# Patient Record
Sex: FEMALE | Race: BLACK OR AFRICAN AMERICAN | NOT HISPANIC OR LATINO | Employment: FULL TIME | URBAN - METROPOLITAN AREA
[De-identification: names, ages, dates, MRNs, and addresses within clinical notes are randomized per-mention and may not be internally consistent; named-entity substitution may affect disease eponyms.]

---

## 2017-05-02 ENCOUNTER — OFFICE VISIT (OUTPATIENT)
Dept: FAMILY MEDICINE | Facility: CLINIC | Age: 27
End: 2017-05-02
Payer: MEDICAID

## 2017-05-02 VITALS
RESPIRATION RATE: 18 BRPM | BODY MASS INDEX: 41.22 KG/M2 | WEIGHT: 247.38 LBS | HEART RATE: 80 BPM | SYSTOLIC BLOOD PRESSURE: 122 MMHG | HEIGHT: 65 IN | DIASTOLIC BLOOD PRESSURE: 70 MMHG | TEMPERATURE: 97 F

## 2017-05-02 DIAGNOSIS — N62 BREAST HYPERTROPHY IN FEMALE: ICD-10-CM

## 2017-05-02 DIAGNOSIS — Z00.00 ANNUAL PHYSICAL EXAM: Primary | ICD-10-CM

## 2017-05-02 DIAGNOSIS — E66.01 MORBID OBESITY WITH BMI OF 40.0-44.9, ADULT: ICD-10-CM

## 2017-05-02 PROCEDURE — 99999 PR PBB SHADOW E&M-EST. PATIENT-LVL III: CPT | Mod: PBBFAC,,, | Performed by: FAMILY MEDICINE

## 2017-05-02 PROCEDURE — 99213 OFFICE O/P EST LOW 20 MIN: CPT | Mod: PBBFAC,PO | Performed by: FAMILY MEDICINE

## 2017-05-02 PROCEDURE — 99395 PREV VISIT EST AGE 18-39: CPT | Mod: S$PBB,,, | Performed by: FAMILY MEDICINE

## 2017-05-02 RX ORDER — IBUPROFEN 800 MG/1
800 TABLET ORAL
COMMUNITY
End: 2019-08-29

## 2017-05-02 RX ORDER — ACETAMINOPHEN 500 MG
500 TABLET ORAL 2 TIMES DAILY
Status: ON HOLD | COMMUNITY
End: 2020-04-16

## 2017-05-02 RX ORDER — PHENTERMINE HYDROCHLORIDE 37.5 MG/1
37.5 TABLET ORAL
Qty: 30 TABLET | Refills: 0 | Status: SHIPPED | OUTPATIENT
Start: 2017-05-02 | End: 2017-06-01

## 2017-05-02 RX ORDER — PANTOPRAZOLE SODIUM 40 MG/1
40 TABLET, DELAYED RELEASE ORAL 2 TIMES DAILY
COMMUNITY
End: 2018-07-03 | Stop reason: SDUPTHER

## 2017-05-02 NOTE — MR AVS SNAPSHOT
[facility]  [address] [facility] [address]  Phone: [phone]                  Anthony Barrientos   2017 2:15 PM   Office Visit    Description:  Female : 1990   Provider:  Tyesha Leggett MD   Department:  [facility]           Diagnoses this Visit        Comments    Annual physical exam    -  Primary     Morbid obesity with BMI of 40.0-44.9, adult                To Do List           Future Appointments        Provider Department Dept Phone    5/10/2017 8:00 AM LABORATORY, [address] [facility] [phone]    2017 2:45 PM Tyesha Leggett MD [facility] [phone]      Goals (5 Years of Data)     None      Follow-Up and Disposition     Return in about 4 weeks (around 2017) for weight recheck.       These Medications        Disp Refills Start End    phentermine (ADIPEX-P) 37.5 mg tablet 30 tablet 0 2017    Take 1 tablet (37.5 mg total) by mouth before breakfast. - Oral    Pharmacy: [facility] [address] [place] [address] [address] AT [facility] Ph #: [phone]         [facility]     [facility] -  Assistance  Unless otherwise directed by your provider, please contact [facility], our nurse care line that is available for  assistance.     Registered nurses in the [facility] provide: appointment scheduling, clinical advisement, health education, and other advisory services.  Call: [phone] (toll free)               Medications           Message regarding Medications     Verify the changes and/or additions to your medication regime listed below are the same as discussed with your clinician today.  If any of these changes or additions are incorrect, please notify your healthcare provider.        START taking these NEW medications        Refills    phentermine (ADIPEX-P) 37.5 mg tablet 0  "   Sig: Take 1 tablet (37.5 mg total) by mouth before breakfast.    Class: Normal    Route: Oral      STOP taking these medications     doxylamine-pyridoxine (DICLEGIS) 10-10 mg TbEC Take 20 mg by mouth every evening.    promethazine (PHENERGAN) 25 MG suppository Place 1 suppository (25 mg total) rectally every 6 (six) hours as needed for Nausea.           Verify that the below list of medications is an accurate representation of the medications you are currently taking.  If none reported, the list may be blank. If incorrect, please contact your healthcare provider. Carry this list with you in case of emergency.           Current Medications     acetaminophen (TYLENOL) 500 MG tablet Take 500 mg by mouth 2 (two) times daily.    ibuprofen (ADVIL,MOTRIN) 800 MG tablet Take 800 mg by mouth once daily.    pantoprazole (PROTONIX) 40 MG tablet Take 40 mg by mouth 2 (two) times daily.    phentermine (ADIPEX-P) 37.5 mg tablet Take 1 tablet (37.5 mg total) by mouth before breakfast.           Clinical Reference Information           Your Vitals Were     BP Pulse Temp Resp    122/70 (BP Location: Left arm, Patient Position: Sitting, BP Method: Manual) 80 97.3 °F (36.3 °C) (Tympanic) 18    Height Weight Last Period BMI    5' 5" (1.651 m) 112.2 kg (247 lb 5.7 oz) 04/25/2017 (Approximate) 41.16 kg/m2      Blood Pressure          Most Recent Value    BP  122/70      Allergies as of 5/2/2017     No Known Allergies      Immunizations Administered on Date of Encounter - 5/2/2017     None      Orders Placed During Today's Visit     Future Labs/Procedures Expected by Expires    CBC auto differential  5/2/2017 7/1/2018    Comprehensive metabolic panel  5/2/2017 7/1/2018    Hemoglobin A1c  5/2/2017 7/1/2018    Insulin, random  5/2/2017 7/1/2018    Lipid panel  5/2/2017 7/1/2018    TSH  5/2/2017 7/1/2018    Vitamin D  5/2/2017 7/1/2018      Language Assistance Services     ATTENTION: Language assistance services are available, free of " charge. Please call [phone].      ATENCIÓN: Si habla español, tiene a neff disposición servicios gratuitos de asistencia lingüística. Llame al [phone].     CHÚ Ý: N?u b?n nói Ti?ng Vi?t, có các d?ch v? h? tr? ngôn ng? mi?n phí dành cho b?n. G?i s? [phone].         [facility] complies with applicable Federal civil rights laws and does not discriminate on the basis of race, color, national origin, age, disability, or sex.

## 2017-05-02 NOTE — PROGRESS NOTES
HISTORY OF PRESENT ILLNESS: Ms. Barrientos comes in today not fasting and without taking medication for annual wellness examination.    END OF LIFE DECISION: He does not have a living will and is not sure about life support.    SCREENINGS:       Cholesterol: February 8, 2016.     FFS/Colonoscopy: Never.     Mammogram:  Never.     GYN Exam: April 2017 with GYN Dr. Surekha julian per patient.     Dexa Scan:  Never.     Eye Exam: August 2016 per patient. She wears glasses.       PPD: Negative in the past.     Immunizations:  Td/Tdap - 2016 per patient.                              Gardasil - Never.                              Zostavax - N./A.                              Pneumovax - In the past.                              Seasonal Flu - Fall 2016 per patient                           ROS:  GENERAL: Denies fever, chills, fatigue or unusual weight change. Appetite normal. Exercise 2 times per week - walks with mother.  Does not monitor diet. Weight 115.3 kg (254 lb 3.1 oz) at April 14, 2016 visit. Interested in weight loss medication.  SKIN: Denies changes in mole, color or texture of skin or easy bruising. Reports itchy rash underneath breast - chronic without help with Lotrimin, Nystatin creams.  HEAD:  Denies headaches or recent head trauma.  EYES: Denies change in vision, pain, diplopia, redness or discharge. Wears seatbelt.  EARS: Denies ear pain, discharge, vertigo or decreased hearing.  NOSE: Denies loss of smell, epistaxis or rhinitis.  MOUTH & THROAT: Denies hoarseness or change in voice. Denies excessive gum bleeding or mouth sores.  Denies sore throat.  NODES: Denies swollen glands.  CHEST: Denies TELLEZ, wheezing, cough, or sputum production. Except shortness of breath with lying down flat on back (due to breast enlargement).  CARDIOVASCULAR: Denies chest pain, PND, orthopnea or reduced exercise tolerance.  Denies palpitations. Denies history of heart disease or valvular heart disease or  "hypertension.  ABDOMEN: Denies diarrhea, constipation, nausea, vomiting, abdominal pain, or blood in stool.  URINARY: Denies flank pain, dysuria or hematuria.  GENITOURINARY: Denies flank pain, dysuria, frequency or hematuria. No change in menses. Performs monthly breast self examinations.  Reports breast pain daily due to breast size; consumes chocolate once per week. Bra size 40H.  ENDOCRINE: Denies thyroid, diabetes or cholesterol problems.  HEME/LYMPH: Denies bleeding problems.  PERIPHERAL VASCULAR:Denies claudication or cyanosis.  MUSCULOSKELETAL: Denies joint stiffness, pain or swelling. Denies edema.  NEUROLOGIC: Denies history of seizures, tremors, paralysis, alteration of gait or coordination.  PSYCHIATRIC: Denies mood swings, depression, anxiety, homicidal or suicidal thoughts. Denies sleep problems.    PE:   VS: /70 (BP Location: Left arm, Patient Position: Sitting, BP Method: Manual)  Pulse 80  Temp 97.3 °F (36.3 °C) (Tympanic)   Resp 18  Ht 5' 5" (1.651 m)  Wt 112.2 kg (247 lb 5.7 oz)  LMP 04/25/2017 (Approximate) Comment: Monthly  Breastfeeding? No  BMI 41.16 kg/m2  APPEARANCE:  Well nourished, well developed female, obese and pleasant, alert and oriented in no acute distress.    HEAD: Nontender. Full range of motion.  EYES: PERRL, conjunctiva pink, lids no edema. She wears glasses.  EARS: External canal patent, no swelling or redness. TM's shiny and clear.  NOSE: Mucosa and turbinates pink, not swollen. No discharge. Nontender sinuses.  THROAT: No pharyngeal erythema or exudate. No stridor.   NECK: Supple, no mass, thyroid not enlarged.  NODES: No cervical, axillary lymph node enlargement.  CHEST: Normal respiratory effort. Lungs clear to auscultation.  CARDIOVASCULAR: Normal S1, S2. No rubs, murmurs or gallops. PMI not displaced. No carotid bruit. Pedal pulses palpable bilaterally. No edema.  ABDOMEN: Bowel sounds present. Not distended. Soft. No tenderness, masses or " organomegaly.  BREAST EXAM: Symmetrical but enlarged breasts. No external lesions, no discharge, no masses palpated.  PELVIC EXAM: Not examined.  RECTAL EXAM: Not examined.  MUSCULOSKELETAL: No joint deformities or stiffness. She is ambulatory without problems.  SKIN: No rashes or suspicious lesions, normal color and turgor.  NEUROLOGIC:   Cranial Nerves: II-XII grossly intact.   DTR's: Knees, Ankles 2+ and equal bilaterally. Gait & Posture: Normal gait and fine motion.  PSYCHIATRIC: Patient alert, oriented x 3. Mood/Affect normal without acute anxiety or depression noted. Judgment/insight good as she makes appropriate decisions on today's examination.     ASSESSMENT:    ICD-10-CM ICD-9-CM    1. Annual physical exam Z00.00 V70.0 Vitamin D      CBC auto differential      TSH      Comprehensive metabolic panel      Lipid panel      Insulin, random      Hemoglobin A1c   2. Breast hypertrophy in female N62 611.1    3. Morbid obesity with BMI of 40.0-44.9, adult E66.01 278.01     Z68.41 V85.41      PLAN:  1.  Age-appropriate counseling-appropriate low-sodium, low-cholesterol, low carbohydrate diet and exercise daily, monthly breast self exam, annual wellness examination.   2.  Patient advised to correspond via My Chart for results.  3.  Continue current medications.  4.  Encouraged patient to check with her insurance regarding necessary process for insurance coverage of breast reduction; then let me know.  5.  Discussed weight-loss medications, potential side effects, surveillance protocol.  She desired to try Phentermine 37.5 mg every morning, #30, 0 refill; medication precautions discussed with patient.  6.  See me in 1 month weight check/follow up.  7.  Advised patient to consider trial application of Cornstarch on skin to help keep intertriginous skin areas dry.

## 2017-05-03 ENCOUNTER — PATIENT MESSAGE (OUTPATIENT)
Dept: FAMILY MEDICINE | Facility: CLINIC | Age: 27
End: 2017-05-03

## 2017-05-09 ENCOUNTER — OFFICE VISIT (OUTPATIENT)
Dept: FAMILY MEDICINE | Facility: CLINIC | Age: 27
End: 2017-05-09
Payer: MEDICAID

## 2017-05-09 ENCOUNTER — LAB VISIT (OUTPATIENT)
Dept: LAB | Facility: HOSPITAL | Age: 27
End: 2017-05-09
Attending: FAMILY MEDICINE
Payer: MEDICAID

## 2017-05-09 VITALS
WEIGHT: 246.5 LBS | DIASTOLIC BLOOD PRESSURE: 72 MMHG | SYSTOLIC BLOOD PRESSURE: 126 MMHG | TEMPERATURE: 97 F | RESPIRATION RATE: 18 BRPM | BODY MASS INDEX: 41.02 KG/M2

## 2017-05-09 DIAGNOSIS — K52.9 AGE (ACUTE GASTROENTERITIS): ICD-10-CM

## 2017-05-09 DIAGNOSIS — N39.0 URINARY TRACT INFECTION WITHOUT HEMATURIA, SITE UNSPECIFIED: ICD-10-CM

## 2017-05-09 DIAGNOSIS — R35.0 URINE FREQUENCY: ICD-10-CM

## 2017-05-09 DIAGNOSIS — Z00.00 ANNUAL PHYSICAL EXAM: ICD-10-CM

## 2017-05-09 DIAGNOSIS — R11.2 NAUSEA AND VOMITING, INTRACTABILITY OF VOMITING NOT SPECIFIED, UNSPECIFIED VOMITING TYPE: ICD-10-CM

## 2017-05-09 PROBLEM — E66.01 MORBID OBESITY WITH BMI OF 40.0-44.9, ADULT: Status: ACTIVE | Noted: 2017-05-09

## 2017-05-09 PROBLEM — N62 BREAST HYPERTROPHY IN FEMALE: Status: ACTIVE | Noted: 2017-05-09

## 2017-05-09 LAB
25(OH)D3+25(OH)D2 SERPL-MCNC: 17 NG/ML
ALBUMIN SERPL BCP-MCNC: 3.5 G/DL
ALP SERPL-CCNC: 113 U/L
ALT SERPL W/O P-5'-P-CCNC: 19 U/L
ANION GAP SERPL CALC-SCNC: 8 MMOL/L
AST SERPL-CCNC: 19 U/L
B-HCG UR QL: NEGATIVE
BASOPHILS # BLD AUTO: 0.04 K/UL
BASOPHILS NFR BLD: 0.6 %
BILIRUB SERPL-MCNC: 0.3 MG/DL
BILIRUB SERPL-MCNC: NORMAL MG/DL
BLOOD URINE, POC: NORMAL
BUN SERPL-MCNC: 9 MG/DL
CALCIUM SERPL-MCNC: 9.1 MG/DL
CHLORIDE SERPL-SCNC: 105 MMOL/L
CHOLEST/HDLC SERPL: 4 {RATIO}
CO2 SERPL-SCNC: 25 MMOL/L
COLOR, POC UA: YELLOW
CREAT SERPL-MCNC: 0.8 MG/DL
CTP QC/QA: YES
DIFFERENTIAL METHOD: ABNORMAL
EOSINOPHIL # BLD AUTO: 0.1 K/UL
EOSINOPHIL NFR BLD: 1 %
ERYTHROCYTE [DISTWIDTH] IN BLOOD BY AUTOMATED COUNT: 14.1 %
EST. GFR  (AFRICAN AMERICAN): >60 ML/MIN/1.73 M^2
EST. GFR  (NON AFRICAN AMERICAN): >60 ML/MIN/1.73 M^2
GLUCOSE SERPL-MCNC: 71 MG/DL
GLUCOSE UR QL STRIP: NORMAL
HCT VFR BLD AUTO: 39 %
HDL/CHOLESTEROL RATIO: 25 %
HDLC SERPL-MCNC: 148 MG/DL
HDLC SERPL-MCNC: 37 MG/DL
HGB BLD-MCNC: 12 G/DL
INSULIN COLLECTION INTERVAL: NORMAL
INSULIN SERPL-ACNC: 12.5 UU/ML
KETONES UR QL STRIP: NORMAL
LDLC SERPL CALC-MCNC: 95.6 MG/DL
LEUKOCYTE ESTERASE URINE, POC: NORMAL
LYMPHOCYTES # BLD AUTO: 1.9 K/UL
LYMPHOCYTES NFR BLD: 31.1 %
MCH RBC QN AUTO: 23.7 PG
MCHC RBC AUTO-ENTMCNC: 30.8 %
MCV RBC AUTO: 77 FL
MONOCYTES # BLD AUTO: 0.3 K/UL
MONOCYTES NFR BLD: 4.2 %
NEUTROPHILS # BLD AUTO: 3.9 K/UL
NEUTROPHILS NFR BLD: 62.9 %
NITRITE, POC UA: NORMAL
NONHDLC SERPL-MCNC: 111 MG/DL
PH, POC UA: 7
PLATELET # BLD AUTO: 233 K/UL
PMV BLD AUTO: 11.6 FL
POTASSIUM SERPL-SCNC: 4 MMOL/L
PROT SERPL-MCNC: 7.6 G/DL
PROTEIN, POC: NORMAL
RBC # BLD AUTO: 5.06 M/UL
SODIUM SERPL-SCNC: 138 MMOL/L
SPECIFIC GRAVITY, POC UA: 1.01
TRIGL SERPL-MCNC: 77 MG/DL
TSH SERPL DL<=0.005 MIU/L-ACNC: 0.84 UIU/ML
UROBILINOGEN, POC UA: NORMAL
WBC # BLD AUTO: 6.17 K/UL

## 2017-05-09 PROCEDURE — 82306 VITAMIN D 25 HYDROXY: CPT

## 2017-05-09 PROCEDURE — 83036 HEMOGLOBIN GLYCOSYLATED A1C: CPT

## 2017-05-09 PROCEDURE — 36415 COLL VENOUS BLD VENIPUNCTURE: CPT | Mod: PO

## 2017-05-09 PROCEDURE — 80053 COMPREHEN METABOLIC PANEL: CPT

## 2017-05-09 PROCEDURE — 99999 PR PBB SHADOW E&M-EST. PATIENT-LVL III: CPT | Mod: PBBFAC,,, | Performed by: FAMILY MEDICINE

## 2017-05-09 PROCEDURE — 80061 LIPID PANEL: CPT

## 2017-05-09 PROCEDURE — 99213 OFFICE O/P EST LOW 20 MIN: CPT | Mod: 25,S$PBB,, | Performed by: FAMILY MEDICINE

## 2017-05-09 PROCEDURE — 85025 COMPLETE CBC W/AUTO DIFF WBC: CPT

## 2017-05-09 PROCEDURE — 84443 ASSAY THYROID STIM HORMONE: CPT

## 2017-05-09 PROCEDURE — 83525 ASSAY OF INSULIN: CPT

## 2017-05-09 RX ORDER — PHENAZOPYRIDINE HYDROCHLORIDE 200 MG/1
200 TABLET, FILM COATED ORAL 3 TIMES DAILY
Qty: 6 TABLET | Refills: 0 | Status: SHIPPED | OUTPATIENT
Start: 2017-05-09 | End: 2017-05-11

## 2017-05-09 RX ORDER — SULFAMETHOXAZOLE AND TRIMETHOPRIM 800; 160 MG/1; MG/1
1 TABLET ORAL 2 TIMES DAILY
Qty: 10 TABLET | Refills: 0 | Status: SHIPPED | OUTPATIENT
Start: 2017-05-09 | End: 2017-05-14

## 2017-05-09 RX ORDER — ONDANSETRON 4 MG/1
4 TABLET, ORALLY DISINTEGRATING ORAL EVERY 8 HOURS PRN
Qty: 21 TABLET | Refills: 0 | Status: SHIPPED | OUTPATIENT
Start: 2017-05-09 | End: 2017-06-20

## 2017-05-09 NOTE — MR AVS SNAPSHOT
[facility]  [address] [facility] [facility] [address]  Phone: [phone]                  Anthony Barrientos   2017 1:30 PM   Office Visit    Description:  Female : 1990   Provider:  Tyesha Leggett MD   Department:  [facility]           Reason for Visit     Nausea     Emesis     Diarrhea     Urinary Tract Infection           Diagnoses this Visit        Comments    Nausea and vomiting, intractability of vomiting not specified, unspecified vomiting type    -  Primary     Urine frequency                To Do List           Future Appointments        Provider Department Dept Phone    2017 2:10 PM LABORATORY, [address] [facility] [phone]    2017 2:45 PM Tyesha Leggett MD [facility] [phone]      Goals (5 Years of Data)     None       These Medications        Disp Refills Start End    sulfamethoxazole-trimethoprim 800-160mg (BACTRIM DS) 800-160 mg Tab 10 tablet 0 2017    Take 1 tablet by mouth 2 (two) times daily. - Oral    Pharmacy: [facility] [address] [place] [address] [place] [address] [facility] Ph #: [phone]       phenazopyridine (PYRIDIUM) 200 MG tablet 6 tablet 0 2017    Take 1 tablet (200 mg total) by mouth 3 (three) times daily. - Oral    [facility] [address] [place], [place] [address] [address] AT [facility] Ph #: [phone]       ondansetron (ZOFRAN-ODT) 4 MG TbDL 21 tablet 0 2017     Take 1 tablet (4 mg total) by mouth every 8 (eight) hours as needed (nausea, vomiting). - Oral    [facility] [address] [place], [place] [address]  [address] [facility] Ph #: [phone]         [facility]     [facility] Care [facility] -  Assistance  Unless otherwise directed by your provider, please contact [facility], our nurse care line  that is available for 24/7 assistance.     Registered nurses in the [facility] provide: appointment scheduling, clinical advisement, health education, and other advisory services.  Call: [phone] (toll free)               Medications           Message regarding Medications     Verify the changes and/or additions to your medication regime listed below are the same as discussed with your clinician today.  If any of these changes or additions are incorrect, please notify your healthcare provider.        START taking these NEW medications        Refills    sulfamethoxazole-trimethoprim 800-160mg (BACTRIM DS) 800-160 mg Tab 0    Sig: Take 1 tablet by mouth 2 (two) times daily.    Class: Normal    Route: Oral    phenazopyridine (PYRIDIUM) 200 MG tablet 0    Sig: Take 1 tablet (200 mg total) by mouth 3 (three) times daily.    Class: Normal    Route: Oral    ondansetron (ZOFRAN-ODT) 4 MG TbDL 0    Sig: Take 1 tablet (4 mg total) by mouth every 8 (eight) hours as needed (nausea, vomiting).    Class: Normal    Route: Oral           Verify that the below list of medications is an accurate representation of the medications you are currently taking.  If none reported, the list may be blank. If incorrect, please contact your healthcare provider. Carry this list with you in case of emergency.           Current Medications     acetaminophen (TYLENOL) 500 MG tablet Take 500 mg by mouth 2 (two) times daily.    ibuprofen (ADVIL,MOTRIN) 800 MG tablet Take 800 mg by mouth once daily.    pantoprazole (PROTONIX) 40 MG tablet Take 40 mg by mouth 2 (two) times daily.    ondansetron (ZOFRAN-ODT) 4 MG TbDL Take 1 tablet (4 mg total) by mouth every 8 (eight) hours as needed (nausea, vomiting).    phenazopyridine (PYRIDIUM) 200 MG tablet Take 1 tablet (200 mg total) by mouth 3 (three) times daily.    phentermine (ADIPEX-P) 37.5 mg tablet Take 1 tablet (37.5 mg total) by mouth before breakfast.    sulfamethoxazole-trimethoprim  800-160mg (BACTRIM DS) 800-160 mg Tab Take 1 tablet by mouth 2 (two) times daily.           Clinical Reference Information           Your Vitals Were     BP Temp Resp Weight Last Period BMI    126/72 97 °F (36.1 °C) (Tympanic) 18 111.8 kg (246 lb 7.6 oz) 04/25/2017 (Approximate) 41.02 kg/m2      Blood Pressure          Most Recent Value    BP  126/72      Allergies as of 5/9/2017     No Known Allergies      Immunizations Administered on Date of Encounter - 5/9/2017     None      Orders Placed During Today's Visit      Normal Orders This Visit    POCT URINE DIPSTICK WITHOUT MICROSCOPE     POCT Urine Pregnancy       Language Assistance Services     ATTENTION: Language assistance services are available, free of charge. Please call [phone].      ATENCIÓN: Si remi nicolas, tiene a neff disposición servicios gratuitos de asistencia lingüística. Llame al [phone].     TEENA Ý: N?u b?n nói Ti?ng Vi?t, có các d?ch v? h? tr? ngôn ng? mi?n phí dành cho b?n. G?i s? [phone].         [facility] complies with applicable Federal civil rights laws and does not discriminate on the basis of race, color, national origin, age, disability, or sex.

## 2017-05-09 NOTE — PROGRESS NOTES
Subjective:       Patient ID: Anthony Barrientos is a 26 y.o. female.    Chief Complaint: Nausea; Emesis; Diarrhea; and Urinary Tract Infection    HPI Comments:   Ms. Barrientos comes in today with complaint of having nausea, vomiting, and non bloody diarrhea since Friday.  She states her 6-month old infant has similar symptoms.  She states her symptoms eased up on Saturday only to return on Sunday but with improved diarrhea.  She states Friday experiencing fever and chills.  She states urine frequency with dysuria starting yesterday.  She has history of chronic back pain.  She reports having slight headache without abdominal pain, change of appetite, shortness of breath, cough, wheezing, chest pain, palpitations, hematuria.  She states she has an IUD but reports has irregular menses; she requests pregnancy test.      Urine dipstick ordered by me today - yellow color, specific gravity 1.015, pH 7, 1+ leukocytes, trace protein, otherwise unremarkable.    Urine pregnancy test ordered by me today - (-).    Nausea   Associated symptoms include headaches, nausea and vomiting. Pertinent negatives include no abdominal pain, arthralgias, chest pain, chills or fever.   Emesis    Associated symptoms include diarrhea and headaches. Pertinent negatives include no abdominal pain, arthralgias, chest pain, chills or fever.   Diarrhea    Associated symptoms include headaches and vomiting. Pertinent negatives include no abdominal pain, arthralgias, chills or fever.   Urinary Tract Infection    Associated symptoms include frequency, nausea and vomiting. Pertinent negatives include no chills, hematuria or constipation.     Review of Systems   Constitutional: Negative for appetite change, chills and fever.   Eyes: Negative for discharge and visual disturbance.   Respiratory: Negative for chest tightness and wheezing.    Cardiovascular: Negative for chest pain and palpitations.   Gastrointestinal: Positive for diarrhea, nausea and vomiting.  Negative for abdominal pain, blood in stool and constipation.   Genitourinary: Positive for dysuria and frequency. Negative for difficulty urinating, hematuria and menstrual problem.   Musculoskeletal: Positive for back pain. Negative for arthralgias.        Chronic.   Neurological: Positive for headaches.       Objective:      Physical Exam   Constitutional: She is oriented to person, place, and time. She appears well-developed and well-nourished. No distress.   Pleasant.   Neck: Normal range of motion. Neck supple. No thyromegaly present.   Cardiovascular: Normal rate, regular rhythm, normal heart sounds and intact distal pulses.    No murmur heard.  Pulmonary/Chest: Effort normal and breath sounds normal. No respiratory distress. She has no wheezes.   Abdominal: Soft. Bowel sounds are normal. She exhibits no distension and no mass. There is tenderness. There is no rebound and no guarding.   Slightly tender at upper and mid to left lower abdomen without acute abdomen noted.  No CVA tenderness noted.   Musculoskeletal: Normal range of motion. She exhibits no edema or tenderness.   She is ambulatory without problems.   Lymphadenopathy:     She has no cervical adenopathy.   Neurological: She is alert and oriented to person, place, and time.   Skin: She is not diaphoretic.   Psychiatric: She has a normal mood and affect. Her behavior is normal. Judgment and thought content normal.   Vitals reviewed.      Assessment:       1. Urinary tract infection without hematuria, site unspecified    2. AGE (acute gastroenteritis)    3. Urine frequency    4. Nausea and vomiting, intractability of vomiting not specified, unspecified vomiting type        Plan:       1.  Bactrim DS twice daily x 5 days.  2.  Pyridium 200 mg three times daily x 2 days.  3.  Zofran ODT 4 mg every 8 hours prn nausea, vomiting, #21, 0 refills; medication precautions discussed with patient.  4.  Diet: Liquid to bland to regular as tolerated.  5.  Continue  current medications, follow low sodium, low cholesterol, low carb diet, daily walks.  6.  Follow up sooner if no improvement or worsening symptoms noted.  7.  Work excuse for 5/8/2017 - 5/9/2017 with return 5/10/2017.

## 2017-05-10 LAB
ESTIMATED AVG GLUCOSE: 117 MG/DL
HBA1C MFR BLD HPLC: 5.7 %

## 2017-06-20 ENCOUNTER — OFFICE VISIT (OUTPATIENT)
Dept: FAMILY MEDICINE | Facility: CLINIC | Age: 27
End: 2017-06-20
Payer: MEDICAID

## 2017-06-20 VITALS
HEIGHT: 65 IN | TEMPERATURE: 97 F | RESPIRATION RATE: 18 BRPM | HEART RATE: 108 BPM | BODY MASS INDEX: 40.91 KG/M2 | WEIGHT: 245.56 LBS | OXYGEN SATURATION: 98 % | SYSTOLIC BLOOD PRESSURE: 116 MMHG | DIASTOLIC BLOOD PRESSURE: 82 MMHG

## 2017-06-20 DIAGNOSIS — E55.9 VITAMIN D DEFICIENCY: ICD-10-CM

## 2017-06-20 DIAGNOSIS — N62 BREAST HYPERTROPHY IN FEMALE: ICD-10-CM

## 2017-06-20 DIAGNOSIS — M54.9 BACK PAIN, UNSPECIFIED BACK LOCATION, UNSPECIFIED BACK PAIN LATERALITY, UNSPECIFIED CHRONICITY: ICD-10-CM

## 2017-06-20 DIAGNOSIS — E66.01 MORBID OBESITY WITH BMI OF 40.0-44.9, ADULT: ICD-10-CM

## 2017-06-20 DIAGNOSIS — M54.2 NECK PAIN: ICD-10-CM

## 2017-06-20 PROCEDURE — 99214 OFFICE O/P EST MOD 30 MIN: CPT | Mod: S$PBB,,, | Performed by: FAMILY MEDICINE

## 2017-06-20 PROCEDURE — 99999 PR PBB SHADOW E&M-EST. PATIENT-LVL III: CPT | Mod: PBBFAC,,, | Performed by: FAMILY MEDICINE

## 2017-06-20 PROCEDURE — 99213 OFFICE O/P EST LOW 20 MIN: CPT | Mod: PBBFAC,PO | Performed by: FAMILY MEDICINE

## 2017-06-20 RX ORDER — ERGOCALCIFEROL 1.25 MG/1
50000 CAPSULE ORAL
Qty: 4 CAPSULE | Refills: 5 | Status: SHIPPED | OUTPATIENT
Start: 2017-06-20 | End: 2017-09-21

## 2017-06-20 NOTE — PROGRESS NOTES
Subjective:       Patient ID: Anthony Barrientos is a 26 y.o. female.    Chief Complaint: weight recheck    Ms. Barrientos comes in today for 1-month weight recheck.  She was started on phentermine 37.5 mg daily on May 2, 2017 to help her with weight loss assistance but states 2 weeks after taken medication she stopped taking it due to heart rate levels of 140's.    She states she has been working out 3 times per week with a  since last week.  She states she monitors her diet.  She denies having chest pain, palpitations, leg swelling; shortness of breath, cough, wheezing; abdominal pain, nausea, vomiting, diarrhea, constipation; unusual urinary symptoms; headaches; fatigue.    At this time, she states she desires to see what her diet and exercise alone will do for her in terms of losing weight.    She states she tried massage therapy at [email or web address] for 2 months treatment of for chronic low back and neck pain but reports developed intense soreness with occasional right arm pain; she states massage therapy was not helpful.  She states she desires to end her membership at [email or web address] as therapy has not been helpful but states she needs letter of  medical necessity to end membership.      Labs:                   WBC                      6.17                05/09/2017                 HGB                      12.0                05/09/2017                 HCT                      39.0                05/09/2017                 PLT                      233                 05/09/2017                 LDLCALC                  95.6                05/09/2017                     CHOL                     148                 05/09/2017                 TRIG                     77                  05/09/2017                 HDL                      37 (L)              05/09/2017                 ALT                      19                  05/09/2017                 AST                      19                  05/09/2017                  NA                       138                 05/09/2017                 K                        4.0                 05/09/2017                 CL                       105                 05/09/2017                 CREATININE               0.8                 05/09/2017                 BUN                      9                   05/09/2017                 CO2                      25                  05/09/2017                 TSH                      0.843               05/09/2017                 HGBA1C                   5.7                 05/09/2017                 Vit D, 25-Hydroxy                  17            05/09/2017      Review of Systems   Constitutional: Positive for activity change. Negative for appetite change and fatigue.        Weight 111.8 kg (246 lb 7.6 oz) at May 9, 2017 visit. See history of present illness.   Respiratory: Negative for cough, shortness of breath and wheezing.    Cardiovascular: Negative for chest pain, palpitations and leg swelling.   Gastrointestinal: Negative for abdominal pain, constipation, diarrhea, nausea and vomiting.   Endocrine:        See history of present illness.     Genitourinary: Negative for difficulty urinating.   Musculoskeletal: Positive for back pain and neck pain.   Neurological: Negative for headaches.       Objective:      Physical Exam   Constitutional: She is oriented to person, place, and time. She appears well-developed and well-nourished. No distress.   Pleasant.   Neck: Normal range of motion. Neck supple. No thyromegaly present.   Cardiovascular: Normal rate, regular rhythm, normal heart sounds and intact distal pulses.    No murmur heard.  Pulmonary/Chest: Effort normal and breath sounds normal. No respiratory distress. She has no wheezes.   Abdominal: Soft. Bowel sounds are normal. She exhibits no distension and no mass. There is no tenderness. There is no rebound and no guarding.   Musculoskeletal: Normal range of motion. She exhibits no  edema or tenderness.   She is ambulatory without problems. Non tender back.   Lymphadenopathy:     She has no cervical adenopathy.   Neurological: She is alert and oriented to person, place, and time.   Skin: She is not diaphoretic.   Psychiatric: She has a normal mood and affect. Her behavior is normal. Judgment and thought content normal.   Vitals reviewed.      Assessment:       1. Back pain, unspecified back location, unspecified back pain laterality, unspecified chronicity    2. Neck pain    3. Vitamin D deficiency    4. Breast hypertrophy in female    5. Morbid obesity with BMI of 40.0-44.9, adult        Plan:       1.  Continue current medications (except stop Phentermine), follow low sodium, low cholesterol, low carb diet, daily walks.  2.  Letter requesting termination from membership at [facility] due to ineffectiveness of treatment for back and neck pain.  3.  Restart Vitamin D 50,000 units weekly, #4, 5 refills.  4.  Work on prior authorization for breast reduction consultation/referral.  5.  Follow up sooner if no improvement or worsening symptoms noted.

## 2017-06-27 PROBLEM — M54.2 NECK PAIN: Status: ACTIVE | Noted: 2017-06-27

## 2017-06-27 PROBLEM — M54.9 BACK PAIN: Status: ACTIVE | Noted: 2017-06-27

## 2017-06-27 PROBLEM — E55.9 VITAMIN D DEFICIENCY: Status: ACTIVE | Noted: 2017-06-27

## 2017-09-21 ENCOUNTER — TELEPHONE (OUTPATIENT)
Dept: FAMILY MEDICINE | Facility: CLINIC | Age: 27
End: 2017-09-21

## 2017-09-21 ENCOUNTER — OFFICE VISIT (OUTPATIENT)
Dept: FAMILY MEDICINE | Facility: CLINIC | Age: 27
End: 2017-09-21
Payer: MEDICAID

## 2017-09-21 VITALS
HEART RATE: 88 BPM | BODY MASS INDEX: 39.92 KG/M2 | OXYGEN SATURATION: 99 % | HEIGHT: 65 IN | SYSTOLIC BLOOD PRESSURE: 132 MMHG | TEMPERATURE: 97 F | RESPIRATION RATE: 18 BRPM | DIASTOLIC BLOOD PRESSURE: 84 MMHG | WEIGHT: 239.63 LBS

## 2017-09-21 DIAGNOSIS — J30.2 SEASONAL ALLERGIC RHINITIS, UNSPECIFIED CHRONICITY, UNSPECIFIED TRIGGER: ICD-10-CM

## 2017-09-21 DIAGNOSIS — J02.9 PHARYNGITIS, UNSPECIFIED ETIOLOGY: Primary | ICD-10-CM

## 2017-09-21 PROCEDURE — 99999 PR PBB SHADOW E&M-EST. PATIENT-LVL III: CPT | Mod: PBBFAC,,, | Performed by: FAMILY MEDICINE

## 2017-09-21 PROCEDURE — 96372 THER/PROPH/DIAG INJ SC/IM: CPT | Mod: PBBFAC,PO

## 2017-09-21 PROCEDURE — 99213 OFFICE O/P EST LOW 20 MIN: CPT | Mod: PBBFAC,PO,25 | Performed by: FAMILY MEDICINE

## 2017-09-21 PROCEDURE — 3008F BODY MASS INDEX DOCD: CPT | Mod: ,,, | Performed by: FAMILY MEDICINE

## 2017-09-21 PROCEDURE — 99213 OFFICE O/P EST LOW 20 MIN: CPT | Mod: 25,S$PBB,, | Performed by: FAMILY MEDICINE

## 2017-09-21 RX ORDER — FLUTICASONE PROPIONATE 50 MCG
2 SPRAY, SUSPENSION (ML) NASAL DAILY PRN
Qty: 16 G | Refills: 5 | Status: SHIPPED | OUTPATIENT
Start: 2017-09-21 | End: 2020-01-16 | Stop reason: SDUPTHER

## 2017-09-21 RX ORDER — BETAMETHASONE SODIUM PHOSPHATE AND BETAMETHASONE ACETATE 3; 3 MG/ML; MG/ML
12 INJECTION, SUSPENSION INTRA-ARTICULAR; INTRALESIONAL; INTRAMUSCULAR; SOFT TISSUE
Status: COMPLETED | OUTPATIENT
Start: 2017-09-21 | End: 2017-09-21

## 2017-09-21 RX ORDER — AMOXICILLIN 875 MG/1
875 TABLET, FILM COATED ORAL 2 TIMES DAILY
Qty: 20 TABLET | Refills: 0 | Status: SHIPPED | OUTPATIENT
Start: 2017-09-21 | End: 2017-10-01

## 2017-09-21 RX ADMIN — BETAMETHASONE ACETATE AND BETAMETHASONE SODIUM PHOSPHATE 12 MG: 3; 3 INJECTION, SUSPENSION INTRA-ARTICULAR; INTRALESIONAL; INTRAMUSCULAR; SOFT TISSUE at 02:09

## 2017-09-21 NOTE — TELEPHONE ENCOUNTER
----- Message from Sandra Jane sent at 9/21/2017  7:16 AM CDT -----  Contact: pt  Pt has possible strep,, pt is requesting to be seen today,,,no other providers avavible til 10/02,,, please call pt back at [phone]

## 2017-09-21 NOTE — PROGRESS NOTES
Subjective:       Patient ID: Anthony Barrientos is a 27 y.o. female.    Chief Complaint: Sore Throat    Ms. Barrientos comes in today with complaint of having sore throat, pain and difficulty with swallowing for 3 days.  She states she feels intermittent hot to cold intolerance.  She reports having chronic back pain.  She denies having headache, sinus or ocular symptoms, fatigue, change of appetite, chest pain, palpitations, leg swelling, shortness of breath, cough, wheezing, abdominal pain, nausea, vomiting, diarrhea.  She states her  was diagnosed with strep throat 2 weeks ago.  She does not smoke.    Current Outpatient Prescriptions:  acetaminophen (TYLENOL) 500 MG tablet, Take 500 mg by mouth 2 (two) times daily.  ibuprofen (ADVIL,MOTRIN) 800 MG tablet, Take 800 mg by mouth once daily.  pantoprazole (PROTONIX) 40 MG tablet, Take 40 mg by mouth 2 (two) times daily.            Sore Throat    Pertinent negatives include no abdominal pain, congestion, coughing, diarrhea, headaches, shortness of breath or vomiting.     Review of Systems   Constitutional: Negative for appetite change, chills, fatigue and fever.   HENT: Positive for sore throat. Negative for congestion, postnasal drip, rhinorrhea, sinus pain, sinus pressure and sneezing.    Eyes: Negative for pain, discharge, redness and itching.   Respiratory: Negative for cough, shortness of breath and wheezing.    Cardiovascular: Negative for chest pain, palpitations and leg swelling.   Gastrointestinal: Negative for abdominal pain, diarrhea, nausea and vomiting.   Endocrine: Positive for cold intolerance and heat intolerance.   Musculoskeletal: Positive for back pain. Negative for myalgias.        Chronic.   Skin: Negative for rash.   Neurological: Negative for headaches.       Objective:      Physical Exam   Constitutional: She is oriented to person, place, and time. She appears well-developed and well-nourished. No distress.   Pleasant.   HENT:   Head:  Normocephalic and atraumatic.   Right Ear: External ear normal.   Left Ear: External ear normal.   Nose: Nose normal.   Mouth/Throat: Oropharyngeal exudate present.   Non tender sinuses.  TM's shiny and clear. Nasal mucosa pink, slightly congested without drainage noted.  Oropharynx red with exudate without enlarged tonsils noted.   Eyes: Conjunctivae and EOM are normal. Pupils are equal, round, and reactive to light. Right eye exhibits no discharge. Left eye exhibits no discharge.   Neck: Normal range of motion. Neck supple. No thyromegaly present.   Cardiovascular: Normal rate, regular rhythm, normal heart sounds and intact distal pulses.    No murmur heard.  Pulmonary/Chest: Effort normal and breath sounds normal. No respiratory distress. She has no wheezes.   Abdominal: Soft. Bowel sounds are normal. She exhibits no distension and no mass. There is no tenderness. There is no rebound and no guarding.   Musculoskeletal: Normal range of motion. She exhibits no edema or tenderness.   She is ambulatory without problems.    Lymphadenopathy:     She has no cervical adenopathy.   Neurological: She is alert and oriented to person, place, and time.   Skin: No rash noted. She is not diaphoretic.   Psychiatric: She has a normal mood and affect. Her behavior is normal. Judgment and thought content normal.   Vitals reviewed.      Assessment:       1. Pharyngitis, unspecified etiology    2. Seasonal allergic rhinitis, unspecified chronicity, unspecified trigger        Plan:       1.  Amoxicillin 875 mg twice daily for 10 days.  2.  Flonase 2 sprays each nostril daily prn, #1, 5 refills.  3.  Celestone 12 mg IM X 1 today.  4.  Continue current medications, follow low sodium, low cholesterol, low carb diet, daily walks.  5.  Follow up sooner if no improvement or worsening symptoms noted.  6.  Work excuse for September 22, 2017 with return September 24, 2017.

## 2018-07-02 ENCOUNTER — TELEPHONE (OUTPATIENT)
Dept: FAMILY MEDICINE | Facility: CLINIC | Age: 28
End: 2018-07-02

## 2018-07-02 NOTE — TELEPHONE ENCOUNTER
----- Message from Niya Encarnacion sent at 7/2/2018  1:10 PM CDT -----  Contact: pt  Please give pt a call at ..[phone] (home) regarding an appt being scheduled for abdominal soreness and tenderness

## 2018-07-03 ENCOUNTER — OFFICE VISIT (OUTPATIENT)
Dept: FAMILY MEDICINE | Facility: CLINIC | Age: 28
End: 2018-07-03
Payer: MEDICAID

## 2018-07-03 VITALS
BODY MASS INDEX: 38.76 KG/M2 | DIASTOLIC BLOOD PRESSURE: 78 MMHG | TEMPERATURE: 99 F | RESPIRATION RATE: 16 BRPM | OXYGEN SATURATION: 99 % | SYSTOLIC BLOOD PRESSURE: 118 MMHG | HEIGHT: 66 IN | WEIGHT: 241.19 LBS | HEART RATE: 85 BPM

## 2018-07-03 DIAGNOSIS — Z87.11 HISTORY OF PEPTIC ULCER: ICD-10-CM

## 2018-07-03 DIAGNOSIS — K21.9 GASTROESOPHAGEAL REFLUX DISEASE, ESOPHAGITIS PRESENCE NOT SPECIFIED: Primary | ICD-10-CM

## 2018-07-03 PROCEDURE — 99214 OFFICE O/P EST MOD 30 MIN: CPT | Mod: S$PBB,,, | Performed by: FAMILY MEDICINE

## 2018-07-03 PROCEDURE — 99213 OFFICE O/P EST LOW 20 MIN: CPT | Mod: PBBFAC,PO | Performed by: FAMILY MEDICINE

## 2018-07-03 PROCEDURE — 99999 PR PBB SHADOW E&M-EST. PATIENT-LVL III: CPT | Mod: PBBFAC,,, | Performed by: FAMILY MEDICINE

## 2018-07-03 RX ORDER — PANTOPRAZOLE SODIUM 40 MG/1
40 TABLET, DELAYED RELEASE ORAL DAILY PRN
Qty: 30 TABLET | Refills: 2 | Status: SHIPPED | OUTPATIENT
Start: 2018-07-03 | End: 2018-11-15 | Stop reason: SDUPTHER

## 2018-07-03 NOTE — PROGRESS NOTES
Anthony Barrientos    Chief Complaint   Patient presents with    Abdominal Pain       History of Present Illness:   Ms. Barrientos comes in today with complaint of having abdominal tenderness, soreness mainly at her right upper quadrant and epigastric areas for 3 days.  She describes pain as constant with varying severity.  She denies associated nausea, vomiting, diarrhea, constipation but admits having more frequent stools recently.  She also denies associated fever, chills, change of appetite, chest pain, cough, wheezing, unusual urinary symptoms, reflux symptoms.  She reports slight fatigue.  She also reports having palpitations with shortness of breath yesterday while walking.  She states pain is worse with moving around.    She states she was evaluated at [facility] last night at which time she states workup included unremarkable gallbladder ultrasound, CT chest scan, and urinalysis with abnormal D-dimer test.  She states she was given tramadol and Robaxin prescriptions to take for pain but states she has not yet taken medications.    She states she has not taken Protonix for peptic ulcer disease for at least one month after taking it daily for years.  She states approximately 1 week ago she stopped eating fried foods and caffeine but increased her consumption of vegetables.  She denies tobacco, alcohol, or illicit drug use.  She states she had colposcopy performed by GYN Dr. Surekha Hernandez on June 28, 2018.    She also reports having slight squeezing sensation at her right arm with occasional tingling in all 5 of her right fingers.  She is right handed.  She also reports having chronic but intermittent back pain due to having enlarged breasts.          Current Outpatient Prescriptions   Medication Sig    acetaminophen (TYLENOL) 500 MG tablet Take 500 mg by mouth 2 (two) times daily.    fluticasone (FLONASE) 50 mcg/actuation nasal spray 2 sprays by Each Nare route daily as needed.    ibuprofen  (ADVIL,MOTRIN) 800 MG tablet Take 800 mg by mouth once daily.    pantoprazole (PROTONIX) 40 MG tablet Take 40 mg by mouth 2 (two) times daily.         Review of Systems   Constitutional: Positive for activity change and fatigue. Negative for appetite change, chills and fever.   Respiratory: Negative for cough, shortness of breath and wheezing.    Cardiovascular: Negative for chest pain, palpitations and leg swelling.   Gastrointestinal: Positive for abdominal pain. Negative for constipation, diarrhea, nausea and vomiting.        See history of present illness.   Genitourinary: Negative for difficulty urinating.   Musculoskeletal: Positive for back pain.        Chronic.   Neurological: Negative for headaches.        Occasional tingling at right arm.       Objective:  Physical Exam   Constitutional: She is oriented to person, place, and time. She appears well-developed and well-nourished. No distress.   Pleasant.   HENT:   Mouth/Throat: Oropharynx is clear and moist. No oropharyngeal exudate.   Neck: Normal range of motion. Neck supple. No thyromegaly present.   Cardiovascular: Normal rate, regular rhythm, normal heart sounds and intact distal pulses.    No murmur heard.  Pulmonary/Chest: Effort normal and breath sounds normal. No respiratory distress. She has no wheezes.   Abdominal: Soft. Bowel sounds are normal. She exhibits no distension and no mass. There is no tenderness. There is no rebound and no guarding.   No CVA tenderness noted. Tender at epigastric > right upper quadrants without acute abdomen noted.   Musculoskeletal: Normal range of motion. She exhibits no edema or tenderness.   She is ambulatory without problems. Non tender back.   Lymphadenopathy:     She has no cervical adenopathy.   Neurological: She is alert and oriented to person, place, and time.   Skin: She is not diaphoretic.   Psychiatric: She has a normal mood and affect. Her behavior is normal. Judgment and thought content normal.   Vitals  reviewed.      ASSESSMENT:  1. Gastroesophageal reflux disease, esophagitis presence not specified    2. History of peptic ulcer        PLAN:  Anthony was seen today for abdominal pain.    Diagnoses and all orders for this visit:    Gastroesophageal reflux disease, esophagitis presence not specified  -     pantoprazole (PROTONIX) 40 MG tablet; Take 1 tablet (40 mg total) by mouth daily as needed.    History of peptic ulcer    Restart Protonix 40 mg daily prn.   Avoid NSAID's due history of PUD. Okay to proceed with Robaxin for help with arm and back inflammation.  Continue current medications, follow low sodium, low cholesterol, low carb diet, daily walks.  Follow-up if symptoms worsen or fail to improve.

## 2018-09-24 ENCOUNTER — TELEPHONE (OUTPATIENT)
Dept: FAMILY MEDICINE | Facility: CLINIC | Age: 28
End: 2018-09-24

## 2018-09-24 NOTE — TELEPHONE ENCOUNTER
----- Message from Maren Myriam sent at 9/24/2018  9:35 AM CDT -----  Pt at [phone]//states she went to the [facility] er this morning for dizziness and headache//was told to followup with her PCP//would like to know if possible to have an appt today//pt has Medicaid//please call//adam/carmen

## 2018-09-25 ENCOUNTER — OFFICE VISIT (OUTPATIENT)
Dept: FAMILY MEDICINE | Facility: CLINIC | Age: 28
End: 2018-09-25
Payer: MEDICAID

## 2018-09-25 VITALS
DIASTOLIC BLOOD PRESSURE: 76 MMHG | BODY MASS INDEX: 39.41 KG/M2 | HEART RATE: 96 BPM | WEIGHT: 245.25 LBS | TEMPERATURE: 99 F | HEIGHT: 66 IN | OXYGEN SATURATION: 98 % | SYSTOLIC BLOOD PRESSURE: 114 MMHG

## 2018-09-25 DIAGNOSIS — J30.2 SEASONAL ALLERGIC RHINITIS, UNSPECIFIED TRIGGER: ICD-10-CM

## 2018-09-25 DIAGNOSIS — R42 VERTIGO: Primary | ICD-10-CM

## 2018-09-25 PROCEDURE — 99999 PR PBB SHADOW E&M-EST. PATIENT-LVL III: CPT | Mod: PBBFAC,,, | Performed by: FAMILY MEDICINE

## 2018-09-25 PROCEDURE — 99213 OFFICE O/P EST LOW 20 MIN: CPT | Mod: PBBFAC,PO | Performed by: FAMILY MEDICINE

## 2018-09-25 PROCEDURE — 99213 OFFICE O/P EST LOW 20 MIN: CPT | Mod: S$PBB,,, | Performed by: FAMILY MEDICINE

## 2018-09-25 RX ORDER — KETOROLAC TROMETHAMINE 10 MG/1
10 TABLET, FILM COATED ORAL EVERY 6 HOURS
COMMUNITY
End: 2019-02-12

## 2018-09-25 NOTE — LETTER
September 25, 2018      [facility]  [address] [place] [address]  Phone: [phone]       Patient: Anthony Barrientos   YOB: 1990  Date of Visit: 09/25/2018    To Whom It May Concern:    Jorge Barrientos  was at [facility] on 09/25/2018. Please excuse her from 09/24/2018-09/26/2018. She may return to work on 09/26/2018 with no restrictions. If you have any questions or concerns, or if I can be of further assistance, please do not hesitate to contact me.    Sincerely,    Tyesha Leggett MD

## 2018-09-25 NOTE — PROGRESS NOTES
Anthony Barrientos    Chief Complaint   Patient presents with    ER Follow up       History of Present Illness:   Ms. Barrientos comes in today for ER follow-up.  She states she was evaluated at [facility] ER on September 24, 2018 DOS for bad headache.  She states her blood pressure was slightly elevated.  She states the she had normal urine pregnancy test.  She states she was given Norco and Toradol shot while in the ER and was given prescription for home use for meclizine for dizziness (which she states helps) we and was told to follow up with me.  She states she continues to take Toradol as well for headache with help.    She reports having bad headaches, located at the top of her head and frontally, for 2 weeks.  She reports also having occasional dizziness (swimming sensation) with mild nausea without vomiting, diarrhea, constipation, chest pain, leg swelling, shortness of breath, cough, wheezing, sinus symptoms, photophobia.  She reports having chronic palpitation and also reports having rare phonophobia, occasional blurred vision.  She denies having head trauma, anxiety, depression, homicidal or suicidal thoughts.  She states symptoms are new for her.    She states she rarely consumes caffeine products.  She denies tobacco, alcohol, or illicit drug use.  She reports no known family history of brain tumors, migraine headaches.  She states her last eye exam was approximately 6 months ago; she wears contacts.    She reports headache at 6/10 on pain scale today.            Current Outpatient Medications   Medication Sig    acetaminophen (TYLENOL) 500 MG tablet Take 500 mg by mouth 2 (two) times daily.    fluticasone (FLONASE) 50 mcg/actuation nasal spray 2 sprays by Each Nare route daily as needed.    ibuprofen (ADVIL,MOTRIN) 800 MG tablet Take 800 mg by mouth once daily.    ketorolac (TORADOL) 10 mg tablet Take 10 mg by mouth every 6 (six) hours.    meclizine HCl (MEDI-MECLIZINE ORAL) Take by mouth once  daily.    pantoprazole (PROTONIX) 40 MG tablet Take 1 tablet (40 mg total) by mouth daily as needed.         Review of Systems   Constitutional: Negative for activity change, appetite change, chills, fatigue and fever.   HENT: Negative for congestion, postnasal drip, rhinorrhea, sinus pressure, sinus pain, sneezing and sore throat.         Occasional phonophobia.   Eyes: Positive for visual disturbance. Negative for photophobia, pain, discharge, redness and itching.   Respiratory: Negative for cough, shortness of breath and wheezing.    Cardiovascular: Negative for chest pain, palpitations and leg swelling.   Gastrointestinal: Positive for nausea. Negative for abdominal pain, constipation, diarrhea and vomiting.   Genitourinary: Negative for difficulty urinating.   Musculoskeletal: Negative for back pain.   Neurological: Positive for dizziness and headaches. Negative for syncope.   Psychiatric/Behavioral: Negative for dysphoric mood and suicidal ideas. The patient is not nervous/anxious.         Negative for homicidal ideas.       Objective:  Physical Exam   Constitutional: She is oriented to person, place, and time. She appears well-developed and well-nourished. No distress.   Pleasant.   HENT:   Head: Normocephalic and atraumatic.   Right Ear: External ear normal.   Left Ear: External ear normal.   Nose: Nose normal.   Mouth/Throat: Oropharynx is clear and moist. No oropharyngeal exudate.   Non tender sinuses.  TM's shiny and clear. Nasal mucosa pink, slightly congested without drainage noted.     Eyes: Conjunctivae and EOM are normal. Pupils are equal, round, and reactive to light. Right eye exhibits no discharge. Left eye exhibits no discharge.   Neck: Normal range of motion. Neck supple. No thyromegaly present.   Cardiovascular: Normal rate, regular rhythm, normal heart sounds and intact distal pulses.   No murmur heard.  Pulmonary/Chest: Effort normal and breath sounds normal. No respiratory distress. She has  no wheezes.   Abdominal: Soft. Bowel sounds are normal. She exhibits no distension and no mass. There is no tenderness. There is no rebound and no guarding.   Musculoskeletal: Normal range of motion. She exhibits no edema or tenderness.   She is ambulatory without problems.    Lymphadenopathy:     She has no cervical adenopathy.   Neurological: She is alert and oriented to person, place, and time.   Slight dizziness noted with rapid head range of motion noted.   Skin: She is not diaphoretic.   Psychiatric: She has a normal mood and affect. Her behavior is normal. Judgment and thought content normal.   Vitals reviewed.      ASSESSMENT:  1. Vertigo    2. Seasonal allergic rhinitis, unspecified trigger        PLAN:  Anthony was seen today for er follow up.    Diagnoses and all orders for this visit:    Vertigo    Seasonal allergic rhinitis, unspecified trigger       Advised patient to use OTC Flonase (which she has at home) for symptoms.  Maintain good hydration status.  Continue current medications, follow low sodium, low cholesterol, low carb diet, daily walks.  Work excuse for 9/24/2018 - 9/25/2018 with return 9/26/2018.  Follow-up if symptoms worsen or fail to improve.

## 2018-11-15 DIAGNOSIS — K21.9 GASTROESOPHAGEAL REFLUX DISEASE, ESOPHAGITIS PRESENCE NOT SPECIFIED: ICD-10-CM

## 2018-11-15 RX ORDER — PANTOPRAZOLE SODIUM 40 MG/1
TABLET, DELAYED RELEASE ORAL
Qty: 30 TABLET | Refills: 0 | Status: SHIPPED | OUTPATIENT
Start: 2018-11-15 | End: 2019-01-19 | Stop reason: SDUPTHER

## 2019-01-19 DIAGNOSIS — K21.9 GASTROESOPHAGEAL REFLUX DISEASE, ESOPHAGITIS PRESENCE NOT SPECIFIED: ICD-10-CM

## 2019-01-22 RX ORDER — PANTOPRAZOLE SODIUM 40 MG/1
TABLET, DELAYED RELEASE ORAL
Qty: 30 TABLET | Refills: 0 | Status: SHIPPED | OUTPATIENT
Start: 2019-01-22 | End: 2019-05-06 | Stop reason: SDUPTHER

## 2019-02-06 ENCOUNTER — TELEPHONE (OUTPATIENT)
Dept: RHEUMATOLOGY | Facility: CLINIC | Age: 29
End: 2019-02-06

## 2019-02-06 NOTE — TELEPHONE ENCOUNTER
----- Message from Colette Gama sent at 2/6/2019  8:19 AM CST -----  Contact: pt   Pt is requesting a call back from the nurse in regards to her getting in to see the    [phone] (home)

## 2019-02-06 NOTE — TELEPHONE ENCOUNTER
Spoke with patient . Message was sent to wrong doctor. She is trying to schedule an eye exam with Ophthalmology. Patient transferred to

## 2019-02-07 ENCOUNTER — OFFICE VISIT (OUTPATIENT)
Dept: OPHTHALMOLOGY | Facility: CLINIC | Age: 29
End: 2019-02-07
Payer: COMMERCIAL

## 2019-02-07 DIAGNOSIS — H52.203 MYOPIA OF BOTH EYES WITH ASTIGMATISM: Primary | ICD-10-CM

## 2019-02-07 DIAGNOSIS — H52.13 MYOPIA OF BOTH EYES WITH ASTIGMATISM: Primary | ICD-10-CM

## 2019-02-07 PROCEDURE — 99999 PR PBB SHADOW E&M-EST. PATIENT-LVL I: CPT | Mod: PBBFAC,,, | Performed by: OPTOMETRIST

## 2019-02-07 PROCEDURE — 92310 PR CONTACT LENS FITTING (NO CHANGE): ICD-10-PCS | Mod: S$GLB,,, | Performed by: OPTOMETRIST

## 2019-02-07 PROCEDURE — 92310 CONTACT LENS FITTING OU: CPT | Mod: S$GLB,,, | Performed by: OPTOMETRIST

## 2019-02-07 PROCEDURE — 99999 PR PBB SHADOW E&M-EST. PATIENT-LVL I: ICD-10-PCS | Mod: PBBFAC,,, | Performed by: OPTOMETRIST

## 2019-02-07 PROCEDURE — 92015 PR REFRACTION: ICD-10-PCS | Mod: S$GLB,,, | Performed by: OPTOMETRIST

## 2019-02-07 PROCEDURE — 92004 PR EYE EXAM, NEW PATIENT,COMPREHESV: ICD-10-PCS | Mod: S$GLB,,, | Performed by: OPTOMETRIST

## 2019-02-07 PROCEDURE — 92004 COMPRE OPH EXAM NEW PT 1/>: CPT | Mod: S$GLB,,, | Performed by: OPTOMETRIST

## 2019-02-07 PROCEDURE — 92015 DETERMINE REFRACTIVE STATE: CPT | Mod: S$GLB,,, | Performed by: OPTOMETRIST

## 2019-02-07 NOTE — PROGRESS NOTES
HPI     Pts last exam was 1 year ago. PT c/o blurred distance va and wears cls   full time.   HPI    Any vision changes since last exam: decreased distance va OS>OD  Eye pain: no  Other ocular symptoms: dryness and redness OU, no gtts    Do you wear currently wear glasses or contacts? both    Interested in contacts today? Yes, unsure of brand    Do you plan on getting new glasses today? If needed    Last edited by María Wise MA on 2/7/2019  8:14 AM. (History)            Assessment /Plan     For exam results, see Encounter Report.    Myopia of both eyes with astigmatism      Eyeglass Final Rx     Eyeglass Final Rx       Sphere Cylinder Axis Dist VA    Right -5.50 +0.50 085 20/20    Left -6.25 +1.00 115 20/20    Expiration Date:  2/8/2020              Contact Lens Prescription (2/7/2019)        Brand Base Curve Diameter Sphere Cylinder Axis    Right [employer] 8.60 14.0 -5.00      Left [employer] 8.7 14.5 -5.00 -0.75 030    Expiration Date:  2/8/2020    Replacement:  Monthly    Wearing Schedule:  Daily wear        Dispensed trial contact lenses today. Patient is to wear lenses for 1 week. Ok to order supply if no problems. RTC PRN if any problems arise.    RTC 1 yr for dilated eye exam or PRN if any problems.   Discussed above and answered questions.

## 2019-02-11 ENCOUNTER — PATIENT MESSAGE (OUTPATIENT)
Dept: FAMILY MEDICINE | Facility: CLINIC | Age: 29
End: 2019-02-11

## 2019-02-12 ENCOUNTER — LAB VISIT (OUTPATIENT)
Dept: LAB | Facility: HOSPITAL | Age: 29
End: 2019-02-12
Attending: FAMILY MEDICINE
Payer: COMMERCIAL

## 2019-02-12 ENCOUNTER — OFFICE VISIT (OUTPATIENT)
Dept: FAMILY MEDICINE | Facility: CLINIC | Age: 29
End: 2019-02-12
Payer: COMMERCIAL

## 2019-02-12 VITALS
SYSTOLIC BLOOD PRESSURE: 137 MMHG | WEIGHT: 255.5 LBS | OXYGEN SATURATION: 98 % | HEART RATE: 120 BPM | DIASTOLIC BLOOD PRESSURE: 83 MMHG | HEIGHT: 65 IN | TEMPERATURE: 99 F | BODY MASS INDEX: 42.57 KG/M2

## 2019-02-12 DIAGNOSIS — G89.29 CHRONIC RIGHT UPPER QUADRANT PAIN: ICD-10-CM

## 2019-02-12 DIAGNOSIS — R53.83 FATIGUE, UNSPECIFIED TYPE: Primary | ICD-10-CM

## 2019-02-12 DIAGNOSIS — R53.83 FATIGUE, UNSPECIFIED TYPE: ICD-10-CM

## 2019-02-12 DIAGNOSIS — R10.11 CHRONIC RIGHT UPPER QUADRANT PAIN: ICD-10-CM

## 2019-02-12 LAB
25(OH)D3+25(OH)D2 SERPL-MCNC: 11 NG/ML
ALBUMIN SERPL BCP-MCNC: 3.5 G/DL
ALP SERPL-CCNC: 128 U/L
ALT SERPL W/O P-5'-P-CCNC: 14 U/L
ANION GAP SERPL CALC-SCNC: 10 MMOL/L
AST SERPL-CCNC: 16 U/L
B-HCG UR QL: NEGATIVE
BASOPHILS # BLD AUTO: 0.05 K/UL
BASOPHILS NFR BLD: 0.5 %
BILIRUB SERPL-MCNC: 0.2 MG/DL
BUN SERPL-MCNC: 11 MG/DL
CALCIUM SERPL-MCNC: 9.6 MG/DL
CHLORIDE SERPL-SCNC: 104 MMOL/L
CO2 SERPL-SCNC: 26 MMOL/L
CREAT SERPL-MCNC: 0.7 MG/DL
CTP QC/QA: YES
DIFFERENTIAL METHOD: ABNORMAL
EOSINOPHIL # BLD AUTO: 0.1 K/UL
EOSINOPHIL NFR BLD: 0.6 %
ERYTHROCYTE [DISTWIDTH] IN BLOOD BY AUTOMATED COUNT: 14 %
EST. GFR  (AFRICAN AMERICAN): >60 ML/MIN/1.73 M^2
EST. GFR  (NON AFRICAN AMERICAN): >60 ML/MIN/1.73 M^2
FERRITIN SERPL-MCNC: 116 NG/ML
GLUCOSE SERPL-MCNC: 80 MG/DL
HCT VFR BLD AUTO: 40 %
HGB BLD-MCNC: 11.6 G/DL
IMM GRANULOCYTES # BLD AUTO: 0.03 K/UL
IMM GRANULOCYTES NFR BLD AUTO: 0.3 %
LYMPHOCYTES # BLD AUTO: 2.9 K/UL
LYMPHOCYTES NFR BLD: 28.6 %
MCH RBC QN AUTO: 23.9 PG
MCHC RBC AUTO-ENTMCNC: 29 G/DL
MCV RBC AUTO: 82 FL
MONOCYTES # BLD AUTO: 0.6 K/UL
MONOCYTES NFR BLD: 5.6 %
NEUTROPHILS # BLD AUTO: 6.6 K/UL
NEUTROPHILS NFR BLD: 64.4 %
NRBC BLD-RTO: 0 /100 WBC
PLATELET # BLD AUTO: 339 K/UL
PMV BLD AUTO: 11.7 FL
POTASSIUM SERPL-SCNC: 3.9 MMOL/L
PROT SERPL-MCNC: 7.9 G/DL
RBC # BLD AUTO: 4.86 M/UL
SODIUM SERPL-SCNC: 140 MMOL/L
TSH SERPL DL<=0.005 MIU/L-ACNC: 1.33 UIU/ML
WBC # BLD AUTO: 10.25 K/UL

## 2019-02-12 PROCEDURE — 82306 VITAMIN D 25 HYDROXY: CPT

## 2019-02-12 PROCEDURE — 3008F BODY MASS INDEX DOCD: CPT | Mod: CPTII,S$GLB,, | Performed by: FAMILY MEDICINE

## 2019-02-12 PROCEDURE — 85025 COMPLETE CBC W/AUTO DIFF WBC: CPT

## 2019-02-12 PROCEDURE — 81025 URINE PREGNANCY TEST: CPT | Mod: S$GLB,,, | Performed by: FAMILY MEDICINE

## 2019-02-12 PROCEDURE — 82728 ASSAY OF FERRITIN: CPT

## 2019-02-12 PROCEDURE — 99214 OFFICE O/P EST MOD 30 MIN: CPT | Mod: 25,S$GLB,, | Performed by: FAMILY MEDICINE

## 2019-02-12 PROCEDURE — 3008F PR BODY MASS INDEX (BMI) DOCUMENTED: ICD-10-PCS | Mod: CPTII,S$GLB,, | Performed by: FAMILY MEDICINE

## 2019-02-12 PROCEDURE — 36415 COLL VENOUS BLD VENIPUNCTURE: CPT | Mod: PO

## 2019-02-12 PROCEDURE — 99214 PR OFFICE/OUTPT VISIT, EST, LEVL IV, 30-39 MIN: ICD-10-PCS | Mod: 25,S$GLB,, | Performed by: FAMILY MEDICINE

## 2019-02-12 PROCEDURE — 99999 PR PBB SHADOW E&M-EST. PATIENT-LVL III: CPT | Mod: PBBFAC,,, | Performed by: FAMILY MEDICINE

## 2019-02-12 PROCEDURE — 81025 POCT URINE PREGNANCY: ICD-10-PCS | Mod: S$GLB,,, | Performed by: FAMILY MEDICINE

## 2019-02-12 PROCEDURE — 99999 PR PBB SHADOW E&M-EST. PATIENT-LVL III: ICD-10-PCS | Mod: PBBFAC,,, | Performed by: FAMILY MEDICINE

## 2019-02-12 PROCEDURE — 84443 ASSAY THYROID STIM HORMONE: CPT

## 2019-02-12 PROCEDURE — 80053 COMPREHEN METABOLIC PANEL: CPT

## 2019-02-12 NOTE — PROGRESS NOTES
"Anthony Sexton    Chief Complaint   Patient presents with    Increasing fatigue       History of Present Illness:   Ms. Sexton comes in today with complaint of having more than usual fatigue for 2 months.  She states fatigue is now affecting her work as she feels "foggy" at the computer.  She states she has a new job for the last 2 months which is less stressful.  She states she sleeps well at night and denies having anxiety, depression, homicidal suicidal.  She states for the last month she has been monitoring her diet but has not been exercising.  She states she has occasional cold intolerance without heat intolerance.  She states she had menstrual flow in December 2018 but Ms. January 2019 with last menstrual period of February 4, 2019.    She also complains of having occasional, intermittent right upper quadrant pain for 2-3 months.  She denies having associated indigestion, heartburn, nausea, vomiting, diarrhea, constipation, unusual urinary symptoms, fever, chills, appetite change, chest pain, palpitations, leg swelling, shortness of breath, cough, back pain, headaches.  She states she takes Protonix daily.    She reports having occasional right upper arm tightness of intermittent nature without associated trauma.  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  She states she often lifts the her 2-year-old, 25-lb daughter.    Urine pregnancy test ordered by me - (-).        Current Outpatient Medications   Medication Sig    acetaminophen (TYLENOL) 500 MG tablet Take 500 mg by mouth 2 (two) times daily.    fluticasone (FLONASE) 50 mcg/actuation nasal spray 2 sprays by Each Nare route daily as needed.    ibuprofen (ADVIL,MOTRIN) 800 MG tablet Take 800 mg by mouth once daily.    pantoprazole (PROTONIX) 40 MG tablet TAKE 1 TABLET(40 MG) BY MOUTH DAILY AS NEEDED       Review of Systems   Constitutional: Positive for fatigue. Negative for activity change, appetite change, chills, fever and unexpected " weight change.   Respiratory: Negative for cough, shortness of breath and wheezing.    Cardiovascular: Negative for chest pain, palpitations and leg swelling.   Gastrointestinal: Negative for blood in stool, constipation, diarrhea and vomiting.   Endocrine: Positive for cold intolerance. Negative for heat intolerance.   Genitourinary: Positive for menstrual problem. Negative for difficulty urinating.   Musculoskeletal: Negative for arthralgias.   Neurological: Negative for headaches.   Psychiatric/Behavioral: Negative for dysphoric mood and suicidal ideas. The patient is not nervous/anxious.         Negative for homicidal ideas.       Objective:  Physical Exam   Constitutional: She is oriented to person, place, and time. She appears well-developed and well-nourished. No distress.   Pleasant.   HENT:   Mouth/Throat: Oropharynx is clear and moist. No oropharyngeal exudate.   Eyes: Conjunctivae and EOM are normal. Pupils are equal, round, and reactive to light.   Neck: Normal range of motion. Neck supple. No thyromegaly present.   Cardiovascular: Normal rate, regular rhythm, normal heart sounds and intact distal pulses.   No murmur heard.  Pulmonary/Chest: Effort normal and breath sounds normal. No respiratory distress. She has no wheezes.   Abdominal: Soft. Bowel sounds are normal. She exhibits no distension and no mass. There is tenderness. There is no rebound and no guarding.   No CVA tenderness noted. Slightly tender right upper quadrant without acute abdomen noted.   Musculoskeletal: Normal range of motion. She exhibits no edema or tenderness.   She is ambulatory without problems. Non tender arms with full range of motion noted.   Lymphadenopathy:     She has no cervical adenopathy.   Neurological: She is alert and oriented to person, place, and time.   Skin: She is not diaphoretic.   Psychiatric: She has a normal mood and affect. Her behavior is normal. Judgment and thought content normal.   Vitals  reviewed.      ASSESSMENT:  1. Fatigue, unspecified type    2. Chronic right upper quadrant pain        PLAN:  Anthony was seen today for increasing fatigue.    Diagnoses and all orders for this visit:    Fatigue, unspecified type  -     POCT Urine Pregnancy  -     CBC auto differential; Future  -     Comprehensive metabolic panel; Future  -     TSH; Future  -     Ferritin; Future  -     Vitamin D; Future    Chronic right upper quadrant pain  -     US Abdomen Complete; Future    Patient advised to call for results.  Continue current medications, follow low sodium, low cholesterol, low carb diet, daily walks.  Follow-up if symptoms worsen or fail to improve.

## 2019-02-14 ENCOUNTER — PATIENT MESSAGE (OUTPATIENT)
Dept: FAMILY MEDICINE | Facility: CLINIC | Age: 29
End: 2019-02-14

## 2019-02-19 DIAGNOSIS — E66.01 MORBID OBESITY: Primary | ICD-10-CM

## 2019-02-19 DIAGNOSIS — E55.9 VITAMIN D DEFICIENCY: ICD-10-CM

## 2019-02-19 RX ORDER — ERGOCALCIFEROL 1.25 MG/1
50000 CAPSULE ORAL
Qty: 4 CAPSULE | Refills: 5 | Status: SHIPPED | OUTPATIENT
Start: 2019-02-19 | End: 2019-08-13 | Stop reason: SDUPTHER

## 2019-03-08 ENCOUNTER — HOSPITAL ENCOUNTER (OUTPATIENT)
Dept: RADIOLOGY | Facility: HOSPITAL | Age: 29
Discharge: HOME | End: 2019-03-08
Attending: FAMILY MEDICINE
Payer: COMMERCIAL

## 2019-03-08 DIAGNOSIS — R10.11 CHRONIC RIGHT UPPER QUADRANT PAIN: ICD-10-CM

## 2019-03-08 DIAGNOSIS — G89.29 CHRONIC RIGHT UPPER QUADRANT PAIN: ICD-10-CM

## 2019-03-08 PROCEDURE — 76700 US EXAM ABDOM COMPLETE: CPT | Mod: 26,,, | Performed by: RADIOLOGY

## 2019-03-08 PROCEDURE — 76700 US EXAM ABDOM COMPLETE: CPT | Mod: TC

## 2019-03-08 PROCEDURE — 76700 US ABDOMEN COMPLETE: ICD-10-PCS | Mod: 26,,, | Performed by: RADIOLOGY

## 2019-04-12 ENCOUNTER — OFFICE VISIT (OUTPATIENT)
Dept: FAMILY MEDICINE | Facility: CLINIC | Age: 29
End: 2019-04-12
Payer: COMMERCIAL

## 2019-04-12 VITALS
TEMPERATURE: 98 F | RESPIRATION RATE: 16 BRPM | HEART RATE: 86 BPM | BODY MASS INDEX: 42.32 KG/M2 | WEIGHT: 254 LBS | DIASTOLIC BLOOD PRESSURE: 64 MMHG | SYSTOLIC BLOOD PRESSURE: 126 MMHG | OXYGEN SATURATION: 97 % | HEIGHT: 65 IN

## 2019-04-12 DIAGNOSIS — E66.01 MORBID OBESITY WITH BMI OF 40.0-44.9, ADULT: ICD-10-CM

## 2019-04-12 DIAGNOSIS — E55.9 VITAMIN D DEFICIENCY: ICD-10-CM

## 2019-04-12 DIAGNOSIS — R06.83 SNORING: ICD-10-CM

## 2019-04-12 DIAGNOSIS — R41.840 ATTENTION DEFICIT: ICD-10-CM

## 2019-04-12 DIAGNOSIS — Z00.00 ANNUAL PHYSICAL EXAM: Primary | ICD-10-CM

## 2019-04-12 PROCEDURE — 99999 PR PBB SHADOW E&M-EST. PATIENT-LVL IV: ICD-10-PCS | Mod: PBBFAC,,, | Performed by: FAMILY MEDICINE

## 2019-04-12 PROCEDURE — 99395 PREV VISIT EST AGE 18-39: CPT | Mod: S$GLB,,, | Performed by: FAMILY MEDICINE

## 2019-04-12 PROCEDURE — 99395 PR PREVENTIVE VISIT,EST,18-39: ICD-10-PCS | Mod: S$GLB,,, | Performed by: FAMILY MEDICINE

## 2019-04-12 PROCEDURE — 99999 PR PBB SHADOW E&M-EST. PATIENT-LVL IV: CPT | Mod: PBBFAC,,, | Performed by: FAMILY MEDICINE

## 2019-04-12 RX ORDER — DEXTROAMPHETAMINE SACCHARATE, AMPHETAMINE ASPARTATE MONOHYDRATE, DEXTROAMPHETAMINE SULFATE AND AMPHETAMINE SULFATE 5; 5; 5; 5 MG/1; MG/1; MG/1; MG/1
20 CAPSULE, EXTENDED RELEASE ORAL EVERY MORNING
Qty: 30 CAPSULE | Refills: 0 | Status: SHIPPED | OUTPATIENT
Start: 2019-04-12 | End: 2019-04-15 | Stop reason: SDUPTHER

## 2019-04-12 NOTE — PROGRESS NOTES
HISTORY OF PRESENT ILLNESS: Ms. Barrientos comes in today not fasting and with taking medication for annual wellness examination.     END OF LIFE DECISION: He does not have a living will and is not sure about life support.    Current Outpatient Medications   Medication Sig    acetaminophen (TYLENOL) 500 MG tablet Take 500 mg by mouth 2 (two) times daily.    ergocalciferol (ERGOCALCIFEROL) 50,000 unit Cap Take 1 capsule (50,000 Units total) by mouth every 7 days.    fluticasone (FLONASE) 50 mcg/actuation nasal spray 2 sprays by Each Nare route daily as needed.    ibuprofen (ADVIL,MOTRIN) 800 MG tablet Take 800 mg by mouth once daily.    pantoprazole (PROTONIX) 40 MG tablet TAKE 1 TABLET(40 MG) BY MOUTH DAILY AS NEEDED     SCREENINGS:       Cholesterol: May 9, 2017.     FFS/Colonoscopy: Never.     Mammogram:  Never.     GYN Exam: April 2018 with GYN Dr. Surekha julian per patient. Scheduled for April 15, 2019.     Dexa Scan:  Never.     Eye Exam: February 7, 2019 with Dr. Delgado. She wears contacts/glasses.       PPD: Negative in the past.     Immunizations:  Td/Tdap - 2016 per patient.                              Gardasil - Never.                              Zostavax - N./A.                              Pneumovax - In the past.                              Seasonal Flu - Fall 2018 per patient                           ROS:        GENERAL: Denies fever, chills, fatigue or unusual weight change. Appetite normal. Does not exercises.  Does not monitor diet. Weight 115.9 kg (255 lb 8.2 oz)             at February 12, 2019 visit.   SKIN: Denies itching, rash, changes in mole, color or texture of skin or easy bruising.   HEAD:  Denies headaches or recent head trauma.  EYES: Denies change in vision, pain, diplopia, redness or discharge. Wears seatbelt.  EARS: Denies ear pain, discharge, vertigo or decreased hearing.  NOSE: Denies loss of smell, epistaxis or rhinitis.  MOUTH & THROAT: Denies hoarseness or change in  "voice. Denies excessive gum bleeding or mouth sores.  Denies sore throat.  NODES: Denies swollen glands.  CHEST: Denies TELLEZ, wheezing, cough, or sputum production. Reports slight snoring.  CARDIOVASCULAR: Denies chest pain, PND, orthopnea or reduced exercise tolerance.  Denies palpitations.   ABDOMEN: Denies diarrhea, constipation, nausea, vomiting, abdominal pain, or blood in stool.  URINARY: Denies flank pain, dysuria or hematuria.  GENITOURINARY: Denies flank pain, dysuria, frequency or hematuria. No change in menses. Performs monthly breast self examinations.    ENDOCRINE: Denies thyroid, diabetes or cholesterol problems.  HEME/LYMPH: Denies bleeding problems.  PERIPHERAL VASCULAR:Denies claudication or cyanosis.  MUSCULOSKELETAL: Denies joint stiffness, pain or swelling. Denies edema.  NEUROLOGIC: Denies history of seizures, tremors, paralysis, alteration of gait or coordination.  PSYCHIATRIC: Denies mood swings, controlled depression, anxiety, homicidal or suicidal thoughts. Reports somnolence x years. Reports decreased focused/attention x at least 2 years; reports has psychiatry 1st-evaluation appointment scheduled for May 15, 2019.    PE:   VS: /64   Pulse 86   Temp 98.4 °F (36.9 °C) (Oral)   Resp 16   Ht 5' 5" (1.651 m)   Wt 115.2 kg (253 lb 15.5 oz)   LMP  (LMP Unknown)   SpO2 97%   BMI 42.26 kg/m²   APPEARANCE:  Well nourished, well developed female, pleasant, alert and oriented in no acute distress.    HEAD: Nontender. Full range of motion.  EYES: PERRL, conjunctiva pink, lids no edema.   EARS: External canal patent, no swelling or redness. TM's shiny and clear.  NOSE: Mucosa and turbinates pink, not swollen. No discharge. Nontender sinuses.  THROAT: No pharyngeal erythema or exudate. No stridor.   NECK: Supple, no mass, thyroid not enlarged.  NODES: No cervical, axillary or inguinal lymph node enlargement.  CHEST: Normal respiratory effort. Lungs clear to auscultation.  CARDIOVASCULAR: " Normal S1, S2. No rubs, murmurs or gallops. PMI not displaced. No carotid bruit. Pedal pulses palpable bilaterally. No edema.  ABDOMEN: Bowel sounds present. Not distended. Soft. No tenderness, masses or organomegaly.  BREAST EXAM: Symmetrical, no external lesions, no discharge, no masses palpated.  PELVIC EXAM: No external lesions noted, no discharge, cervix appears normal, bimanual exam showed no uterine abnormalities and no adnexal masses. Urethra and bladder intact.  RECTAL EXAM: No external hemorrhoids or anal fissures. Heme-negative stool in the rectal vault.  MUSCULOSKELETAL: No joint deformities or stiffness. She is ambulatory without problems.  SKIN: No rashes or suspicious lesions, normal color and turgor.  NEUROLOGIC:   Cranial Nerves: II-XII grossly intact.   DTR's: Knees, Ankles 2+ and equal bilaterally. Gait & Posture: Normal gait and fine motion.  PSYCHIATRIC:Patient alert, oriented x 3. Mood/Affect normal without acute anxiety or depression noted. Judgment/insight good as she makes appropriate decisions during today's exam.     [place] Sleepiness Score - 17.      Lab Results   Component Value Date    WBC 10.25 02/12/2019    HGB 11.6 (L) 02/12/2019    HCT 40.0 02/12/2019    MCV 82 02/12/2019     02/12/2019     CMP  Sodium   Date Value Ref Range Status   02/12/2019 140 136 - 145 mmol/L Final     Potassium   Date Value Ref Range Status   02/12/2019 3.9 3.5 - 5.1 mmol/L Final     Chloride   Date Value Ref Range Status   02/12/2019 104 95 - 110 mmol/L Final     CO2   Date Value Ref Range Status   02/12/2019 26 23 - 29 mmol/L Final     Glucose   Date Value Ref Range Status   02/12/2019 80 70 - 110 mg/dL Final     BUN, Bld   Date Value Ref Range Status   02/12/2019 11 6 - 20 mg/dL Final     Creatinine   Date Value Ref Range Status   02/12/2019 0.7 0.5 - 1.4 mg/dL Final     Calcium   Date Value Ref Range Status   02/12/2019 9.6 8.7 - 10.5 mg/dL Final     Total Protein   Date Value Ref Range Status    02/12/2019 7.9 6.0 - 8.4 g/dL Final     Albumin   Date Value Ref Range Status   02/12/2019 3.5 3.5 - 5.2 g/dL Final     Total Bilirubin   Date Value Ref Range Status   02/12/2019 0.2 0.1 - 1.0 mg/dL Final     Comment:     For infants and newborns, interpretation of results should be based  on gestational age, weight and in agreement with clinical  observations.  Premature Infant recommended reference ranges:  Up to 24 hours.............<8.0 mg/dL  Up to 48 hours............<12.0 mg/dL  3-5 days..................<15.0 mg/dL  6-29 days.................<15.0 mg/dL       Alkaline Phosphatase   Date Value Ref Range Status   02/12/2019 128 55 - 135 U/L Final     AST   Date Value Ref Range Status   02/12/2019 16 10 - 40 U/L Final     ALT   Date Value Ref Range Status   02/12/2019 14 10 - 44 U/L Final     Anion Gap   Date Value Ref Range Status   02/12/2019 10 8 - 16 mmol/L Final     eGFR if    Date Value Ref Range Status   02/12/2019 >60.0 >60 mL/min/1.73 m^2 Final     Lab Results   Component Value Date    TSH 1.332 02/12/2019     Vit D, 25-Hydroxy 30 - 96 ng/mL 11   02/12/2019     Lab Results   Component Value Date    FERRITIN 116 02/12/2019     ASSESSMENT:    ICD-10-CM ICD-9-CM    1. Annual physical exam Z00.00 V70.0 Urinalysis      Lipid panel      Hemoglobin A1c   2. Vitamin D deficiency E55.9 268.9    3. Snoring R06.83 786.09 Polysomnogram (CPAP will be added if patient meets diagnostic criteria.)   4. Attention deficit R41.840 799.51  dextroamphetamine-amphetamine (ADDERALL XR) 20 MG 24 hr capsule   5. Morbid obesity with BMI of 40.0-44.9, adult E66.01 278.01     Z68.41 V85.41      PLAN:  1. Age-appropriate counseling-appropriate low-sodium, low-cholesterol, low carbohydrate diet and exercise daily, monthly breast self exam, annual wellness examination.   2. Patient advised to call for results.  3. Continue current medications.  4. Add Adderall XR 20 mg daily; #30, 0 refill; medication precautions  discussed with patient.  5. Keep follow up with specialists.  6. Follow up in about 1 year (around 4/17/2020) for physical.       Answers for HPI/ROS submitted by the patient on 4/11/2019   activity change: No  unexpected weight change: No  neck pain: Yes  hearing loss: No  rhinorrhea: No  trouble swallowing: No  eye discharge: No  visual disturbance: No  chest tightness: No  wheezing: No  chest pain: No  palpitations: No  blood in stool: No  constipation: No  vomiting: No  diarrhea: No  polydipsia: No  polyuria: No  difficulty urinating: No  hematuria: No  menstrual problem: No  dysuria: No  joint swelling: No  arthralgias: No  headaches: No  weakness: No  confusion: No  dysphoric mood: No

## 2019-04-12 NOTE — Clinical Note
Please call lab to see if they can add A1c to lab Lipid drawn today. If not, advise pt order in and can get done at her convenience. Thanks.

## 2019-04-15 ENCOUNTER — TELEPHONE (OUTPATIENT)
Dept: FAMILY MEDICINE | Facility: CLINIC | Age: 29
End: 2019-04-15

## 2019-04-15 ENCOUNTER — CLINICAL SUPPORT (OUTPATIENT)
Dept: INTERNAL MEDICINE | Facility: CLINIC | Age: 29
End: 2019-04-15
Payer: COMMERCIAL

## 2019-04-15 DIAGNOSIS — Z00.00 ANNUAL PHYSICAL EXAM: ICD-10-CM

## 2019-04-15 DIAGNOSIS — R41.840 ATTENTION DEFICIT: ICD-10-CM

## 2019-04-15 PROCEDURE — 83036 HEMOGLOBIN GLYCOSYLATED A1C: CPT

## 2019-04-15 RX ORDER — DEXTROAMPHETAMINE SACCHARATE, AMPHETAMINE ASPARTATE MONOHYDRATE, DEXTROAMPHETAMINE SULFATE AND AMPHETAMINE SULFATE 5; 5; 5; 5 MG/1; MG/1; MG/1; MG/1
20 CAPSULE, EXTENDED RELEASE ORAL EVERY MORNING
Qty: 30 CAPSULE | Refills: 0 | Status: SHIPPED | OUTPATIENT
Start: 2019-04-15 | End: 2019-04-24 | Stop reason: SDUPTHER

## 2019-04-15 NOTE — TELEPHONE ENCOUNTER
----- Message from Tyesha Leggett MD sent at 4/15/2019  1:09 PM CDT -----  Please call lab to see if they can add A1c to lab Lipid drawn today. If not, advise pt order in and can get done at her convenience. Thanks.

## 2019-04-16 LAB
ESTIMATED AVG GLUCOSE: 105 MG/DL (ref 68–131)
HBA1C MFR BLD HPLC: 5.3 % (ref 4–5.6)

## 2019-04-23 ENCOUNTER — CLINICAL SUPPORT (OUTPATIENT)
Dept: INTERNAL MEDICINE | Facility: CLINIC | Age: 29
End: 2019-04-23
Payer: COMMERCIAL

## 2019-04-23 ENCOUNTER — TELEPHONE (OUTPATIENT)
Dept: FAMILY MEDICINE | Facility: CLINIC | Age: 29
End: 2019-04-23

## 2019-04-23 DIAGNOSIS — Z00.00 ANNUAL PHYSICAL EXAM: Primary | ICD-10-CM

## 2019-04-23 LAB
BILIRUB UR QL STRIP: NEGATIVE
CLARITY UR: CLEAR
COLOR UR: YELLOW
GLUCOSE UR QL STRIP: NEGATIVE
HGB UR QL STRIP: NEGATIVE
KETONES UR QL STRIP: ABNORMAL
LEUKOCYTE ESTERASE UR QL STRIP: NEGATIVE
NITRITE UR QL STRIP: NEGATIVE
PH UR STRIP: 6 [PH] (ref 5–8)
PROT UR QL STRIP: NEGATIVE
SP GR UR STRIP: <=1.005 (ref 1–1.03)
URN SPEC COLLECT METH UR: ABNORMAL

## 2019-04-24 ENCOUNTER — PATIENT MESSAGE (OUTPATIENT)
Dept: FAMILY MEDICINE | Facility: CLINIC | Age: 29
End: 2019-04-24

## 2019-04-24 ENCOUNTER — HOSPITAL ENCOUNTER (EMERGENCY)
Facility: HOSPITAL | Age: 29
Discharge: HOME | End: 2019-04-24
Attending: EMERGENCY MEDICINE
Payer: COMMERCIAL

## 2019-04-24 ENCOUNTER — OFFICE VISIT (OUTPATIENT)
Dept: URGENT CARE | Facility: CLINIC | Age: 29
End: 2019-04-24
Payer: COMMERCIAL

## 2019-04-24 VITALS
HEART RATE: 114 BPM | SYSTOLIC BLOOD PRESSURE: 126 MMHG | HEIGHT: 66 IN | WEIGHT: 246.94 LBS | RESPIRATION RATE: 18 BRPM | DIASTOLIC BLOOD PRESSURE: 74 MMHG | BODY MASS INDEX: 39.69 KG/M2 | OXYGEN SATURATION: 99 % | TEMPERATURE: 98 F

## 2019-04-24 VITALS
SYSTOLIC BLOOD PRESSURE: 187 MMHG | HEART RATE: 118 BPM | DIASTOLIC BLOOD PRESSURE: 98 MMHG | BODY MASS INDEX: 40.07 KG/M2 | OXYGEN SATURATION: 100 % | WEIGHT: 249.31 LBS | HEIGHT: 66 IN | TEMPERATURE: 99 F | RESPIRATION RATE: 20 BRPM

## 2019-04-24 DIAGNOSIS — R00.0 TACHYCARDIA: Primary | ICD-10-CM

## 2019-04-24 DIAGNOSIS — R94.31 ABNORMAL EKG: ICD-10-CM

## 2019-04-24 DIAGNOSIS — T50.905A ADVERSE EFFECT OF DRUG, INITIAL ENCOUNTER: Primary | ICD-10-CM

## 2019-04-24 DIAGNOSIS — R00.2 PALPITATIONS: ICD-10-CM

## 2019-04-24 DIAGNOSIS — R41.840 ATTENTION DEFICIT: ICD-10-CM

## 2019-04-24 DIAGNOSIS — R07.89 CHEST PRESSURE: ICD-10-CM

## 2019-04-24 DIAGNOSIS — T43.605A: ICD-10-CM

## 2019-04-24 PROCEDURE — 96372 THER/PROPH/DIAG INJ SC/IM: CPT

## 2019-04-24 PROCEDURE — 99214 OFFICE O/P EST MOD 30 MIN: CPT | Mod: S$GLB,,, | Performed by: NURSE PRACTITIONER

## 2019-04-24 PROCEDURE — 93005 ELECTROCARDIOGRAM TRACING: CPT

## 2019-04-24 PROCEDURE — 3008F BODY MASS INDEX DOCD: CPT | Mod: CPTII,S$GLB,, | Performed by: NURSE PRACTITIONER

## 2019-04-24 PROCEDURE — 99284 EMERGENCY DEPT VISIT MOD MDM: CPT | Mod: 25

## 2019-04-24 PROCEDURE — 99214 PR OFFICE/OUTPT VISIT, EST, LEVL IV, 30-39 MIN: ICD-10-PCS | Mod: S$GLB,,, | Performed by: NURSE PRACTITIONER

## 2019-04-24 PROCEDURE — 93010 ELECTROCARDIOGRAM REPORT: CPT | Mod: S$GLB,,, | Performed by: INTERNAL MEDICINE

## 2019-04-24 PROCEDURE — 63600175 PHARM REV CODE 636 W HCPCS: Performed by: PHYSICIAN ASSISTANT

## 2019-04-24 PROCEDURE — 99999 PR PBB SHADOW E&M-EST. PATIENT-LVL V: CPT | Mod: PBBFAC,,, | Performed by: NURSE PRACTITIONER

## 2019-04-24 PROCEDURE — 99999 PR PBB SHADOW E&M-EST. PATIENT-LVL V: ICD-10-PCS | Mod: PBBFAC,,, | Performed by: NURSE PRACTITIONER

## 2019-04-24 PROCEDURE — 93010 EKG 12-LEAD: ICD-10-PCS | Mod: S$GLB,,, | Performed by: INTERNAL MEDICINE

## 2019-04-24 PROCEDURE — 3008F PR BODY MASS INDEX (BMI) DOCUMENTED: ICD-10-PCS | Mod: CPTII,S$GLB,, | Performed by: NURSE PRACTITIONER

## 2019-04-24 RX ORDER — DEXTROAMPHETAMINE SACCHARATE, AMPHETAMINE ASPARTATE MONOHYDRATE, DEXTROAMPHETAMINE SULFATE AND AMPHETAMINE SULFATE 2.5; 2.5; 2.5; 2.5 MG/1; MG/1; MG/1; MG/1
CAPSULE, EXTENDED RELEASE ORAL
Qty: 30 CAPSULE | Refills: 0 | Status: SHIPPED | OUTPATIENT
Start: 2019-04-24 | End: 2019-05-01

## 2019-04-24 RX ADMIN — LORAZEPAM 2 MG: 2 INJECTION INTRAMUSCULAR; INTRAVENOUS at 09:04

## 2019-04-24 NOTE — LETTER
April 24, 2019      [facility]  [address]  [facility] [address]  Phone: [phone]  Fax: [phone]       Patient: Anthony Sexton   YOB: 1990  Date of Visit: 04/24/2019    To Whom It May Concern:    Jorge Sexton  was at [facility] on 04/24/2019. She may return to work/school on 04/26/19 with no restrictions. If you have any questions or concerns, or if I can be of further assistance, please do not hesitate to contact me.    Sincerely,    Gala Card LPN

## 2019-04-24 NOTE — PATIENT INSTRUCTIONS
Drug Reaction (Other Type)  You are having a reaction to a drug you have taken. This may not be the same as an allergic reaction. It is an undesired, unfavorable reaction, or a side effect of a medicine. This can cause a variety of symptoms, including:  · Dizziness or headache  · Rash  · Flushing or hot sensation  · Nausea, vomiting, or stomach pain  · Diarrhea or constipation  · Trouble breathing  · High or low blood pressure  A reaction can be an upset stomach from something like aspirin or ibuprofen, feeling faint after taking a blood pressure medicine, feeling anxious, and many other things. Symptoms of a drug reaction can range from very mild to very severe.  In most cases, the reaction goes away within 1 to 12 hours. However, it will probably occur again if you take this same medicine. Your healthcare provider will advise you whether to change how much, when, or how often you take this medicine. He or she may also advise you to discontinue this medicine or switch to another one.   Home care  · Another medicine may be recommended to reduce your symptoms until the drugs effect wears off. Follow your healthcare providers advice.  · When the drugs effect has worn off, there should be no further problem as long as you don't take the same drug again.   · Ask your healthcare provider if you should also avoid similar drugs. Write down the information so you will remember it.  · Make certain the drug reaction is documented in your medical record.  Follow-up care  Follow up with your healthcare provider, or as advised if your symptoms are not better within 24 hours.  When to seek medical advice  Call your healthcare provider right away if any of these occur.  · New symptoms that concern you  · Worsening of your current symptoms, including rash or facial swelling  · Symptoms that are not relieved by the treatment advised  · A fever of 100.4°F (38°C),or as advised by your healthcare provider  Call 911  Call emergency  services right away if any of these occur:  · Trouble breathing or swallowing, or wheezing  · Hoarse voice or trouble speaking  · Confusion  · Extreme drowsiness or trouble awakening  · Fainting or loss of consciousness  · Rapid heart rate or slow heart rate  · Very low or very high blood pressure  · Vomiting blood, or large amounts of blood in stool  · Seizure  Date Last Reviewed: 4/1/2017  © 4230-8051 [employer]. [address] [place] [address], [place] [address]. All rights reserved. This information is not intended as a substitute for professional medical care. Always follow your healthcare professional's instructions.

## 2019-04-24 NOTE — PROGRESS NOTES
Subjective:      Patient ID: Anthony Sexton is a 28 y.o. female.    Chief Complaint: Pressure in chest      29 y/o BF presents to the  with reports of chest pressure, started Adderall on Monday, B/P elevated for 2 days since starting medication and HRT rate 120's-130's.  Reports notified PCP and dosage for Adderall was reduced to 10 mg today.  Also reports did not take Adderall today.  Reports PMH of Sinus Tachycardia    Review of Systems   Constitutional: Negative for chills, diaphoresis and fatigue.   Respiratory: Positive for chest tightness. Negative for shortness of breath and wheezing.    Cardiovascular: Negative for chest pain.        Tachycardia   Endocrine: Negative for cold intolerance.   Neurological: Negative for dizziness, weakness, light-headedness, numbness and headaches.   Psychiatric/Behavioral: Negative for agitation.        Objective:     Vitals:    04/24/19 1707   BP: 126/74   Pulse: (!) 114   Resp: 18   Temp: 98 °F (36.7 °C)     Physical Exam   Constitutional: She is oriented to person, place, and time. She appears well-developed and well-nourished. She is cooperative. No distress.   HENT:   Head: Normocephalic.   Right Ear: Hearing, tympanic membrane, external ear and ear canal normal.   Left Ear: Hearing, tympanic membrane, external ear and ear canal normal.   Nose: Nose normal.   Mouth/Throat: Uvula is midline, oropharynx is clear and moist and mucous membranes are normal. No oral lesions. No uvula swelling. No oropharyngeal exudate, posterior oropharyngeal edema, posterior oropharyngeal erythema or tonsillar abscesses. Tonsils are 2+ on the right. Tonsils are 2+ on the left. No tonsillar exudate.   Eyes: Pupils are equal, round, and reactive to light. Conjunctivae, EOM and lids are normal. Right eye exhibits no discharge. Left eye exhibits no discharge.   Neck: Normal range of motion and full passive range of motion without pain. Neck supple. No tracheal tenderness present.    Cardiovascular: Regular rhythm and normal heart sounds. Tachycardia present.   Abnormal EKG:  Sinus Tach, Possible left atrial enlargement and Septal Infarc   Pulmonary/Chest: Effort normal and breath sounds normal. No accessory muscle usage. No tachypnea and no bradypnea. No respiratory distress.   Musculoskeletal: Normal range of motion.   Neurological: She is alert and oriented to person, place, and time.   Skin: Skin is warm and dry. Capillary refill takes less than 2 seconds. No rash noted.   Nursing note and vitals reviewed.      Assessment:     1. Adverse effect of drug, initial encounter    2. Abnormal EKG    3. Chest pressure        Plan:     Anthony was seen today for pressure in chest.    Diagnoses and all orders for this visit:    Adverse effect of drug, initial encounter    Abnormal EKG  -     Ambulatory referral to Cardiology    Chest pressure  -     IN OFFICE EKG 12-LEAD (to Muse)    Follow up with Dr. Leggett within the next 24-48 hours  Go to ER if condition persist or worsens  Discontinue Adderall until follow up with Dr. Leggett  Work excuse given for tomorrow  Maintain hydration  Adequate rest    SAURABH Vasquez, FNP-C

## 2019-04-25 NOTE — ED NOTES
Patient identifiers verified and correct for Anthony Sexton.    LOC: The patient is awake, alert and aware of environment with an appropriate affect, the patient is oriented x 3 and speaking appropriately.  APPEARANCE: Patient resting comfortably and in no acute distress, patient is clean and well groomed, patient's clothing is properly fastened.  SKIN: The skin is warm and dry, color consistent with ethnicity, patient has normal skin turgor and moist mucus membranes, skin intact, no breakdown or bruising noted.  MUSCULOSKELETAL: Patient moving all extremities spontaneously, no obvious swelling or deformities noted.  RESPIRATORY: Airway is open and patent, respirations are spontaneous, patient has a normal effort and rate, no accessory muscle use noted, bilateral breath sounds clear.  CARDIAC: Patient has a normal rate and regular rhythm, no periphreal edema noted, capillary refill < 3 seconds.  ABDOMEN: Soft and non tender to palpation, no distention noted, normoactive bowel sounds present in all four quadrants.  NEUROLOGIC: PERRL, **mm bilaterally, eyes open spontaneously, behavior appropriate to situation, follows commands, facial expression symmetrical, bilateral hand grasp equal and even, purposeful motor response noted, normal sensation in all extremities when touched with a finger.    Pt reports palpitations, sob, throat closing, nervousness, and insomnia after adderall dose

## 2019-04-25 NOTE — ED PROVIDER NOTES
SCRIBE #1 NOTE: I, Susy Keys, am scribing for, and in the presence of, GABRIELLA Keen. I have scribed the entire note.      History      Chief Complaint   Patient presents with    Palpitations     Pt states started on Adderall 2 days ago for ADHD and having palpitations since started medicine.         Review of patient's allergies indicates:  No Known Allergies     HPI   HPI    4/24/2019, 8:45 PM   History obtained from the patient      History of Present Illness: Anthony Sexton is a 28 y.o. female patient who presents to the Emergency Department for palpitations which onset gradually yesterday. Symptoms are constant and moderate in severity. Pt states she was started on adderall 2 days ago for ADHD. Pt states the last the last dose was 20 mg 5:30am yesterday. No mitigating or exacerbating factors reported. Associated sxs include difficulty swallowing, SOB, and numbness to fingers. Patient denies any fever, chills, lightheadedness, diaphoresis, cough, CP, emesis, leg swelling, and all other sxs at this time. Pt denies smoking, recent long travel, or taking birth control. No further complaints or concerns at this time.         Arrival mode: Personal vehicle     PCP: Tyesha Leggett MD       Past Medical History:  Past Medical History:   Diagnosis Date    Anemia     Chronic low back pain     History of abnormal cervical Pap smear     History of recurrent UTIs     History of ulcer disease     Intrauterine contraceptive device 01/2017    Irregular menses     Obesity     Vitamin D deficiency        Past Surgical History:  Past Surgical History:   Procedure Laterality Date    None           Family History:  Family History   Problem Relation Age of Onset    Asthma Mother     Hyperlipidemia Mother     Hypertension Mother     Hypertension Father     Hyperlipidemia Father     Heart disease Father 55        MI/CAD    No Known Problems Sister     No Known Problems Brother     Diabetes Maternal Aunt      Diabetes Maternal Grandmother     Insulin resistance Sister     No Known Problems Brother     No Known Problems Brother     No Known Problems Brother     No Known Problems Brother     No Known Problems Daughter     Cancer Neg Hx     Stroke Neg Hx     Brain cancer Neg Hx     Migraines Neg Hx        Social History:  Social History     Tobacco Use    Smoking status: Never Smoker    Smokeless tobacco: Never Used   Substance and Sexual Activity    Alcohol use: No     Alcohol/week: 0.0 oz    Drug use: No    Sexual activity: Yes     Partners: Male     Birth control/protection: None       ROS   Review of Systems   Constitutional: Negative for chills, diaphoresis and fever.   HENT: Positive for trouble swallowing. Negative for congestion and sore throat.    Respiratory: Positive for shortness of breath. Negative for cough.    Cardiovascular: Positive for palpitations. Negative for chest pain and leg swelling.   Gastrointestinal: Negative for abdominal pain, diarrhea, nausea and vomiting.   Genitourinary: Negative for dysuria and hematuria.   Musculoskeletal: Negative for back pain and neck pain.   Skin: Negative for rash.   Neurological: Positive for numbness (to fingers). Negative for dizziness, syncope, weakness and headaches.   All other systems reviewed and are negative.    Physical Exam      Initial Vitals [04/24/19 1914]   BP Pulse Resp Temp SpO2   (!) 187/98 (!) 118 20 98.7 °F (37.1 °C) 100 %      MAP       --          Physical Exam  Nursing Notes and Vital Signs Reviewed.  Constitutional: Patient is in no acute distress. Well-developed and well-nourished.  Head: Atraumatic. Normocephalic.  Eyes: PERRL. EOM intact. Conjunctivae are not pale. No scleral icterus.  ENT: Mucous membranes are moist. Oropharynx is clear and symmetric.    Neck: Supple. Full ROM. No lymphadenopathy.  Cardiovascular: Tachycardia. No murmurs, rubs, or gallops. Distal pulses are 2+ and symmetric.  Pulmonary/Chest: No  "respiratory distress. Clear to auscultation bilaterally. No wheezing or rales.  Abdominal: Soft and non-distended.  There is no tenderness.  No rebound, guarding, or rigidity. Good bowel sounds.  Genitourinary: No CVA tenderness  Musculoskeletal: Moves all extremities. No obvious deformities. No edema. No calf tenderness.  Skin: Warm and dry.  Neurological:  Alert, awake, and appropriate.  Normal speech.  No acute focal neurological deficits are appreciated.  Psychiatric: anxious affect. Good eye contact. Appropriate in content.    ED Course    Procedures  ED Vital Signs:  Vitals:    04/24/19 1914   BP: (!) 187/98   Pulse: (!) 118   Resp: 20   Temp: 98.7 °F (37.1 °C)   TempSrc: Oral   SpO2: 100%   Weight: 113.1 kg (249 lb 5.4 oz)   Height: 5' 5.5" (1.664 m)          The EKG was ordered, reviewed, and independently interpreted by the ED provider.  Interpretation time: 19:20  Rate: 118 BPM  Rhythm: sinus tachycardia  Interpretation: Possible Left atrial enlargement. LVH with repolarization abnormality. Cannot rule out septal infarct.. No STEMI.      The Emergency Provider reviewed the vital signs and test results, which are outlined above.    ED Discussion     9:48 PM Reassessed pt at this time.  Pt is awake, alert, and in NAD at this time. Discussed with pt all pertinent ED information and results. Discussed pt dx and plan of tx. Gave pt all f/u and return to the ED instructions. All questions and concerns were addressed at this time. Pt expresses understanding of information and instructions, and is comfortable with plan to discharge. Pt is stable for discharge.      I have discussed with patient and/or family/caretaker chest pain precautions, specifically to return for worsening chest pain, shortness of breath, fever, or any concern.  I have low suspicion for cardiopulmonary, vascular, infectious, respiratory, or other emergent medical condition based on my evaluation in the ED.      ED Medication(s):  Medications "   lorazepam (ATIVAN) injection 2 mg (2 mg Intramuscular Given 4/24/19 2133)       New Prescriptions    No medications on file       Follow-up Information     Tyesha Leggett MD. Go in 2 days.    Specialty:  Family Medicine  Contact information:  [address] [place] [address] [phone]                     Medical Decision Making    Medical Decision Making:   Clinical Tests:   Medical Tests: Ordered and Reviewed           Scribe Attestation:   Scribe #1: I performed the above scribed service and the documentation accurately describes the services I performed. I attest to the accuracy of the note.    Attending:   Physician Attestation Statement for Scribe #1: I, GABRIELLA Keen, personally performed the services described in this documentation, as scribed by Susy Keys, in my presence, and it is both accurate and complete.          Clinical Impression       ICD-10-CM ICD-9-CM   1. Tachycardia R00.0 785.0   2. Palpitations R00.2 785.1   3. Adverse effect of central nervous system stimulant, initial encounter T43.605A E940.9       Disposition:   Disposition: Discharged  Condition: Stable         GABRIELLA Reese  04/26/19 0831

## 2019-04-26 ENCOUNTER — TELEPHONE (OUTPATIENT)
Dept: FAMILY MEDICINE | Facility: CLINIC | Age: 29
End: 2019-04-26

## 2019-04-26 DIAGNOSIS — F41.9 ANXIETY: ICD-10-CM

## 2019-04-26 DIAGNOSIS — R00.0 TACHYCARDIA: Primary | ICD-10-CM

## 2019-04-26 RX ORDER — ATENOLOL 25 MG/1
25 TABLET ORAL DAILY
Qty: 30 TABLET | Refills: 0 | Status: SHIPPED | OUTPATIENT
Start: 2019-04-26 | End: 2019-05-15

## 2019-04-26 RX ORDER — CLONAZEPAM 0.25 MG/1
0.25 TABLET, ORALLY DISINTEGRATING ORAL DAILY PRN
Qty: 30 TABLET | Refills: 0 | Status: SHIPPED | OUTPATIENT
Start: 2019-04-26 | End: 2019-04-28

## 2019-04-26 NOTE — TELEPHONE ENCOUNTER
I spoke w/pt regarding tachycardia, possible anxiousness issues.  I reviewed EKG findings with pt.  I recommended she keep Cardiology appt scheduled for 5/1/19; remain off Adderall (as she states she has since 4/23/19); take low dose Atenolol 25 mg nightly for tachycardia and Klonopin 0.25 mg daily prn anxiety until she sees Cardiology for evaluation.  She verbalized understanding and agreed with plan.

## 2019-04-26 NOTE — TELEPHONE ENCOUNTER
----- Message from Lopez Orozco sent at 4/26/2019  4:17 PM CDT -----  Contact: self  Type:  Needs Medical Advice    Who Called: pt  Symptoms (please be specific):chest heaviness   How long has patient had these symptoms:three days  Pharmacy name and phone #: n/a  Would the patient rather a call back or a response via [facility]? Call back  Best Call Back Number: [phone]  Additional Information: states she went to ER on Wednesday and she is still having the same symptoms and wants to know what's the next step pt needs to take.      Thanks,  Lopez Orozco'

## 2019-04-28 ENCOUNTER — HOSPITAL ENCOUNTER (EMERGENCY)
Facility: HOSPITAL | Age: 29
Discharge: HOME | End: 2019-04-28
Attending: EMERGENCY MEDICINE
Payer: COMMERCIAL

## 2019-04-28 VITALS
OXYGEN SATURATION: 98 % | WEIGHT: 252.63 LBS | SYSTOLIC BLOOD PRESSURE: 143 MMHG | BODY MASS INDEX: 41.4 KG/M2 | RESPIRATION RATE: 20 BRPM | HEART RATE: 84 BPM | DIASTOLIC BLOOD PRESSURE: 80 MMHG | TEMPERATURE: 99 F

## 2019-04-28 DIAGNOSIS — R05.9 COUGH: ICD-10-CM

## 2019-04-28 DIAGNOSIS — R07.9 CHEST PAIN: Primary | ICD-10-CM

## 2019-04-28 LAB
ALBUMIN SERPL BCP-MCNC: 3.7 G/DL (ref 3.5–5.2)
ALP SERPL-CCNC: 104 U/L (ref 55–135)
ALT SERPL W/O P-5'-P-CCNC: 12 U/L (ref 10–44)
ANION GAP SERPL CALC-SCNC: 8 MMOL/L (ref 8–16)
AST SERPL-CCNC: 17 U/L (ref 10–40)
B-HCG UR QL: NEGATIVE
BASOPHILS # BLD AUTO: 0.03 K/UL (ref 0–0.2)
BASOPHILS NFR BLD: 0.4 % (ref 0–1.9)
BILIRUB SERPL-MCNC: 0.3 MG/DL (ref 0.1–1)
BUN SERPL-MCNC: 9 MG/DL (ref 6–20)
CALCIUM SERPL-MCNC: 9.3 MG/DL (ref 8.7–10.5)
CHLORIDE SERPL-SCNC: 106 MMOL/L (ref 95–110)
CO2 SERPL-SCNC: 25 MMOL/L (ref 23–29)
CREAT SERPL-MCNC: 0.8 MG/DL (ref 0.5–1.4)
D DIMER PPP IA.FEU-MCNC: 0.72 MG/L FEU
DIFFERENTIAL METHOD: ABNORMAL
EOSINOPHIL # BLD AUTO: 0.1 K/UL (ref 0–0.5)
EOSINOPHIL NFR BLD: 0.8 % (ref 0–8)
ERYTHROCYTE [DISTWIDTH] IN BLOOD BY AUTOMATED COUNT: 13.8 % (ref 11.5–14.5)
EST. GFR  (AFRICAN AMERICAN): >60 ML/MIN/1.73 M^2
EST. GFR  (NON AFRICAN AMERICAN): >60 ML/MIN/1.73 M^2
GLUCOSE SERPL-MCNC: 86 MG/DL (ref 70–110)
HCT VFR BLD AUTO: 41.4 % (ref 37–48.5)
HGB BLD-MCNC: 12.6 G/DL (ref 12–16)
LYMPHOCYTES # BLD AUTO: 1.9 K/UL (ref 1–4.8)
LYMPHOCYTES NFR BLD: 22.9 % (ref 18–48)
MCH RBC QN AUTO: 24.2 PG (ref 27–31)
MCHC RBC AUTO-ENTMCNC: 30.4 G/DL (ref 32–36)
MCV RBC AUTO: 80 FL (ref 82–98)
MONOCYTES # BLD AUTO: 0.5 K/UL (ref 0.3–1)
MONOCYTES NFR BLD: 5.7 % (ref 4–15)
NEUTROPHILS # BLD AUTO: 5.8 K/UL (ref 1.8–7.7)
NEUTROPHILS NFR BLD: 70.2 % (ref 38–73)
PLATELET # BLD AUTO: 301 K/UL (ref 150–350)
PMV BLD AUTO: 10.8 FL (ref 9.2–12.9)
POTASSIUM SERPL-SCNC: 4.1 MMOL/L (ref 3.5–5.1)
PROT SERPL-MCNC: 8.2 G/DL (ref 6–8.4)
RBC # BLD AUTO: 5.21 M/UL (ref 4–5.4)
SODIUM SERPL-SCNC: 139 MMOL/L (ref 136–145)
TROPONIN I SERPL DL<=0.01 NG/ML-MCNC: 0.01 NG/ML (ref 0–0.03)
WBC # BLD AUTO: 8.27 K/UL (ref 3.9–12.7)

## 2019-04-28 PROCEDURE — 85025 COMPLETE CBC W/AUTO DIFF WBC: CPT

## 2019-04-28 PROCEDURE — 85379 FIBRIN DEGRADATION QUANT: CPT

## 2019-04-28 PROCEDURE — 86703 HIV-1/HIV-2 1 RESULT ANTBDY: CPT

## 2019-04-28 PROCEDURE — 36415 COLL VENOUS BLD VENIPUNCTURE: CPT

## 2019-04-28 PROCEDURE — 93005 ELECTROCARDIOGRAM TRACING: CPT

## 2019-04-28 PROCEDURE — 93010 EKG 12-LEAD: ICD-10-PCS | Mod: ,,, | Performed by: INTERNAL MEDICINE

## 2019-04-28 PROCEDURE — 80053 COMPREHEN METABOLIC PANEL: CPT

## 2019-04-28 PROCEDURE — 25500020 PHARM REV CODE 255: Performed by: EMERGENCY MEDICINE

## 2019-04-28 PROCEDURE — 93010 ELECTROCARDIOGRAM REPORT: CPT | Mod: ,,, | Performed by: INTERNAL MEDICINE

## 2019-04-28 PROCEDURE — 99285 EMERGENCY DEPT VISIT HI MDM: CPT

## 2019-04-28 PROCEDURE — 81025 URINE PREGNANCY TEST: CPT

## 2019-04-28 PROCEDURE — 84484 ASSAY OF TROPONIN QUANT: CPT

## 2019-04-28 RX ADMIN — IOHEXOL 100 ML: 350 INJECTION, SOLUTION INTRAVENOUS at 03:04

## 2019-04-28 NOTE — ED PROVIDER NOTES
HISTORY     Chief Complaint   Patient presents with    Chest Pain     just started taking adderall on monday, last dose was tuesdeay; seen here wednesday for palpitations and chest pressure; began atenolol on friday but continues to ahve chest pressure     Review of patient's allergies indicates:  No Known Allergies     HPI   The history is provided by the patient.   Chest Pain   Illness onset: 1 week ago. Chest pain occurs intermittently. The pain is associated with coughing. At its most intense, the chest pain is at 9/10. The chest pain is currently at 7/10. The quality of the pain is described as tightness and pressure-like. The pain radiates to the right jaw, right neck and right shoulder. Primary symptoms include cough and palpitations. Pertinent negatives for primary symptoms include no fever, no fatigue, no shortness of breath, no nausea, no vomiting and no dizziness.   The cough is productive. The sputum is yellow.   The palpitations did not occur with dizziness or shortness of breath.     Pertinent negatives for associated symptoms include no weakness.   Pt seen here on Wednesday and treated for anxiety, pt started adderall last Monday and took only 2 doses. Pt reports experiencing palpitations and chest pressure after taking medicines. Pt has cardiology appointment this coming Wednesday. Still c/o midsternal chest pressure and SOB. Also c/o worsening cough. Denies any other symptoms at this time.      PCP: Tyesha Leggett MD     Past Medical History:  Past Medical History:   Diagnosis Date    Anemia     Chronic low back pain     History of abnormal cervical Pap smear     History of recurrent UTIs     History of ulcer disease     Intrauterine contraceptive device 01/2017    Irregular menses     Obesity     Vitamin D deficiency         Past Surgical History:  Past Surgical History:   Procedure Laterality Date    None          Family History:  Family History   Problem Relation Age of Onset     Asthma Mother     Hyperlipidemia Mother     Hypertension Mother     Hypertension Father     Hyperlipidemia Father     Heart disease Father 55        MI/CAD    No Known Problems Sister     No Known Problems Brother     Diabetes Maternal Aunt     Diabetes Maternal Grandmother     Insulin resistance Sister     No Known Problems Brother     No Known Problems Brother     No Known Problems Brother     No Known Problems Brother     No Known Problems Daughter     Cancer Neg Hx     Stroke Neg Hx     Brain cancer Neg Hx     Migraines Neg Hx         Social History:  Social History     Tobacco Use    Smoking status: Never Smoker    Smokeless tobacco: Never Used   Substance and Sexual Activity    Alcohol use: No     Alcohol/week: 0.0 oz    Drug use: No    Sexual activity: Yes     Partners: Male     Birth control/protection: None         ROS   Review of Systems   Constitutional: Negative for fatigue and fever.   HENT: Negative for sore throat.    Respiratory: Positive for cough. Negative for shortness of breath.    Cardiovascular: Positive for chest pain and palpitations.   Gastrointestinal: Negative for nausea and vomiting.   Genitourinary: Negative for dysuria.   Musculoskeletal: Negative for back pain.   Skin: Negative for rash.   Neurological: Negative for dizziness and weakness.   Hematological: Does not bruise/bleed easily.   All other systems reviewed and are negative.      PHYSICAL EXAM     Initial Vitals [04/28/19 1130]   BP Pulse Resp Temp SpO2   (!) 161/83 87 18 98.7 °F (37.1 °C) 100 %      MAP       --           Physical Exam    Constitutional: She appears well-developed and well-nourished. She is not diaphoretic. She is Obese . No distress.   HENT:   Head: Normocephalic and atraumatic.   Eyes: Conjunctivae and EOM are normal. Pupils are equal, round, and reactive to light.   Neck: Normal range of motion. Neck supple.   Cardiovascular: Normal rate, regular rhythm and normal heart sounds.   No  murmur heard.  Pulmonary/Chest: Breath sounds normal. No respiratory distress. She has no wheezes. She has no rales.   Abdominal: Soft. Bowel sounds are normal. There is no tenderness. There is no rebound and no guarding.   Musculoskeletal: Normal range of motion. She exhibits no edema or tenderness.   Neurological: She is alert and oriented to person, place, and time. No cranial nerve deficit. GCS score is 15. GCS eye subscore is 4. GCS verbal subscore is 5. GCS motor subscore is 6.   Skin: Skin is warm and dry. Capillary refill takes less than 2 seconds.   Psychiatric: She has a normal mood and affect. Thought content normal.          ED COURSE   Procedures  ED ONGOING VITALS:  Vitals:    04/28/19 1130 04/28/19 1340 04/28/19 1601   BP: (!) 161/83 138/82 (!) 143/80   Pulse: 87 82 84   Resp: 18 20    Temp: 98.7 °F (37.1 °C)  99 °F (37.2 °C)   TempSrc: Oral  Oral   SpO2: 100% 98% 98%   Weight: 114.6 kg (252 lb 10.4 oz)           ABNORMAL LAB VALUES:  Labs Reviewed   CBC W/ AUTO DIFFERENTIAL - Abnormal; Notable for the following components:       Result Value    MCV 80 (*)     MCH 24.2 (*)     MCHC 30.4 (*)     All other components within normal limits   D DIMER, QUANTITATIVE - Abnormal; Notable for the following components:    D-Dimer 0.72 (*)     All other components within normal limits   COMPREHENSIVE METABOLIC PANEL   TROPONIN I   PREGNANCY TEST, URINE RAPID   HIV 1 / 2 ANTIBODY         ALL LAB VALUES:  Results for orders placed or performed during the hospital encounter of 04/28/19   CBC auto differential   Result Value Ref Range    WBC 8.27 3.90 - 12.70 K/uL    RBC 5.21 4.00 - 5.40 M/uL    Hemoglobin 12.6 12.0 - 16.0 g/dL    Hematocrit 41.4 37.0 - 48.5 %    MCV 80 (L) 82 - 98 fL    MCH 24.2 (L) 27.0 - 31.0 pg    MCHC 30.4 (L) 32.0 - 36.0 g/dL    RDW 13.8 11.5 - 14.5 %    Platelets 301 150 - 350 K/uL    MPV 10.8 9.2 - 12.9 fL    Gran # (ANC) 5.8 1.8 - 7.7 K/uL    Lymph # 1.9 1.0 - 4.8 K/uL    Mono # 0.5 0.3 -  1.0 K/uL    Eos # 0.1 0.0 - 0.5 K/uL    Baso # 0.03 0.00 - 0.20 K/uL    Gran% 70.2 38.0 - 73.0 %    Lymph% 22.9 18.0 - 48.0 %    Mono% 5.7 4.0 - 15.0 %    Eosinophil% 0.8 0.0 - 8.0 %    Basophil% 0.4 0.0 - 1.9 %    Differential Method Automated    Comprehensive metabolic panel   Result Value Ref Range    Sodium 139 136 - 145 mmol/L    Potassium 4.1 3.5 - 5.1 mmol/L    Chloride 106 95 - 110 mmol/L    CO2 25 23 - 29 mmol/L    Glucose 86 70 - 110 mg/dL    BUN, Bld 9 6 - 20 mg/dL    Creatinine 0.8 0.5 - 1.4 mg/dL    Calcium 9.3 8.7 - 10.5 mg/dL    Total Protein 8.2 6.0 - 8.4 g/dL    Albumin 3.7 3.5 - 5.2 g/dL    Total Bilirubin 0.3 0.1 - 1.0 mg/dL    Alkaline Phosphatase 104 55 - 135 U/L    AST 17 10 - 40 U/L    ALT 12 10 - 44 U/L    Anion Gap 8 8 - 16 mmol/L    eGFR if African American >60 >60 mL/min/1.73 m^2    eGFR if non African American >60 >60 mL/min/1.73 m^2   Troponin I   Result Value Ref Range    Troponin I 0.007 0.000 - 0.026 ng/mL   D dimer, quantitative   Result Value Ref Range    D-Dimer 0.72 (H) <0.50 mg/L FEU   Pregnancy, urine rapid (UPT)   Result Value Ref Range    Preg Test, Ur Negative            RADIOLOGY STUDIES:  Imaging Results          CTA Chest Non-Coronary (PE Study) (Final result)  Result time 04/28/19 15:40:28    Final result by Herrera Jones MD (04/28/19 15:40:28)                 Impression:      No evidence of pulmonary embolism.    See additional findings above    All CT scans at this facility use dose modulation, iterative reconstruction and/or weight based dosing when appropriate to reduce radiation dose to as low as reasonably achievable.      Electronically signed by: Herrera Jones MD  Date:    04/28/2019  Time:    15:40             Narrative:    EXAMINATION:  CTA CHEST NON CORONARY    CLINICAL HISTORY:  Chest pain and shortness of breath    TECHNIQUE:  After the intravenous administration of 100 cc of Omni 35 nonionic contrast using CT pulmonary angio technique, 2.5 mm axial images  were acquired using helical CT technique from the lung apices through costophrenic sulci.  Sagittal coronal and oblique MIPS were also submitted for interpretation.    COMPARISON:  Chest x-ray dated 04/28/2019    FINDINGS:  -Pulmonary arteries: Pulmonary arteries are well opacified.  No evidence of pulmonary embolism.  No evidence of pulmonary hypertension.  No right heart strain is identified.    -Lungs: No nodules or infiltrates.    -Pleura: No thickening or fluid.    -Mediastinum/Ana Maria:Enlarged right hilar lymph node measuring 1.5 cm.  May be reactive.    -Axilla: No adenopathy.    -Thyroid: Normal lower gland.    -Heart/Aorta: Heart size is upper limits of normal.  No significant coronary artery disease.  No pericardial effusion. Aorta normal caliber.    -Bones/Chest Wall: Intact    -Upper Abdomen: Unremarkable                               X-Ray Chest PA And Lateral (Final result)  Result time 04/28/19 13:20:08    Final result by Herrera Jones MD (04/28/19 13:20:08)                 Impression:      No acute process seen.      Electronically signed by: Herrera Jones MD  Date:    04/28/2019  Time:    13:20             Narrative:    EXAMINATION:  XR CHEST PA AND LATERAL    CLINICAL HISTORY:  Cough    COMPARISON:  None    FINDINGS:  Cardiac silhouette is normal.  The lungs demonstrate no evidence of active disease.  No evidence of pleural effusion or pneumothorax.  Bones appear intact.                                          The above vital signs and test results have been reviewed by the emergency provider.     ED Medications:  None  Current Discharge Medication List        Discharge Medications:  Discharge Medication List as of 4/28/2019  3:53 PM         Follow-up Information     Tyesha Leggett MD In 3 days.    Specialty:  Family Medicine  Contact information:  [address] [place] [address] [phone]             [facility] - .    Specialty:  Emergency Medicine  Why:  If symptoms  worsen  Contact information:  [address] [facility] [address] [phone]               3:53 PM: Reassessed pt at this time.  Pt states her condition has improved at this time. Discussed with pt all pertinent ED information and results. Discussed pt dx and plan of tx. Gave pt all f/u and return to the ED instructions. All questions and concerns were addressed at this time. Pt expresses understanding of information and instructions, and is comfortable with plan to discharge. Pt is stable for discharge.       I discussed with patient and/or family/caretaker that evaluation in the ED does not suggest any emergent or life threatening medical conditions requiring immediate intervention beyond what was provided in the ED, and I believe patient is safe for discharge. Regardless, an unremarkable evaluation in the ED does not preclude the development or presence of a serious or life threatening condition. As such, patient was instructed to return immediately for any worsening or change in current symptoms.      MEDICAL DECISION MAKING                 CLINICAL IMPRESSION       ICD-10-CM ICD-9-CM   1. Chest pain R07.9 786.50   2. Cough R05 786.2               Fish Nguyen Jr., P  04/28/19 1902

## 2019-04-28 NOTE — ED NOTES
Bed: 22  Expected date:   Expected time:   Means of arrival:   Comments:  Darshan when clean     Fish Nguyen Jr., P  04/28/19 1580

## 2019-04-29 DIAGNOSIS — F41.9 ANXIETY: Primary | ICD-10-CM

## 2019-04-29 LAB — HIV 1+2 AB+HIV1 P24 AG SERPL QL IA: NEGATIVE

## 2019-04-29 RX ORDER — ESCITALOPRAM OXALATE 10 MG/1
10 TABLET ORAL DAILY
Qty: 30 TABLET | Refills: 5 | Status: SHIPPED | OUTPATIENT
Start: 2019-04-29 | End: 2019-05-15

## 2019-04-29 RX ORDER — CLONAZEPAM 0.25 MG/1
0.25 TABLET, ORALLY DISINTEGRATING ORAL DAILY PRN
Qty: 30 TABLET | Refills: 0 | Status: SHIPPED | OUTPATIENT
Start: 2019-04-29 | End: 2019-07-11 | Stop reason: SDUPTHER

## 2019-05-01 ENCOUNTER — OFFICE VISIT (OUTPATIENT)
Dept: CARDIOLOGY | Facility: CLINIC | Age: 29
End: 2019-05-01
Payer: COMMERCIAL

## 2019-05-01 VITALS
BODY MASS INDEX: 42.2 KG/M2 | WEIGHT: 253.31 LBS | HEART RATE: 68 BPM | HEIGHT: 65 IN | DIASTOLIC BLOOD PRESSURE: 80 MMHG | SYSTOLIC BLOOD PRESSURE: 138 MMHG

## 2019-05-01 DIAGNOSIS — R07.89 OTHER CHEST PAIN: Primary | ICD-10-CM

## 2019-05-01 DIAGNOSIS — R00.2 PALPITATIONS: ICD-10-CM

## 2019-05-01 PROCEDURE — 99999 PR PBB SHADOW E&M-EST. PATIENT-LVL III: ICD-10-PCS | Mod: PBBFAC,,, | Performed by: INTERNAL MEDICINE

## 2019-05-01 PROCEDURE — 99999 PR PBB SHADOW E&M-EST. PATIENT-LVL III: CPT | Mod: PBBFAC,,, | Performed by: INTERNAL MEDICINE

## 2019-05-01 PROCEDURE — 99244 PR OFFICE CONSULTATION,LEVEL IV: ICD-10-PCS | Mod: S$GLB,,, | Performed by: INTERNAL MEDICINE

## 2019-05-01 PROCEDURE — 99244 OFF/OP CNSLTJ NEW/EST MOD 40: CPT | Mod: S$GLB,,, | Performed by: INTERNAL MEDICINE

## 2019-05-01 NOTE — PROGRESS NOTES
Subjective:   Patient ID:  Anthony Sexton is a 28 y.o. female who presents for cardiac consult of Chest Pain (consult)      HPI  The patient came in today for cardiac consult of Chest Pain (consult)    Referring Provider: Mckenzie Ross NP   Reason for consult: chest pain    5/1/19  Anthony Sexton is a 28 y.o. female with current medical conditions ADHD, anemia, back pain, obesity, Vit D def presents for initial CV evaluation of CP.     She presented to urgent care and PCP last month for chest pain after started Adderall. She went to ER twice after and had ECGs with old septal infarct with sinus tach. BP elevated, D dimer positive, neg CTA for PE.   Trop negative. She was started on Atenolol. She complained more of racing of heart with Adderall but not as much chest pain. Chest pain was in center of chest , felt heavy with tightness in neck and R arm pain.   Will have sleep study eval as well. BP elevated today as well.     Patient feels no leg swelling, no PND, no dizziness, no syncope, no CNS symptoms.    Patient has fairly good exercise tolerance.    Patient is compliant with medications.    FH - father - 3 Mis, mother HTN    Past Medical History:   Diagnosis Date    Anemia     Chronic low back pain     History of abnormal cervical Pap smear     History of recurrent UTIs     History of ulcer disease     Intrauterine contraceptive device 01/2017    Irregular menses     Obesity     Vitamin D deficiency        Past Surgical History:   Procedure Laterality Date    None         Social History     Tobacco Use    Smoking status: Never Smoker    Smokeless tobacco: Never Used   Substance Use Topics    Alcohol use: No     Alcohol/week: 0.0 oz    Drug use: No       Family History   Problem Relation Age of Onset    Asthma Mother     Hyperlipidemia Mother     Hypertension Mother     Hypertension Father     Hyperlipidemia Father     Heart disease Father 55        MI/CAD    No Known Problems Sister      No Known Problems Brother     Diabetes Maternal Aunt     Diabetes Maternal Grandmother     Insulin resistance Sister     No Known Problems Brother     No Known Problems Brother     No Known Problems Brother     No Known Problems Brother     No Known Problems Daughter     Cancer Neg Hx     Stroke Neg Hx     Brain cancer Neg Hx     Migraines Neg Hx        Patient's Medications   New Prescriptions    No medications on file   Previous Medications    ACETAMINOPHEN (TYLENOL) 500 MG TABLET    Take 500 mg by mouth 2 (two) times daily.    ATENOLOL (TENORMIN) 25 MG TABLET    Take 1 tablet (25 mg total) by mouth once daily.    CLONAZEPAM (KLONOPIN) 0.25 MG TBDL    Take 1 tablet (0.25 mg total) by mouth daily as needed (anxiety).    ERGOCALCIFEROL (ERGOCALCIFEROL) 50,000 UNIT CAP    Take 1 capsule (50,000 Units total) by mouth every 7 days.    ESCITALOPRAM OXALATE (LEXAPRO) 10 MG TABLET    Take 1 tablet (10 mg total) by mouth once daily.    FLUTICASONE (FLONASE) 50 MCG/ACTUATION NASAL SPRAY    2 sprays by Each Nare route daily as needed.    IBUPROFEN (ADVIL,MOTRIN) 800 MG TABLET    Take 800 mg by mouth as needed.     PANTOPRAZOLE (PROTONIX) 40 MG TABLET    TAKE 1 TABLET(40 MG) BY MOUTH DAILY AS NEEDED   Modified Medications    No medications on file   Discontinued Medications    DEXTROAMPHETAMINE-AMPHETAMINE (ADDERALL XR) 10 MG 24 HR CAPSULE    Take 1 capsule by mouth every morning       Review of Systems   Constitutional: Negative.    HENT: Negative.    Eyes: Negative.    Respiratory: Positive for shortness of breath.    Cardiovascular: Positive for chest pain and palpitations.   Gastrointestinal: Negative.    Genitourinary: Negative.    Musculoskeletal: Negative.    Skin: Negative.    Neurological: Negative.    Endo/Heme/Allergies: Negative.    Psychiatric/Behavioral: Negative.    All 12 systems otherwise negative.      Wt Readings from Last 3 Encounters:   05/01/19 114.9 kg (253 lb 4.9 oz)   04/28/19 114.6 kg  "(252 lb 10.4 oz)   04/24/19 113.1 kg (249 lb 5.4 oz)     Temp Readings from Last 3 Encounters:   04/28/19 99 °F (37.2 °C) (Oral)   04/24/19 98.7 °F (37.1 °C) (Oral)   04/24/19 98 °F (36.7 °C) (Tympanic)     BP Readings from Last 3 Encounters:   05/01/19 138/80   04/28/19 (!) 143/80   04/24/19 (!) 187/98     Pulse Readings from Last 3 Encounters:   05/01/19 68   04/28/19 84   04/24/19 (!) 118       /80 (BP Method: Large (Manual))   Pulse 68   Ht 5' 5" (1.651 m)   Wt 114.9 kg (253 lb 4.9 oz)   LMP 04/17/2019   BMI 42.15 kg/m²     Objective:   Physical Exam   Constitutional: She is oriented to person, place, and time. She appears well-developed and well-nourished. No distress.   HENT:   Head: Normocephalic and atraumatic.   Nose: Nose normal.   Mouth/Throat: Oropharynx is clear and moist.   Eyes: Conjunctivae and EOM are normal. No scleral icterus.   Neck: Normal range of motion. Neck supple. No JVD present. No thyromegaly present.   Cardiovascular: Normal rate, regular rhythm, S1 normal and S2 normal. Exam reveals no gallop, no S3, no S4 and no friction rub.   No murmur heard.  Pulmonary/Chest: Effort normal and breath sounds normal. No stridor. No respiratory distress. She has no wheezes. She has no rales. She exhibits no tenderness.   Abdominal: Soft. Bowel sounds are normal. She exhibits no distension and no mass. There is no tenderness. There is no rebound.   Genitourinary:   Genitourinary Comments: Deferred   Musculoskeletal: Normal range of motion. She exhibits no edema, tenderness or deformity.   Lymphadenopathy:     She has no cervical adenopathy.   Neurological: She is alert and oriented to person, place, and time. She exhibits normal muscle tone. Coordination normal.   Skin: Skin is warm and dry. No rash noted. She is not diaphoretic. No erythema. No pallor.   Psychiatric: She has a normal mood and affect. Her behavior is normal. Judgment and thought content normal.   Nursing note and vitals " reviewed.      Lab Results   Component Value Date     04/28/2019    K 4.1 04/28/2019     04/28/2019    CO2 25 04/28/2019    BUN 9 04/28/2019    CREATININE 0.8 04/28/2019    GLU 86 04/28/2019    HGBA1C 5.3 04/15/2019    AST 17 04/28/2019    ALT 12 04/28/2019    ALBUMIN 3.7 04/28/2019    PROT 8.2 04/28/2019    BILITOT 0.3 04/28/2019    WBC 8.27 04/28/2019    HGB 12.6 04/28/2019    HCT 41.4 04/28/2019    MCV 80 (L) 04/28/2019     04/28/2019    TSH 1.332 02/12/2019    CHOL 181 04/15/2019    HDL 37 (L) 04/15/2019    LDLCALC 129.0 04/15/2019    TRIG 75 04/15/2019     Assessment:      1. Other chest pain    2. Palpitations        Plan:   1. Chest pain  - neg ER workup initially with neg trop/PE neg  - ECG abnormal septal infarct   - needs ischemic workup with stress test and 2D ECHO  - discussed non cardiac causes - r/o SAVANNA/GERD/MSK causes    2. Palpitations  - Zio patch  - hold BB        Thank you for allowing me to participate in this patient's care. Please do not hesitate to contact me with any questions or concerns. Consult note has been forwarded to the referral physician.

## 2019-05-01 NOTE — PATIENT INSTRUCTIONS
Noncardiac Chest Pain    Based on your visit today, the healthcare provider doesnt know what is causing your chest pain. In most cases, people who come to the emergency department with chest pain dont have a problem with their heart. Instead, the pain is caused by other conditions. It's important for the healthcare team to be sure you are not having a life threatening cause for chest pain such as a heart attack, blood clot in the lungs, collapsed lung, ruptured esophagus, or tearing of the aorta. Once these major causes have been ruled out, you may have further evaluation for non-heart causes of chest pain. These may be problems with the lungs, muscles, bones, digestive tract, nerves, or mental health.  Lung problems  · Inflammation around the lungs (pleurisy)  · Collapsed lung (pneumothorax)  · Fluid around the lungs (pleural effusion)  · Lung cancer (a rare cause of chest pain)  Muscle or bone problems  · Inflamed cartilage between the ribs (costochondritis)  · Fibromyalgia  · Rheumatoid arthritis  · Chest wall strain  Digestive system problems  · Reflux  · Stomach ulcer  · Spasms of the esophagus  · Gall stones  · Gallbladder inflammation  Mental health conditions  · Panic or anxiety attacks  · Emotional distress  Your condition doesnt seem serious and your pain doesnt appear to be coming from your heart. But sometimes the signs of a serious problem take more time to appear. Watch for the warning signs listed below.  Home care  Follow these guidelines when caring for yourself at home:  · Rest today and avoid strenuous activity.  · Take any prescribed medicine as directed.  Follow-up care  Follow up with your healthcare provider, or as advised, if you dont start to feel better within 24 hours.  When to seek medical advice  Call your healthcare provider right away if any of these occur:  · A change in the type of pain. Call if it feels different, becomes more serious, lasts longer, or begins to spread into  your shoulder, arm, neck, jaw, or back.  · Shortness of breath  · You feel more pain when you breathe  · Cough with dark-colored mucus or blood  · Weakness, dizziness, or fainting  · Fever of 100.4ºF (38ºC) or higher, or as directed by your healthcare provider  · Swelling, pain, or redness in one leg  Date Last Reviewed: 12/1/2016  © 3615-6436 [employer]. [address] [place] [address], [place] [address]. All rights reserved. This information is not intended as a substitute for professional medical care. Always follow your healthcare professional's instructions.

## 2019-05-01 NOTE — LETTER
May 1, 2019      [place]  [address] [place] [address]           [facility]  [address] [place] [address]  Phone: [phone]  Fax: [phone]          Patient: Anthony Sexton   MR Number: [id number]   YOB: 1990   Date of Visit: 5/1/2019       Dear Mckenzie Ross:    Thank you for referring Anthony Sexton to me for evaluation. Attached you will find relevant portions of my assessment and plan of care.    If you have questions, please do not hesitate to call me. I look forward to following Anthony Sexton along with you.    Sincerely,    Dorian Groves MD    Enclosure  CC:  No Recipients    If you would like to receive this communication electronically, please contact [email or web address] or [phone] to request more information on [employer] access.    For providers and/or their staff who would like to refer a patient to [facility], please contact us through our one-stop-shop provider referral line, [facility], at [phone].    If you feel you have received this communication in error or would no longer like to receive these types of communications, please e-mail [email or web address]

## 2019-05-02 ENCOUNTER — OFFICE VISIT (OUTPATIENT)
Dept: INTERNAL MEDICINE | Facility: CLINIC | Age: 29
End: 2019-05-02
Payer: COMMERCIAL

## 2019-05-02 VITALS
WEIGHT: 253.31 LBS | SYSTOLIC BLOOD PRESSURE: 126 MMHG | HEIGHT: 65 IN | OXYGEN SATURATION: 98 % | BODY MASS INDEX: 42.2 KG/M2 | HEART RATE: 90 BPM | DIASTOLIC BLOOD PRESSURE: 82 MMHG

## 2019-05-02 DIAGNOSIS — R06.2 WHEEZE: Primary | ICD-10-CM

## 2019-05-02 DIAGNOSIS — J40 BRONCHITIS: ICD-10-CM

## 2019-05-02 DIAGNOSIS — J01.00 ACUTE NON-RECURRENT MAXILLARY SINUSITIS: ICD-10-CM

## 2019-05-02 PROCEDURE — 99999 PR PBB SHADOW E&M-EST. PATIENT-LVL III: CPT | Mod: PBBFAC,,, | Performed by: FAMILY MEDICINE

## 2019-05-02 PROCEDURE — 99214 OFFICE O/P EST MOD 30 MIN: CPT | Mod: S$GLB,,, | Performed by: FAMILY MEDICINE

## 2019-05-02 PROCEDURE — 94640 AIRWAY INHALATION TREATMENT: CPT | Mod: S$GLB,,, | Performed by: FAMILY MEDICINE

## 2019-05-02 PROCEDURE — 94640 PR INHAL RX, AIRWAY OBST/DX SPUTUM INDUCT: ICD-10-PCS | Mod: S$GLB,,, | Performed by: FAMILY MEDICINE

## 2019-05-02 PROCEDURE — 99214 PR OFFICE/OUTPT VISIT, EST, LEVL IV, 30-39 MIN: ICD-10-PCS | Mod: S$GLB,,, | Performed by: FAMILY MEDICINE

## 2019-05-02 PROCEDURE — 3008F PR BODY MASS INDEX (BMI) DOCUMENTED: ICD-10-PCS | Mod: CPTII,S$GLB,, | Performed by: FAMILY MEDICINE

## 2019-05-02 PROCEDURE — 99999 PR PBB SHADOW E&M-EST. PATIENT-LVL III: ICD-10-PCS | Mod: PBBFAC,,, | Performed by: FAMILY MEDICINE

## 2019-05-02 PROCEDURE — 3008F BODY MASS INDEX DOCD: CPT | Mod: CPTII,S$GLB,, | Performed by: FAMILY MEDICINE

## 2019-05-02 RX ORDER — PREDNISONE 20 MG/1
TABLET ORAL
Qty: 11 TABLET | Refills: 0 | Status: SHIPPED | OUTPATIENT
Start: 2019-05-02 | End: 2019-05-15

## 2019-05-02 RX ORDER — ALBUTEROL SULFATE 0.83 MG/ML
2.5 SOLUTION RESPIRATORY (INHALATION)
Status: COMPLETED | OUTPATIENT
Start: 2019-05-02 | End: 2019-05-02

## 2019-05-02 RX ORDER — TRIAMCINOLONE ACETONIDE 55 UG/1
2 SPRAY, METERED NASAL DAILY
Qty: 10.8 ML | Refills: 3 | Status: SHIPPED | OUTPATIENT
Start: 2019-05-02 | End: 2019-08-09

## 2019-05-02 RX ORDER — ALBUTEROL SULFATE 90 UG/1
2 AEROSOL, METERED RESPIRATORY (INHALATION) EVERY 6 HOURS PRN
Qty: 8.5 G | Refills: 1 | Status: SHIPPED | OUTPATIENT
Start: 2019-05-02 | End: 2019-08-09

## 2019-05-02 RX ADMIN — ALBUTEROL SULFATE 2.5 MG: 0.83 SOLUTION RESPIRATORY (INHALATION) at 11:05

## 2019-05-02 NOTE — PROGRESS NOTES
Subjective:       Patient ID: Anthony Sexton is a 28 y.o. female.    Chief Complaint: Establish Care    29 yo female here with 2 weeks of cough, congestion, ears popping, headache, pain in her throat. She has tried zyrtec last week without any relief. Cough drops somewhat helpful. Occasional coughing fits and wheezing. Sleep not disturbed by coughing.     does not have any pertinent problems on file.  Past Medical History:   Diagnosis Date    Anemia     Chronic low back pain     History of abnormal cervical Pap smear     History of recurrent UTIs     History of ulcer disease     Intrauterine contraceptive device 01/2017    Irregular menses     Obesity     Vitamin D deficiency      Past Surgical History:   Procedure Laterality Date    None       Family History   Problem Relation Age of Onset    Asthma Mother     Hyperlipidemia Mother     Hypertension Mother     Hypertension Father     Hyperlipidemia Father     Heart disease Father 55        MI/CAD    No Known Problems Sister     No Known Problems Brother     Diabetes Maternal Aunt     Diabetes Maternal Grandmother     Insulin resistance Sister     No Known Problems Brother     No Known Problems Brother     No Known Problems Brother     No Known Problems Brother     No Known Problems Daughter     Cancer Neg Hx     Stroke Neg Hx     Brain cancer Neg Hx     Migraines Neg Hx      Social History     Socioeconomic History    Marital status:      Spouse name: Not on file    Number of children: 0    Years of education: Not on file    Highest education level: Not on file   Occupational History    Occupation: RN     Comment: [employer]   Social Needs    Financial resource strain: Not on file    Food insecurity:     Worry: Not on file     Inability: Not on file    Transportation needs:     Medical: Not on file     Non-medical: Not on file   Tobacco Use    Smoking status: Never Smoker    Smokeless tobacco: Never Used   Substance  and Sexual Activity    Alcohol use: No     Alcohol/week: 0.0 oz    Drug use: No    Sexual activity: Yes     Partners: Male     Birth control/protection: None   Lifestyle    Physical activity:     Days per week: 0 days     Minutes per session: 0 min    Stress: Not at all   Relationships    Social connections:     Talks on phone: More than three times a week     Gets together: Never     Attends Religion service: 1 to 4 times per year     Active member of club or organization: Yes     Attends meetings of clubs or organizations: Never     Relationship status:    Other Topics Concern    Not on file   Social History Narrative    She wears seatbelt.     Review of Systems   Constitutional: Positive for fatigue. Negative for appetite change, chills and fever.   HENT: Positive for congestion, rhinorrhea and sore throat. Negative for sinus pressure and sinus pain.    Respiratory: Positive for cough and wheezing. Negative for chest tightness and shortness of breath.    Cardiovascular: Negative for chest pain and palpitations.   Gastrointestinal: Negative for abdominal pain, diarrhea, nausea and vomiting.   Endocrine: Positive for cold intolerance. Negative for heat intolerance.   Genitourinary: Negative for dysuria and urgency.   Musculoskeletal: Positive for myalgias. Negative for arthralgias.   Skin: Negative for color change and rash.   Neurological: Negative for dizziness and light-headedness.   Psychiatric/Behavioral: Positive for sleep disturbance. Negative for dysphoric mood. The patient is not nervous/anxious.    All other systems reviewed and are negative.      Objective:     Vitals:    05/02/19 1056   BP: 126/82   Pulse: 90        Physical Exam   Constitutional: She is oriented to person, place, and time. She appears well-developed and well-nourished. No distress.   HENT:   Head: Normocephalic and atraumatic.   Right Ear: External ear normal. Tympanic membrane is bulging. Tympanic membrane is not  injected. A middle ear effusion is present.   Left Ear: External ear normal. Tympanic membrane is not injected. A middle ear effusion is present.   Nose: Mucosal edema and rhinorrhea present. Right sinus exhibits no maxillary sinus tenderness and no frontal sinus tenderness. Left sinus exhibits no maxillary sinus tenderness and no frontal sinus tenderness.   Mouth/Throat: Posterior oropharyngeal erythema present. No oropharyngeal exudate, posterior oropharyngeal edema or tonsillar abscesses. No tonsillar exudate.   Eyes: Pupils are equal, round, and reactive to light. Conjunctivae and EOM are normal. No scleral icterus.   Neck: Normal range of motion. Neck supple.   Cardiovascular: Normal rate, regular rhythm, normal heart sounds and intact distal pulses.   No murmur heard.  Pulmonary/Chest: Effort normal. No stridor. No respiratory distress. She has wheezes. She has no rales.   Musculoskeletal: Normal range of motion. She exhibits no edema or tenderness.   Lymphadenopathy:     She has cervical adenopathy.   Neurological: She is alert and oriented to person, place, and time.   Normal gait. No speech abnormality   Skin: Skin is warm and dry. No pallor.   Psychiatric: She has a normal mood and affect. Her behavior is normal. Judgment and thought content normal.   Nursing note and vitals reviewed.      Assessment:       1. Wheeze    2. Bronchitis    3. Acute non-recurrent maxillary sinusitis        Plan:           Problem List Items Addressed This Visit     None      Visit Diagnoses     Wheeze    -  Primary    Relevant Medications    albuterol nebulizer solution 2.5 mg (Start on 5/2/2019 11:30 AM)    Bronchitis        Relevant Medications    predniSONE (DELTASONE) 20 MG tablet    inhalation spacing device    albuterol (PROVENTIL/VENTOLIN HFA) 90 mcg/actuation inhaler    Acute non-recurrent maxillary sinusitis        Relevant Medications    triamcinolone (NASACORT) 55 mcg nasal inhaler

## 2019-05-06 DIAGNOSIS — K21.9 GASTROESOPHAGEAL REFLUX DISEASE, ESOPHAGITIS PRESENCE NOT SPECIFIED: ICD-10-CM

## 2019-05-06 RX ORDER — PANTOPRAZOLE SODIUM 40 MG/1
TABLET, DELAYED RELEASE ORAL
Qty: 30 TABLET | Refills: 5 | Status: SHIPPED | OUTPATIENT
Start: 2019-05-06 | End: 2019-09-09

## 2019-05-08 ENCOUNTER — OFFICE VISIT (OUTPATIENT)
Dept: CARDIOLOGY | Facility: CLINIC | Age: 29
End: 2019-05-08
Attending: INTERNAL MEDICINE
Payer: COMMERCIAL

## 2019-05-08 DIAGNOSIS — R07.89 OTHER CHEST PAIN: ICD-10-CM

## 2019-05-08 DIAGNOSIS — R00.2 PALPITATIONS: ICD-10-CM

## 2019-05-08 LAB
DIASTOLIC DYSFUNCTION: NO
ESTIMATED PA SYSTOLIC PRESSURE: 28.4
RETIRED EF AND QEF - SEE NOTES: 60 (ref 55–65)

## 2019-05-08 PROCEDURE — 0298T HOLTER MONITOR - 3-14 DAY ADULT: ICD-10-PCS | Mod: S$GLB,,, | Performed by: NUCLEAR MEDICINE

## 2019-05-08 PROCEDURE — 0298T HOLTER MONITOR - 3-14 DAY ADULT: CPT | Mod: S$GLB,,, | Performed by: NUCLEAR MEDICINE

## 2019-05-08 PROCEDURE — 0296T HOLTER MONITOR - 3-14 DAY ADULT: CPT | Mod: S$GLB,,, | Performed by: NUCLEAR MEDICINE

## 2019-05-08 PROCEDURE — 93306 2D ECHO WITH COLOR FLOW DOPPLER: ICD-10-PCS | Mod: S$GLB,,, | Performed by: INTERNAL MEDICINE

## 2019-05-08 PROCEDURE — 93306 TTE W/DOPPLER COMPLETE: CPT | Mod: S$GLB,,, | Performed by: INTERNAL MEDICINE

## 2019-05-08 PROCEDURE — 0296T HOLTER MONITOR - 3-14 DAY ADULT: ICD-10-PCS | Mod: S$GLB,,, | Performed by: NUCLEAR MEDICINE

## 2019-05-15 ENCOUNTER — OFFICE VISIT (OUTPATIENT)
Dept: PSYCHIATRY | Facility: CLINIC | Age: 29
End: 2019-05-15
Payer: COMMERCIAL

## 2019-05-15 VITALS
HEART RATE: 85 BPM | DIASTOLIC BLOOD PRESSURE: 93 MMHG | SYSTOLIC BLOOD PRESSURE: 125 MMHG | BODY MASS INDEX: 42.3 KG/M2 | WEIGHT: 254.19 LBS

## 2019-05-15 DIAGNOSIS — F90.0 ATTENTION DEFICIT HYPERACTIVITY DISORDER (ADHD), PREDOMINANTLY INATTENTIVE TYPE: ICD-10-CM

## 2019-05-15 DIAGNOSIS — F41.1 GENERALIZED ANXIETY DISORDER: ICD-10-CM

## 2019-05-15 DIAGNOSIS — F33.1 MDD (MAJOR DEPRESSIVE DISORDER), RECURRENT EPISODE, MODERATE: Primary | ICD-10-CM

## 2019-05-15 PROCEDURE — 99999 PR PBB SHADOW E&M-EST. PATIENT-LVL II: CPT | Mod: PBBFAC,,, | Performed by: PSYCHIATRY & NEUROLOGY

## 2019-05-15 PROCEDURE — 99999 PR PBB SHADOW E&M-EST. PATIENT-LVL II: ICD-10-PCS | Mod: PBBFAC,,, | Performed by: PSYCHIATRY & NEUROLOGY

## 2019-05-15 PROCEDURE — 90792 PR PSYCHIATRIC DIAGNOSTIC EVALUATION W/MEDICAL SERVICES: ICD-10-PCS | Mod: S$GLB,,, | Performed by: PSYCHIATRY & NEUROLOGY

## 2019-05-15 PROCEDURE — 90792 PSYCH DIAG EVAL W/MED SRVCS: CPT | Mod: S$GLB,,, | Performed by: PSYCHIATRY & NEUROLOGY

## 2019-05-15 RX ORDER — FLUOXETINE HYDROCHLORIDE 20 MG/1
20 CAPSULE ORAL DAILY
Qty: 30 CAPSULE | Refills: 1 | Status: SHIPPED | OUTPATIENT
Start: 2019-06-14 | End: 2019-07-11 | Stop reason: SDUPTHER

## 2019-05-15 RX ORDER — FLUOXETINE 10 MG/1
CAPSULE ORAL
Qty: 53 CAPSULE | Refills: 0 | Status: SHIPPED | OUTPATIENT
Start: 2019-05-15 | End: 2019-06-16
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
## 2019-05-24 ENCOUNTER — OFFICE VISIT (OUTPATIENT)
Dept: OBSTETRICS AND GYNECOLOGY | Facility: CLINIC | Age: 29
End: 2019-05-24
Payer: COMMERCIAL

## 2019-05-24 VITALS
WEIGHT: 256.38 LBS | DIASTOLIC BLOOD PRESSURE: 72 MMHG | BODY MASS INDEX: 42.71 KG/M2 | HEIGHT: 65 IN | SYSTOLIC BLOOD PRESSURE: 142 MMHG

## 2019-05-24 DIAGNOSIS — Z12.4 CERVICAL CANCER SCREENING: ICD-10-CM

## 2019-05-24 DIAGNOSIS — B96.89 BACTERIAL VAGINOSIS: ICD-10-CM

## 2019-05-24 DIAGNOSIS — Z01.419 WELL WOMAN EXAM WITH ROUTINE GYNECOLOGICAL EXAM: Primary | ICD-10-CM

## 2019-05-24 DIAGNOSIS — N89.8 VAGINAL DISCHARGE: ICD-10-CM

## 2019-05-24 DIAGNOSIS — N76.0 BACTERIAL VAGINOSIS: ICD-10-CM

## 2019-05-24 PROCEDURE — 99999 PR PBB SHADOW E&M-EST. PATIENT-LVL III: CPT | Mod: PBBFAC,,, | Performed by: OBSTETRICS & GYNECOLOGY

## 2019-05-24 PROCEDURE — 87491 CHLMYD TRACH DNA AMP PROBE: CPT

## 2019-05-24 PROCEDURE — 87210 PR  SMEAR,STAIN,WET MNT,INTERP: ICD-10-PCS | Mod: QW,S$GLB,, | Performed by: OBSTETRICS & GYNECOLOGY

## 2019-05-24 PROCEDURE — 99385 PR PREVENTIVE VISIT,NEW,18-39: ICD-10-PCS | Mod: 25,S$GLB,, | Performed by: OBSTETRICS & GYNECOLOGY

## 2019-05-24 PROCEDURE — 87624 HPV HI-RISK TYP POOLED RSLT: CPT

## 2019-05-24 PROCEDURE — 99385 PREV VISIT NEW AGE 18-39: CPT | Mod: 25,S$GLB,, | Performed by: OBSTETRICS & GYNECOLOGY

## 2019-05-24 PROCEDURE — 99999 PR PBB SHADOW E&M-EST. PATIENT-LVL III: ICD-10-PCS | Mod: PBBFAC,,, | Performed by: OBSTETRICS & GYNECOLOGY

## 2019-05-24 PROCEDURE — 88175 CYTOPATH C/V AUTO FLUID REDO: CPT

## 2019-05-24 PROCEDURE — 87210 SMEAR WET MOUNT SALINE/INK: CPT | Mod: QW,S$GLB,, | Performed by: OBSTETRICS & GYNECOLOGY

## 2019-05-24 RX ORDER — METRONIDAZOLE 7.5 MG/G
1 GEL VAGINAL NIGHTLY
Qty: 70 G | Refills: 0 | Status: SHIPPED | OUTPATIENT
Start: 2019-05-24 | End: 2019-05-29

## 2019-05-24 NOTE — PROGRESS NOTES
Subjective:       Patient ID: Anthony Sexton is a 28 y.o. female.    Chief Complaint:  Well Woman      History of Present Illness  HPI  Presents for well-woman exam.  She complains of a vaginal discharge with an odor.  No itching or burning.  Mutually monogamous with her .  They are not using contraception, but she wants to discuss it.  Had a Mirena, but required early removal for persistent daily bleeding.  Is now having regular, monthly periods.     Last pap in 2017: abnormal; had a colpo and was told she needed a LEEP, but did not have it done.  GYN & OB History  Patient's last menstrual period was 2019 (exact date).   Date of Last Pap: No result found    OB History    Para Term  AB Living   1 1 1 0 0 1   SAB TAB Ectopic Multiple Live Births   0 0 0 0 1      # Outcome Date GA Lbr Trever/2nd Weight Sex Delivery Anes PTL Lv   1 Term 16   3.289 kg (7 lb 4 oz) F Vag-Spont   DANIELLE      Complications: Severe hyperemesis gravidarum       Review of Systems  Review of Systems   Constitutional: Negative for activity change, fatigue, fever and unexpected weight change.   Gastrointestinal: Negative for abdominal pain, bloating, constipation, diarrhea, nausea and vomiting.   Endocrine: Negative for hair loss and hot flashes.   Genitourinary: Positive for vaginal discharge and vaginal odor. Negative for dysmenorrhea, dyspareunia, dysuria, frequency, genital sores, hematuria, menorrhagia, menstrual problem, pelvic pain, urgency, vaginal bleeding, vaginal pain and postcoital bleeding.   Integumentary:  Negative for rash, hair changes, breast mass, nipple discharge and breast skin changes.   Hematological: Negative for adenopathy.   Breast: Negative for mass, mastodynia, nipple discharge and skin changes          Objective:    Physical Exam:   Constitutional: She is oriented to person, place, and time. She appears well-developed and well-nourished. No distress.      Neck: Neck supple. No thyromegaly  present.     Pulmonary/Chest: Right breast exhibits no inverted nipple, no mass, no nipple discharge, no skin change, no tenderness, no bleeding and no swelling. Left breast exhibits no inverted nipple, no mass, no nipple discharge, no skin change, no tenderness, no bleeding and no swelling. Breasts are symmetrical.        Abdominal: Soft. She exhibits no distension and no mass. There is no tenderness. There is no rebound and no guarding.     Genitourinary: Pelvic exam was performed with patient supine. There is no rash, tenderness, lesion or injury on the right labia. There is no rash, tenderness, lesion or injury on the left labia. Uterus is not deviated, not enlarged, not fixed, not tender, not hosting fibroids and not experiencing uterine prolapse. Cervix is normal. Right adnexum displays no mass, no tenderness and no fullness. Left adnexum displays no mass, no tenderness and no fullness. No erythema, tenderness, bleeding, rectocele or cystocele in the vagina. No foreign body in the vagina. No signs of injury around the vagina. Vaginal discharge (white with fishy odor) found. Cervix exhibits no motion tenderness, no discharge and no friability.        Uterus Size: 6 cm      Lymphadenopathy:        Right: No inguinal adenopathy present.        Left: No inguinal adenopathy present.    Neurological: She is alert and oriented to person, place, and time.     Psychiatric: She has a normal mood and affect.        UPT: negative  Wet prep: many clue cells  Assessment:        1. Well woman exam with routine gynecological exam    2. Cervical cancer screening    3. Vaginal discharge       4.   Bacterial vaginosis         Plan:      Anthony was seen today for well woman.    Diagnoses and all orders for this visit:    Well woman exam with routine gynecological exam    Cervical cancer screening  -     Liquid-based pap smear, screening  -     HPV High Risk Genotypes, PCR    Vaginal discharge  -     C. trachomatis/N. gonorrhoeae  by AMP DNA    Bacterial vaginosis  -     metroNIDAZOLE (METROGEL) 0.75 % vaginal gel; Place 1 applicator vaginally every evening. for 5 days    Patient was counseled today on vaginitis prevention including :  a. avoiding feminine products such as deoderant soaps, body wash, bubble bath, douches, scented toilet paper, deoderant tampons or pads, feminine wipes, chronic pad use, etc.  b. avoiding other vulvovaginal irritants such as long hot baths, humidity, tight, synthetic clothing, chlorine and sitting around in wet bathing suits  c. wearing cotton underwear, avoiding thong underwear and no underwear to bed  d. taking showers instead of baths and use a hair dryer on cool setting afterwards to dry  e. wearing cotton to exercise and shower immediately after exercise and change clothes    Discussed contraception options with patient including pills, patch, ring, Depo Provera, Implanon, Mirena, and Paraguard.  I recommend no estrogen containing therapy at this time.  Pt's most recent EKG with possible septal infarct, and she has elevated bp.  Patient states she will use condoms at this time until the cardiac work-up is complete.    RTC 1 year or sooner pending results.

## 2019-05-27 LAB
C TRACH DNA SPEC QL NAA+PROBE: NOT DETECTED
N GONORRHOEA DNA SPEC QL NAA+PROBE: NOT DETECTED

## 2019-05-29 ENCOUNTER — CLINICAL SUPPORT (OUTPATIENT)
Dept: CARDIOLOGY | Facility: CLINIC | Age: 29
End: 2019-05-29
Attending: INTERNAL MEDICINE
Payer: COMMERCIAL

## 2019-05-29 DIAGNOSIS — R00.2 PALPITATIONS: ICD-10-CM

## 2019-05-29 DIAGNOSIS — R07.89 OTHER CHEST PAIN: ICD-10-CM

## 2019-05-29 LAB — DIASTOLIC DYSFUNCTION: NO

## 2019-05-29 PROCEDURE — 93015 CARDIAC TREADMILL STRESS TEST: ICD-10-PCS | Mod: S$GLB,,, | Performed by: INTERNAL MEDICINE

## 2019-05-29 PROCEDURE — 93015 CV STRESS TEST SUPVJ I&R: CPT | Mod: S$GLB,,, | Performed by: INTERNAL MEDICINE

## 2019-05-29 PROCEDURE — 99999 PR PBB SHADOW E&M-EST. PATIENT-LVL I: CPT | Mod: PBBFAC,,,

## 2019-05-29 PROCEDURE — 99999 PR PBB SHADOW E&M-EST. PATIENT-LVL I: ICD-10-PCS | Mod: PBBFAC,,,

## 2019-05-30 LAB
HPV HR 12 DNA CVX QL NAA+PROBE: POSITIVE
HPV16 AG SPEC QL: NEGATIVE
HPV18 DNA SPEC QL NAA+PROBE: NEGATIVE

## 2019-06-14 ENCOUNTER — OFFICE VISIT (OUTPATIENT)
Dept: OPHTHALMOLOGY | Facility: CLINIC | Age: 29
End: 2019-06-14
Payer: COMMERCIAL

## 2019-06-14 DIAGNOSIS — H53.10 SUBJECTIVE VISUAL DISTURBANCE OF RIGHT EYE: Primary | ICD-10-CM

## 2019-06-14 PROCEDURE — 99999 PR PBB SHADOW E&M-EST. PATIENT-LVL II: ICD-10-PCS | Mod: PBBFAC,,, | Performed by: OPTOMETRIST

## 2019-06-14 PROCEDURE — 92012 PR EYE EXAM, EST PATIENT,INTERMED: ICD-10-PCS | Mod: S$GLB,,, | Performed by: OPTOMETRIST

## 2019-06-14 PROCEDURE — 92012 INTRM OPH EXAM EST PATIENT: CPT | Mod: S$GLB,,, | Performed by: OPTOMETRIST

## 2019-06-14 PROCEDURE — 99999 PR PBB SHADOW E&M-EST. PATIENT-LVL II: CPT | Mod: PBBFAC,,, | Performed by: OPTOMETRIST

## 2019-06-14 NOTE — PROGRESS NOTES
HPI     PT was last seen on 2/7/19 with DNL for full exam.   PT c/o blurred vision, fb sensation and irritation in her right eye for   about 3 weeks.   Pain Scale:  1  Onset:   3 weeks  OD, OS, OU:   OD*  Discharge:   no  A.M. Matting:  no  Itch:   no  Redness:   no  Photophobia:   no  Foreign body sensation:   yes  Deep pain:   no  Previous occurrence:   no  Drops:   no      Last edited by María Wise MA on 6/14/2019 11:14 AM. (History)            Assessment /Plan     For exam results, see Encounter Report.    Subjective visual disturbance of right eye    not fb sensation but seems as if there is something in field of vision especially when looking up, thought it was hair in the way   No pain, no redness    Optos done today: retina intact with no tears, holes or RD  mOCT done to r/o any macular issues: normal    SLE: no fb seen with lid eversion, but noted one long lash and 2 shorter lashes pointed downward along UL margin  Removed lashes without incident with cilia forceps  Pt reports after lash removal that symptoms have dissipated     RTC PRN if symptoms return

## 2019-07-11 ENCOUNTER — OFFICE VISIT (OUTPATIENT)
Dept: PSYCHIATRY | Facility: CLINIC | Age: 29
End: 2019-07-11
Payer: COMMERCIAL

## 2019-07-11 DIAGNOSIS — F41.1 GENERALIZED ANXIETY DISORDER: ICD-10-CM

## 2019-07-11 DIAGNOSIS — F33.0 MILD EPISODE OF RECURRENT MAJOR DEPRESSIVE DISORDER: Primary | ICD-10-CM

## 2019-07-11 PROCEDURE — 99213 PR OFFICE/OUTPT VISIT, EST, LEVL III, 20-29 MIN: ICD-10-PCS | Mod: S$GLB,,, | Performed by: PSYCHIATRY & NEUROLOGY

## 2019-07-11 PROCEDURE — 99213 OFFICE O/P EST LOW 20 MIN: CPT | Mod: S$GLB,,, | Performed by: PSYCHIATRY & NEUROLOGY

## 2019-07-11 RX ORDER — FLUOXETINE HYDROCHLORIDE 20 MG/1
20 CAPSULE ORAL DAILY
Qty: 30 CAPSULE | Refills: 3 | Status: SHIPPED | OUTPATIENT
Start: 2019-07-11 | End: 2019-08-29

## 2019-07-11 RX ORDER — CLONAZEPAM 0.25 MG/1
0.25 TABLET, ORALLY DISINTEGRATING ORAL DAILY PRN
Qty: 30 TABLET | Refills: 0 | Status: SHIPPED | OUTPATIENT
Start: 2019-07-11 | End: 2019-08-29

## 2019-07-11 NOTE — PROGRESS NOTES
ESTABLISHED OUTPATIENT VISIT   E/M LEVEL 3: [id number]    ENCOUNTER DATE: 8/1/2019  SITE: [facility] [place], [place].       HISTORY    CHIEF COMPLAINT   Antohny Sexton is a 28 y.o. female who presents for followup of Bipolar Disorder    HPI   Anthony finds that the Prozac is working. However, she feels a lot of stress from work. Currently she is struggling with work and also struggling with problems at home. She is getting out more and has a little more motivation to get things done, but not 100%. No suicidal ideation, no homicidal ideation. Clonazepam has helped with anxiety. Denies SI, Denies HI. Feels that the anxiety is not 100 percent improved. Still present.         ROS   pertient symptoms in HPI    [facility]  Past Medical History reviewed: Yes  Family History reviewed: Yes  Social History reviewed: Yes    Allergies:   Review of patient's allergies indicates:  No Known Allergies     PSYCHOTROPIC MEDICATIONS   Current Outpatient Medications on File Prior to Visit   Medication Sig Dispense Refill    acetaminophen (TYLENOL) 500 MG tablet Take 500 mg by mouth 2 (two) times daily.      albuterol (PROVENTIL/VENTOLIN HFA) 90 mcg/actuation inhaler Inhale 2 puffs into the lungs every 6 (six) hours as needed for Wheezing. Rescue 8.5 g 1    ergocalciferol (ERGOCALCIFEROL) 50,000 unit Cap Take 1 capsule (50,000 Units total) by mouth every 7 days. 4 capsule 5    fluticasone (FLONASE) 50 mcg/actuation nasal spray 2 sprays by Each Nare route daily as needed. 16 g 5    ibuprofen (ADVIL,MOTRIN) 800 MG tablet Take 800 mg by mouth as needed.       pantoprazole (PROTONIX) 40 MG tablet TAKE 1 TABLET BY MOUTH ONCE DAILY AS NEEDED 30 tablet 5    triamcinolone (NASACORT) 55 mcg nasal inhaler 2 sprays by Nasal route once daily. 10.8 mL 3     No current facility-administered medications on file prior to visit.          EXAMINATION    [unfilled]  There were no vitals filed for this visit.     Wt Readings from Last 2 Encounters:   05/24/19  116.3 kg (256 lb 6.3 oz)   05/15/19 115.3 kg (254 lb 3.1 oz)         CONSTITUTIONAL  General Appearance: well groomed    MUSCULOSKELETAL  No involuntary muscle movements  Normal gait    PSYCHIATRIC   Mental Status Exam:  Appearance: unremarkable, age appropriate  Behavior/Cooperation: appopriate normal, cooperative  Speech:  normal tone, normal rate, normal pitch, normal volume  Language: grossly intact with spontaneous speech  Mood: improving depression. Some anxiety   Affect:  congruent with mood, broad  Thought Process:linear, logical and gaol directed.   Thought Content: normal, no suicidality, no homicidality, no delusions, nor paranoia  Sensorium:  Awake  Alert and Oriented: x3 person, place, situation, no time/date  Memory:    Recent:  Intact; able to report recent events  Attention/concentration: appropriate for age/education.   Insight:Intact  Judgment:  Intact      MEDICAL DECISION MAKING    DIAGNOSES  No diagnosis found.      PROBLEM LIST AND MANAGEMENT PLANS       - increase prozac to 20 mg after first week, to help with depression and anxiety.   -Continue Clonazepam .25 mg   -Referral for therapy         PRESCRIPTION DRUG MANAGEMENT  Compliance: YES  Side Effects: YES  Regimen Adjustments: YES           SSRI  R/B/SE of SSRI reviewed with patient including gi distress, sexual dysfunction, and serotonin syndrome.  Black box warning of increased risk of worsening depression and SI reviewed.  Patient verbalized understanding.    DIAGNOSTIC TESTING  No diagnosis found.        -Patient was educated on possible side-effects of medications  -Discussed 911 for suicidal or homicidal ideation, worsening symptoms, or adverse reactions to medications  -Patient will contact clinic with any questions or concerns    Lenora Walters

## 2019-08-09 ENCOUNTER — OFFICE VISIT (OUTPATIENT)
Dept: FAMILY MEDICINE | Facility: CLINIC | Age: 29
End: 2019-08-09
Payer: COMMERCIAL

## 2019-08-09 VITALS
HEART RATE: 97 BPM | DIASTOLIC BLOOD PRESSURE: 80 MMHG | OXYGEN SATURATION: 97 % | SYSTOLIC BLOOD PRESSURE: 128 MMHG | WEIGHT: 257.19 LBS | BODY MASS INDEX: 42.85 KG/M2 | TEMPERATURE: 98 F | HEIGHT: 65 IN

## 2019-08-09 DIAGNOSIS — J30.2 SEASONAL ALLERGIC RHINITIS, UNSPECIFIED TRIGGER: ICD-10-CM

## 2019-08-09 DIAGNOSIS — R51.9 PRESSURE IN HEAD: ICD-10-CM

## 2019-08-09 PROCEDURE — 99214 PR OFFICE/OUTPT VISIT, EST, LEVL IV, 30-39 MIN: ICD-10-PCS | Mod: S$GLB,,, | Performed by: FAMILY MEDICINE

## 2019-08-09 PROCEDURE — 99999 PR PBB SHADOW E&M-EST. PATIENT-LVL III: CPT | Mod: PBBFAC,,, | Performed by: FAMILY MEDICINE

## 2019-08-09 PROCEDURE — 3008F PR BODY MASS INDEX (BMI) DOCUMENTED: ICD-10-PCS | Mod: CPTII,S$GLB,, | Performed by: FAMILY MEDICINE

## 2019-08-09 PROCEDURE — 3008F BODY MASS INDEX DOCD: CPT | Mod: CPTII,S$GLB,, | Performed by: FAMILY MEDICINE

## 2019-08-09 PROCEDURE — 99999 PR PBB SHADOW E&M-EST. PATIENT-LVL III: ICD-10-PCS | Mod: PBBFAC,,, | Performed by: FAMILY MEDICINE

## 2019-08-09 PROCEDURE — 99214 OFFICE O/P EST MOD 30 MIN: CPT | Mod: S$GLB,,, | Performed by: FAMILY MEDICINE

## 2019-08-09 RX ORDER — METRONIDAZOLE 7.5 MG/G
37.5 GEL VAGINAL DAILY
COMMUNITY
Start: 2019-08-06 | End: 2019-08-11

## 2019-08-09 NOTE — PROGRESS NOTES
Anthony Sexton    Chief Complaint   Patient presents with    head pressure       History of Present Illness:   Ms. Sexton comes in today with complaint of having intermittent head pressure for 1 month but more frequently this week.  She states her ears are popping more.  She states she awakens with left nasal congestion.  She states she has been experiencing lightheaded, photophobia, dizziness, nausea and has had occasional blurred vision without phonophobia or other sinus or ocular symptoms.  She denies having head trauma.  She states head pressure occurs at the top of her head; she does not describe pressure as pain but describes pressure as electrical shock.  She states this is the worst pressure of her life.    She states she wears a week.  She takes ibuprofen and without help for pressure she states she uses Flonase daily for allergies.    She states she saw Dr. Delgado, optometrist, on June 14, 2019 for evaluation of floaters at her right eye.    Otherwise, she reports no other associated symptoms.        Current Outpatient Medications   Medication Sig    acetaminophen (TYLENOL) 500 MG tablet Take 500 mg by mouth 2 (two) times daily.    clonazePAM (KLONOPIN) 0.25 MG TbDL Take 1 tablet (0.25 mg total) by mouth daily as needed (anxiety).    FLUoxetine 20 MG capsule Take 1 capsule (20 mg total) by mouth once daily.    fluticasone (FLONASE) 50 mcg/actuation nasal spray 2 sprays by Each Nare route daily as needed.    ibuprofen (ADVIL,MOTRIN) 800 MG tablet Take 800 mg by mouth as needed.     metroNIDAZOLE (METROGEL) 0.75 % vaginal gel Place 37.5 mg vaginally once daily.    pantoprazole (PROTONIX) 40 MG tablet TAKE 1 TABLET BY MOUTH ONCE DAILY AS NEEDED    ergocalciferol (ERGOCALCIFEROL) 50,000 unit Cap Take 1 capsule (50,000 Units total) by mouth every 7 days.       Review of Systems   Constitutional: Negative for appetite change, chills, fatigue and fever.   HENT: Positive for congestion. Negative for ear  pain, rhinorrhea, sinus pressure, sinus pain, sneezing and sore throat.         See history of present illness.   Eyes: Positive for photophobia and visual disturbance. Negative for pain, discharge, redness and itching.   Respiratory: Negative for cough, shortness of breath and wheezing.    Cardiovascular: Negative for chest pain, palpitations and leg swelling.   Gastrointestinal: Positive for nausea. Negative for abdominal pain, constipation, diarrhea and vomiting.   Genitourinary: Negative for difficulty urinating.   Musculoskeletal: Negative for back pain.   Neurological: Positive for dizziness, light-headedness and headaches. Negative for syncope.   Psychiatric/Behavioral: Negative for dysphoric mood and suicidal ideas. The patient is not nervous/anxious.         Negative for homicidal ideas.       Objective:  Physical Exam   Constitutional: She is oriented to person, place, and time. She appears well-developed and well-nourished. No distress.   Pleasant.   HENT:   Head: Normocephalic and atraumatic.   Right Ear: External ear normal.   Left Ear: External ear normal.   Nose: Nose normal.   Mouth/Throat: Oropharynx is clear and moist. No oropharyngeal exudate.   Non tender sinuses.  TM's shiny and clear. Nasal mucosa pink, congested without drainage noted.   Eyes: Pupils are equal, round, and reactive to light. Conjunctivae and EOM are normal. Right eye exhibits no discharge. Left eye exhibits no discharge.   Neck: Normal range of motion. Neck supple. No thyromegaly present.   Cardiovascular: Normal rate, regular rhythm, normal heart sounds and intact distal pulses.   No murmur heard.  Pulmonary/Chest: Effort normal and breath sounds normal. No respiratory distress. She has no wheezes.   Abdominal: Soft. Bowel sounds are normal. She exhibits no distension and no mass. There is no tenderness. There is no rebound and no guarding.   Musculoskeletal: Normal range of motion. She exhibits tenderness. She exhibits no  edema.   She is ambulatory without problems. Slightly tender at top of head.   Lymphadenopathy:     She has no cervical adenopathy.   Neurological: She is alert and oriented to person, place, and time.   Skin: She is not diaphoretic.   Psychiatric: She has a normal mood and affect. Her behavior is normal. Judgment and thought content normal.   Vitals reviewed.      ASSESSMENT:  1. Pressure in head    2. Seasonal allergic rhinitis, unspecified trigger        PLAN:  Anthony was seen today for head pressure.    Diagnoses and all orders for this visit:    Pressure in head  -     CT Head Without Contrast; Future    Seasonal allergic rhinitis, unspecified trigger       Patient advised to call for results.  Continue current medications, follow low sodium, low cholesterol, low carb diet, daily walks.  Patient will try OTC Excedrin as directed.  Follow up if symptoms worsen or fail to improve.

## 2019-08-13 ENCOUNTER — TELEPHONE (OUTPATIENT)
Dept: PSYCHIATRY | Facility: CLINIC | Age: 29
End: 2019-08-13

## 2019-08-13 DIAGNOSIS — E55.9 VITAMIN D DEFICIENCY: ICD-10-CM

## 2019-08-13 RX ORDER — ERGOCALCIFEROL 1.25 MG/1
50000 CAPSULE ORAL
Qty: 4 CAPSULE | Refills: 5 | Status: SHIPPED | OUTPATIENT
Start: 2019-08-13 | End: 2019-09-10 | Stop reason: SDUPTHER

## 2019-08-21 ENCOUNTER — PATIENT MESSAGE (OUTPATIENT)
Dept: OBSTETRICS AND GYNECOLOGY | Facility: CLINIC | Age: 29
End: 2019-08-21

## 2019-08-21 DIAGNOSIS — Z30.011 ENCOUNTER FOR INITIAL PRESCRIPTION OF CONTRACEPTIVE PILLS: Primary | ICD-10-CM

## 2019-08-22 RX ORDER — NORGESTIMATE AND ETHINYL ESTRADIOL 0.25-0.035
1 KIT ORAL DAILY
Qty: 28 TABLET | Refills: 11 | Status: SHIPPED | OUTPATIENT
Start: 2019-08-22 | End: 2019-08-29

## 2019-08-27 ENCOUNTER — PATIENT MESSAGE (OUTPATIENT)
Dept: OBSTETRICS AND GYNECOLOGY | Facility: CLINIC | Age: 29
End: 2019-08-27

## 2019-08-27 DIAGNOSIS — R11.0 NAUSEA: Primary | ICD-10-CM

## 2019-08-27 RX ORDER — PROMETHAZINE HYDROCHLORIDE 25 MG/1
25 TABLET ORAL EVERY 6 HOURS PRN
Qty: 30 TABLET | Refills: 3 | Status: SHIPPED | OUTPATIENT
Start: 2019-08-27 | End: 2019-10-21 | Stop reason: SDUPTHER

## 2019-08-29 ENCOUNTER — OFFICE VISIT (OUTPATIENT)
Dept: OBSTETRICS AND GYNECOLOGY | Facility: CLINIC | Age: 29
End: 2019-08-29
Payer: COMMERCIAL

## 2019-08-29 VITALS
DIASTOLIC BLOOD PRESSURE: 82 MMHG | WEIGHT: 263.69 LBS | HEIGHT: 65 IN | SYSTOLIC BLOOD PRESSURE: 122 MMHG | BODY MASS INDEX: 43.93 KG/M2

## 2019-08-29 DIAGNOSIS — E55.9 VITAMIN D DEFICIENCY: ICD-10-CM

## 2019-08-29 DIAGNOSIS — Z32.01 PREGNANCY TEST POSITIVE: Primary | ICD-10-CM

## 2019-08-29 DIAGNOSIS — E66.01 MORBID OBESITY WITH BMI OF 40.0-44.9, ADULT: ICD-10-CM

## 2019-08-29 LAB
BACTERIA #/AREA URNS AUTO: ABNORMAL /HPF
BILIRUB UR QL STRIP: NEGATIVE
CLARITY UR REFRACT.AUTO: ABNORMAL
COLOR UR AUTO: YELLOW
GLUCOSE UR QL STRIP: NEGATIVE
HGB UR QL STRIP: NEGATIVE
KETONES UR QL STRIP: NEGATIVE
LEUKOCYTE ESTERASE UR QL STRIP: ABNORMAL
MICROSCOPIC COMMENT: ABNORMAL
NITRITE UR QL STRIP: NEGATIVE
PH UR STRIP: 5 [PH] (ref 5–8)
PROT UR QL STRIP: NEGATIVE
RBC #/AREA URNS AUTO: 1 /HPF (ref 0–4)
SP GR UR STRIP: 1.02 (ref 1–1.03)
SQUAMOUS #/AREA URNS AUTO: 18 /HPF
URN SPEC COLLECT METH UR: ABNORMAL
WBC #/AREA URNS AUTO: 25 /HPF (ref 0–5)

## 2019-08-29 PROCEDURE — 99213 PR OFFICE/OUTPT VISIT, EST, LEVL III, 20-29 MIN: ICD-10-PCS | Mod: S$GLB,,, | Performed by: OBSTETRICS & GYNECOLOGY

## 2019-08-29 PROCEDURE — 3008F BODY MASS INDEX DOCD: CPT | Mod: CPTII,S$GLB,, | Performed by: OBSTETRICS & GYNECOLOGY

## 2019-08-29 PROCEDURE — 3008F PR BODY MASS INDEX (BMI) DOCUMENTED: ICD-10-PCS | Mod: CPTII,S$GLB,, | Performed by: OBSTETRICS & GYNECOLOGY

## 2019-08-29 PROCEDURE — 99213 OFFICE O/P EST LOW 20 MIN: CPT | Mod: S$GLB,,, | Performed by: OBSTETRICS & GYNECOLOGY

## 2019-08-29 PROCEDURE — 81001 URINALYSIS AUTO W/SCOPE: CPT

## 2019-08-29 PROCEDURE — 99999 PR PBB SHADOW E&M-EST. PATIENT-LVL III: ICD-10-PCS | Mod: PBBFAC,,, | Performed by: OBSTETRICS & GYNECOLOGY

## 2019-08-29 PROCEDURE — 87086 URINE CULTURE/COLONY COUNT: CPT

## 2019-08-29 PROCEDURE — 99999 PR PBB SHADOW E&M-EST. PATIENT-LVL III: CPT | Mod: PBBFAC,,, | Performed by: OBSTETRICS & GYNECOLOGY

## 2019-08-29 NOTE — PROGRESS NOTES
"  Subjective:       Patient ID: Anthony Sexton is a 29 y.o. female.    Chief Complaint:  Possible Pregnancy      History of Present Illness  HPI  Presents for pregnancy risk assessment.  Pregnancy was unplanned, but she and her s/o are very excited.  This is her second pregnancy.  First pregnancy was in  and complicated by severe hyperemesis gravidarum.  LMP was 19.  So far, her nausea is very manageable with phenergan.  Is taking 1/2 of the 25mg tabs.  Tolerating prenatal gummy vitamins well.  She had a normal well-woman exam with me in May.  Had negative pap with positive "other" high risk HPV.  Negative GC/CT in May.    GYN & OB History  Patient's last menstrual period was 2019.   Date of Last Pap: 2019    OB History    Para Term  AB Living   2 1 1 0 0 1   SAB TAB Ectopic Multiple Live Births   0 0 0 0 1      # Outcome Date GA Lbr Trever/2nd Weight Sex Delivery Anes PTL Lv   2 Current            1 Term 16   3.289 kg (7 lb 4 oz) F Vag-Spont   DANIELLE      Complications: Severe hyperemesis gravidarum       Review of Systems  Review of Systems   Constitutional: Negative for activity change, fatigue, fever and unexpected weight change.   Gastrointestinal: Positive for nausea (managed with phenergan). Negative for abdominal pain, bloating, constipation, diarrhea and vomiting.   Endocrine: Negative for hair loss and hot flashes.   Genitourinary: Positive for menstrual problem (amenorrhea). Negative for dysmenorrhea, dyspareunia, dysuria, frequency, genital sores, hematuria, menorrhagia, pelvic pain, urgency, vaginal bleeding, vaginal discharge, vaginal pain, postcoital bleeding and vaginal odor.   Integumentary:  Negative for rash, hair changes, breast mass, nipple discharge and breast skin changes.   Hematological: Negative for adenopathy.   Breast: Negative for mass, mastodynia, nipple discharge and skin changes          Objective:    Physical Exam:   Constitutional: She is oriented to " person, place, and time. She appears well-developed and well-nourished. No distress.                           Neurological: She is alert and oriented to person, place, and time.    Skin: No rash noted. No erythema. No pallor.    Psychiatric: She has a normal mood and affect. Her behavior is normal. Judgment and thought content normal.        UPT: positive  Assessment:        1. Pregnancy test positive    2. Morbid obesity with BMI of 40.0-44.9, adult    3. Vitamin D deficiency                Plan:      Anthony was seen today for possible pregnancy.    Diagnoses and all orders for this visit:    Pregnancy test positive  -     CBC auto differential; Future  -     Hepatitis B surface antigen; Future  -     HIV 1/2 Ag/Ab (4th Gen); Future  -     RPR; Future  -     Rubella antibody, IgG; Future  -     Type & Screen - Ob Profile; Future  -     Urinalysis  -     Urine culture  -     US OB/GYN Procedure ([facility]); Future  -     Hemoglobin Electrophoresis,Hgb A2 Kp.; Future    Morbid obesity with BMI of 40.0-44.9, adult  -     OB Glucose Screen; Future  -     HEMOGLOBIN A1C; Future    Vitamin D deficiency  -     Vitamin D; Future    The patient was counseled on common complaints of pregnancy.  She was given bleeding and pain precautions.  I oriented the patient to the collaborative CNM/physician practice, and all questions were answered.    Patient works at [employer].  Will schedule initial OB appt with CNM at [employer].  Okay to come to [facility] to see me for visits if she requests or if needed.

## 2019-08-29 NOTE — PATIENT INSTRUCTIONS
Pregnancy    Your exam today shows that you are pregnant.  Pregnancy symptoms  During pregnancy your bodys hormones change. This causes physical and emotional changes. This is normal. Knowing what to expect is important for your piece of mind and so you know when to seek help for a problem. Here are some of the most common symptoms:  · Morning sickness or nausea. This can happen any time of the day or night.  · Tender, swollen breasts  · Need to urinate frequently  · Tiredness or fatigue  · Dizziness  · Indigestion or heartburn  · Food cravings or turn-offs  · Constipation  · Emotional changes. This can range from anxiety to excitement to depression.  General care for a healthy pregnancy  Here are things you can do to help make sure your baby is born healthy:  · Rest when you feel tired. This is especially true in the later months of pregnancy.  · Drink more fluids. Your body needs more fluids than you may be used to. Drink 8 to10 glasses of juice, milk, or water every day.  · Eat well-balanced meals. Eat at regular times to give your body enough protein. You can expect to gain about 30 pounds during the pregnancy. Dont try to diet or lose weight while you are pregnant.  · Take a prenatal vitamin every day. This helps you meet the extra nutritional needs of pregnancy.  · Dont take any other medicine during your pregnancy unless your healthcare provider tells you to. This includes prescription medicines and those you buy over the counter. Many medicines can harm the growing baby.  · If you have nausea or vomiting, dont eat greasy or fried foods. Eat several smaller meals throughout the day rather than 3 large meals.  · If you smoke, you must stop. The nicotine you breathe in goes right to the baby.  · Stay away from alcohol, even in moderate amounts. Daily drinking will harm your baby and can cause permanent brain damage.  · Dont use recreational drugs, especially cocaine, crack, and heroin. These will harm  your baby. Also avoid marijuana.  · If you were using recreational drugs or prescribed medicine when you found out that you were pregnant, talk with your healthcare provider about possible effects on your growing baby.  · If you have medical problems that you need to take medicine for, talk with your healthcare provider.  Follow-up care  Call your healthcare provider to arrange for prenatal care. Prenatal care is important. You can see your family provider, a pregnancy specialist (obstetrician), or a primary care clinic.  When to seek medical advice  Call your healthcare provider right away if any of these occur:  · Vaginal bleeding  · Pain in your belly (abdomen) or back that is moderate or severe  · Lots of vomiting, or you cant keep any fluids down for 6 hours  · Burning feeling when you urinate  · Headache, dizziness, or rapid weight gain  · Fever  · Vision changes or blurred vision  Date Last Reviewed: 10/1/2016  © 6713-8228 [employer]. [address] [place] [address], [place] [address]. All rights reserved. This information is not intended as a substitute for professional medical care. Always follow your healthcare professional's instructions.

## 2019-08-30 LAB
BACTERIA UR CULT: NORMAL
BACTERIA UR CULT: NORMAL

## 2019-09-03 ENCOUNTER — PATIENT MESSAGE (OUTPATIENT)
Dept: OBSTETRICS AND GYNECOLOGY | Facility: CLINIC | Age: 29
End: 2019-09-03

## 2019-09-09 ENCOUNTER — INITIAL PRENATAL (OUTPATIENT)
Dept: OBSTETRICS AND GYNECOLOGY | Facility: CLINIC | Age: 29
End: 2019-09-09
Payer: COMMERCIAL

## 2019-09-09 ENCOUNTER — PROCEDURE VISIT (OUTPATIENT)
Dept: OBSTETRICS AND GYNECOLOGY | Facility: CLINIC | Age: 29
End: 2019-09-09
Payer: COMMERCIAL

## 2019-09-09 ENCOUNTER — LAB VISIT (OUTPATIENT)
Dept: LAB | Facility: HOSPITAL | Age: 29
End: 2019-09-09
Attending: OBSTETRICS & GYNECOLOGY
Payer: COMMERCIAL

## 2019-09-09 VITALS — BODY MASS INDEX: 42.85 KG/M2 | SYSTOLIC BLOOD PRESSURE: 120 MMHG | WEIGHT: 257.5 LBS | DIASTOLIC BLOOD PRESSURE: 74 MMHG

## 2019-09-09 DIAGNOSIS — N89.8 VAGINAL IRRITATION: Primary | ICD-10-CM

## 2019-09-09 DIAGNOSIS — E66.01 MORBID OBESITY WITH BMI OF 40.0-44.9, ADULT: ICD-10-CM

## 2019-09-09 DIAGNOSIS — Z32.01 PREGNANCY TEST POSITIVE: ICD-10-CM

## 2019-09-09 DIAGNOSIS — Z34.91 NORMAL PREGNANCY IN FIRST TRIMESTER: ICD-10-CM

## 2019-09-09 DIAGNOSIS — E55.9 VITAMIN D DEFICIENCY: ICD-10-CM

## 2019-09-09 DIAGNOSIS — K21.9 GASTROESOPHAGEAL REFLUX DISEASE, ESOPHAGITIS PRESENCE NOT SPECIFIED: ICD-10-CM

## 2019-09-09 LAB
25(OH)D3+25(OH)D2 SERPL-MCNC: 15 NG/ML (ref 30–96)
ABO + RH BLD: NORMAL
BASOPHILS # BLD AUTO: 0.04 K/UL (ref 0–0.2)
BASOPHILS NFR BLD: 0.3 % (ref 0–1.9)
BLD GP AB SCN CELLS X3 SERPL QL: NORMAL
DIFFERENTIAL METHOD: ABNORMAL
EOSINOPHIL # BLD AUTO: 0 K/UL (ref 0–0.5)
EOSINOPHIL NFR BLD: 0.2 % (ref 0–8)
ERYTHROCYTE [DISTWIDTH] IN BLOOD BY AUTOMATED COUNT: 14.2 % (ref 11.5–14.5)
ESTIMATED AVG GLUCOSE: 103 MG/DL (ref 68–131)
HBA1C MFR BLD HPLC: 5.2 % (ref 4–5.6)
HCT VFR BLD AUTO: 41.4 % (ref 37–48.5)
HGB BLD-MCNC: 12 G/DL (ref 12–16)
IMM GRANULOCYTES # BLD AUTO: 0.04 K/UL (ref 0–0.04)
IMM GRANULOCYTES NFR BLD AUTO: 0.3 % (ref 0–0.5)
LYMPHOCYTES # BLD AUTO: 2.3 K/UL (ref 1–4.8)
LYMPHOCYTES NFR BLD: 18.8 % (ref 18–48)
MCH RBC QN AUTO: 24.1 PG (ref 27–31)
MCHC RBC AUTO-ENTMCNC: 29 G/DL (ref 32–36)
MCV RBC AUTO: 83 FL (ref 82–98)
MONOCYTES # BLD AUTO: 0.5 K/UL (ref 0.3–1)
MONOCYTES NFR BLD: 4.1 % (ref 4–15)
NEUTROPHILS # BLD AUTO: 9.5 K/UL (ref 1.8–7.7)
NEUTROPHILS NFR BLD: 76.3 % (ref 38–73)
NRBC BLD-RTO: 0 /100 WBC
PLATELET # BLD AUTO: 291 K/UL (ref 150–350)
PMV BLD AUTO: 11.3 FL (ref 9.2–12.9)
RBC # BLD AUTO: 4.97 M/UL (ref 4–5.4)
WBC # BLD AUTO: 12.4 K/UL (ref 3.9–12.7)

## 2019-09-09 PROCEDURE — 0500F INITIAL PRENATAL CARE VISIT: CPT | Mod: S$GLB,,, | Performed by: ADVANCED PRACTICE MIDWIFE

## 2019-09-09 PROCEDURE — 76801 OB US < 14 WKS SINGLE FETUS: CPT | Mod: S$GLB,,, | Performed by: OBSTETRICS & GYNECOLOGY

## 2019-09-09 PROCEDURE — 87491 CHLMYD TRACH DNA AMP PROBE: CPT

## 2019-09-09 PROCEDURE — 83020 HEMOGLOBIN ELECTROPHORESIS: CPT

## 2019-09-09 PROCEDURE — 0500F PR INITIAL PRENATAL CARE VISIT: ICD-10-PCS | Mod: S$GLB,,, | Performed by: ADVANCED PRACTICE MIDWIFE

## 2019-09-09 PROCEDURE — 86762 RUBELLA ANTIBODY: CPT

## 2019-09-09 PROCEDURE — 87340 HEPATITIS B SURFACE AG IA: CPT

## 2019-09-09 PROCEDURE — 86703 HIV-1/HIV-2 1 RESULT ANTBDY: CPT

## 2019-09-09 PROCEDURE — 87661 TRICHOMONAS VAGINALIS AMPLIF: CPT

## 2019-09-09 PROCEDURE — 86901 BLOOD TYPING SEROLOGIC RH(D): CPT

## 2019-09-09 PROCEDURE — 99999 PR PBB SHADOW E&M-EST. PATIENT-LVL II: ICD-10-PCS | Mod: PBBFAC,,, | Performed by: ADVANCED PRACTICE MIDWIFE

## 2019-09-09 PROCEDURE — 76801 PR US, OB <14WKS, TRANSABD, SINGLE GESTATION: ICD-10-PCS | Mod: S$GLB,,, | Performed by: OBSTETRICS & GYNECOLOGY

## 2019-09-09 PROCEDURE — 86592 SYPHILIS TEST NON-TREP QUAL: CPT

## 2019-09-09 PROCEDURE — 85025 COMPLETE CBC W/AUTO DIFF WBC: CPT

## 2019-09-09 PROCEDURE — 87481 CANDIDA DNA AMP PROBE: CPT | Mod: 59

## 2019-09-09 PROCEDURE — 83036 HEMOGLOBIN GLYCOSYLATED A1C: CPT

## 2019-09-09 PROCEDURE — 87801 DETECT AGNT MULT DNA AMPLI: CPT

## 2019-09-09 PROCEDURE — 99999 PR PBB SHADOW E&M-EST. PATIENT-LVL II: CPT | Mod: PBBFAC,,, | Performed by: ADVANCED PRACTICE MIDWIFE

## 2019-09-09 PROCEDURE — 82306 VITAMIN D 25 HYDROXY: CPT

## 2019-09-09 PROCEDURE — 36415 COLL VENOUS BLD VENIPUNCTURE: CPT

## 2019-09-09 RX ORDER — METRONIDAZOLE 10 MG/G
GEL TOPICAL DAILY
Qty: 60 G | Refills: 1 | Status: SHIPPED | OUTPATIENT
Start: 2019-09-09 | End: 2020-01-28

## 2019-09-09 RX ORDER — PANTOPRAZOLE SODIUM 40 MG/1
40 TABLET, DELAYED RELEASE ORAL 2 TIMES DAILY
Qty: 60 TABLET | Refills: 5 | Status: ON HOLD | OUTPATIENT
Start: 2019-09-09 | End: 2020-04-16

## 2019-09-09 RX ORDER — ONDANSETRON 4 MG/1
4 TABLET, FILM COATED ORAL EVERY 6 HOURS PRN
Qty: 30 TABLET | Refills: 1 | Status: ON HOLD | OUTPATIENT
Start: 2019-09-09 | End: 2020-04-16

## 2019-09-09 NOTE — PROGRESS NOTES
NOB S<D   Labs today sono reviewed  genprobe and vaginosis probs done  25 pound weight gain advised  Early glucose screen ordered Patient unable to tolerate glucose today Will try next visit  Refill given for metrogel for BV   Protonix increase to 2xd and zofran ordered

## 2019-09-10 ENCOUNTER — PATIENT MESSAGE (OUTPATIENT)
Dept: OBSTETRICS AND GYNECOLOGY | Facility: CLINIC | Age: 29
End: 2019-09-10

## 2019-09-10 DIAGNOSIS — E55.9 VITAMIN D DEFICIENCY: Primary | ICD-10-CM

## 2019-09-10 LAB
BACTERIAL VAGINOSIS DNA: NEGATIVE
C TRACH DNA SPEC QL NAA+PROBE: NOT DETECTED
CANDIDA GLABRATA DNA: NEGATIVE
CANDIDA KRUSEI DNA: NEGATIVE
CANDIDA RRNA VAG QL PROBE: NEGATIVE
HBV SURFACE AG SERPL QL IA: NEGATIVE
HIV 1+2 AB+HIV1 P24 AG SERPL QL IA: NEGATIVE
N GONORRHOEA DNA SPEC QL NAA+PROBE: NOT DETECTED
RPR SER QL: NORMAL
RUBV IGG SER-ACNC: 42.6 IU/ML
RUBV IGG SER-IMP: REACTIVE
T VAGINALIS RRNA GENITAL QL PROBE: NEGATIVE

## 2019-09-10 RX ORDER — ERGOCALCIFEROL 1.25 MG/1
100000 CAPSULE ORAL
Qty: 8 CAPSULE | Refills: 5 | Status: SHIPPED | OUTPATIENT
Start: 2019-09-10 | End: 2020-08-04 | Stop reason: SDUPTHER

## 2019-09-11 LAB
HGB A2 MFR BLD HPLC: 2.2 % (ref 2.2–3.2)
HGB FRACT BLD ELPH-IMP: NORMAL
HGB FRACT BLD ELPH-IMP: NORMAL

## 2019-09-18 ENCOUNTER — PATIENT MESSAGE (OUTPATIENT)
Dept: OBSTETRICS AND GYNECOLOGY | Facility: CLINIC | Age: 29
End: 2019-09-18

## 2019-09-30 ENCOUNTER — HOSPITAL ENCOUNTER (EMERGENCY)
Facility: HOSPITAL | Age: 29
Discharge: HOME | End: 2019-09-30
Attending: EMERGENCY MEDICINE
Payer: COMMERCIAL

## 2019-09-30 VITALS
DIASTOLIC BLOOD PRESSURE: 82 MMHG | OXYGEN SATURATION: 100 % | BODY MASS INDEX: 40.9 KG/M2 | WEIGHT: 254.5 LBS | TEMPERATURE: 98 F | HEIGHT: 66 IN | RESPIRATION RATE: 18 BRPM | HEART RATE: 98 BPM | SYSTOLIC BLOOD PRESSURE: 139 MMHG

## 2019-09-30 DIAGNOSIS — O21.0 HYPEREMESIS GRAVIDARUM: Primary | ICD-10-CM

## 2019-09-30 LAB
ALBUMIN SERPL BCP-MCNC: 3.3 G/DL (ref 3.5–5.2)
ALP SERPL-CCNC: 75 U/L (ref 55–135)
ALT SERPL W/O P-5'-P-CCNC: 13 U/L (ref 10–44)
ANION GAP SERPL CALC-SCNC: 8 MMOL/L (ref 8–16)
AST SERPL-CCNC: 11 U/L (ref 10–40)
BASOPHILS # BLD AUTO: 0.01 K/UL (ref 0–0.2)
BASOPHILS NFR BLD: 0.1 % (ref 0–1.9)
BILIRUB SERPL-MCNC: 0.3 MG/DL (ref 0.1–1)
BUN SERPL-MCNC: 9 MG/DL (ref 6–20)
CALCIUM SERPL-MCNC: 9.4 MG/DL (ref 8.7–10.5)
CHLORIDE SERPL-SCNC: 105 MMOL/L (ref 95–110)
CO2 SERPL-SCNC: 22 MMOL/L (ref 23–29)
CREAT SERPL-MCNC: 0.8 MG/DL (ref 0.5–1.4)
DIFFERENTIAL METHOD: ABNORMAL
EOSINOPHIL # BLD AUTO: 0 K/UL (ref 0–0.5)
EOSINOPHIL NFR BLD: 0.3 % (ref 0–8)
ERYTHROCYTE [DISTWIDTH] IN BLOOD BY AUTOMATED COUNT: 14.4 % (ref 11.5–14.5)
EST. GFR  (AFRICAN AMERICAN): >60 ML/MIN/1.73 M^2
EST. GFR  (NON AFRICAN AMERICAN): >60 ML/MIN/1.73 M^2
GLUCOSE SERPL-MCNC: 80 MG/DL (ref 70–110)
HCT VFR BLD AUTO: 39 % (ref 37–48.5)
HGB BLD-MCNC: 12.5 G/DL (ref 12–16)
LYMPHOCYTES # BLD AUTO: 2 K/UL (ref 1–4.8)
LYMPHOCYTES NFR BLD: 17.7 % (ref 18–48)
MCH RBC QN AUTO: 25.1 PG (ref 27–31)
MCHC RBC AUTO-ENTMCNC: 32.1 G/DL (ref 32–36)
MCV RBC AUTO: 78 FL (ref 82–98)
MONOCYTES # BLD AUTO: 0.5 K/UL (ref 0.3–1)
MONOCYTES NFR BLD: 4.6 % (ref 4–15)
NEUTROPHILS # BLD AUTO: 8.8 K/UL (ref 1.8–7.7)
NEUTROPHILS NFR BLD: 77.5 % (ref 38–73)
PLATELET # BLD AUTO: 308 K/UL (ref 150–350)
PMV BLD AUTO: 10.8 FL (ref 9.2–12.9)
POTASSIUM SERPL-SCNC: 3.8 MMOL/L (ref 3.5–5.1)
PROT SERPL-MCNC: 7.6 G/DL (ref 6–8.4)
RBC # BLD AUTO: 4.98 M/UL (ref 4–5.4)
SODIUM SERPL-SCNC: 135 MMOL/L (ref 136–145)
WBC # BLD AUTO: 11.4 K/UL (ref 3.9–12.7)

## 2019-09-30 PROCEDURE — 85025 COMPLETE CBC W/AUTO DIFF WBC: CPT

## 2019-09-30 PROCEDURE — 80053 COMPREHEN METABOLIC PANEL: CPT

## 2019-09-30 PROCEDURE — 63600175 PHARM REV CODE 636 W HCPCS: Performed by: NURSE PRACTITIONER

## 2019-09-30 PROCEDURE — 96361 HYDRATE IV INFUSION ADD-ON: CPT

## 2019-09-30 PROCEDURE — 99284 EMERGENCY DEPT VISIT MOD MDM: CPT | Mod: 25

## 2019-09-30 PROCEDURE — 96374 THER/PROPH/DIAG INJ IV PUSH: CPT

## 2019-09-30 RX ORDER — METOCLOPRAMIDE HYDROCHLORIDE 5 MG/ML
10 INJECTION INTRAMUSCULAR; INTRAVENOUS
Status: COMPLETED | OUTPATIENT
Start: 2019-09-30 | End: 2019-09-30

## 2019-09-30 RX ORDER — SODIUM CHLORIDE 9 MG/ML
1000 INJECTION, SOLUTION INTRAVENOUS
Status: COMPLETED | OUTPATIENT
Start: 2019-09-30 | End: 2019-09-30

## 2019-09-30 RX ORDER — METOCLOPRAMIDE 10 MG/1
10 TABLET ORAL EVERY 6 HOURS PRN
Qty: 30 TABLET | Refills: 0 | Status: ON HOLD | OUTPATIENT
Start: 2019-09-30 | End: 2020-04-16

## 2019-09-30 RX ADMIN — SODIUM CHLORIDE 1000 ML: 0.9 INJECTION, SOLUTION INTRAVENOUS at 09:09

## 2019-09-30 RX ADMIN — METOCLOPRAMIDE 10 MG: 5 INJECTION, SOLUTION INTRAMUSCULAR; INTRAVENOUS at 09:09

## 2019-09-30 NOTE — ED NOTES
Patient identifiers verified and correct for Anthony Sexton.    Patient presented to the ED with c/o N/V on and off for the past 6 weeks and some upper abdominal pain. Patient reports she is 10 weeks pregnant and has a HX of hyperemesis. Patient denies taking anything for N/V, and denies any other issues at this time     LOC: The patient is awake, alert and aware of environment with an appropriate affect, the patient is oriented x 3 and speaking appropriately.  APPEARANCE: Patient resting comfortably and in no acute distress, patient is clean and well groomed, patient's clothing is properly fastened.  HEENT: WNL   SKIN: The skin is warm and dry, color consistent with ethnicity, patient has normal skin turgor and moist mucus membranes, skin intact, no breakdown or bruising noted.  MUSCULOSKELETAL: Patient moving all extremities spontaneously.   RESPIRATORY: Airway is open and patent, respirations are spontaneous, and unlabored. Breath sounds are clear.   CARDIAC: Patient has a normal rate, no periphreal edema noted, capillary refill < 3 seconds.   ABDOMEN: Soft and non tender to palpation. No distention noted.   : Normal urinary patterns. Urine is yellow without foul odor.    GI: + N/V, epigastric pain reported

## 2019-09-30 NOTE — ED PROVIDER NOTES
Encounter Date: 9/30/2019       History     Chief Complaint   Patient presents with    Emesis During Pregnancy     pt states she is 10 weeks pregnant, pt c/o n/v, decreased urine output     29 year old female with complaint of nausea and vomiting X 6 weeks.  Reports that she is 10 weeks pregnant.  Pt reports not holding any food or fluids down over the past few days. No diarrhea.  No fever or chills.  No vaginal bleeding. NO pelvic pain.         Review of patient's allergies indicates:  No Known Allergies  Past Medical History:   Diagnosis Date    Anemia     Chronic low back pain     History of abnormal cervical Pap smear     History of recurrent UTIs     History of ulcer disease     Irregular menses     Obesity     Vitamin D deficiency      Past Surgical History:   Procedure Laterality Date    None       Family History   Problem Relation Age of Onset    Asthma Mother     Hyperlipidemia Mother     Hypertension Mother     Hypertension Father     Hyperlipidemia Father     Heart disease Father 55        MI/CAD    No Known Problems Sister     No Known Problems Brother     Diabetes Maternal Aunt     Diabetes Maternal Grandmother     Insulin resistance Sister     No Known Problems Brother     No Known Problems Brother     No Known Problems Brother     No Known Problems Brother     No Known Problems Daughter     Breast cancer Maternal Aunt     Cancer Neg Hx     Stroke Neg Hx     Brain cancer Neg Hx     Migraines Neg Hx     Colon cancer Neg Hx     Ovarian cancer Neg Hx      Social History     Tobacco Use    Smoking status: Never Smoker    Smokeless tobacco: Never Used   Substance Use Topics    Alcohol use: Not Currently     Alcohol/week: 0.0 standard drinks    Drug use: No     Review of Systems   Constitutional: Negative for fever.   HENT: Negative for sore throat.    Respiratory: Negative for shortness of breath.    Cardiovascular: Negative for chest pain.   Gastrointestinal: Positive  for nausea and vomiting.   Genitourinary: Negative for dysuria.   Musculoskeletal: Negative for back pain.   Skin: Negative for rash.   Neurological: Negative for weakness.   Hematological: Does not bruise/bleed easily.       Physical Exam     Initial Vitals [09/30/19 0905]   BP Pulse Resp Temp SpO2   (!) 140/85 100 20 98.8 °F (37.1 °C) 100 %      MAP       --         Physical Exam    Nursing note and vitals reviewed.  Constitutional: She appears well-developed and well-nourished.   HENT:   Head: Normocephalic and atraumatic.   Eyes: Conjunctivae and EOM are normal. Pupils are equal, round, and reactive to light.   Neck: Normal range of motion. Neck supple.   Cardiovascular: Normal rate, regular rhythm, normal heart sounds and intact distal pulses.   Pulmonary/Chest: Breath sounds normal.   Abdominal: Soft. There is no tenderness. There is no rebound and no guarding.   Musculoskeletal: Normal range of motion.   Neurological: She is alert and oriented to person, place, and time. She has normal strength and normal reflexes.   Skin: Skin is warm and dry.   Psychiatric: She has a normal mood and affect. Her behavior is normal. Thought content normal.         ED Course   Procedures  Labs Reviewed   CBC W/ AUTO DIFFERENTIAL - Abnormal; Notable for the following components:       Result Value    Mean Corpuscular Volume 78 (*)     Mean Corpuscular Hemoglobin 25.1 (*)     Gran # (ANC) 8.8 (*)     Gran% 77.5 (*)     Lymph% 17.7 (*)     All other components within normal limits   COMPREHENSIVE METABOLIC PANEL - Abnormal; Notable for the following components:    Sodium 135 (*)     CO2 22 (*)     Albumin 3.3 (*)     All other components within normal limits   URINALYSIS          Imaging Results    None           10:35 AM  Pt reports feeling better, pt reports that she is ready for discharge                    Clinical Impression:       ICD-10-CM ICD-9-CM   1. Hyperemesis gravidarum O21.0 643.00                                 Tad Lu, HILARY  09/30/19 1030

## 2019-10-18 ENCOUNTER — ROUTINE PRENATAL (OUTPATIENT)
Dept: OBSTETRICS AND GYNECOLOGY | Facility: CLINIC | Age: 29
End: 2019-10-18
Payer: COMMERCIAL

## 2019-10-18 VITALS — WEIGHT: 257.5 LBS | SYSTOLIC BLOOD PRESSURE: 118 MMHG | DIASTOLIC BLOOD PRESSURE: 80 MMHG | BODY MASS INDEX: 42.2 KG/M2

## 2019-10-18 DIAGNOSIS — Z34.81 SUPERVISION OF NORMAL INTRAUTERINE PREGNANCY IN MULTIGRAVIDA IN FIRST TRIMESTER: Primary | ICD-10-CM

## 2019-10-18 DIAGNOSIS — Z3A.12 12 WEEKS GESTATION OF PREGNANCY: ICD-10-CM

## 2019-10-18 PROCEDURE — 0502F SUBSEQUENT PRENATAL CARE: CPT | Mod: CPTII,S$GLB,, | Performed by: MIDWIFE

## 2019-10-18 PROCEDURE — 90471 IMMUNIZATION ADMIN: CPT | Mod: S$GLB,,, | Performed by: OBSTETRICS & GYNECOLOGY

## 2019-10-18 PROCEDURE — 90686 FLU VACCINE (QUAD) GREATER THAN OR EQUAL TO 3YO PRESERVATIVE FREE IM: ICD-10-PCS | Mod: S$GLB,,, | Performed by: OBSTETRICS & GYNECOLOGY

## 2019-10-18 PROCEDURE — 0502F PR SUBSEQUENT PRENATAL CARE: ICD-10-PCS | Mod: CPTII,S$GLB,, | Performed by: MIDWIFE

## 2019-10-18 PROCEDURE — 99999 PR PBB SHADOW E&M-EST. PATIENT-LVL III: CPT | Mod: PBBFAC,,, | Performed by: MIDWIFE

## 2019-10-18 PROCEDURE — 99999 PR PBB SHADOW E&M-EST. PATIENT-LVL III: ICD-10-PCS | Mod: PBBFAC,,, | Performed by: MIDWIFE

## 2019-10-18 PROCEDURE — 90471 FLU VACCINE (QUAD) GREATER THAN OR EQUAL TO 3YO PRESERVATIVE FREE IM: ICD-10-PCS | Mod: S$GLB,,, | Performed by: OBSTETRICS & GYNECOLOGY

## 2019-10-18 PROCEDURE — 90686 IIV4 VACC NO PRSV 0.5 ML IM: CPT | Mod: S$GLB,,, | Performed by: OBSTETRICS & GYNECOLOGY

## 2019-10-18 NOTE — PROGRESS NOTES
29 y.o. female  at 12w3d  Doing well without concerns  Quad screen next visit  Reports nausea better today  TW lbs  Flu vaccine today   Reviewed warning signs and how/when to call.  RTC x 4wks, call or present sooner prn.

## 2019-10-21 DIAGNOSIS — R11.0 NAUSEA: ICD-10-CM

## 2019-10-22 RX ORDER — PROMETHAZINE HYDROCHLORIDE 25 MG/1
25 TABLET ORAL EVERY 6 HOURS PRN
Qty: 30 TABLET | Refills: 3 | Status: SHIPPED | OUTPATIENT
Start: 2019-10-22 | End: 2019-12-12 | Stop reason: SDUPTHER

## 2019-11-06 ENCOUNTER — PATIENT MESSAGE (OUTPATIENT)
Dept: OBSTETRICS AND GYNECOLOGY | Facility: CLINIC | Age: 29
End: 2019-11-06

## 2019-11-13 ENCOUNTER — CLINICAL SUPPORT (OUTPATIENT)
Dept: INTERNAL MEDICINE | Facility: CLINIC | Age: 29
End: 2019-11-13
Payer: COMMERCIAL

## 2019-11-13 DIAGNOSIS — Z34.81 SUPERVISION OF NORMAL INTRAUTERINE PREGNANCY IN MULTIGRAVIDA IN FIRST TRIMESTER: ICD-10-CM

## 2019-11-13 PROCEDURE — 81511 FTL CGEN ABNOR FOUR ANAL: CPT

## 2019-11-15 ENCOUNTER — ROUTINE PRENATAL (OUTPATIENT)
Dept: OBSTETRICS AND GYNECOLOGY | Facility: CLINIC | Age: 29
End: 2019-11-15
Payer: COMMERCIAL

## 2019-11-15 VITALS — WEIGHT: 259.94 LBS | SYSTOLIC BLOOD PRESSURE: 130 MMHG | DIASTOLIC BLOOD PRESSURE: 80 MMHG | BODY MASS INDEX: 42.6 KG/M2

## 2019-11-15 DIAGNOSIS — Z34.92 NORMAL PREGNANCY IN SECOND TRIMESTER: Primary | ICD-10-CM

## 2019-11-15 LAB
# FETUSES US: NORMAL
2ND TRIMESTER 4 SCREEN PNL SERPL: NEGATIVE
2ND TRIMESTER 4 SCREEN SERPL-IMP: NORMAL
AFP MOM SERPL: 1.18
AFP SERPL-MCNC: 31.1 NG/ML
AGE AT DELIVERY: 31
B-HCG MOM SERPL: 2
B-HCG SERPL-ACNC: 59.6 IU/ML
FET TS 21 RISK FROM MAT AGE: NORMAL
GA (DAYS): 1 D
GA (WEEKS): 16 WK
GA METHOD: NORMAL
IDDM PATIENT QL: NORMAL
INHIBIN A MOM SERPL: 1.54
INHIBIN A SERPL-MCNC: 181.2 PG/ML
SMOKING STATUS FTND: NO
TS 18 RISK FETUS: NORMAL
TS 21 RISK FETUS: NORMAL
U ESTRIOL MOM SERPL: 1.62
U ESTRIOL SERPL-MCNC: 1.27 NG/ML

## 2019-11-15 PROCEDURE — 0502F PR SUBSEQUENT PRENATAL CARE: ICD-10-PCS | Mod: CPTII,S$GLB,, | Performed by: ADVANCED PRACTICE MIDWIFE

## 2019-11-15 PROCEDURE — 99999 PR PBB SHADOW E&M-EST. PATIENT-LVL II: ICD-10-PCS | Mod: PBBFAC,,, | Performed by: ADVANCED PRACTICE MIDWIFE

## 2019-11-15 PROCEDURE — 0502F SUBSEQUENT PRENATAL CARE: CPT | Mod: CPTII,S$GLB,, | Performed by: ADVANCED PRACTICE MIDWIFE

## 2019-11-15 PROCEDURE — 99999 PR PBB SHADOW E&M-EST. PATIENT-LVL II: CPT | Mod: PBBFAC,,, | Performed by: ADVANCED PRACTICE MIDWIFE

## 2019-11-15 RX ORDER — BUTALBITAL, ACETAMINOPHEN AND CAFFEINE 50; 325; 40 MG/1; MG/1; MG/1
1 TABLET ORAL EVERY 4 HOURS PRN
Qty: 30 TABLET | Refills: 0 | Status: SHIPPED | OUTPATIENT
Start: 2019-11-15 | End: 2019-12-15

## 2019-11-15 NOTE — PROGRESS NOTES
"Quad screen done results pending  Able to tolerate prenatals  Anatomy scan next visit  fioricet for headaches  Patient viewed [employer] video, Fall in Love and was provided with [facility] handouts: Benefits of Breastfeeding, "Exclusive Breastfeeding," and Skin to Skin with Your Baby. Discussed benefits of skin to skin, the magic first hour, delaying routine procedures, benefits of breastfeeding, and the importance of the first early feeding, and the importance of exclusive breastfeeding. Encouraged patient to attend [facility]s Prenatal Breastfeeding Class and to download the [employer] mobile elian if she has not already done so.  Patient verbalizes understanding.  "

## 2019-12-12 ENCOUNTER — PROCEDURE VISIT (OUTPATIENT)
Dept: OBSTETRICS AND GYNECOLOGY | Facility: CLINIC | Age: 29
End: 2019-12-12
Payer: COMMERCIAL

## 2019-12-12 ENCOUNTER — ROUTINE PRENATAL (OUTPATIENT)
Dept: OBSTETRICS AND GYNECOLOGY | Facility: CLINIC | Age: 29
End: 2019-12-12
Payer: COMMERCIAL

## 2019-12-12 VITALS
BODY MASS INDEX: 42.81 KG/M2 | DIASTOLIC BLOOD PRESSURE: 78 MMHG | WEIGHT: 261.25 LBS | SYSTOLIC BLOOD PRESSURE: 124 MMHG

## 2019-12-12 DIAGNOSIS — Z34.92 NORMAL PREGNANCY IN SECOND TRIMESTER: ICD-10-CM

## 2019-12-12 DIAGNOSIS — Z34.92 NORMAL PREGNANCY IN SECOND TRIMESTER: Primary | ICD-10-CM

## 2019-12-12 DIAGNOSIS — R11.0 NAUSEA: ICD-10-CM

## 2019-12-12 DIAGNOSIS — O99.212 OBESITY AFFECTING PREGNANCY IN SECOND TRIMESTER: ICD-10-CM

## 2019-12-12 DIAGNOSIS — Z36.3 ANTENATAL SCREENING FOR MALFORMATION USING ULTRASONICS: ICD-10-CM

## 2019-12-12 PROCEDURE — 0502F SUBSEQUENT PRENATAL CARE: CPT | Mod: CPTII,S$GLB,, | Performed by: ADVANCED PRACTICE MIDWIFE

## 2019-12-12 PROCEDURE — 76805 PR US, OB 14+WKS, TRANSABD, SINGLE GESTATION: ICD-10-PCS | Mod: S$GLB,,, | Performed by: OBSTETRICS & GYNECOLOGY

## 2019-12-12 PROCEDURE — 99999 PR PBB SHADOW E&M-EST. PATIENT-LVL I: ICD-10-PCS | Mod: PBBFAC,,, | Performed by: ADVANCED PRACTICE MIDWIFE

## 2019-12-12 PROCEDURE — 76805 OB US >/= 14 WKS SNGL FETUS: CPT | Mod: S$GLB,,, | Performed by: OBSTETRICS & GYNECOLOGY

## 2019-12-12 PROCEDURE — 0502F PR SUBSEQUENT PRENATAL CARE: ICD-10-PCS | Mod: CPTII,S$GLB,, | Performed by: ADVANCED PRACTICE MIDWIFE

## 2019-12-12 PROCEDURE — 99999 PR PBB SHADOW E&M-EST. PATIENT-LVL I: CPT | Mod: PBBFAC,,, | Performed by: ADVANCED PRACTICE MIDWIFE

## 2019-12-12 RX ORDER — PROMETHAZINE HYDROCHLORIDE 25 MG/1
25 TABLET ORAL EVERY 6 HOURS PRN
Qty: 30 TABLET | Refills: 3 | Status: ON HOLD | OUTPATIENT
Start: 2019-12-12 | End: 2020-04-16

## 2019-12-12 NOTE — PROGRESS NOTES
sono done anterior placenta normal anatomy  Headaches much improved  Patient viewed [employer] video, Learn Your Baby and was provided with [facility] handouts: Baby Led Feeding and Rooming In. Discussed benefits of rooming-in 24 hours a day, benefits of cue-based feeding, the impact of feeding frequency on milk supply, and basic breastfeeding management. Encouraged patient to attend [facility]s Prenatal Breastfeeding Class and to download the [employer] mobile elian if she has not already done so. Patient verbalizes understanding.

## 2020-01-09 ENCOUNTER — OFFICE VISIT (OUTPATIENT)
Dept: OPHTHALMOLOGY | Facility: CLINIC | Age: 30
End: 2020-01-09
Payer: COMMERCIAL

## 2020-01-09 DIAGNOSIS — H52.13 MYOPIA OF BOTH EYES WITH ASTIGMATISM: Primary | ICD-10-CM

## 2020-01-09 DIAGNOSIS — H52.203 MYOPIA OF BOTH EYES WITH ASTIGMATISM: Primary | ICD-10-CM

## 2020-01-09 PROCEDURE — 99999 PR PBB SHADOW E&M-EST. PATIENT-LVL II: CPT | Mod: PBBFAC,,, | Performed by: OPTOMETRIST

## 2020-01-09 PROCEDURE — 92310 CONTACT LENS FITTING OU: CPT | Mod: CSM,,, | Performed by: OPTOMETRIST

## 2020-01-09 PROCEDURE — 92015 DETERMINE REFRACTIVE STATE: CPT | Mod: S$GLB,,, | Performed by: OPTOMETRIST

## 2020-01-09 PROCEDURE — 99999 PR PBB SHADOW E&M-EST. PATIENT-LVL II: ICD-10-PCS | Mod: PBBFAC,,, | Performed by: OPTOMETRIST

## 2020-01-09 PROCEDURE — 92014 PR EYE EXAM, EST PATIENT,COMPREHESV: ICD-10-PCS | Mod: S$GLB,,, | Performed by: OPTOMETRIST

## 2020-01-09 PROCEDURE — 92014 COMPRE OPH EXAM EST PT 1/>: CPT | Mod: S$GLB,,, | Performed by: OPTOMETRIST

## 2020-01-09 PROCEDURE — 92015 PR REFRACTION: ICD-10-PCS | Mod: S$GLB,,, | Performed by: OPTOMETRIST

## 2020-01-09 PROCEDURE — 92310 PR CONTACT LENS FITTING (NO CHANGE): ICD-10-PCS | Mod: CSM,,, | Performed by: OPTOMETRIST

## 2020-01-09 NOTE — PROGRESS NOTES
HPI     Blurred Vision     Comments: Yearly              Comments     Last visit with DNL on 06/14/2019.  HPI    Any vision changes since last exam: Yes  Eye pain: No  Other ocular symptoms: No    Do you wear currently wear glasses or contacts? Both    Interested in contacts today? Yes    Do you plan on getting new glasses today? Yes              Last edited by Nancy Moore on 1/9/2020  2:43 PM. (History)            Assessment /Plan     For exam results, see Encounter Report.    Myopia of both eyes with astigmatism    Eyeglass Final Rx     Eyeglass Final Rx       Sphere Cylinder Axis    Right -5.50 +0.75 080    Left -6.50 +1.25 105    Expiration Date:  1/9/2021              Dispensed AO trials today, pt completely out of lenses    Order trials  See at dispense

## 2020-01-10 ENCOUNTER — ROUTINE PRENATAL (OUTPATIENT)
Dept: OBSTETRICS AND GYNECOLOGY | Facility: CLINIC | Age: 30
End: 2020-01-10
Payer: COMMERCIAL

## 2020-01-10 VITALS
BODY MASS INDEX: 42.49 KG/M2 | SYSTOLIC BLOOD PRESSURE: 122 MMHG | WEIGHT: 259.25 LBS | DIASTOLIC BLOOD PRESSURE: 70 MMHG

## 2020-01-10 DIAGNOSIS — Z34.92 NORMAL PREGNANCY IN SECOND TRIMESTER: Primary | ICD-10-CM

## 2020-01-10 PROCEDURE — 99999 PR PBB SHADOW E&M-EST. PATIENT-LVL III: CPT | Mod: PBBFAC,,, | Performed by: ADVANCED PRACTICE MIDWIFE

## 2020-01-10 PROCEDURE — 99999 PR PBB SHADOW E&M-EST. PATIENT-LVL III: ICD-10-PCS | Mod: PBBFAC,,, | Performed by: ADVANCED PRACTICE MIDWIFE

## 2020-01-10 PROCEDURE — 0502F SUBSEQUENT PRENATAL CARE: CPT | Mod: CPTII,S$GLB,, | Performed by: ADVANCED PRACTICE MIDWIFE

## 2020-01-10 PROCEDURE — 0502F PR SUBSEQUENT PRENATAL CARE: ICD-10-PCS | Mod: CPTII,S$GLB,, | Performed by: ADVANCED PRACTICE MIDWIFE

## 2020-01-10 NOTE — PROGRESS NOTES
28 week labs next visit  Patient viewed [employer] video, Protect Breastfeeding and was provided with [facility] handout: Risks of Formula Feeding. Discussed importance of exclusive breastfeeding for the first 6 months and continuing to breastfeed after the introduction of complementary foods, risks of supplementation, benefits of feeding on demand, the impact of feeding frequency on milk supply, and basic management of breastfeeding. Encouraged patient to attend [facility] Prenatal Breastfeeding Class and to download the [employer] mobile elian if she has not already done so. Patient verbalizes understanding.

## 2020-01-17 ENCOUNTER — PATIENT MESSAGE (OUTPATIENT)
Dept: FAMILY MEDICINE | Facility: CLINIC | Age: 30
End: 2020-01-17

## 2020-01-17 RX ORDER — FLUTICASONE PROPIONATE 50 MCG
2 SPRAY, SUSPENSION (ML) NASAL DAILY PRN
Qty: 16 G | Refills: 5 | Status: SHIPPED | OUTPATIENT
Start: 2020-01-17 | End: 2020-04-23

## 2020-01-24 ENCOUNTER — LAB VISIT (OUTPATIENT)
Dept: LAB | Facility: HOSPITAL | Age: 30
End: 2020-01-24
Attending: ADVANCED PRACTICE MIDWIFE
Payer: COMMERCIAL

## 2020-01-24 DIAGNOSIS — Z34.92 NORMAL PREGNANCY IN SECOND TRIMESTER: ICD-10-CM

## 2020-01-24 LAB
BASOPHILS # BLD AUTO: 0.02 K/UL (ref 0–0.2)
BASOPHILS NFR BLD: 0.2 % (ref 0–1.9)
DIFFERENTIAL METHOD: ABNORMAL
EOSINOPHIL # BLD AUTO: 0 K/UL (ref 0–0.5)
EOSINOPHIL NFR BLD: 0.2 % (ref 0–8)
ERYTHROCYTE [DISTWIDTH] IN BLOOD BY AUTOMATED COUNT: 13.7 % (ref 11.5–14.5)
GLUCOSE SERPL-MCNC: 90 MG/DL (ref 70–140)
HCT VFR BLD AUTO: 33.4 % (ref 37–48.5)
HGB BLD-MCNC: 9.8 G/DL (ref 12–16)
IMM GRANULOCYTES # BLD AUTO: 0.04 K/UL (ref 0–0.04)
IMM GRANULOCYTES NFR BLD AUTO: 0.4 % (ref 0–0.5)
LYMPHOCYTES # BLD AUTO: 1.5 K/UL (ref 1–4.8)
LYMPHOCYTES NFR BLD: 16.4 % (ref 18–48)
MCH RBC QN AUTO: 23.9 PG (ref 27–31)
MCHC RBC AUTO-ENTMCNC: 29.3 G/DL (ref 32–36)
MCV RBC AUTO: 82 FL (ref 82–98)
MONOCYTES # BLD AUTO: 0.4 K/UL (ref 0.3–1)
MONOCYTES NFR BLD: 4.7 % (ref 4–15)
NEUTROPHILS # BLD AUTO: 7.1 K/UL (ref 1.8–7.7)
NEUTROPHILS NFR BLD: 78.1 % (ref 38–73)
NRBC BLD-RTO: 0 /100 WBC
PLATELET # BLD AUTO: 284 K/UL (ref 150–350)
PMV BLD AUTO: 11.4 FL (ref 9.2–12.9)
RBC # BLD AUTO: 4.1 M/UL (ref 4–5.4)
WBC # BLD AUTO: 9.13 K/UL (ref 3.9–12.7)

## 2020-01-24 PROCEDURE — 85025 COMPLETE CBC W/AUTO DIFF WBC: CPT

## 2020-01-24 PROCEDURE — 86703 HIV-1/HIV-2 1 RESULT ANTBDY: CPT

## 2020-01-24 PROCEDURE — 86592 SYPHILIS TEST NON-TREP QUAL: CPT

## 2020-01-24 PROCEDURE — 36415 COLL VENOUS BLD VENIPUNCTURE: CPT

## 2020-01-24 PROCEDURE — 82950 GLUCOSE TEST: CPT

## 2020-01-25 LAB — RPR SER QL: NORMAL

## 2020-01-27 ENCOUNTER — PATIENT MESSAGE (OUTPATIENT)
Dept: OBSTETRICS AND GYNECOLOGY | Facility: CLINIC | Age: 30
End: 2020-01-27

## 2020-01-27 PROBLEM — D50.8 IRON DEFICIENCY ANEMIA SECONDARY TO INADEQUATE DIETARY IRON INTAKE: Status: ACTIVE | Noted: 2020-01-27

## 2020-01-27 LAB — HIV 1+2 AB+HIV1 P24 AG SERPL QL IA: NEGATIVE

## 2020-01-27 RX ORDER — FERROUS SULFATE 325(65) MG
325 TABLET, DELAYED RELEASE (ENTERIC COATED) ORAL 2 TIMES DAILY
Qty: 60 TABLET | Refills: 3 | Status: SHIPPED | OUTPATIENT
Start: 2020-01-27 | End: 2020-05-27

## 2020-01-28 ENCOUNTER — ROUTINE PRENATAL (OUTPATIENT)
Dept: OBSTETRICS AND GYNECOLOGY | Facility: CLINIC | Age: 30
End: 2020-01-28
Payer: COMMERCIAL

## 2020-01-28 VITALS
WEIGHT: 257.06 LBS | DIASTOLIC BLOOD PRESSURE: 66 MMHG | SYSTOLIC BLOOD PRESSURE: 116 MMHG | BODY MASS INDEX: 42.13 KG/M2

## 2020-01-28 DIAGNOSIS — Z34.80 SUPERVISION OF OTHER NORMAL PREGNANCY: ICD-10-CM

## 2020-01-28 DIAGNOSIS — D50.8 IRON DEFICIENCY ANEMIA SECONDARY TO INADEQUATE DIETARY IRON INTAKE: Primary | ICD-10-CM

## 2020-01-28 PROCEDURE — 99999 PR PBB SHADOW E&M-EST. PATIENT-LVL III: ICD-10-PCS | Mod: PBBFAC,,, | Performed by: ADVANCED PRACTICE MIDWIFE

## 2020-01-28 PROCEDURE — 99999 PR PBB SHADOW E&M-EST. PATIENT-LVL III: CPT | Mod: PBBFAC,,, | Performed by: ADVANCED PRACTICE MIDWIFE

## 2020-01-28 PROCEDURE — 0502F SUBSEQUENT PRENATAL CARE: CPT | Mod: CPTII,S$GLB,, | Performed by: ADVANCED PRACTICE MIDWIFE

## 2020-01-28 PROCEDURE — 0502F PR SUBSEQUENT PRENATAL CARE: ICD-10-PCS | Mod: CPTII,S$GLB,, | Performed by: ADVANCED PRACTICE MIDWIFE

## 2020-01-28 NOTE — PROGRESS NOTES
Here c/o tachycardia and SOB H/H 9.8/33.4 taking iron. Hematology consult, handicapped parking and hydration urged  Baby active 28 week labs reviewed  Patient viewed [employer]s video, Nourish and was provided with [facility] handouts: A Good Latch and Tips for a More Comfortable Labor. Discussed nonpharmacological pain relief methods for labor, techniques and benefits of effective breastfeeding position and latch, and basic breastfeeding management. Encouraged patient to attend [facility]s Prenatal Breastfeeding Class and to download the [employer] mobile elian if she has not already done so. Patient verbalizes understanding.

## 2020-02-06 NOTE — PROGRESS NOTES
Subjective:       Patient ID: Anthony Sexton is a 29 y.o. female.    Chief Complaint: Iron deficiency anemia secondary to inadequate dietary iron intake [D50.8]  HPI: We have an opportunity to see Ms. Anthony Sexton in Hematology Oncology clinic at [facility] on 02/06/2020.  Ms. Anthony Sexton is a 29 y.o. woman 28w1d gestation who presents for evaluation of anemia.  Reports has TELLEZ, and intermittent SOB.  Denies any SOB at night when resting in bed. Taking prenatals with iron.        No history exists.     Past Medical History:   Diagnosis Date    Anemia     Chronic low back pain     History of abnormal cervical Pap smear     History of recurrent UTIs     History of ulcer disease     Irregular menses     Obesity     Vitamin D deficiency      Family History   Problem Relation Age of Onset    Asthma Mother     Hyperlipidemia Mother     Hypertension Mother     Hypertension Father     Hyperlipidemia Father     Heart disease Father 55        MI/CAD    No Known Problems Sister     No Known Problems Brother     Diabetes Maternal Aunt     Diabetes Maternal Grandmother     Insulin resistance Sister     No Known Problems Brother     No Known Problems Brother     No Known Problems Brother     No Known Problems Brother     No Known Problems Daughter     Breast cancer Maternal Aunt     Cancer Neg Hx     Stroke Neg Hx     Brain cancer Neg Hx     Migraines Neg Hx     Colon cancer Neg Hx     Ovarian cancer Neg Hx      Social History     Socioeconomic History    Marital status:      Spouse name: Not on file    Number of children: 0    Years of education: Not on file    Highest education level: Not on file   Occupational History    Occupation: RN     Comment: [employer]   Social Needs    Financial resource strain: Not on file    Food insecurity:     Worry: Not on file     Inability: Not on file    Transportation needs:     Medical: Not on file     Non-medical: Not on file    Tobacco Use    Smoking status: Never Smoker    Smokeless tobacco: Never Used   Substance and Sexual Activity    Alcohol use: Not Currently     Alcohol/week: 0.0 standard drinks    Drug use: No    Sexual activity: Yes     Partners: Male     Birth control/protection: None   Lifestyle    Physical activity:     Days per week: 0 days     Minutes per session: 0 min    Stress: Not at all   Relationships    Social connections:     Talks on phone: More than three times a week     Gets together: Never     Attends Taoist service: 1 to 4 times per year     Active member of club or organization: Yes     Attends meetings of clubs or organizations: Never     Relationship status:    Other Topics Concern    Not on file   Social History Narrative    She wears seatbelt.     Past Surgical History:   Procedure Laterality Date    None       Current Outpatient Medications   Medication Sig Dispense Refill    acetaminophen (TYLENOL) 500 MG tablet Take 500 mg by mouth 2 (two) times daily.      ergocalciferol (ERGOCALCIFEROL) 50,000 unit Cap Take 2 capsules (100,000 Units total) by mouth every 7 days. 8 capsule 5    ferrous sulfate 325 (65 FE) MG EC tablet Take 1 tablet (325 mg total) by mouth 2 (two) times daily. 60 tablet 3    fluticasone propionate (FLONASE) 50 mcg/actuation nasal spray 2 sprays (100 mcg total) by Each Nostril route daily as needed. 16 g 5    metoclopramide HCl (REGLAN) 10 MG tablet Take 1 tablet (10 mg total) by mouth every 6 (six) hours as needed (vomiting). (Patient not taking: Reported on 1/28/2020) 30 tablet 0    ondansetron (ZOFRAN) 4 MG tablet Take 1 tablet (4 mg total) by mouth every 6 (six) hours as needed for Nausea. 30 tablet 1    pantoprazole (PROTONIX) 40 MG tablet Take 1 tablet (40 mg total) by mouth 2 (two) times daily. 60 tablet 5    prenatal vits62/FA/om3/dha/epa (PRENATAL GUMMY ORAL) Take 1 Dose by mouth.      promethazine (PHENERGAN) 25 MG tablet Take 1 tablet (25 mg total)  by mouth every 6 (six) hours as needed for Nausea. 30 tablet 3     No current facility-administered medications for this visit.        Labs:  Lab Results   Component Value Date    WBC 9.13 01/24/2020    HGB 9.8 (L) 01/24/2020    HCT 33.4 (L) 01/24/2020    MCV 82 01/24/2020     01/24/2020     BMP  Lab Results   Component Value Date     (L) 09/30/2019    K 3.8 09/30/2019     09/30/2019    CO2 22 (L) 09/30/2019    BUN 9 09/30/2019    CREATININE 0.8 09/30/2019    CALCIUM 9.4 09/30/2019    ANIONGAP 8 09/30/2019    ESTGFRAFRICA >60 09/30/2019    EGFRNONAA >60 09/30/2019     Lab Results   Component Value Date    ALT 13 09/30/2019    AST 11 09/30/2019    ALKPHOS 75 09/30/2019    BILITOT 0.3 09/30/2019       Lab Results   Component Value Date    IRON 42 04/29/2010    TIBC 355 04/29/2010    FERRITIN 116 02/12/2019     Lab Results   Component Value Date    ZVJOKUYX90 347 04/29/2010     Lab Results   Component Value Date    FOLATE 14.3 04/29/2010     Lab Results   Component Value Date    TSH 1.332 02/12/2019       I have reviewed the radiology reports and examined the scan/xray images.    Review of Systems   Constitutional: Negative.    HENT: Negative.    Eyes: Negative.    Respiratory: Positive for shortness of breath.    Cardiovascular: Negative.    Gastrointestinal: Negative.    Endocrine: Negative.    Genitourinary: Negative.    Musculoskeletal: Negative.    Skin: Negative.    Allergic/Immunologic: Negative.    Neurological: Negative.    Hematological: Negative.    Psychiatric/Behavioral: Negative.      ECOG SCORE    0 - Fully active-able to carry on all pre-disease performance without restriction            Objective:     Vitals:    02/07/20 1551   BP: 113/73   Pulse: 107   Resp: 18   Temp: 97.4 °F (36.3 °C)   Body mass index is 41.99 kg/m².  Physical Exam   Constitutional: She is oriented to person, place, and time. She appears well-developed and well-nourished.   HENT:   Head: Normocephalic and  atraumatic.   Eyes: Conjunctivae and EOM are normal.   Neck: Normal range of motion. Neck supple.   Cardiovascular: Normal rate and regular rhythm.   Pulmonary/Chest: Effort normal and breath sounds normal.   Abdominal: Soft. Bowel sounds are normal.   Musculoskeletal: Normal range of motion.   Neurological: She is alert and oriented to person, place, and time.   Skin: Skin is warm and dry.   Psychiatric: She has a normal mood and affect. Her behavior is normal. Judgment and thought content normal.   Nursing note and vitals reviewed.        Assessment:      1. Iron deficiency anemia secondary to inadequate dietary iron intake    2. SOB (shortness of breath)           Plan:     Iron deficiency anemia secondary to inadequate dietary iron intake    Will check iron levels, if has iron deficiency, will set up for iv injectafer x 2.  Workup for other causes of anemia.  -     CBC auto differential; Future; Expected date: 02/07/2020  -     Comprehensive metabolic panel; Future; Expected date: 02/07/2020  -     Ferritin; Future; Expected date: 02/07/2020  -     Iron and TIBC; Future; Expected date: 02/07/2020  -     Lactate dehydrogenase; Future; Expected date: 02/07/2020  -     Haptoglobin; Future; Expected date: 02/07/2020  -     Vitamin B12; Future; Expected date: 02/07/2020  -     TSH; Future; Expected date: 02/07/2020  -     Folate; Future; Expected date: 02/07/2020      SOB  Likely due to anemia.  Will check ddimer, if elevated, will set up for CTA chest.  -     D dimer, quantitative; Future; Expected date: 02/07/2020

## 2020-02-07 ENCOUNTER — ROUTINE PRENATAL (OUTPATIENT)
Dept: OBSTETRICS AND GYNECOLOGY | Facility: CLINIC | Age: 30
End: 2020-02-07
Payer: COMMERCIAL

## 2020-02-07 ENCOUNTER — OFFICE VISIT (OUTPATIENT)
Dept: HEMATOLOGY/ONCOLOGY | Facility: CLINIC | Age: 30
End: 2020-02-07
Payer: COMMERCIAL

## 2020-02-07 ENCOUNTER — LAB VISIT (OUTPATIENT)
Dept: LAB | Facility: HOSPITAL | Age: 30
End: 2020-02-07
Attending: INTERNAL MEDICINE
Payer: COMMERCIAL

## 2020-02-07 VITALS
OXYGEN SATURATION: 100 % | RESPIRATION RATE: 18 BRPM | HEIGHT: 66 IN | TEMPERATURE: 97 F | WEIGHT: 260.13 LBS | SYSTOLIC BLOOD PRESSURE: 113 MMHG | BODY MASS INDEX: 42.63 KG/M2 | BODY MASS INDEX: 41.8 KG/M2 | WEIGHT: 260.13 LBS | DIASTOLIC BLOOD PRESSURE: 64 MMHG | HEART RATE: 107 BPM | SYSTOLIC BLOOD PRESSURE: 122 MMHG | DIASTOLIC BLOOD PRESSURE: 73 MMHG

## 2020-02-07 DIAGNOSIS — R06.02 SOB (SHORTNESS OF BREATH): ICD-10-CM

## 2020-02-07 DIAGNOSIS — D50.8 IRON DEFICIENCY ANEMIA SECONDARY TO INADEQUATE DIETARY IRON INTAKE: Primary | ICD-10-CM

## 2020-02-07 DIAGNOSIS — O99.213 OBESITY AFFECTING PREGNANCY IN THIRD TRIMESTER: Primary | ICD-10-CM

## 2020-02-07 DIAGNOSIS — Z34.92 NORMAL PREGNANCY IN SECOND TRIMESTER: ICD-10-CM

## 2020-02-07 DIAGNOSIS — D50.8 IRON DEFICIENCY ANEMIA SECONDARY TO INADEQUATE DIETARY IRON INTAKE: ICD-10-CM

## 2020-02-07 LAB
ALBUMIN SERPL BCP-MCNC: 2.8 G/DL (ref 3.5–5.2)
ALP SERPL-CCNC: 112 U/L (ref 55–135)
ALT SERPL W/O P-5'-P-CCNC: 7 U/L (ref 10–44)
ANION GAP SERPL CALC-SCNC: 8 MMOL/L (ref 8–16)
AST SERPL-CCNC: 12 U/L (ref 10–40)
BASOPHILS # BLD AUTO: 0.03 K/UL (ref 0–0.2)
BASOPHILS NFR BLD: 0.3 % (ref 0–1.9)
BILIRUB SERPL-MCNC: 0.1 MG/DL (ref 0.1–1)
BUN SERPL-MCNC: 8 MG/DL (ref 6–20)
CALCIUM SERPL-MCNC: 8.8 MG/DL (ref 8.7–10.5)
CHLORIDE SERPL-SCNC: 107 MMOL/L (ref 95–110)
CO2 SERPL-SCNC: 23 MMOL/L (ref 23–29)
CREAT SERPL-MCNC: 0.6 MG/DL (ref 0.5–1.4)
D DIMER PPP IA.FEU-MCNC: 2.17 MG/L FEU
DIFFERENTIAL METHOD: ABNORMAL
EOSINOPHIL # BLD AUTO: 0 K/UL (ref 0–0.5)
EOSINOPHIL NFR BLD: 0.2 % (ref 0–8)
ERYTHROCYTE [DISTWIDTH] IN BLOOD BY AUTOMATED COUNT: 14.2 % (ref 11.5–14.5)
EST. GFR  (AFRICAN AMERICAN): >60 ML/MIN/1.73 M^2
EST. GFR  (NON AFRICAN AMERICAN): >60 ML/MIN/1.73 M^2
GLUCOSE SERPL-MCNC: 83 MG/DL (ref 70–110)
HCT VFR BLD AUTO: 31.2 % (ref 37–48.5)
HGB BLD-MCNC: 9.4 G/DL (ref 12–16)
IMM GRANULOCYTES # BLD AUTO: 0.03 K/UL (ref 0–0.04)
IMM GRANULOCYTES NFR BLD AUTO: 0.3 % (ref 0–0.5)
LDH SERPL L TO P-CCNC: 146 U/L (ref 110–260)
LYMPHOCYTES # BLD AUTO: 1.8 K/UL (ref 1–4.8)
LYMPHOCYTES NFR BLD: 16.9 % (ref 18–48)
MCH RBC QN AUTO: 24 PG (ref 27–31)
MCHC RBC AUTO-ENTMCNC: 30.1 G/DL (ref 32–36)
MCV RBC AUTO: 80 FL (ref 82–98)
MONOCYTES # BLD AUTO: 0.7 K/UL (ref 0.3–1)
MONOCYTES NFR BLD: 6.3 % (ref 4–15)
NEUTROPHILS # BLD AUTO: 8.2 K/UL (ref 1.8–7.7)
NEUTROPHILS NFR BLD: 76.3 % (ref 38–73)
NRBC BLD-RTO: 0 /100 WBC
PLATELET # BLD AUTO: 282 K/UL (ref 150–350)
PMV BLD AUTO: 10.8 FL (ref 9.2–12.9)
POTASSIUM SERPL-SCNC: 3.7 MMOL/L (ref 3.5–5.1)
PROT SERPL-MCNC: 6.7 G/DL (ref 6–8.4)
RBC # BLD AUTO: 3.92 M/UL (ref 4–5.4)
SODIUM SERPL-SCNC: 138 MMOL/L (ref 136–145)
WBC # BLD AUTO: 10.77 K/UL (ref 3.9–12.7)

## 2020-02-07 PROCEDURE — 85379 FIBRIN DEGRADATION QUANT: CPT

## 2020-02-07 PROCEDURE — 99999 PR PBB SHADOW E&M-EST. PATIENT-LVL III: ICD-10-PCS | Mod: PBBFAC,,, | Performed by: ADVANCED PRACTICE MIDWIFE

## 2020-02-07 PROCEDURE — 36415 COLL VENOUS BLD VENIPUNCTURE: CPT

## 2020-02-07 PROCEDURE — 0502F SUBSEQUENT PRENATAL CARE: CPT | Mod: CPTII,S$GLB,, | Performed by: ADVANCED PRACTICE MIDWIFE

## 2020-02-07 PROCEDURE — 83010 ASSAY OF HAPTOGLOBIN QUANT: CPT

## 2020-02-07 PROCEDURE — 99999 PR PBB SHADOW E&M-EST. PATIENT-LVL III: CPT | Mod: PBBFAC,,, | Performed by: ADVANCED PRACTICE MIDWIFE

## 2020-02-07 PROCEDURE — 82728 ASSAY OF FERRITIN: CPT

## 2020-02-07 PROCEDURE — 0502F PR SUBSEQUENT PRENATAL CARE: ICD-10-PCS | Mod: CPTII,S$GLB,, | Performed by: ADVANCED PRACTICE MIDWIFE

## 2020-02-07 PROCEDURE — 99244 PR OFFICE CONSULTATION,LEVEL IV: ICD-10-PCS | Mod: S$GLB,,, | Performed by: INTERNAL MEDICINE

## 2020-02-07 PROCEDURE — 82607 VITAMIN B-12: CPT

## 2020-02-07 PROCEDURE — 82746 ASSAY OF FOLIC ACID SERUM: CPT

## 2020-02-07 PROCEDURE — 85025 COMPLETE CBC W/AUTO DIFF WBC: CPT

## 2020-02-07 PROCEDURE — 99999 PR PBB SHADOW E&M-EST. PATIENT-LVL III: ICD-10-PCS | Mod: PBBFAC,,, | Performed by: INTERNAL MEDICINE

## 2020-02-07 PROCEDURE — 80053 COMPREHEN METABOLIC PANEL: CPT

## 2020-02-07 PROCEDURE — 99244 OFF/OP CNSLTJ NEW/EST MOD 40: CPT | Mod: S$GLB,,, | Performed by: INTERNAL MEDICINE

## 2020-02-07 PROCEDURE — 83540 ASSAY OF IRON: CPT

## 2020-02-07 PROCEDURE — 83615 LACTATE (LD) (LDH) ENZYME: CPT

## 2020-02-07 PROCEDURE — 99999 PR PBB SHADOW E&M-EST. PATIENT-LVL III: CPT | Mod: PBBFAC,,, | Performed by: INTERNAL MEDICINE

## 2020-02-07 PROCEDURE — 84443 ASSAY THYROID STIM HORMONE: CPT

## 2020-02-07 NOTE — LETTER
February 7, 2020      Elizabeth Rosales CNM  [address]  [place] [address]           [facility]  [address]  [place] [address]  Phone: [phone]  Fax: [phone]          Patient: Anthony Sexton   MR Number: [id number]   YOB: 1990   Date of Visit: 2/7/2020       Dear Elizabeth Rosales:    Thank you for referring Anthony Sexton to me for evaluation. Attached you will find relevant portions of my assessment and plan of care.    If you have questions, please do not hesitate to call me. I look forward to following Anthony Sexton along with you.    Sincerely,    Teddy Leone MD    Enclosure  CC:  No Recipients    If you would like to receive this communication electronically, please contact [email or web address] or [phone] to request more information on [facility] Link access.    For providers and/or their staff who would like to refer a patient to [facility], please contact us through our one-stop-shop provider referral line, Angel Sahu, at [phone].    If you feel you have received this communication in error or would no longer like to receive these types of communications, please e-mail [email or web address]

## 2020-02-08 LAB
FERRITIN SERPL-MCNC: 20 NG/ML (ref 20–300)
FOLATE SERPL-MCNC: 6.9 NG/ML (ref 4–24)
HAPTOGLOB SERPL-MCNC: 221 MG/DL (ref 30–250)
IRON SERPL-MCNC: 202 UG/DL (ref 30–160)
SATURATED IRON: 41 % (ref 20–50)
TOTAL IRON BINDING CAPACITY: 496 UG/DL (ref 250–450)
TRANSFERRIN SERPL-MCNC: 335 MG/DL (ref 200–375)
TSH SERPL DL<=0.005 MIU/L-ACNC: 0.77 UIU/ML (ref 0.4–4)
VIT B12 SERPL-MCNC: 259 PG/ML (ref 210–950)

## 2020-02-21 ENCOUNTER — ROUTINE PRENATAL (OUTPATIENT)
Dept: OBSTETRICS AND GYNECOLOGY | Facility: CLINIC | Age: 30
End: 2020-02-21
Payer: COMMERCIAL

## 2020-02-21 VITALS
BODY MASS INDEX: 41.49 KG/M2 | SYSTOLIC BLOOD PRESSURE: 136 MMHG | WEIGHT: 257.06 LBS | DIASTOLIC BLOOD PRESSURE: 70 MMHG

## 2020-02-21 DIAGNOSIS — Z34.80 SUPERVISION OF OTHER NORMAL PREGNANCY: Primary | ICD-10-CM

## 2020-02-21 PROCEDURE — 90471 IMMUNIZATION ADMIN: CPT | Mod: S$GLB,,, | Performed by: OBSTETRICS & GYNECOLOGY

## 2020-02-21 PROCEDURE — 90715 TDAP VACCINE GREATER THAN OR EQUAL TO 7YO IM: ICD-10-PCS | Mod: S$GLB,,, | Performed by: OBSTETRICS & GYNECOLOGY

## 2020-02-21 PROCEDURE — 99999 PR PBB SHADOW E&M-EST. PATIENT-LVL III: CPT | Mod: PBBFAC,,, | Performed by: ADVANCED PRACTICE MIDWIFE

## 2020-02-21 PROCEDURE — 99999 PR PBB SHADOW E&M-EST. PATIENT-LVL III: ICD-10-PCS | Mod: PBBFAC,,, | Performed by: ADVANCED PRACTICE MIDWIFE

## 2020-02-21 PROCEDURE — 90471 TDAP VACCINE GREATER THAN OR EQUAL TO 7YO IM: ICD-10-PCS | Mod: S$GLB,,, | Performed by: OBSTETRICS & GYNECOLOGY

## 2020-02-21 PROCEDURE — 90715 TDAP VACCINE 7 YRS/> IM: CPT | Mod: S$GLB,,, | Performed by: OBSTETRICS & GYNECOLOGY

## 2020-02-21 PROCEDURE — 0502F SUBSEQUENT PRENATAL CARE: CPT | Mod: CPTII,S$GLB,, | Performed by: ADVANCED PRACTICE MIDWIFE

## 2020-02-21 PROCEDURE — 0502F PR SUBSEQUENT PRENATAL CARE: ICD-10-PCS | Mod: CPTII,S$GLB,, | Performed by: ADVANCED PRACTICE MIDWIFE

## 2020-02-21 NOTE — PROGRESS NOTES
BBP next visit  Reports frequent BH contractions  PTL precautions  Saw hematology will continue iron  Kick counts baby active

## 2020-02-21 NOTE — PROGRESS NOTES
Verified pt by two identifiers: name and date of birth.Allergies and medications reviewed.     Tdap Given IM to right deltoid using aseptic technique. No discomfort noted, pt tolerated well. Advised to wait 15 minutes in clinic to monitor. Patient verbalized understanding.

## 2020-03-06 ENCOUNTER — PROCEDURE VISIT (OUTPATIENT)
Dept: OBSTETRICS AND GYNECOLOGY | Facility: CLINIC | Age: 30
End: 2020-03-06
Payer: COMMERCIAL

## 2020-03-06 ENCOUNTER — ROUTINE PRENATAL (OUTPATIENT)
Dept: OBSTETRICS AND GYNECOLOGY | Facility: CLINIC | Age: 30
End: 2020-03-06
Payer: COMMERCIAL

## 2020-03-06 VITALS
BODY MASS INDEX: 41.13 KG/M2 | WEIGHT: 254.88 LBS | SYSTOLIC BLOOD PRESSURE: 114 MMHG | DIASTOLIC BLOOD PRESSURE: 68 MMHG

## 2020-03-06 DIAGNOSIS — Z34.80 SUPERVISION OF OTHER NORMAL PREGNANCY: ICD-10-CM

## 2020-03-06 DIAGNOSIS — Z36.89 ENCOUNTER FOR ULTRASOUND TO ASSESS FETAL GROWTH: ICD-10-CM

## 2020-03-06 DIAGNOSIS — O99.213 OBESITY COMPLICATING PREGNANCY IN THIRD TRIMESTER: ICD-10-CM

## 2020-03-06 DIAGNOSIS — O99.213 OBESITY AFFECTING PREGNANCY IN THIRD TRIMESTER: Primary | ICD-10-CM

## 2020-03-06 DIAGNOSIS — Z34.93 NORMAL PREGNANCY IN THIRD TRIMESTER: ICD-10-CM

## 2020-03-06 PROCEDURE — 76816 PR  US,PREGNANT UTERUS,F/U,TRANSABD APP: ICD-10-PCS | Mod: S$GLB,,, | Performed by: OBSTETRICS & GYNECOLOGY

## 2020-03-06 PROCEDURE — 99999 PR PBB SHADOW E&M-EST. PATIENT-LVL III: ICD-10-PCS | Mod: PBBFAC,,, | Performed by: ADVANCED PRACTICE MIDWIFE

## 2020-03-06 PROCEDURE — 76816 OB US FOLLOW-UP PER FETUS: CPT | Mod: S$GLB,,, | Performed by: OBSTETRICS & GYNECOLOGY

## 2020-03-06 PROCEDURE — 0502F SUBSEQUENT PRENATAL CARE: CPT | Mod: CPTII,S$GLB,, | Performed by: ADVANCED PRACTICE MIDWIFE

## 2020-03-06 PROCEDURE — 0502F PR SUBSEQUENT PRENATAL CARE: ICD-10-PCS | Mod: CPTII,S$GLB,, | Performed by: ADVANCED PRACTICE MIDWIFE

## 2020-03-06 PROCEDURE — 99999 PR PBB SHADOW E&M-EST. PATIENT-LVL III: CPT | Mod: PBBFAC,,, | Performed by: ADVANCED PRACTICE MIDWIFE

## 2020-03-19 ENCOUNTER — PATIENT MESSAGE (OUTPATIENT)
Dept: OBSTETRICS AND GYNECOLOGY | Facility: CLINIC | Age: 30
End: 2020-03-19

## 2020-03-20 ENCOUNTER — ROUTINE PRENATAL (OUTPATIENT)
Dept: OBSTETRICS AND GYNECOLOGY | Facility: CLINIC | Age: 30
End: 2020-03-20
Payer: COMMERCIAL

## 2020-03-20 VITALS
WEIGHT: 252.19 LBS | BODY MASS INDEX: 40.71 KG/M2 | SYSTOLIC BLOOD PRESSURE: 114 MMHG | DIASTOLIC BLOOD PRESSURE: 78 MMHG

## 2020-03-20 DIAGNOSIS — O09.93 SUPERVISION OF HIGH RISK PREGNANCY IN THIRD TRIMESTER: ICD-10-CM

## 2020-03-20 DIAGNOSIS — O99.213 OBESITY AFFECTING PREGNANCY IN THIRD TRIMESTER: Primary | ICD-10-CM

## 2020-03-20 PROCEDURE — 0502F SUBSEQUENT PRENATAL CARE: CPT | Mod: CPTII,S$GLB,, | Performed by: ADVANCED PRACTICE MIDWIFE

## 2020-03-20 PROCEDURE — 0502F PR SUBSEQUENT PRENATAL CARE: ICD-10-PCS | Mod: CPTII,S$GLB,, | Performed by: ADVANCED PRACTICE MIDWIFE

## 2020-03-20 PROCEDURE — 99999 PR PBB SHADOW E&M-EST. PATIENT-LVL III: CPT | Mod: PBBFAC,,, | Performed by: ADVANCED PRACTICE MIDWIFE

## 2020-03-20 PROCEDURE — 99999 PR PBB SHADOW E&M-EST. PATIENT-LVL III: ICD-10-PCS | Mod: PBBFAC,,, | Performed by: ADVANCED PRACTICE MIDWIFE

## 2020-03-24 ENCOUNTER — PATIENT MESSAGE (OUTPATIENT)
Dept: OBSTETRICS AND GYNECOLOGY | Facility: CLINIC | Age: 30
End: 2020-03-24

## 2020-03-25 ENCOUNTER — PATIENT MESSAGE (OUTPATIENT)
Dept: PSYCHIATRY | Facility: CLINIC | Age: 30
End: 2020-03-25

## 2020-03-26 ENCOUNTER — OFFICE VISIT (OUTPATIENT)
Dept: PSYCHIATRY | Facility: CLINIC | Age: 30
End: 2020-03-26
Payer: COMMERCIAL

## 2020-03-26 DIAGNOSIS — F41.1 GENERALIZED ANXIETY DISORDER: Primary | ICD-10-CM

## 2020-03-26 DIAGNOSIS — F33.1 MODERATE EPISODE OF RECURRENT MAJOR DEPRESSIVE DISORDER: ICD-10-CM

## 2020-03-26 PROCEDURE — 99214 OFFICE O/P EST MOD 30 MIN: CPT | Mod: 95,,, | Performed by: PSYCHIATRY & NEUROLOGY

## 2020-03-26 PROCEDURE — 99214 PR OFFICE/OUTPT VISIT, EST, LEVL IV, 30-39 MIN: ICD-10-PCS | Mod: 95,,, | Performed by: PSYCHIATRY & NEUROLOGY

## 2020-03-26 RX ORDER — HYDROXYZINE HYDROCHLORIDE 25 MG/1
25 TABLET, FILM COATED ORAL 3 TIMES DAILY PRN
Qty: 90 TABLET | Refills: 3 | Status: SHIPPED | OUTPATIENT
Start: 2020-03-26 | End: 2020-04-23

## 2020-03-26 RX ORDER — SERTRALINE HYDROCHLORIDE 100 MG/1
100 TABLET, FILM COATED ORAL DAILY
Qty: 30 TABLET | Refills: 11 | Status: SHIPPED | OUTPATIENT
Start: 2020-03-26 | End: 2020-03-27

## 2020-03-26 NOTE — PROGRESS NOTES
ESTABLISHED OUTPATIENT VISIT   E/M LEVEL 4: [id number]    ENCOUNTER DATE: 4/7/2020  SITE: [facility] [place].       HISTORY      CHIEF COMPLAINT   Anthony Sexton is a 29 y.o. female who presents for followup of anxiety disorder    HPI         The patient location is:  Patient Home   The chief complaint leading to consultation is: ANXIETY AND DEPRESSION   Visit type: Virtual visit with synchronous audio and video  Total time spent with patient: 20  Each patient to whom he or she provides medical services by telemedicine is:  (1) informed of the relationship between the physician and patient and the respective role of any other health care provider with respect to management of the patient; and (2) notified that he or she may decline to receive medical services by telemedicine and may withdraw from such care at any time.     Interval History and Content of Current Session:  Interim Events/Subjective Report/Content of Current Session:   Increased anxiety secondary to current cases of Corona, and worrying about exposure due to work.  36 weeks pregnant  History of postpartum depression  Currently on no medication  She was previously on SSRI-Prozac and clonazepam      Depressive Disorder: DENIES passive suicidal ideation, active suicidal ideation.  REPORTS depressed mood, sleep change, tired/fatigued, worthlessness.    Anxiety Disorder: panic attacks, sleep problems, excessive worry, avoidance symptoms.   Manic Disorder: DENIES racing thoughts, mixed with depression symptoms, reckless behavior., REPORTS racing thoughts, mixed with depression symptoms, reckless behavior.   Psychotic Disorder: DENIES delusions, hallucinations, overvalued ideas.   Substance Use:  DENIES cigarettes, alcohol, blackouts.           ROS   pertient symptoms in HPI    PFSH  Past Medical History reviewed: Yes  Family History reviewed: Yes  Social History reviewed: Yes    Allergies:   Review of patient's allergies indicates:  No Known  Allergies     PSYCHOTROPIC MEDICATIONS   Current Outpatient Medications on File Prior to Visit   Medication Sig Dispense Refill    acetaminophen (TYLENOL) 500 MG tablet Take 500 mg by mouth 2 (two) times daily.      ergocalciferol (ERGOCALCIFEROL) 50,000 unit Cap Take 2 capsules (100,000 Units total) by mouth every 7 days. 8 capsule 5    ferrous sulfate 325 (65 FE) MG EC tablet Take 1 tablet (325 mg total) by mouth 2 (two) times daily. 60 tablet 3    fluticasone propionate (FLONASE) 50 mcg/actuation nasal spray 2 sprays (100 mcg total) by Each Nostril route daily as needed. 16 g 5    levonorgestreL (MIRENA) 20 mcg/24 hours (5 yrs) 52 mg IUD 1 Intra Uterine Device by Intrauterine route once. for 1 dose 1 each 0    metoclopramide HCl (REGLAN) 10 MG tablet Take 1 tablet (10 mg total) by mouth every 6 (six) hours as needed (vomiting). 30 tablet 0    ondansetron (ZOFRAN) 4 MG tablet Take 1 tablet (4 mg total) by mouth every 6 (six) hours as needed for Nausea. 30 tablet 1    pantoprazole (PROTONIX) 40 MG tablet Take 1 tablet (40 mg total) by mouth 2 (two) times daily. 60 tablet 5    prenatal vits62/FA/om3/dha/epa (PRENATAL GUMMY ORAL) Take 1 Dose by mouth.      promethazine (PHENERGAN) 25 MG tablet Take 1 tablet (25 mg total) by mouth every 6 (six) hours as needed for Nausea. 30 tablet 3     No current facility-administered medications on file prior to visit.          EXAMINATION    [unfilled]  There were no vitals filed for this visit.     Wt Readings from Last 2 Encounters:   04/03/20 114.5 kg (252 lb 6.8 oz)   03/20/20 114.4 kg (252 lb 3.3 oz)         CONSTITUTIONAL  General Appearance: well groomed    MUSCULOSKELETAL  No involuntary muscle movements  Normal gait    PSYCHIATRIC   Mental Status Exam:  Appearance: unremarkable, age appropriate  Behavior/Cooperation: appopriate normal, cooperative  Speech:  normal tone, normal rate, normal pitch, normal volume  Language: grossly intact with spontaneous speech  Mood:  Depressed mood, lack of motivation,   Affect:  congruent with mood, restricted  Thought Process:linear, logical and gaol directed.   Thought Content: no suicidality, no homicidality, no delusions, nor paranoia  Sensorium:  Awake  Alert and Oriented: x3 person, place, situation, no time/date  Memory:    Recent:  Intact; able to report recent events  Attention/concentration: appropriate for age/education.   Insight:Intact  Judgment:  Intact      MEDICAL DECISION MAKING    DIAGNOSES  Encounter Diagnoses   Name Primary?    Generalized anxiety disorder Yes    Moderate episode of recurrent major depressive disorder          PROBLEM LIST AND MANAGEMENT PLANS         Orders Placed This Encounter    hydroxyzine HCL (ATARAX) 25 MG tablet    sertraline (ZOLOFT) 50 MG tablet           PRESCRIPTION DRUG MANAGEMENT  Compliance: YES  Side Effects: YES  Regimen Adjustments: YES           SSRI  R/B/SE of SSRI reviewed with patient including gi distress, sexual dysfunction, and serotonin syndrome.  Black box warning of increased risk of worsening depression and SI reviewed.  Patient verbalized understanding.    DIAGNOSTIC TESTING  Encounter Diagnoses   Name Primary?    Generalized anxiety disorder Yes    Moderate episode of recurrent major depressive disorder            -Patient was educated on possible side-effects of medications  -Discussed 911 for suicidal or homicidal ideation, worsening symptoms, or adverse reactions to medications  -Patient will contact clinic with any questions or concerns    Lenora Walters

## 2020-03-27 ENCOUNTER — PATIENT MESSAGE (OUTPATIENT)
Dept: PSYCHIATRY | Facility: CLINIC | Age: 30
End: 2020-03-27

## 2020-03-27 RX ORDER — SERTRALINE HYDROCHLORIDE 50 MG/1
50 TABLET, FILM COATED ORAL DAILY
Qty: 30 TABLET | Refills: 11 | Status: SHIPPED | OUTPATIENT
Start: 2020-03-27 | End: 2020-04-20

## 2020-04-03 ENCOUNTER — PROCEDURE VISIT (OUTPATIENT)
Dept: OBSTETRICS AND GYNECOLOGY | Facility: CLINIC | Age: 30
End: 2020-04-03
Payer: COMMERCIAL

## 2020-04-03 ENCOUNTER — ROUTINE PRENATAL (OUTPATIENT)
Dept: OBSTETRICS AND GYNECOLOGY | Facility: CLINIC | Age: 30
End: 2020-04-03
Payer: COMMERCIAL

## 2020-04-03 VITALS
WEIGHT: 252.44 LBS | SYSTOLIC BLOOD PRESSURE: 118 MMHG | DIASTOLIC BLOOD PRESSURE: 74 MMHG | BODY MASS INDEX: 40.74 KG/M2

## 2020-04-03 DIAGNOSIS — O99.213 OBESITY AFFECTING PREGNANCY IN THIRD TRIMESTER: ICD-10-CM

## 2020-04-03 DIAGNOSIS — Z34.93 NORMAL PREGNANCY IN THIRD TRIMESTER: ICD-10-CM

## 2020-04-03 DIAGNOSIS — O99.213 OBESITY AFFECTING PREGNANCY IN THIRD TRIMESTER: Primary | ICD-10-CM

## 2020-04-03 PROCEDURE — 0502F SUBSEQUENT PRENATAL CARE: CPT | Mod: CPTII,S$GLB,, | Performed by: ADVANCED PRACTICE MIDWIFE

## 2020-04-03 PROCEDURE — 76816 OB US FOLLOW-UP PER FETUS: CPT | Mod: S$GLB,,, | Performed by: OBSTETRICS & GYNECOLOGY

## 2020-04-03 PROCEDURE — 87081 CULTURE SCREEN ONLY: CPT

## 2020-04-03 PROCEDURE — 0502F PR SUBSEQUENT PRENATAL CARE: ICD-10-PCS | Mod: CPTII,S$GLB,, | Performed by: ADVANCED PRACTICE MIDWIFE

## 2020-04-03 PROCEDURE — 99999 PR PBB SHADOW E&M-EST. PATIENT-LVL III: ICD-10-PCS | Mod: PBBFAC,,, | Performed by: ADVANCED PRACTICE MIDWIFE

## 2020-04-03 PROCEDURE — 76819 PR US, OB, FETAL BIOPHYSICAL, W/O NST: ICD-10-PCS | Mod: S$GLB,,, | Performed by: OBSTETRICS & GYNECOLOGY

## 2020-04-03 PROCEDURE — 76819 FETAL BIOPHYS PROFIL W/O NST: CPT | Mod: S$GLB,,, | Performed by: OBSTETRICS & GYNECOLOGY

## 2020-04-03 PROCEDURE — 99999 PR PBB SHADOW E&M-EST. PATIENT-LVL III: CPT | Mod: PBBFAC,,, | Performed by: ADVANCED PRACTICE MIDWIFE

## 2020-04-03 PROCEDURE — 76816 PR  US,PREGNANT UTERUS,F/U,TRANSABD APP: ICD-10-PCS | Mod: S$GLB,,, | Performed by: OBSTETRICS & GYNECOLOGY

## 2020-04-06 LAB — BACTERIA SPEC AEROBE CULT: NORMAL

## 2020-04-14 ENCOUNTER — PATIENT MESSAGE (OUTPATIENT)
Dept: OBSTETRICS AND GYNECOLOGY | Facility: CLINIC | Age: 30
End: 2020-04-14

## 2020-04-15 ENCOUNTER — PATIENT MESSAGE (OUTPATIENT)
Dept: OBSTETRICS AND GYNECOLOGY | Facility: CLINIC | Age: 30
End: 2020-04-15

## 2020-04-15 ENCOUNTER — TELEPHONE (OUTPATIENT)
Dept: OBSTETRICS AND GYNECOLOGY | Facility: CLINIC | Age: 30
End: 2020-04-15

## 2020-04-15 NOTE — TELEPHONE ENCOUNTER
----- Message from Nolan Paz sent at 4/15/2020  3:38 PM CDT -----  Contact: Pt   Type:  Patient Returning Call    Who Called:Anthony Sexton  Who Left Message for Patient:  Does the patient know what this is regarding?:  Would the patient rather a call back or a response via [facility]? Call Back  Best Call Back Number:[phone] (home)   Additional Information:

## 2020-04-16 ENCOUNTER — ANESTHESIA (OUTPATIENT)
Dept: OBSTETRICS AND GYNECOLOGY | Facility: HOSPITAL | Age: 30
End: 2020-04-16
Payer: COMMERCIAL

## 2020-04-16 ENCOUNTER — ANESTHESIA EVENT (OUTPATIENT)
Dept: OBSTETRICS AND GYNECOLOGY | Facility: HOSPITAL | Age: 30
End: 2020-04-16
Payer: COMMERCIAL

## 2020-04-16 ENCOUNTER — HOSPITAL ENCOUNTER (INPATIENT)
Facility: HOSPITAL | Age: 30
LOS: 2 days | Discharge: HOME | End: 2020-04-18
Attending: OBSTETRICS & GYNECOLOGY | Admitting: OBSTETRICS & GYNECOLOGY
Payer: COMMERCIAL

## 2020-04-16 ENCOUNTER — ROUTINE PRENATAL (OUTPATIENT)
Dept: OBSTETRICS AND GYNECOLOGY | Facility: CLINIC | Age: 30
End: 2020-04-16
Payer: COMMERCIAL

## 2020-04-16 VITALS
BODY MASS INDEX: 40.28 KG/M2 | DIASTOLIC BLOOD PRESSURE: 72 MMHG | SYSTOLIC BLOOD PRESSURE: 116 MMHG | WEIGHT: 249.56 LBS

## 2020-04-16 DIAGNOSIS — Z34.93 NORMAL PREGNANCY IN THIRD TRIMESTER: Primary | ICD-10-CM

## 2020-04-16 DIAGNOSIS — Z37.9 NORMAL LABOR: ICD-10-CM

## 2020-04-16 DIAGNOSIS — O99.213 OBESITY AFFECTING PREGNANCY IN THIRD TRIMESTER: ICD-10-CM

## 2020-04-16 PROBLEM — A53.0 POSITIVE RPR TEST: Status: ACTIVE | Noted: 2020-04-16

## 2020-04-16 LAB
ABO + RH BLD: NORMAL
BASOPHILS # BLD AUTO: 0.02 K/UL (ref 0–0.2)
BASOPHILS NFR BLD: 0.2 % (ref 0–1.9)
BLD GP AB SCN CELLS X3 SERPL QL: NORMAL
DIFFERENTIAL METHOD: ABNORMAL
EOSINOPHIL # BLD AUTO: 0 K/UL (ref 0–0.5)
EOSINOPHIL NFR BLD: 0.1 % (ref 0–8)
ERYTHROCYTE [DISTWIDTH] IN BLOOD BY AUTOMATED COUNT: 15.9 % (ref 11.5–14.5)
HCT VFR BLD AUTO: 33.4 % (ref 37–48.5)
HGB BLD-MCNC: 10.1 G/DL (ref 12–16)
HIV1+2 IGG SERPL QL IA.RAPID: NORMAL
IMM GRANULOCYTES # BLD AUTO: 0.03 K/UL (ref 0–0.04)
IMM GRANULOCYTES NFR BLD AUTO: 0.3 % (ref 0–0.5)
LYMPHOCYTES # BLD AUTO: 1.5 K/UL (ref 1–4.8)
LYMPHOCYTES NFR BLD: 14.1 % (ref 18–48)
MCH RBC QN AUTO: 23.7 PG (ref 27–31)
MCHC RBC AUTO-ENTMCNC: 30.2 G/DL (ref 32–36)
MCV RBC AUTO: 78 FL (ref 82–98)
MONOCYTES # BLD AUTO: 0.5 K/UL (ref 0.3–1)
MONOCYTES NFR BLD: 4.4 % (ref 4–15)
NEUTROPHILS # BLD AUTO: 8.9 K/UL (ref 1.8–7.7)
NEUTROPHILS NFR BLD: 80.9 % (ref 38–73)
NRBC BLD-RTO: 0 /100 WBC
PLATELET # BLD AUTO: 276 K/UL (ref 150–350)
PMV BLD AUTO: 11.4 FL (ref 9.2–12.9)
RBC # BLD AUTO: 4.26 M/UL (ref 4–5.4)
RPR SER QL: REACTIVE
RPR SER-TITR: ABNORMAL {TITER}
SARS-COV-2 RDRP RESP QL NAA+PROBE: NEGATIVE
WBC # BLD AUTO: 10.94 K/UL (ref 3.9–12.7)

## 2020-04-16 PROCEDURE — 86901 BLOOD TYPING SEROLOGIC RH(D): CPT

## 2020-04-16 PROCEDURE — 59400 PR FULL ROUT OBSTE CARE,VAGINAL DELIV: ICD-10-PCS | Mod: AT,,, | Performed by: MIDWIFE

## 2020-04-16 PROCEDURE — 27200710 HC EPIDURAL INFUSION PUMP SET: Performed by: ANESTHESIOLOGY

## 2020-04-16 PROCEDURE — 0502F SUBSEQUENT PRENATAL CARE: CPT | Mod: CPTII,S$GLB,, | Performed by: ADVANCED PRACTICE MIDWIFE

## 2020-04-16 PROCEDURE — 63600175 PHARM REV CODE 636 W HCPCS: Performed by: MIDWIFE

## 2020-04-16 PROCEDURE — 99211 OFF/OP EST MAY X REQ PHY/QHP: CPT | Mod: 25

## 2020-04-16 PROCEDURE — 72100003 HC LABOR CARE, EA. ADDL. 8 HRS

## 2020-04-16 PROCEDURE — 86703 HIV-1/HIV-2 1 RESULT ANTBDY: CPT

## 2020-04-16 PROCEDURE — 27800516 HC TRAY, EPIDURAL COMBO: Performed by: ANESTHESIOLOGY

## 2020-04-16 PROCEDURE — 86593 SYPHILIS TEST NON-TREP QUANT: CPT

## 2020-04-16 PROCEDURE — 11000001 HC ACUTE MED/SURG PRIVATE ROOM

## 2020-04-16 PROCEDURE — 63600175 PHARM REV CODE 636 W HCPCS: Mod: JG | Performed by: OBSTETRICS & GYNECOLOGY

## 2020-04-16 PROCEDURE — 72100002 HC LABOR CARE, 1ST 8 HOURS

## 2020-04-16 PROCEDURE — 59025 FETAL NON-STRESS TEST: CPT

## 2020-04-16 PROCEDURE — 36415 COLL VENOUS BLD VENIPUNCTURE: CPT

## 2020-04-16 PROCEDURE — 62326 NJX INTERLAMINAR LMBR/SAC: CPT | Performed by: NURSE ANESTHETIST, CERTIFIED REGISTERED

## 2020-04-16 PROCEDURE — 25000003 PHARM REV CODE 250: Performed by: NURSE ANESTHETIST, CERTIFIED REGISTERED

## 2020-04-16 PROCEDURE — 51701 INSERT BLADDER CATHETER: CPT

## 2020-04-16 PROCEDURE — 59400 OBSTETRICAL CARE: CPT | Mod: AT,,, | Performed by: MIDWIFE

## 2020-04-16 PROCEDURE — 0502F PR SUBSEQUENT PRENATAL CARE: ICD-10-PCS | Mod: CPTII,S$GLB,, | Performed by: ADVANCED PRACTICE MIDWIFE

## 2020-04-16 PROCEDURE — 99999 PR PBB SHADOW E&M-EST. PATIENT-LVL III: CPT | Mod: PBBFAC,,, | Performed by: ADVANCED PRACTICE MIDWIFE

## 2020-04-16 PROCEDURE — 99999 PR PBB SHADOW E&M-EST. PATIENT-LVL III: ICD-10-PCS | Mod: PBBFAC,,, | Performed by: ADVANCED PRACTICE MIDWIFE

## 2020-04-16 PROCEDURE — U0002 COVID-19 LAB TEST NON-CDC: HCPCS

## 2020-04-16 PROCEDURE — 86592 SYPHILIS TEST NON-TREP QUAL: CPT

## 2020-04-16 PROCEDURE — 85025 COMPLETE CBC W/AUTO DIFF WBC: CPT

## 2020-04-16 PROCEDURE — 86780 TREPONEMA PALLIDUM: CPT

## 2020-04-16 PROCEDURE — 72200004 HC VAGINAL DELIVERY LEVEL I

## 2020-04-16 PROCEDURE — 63600175 PHARM REV CODE 636 W HCPCS: Performed by: NURSE ANESTHETIST, CERTIFIED REGISTERED

## 2020-04-16 RX ORDER — ROPIVACAINE HYDROCHLORIDE 2 MG/ML
INJECTION, SOLUTION EPIDURAL; INFILTRATION; PERINEURAL
Status: DISCONTINUED | OUTPATIENT
Start: 2020-04-16 | End: 2020-04-17

## 2020-04-16 RX ORDER — OXYTOCIN/RINGER'S LACTATE 30/500 ML
41.7 PLASTIC BAG, INJECTION (ML) INTRAVENOUS CONTINUOUS
Status: ACTIVE | OUTPATIENT
Start: 2020-04-16 | End: 2020-04-16

## 2020-04-16 RX ORDER — LIDOCAINE HYDROCHLORIDE AND EPINEPHRINE 15; 5 MG/ML; UG/ML
INJECTION, SOLUTION EPIDURAL
Status: DISCONTINUED | OUTPATIENT
Start: 2020-04-16 | End: 2020-04-17

## 2020-04-16 RX ORDER — ROPIVACAINE HYDROCHLORIDE 2 MG/ML
INJECTION, SOLUTION EPIDURAL; INFILTRATION; PERINEURAL CONTINUOUS PRN
Status: DISCONTINUED | OUTPATIENT
Start: 2020-04-16 | End: 2020-04-17

## 2020-04-16 RX ORDER — SODIUM CHLORIDE, SODIUM LACTATE, POTASSIUM CHLORIDE, CALCIUM CHLORIDE 600; 310; 30; 20 MG/100ML; MG/100ML; MG/100ML; MG/100ML
INJECTION, SOLUTION INTRAVENOUS CONTINUOUS
Status: CANCELLED | OUTPATIENT
Start: 2020-04-16

## 2020-04-16 RX ORDER — OXYTOCIN/RINGER'S LACTATE 30/500 ML
41.7 PLASTIC BAG, INJECTION (ML) INTRAVENOUS CONTINUOUS
Status: CANCELLED | OUTPATIENT
Start: 2020-04-16 | End: 2020-04-16

## 2020-04-16 RX ORDER — CALCIUM CARBONATE 200(500)MG
500 TABLET,CHEWABLE ORAL 3 TIMES DAILY PRN
Status: DISCONTINUED | OUTPATIENT
Start: 2020-04-16 | End: 2020-04-17

## 2020-04-16 RX ORDER — ONDANSETRON 8 MG/1
8 TABLET, ORALLY DISINTEGRATING ORAL EVERY 8 HOURS PRN
Status: DISCONTINUED | OUTPATIENT
Start: 2020-04-16 | End: 2020-04-17

## 2020-04-16 RX ORDER — OXYTOCIN/RINGER'S LACTATE 30/500 ML
333 PLASTIC BAG, INJECTION (ML) INTRAVENOUS CONTINUOUS
Status: CANCELLED | OUTPATIENT
Start: 2020-04-16 | End: 2020-04-16

## 2020-04-16 RX ORDER — SIMETHICONE 80 MG
1 TABLET,CHEWABLE ORAL 4 TIMES DAILY PRN
Status: DISCONTINUED | OUTPATIENT
Start: 2020-04-16 | End: 2020-04-17

## 2020-04-16 RX ORDER — CALCIUM CARBONATE 200(500)MG
500 TABLET,CHEWABLE ORAL 3 TIMES DAILY PRN
Status: CANCELLED | OUTPATIENT
Start: 2020-04-16

## 2020-04-16 RX ORDER — ONDANSETRON 8 MG/1
8 TABLET, ORALLY DISINTEGRATING ORAL EVERY 8 HOURS PRN
Status: CANCELLED | OUTPATIENT
Start: 2020-04-16

## 2020-04-16 RX ORDER — SIMETHICONE 80 MG
1 TABLET,CHEWABLE ORAL 4 TIMES DAILY PRN
Status: CANCELLED | OUTPATIENT
Start: 2020-04-16

## 2020-04-16 RX ORDER — SODIUM CHLORIDE 9 MG/ML
INJECTION, SOLUTION INTRAVENOUS
Status: CANCELLED | OUTPATIENT
Start: 2020-04-16

## 2020-04-16 RX ORDER — SODIUM CHLORIDE 9 MG/ML
INJECTION, SOLUTION INTRAVENOUS
Status: DISCONTINUED | OUTPATIENT
Start: 2020-04-16 | End: 2020-04-17

## 2020-04-16 RX ORDER — SODIUM CHLORIDE, SODIUM LACTATE, POTASSIUM CHLORIDE, CALCIUM CHLORIDE 600; 310; 30; 20 MG/100ML; MG/100ML; MG/100ML; MG/100ML
INJECTION, SOLUTION INTRAVENOUS CONTINUOUS
Status: DISCONTINUED | OUTPATIENT
Start: 2020-04-16 | End: 2020-04-17

## 2020-04-16 RX ORDER — MISOPROSTOL 200 UG/1
600 TABLET ORAL
Status: DISCONTINUED | OUTPATIENT
Start: 2020-04-16 | End: 2020-04-17

## 2020-04-16 RX ORDER — OXYTOCIN/RINGER'S LACTATE 30/500 ML
333 PLASTIC BAG, INJECTION (ML) INTRAVENOUS CONTINUOUS
Status: ACTIVE | OUTPATIENT
Start: 2020-04-16 | End: 2020-04-16

## 2020-04-16 RX ORDER — MISOPROSTOL 200 UG/1
600 TABLET ORAL
Status: CANCELLED | OUTPATIENT
Start: 2020-04-16

## 2020-04-16 RX ADMIN — PENICILLIN G BENZATHINE 2.4 MILLION UNITS: 2400000 INJECTION, SUSPENSION INTRAMUSCULAR at 06:04

## 2020-04-16 RX ADMIN — SODIUM CHLORIDE, POTASSIUM CHLORIDE, SODIUM LACTATE AND CALCIUM CHLORIDE 500 ML: 600; 310; 30; 20 INJECTION, SOLUTION INTRAVENOUS at 10:04

## 2020-04-16 RX ADMIN — ROPIVACAINE HYDROCHLORIDE 5 ML: 2 INJECTION, SOLUTION EPIDURAL; INFILTRATION at 07:04

## 2020-04-16 RX ADMIN — ROPIVACAINE HYDROCHLORIDE 14 ML/HR: 2 INJECTION, SOLUTION EPIDURAL; INFILTRATION at 07:04

## 2020-04-16 RX ADMIN — LIDOCAINE HYDROCHLORIDE,EPINEPHRINE BITARTRATE 3 ML: 15; .005 INJECTION, SOLUTION EPIDURAL; INFILTRATION; INTRACAUDAL; PERINEURAL at 07:04

## 2020-04-16 NOTE — PROGRESS NOTES
+ RPR with 1:2 ratio. Discussed with Dr. SILVER Siu, as patient is low risk for exposure. Recommendation is to give dose of IM Bicillin. Only 1 dose, as RPR was negative 2 months ago. Discussed findings and plan of care with patient. Agrees to receiving dose of Bicillin while waiting for FTA antibodies results. Nurse instructed to notify peds.

## 2020-04-16 NOTE — ANESTHESIA PREPROCEDURE EVALUATION
04/16/2020  Anthony Sexton is a 29 y.o., female.    Anesthesia Evaluation    I have reviewed the Patient Summary Reports.    I have reviewed the Nursing Notes.   I have reviewed the Medications.     Review of Systems  Anesthesia Hx:  No previous Anesthesia    Social:  Non-Smoker    Hematology/Oncology:  Hematology Normal   Oncology Normal     EENT/Dental:EENT/Dental Normal   Cardiovascular:   Exercise tolerance: good NYHA Classification I    Pulmonary:   Shortness of breath    Renal/:  Renal/ Normal     Hepatic/GI:  Hepatic/GI Normal    Musculoskeletal:  Musculoskeletal Normal    Neurological:  Neurology Normal    Endocrine:  Endocrine Normal    Dermatological:  Skin Normal    Psych:  Psychiatric Normal           Physical Exam  General:  Well nourished, Obesity    Airway/Jaw/Neck:  Airway Findings: Mouth Opening: Normal Tongue: Normal  General Airway Assessment: Adult  Mallampati: II  Improves to I with phonation.  TM Distance: Normal, at least 6 cm        Eyes/Ears/Nose:  EYES/EARS/NOSE FINDINGS: Normal   Dental:  DENTAL FINDINGS: Normal   Chest/Lungs:  Chest/Lungs Findings: Clear to auscultation, Normal Respiratory Rate     Heart/Vascular:  Heart Findings: Rate: Normal  Rhythm: Regular Rhythm     Abdomen:  Abdomen Findings: Normal    Musculoskeletal:  Musculoskeletal Findings: Normal   Skin:  Skin Findings: Normal    Mental Status:  Mental Status Findings:  Cooperative, Alert and Oriented         Anesthesia Plan  Type of Anesthesia, risks & benefits discussed:  Anesthesia Type:  epidural  Patient's Preference:   Intra-op Monitoring Plan:   Intra-op Monitoring Plan Comments:   Post Op Pain Control Plan: multimodal analgesia  Post Op Pain Control Plan Comments:   Induction:    Beta Blocker:  Patient is not currently on a Beta-Blocker (No further documentation required).       Informed Consent: Patient  understands risks and agrees with Anesthesia plan.  Questions answered. Anesthesia consent signed with patient.  ASA Score: 2     Day of Surgery Review of History & Physical:    H&P update referred to the surgeon.         Ready For Surgery From Anesthesia Perspective.

## 2020-04-16 NOTE — SUBJECTIVE & OBJECTIVE
Obstetric HPI:  Patient reports painful contractions, active fetal movement, Yes vaginal bleeding (bloody show) , No loss of fluid     This pregnancy has been complicated by morbid obesity, & iron deficiency anemia (on iron supplement).    OB History    Para Term  AB Living   2 1 1 0 0 1   SAB TAB Ectopic Multiple Live Births   0 0 0 0 1      # Outcome Date GA Lbr Tre[place]/2nd Weight Sex Delivery Anes PTL Lv   2 Current            1 Term 16 37w0d  3.289 kg (7 lb 4 oz) F Vag-Spont   DANIELLE      Complications: Severe hyperemesis gravidarum     Past Medical History:   Diagnosis Date    Anemia     Chronic low back pain     History of abnormal cervical Pap smear     History of recurrent UTIs     History of ulcer disease     Irregular menses     Obesity     Vitamin D deficiency      Past Surgical History:   Procedure Laterality Date    None         PTA Medications   Medication Sig    acetaminophen (TYLENOL) 500 MG tablet Take 500 mg by mouth 2 (two) times daily.    ergocalciferol (ERGOCALCIFEROL) 50,000 unit Cap Take 2 capsules (100,000 Units total) by mouth every 7 days.    ferrous sulfate 325 (65 FE) MG EC tablet Take 1 tablet (325 mg total) by mouth 2 (two) times daily. (Patient not taking: Reported on 2020)    fluticasone propionate (FLONASE) 50 mcg/actuation nasal spray 2 sprays (100 mcg total) by Each Nostril route daily as needed.    hydroxyzine HCL (ATARAX) 25 MG tablet Take 1 tablet (25 mg total) by mouth 3 (three) times daily as needed for Itching. (Patient not taking: Reported on 2020)    levonorgestreL (MIRENA) 20 mcg/24 hours (5 yrs) 52 mg IUD 1 Intra Uterine Device by Intrauterine route once. for 1 dose    metoclopramide HCl (REGLAN) 10 MG tablet Take 1 tablet (10 mg total) by mouth every 6 (six) hours as needed (vomiting). (Patient not taking: Reported on 2020)    ondansetron (ZOFRAN) 4 MG tablet Take 1 tablet (4 mg total) by mouth every 6 (six) hours as needed  for Nausea.    pantoprazole (PROTONIX) 40 MG tablet Take 1 tablet (40 mg total) by mouth 2 (two) times daily.    prenatal vits62/FA/om3/dha/epa (PRENATAL GUMMY ORAL) Take 1 Dose by mouth.    promethazine (PHENERGAN) 25 MG tablet Take 1 tablet (25 mg total) by mouth every 6 (six) hours as needed for Nausea.    sertraline (ZOLOFT) 50 MG tablet Take 1 tablet (50 mg total) by mouth once daily.       Review of patient's allergies indicates:  No Known Allergies     Family History     Problem Relation (Age of Onset)    Asthma Mother    Breast cancer Maternal Aunt    Diabetes Maternal Aunt, Maternal Grandmother    Heart disease Father (55)    Hyperlipidemia Mother, Father    Hypertension Mother, Father    Insulin resistance Sister    No Known Problems Sister, Brother, Brother, Brother, Brother, Brother, Daughter        Tobacco Use    Smoking status: Never Smoker    Smokeless tobacco: Never Used   Substance and Sexual Activity    Alcohol use: Not Currently     Alcohol/week: 0.0 standard drinks    Drug use: No    Sexual activity: Yes     Partners: Male     Birth control/protection: None     Review of Systems   Constitutional: Negative.    Eyes: Negative.    Respiratory: Negative.    Cardiovascular: Positive for leg swelling.   Gastrointestinal: Positive for abdominal pain. Negative for vomiting.        Abdominal pain consistent with contractions   Genitourinary: Positive for vaginal bleeding. Negative for vaginal discharge.        Reports small amount of bloody show   Neurological: Negative.  Negative for headaches.   All other systems reviewed and are negative.     Objective:     Vital Signs (Most Recent):  Temp: 98.4 °F (36.9 °C) (04/16/20 1452)  Pulse: 107 (04/16/20 1601)  BP: 105/75 (04/16/20 1601)  SpO2: 99 % (04/16/20 1559) Vital Signs (24h Range):  Temp:  [98.4 °F (36.9 °C)] 98.4 °F (36.9 °C)  Pulse:  [] 107  SpO2:  [99 %-100 %] 99 %  BP: (105-134)/(72-76) 105/75        There is no height or weight on  file to calculate BMI.    FHT: Cat 1 (reassuring) accels present, no decels  TOCO:  Q 3-5 minutes    Physical Exam:   Constitutional: She is oriented to person, place, and time. She appears well-developed and well-nourished.    HENT:   Head: Normocephalic and atraumatic.    Eyes: Conjunctivae and EOM are normal.    Neck: Normal range of motion.    Cardiovascular: Normal rate, regular rhythm and normal heart sounds.  Exam reveals edema.    +1 pitting edema to LE    Pulmonary/Chest: Effort normal and breath sounds normal. No respiratory distress.        Abdominal: Soft. Bowel sounds are normal. She exhibits no distension. There is no tenderness.     Genitourinary: Vagina normal and uterus normal.           Musculoskeletal: Normal range of motion and moves all extremeties.       Neurological: She is alert and oriented to person, place, and time. She has normal reflexes. She displays normal reflexes.    Skin: Skin is warm and dry.    Psychiatric: She has a normal mood and affect. Her behavior is normal. Judgment and thought content normal.       Cervix:  Dilation:  5-6  Effacement:  75%  Station: -2  Presentation: Vertex per RN exam     Significant Labs:  Lab Results   Component Value Date    GROUPTRH B POS 09/09/2019    HEPBSAG Negative 09/09/2019    STREPBCULT No Group B Streptococcus isolated 04/03/2020       I have personallly reviewed all pertinent lab results from the last 24 hours.

## 2020-04-16 NOTE — PROGRESS NOTES
Lots of back pain Having frequent contractions Will begin leave today  cx favorable for labor Home to await labor  Will bounce on birth ball and walk  Kick counts baby active

## 2020-04-16 NOTE — LACTATION NOTE
Discussed practices that support optimal maternity care and  feeding such as immediate skin to skin, the magic first hour, the importance of the first feeding, and delaying routine procedures. Also discussed continued skin to skin contact, rooming-in, and feeding on cue. Discussed feeding choice with mother. Reviewed benefits of breastfeeding and risks of formula feeding. Mother states her intention is to breastfeed

## 2020-04-16 NOTE — HOSPITAL COURSE
Admit to L&D for management of labor  Patient desires epidural, OK on request  Treated for +RPR, FTA antibodies pending  AROM clear fluid  4/17/20- PPD#1 Routine pp care. VSSAF, protonix added as h/o GERD  4/18/2020--PPD#2, Discharge instructions reviewed including PPD, PIH S&S, bleeding precautions and pain management

## 2020-04-16 NOTE — PLAN OF CARE
Pt ambulating the halls to illicit a regular contraction pattern.  supporting pt with ambulation. IV saline lock L wrist. No needs at this time.

## 2020-04-16 NOTE — H&P
[facility] -   Obstetrics  History & Physical    Patient Name: Anthony Sexton  MRN: [id number]  Admission Date: 2020  Primary Care Provider: Tyesha Leggett MD    Subjective:     Principal Problem:Normal labor    History of Present Illness:   38.2 to L&D with c/o contractions. Was 4 cm in office today, now 5-6 cm.     Obstetric HPI:  Patient reports painful contractions, active fetal movement, Yes vaginal bleeding (bloody show) , No loss of fluid     This pregnancy has been complicated by morbid obesity, & iron deficiency anemia (on iron supplement).    OB History    Para Term  AB Living   2 1 1 0 0 1   SAB TAB Ectopic Multiple Live Births   0 0 0 0 1      # Outcome Date GA Lbr Trever/2nd Weight Sex Delivery Anes PTL Lv   2 Current            1 Term 16 37w0d  3.289 kg (7 lb 4 oz) F Vag-Spont   DANIELLE      Complications: Severe hyperemesis gravidarum     Past Medical History:   Diagnosis Date    Anemia     Chronic low back pain     History of abnormal cervical Pap smear     History of recurrent UTIs     History of ulcer disease     Irregular menses     Obesity     Vitamin D deficiency      Past Surgical History:   Procedure Laterality Date    None         PTA Medications   Medication Sig    acetaminophen (TYLENOL) 500 MG tablet Take 500 mg by mouth 2 (two) times daily.    ergocalciferol (ERGOCALCIFEROL) 50,000 unit Cap Take 2 capsules (100,000 Units total) by mouth every 7 days.    ferrous sulfate 325 (65 FE) MG EC tablet Take 1 tablet (325 mg total) by mouth 2 (two) times daily. (Patient not taking: Reported on 2020)    fluticasone propionate (FLONASE) 50 mcg/actuation nasal spray 2 sprays (100 mcg total) by Each Nostril route daily as needed.    hydroxyzine HCL (ATARAX) 25 MG tablet Take 1 tablet (25 mg total) by mouth 3 (three) times daily as needed for Itching. (Patient not taking: Reported on 2020)    levonorgestreL (MIRENA) 20 mcg/24 hours (5 yrs) 52 mg  IUD 1 Intra Uterine Device by Intrauterine route once. for 1 dose    metoclopramide HCl (REGLAN) 10 MG tablet Take 1 tablet (10 mg total) by mouth every 6 (six) hours as needed (vomiting). (Patient not taking: Reported on 4/16/2020)    ondansetron (ZOFRAN) 4 MG tablet Take 1 tablet (4 mg total) by mouth every 6 (six) hours as needed for Nausea.    pantoprazole (PROTONIX) 40 MG tablet Take 1 tablet (40 mg total) by mouth 2 (two) times daily.    prenatal vits62/FA/om3/dha/epa (PRENATAL GUMMY ORAL) Take 1 Dose by mouth.    promethazine (PHENERGAN) 25 MG tablet Take 1 tablet (25 mg total) by mouth every 6 (six) hours as needed for Nausea.    sertraline (ZOLOFT) 50 MG tablet Take 1 tablet (50 mg total) by mouth once daily.       Review of patient's allergies indicates:  No Known Allergies     Family History     Problem Relation (Age of Onset)    Asthma Mother    Breast cancer Maternal Aunt    Diabetes Maternal Aunt, Maternal Grandmother    Heart disease Father (55)    Hyperlipidemia Mother, Father    Hypertension Mother, Father    Insulin resistance Sister    No Known Problems Sister, Brother, Brother, Brother, Brother, Brother, Daughter        Tobacco Use    Smoking status: Never Smoker    Smokeless tobacco: Never Used   Substance and Sexual Activity    Alcohol use: Not Currently     Alcohol/week: 0.0 standard drinks    Drug use: No    Sexual activity: Yes     Partners: Male     Birth control/protection: None     Review of Systems   Constitutional: Negative.    Eyes: Negative.    Respiratory: Negative.    Cardiovascular: Positive for leg swelling.   Gastrointestinal: Positive for abdominal pain. Negative for vomiting.        Abdominal pain consistent with contractions   Genitourinary: Positive for vaginal bleeding. Negative for vaginal discharge.        Reports small amount of bloody show   Neurological: Negative.  Negative for headaches.   All other systems reviewed and are negative.     Objective:     Vital  Signs (Most Recent):  Temp: 98.4 °F (36.9 °C) (20 1452)  Pulse: 107 (20 1601)  BP: 105/75 (20 1601)  SpO2: 99 % (20 1559) Vital Signs (24h Range):  Temp:  [98.4 °F (36.9 °C)] 98.4 °F (36.9 °C)  Pulse:  [] 107  SpO2:  [99 %-100 %] 99 %  BP: (105-134)/(72-76) 105/75        There is no height or weight on file to calculate BMI.    FHT: Cat 1 (reassuring) accels present, no decels  TOCO:  Q 3-5 minutes    Physical Exam:   Constitutional: She is oriented to person, place, and time. She appears well-developed and well-nourished.    HENT:   Head: Normocephalic and atraumatic.    Eyes: Conjunctivae and EOM are normal.    Neck: Normal range of motion.    Cardiovascular: Normal rate, regular rhythm and normal heart sounds.  Exam reveals edema.    +1 pitting edema to LE    Pulmonary/Chest: Effort normal and breath sounds normal. No respiratory distress.        Abdominal: Soft. Bowel sounds are normal. She exhibits no distension. There is no tenderness.     Genitourinary: Vagina normal and uterus normal.           Musculoskeletal: Normal range of motion and moves all extremeties.       Neurological: She is alert and oriented to person, place, and time. She has normal reflexes. She displays normal reflexes.    Skin: Skin is warm and dry.    Psychiatric: She has a normal mood and affect. Her behavior is normal. Judgment and thought content normal.       Cervix:  Dilation:  5-6  Effacement:  75%  Station: -2  Presentation: Vertex per RN exam     Significant Labs:  Lab Results   Component Value Date    GROUPTRH B POS 2019    HEPBSAG Negative 2019    STREPBCULT No Group B Streptococcus isolated 2020       I have personallly reviewed all pertinent lab results from the last 24 hours.    Assessment/Plan:     29 y.o. female  at 38w2d for:    * Normal labor  Normal labor  Continue to monitor maternal/fetal status closely  Anticipate         Donna Goins,  CNM  Obstetrics  [facility]

## 2020-04-17 PROCEDURE — 25000003 PHARM REV CODE 250: Performed by: OBSTETRICS & GYNECOLOGY

## 2020-04-17 PROCEDURE — 25000003 PHARM REV CODE 250: Performed by: MIDWIFE

## 2020-04-17 PROCEDURE — 11000001 HC ACUTE MED/SURG PRIVATE ROOM

## 2020-04-17 PROCEDURE — 63600175 PHARM REV CODE 636 W HCPCS: Performed by: OBSTETRICS & GYNECOLOGY

## 2020-04-17 RX ORDER — ACETAMINOPHEN 325 MG/1
650 TABLET ORAL EVERY 6 HOURS PRN
Status: DISCONTINUED | OUTPATIENT
Start: 2020-04-17 | End: 2020-04-18 | Stop reason: HOSPADM

## 2020-04-17 RX ORDER — IBUPROFEN 600 MG/1
600 TABLET ORAL EVERY 6 HOURS
Status: DISCONTINUED | OUTPATIENT
Start: 2020-04-17 | End: 2020-04-18 | Stop reason: HOSPADM

## 2020-04-17 RX ORDER — SERTRALINE HYDROCHLORIDE 50 MG/1
50 TABLET, FILM COATED ORAL DAILY
Status: DISCONTINUED | OUTPATIENT
Start: 2020-04-17 | End: 2020-04-18 | Stop reason: HOSPADM

## 2020-04-17 RX ORDER — OXYTOCIN/RINGER'S LACTATE 30/500 ML
41.65 PLASTIC BAG, INJECTION (ML) INTRAVENOUS CONTINUOUS
Status: DISPENSED | OUTPATIENT
Start: 2020-04-17 | End: 2020-04-17

## 2020-04-17 RX ORDER — ONDANSETRON 8 MG/1
8 TABLET, ORALLY DISINTEGRATING ORAL EVERY 8 HOURS PRN
Status: DISCONTINUED | OUTPATIENT
Start: 2020-04-17 | End: 2020-04-18 | Stop reason: HOSPADM

## 2020-04-17 RX ORDER — HYDROCORTISONE 25 MG/G
CREAM TOPICAL 3 TIMES DAILY PRN
Status: DISCONTINUED | OUTPATIENT
Start: 2020-04-17 | End: 2020-04-18 | Stop reason: HOSPADM

## 2020-04-17 RX ORDER — DOCUSATE SODIUM 100 MG/1
200 CAPSULE, LIQUID FILLED ORAL 2 TIMES DAILY PRN
Status: DISCONTINUED | OUTPATIENT
Start: 2020-04-17 | End: 2020-04-18 | Stop reason: HOSPADM

## 2020-04-17 RX ORDER — DIPHENHYDRAMINE HCL 25 MG
25 CAPSULE ORAL EVERY 4 HOURS PRN
Status: DISCONTINUED | OUTPATIENT
Start: 2020-04-17 | End: 2020-04-18 | Stop reason: HOSPADM

## 2020-04-17 RX ORDER — ENOXAPARIN SODIUM 100 MG/ML
40 INJECTION SUBCUTANEOUS EVERY 24 HOURS
Status: DISCONTINUED | OUTPATIENT
Start: 2020-04-17 | End: 2020-04-18 | Stop reason: HOSPADM

## 2020-04-17 RX ORDER — DIPHENHYDRAMINE HYDROCHLORIDE 50 MG/ML
25 INJECTION INTRAMUSCULAR; INTRAVENOUS EVERY 4 HOURS PRN
Status: DISCONTINUED | OUTPATIENT
Start: 2020-04-17 | End: 2020-04-18 | Stop reason: HOSPADM

## 2020-04-17 RX ORDER — HYDROCODONE BITARTRATE AND ACETAMINOPHEN 5; 325 MG/1; MG/1
1 TABLET ORAL EVERY 4 HOURS PRN
Status: DISCONTINUED | OUTPATIENT
Start: 2020-04-17 | End: 2020-04-18 | Stop reason: HOSPADM

## 2020-04-17 RX ORDER — PANTOPRAZOLE SODIUM 40 MG/1
40 TABLET, DELAYED RELEASE ORAL DAILY
Status: DISCONTINUED | OUTPATIENT
Start: 2020-04-17 | End: 2020-04-18 | Stop reason: HOSPADM

## 2020-04-17 RX ADMIN — SERTRALINE HYDROCHLORIDE 50 MG: 50 TABLET ORAL at 08:04

## 2020-04-17 RX ADMIN — ONDANSETRON 8 MG: 8 TABLET, ORALLY DISINTEGRATING ORAL at 04:04

## 2020-04-17 RX ADMIN — ENOXAPARIN SODIUM 40 MG: 100 INJECTION SUBCUTANEOUS at 11:04

## 2020-04-17 RX ADMIN — IBUPROFEN 600 MG: 600 TABLET, FILM COATED ORAL at 02:04

## 2020-04-17 RX ADMIN — HYDROCODONE BITARTRATE AND ACETAMINOPHEN 1 TABLET: 5; 325 TABLET ORAL at 04:04

## 2020-04-17 RX ADMIN — PANTOPRAZOLE SODIUM 40 MG: 40 TABLET, DELAYED RELEASE ORAL at 02:04

## 2020-04-17 RX ADMIN — IBUPROFEN 600 MG: 600 TABLET, FILM COATED ORAL at 05:04

## 2020-04-17 RX ADMIN — IBUPROFEN 600 MG: 600 TABLET, FILM COATED ORAL at 11:04

## 2020-04-17 NOTE — ASSESSMENT & PLAN NOTE
Normal labor  AROM  Bolusing for epidural  Continue to monitor maternal/fetal status closely  Anticipate

## 2020-04-17 NOTE — PROGRESS NOTES
[facility]  Obstetrics  Labor Progress Note    Patient Name: Anthony Sexton  MRN: [id number]  Admission Date: 2020  Hospital Length of Stay: 0 days  Attending Physician: Kirsten Siu MD  Primary Care Provider: Tyesha Leggett MD    Subjective:     Principal Problem:Normal labor    Hospital Course:  Admit to L&D for management of labor  Patient desires epidural, OK on request  Treated for +RPR, FTA antibodies pending  AROM clear fluid      Interval History:  Anthony is a 29 y.o.  at 38w2d. She is doing well. Bouncing on birthing ball.     Objective:     Vital Signs (Most Recent):  Temp: 98.4 °F (36.9 °C) (20 1452)  Pulse: 88 (20 1820)  BP: 121/78 (20 1820)  SpO2: 100 % (20 1820) Vital Signs (24h Range):  Temp:  [98.4 °F (36.9 °C)] 98.4 °F (36.9 °C)  Pulse:  [] 88  SpO2:  [98 %-100 %] 100 %  BP: (105-134)/(72-78) 121/78        There is no height or weight on file to calculate BMI.    FHT: Cat 1 (reassuring) intermittent monitring  TOCO:  irregular    Cervical Exam:  Dilation:  7  Effacement:  90%  Station: 0  Presentation: Vertex     AROM small amount of clear, odorless fluid     Significant Labs:  Lab Results   Component Value Date    GROUPTRH B POS 2020    HEPBSAG Negative 2019    STREPBCULT No Group B Streptococcus isolated 2020       I have personallly reviewed all pertinent lab results from the last 24 hours.    Physical Exam    Assessment/Plan:     29 y.o. female  at 38w2d for:    * Normal labor  Normal labor  AROM  Bolusing for epidural  Continue to monitor maternal/fetal status closely  Anticipate     Positive RPR test  Consulted with MD  1x dose of Bicillin given  FTA antibodies pending  Will notify katya Goins CNM  Obstetrics  [facility]

## 2020-04-17 NOTE — LACTATION NOTE
"Lactation Rounds.      Mother reports that breastfeeding is going "ok" so far.  She reports that the latch is initially painful and then it resolves during the feeding.      Discussed early feeding cues and encouraged mother to feed baby in response to those cues. Encouraged unrestricted feedings rather than timed/amount limits, procedural schedules, or visitation schedules. Reviewed normal feeding expectations of 8 or more feedings per 24 hour period, cues that babies use to signal hunger and satiety, and the importance of physical contact during feeding.     Asked mother to call for the next feeding for latching assistance and evaluation.  "

## 2020-04-17 NOTE — ANESTHESIA POSTPROCEDURE EVALUATION
Anesthesia Post Evaluation    Patient: Anthony Sexton    Procedure(s) Performed: * No procedures listed *    Final Anesthesia Type: epidural    Patient location during evaluation: labor & delivery  Patient participation: Yes- Able to Participate  Level of consciousness: awake and alert  Post-procedure vital signs: reviewed and stable  Pain management: adequate  Airway patency: patent    PONV status at discharge: No PONV  Anesthetic complications: no      Cardiovascular status: blood pressure returned to baseline  Respiratory status: unassisted and spontaneous ventilation  Hydration status: euvolemic  Follow-up not needed.          Vitals Value Taken Time   /77 4/17/2020 12:31 AM   Temp 36.6 °C (97.8 °F) 4/17/2020 12:36 AM   Pulse 93 4/17/2020 12:36 AM   Resp 18 4/17/2020 12:36 AM   SpO2 98 % 4/17/2020 12:36 AM         No case tracking events are documented in the log.      Pain/Faizan Score: Pain Rating Prior to Med Admin: 6 (4/17/2020  4:42 AM)  Pain Rating Post Med Admin: 4 (4/17/2020  3:00 AM)  Faizan Score: 9 (4/17/2020 12:45 AM)

## 2020-04-17 NOTE — SUBJECTIVE & OBJECTIVE
Hospital course: Admit to L&D for management of labor  Patient desires epidural, OK on request  Treated for +RPR, FTA antibodies pending  AROM clear fluid  4/17/20- PPD#1 Routine pp care. VSSAF, protonix added as h/o GERD      Interval History:       She is doing well this morning. She is tolerating a regular diet without nausea or vomiting. She is voiding spontaneously. She is ambulating. She has passed flatus, and has a BM. Vaginal bleeding is mild. She denies fever or chills. Abdominal pain is mild and controlled with oral medications. She is breastfeeding. She desires circumcision for her male baby: not applicable.    Objective:     Vital Signs (Most Recent):  Temp: 97.8 °F (36.6 °C) (04/17/20 0036)  Pulse: 93 (04/17/20 0036)  Resp: 18 (04/17/20 0036)  BP: 127/77 (04/17/20 0031)  SpO2: 98 % (04/17/20 0036) Vital Signs (24h Range):  Temp:  [97.5 °F (36.4 °C)-98.4 °F (36.9 °C)] 97.8 °F (36.6 °C)  Pulse:  [] 93  Resp:  [18] 18  SpO2:  [86 %-100 %] 98 %  BP: (105-138)/(73-87) 127/77        There is no height or weight on file to calculate BMI.      Intake/Output Summary (Last 24 hours) at 4/17/2020 1152  Last data filed at 4/17/2020 0400  Gross per 24 hour   Intake --   Output 1075 ml   Net -1075 ml       Significant Labs:  Lab Results   Component Value Date    GROUPTRH B POS 04/16/2020    HEPBSAG Negative 09/09/2019    STREPBCULT No Group B Streptococcus isolated 04/03/2020     Recent Labs   Lab 04/16/20  1518   HGB 10.1*   HCT 33.4*       I have personallly reviewed all pertinent lab results from the last 24 hours.    Physical Exam:   Constitutional: She is oriented to person, place, and time. She appears well-developed and well-nourished.    HENT:   Head: Normocephalic.    Eyes: EOM are normal.    Neck: Neck supple.    Cardiovascular: Normal rate and regular rhythm.     Pulmonary/Chest: Effort normal.        Abdominal: Soft.     Genitourinary: Uterus normal. Vaginal discharge (normal lochia) found.            Musculoskeletal: Moves all extremeties. She exhibits no edema.       Neurological: She is alert and oriented to person, place, and time. She has normal reflexes.    Skin: Skin is warm and dry.    Psychiatric: She has a normal mood and affect.

## 2020-04-17 NOTE — L&D DELIVERY NOTE
[facility]  Vaginal Delivery   Operative Note    SUMMARY     Normal spontaneous vaginal delivery of live infant, was placed on mothers abdomen for skin to skin and bulb suctioning performed.  Infant delivered position OA over intact perineum.  Nuchal cord: Yes, cord reduced at perineum.    Spontaneous delivery of placenta and IV pitocin given noting good uterine tone.  Small right periurethral tear repaired in normal fashion with 3.0 Chromic SH suture. Red rubber catheter in place during repair. Hemostasis and approximation noted.  Patient tolerated delivery well. Sponge needle and lap counted correctly x2. 200 ml EBL. Mom and baby stable.     Indications:  (spontaneous vaginal delivery)  Pregnancy complicated by:   Patient Active Problem List   Diagnosis    Acanthosis nigricans    Morbid obesity with BMI of 40.0-44.9, adult    Breast hypertrophy in female    Vitamin D deficiency    Back pain    Neck pain    Iron deficiency anemia secondary to inadequate dietary iron intake    SOB (shortness of breath)    Positive RPR test     (spontaneous vaginal delivery)    Single live birth    Tear of periurethral tissue with delivery     Admitting GA: 38w2d    Delivery Information for Maynor Sexton    Birth information:  YOB: 2022   Time of birth: 10:21 PM   Sex: female   Head Delivery Date/Time: 2020 10:20 PM   Delivery type: Vaginal, Spontaneous   Gestational Age: 38w2d    Delivery Providers    Delivering clinician:  Donna Goins CNM   Provider Role    Alexandr Knight RN Registered Nurse    Tierra Vargas, SOPHY Registered Nurse    Mayte Coelho, ST Surgical Tech            Measurements    Weight:    Length:           Apgars    Living status:  Living  Apgars:   1 min.:   5 min.:   10 min.:   15 min.:   20 min.:     Skin color:   1  1       Heart rate:   2  2       Reflex irritability:   2  2       Muscle tone:   2  2       Respiratory effort:   2  2       Total:   9   9       Apgars assigned by:  ANGY MANN RN         Operative Delivery    Forceps attempted?:  No  Vacuum extractor attempted?:  No         Shoulder Dystocia    Shoulder dystocia present?:  No           Presentation    Presentation:  Vertex  Position:  Middle Occiput Anterior           Interventions/Resuscitation    Method:  Bulb Suctioning, Tactile Stimulation       Cord    Vessels:  3 vessels  Complications:  Nuchal  Nuchal Intervention:  reduced  Nuchal Cord Description:  loose nuchal cord  Number of Loops:  1  Delayed Cord Clamping?:  Yes  Cord Clamped Date/Time:  2020 10:26 PM  Cord Blood Disposition:  Lab  Gases Sent?:  No  Stem Cell Collection (by MD):  No       Placenta    Placenta delivery date/time:  2020  Placenta removal:  Spontaneous  Placenta appearance:  Intact  Placenta disposition:  discarded           Labor Events:       labor: No     Labor Onset Date/Time:         Dilation Complete Date/Time:         Start Pushing Date/Time:         Start Pushing Date/Time:       Rupture Date/Time:              Rupture type:           Fluid Amount:        Fluid Color:        Fluid Odor:        Membrane Status:                 steroids: None     Antibiotics given for GBS: No     Induction: none     Indications for induction:        Augmentation: amniotomy     Indications for augmentation: Ineffective Contraction Pattern     Labor complications: None     Additional complications:          Cervical ripening:                     Delivery:      Episiotomy: None     Indication for Episiotomy:       Perineal Lacerations: None Repaired:      Periurethral Laceration: right Repaired: Yes   Labial Laceration:   Repaired:     Sulcus Laceration:   Repaired:     Vaginal Laceration:   Repaired:     Cervical Laceration:   Repaired:     Repair suture:       Repair # of packets: 1     Last Value - EBL - Nursing (mL): 200     Sum - EBL - Nursing (mL): 200     Last Value - EBL - Anesthesia (mL):       Calculated QBL (mL):        Vaginal Sweep Performed: No     Surgicount Correct: Yes       Other providers:       Anesthesia    Method:  Epidural          Details (if applicable):  Trial of Labor      Categorization:      Priority:     Indications for :     Incision Type:       Additional  information:  Forceps:    Vacuum:    Breech:    Observed anomalies    Other (Comments):

## 2020-04-17 NOTE — PROGRESS NOTES
[facility] -   Obstetrics  Postpartum Progress Note    Patient Name: Anthony Sexton  MRN: [id number]  Admission Date: 2020  Hospital Length of Stay: 1 days  Attending Physician: Kirsten Siu MD  Primary Care Provider: Tyesha Leggett MD    Subjective:     Principal Problem: (spontaneous vaginal delivery)    Hospital course: Admit to L&D for management of labor  Patient desires epidural, OK on request  Treated for +RPR, FTA antibodies pending  AROM clear fluid  20- PPD#1 Routine pp care. VSSAF, protonix added as h/o GERD      Interval History:       She is doing well this morning. She is tolerating a regular diet without nausea or vomiting. She is voiding spontaneously. She is ambulating. She has passed flatus, and has a BM. Vaginal bleeding is mild. She denies fever or chills. Abdominal pain is mild and controlled with oral medications. She is breastfeeding. She desires circumcision for her male baby: not applicable.    Objective:     Vital Signs (Most Recent):  Temp: 97.8 °F (36.6 °C) (20 0036)  Pulse: 93 (20 0036)  Resp: 18 (20 0036)  BP: 127/77 (20 0031)  SpO2: 98 % (20 003) Vital Signs (24h Range):  Temp:  [97.5 °F (36.4 °C)-98.4 °F (36.9 °C)] 97.8 °F (36.6 °C)  Pulse:  [] 93  Resp:  [18] 18  SpO2:  [86 %-100 %] 98 %  BP: (105-138)/(73-87) 127/77        There is no height or weight on file to calculate BMI.      Intake/Output Summary (Last 24 hours) at 2020 1152  Last data filed at 2020 0400  Gross per 24 hour   Intake --   Output 1075 ml   Net -1075 ml       Significant Labs:  Lab Results   Component Value Date    GROUPTRH B POS 2020    HEPBSAG Negative 2019    STREPBCULT No Group B Streptococcus isolated 2020     Recent Labs   Lab 20  1518   HGB 10.1*   HCT 33.4*       I have personallly reviewed all pertinent lab results from the last 24 hours.    Physical Exam:   Constitutional: She is oriented to person, place,  and time. She appears well-developed and well-nourished.    HENT:   Head: Normocephalic.    Eyes: EOM are normal.    Neck: Neck supple.    Cardiovascular: Normal rate and regular rhythm.     Pulmonary/Chest: Effort normal.        Abdominal: Soft.     Genitourinary: Uterus normal. Vaginal discharge (normal lochia) found.           Musculoskeletal: Moves all extremeties. She exhibits no edema.       Neurological: She is alert and oriented to person, place, and time. She has normal reflexes.    Skin: Skin is warm and dry.    Psychiatric: She has a normal mood and affect.       Assessment/Plan:     29 y.o. female  for:    *  (spontaneous vaginal delivery)  Routine pp care    Tear of periurethral tissue with delivery  Analgesics as ordered. Mild discomfort only.     Single live birth  Under care of peds services.     Positive RPR test  Consulted with MD  1x dose of Bicillin given  FTA antibodies pending  Will notify peds  Babys RPR is negative.         Disposition: As patient meets milestones, will plan to discharge tomorrow. .    Leslie Ashraf CNM  Obstetrics  [facility]

## 2020-04-17 NOTE — SUBJECTIVE & OBJECTIVE
Interval History:  Anthony is a 29 y.o.  at 38w2d. She is doing well. Bouncing on birthing ball.     Objective:     Vital Signs (Most Recent):  Temp: 98.4 °F (36.9 °C) (20 1452)  Pulse: 88 (20 1820)  BP: 121/78 (20 1820)  SpO2: 100 % (20 1820) Vital Signs (24h Range):  Temp:  [98.4 °F (36.9 °C)] 98.4 °F (36.9 °C)  Pulse:  [] 88  SpO2:  [98 %-100 %] 100 %  BP: (105-134)/(72-78) 121/78        There is no height or weight on file to calculate BMI.    FHT: Cat 1 (reassuring) intermittent monitring  TOCO:  irregular    Cervical Exam:  Dilation:  7  Effacement:  90%  Station: 0  Presentation: Vertex     AROM small amount of clear, odorless fluid     Significant Labs:  Lab Results   Component Value Date    GROUPTRH B POS 2020    HEPBSAG Negative 2019    STREPBCULT No Group B Streptococcus isolated 2020       I have personallly reviewed all pertinent lab results from the last 24 hours.    Physical Exam

## 2020-04-17 NOTE — ASSESSMENT & PLAN NOTE
Consulted with MD  1x dose of Bicillin given  FTA antibodies pending  Will notify peds  Babys RPR is negative.

## 2020-04-17 NOTE — ANESTHESIA PROCEDURE NOTES
Epidural    Patient location during procedure: OB   Reason for block: primary anesthetic   Diagnosis: iup,labor    Start time: 4/16/2020 7:21 PM  Timeout: 4/16/2020 7:20 PM  End time: 4/16/2020 7:45 PM    Staffing  Performing Provider: Reza Selby Jr., CRNA  Authorizing Provider: Margy Sharma MD        Preanesthetic Checklist  Completed: patient identified, site marked, surgical consent, pre-op evaluation, timeout performed, IV checked, risks and benefits discussed, monitors and equipment checked, anesthesia consent given, hand hygiene performed and patient being monitored  Preparation  Patient position: sitting  Prep: Betadine  Patient monitoring: Pulse Ox and Blood Pressure  Epidural  Skin Anesthetic: lidocaine 1%  Skin Wheal: 3 mL  Administration type: continuous  Approach: midline  Interspace: L3-4    Injection technique: YONG air  Needle and Epidural Catheter  Needle type: Tuohy   Needle gauge: 17  Needle length: 3.5 inches  Needle insertion depth: 9 cm  Catheter type: springwound and multi-orifice  Catheter size: 18 G  Catheter at skin depth: 15 cm  Test dose: 3 mL of lidocaine 1.5% with Epi 1-to-200,000  Additional Documentation: incremental injection, negative aspiration for heme and CSF, no paresthesia on injection, no signs/symptoms of IV or SA injection, no significant pain on injection and no significant complaints from patient  Needle localization: anatomical landmarks  Medications:  Volume per aspiration: 0 mL  Time between aspirations: 2 minutes  Assessment  Upper dermatomal levels - Left: T10  Right: T9   Dermatomal levels determined by alcohol wipe  Ease of block: easy  Patient's tolerance of the procedure: comfortable throughout block and no complaintsNo inadvertent dural puncture with Tuohy.  Dural puncture not performed with spinal needle

## 2020-04-18 VITALS
RESPIRATION RATE: 18 BRPM | OXYGEN SATURATION: 97 % | SYSTOLIC BLOOD PRESSURE: 126 MMHG | TEMPERATURE: 98 F | DIASTOLIC BLOOD PRESSURE: 87 MMHG | HEART RATE: 85 BPM

## 2020-04-18 PROBLEM — O99.019 ANEMIA DURING PREGNANCY: Status: ACTIVE | Noted: 2020-04-18

## 2020-04-18 PROBLEM — Z87.59 HISTORY OF POSTPARTUM DEPRESSION: Status: ACTIVE | Noted: 2020-04-18

## 2020-04-18 PROBLEM — Z86.59 HISTORY OF POSTPARTUM DEPRESSION: Status: ACTIVE | Noted: 2020-04-18

## 2020-04-18 PROCEDURE — 99024 POSTOP FOLLOW-UP VISIT: CPT | Mod: ,,, | Performed by: ADVANCED PRACTICE MIDWIFE

## 2020-04-18 PROCEDURE — 99024 PR POST-OP FOLLOW-UP VISIT: ICD-10-PCS | Mod: ,,, | Performed by: ADVANCED PRACTICE MIDWIFE

## 2020-04-18 PROCEDURE — 25000003 PHARM REV CODE 250: Performed by: OBSTETRICS & GYNECOLOGY

## 2020-04-18 PROCEDURE — 25000003 PHARM REV CODE 250: Performed by: MIDWIFE

## 2020-04-18 RX ORDER — IBUPROFEN 600 MG/1
600 TABLET ORAL EVERY 6 HOURS
Qty: 60 TABLET | Refills: 0 | Status: SHIPPED | OUTPATIENT
Start: 2020-04-18 | End: 2020-11-03

## 2020-04-18 RX ADMIN — IBUPROFEN 600 MG: 600 TABLET, FILM COATED ORAL at 12:04

## 2020-04-18 RX ADMIN — IBUPROFEN 600 MG: 600 TABLET, FILM COATED ORAL at 05:04

## 2020-04-18 RX ADMIN — PANTOPRAZOLE SODIUM 40 MG: 40 TABLET, DELAYED RELEASE ORAL at 08:04

## 2020-04-18 RX ADMIN — DOCUSATE SODIUM 200 MG: 100 CAPSULE, LIQUID FILLED ORAL at 12:04

## 2020-04-18 RX ADMIN — HYDROCODONE BITARTRATE AND ACETAMINOPHEN 1 TABLET: 5; 325 TABLET ORAL at 02:04

## 2020-04-18 RX ADMIN — IBUPROFEN 600 MG: 600 TABLET, FILM COATED ORAL at 11:04

## 2020-04-18 RX ADMIN — SERTRALINE HYDROCHLORIDE 50 MG: 50 TABLET ORAL at 08:04

## 2020-04-18 NOTE — SUBJECTIVE & OBJECTIVE
Hospital course: Admit to L&D for management of labor  Patient desires epidural, OK on request  Treated for +RPR, FTA antibodies pending  AROM clear fluid  4/17/20- PPD#1 Routine pp care. VSSAF, protonix added as h/o GERD  4/18/2020--PPD#2, Discharge instructions reviewed including PPD, PIH S&S, bleeding precautions and pain management    Interval History:     She is doing well this morning. She is tolerating a regular diet without nausea or vomiting. She is voiding spontaneously. She is ambulating. Vaginal bleeding is mild. She denies fever or chills. Abdominal pain is mild and controlled with oral medications. She is breastfeeding. She desires circumcision for her male baby: not applicable.    Objective:     Vital Signs (Most Recent):  Temp: 98.5 °F (36.9 °C) (04/18/20 0825)  Pulse: 77 (04/18/20 0825)  Resp: 18 (04/18/20 0825)  BP: 118/70 (04/18/20 0825)  SpO2: 97 % (04/17/20 1936) Vital Signs (24h Range):  Temp:  [98 °F (36.7 °C)-98.5 °F (36.9 °C)] 98.5 °F (36.9 °C)  Pulse:  [72-84] 77  Resp:  [18-19] 18  SpO2:  [97 %] 97 %  BP: (111-132)/(62-77) 118/70        There is no height or weight on file to calculate BMI.      Intake/Output Summary (Last 24 hours) at 4/18/2020 0932  Last data filed at 4/17/2020 1200  Gross per 24 hour   Intake --   Output 300 ml   Net -300 ml       Significant Labs:  Lab Results   Component Value Date    GROUPTRH B POS 04/16/2020    HEPBSAG Negative 09/09/2019    STREPBCULT No Group B Streptococcus isolated 04/03/2020     Recent Labs   Lab 04/16/20  1518   HGB 10.1*   HCT 33.4*       I have personallly reviewed all pertinent lab results from the last 24 hours.    Physical Exam:   Constitutional: She is oriented to person, place, and time. She appears well-developed and well-nourished.    HENT:   Head: Normocephalic.     Neck: Normal range of motion.     Pulmonary/Chest: Effort normal.        Abdominal: Soft.   Non-Tender             Musculoskeletal: Normal range of motion.        Neurological: She is alert and oriented to person, place, and time.    Skin: Skin is warm and dry.    Psychiatric: She has a normal mood and affect. Her behavior is normal. Judgment and thought content normal.

## 2020-04-18 NOTE — LACTATION NOTE
Lactation Rounds: infant output and weight loss WNL. Mother states that she wishes to pump and bottle feed formula until copious milk production and then resume direct breastfeeding at an undetermined future time. Counseling mother on early introduction of bottle nipples and pumping to establish adequate milk supply, mother wishes to continue with plan, support and encouragement provided. Mother has a Spectra S2 pump for home use. Instructed mother to pump each time infant feeds and discussed mechanism of milk production and maintenance. Encouraged hand expression after pumping session to aid in milk production and volume of expressed breast milk.    Mother states she does not know how to hand express. Mother was taught hand expression of breastmilk/colostrum. She was instructed to:   Sit upright and lean forward, if possible.   When feasible, apply warm, wet compress over breasts for a few minutes.    Perform gentle breast massage.   Form a C with her hand and place it about 1 inch back from the areola with the nipple centered between her index finger and her thumb.   Press, compress, relax:  Using her finger and thumb, apply pressure in an inward direction toward the breast without stretching the tissue, compress the breast tissue between her finger and thumb, then relax her finger and thumb. Repeat process for a few minutes.   Rotate placement of finger and thumb on the breasts to facilitate emptying.   Collect expressed breastmilk/colostrum with a spoon or cup and feed immediately to the baby, if able. Use bottle per maternal preference.   If unable to feed immediately, place breastmilk/colostrum directly into a sterile storage container for later use. Place the babys breast milk label (with the date and time of collection and the names of mother's medications) on the container. Reviewed proper handling and storage of expressed breastmilk.   Patient effectively return demonstrated and verbalized  understanding.      Mother denies any further lactation needs or concerns at this time. Mother anticipates discharge home today. Lactation discharge information reviewed.  Mother is aware of warm line and telehealth outpatient consultations. Encouraged mother to contact lactation with any questions, concerns, or problems. Contact numbers provided, and mother verbalizes understanding.

## 2020-04-18 NOTE — DISCHARGE SUMMARY
[facility] -   Obstetrics  Discharge Summary      Patient Name: Anthony Sexton  MRN: [id number]  Admission Date: 2020  Hospital Length of Stay: 2 days  Discharge Date and Time:  2020 9:44 AM  Attending Physician: Kirsten Siu MD   Discharging Provider: Ozzie Ma CNM   Primary Care Provider: Tyesha Leggett MD    HPI:  38.2 to L&D with c/o contractions. Was 4 cm in office today, now 5-6 cm.         * No surgery found *     Hospital Course:   Admit to L&D for management of labor  Patient desires epidural, OK on request  Treated for +RPR, FTA antibodies pending  AROM clear fluid  20- PPD#1 Routine pp care. VSSAF, protonix added as h/o GERD  2020--PPD#2, Discharge instructions reviewed including PPD, PIH S&S, bleeding precautions and pain management         Final Active Diagnoses:    Diagnosis Date Noted POA    PRINCIPAL PROBLEM:   (spontaneous vaginal delivery) [O80] 2020 Not Applicable    Anemia during pregnancy [O99.019] 2020 Yes    History of postpartum depression [Z87.59, Z86.59] 2020 Not Applicable    Positive RPR test [A53.0] 2020 Yes    Single live birth [Z37.0] 2020 Not Applicable    Tear of periurethral tissue with delivery [O71.89] 2020 No      Problems Resolved During this Admission:    Diagnosis Date Noted Date Resolved POA    Normal labor [O80, Z37.9] 2020 Not Applicable        Labs: All labs within the past 24 hours have been reviewed    Feeding Method: breast    Immunizations     None          Delivery:    Episiotomy: None   Lacerations: None   Repair suture:     Repair # of packets: 1   Blood loss (ml): 200     Birth information:  YOB: 2022   Time of birth: 10:21 PM   Sex: female   Delivery type: Vaginal, Spontaneous   Gestational Age: 38w2d    Delivery Clinician:      Other providers:       Additional  information:  Forceps:    Vacuum:    Breech:    Observed anomalies      Living?:            APGARS  One minute Five minutes Ten minutes   Skin color:         Heart rate:         Grimace:         Muscle tone:         Breathing:         Totals: 9  9        Placenta: Delivered:       appearance    Pending Diagnostic Studies:     None          Discharged Condition: good    Disposition: Home or Self Care    Follow Up:  Follow-up Information     Elizabeth Rosales CNM. Call in 4 weeks.    Specialty:  Obstetrics  Why:  Routine Postpartum Visit  Contact information:  [id number] [facility]  [place] [address] [phone]                 Patient Instructions:   No discharge procedures on file.  Medications:  Current Discharge Medication List      START taking these medications    Details   ibuprofen (ADVIL,MOTRIN) 600 MG tablet Take 1 tablet (600 mg total) by mouth every 6 (six) hours.  Qty: 60 tablet, Refills: 0         CONTINUE these medications which have NOT CHANGED    Details   ergocalciferol (ERGOCALCIFEROL) 50,000 unit Cap Take 2 capsules (100,000 Units total) by mouth every 7 days.  Qty: 8 capsule, Refills: 5    Associated Diagnoses: Vitamin D deficiency      ferrous sulfate 325 (65 FE) MG EC tablet Take 1 tablet (325 mg total) by mouth 2 (two) times daily.  Qty: 60 tablet, Refills: 3      fluticasone propionate (FLONASE) 50 mcg/actuation nasal spray 2 sprays (100 mcg total) by Each Nostril route daily as needed.  Qty: 16 g, Refills: 5      hydroxyzine HCL (ATARAX) 25 MG tablet Take 1 tablet (25 mg total) by mouth 3 (three) times daily as needed for Itching.  Qty: 90 tablet, Refills: 3      levonorgestreL (MIRENA) 20 mcg/24 hours (5 yrs) 52 mg IUD 1 Intra Uterine Device by Intrauterine route once. for 1 dose  Qty: 1 each, Refills: 0      prenatal vits62/FA/om3/dha/epa (PRENATAL GUMMY ORAL) Take 1 Dose by mouth.      sertraline (ZOLOFT) 50 MG tablet Take 1 tablet (50 mg total) by mouth once daily.  Qty: 30 tablet, Refills: 11         STOP taking these medications       acetaminophen (TYLENOL) 500 MG  tablet Comments:   Reason for Stopping:         metoclopramide HCl (REGLAN) 10 MG tablet Comments:   Reason for Stopping:         ondansetron (ZOFRAN) 4 MG tablet Comments:   Reason for Stopping:         pantoprazole (PROTONIX) 40 MG tablet Comments:   Reason for Stopping:         promethazine (PHENERGAN) 25 MG tablet Comments:   Reason for Stopping:               Ozzie Ma CNM  Obstetrics [facility] -

## 2020-04-18 NOTE — PROGRESS NOTES
Discharge instructions given and questions answered. Patient brought to vehicle via wheelchair in stable condition.

## 2020-04-18 NOTE — PLAN OF CARE
Mother requesting supplementation due to baby cluster feeding and nipples are hurting. . Reviewed risks of supplementation. Discussed adequacy of colostrum. Instructed mother on normal  feeding and sleeping patterns. Encouraged mother to breastfeed infant a minimum of 8 times in 24 hours prior to supplementation to promote appropriate breast stimulation for adequate milk supply. Discussed with mother preferred alternative feeding methods, such as supplement infant at breast via SNS, syringe, spoon, or cup feeding. Discussed risks and encouraged mother to avoid artificial nipples and bottles. Mother chooses to supplement infant via nipple.  Mother taught how to safely feed infant via this method. Demonstrated by nurse and mother return demonstrates proper and safe usage.   Because baby is being supplemented away from the breast, mother was:   - informed that breastfeeding support and assistance is available as needed  - encouraged to express milk from both breasts each time a supplement is given  - encouraged to use her own collected milk as a first choice for supplementation  Mother was encouraged to request assistance as needed and voices understanding.

## 2020-04-18 NOTE — PROGRESS NOTES
[facility] -   Obstetrics  Postpartum Progress Note    Patient Name: Anthony Sexton  MRN: [id number]  Admission Date: 2020  Hospital Length of Stay: 2 days  Attending Physician: Kirsten Siu MD  Primary Care Provider: Tyesha Leggett MD    Subjective:     Principal Problem: (spontaneous vaginal delivery)    Hospital course: Admit to L&D for management of labor  Patient desires epidural, OK on request  Treated for +RPR, FTA antibodies pending  AROM clear fluid  20- PPD#1 Routine pp care. VSSAF, protonix added as h/o GERD  2020--PPD#2, Discharge instructions reviewed including PPD, PIH S&S, bleeding precautions and pain management    Interval History:     She is doing well this morning. She is tolerating a regular diet without nausea or vomiting. She is voiding spontaneously. She is ambulating. Vaginal bleeding is mild. She denies fever or chills. Abdominal pain is mild and controlled with oral medications. She is breastfeeding. She desires circumcision for her male baby: not applicable.    Objective:     Vital Signs (Most Recent):  Temp: 98.5 °F (36.9 °C) (20 0825)  Pulse: 77 (20 0825)  Resp: 18 (20 0825)  BP: 118/70 (20 0825)  SpO2: 97 % (20 1936) Vital Signs (24h Range):  Temp:  [98 °F (36.7 °C)-98.5 °F (36.9 °C)] 98.5 °F (36.9 °C)  Pulse:  [72-84] 77  Resp:  [18-19] 18  SpO2:  [97 %] 97 %  BP: (111-132)/(62-77) 118/70        There is no height or weight on file to calculate BMI.      Intake/Output Summary (Last 24 hours) at 2020 0932  Last data filed at 2020 1200  Gross per 24 hour   Intake --   Output 300 ml   Net -300 ml       Significant Labs:  Lab Results   Component Value Date    GROUPTRH B POS 2020    HEPBSAG Negative 2019    STREPBCULT No Group B Streptococcus isolated 2020     Recent Labs   Lab 20  1518   HGB 10.1*   HCT 33.4*       I have personallly reviewed all pertinent lab results from the last 24  hours.    Physical Exam:   Constitutional: She is oriented to person, place, and time. She appears well-developed and well-nourished.    HENT:   Head: Normocephalic.     Neck: Normal range of motion.     Pulmonary/Chest: Effort normal.        Abdominal: Soft.   Non-Tender             Musculoskeletal: Normal range of motion.       Neurological: She is alert and oriented to person, place, and time.    Skin: Skin is warm and dry.    Psychiatric: She has a normal mood and affect. Her behavior is normal. Judgment and thought content normal.       Assessment/Plan:     29 y.o. female  for:    *  (spontaneous vaginal delivery)  Routine pp care    Tear of periurethral tissue with delivery  Analgesics as ordered. Mild discomfort only.     Single live birth  Under care of peds services.     Positive RPR test  Consulted with MD  1x dose of Bicillin given  FTA antibodies pending  Will notify peds  Babys RPR is negative.         Disposition: As patient meets milestones, will plan to discharge today.    Ozzie Ma CNM  Obstetrics  [facility]

## 2020-04-18 NOTE — DISCHARGE INSTRUCTIONS

## 2020-04-20 ENCOUNTER — HOSPITAL ENCOUNTER (EMERGENCY)
Facility: HOSPITAL | Age: 30
Discharge: HOME | End: 2020-04-21
Attending: FAMILY MEDICINE
Payer: COMMERCIAL

## 2020-04-20 ENCOUNTER — OFFICE VISIT (OUTPATIENT)
Dept: PSYCHIATRY | Facility: CLINIC | Age: 30
End: 2020-04-20
Payer: COMMERCIAL

## 2020-04-20 ENCOUNTER — PATIENT MESSAGE (OUTPATIENT)
Dept: OBSTETRICS AND GYNECOLOGY | Facility: CLINIC | Age: 30
End: 2020-04-20

## 2020-04-20 ENCOUNTER — PATIENT MESSAGE (OUTPATIENT)
Dept: PSYCHIATRY | Facility: CLINIC | Age: 30
End: 2020-04-20

## 2020-04-20 ENCOUNTER — OFFICE VISIT (OUTPATIENT)
Dept: OBSTETRICS AND GYNECOLOGY | Facility: CLINIC | Age: 30
End: 2020-04-20
Payer: COMMERCIAL

## 2020-04-20 DIAGNOSIS — Z20.822 SUSPECTED COVID-19 VIRUS INFECTION: ICD-10-CM

## 2020-04-20 DIAGNOSIS — M54.50 ACUTE BILATERAL LOW BACK PAIN WITHOUT SCIATICA: ICD-10-CM

## 2020-04-20 DIAGNOSIS — F41.1 GAD (GENERALIZED ANXIETY DISORDER): ICD-10-CM

## 2020-04-20 DIAGNOSIS — R06.02 SOB (SHORTNESS OF BREATH): Primary | ICD-10-CM

## 2020-04-20 DIAGNOSIS — R22.42 LOCALIZED SWELLING OF LEFT LOWER EXTREMITY: ICD-10-CM

## 2020-04-20 LAB
ALBUMIN SERPL BCP-MCNC: 2.9 G/DL (ref 3.5–5.2)
ALP SERPL-CCNC: 137 U/L (ref 55–135)
ALT SERPL W/O P-5'-P-CCNC: 20 U/L (ref 10–44)
ANION GAP SERPL CALC-SCNC: 10 MMOL/L (ref 8–16)
AST SERPL-CCNC: 26 U/L (ref 10–40)
BASOPHILS # BLD AUTO: 0.03 K/UL (ref 0–0.2)
BASOPHILS NFR BLD: 0.4 % (ref 0–1.9)
BILIRUB SERPL-MCNC: 0.2 MG/DL (ref 0.1–1)
BNP SERPL-MCNC: 192 PG/ML (ref 0–99)
BUN SERPL-MCNC: 10 MG/DL (ref 6–20)
CALCIUM SERPL-MCNC: 8.6 MG/DL (ref 8.7–10.5)
CHLORIDE SERPL-SCNC: 109 MMOL/L (ref 95–110)
CK SERPL-CCNC: 111 U/L (ref 20–180)
CO2 SERPL-SCNC: 21 MMOL/L (ref 23–29)
CREAT SERPL-MCNC: 0.7 MG/DL (ref 0.5–1.4)
CRP SERPL-MCNC: 34.4 MG/L (ref 0–8.2)
D DIMER PPP IA.FEU-MCNC: 3.11 MG/L FEU
DIFFERENTIAL METHOD: ABNORMAL
EOSINOPHIL # BLD AUTO: 0.1 K/UL (ref 0–0.5)
EOSINOPHIL NFR BLD: 1.2 % (ref 0–8)
ERYTHROCYTE [DISTWIDTH] IN BLOOD BY AUTOMATED COUNT: 15.5 % (ref 11.5–14.5)
EST. GFR  (AFRICAN AMERICAN): >60 ML/MIN/1.73 M^2
EST. GFR  (NON AFRICAN AMERICAN): >60 ML/MIN/1.73 M^2
GLUCOSE SERPL-MCNC: 83 MG/DL (ref 70–110)
HCT VFR BLD AUTO: 30.6 % (ref 37–48.5)
HGB BLD-MCNC: 9.3 G/DL (ref 12–16)
IMM GRANULOCYTES # BLD AUTO: 0.03 K/UL (ref 0–0.04)
IMM GRANULOCYTES NFR BLD AUTO: 0.4 % (ref 0–0.5)
LACTATE SERPL-SCNC: 0.8 MMOL/L (ref 0.5–2.2)
LDH SERPL L TO P-CCNC: 204 U/L (ref 110–260)
LYMPHOCYTES # BLD AUTO: 2.1 K/UL (ref 1–4.8)
LYMPHOCYTES NFR BLD: 24.7 % (ref 18–48)
MCH RBC QN AUTO: 23.6 PG (ref 27–31)
MCHC RBC AUTO-ENTMCNC: 30.4 G/DL (ref 32–36)
MCV RBC AUTO: 78 FL (ref 82–98)
MONOCYTES # BLD AUTO: 0.6 K/UL (ref 0.3–1)
MONOCYTES NFR BLD: 6.7 % (ref 4–15)
NEUTROPHILS # BLD AUTO: 5.7 K/UL (ref 1.8–7.7)
NEUTROPHILS NFR BLD: 66.6 % (ref 38–73)
NRBC BLD-RTO: 0 /100 WBC
PLATELET # BLD AUTO: 274 K/UL (ref 150–350)
PMV BLD AUTO: 11.5 FL (ref 9.2–12.9)
POTASSIUM SERPL-SCNC: 3.5 MMOL/L (ref 3.5–5.1)
PROCALCITONIN SERPL IA-MCNC: 0.02 NG/ML
PROT SERPL-MCNC: 6.6 G/DL (ref 6–8.4)
RBC # BLD AUTO: 3.94 M/UL (ref 4–5.4)
SARS-COV-2 RDRP RESP QL NAA+PROBE: NEGATIVE
SODIUM SERPL-SCNC: 140 MMOL/L (ref 136–145)
TROPONIN I SERPL DL<=0.01 NG/ML-MCNC: 0.01 NG/ML (ref 0–0.03)
WBC # BLD AUTO: 8.53 K/UL (ref 3.9–12.7)

## 2020-04-20 PROCEDURE — 0503F PR POSTPARTUM CARE VISIT: ICD-10-PCS | Mod: ,,, | Performed by: ADVANCED PRACTICE MIDWIFE

## 2020-04-20 PROCEDURE — 80053 COMPREHEN METABOLIC PANEL: CPT

## 2020-04-20 PROCEDURE — 84145 PROCALCITONIN (PCT): CPT

## 2020-04-20 PROCEDURE — U0002 COVID-19 LAB TEST NON-CDC: HCPCS

## 2020-04-20 PROCEDURE — 93005 ELECTROCARDIOGRAM TRACING: CPT

## 2020-04-20 PROCEDURE — 93010 ELECTROCARDIOGRAM REPORT: CPT | Mod: ,,, | Performed by: INTERNAL MEDICINE

## 2020-04-20 PROCEDURE — 83880 ASSAY OF NATRIURETIC PEPTIDE: CPT

## 2020-04-20 PROCEDURE — 99214 OFFICE O/P EST MOD 30 MIN: CPT | Mod: 95,,, | Performed by: PSYCHIATRY & NEUROLOGY

## 2020-04-20 PROCEDURE — 85379 FIBRIN DEGRADATION QUANT: CPT

## 2020-04-20 PROCEDURE — 82728 ASSAY OF FERRITIN: CPT

## 2020-04-20 PROCEDURE — 84484 ASSAY OF TROPONIN QUANT: CPT

## 2020-04-20 PROCEDURE — 36000 PLACE NEEDLE IN VEIN: CPT

## 2020-04-20 PROCEDURE — 93010 EKG 12-LEAD: ICD-10-PCS | Mod: ,,, | Performed by: INTERNAL MEDICINE

## 2020-04-20 PROCEDURE — 85025 COMPLETE CBC W/AUTO DIFF WBC: CPT

## 2020-04-20 PROCEDURE — 83615 LACTATE (LD) (LDH) ENZYME: CPT

## 2020-04-20 PROCEDURE — 0503F POSTPARTUM CARE VISIT: CPT | Mod: ,,, | Performed by: ADVANCED PRACTICE MIDWIFE

## 2020-04-20 PROCEDURE — 99214 PR OFFICE/OUTPT VISIT, EST, LEVL IV, 30-39 MIN: ICD-10-PCS | Mod: 95,,, | Performed by: PSYCHIATRY & NEUROLOGY

## 2020-04-20 PROCEDURE — 99285 EMERGENCY DEPT VISIT HI MDM: CPT | Mod: 25

## 2020-04-20 PROCEDURE — 82550 ASSAY OF CK (CPK): CPT

## 2020-04-20 PROCEDURE — 86140 C-REACTIVE PROTEIN: CPT

## 2020-04-20 PROCEDURE — 83605 ASSAY OF LACTIC ACID: CPT

## 2020-04-20 RX ORDER — CYCLOBENZAPRINE HCL 10 MG
10 TABLET ORAL 3 TIMES DAILY PRN
Qty: 30 TABLET | Refills: 1 | Status: SHIPPED | OUTPATIENT
Start: 2020-04-20 | End: 2020-04-30

## 2020-04-20 RX ORDER — HYDROXYZINE PAMOATE 25 MG/1
25 CAPSULE ORAL 3 TIMES DAILY
Qty: 30 CAPSULE | Refills: 3 | Status: SHIPPED | OUTPATIENT
Start: 2020-04-20 | End: 2020-05-19 | Stop reason: SDUPTHER

## 2020-04-20 RX ORDER — SERTRALINE HYDROCHLORIDE 100 MG/1
100 TABLET, FILM COATED ORAL DAILY
Qty: 30 TABLET | Refills: 11 | Status: SHIPPED | OUTPATIENT
Start: 2020-04-20 | End: 2020-05-05

## 2020-04-20 NOTE — PROGRESS NOTES
ESTABLISHED OUTPATIENT VISIT   E/M LEVEL 4: [id number]    ENCOUNTER DATE: 4/30/2020  SITE: [facility] [place].         The patient location is:  Patient home  The chief complaint leading to consultation is: anxiety, depression and ADHD  Visit type: Virtual visit with synchronous audio and video  Total time spent with patient: 20  Each patient to whom he or she provides medical services by telemedicine is:  (1) informed of the relationship between the physician and patient and the respective role of any other health care provider with respect to management of the patient; and (2) notified that he or she may decline to receive medical services by telemedicine and may withdraw from such care at any time.       HISTORY      CHIEF COMPLAINT   Anthony Sexton is a 29 y.o. female who presents for followup of anxiety disorder AND adhd    HPI     Interval History and Content of Current Session:  Interim Events/Subjective Report/Content of Current Session:   Had her baby  She has had problems with sleep  Her baby has been cluster feeding and has not been able to fall sleep  She has been having problems with anxiety  Says that she has been having a lot of problems with anxiety   Has had postpartum depression in the past  Right now dealing with post partum anxiety  Currently does not feel depressed, has motivation, no feelings of worthlessness and hopelessness  The biggest problem is that she is struggling with increased rumination, somatic symptoms of anxiety,         Depressive Disorder: DENIES depressed mood, worthlessness, passive suicidal ideation, active suicidal ideation.    Anxiety Disorder: panic attacks, sleep problems, excessive worry, avoidance symptoms.   Manic Disorder: DENIES racing thoughts, mixed with depression symptoms, reckless behavior.   Psychotic Disorder: DENIES delusions, hallucinations, overvalued ideas.   Substance Use:  DENIES cigarettes, alcohol, blackouts.           ROS   pertient symptoms  in [facility]  Past Medical History reviewed: Yes  Family History reviewed: Yes  Social History reviewed: Yes    Allergies:   Review of patient's allergies indicates:  No Known Allergies     PSYCHOTROPIC MEDICATIONS   Current Outpatient Medications on File Prior to Visit   Medication Sig Dispense Refill    ergocalciferol (ERGOCALCIFEROL) 50,000 unit Cap Take 2 capsules (100,000 Units total) by mouth every 7 days. 8 capsule 5    ferrous sulfate 325 (65 FE) MG EC tablet Take 1 tablet (325 mg total) by mouth 2 (two) times daily. 60 tablet 3    ibuprofen (ADVIL,MOTRIN) 600 MG tablet Take 1 tablet (600 mg total) by mouth every 6 (six) hours. 60 tablet 0    levonorgestreL (MIRENA) 20 mcg/24 hours (5 yrs) 52 mg IUD 1 Intra Uterine Device by Intrauterine route once. for 1 dose 1 each 0    prenatal vits62/FA/om3/dha/epa (PRENATAL GUMMY ORAL) Take 1 Dose by mouth.       No current facility-administered medications on file prior to visit.          EXAMINATION    [unfilled]  There were no vitals filed for this visit.     Wt Readings from Last 2 Encounters:   04/23/20 106.1 kg (233 lb 14.5 oz)   04/20/20 113.4 kg (250 lb)         CONSTITUTIONAL  General Appearance: well groomed    MUSCULOSKELETAL  No involuntary muscle movements  Normal gait    PSYCHIATRIC   Mental Status Exam:  Appearance: unremarkable, age appropriate  Behavior/Cooperation: appopriate normal, cooperative  Speech:  normal tone, normal rate, normal pitch, normal volume  Language: grossly intact with spontaneous speech  Mood: Anxiety   Affect:  congruent with mood, broad  Thought Process:linear, logical and gaol directed.   Thought Content: no suicidality, no homicidality, no delusions, nor paranoia  Sensorium:  Awake  Alert and Oriented: x3 person, place, situation, no time/date  Memory:    Recent:  Intact; able to report recent events  Attention/concentration: appropriate for age/education.   Insight:Intact  Judgment:  Intact      MEDICAL DECISION  MAKING    DIAGNOSES  Encounter Diagnoses   Name Primary?    Post partum depression Yes    SENTHIL (generalized anxiety disorder)          PROBLEM LIST AND MANAGEMENT PLANS    Encounter Diagnoses   Name Primary?    Post partum depression Yes    SENTHIL (generalized anxiety disorder)           Orders Placed This Encounter    sertraline (ZOLOFT) 100 MG tablet    hydrOXYzine pamoate (VISTARIL) 25 MG Cap           PRESCRIPTION DRUG MANAGEMENT  Compliance: YES  Side Effects: YES  Regimen Adjustments: YES           SSRI  R/B/SE of SSRI reviewed with patient including gi distress, sexual dysfunction, and serotonin syndrome.  Black box warning of increased risk of worsening depression and SI reviewed.  Patient verbalized understanding.    DIAGNOSTIC TESTING  Encounter Diagnoses   Name Primary?    Post partum depression Yes    SENTHIL (generalized anxiety disorder)            -Patient was educated on possible side-effects of medications  -Discussed 911 for suicidal or homicidal ideation, worsening symptoms, or adverse reactions to medications  -Patient will contact clinic with any questions or concerns    Lenora Walters

## 2020-04-20 NOTE — PROGRESS NOTES
The patient location is: home  The chief complaint leading to consultation is: post partum  Visit type: audiovisual  Total time spent with patient: 12 min  Each patient to whom he or she provides medical services by telemedicine is:  (1) informed of the relationship between the physician and patient and the respective role of any other health care provider with respect to management of the patient; and (2) notified that he or she may decline to receive medical services by telemedicine and may withdraw from such care at any time.    Notes: Having back pain Did not  motrin rx Will alternate motrin and tylenol. Rx flexeril  Sent to pharmacy  Encouraged to increase water elevate feet and rest when baby sleeps  Keep pp visit  Feels SOB when laying flat /83, mild edema in feet Will monitor and report if sx worsen

## 2020-04-21 VITALS
SYSTOLIC BLOOD PRESSURE: 142 MMHG | HEIGHT: 65 IN | TEMPERATURE: 99 F | HEART RATE: 61 BPM | RESPIRATION RATE: 18 BRPM | DIASTOLIC BLOOD PRESSURE: 69 MMHG | BODY MASS INDEX: 41.65 KG/M2 | OXYGEN SATURATION: 100 % | WEIGHT: 250 LBS

## 2020-04-21 DIAGNOSIS — Z01.84 ANTIBODY RESPONSE EXAMINATION: ICD-10-CM

## 2020-04-21 LAB — FERRITIN SERPL-MCNC: 20 NG/ML (ref 20–300)

## 2020-04-21 PROCEDURE — 25000003 PHARM REV CODE 250: Performed by: FAMILY MEDICINE

## 2020-04-21 PROCEDURE — 25500020 PHARM REV CODE 255: Performed by: FAMILY MEDICINE

## 2020-04-21 RX ORDER — ACETAMINOPHEN 325 MG/1
650 TABLET ORAL
Status: COMPLETED | OUTPATIENT
Start: 2020-04-21 | End: 2020-04-21

## 2020-04-21 RX ADMIN — IOHEXOL 100 ML: 350 INJECTION, SOLUTION INTRAVENOUS at 01:04

## 2020-04-21 RX ADMIN — ACETAMINOPHEN 650 MG: 325 TABLET ORAL at 02:04

## 2020-04-21 NOTE — ED NOTES
Patient PP x6 days, reports that ever since she went home on Saturday 4/18 she noticed that she became SOB when laying flat and when her feet are elevated. Swelling also noted to bilateral legs and feet, RLE extremity more swollen than LLE. SOB improves when pumping or nursing.

## 2020-04-21 NOTE — ED PROVIDER NOTES
SCRIBE #1 NOTE: I, Babatunde Orourke, am scribing for, and in the presence of, Cindy Zamarripa MD. I have scribed the entire note.      History      Chief Complaint   Patient presents with    Shortness of Breath     PP x 4 days, sent down from L&D for eval for PE. Pt c/o shortness of breath, worsening when lying flat or with feet elevated. Pt states that she feels some relief after pumping.        Review of patient's allergies indicates:  No Known Allergies     HPI   HPI    4/20/2020, 10:29 PM   History obtained from the patient      History of Present Illness: Anthony Sexton is a 29 y.o. female patient who presents to the Emergency Department for SOB, onset just PTA. Pt is 4 days postpartum (vaginal delivery). Symptoms are constant and moderate in severity. Pt states that her sxs are worse when lying flat, when her feet are elevated, and at night. Pt states that her SOB improves when pumping or nursing. Associated sxs chest pain when walking. Patient denies any fever, chills, n/v/d, weakness, numbness, dizziness, headache, and all other sxs at this time. No prior Tx reported. No further complaints or concerns at this time.     Arrival mode: Personal vehicle    PCP: Tyesha Leggett MD       Past Medical History:  Past Medical History:   Diagnosis Date    Anemia     Chronic low back pain     History of abnormal cervical Pap smear     History of recurrent UTIs     History of ulcer disease     Irregular menses     Obesity     Vitamin D deficiency        Past Surgical History:  Past Surgical History:   Procedure Laterality Date    None           Family History:  Family History   Problem Relation Age of Onset    Asthma Mother     Hyperlipidemia Mother     Hypertension Mother     Hypertension Father     Hyperlipidemia Father     Heart disease Father 55        MI/CAD    No Known Problems Sister     No Known Problems Brother     Diabetes Maternal Aunt     Diabetes Maternal Grandmother     Insulin resistance  Sister     No Known Problems Brother     No Known Problems Brother     No Known Problems Brother     No Known Problems Brother     No Known Problems Daughter     Breast cancer Maternal Aunt     Cancer Neg Hx     Stroke Neg Hx     Brain cancer Neg Hx     Migraines Neg Hx     Colon cancer Neg Hx     Ovarian cancer Neg Hx        Social History:  Social History     Tobacco Use    Smoking status: Never Smoker    Smokeless tobacco: Never Used   Substance and Sexual Activity    Alcohol use: Not Currently     Alcohol/week: 0.0 standard drinks    Drug use: No    Sexual activity: Yes     Partners: Male     Birth control/protection: None       ROS   Review of Systems   Constitutional: Negative for chills, diaphoresis, fatigue and fever.   HENT: Negative for sore throat.    Respiratory: Positive for shortness of breath.    Cardiovascular: Positive for chest pain (when walking).   Gastrointestinal: Negative for diarrhea, nausea and vomiting.   Genitourinary: Negative for dysuria.   Musculoskeletal: Negative for back pain.   Skin: Negative for rash.   Neurological: Negative for dizziness, seizures, weakness, light-headedness, numbness and headaches.   Hematological: Does not bruise/bleed easily.   All other systems reviewed and are negative.    Physical Exam      Initial Vitals [04/20/20 2148]   BP Pulse Resp Temp SpO2   (!) 168/84 63 18 98.8 °F (37.1 °C) 99 %      MAP       --          Physical Exam  Nursing Notes and Vital Signs Reviewed.  Constitutional: Patient is in no acute distress. Well-developed and well-nourished.  Head: Atraumatic. Normocephalic.  Eyes: PERRL. EOM intact. Conjunctivae are not pale. No scleral icterus.  ENT: Mucous membranes are moist. Oropharynx is clear and symmetric.    Neck: Supple. Full ROM. No lymphadenopathy.  Cardiovascular: Regular rate. Regular rhythm. No murmurs, rubs, or gallops. Distal pulses are 2+ and symmetric.  Pulmonary/Chest: No respiratory distress. Clear to  "auscultation bilaterally. No wheezing or rales.  Abdominal: Soft and non-distended.  There is no tenderness.  No rebound, guarding, or rigidity.   Musculoskeletal: Moves all extremities. No obvious deformities. Trace BLE edema, RLE slightly larger than LLE.  Skin: Warm and dry.  Neurological:  Alert, awake, and appropriate.  Normal speech.  No acute focal neurological deficits are appreciated.  Psychiatric: Normal affect. Good eye contact. Appropriate in content.    ED Course    Procedures  ED Vital Signs:  Vitals:    04/20/20 2148 04/20/20 2206 04/20/20 2240 04/20/20 2355   BP: (!) 168/84  (!) 165/82 (!) 159/73   Pulse: 63 65 60 64   Resp: 18  19 19   Temp: 98.8 °F (37.1 °C)      TempSrc:       SpO2: 99%  100% 100%   Weight: 113.4 kg (250 lb)      Height: 5' 5" (1.651 m)       04/21/20 0135 04/21/20 0320   BP: 136/65 (!) 142/69   Pulse: 62 61   Resp: 20 18   Temp:  98.6 °F (37 °C)   TempSrc:  Oral   SpO2: 97% 100%   Weight:     Height:         Abnormal Lab Results:  Labs Reviewed   CBC W/ AUTO DIFFERENTIAL - Abnormal; Notable for the following components:       Result Value    RBC 3.94 (*)     Hemoglobin 9.3 (*)     Hematocrit 30.6 (*)     Mean Corpuscular Volume 78 (*)     Mean Corpuscular Hemoglobin 23.6 (*)     Mean Corpuscular Hemoglobin Conc 30.4 (*)     RDW 15.5 (*)     All other components within normal limits   COMPREHENSIVE METABOLIC PANEL - Abnormal; Notable for the following components:    CO2 21 (*)     Calcium 8.6 (*)     Albumin 2.9 (*)     Alkaline Phosphatase 137 (*)     All other components within normal limits   C-REACTIVE PROTEIN - Abnormal; Notable for the following components:    CRP 34.4 (*)     All other components within normal limits   B-TYPE NATRIURETIC PEPTIDE - Abnormal; Notable for the following components:     (*)     All other components within normal limits   D DIMER, QUANTITATIVE - Abnormal; Notable for the following components:    D-Dimer 3.11 (*)     All other components " within normal limits   LACTATE DEHYDROGENASE   CK   LACTIC ACID, PLASMA   TROPONIN I   SARS-COV-2 RNA AMPLIFICATION, QUAL    Narrative:     What symptom criteria does the patient meet?->Difficulty  breathing   PROCALCITONIN   FERRITIN        All Lab Results:  Results for orders placed or performed during the hospital encounter of 04/20/20   CBC auto differential   Result Value Ref Range    WBC 8.53 3.90 - 12.70 K/uL    RBC 3.94 (L) 4.00 - 5.40 M/uL    Hemoglobin 9.3 (L) 12.0 - 16.0 g/dL    Hematocrit 30.6 (L) 37.0 - 48.5 %    Mean Corpuscular Volume 78 (L) 82 - 98 fL    Mean Corpuscular Hemoglobin 23.6 (L) 27.0 - 31.0 pg    Mean Corpuscular Hemoglobin Conc 30.4 (L) 32.0 - 36.0 g/dL    RDW 15.5 (H) 11.5 - 14.5 %    Platelets 274 150 - 350 K/uL    MPV 11.5 9.2 - 12.9 fL    Immature Granulocytes 0.4 0.0 - 0.5 %    Gran # (ANC) 5.7 1.8 - 7.7 K/uL    Immature Grans (Abs) 0.03 0.00 - 0.04 K/uL    Lymph # 2.1 1.0 - 4.8 K/uL    Mono # 0.6 0.3 - 1.0 K/uL    Eos # 0.1 0.0 - 0.5 K/uL    Baso # 0.03 0.00 - 0.20 K/uL    nRBC 0 0 /100 WBC    Gran% 66.6 38.0 - 73.0 %    Lymph% 24.7 18.0 - 48.0 %    Mono% 6.7 4.0 - 15.0 %    Eosinophil% 1.2 0.0 - 8.0 %    Basophil% 0.4 0.0 - 1.9 %    Differential Method Automated    Comprehensive metabolic panel   Result Value Ref Range    Sodium 140 136 - 145 mmol/L    Potassium 3.5 3.5 - 5.1 mmol/L    Chloride 109 95 - 110 mmol/L    CO2 21 (L) 23 - 29 mmol/L    Glucose 83 70 - 110 mg/dL    BUN, Bld 10 6 - 20 mg/dL    Creatinine 0.7 0.5 - 1.4 mg/dL    Calcium 8.6 (L) 8.7 - 10.5 mg/dL    Total Protein 6.6 6.0 - 8.4 g/dL    Albumin 2.9 (L) 3.5 - 5.2 g/dL    Total Bilirubin 0.2 0.1 - 1.0 mg/dL    Alkaline Phosphatase 137 (H) 55 - 135 U/L    AST 26 10 - 40 U/L    ALT 20 10 - 44 U/L    Anion Gap 10 8 - 16 mmol/L    eGFR if African American >60 >60 mL/min/1.73 m^2    eGFR if non African American >60 >60 mL/min/1.73 m^2   C-Reactive Protein   Result Value Ref Range    CRP 34.4 (H) 0.0 - 8.2 mg/L    Lactate Dehydrogenase   Result Value Ref Range     110 - 260 U/L   CK   Result Value Ref Range     20 - 180 U/L   Lactic Acid, Plasma   Result Value Ref Range    Lactate (Lactic Acid) 0.8 0.5 - 2.2 mmol/L   Troponin I   Result Value Ref Range    Troponin I 0.009 0.000 - 0.026 ng/mL   COVID-19 Rapid Screening   Result Value Ref Range    SARS-CoV-2 RNA, Amplification, Qual Negative Negative   Procalcitonin   Result Value Ref Range    Procalcitonin 0.02 <0.25 ng/mL   Brain Natriuretic Peptide   Result Value Ref Range     (H) 0 - 99 pg/mL   D dimer, quantitative   Result Value Ref Range    D-Dimer 3.11 (H) <0.50 mg/L FEU     Imaging Results:  Imaging Results          CTA Chest Non-Coronary (PE Study) (Final result)  Result time 04/21/20 07:52:28    Final result by Herrera Jones MD (04/21/20 07:52:28)                 Impression:      No evidence of pulmonary embolism.    See additional findings above    All CT scans at this facility use dose modulation, iterative reconstruction and/or weight based dosing when appropriate to reduce radiation dose to as low as reasonably achievable.      Electronically signed by: Herrera Jones MD  Date:    04/21/2020  Time:    07:52             Narrative:    EXAMINATION:  CTA CHEST NON CORONARY    CLINICAL HISTORY:  Chest pain and shortness of breath}    TECHNIQUE:  After the intravenous administration of 100 cc of Omni 35 nonionic contrast using CT pulmonary angio technique, 1.25  Mm axial images were acquired using helical CT technique from the lung apices through costophrenic sulci.  Sagittal coronal and oblique MIPS were also submitted for interpretation.    COMPARISON:  Chest x-ray dated 04/20/2020    FINDINGS:  -Pulmonary arteries: Pulmonary arteries are well opacified.  No evidence of pulmonary embolism.  No evidence of pulmonary hypertension.  No right heart strain is identified with RV/LV ratio > 0.9.    -Lungs: No nodules or infiltrates.    -Pleura: No  thickening or fluid.    -Mediastinum/Ana Maria:No significant adenopathy.  Residual thymic tissue is noted.    -Axilla: No adenopathy.    -Thyroid: Normal lower gland.    -Heart/Aorta: Heart size is borderline enlarged..    No significant coronary artery disease.  No pericardial effusion. Aorta normal caliber.    -Bones/Chest Wall: Intact    -Upper Abdomen: Unremarkable                               US Lower Extremity Veins Left (Final result)  Result time 04/20/20 23:55:42    Final result by Pio Castro MD (04/20/20 23:55:42)                 Impression:      No sonographic evidence of deep venous thrombosis is identified.      Electronically signed by: Pio Castro  Date:    04/20/2020  Time:    23:55             Narrative:    EXAMINATION:  US LOWER EXTREMITY VEINS LEFT    CLINICAL HISTORY:  Localized swelling, mass and lump, left lower limb    COMPARISON:  None    FINDINGS:  Sonographic evaluation of the common femoral, superficial femoral, popliteal, and posterior tibial veins of left lower extremity demonstrates no sonographic evidence of deep venous thrombosis.                               X-Ray Chest AP Portable (Final result)  Result time 04/20/20 23:13:45    Final result by Pio Castro MD (04/20/20 23:13:45)                 Impression:      No acute findings.      Electronically signed by: Pio Castro  Date:    04/20/2020  Time:    23:13             Narrative:    EXAMINATION:  XR CHEST AP PORTABLE    CLINICAL HISTORY:  Suspected Covid-19 Virus Infection; cough    COMPARISON:  04/28/2019    FINDINGS:  Lung fields are clear.    No pleural fluid or pneumothorax.    No adenopathy is identified.    No significant osseous abnormality.                               3:03 AM: Per STAT radiology, pt's CT results: No evidence of pulmonary embolism. Lungs are clear.    The EKG was ordered, reviewed, and independently interpreted by the ED provider.  Interpretation time: 22:26  Rate: 61 BPM  Rhythm: Normal sinus rhythm with  sinus arrhythmia.  Interpretation: No acute ST changes. No STEMI.           The Emergency Provider reviewed the vital signs and test results, which are outlined above.    ED Discussion     2:56 AM: Reassessed pt at this time. Discussed with pt all pertinent ED information and results. Discussed pt dx and plan of tx. Gave pt all f/u and return to the ED instructions. All questions and concerns were addressed at this time. Pt expresses understanding of information and instructions, and is comfortable with plan to discharge. Pt is stable for discharge.    I discussed with patient and/or family/caretaker that evaluation in the ED does not suggest any emergent or life threatening medical conditions requiring immediate intervention beyond what was provided in the ED, and I believe patient is safe for discharge.  Regardless, an unremarkable evaluation in the ED does not preclude the development or presence of a serious of life threatening condition. As such, patient was instructed to return immediately for any worsening or change in current symptoms.         ED Medication(s):  Medications   iohexoL (OMNIPAQUE 350) injection 100 mL (100 mLs Intravenous Given 4/21/20 0128)   acetaminophen tablet 650 mg (650 mg Oral Given 4/21/20 0250)       Follow-up Information     Tyesha Leggett MD. Schedule an appointment as soon as possible for a visit in 3 days.    Specialty:  Family Medicine  Contact information:  [address]  [place] [address] [phone]                  Discharge Medication List as of 4/21/2020  2:56 AM           Medical Decision Making    Medical Decision Making:   Clinical Tests:   Lab Tests: Ordered and Reviewed  Radiological Study: Ordered and Reviewed  Medical Tests: Ordered and Reviewed           Scribe Attestation:   Scribe #1: I performed the above scribed service and the documentation accurately describes the services I performed. I attest to the accuracy of the note.    Attending:   Physician  "Attestation Statement for Scribe #1: I, Cindy Zamarripa MD, personally performed the services described in this documentation, as scribed by Babatunde Orourke, in my presence, and it is both accurate and complete.          Clinical Impression       ICD-10-CM ICD-9-CM   1. SOB (shortness of breath) R06.02 786.05   2. Suspected Covid-19 Virus Infection R68.89    3. Localized swelling of left lower extremity R22.42 782.2       Disposition:   Disposition: Discharged  Condition: Stable  Portions of this note may have been created with voice recognition software. Occasional "wrong-word" or "sound-a-like" substitutions may have occurred due to the inherent limitations of voice recognition software. Please, read the note carefully and recognize, using context, where substitutions have occurred.            Cindy Zamarripa MD  04/21/20 0758    "

## 2020-04-22 ENCOUNTER — PATIENT MESSAGE (OUTPATIENT)
Dept: OBSTETRICS AND GYNECOLOGY | Facility: CLINIC | Age: 30
End: 2020-04-22

## 2020-04-22 ENCOUNTER — TELEPHONE (OUTPATIENT)
Dept: LACTATION | Facility: CLINIC | Age: 30
End: 2020-04-22

## 2020-04-22 LAB — T PALLIDUM AB SER QL IF: NORMAL

## 2020-04-22 NOTE — TELEPHONE ENCOUNTER
Pt checking to see if maternity leave start date can be changed from 4/24 to 4/13. I wanted to follow up that the request has been sent over to [facility]. Please advise. Thank you!

## 2020-04-23 ENCOUNTER — HOSPITAL ENCOUNTER (INPATIENT)
Facility: HOSPITAL | Age: 30
LOS: 2 days | Discharge: HOME | DRG: 776 | End: 2020-04-25
Attending: EMERGENCY MEDICINE | Admitting: OBSTETRICS & GYNECOLOGY
Payer: COMMERCIAL

## 2020-04-23 DIAGNOSIS — R06.02 SOB (SHORTNESS OF BREATH): ICD-10-CM

## 2020-04-23 DIAGNOSIS — E66.01 MORBID OBESITY WITH BMI OF 40.0-44.9, ADULT: ICD-10-CM

## 2020-04-23 DIAGNOSIS — Z87.59 HISTORY OF POSTPARTUM DEPRESSION: ICD-10-CM

## 2020-04-23 DIAGNOSIS — Z86.59 HISTORY OF POSTPARTUM DEPRESSION: ICD-10-CM

## 2020-04-23 PROBLEM — K21.9 GASTROESOPHAGEAL REFLUX DISEASE WITHOUT ESOPHAGITIS: Status: ACTIVE | Noted: 2020-04-23

## 2020-04-23 LAB
ALBUMIN SERPL BCP-MCNC: 3.1 G/DL (ref 3.5–5.2)
ALP SERPL-CCNC: 131 U/L (ref 55–135)
ALT SERPL W/O P-5'-P-CCNC: 21 U/L (ref 10–44)
ANION GAP SERPL CALC-SCNC: 12 MMOL/L (ref 8–16)
AST SERPL-CCNC: 20 U/L (ref 10–40)
BASOPHILS # BLD AUTO: 0.04 K/UL (ref 0–0.2)
BASOPHILS NFR BLD: 0.5 % (ref 0–1.9)
BILIRUB SERPL-MCNC: 0.3 MG/DL (ref 0.1–1)
BILIRUB UR QL STRIP: NEGATIVE
BNP SERPL-MCNC: 125 PG/ML (ref 0–99)
BUN SERPL-MCNC: 13 MG/DL (ref 6–20)
CALCIUM SERPL-MCNC: 8.4 MG/DL (ref 8.7–10.5)
CHLORIDE SERPL-SCNC: 109 MMOL/L (ref 95–110)
CLARITY UR: CLEAR
CO2 SERPL-SCNC: 19 MMOL/L (ref 23–29)
COLOR UR: YELLOW
CREAT SERPL-MCNC: 0.7 MG/DL (ref 0.5–1.4)
CREAT UR-MCNC: 24.4 MG/DL (ref 15–325)
DIFFERENTIAL METHOD: ABNORMAL
EOSINOPHIL # BLD AUTO: 0.1 K/UL (ref 0–0.5)
EOSINOPHIL NFR BLD: 1.4 % (ref 0–8)
ERYTHROCYTE [DISTWIDTH] IN BLOOD BY AUTOMATED COUNT: 15.7 % (ref 11.5–14.5)
EST. GFR  (AFRICAN AMERICAN): >60 ML/MIN/1.73 M^2
EST. GFR  (NON AFRICAN AMERICAN): >60 ML/MIN/1.73 M^2
GLUCOSE SERPL-MCNC: 76 MG/DL (ref 70–110)
GLUCOSE UR QL STRIP: NEGATIVE
HCT VFR BLD AUTO: 35.3 % (ref 37–48.5)
HGB BLD-MCNC: 10.7 G/DL (ref 12–16)
HGB UR QL STRIP: ABNORMAL
IMM GRANULOCYTES # BLD AUTO: 0.03 K/UL (ref 0–0.04)
IMM GRANULOCYTES NFR BLD AUTO: 0.4 % (ref 0–0.5)
KETONES UR QL STRIP: ABNORMAL
LEUKOCYTE ESTERASE UR QL STRIP: NEGATIVE
LYMPHOCYTES # BLD AUTO: 2.1 K/UL (ref 1–4.8)
LYMPHOCYTES NFR BLD: 26.4 % (ref 18–48)
MCH RBC QN AUTO: 23.5 PG (ref 27–31)
MCHC RBC AUTO-ENTMCNC: 30.3 G/DL (ref 32–36)
MCV RBC AUTO: 78 FL (ref 82–98)
MONOCYTES # BLD AUTO: 0.5 K/UL (ref 0.3–1)
MONOCYTES NFR BLD: 6 % (ref 4–15)
NEUTROPHILS # BLD AUTO: 5.2 K/UL (ref 1.8–7.7)
NEUTROPHILS NFR BLD: 65.3 % (ref 38–73)
NITRITE UR QL STRIP: NEGATIVE
NRBC BLD-RTO: 0 /100 WBC
PH UR STRIP: 7 [PH] (ref 5–8)
PLATELET # BLD AUTO: 349 K/UL (ref 150–350)
PMV BLD AUTO: 12.4 FL (ref 9.2–12.9)
POTASSIUM SERPL-SCNC: 3.7 MMOL/L (ref 3.5–5.1)
PROT SERPL-MCNC: 7.1 G/DL (ref 6–8.4)
PROT UR QL STRIP: NEGATIVE
PROT UR-MCNC: 7 MG/DL (ref 0–15)
PROT/CREAT UR: 0.29 MG/G{CREAT} (ref 0–0.2)
RBC # BLD AUTO: 4.55 M/UL (ref 4–5.4)
SARS-COV-2 RDRP RESP QL NAA+PROBE: NEGATIVE
SODIUM SERPL-SCNC: 140 MMOL/L (ref 136–145)
SP GR UR STRIP: 1.01 (ref 1–1.03)
URN SPEC COLLECT METH UR: ABNORMAL
UROBILINOGEN UR STRIP-ACNC: NEGATIVE EU/DL
WBC # BLD AUTO: 7.98 K/UL (ref 3.9–12.7)

## 2020-04-23 PROCEDURE — 11000001 HC ACUTE MED/SURG PRIVATE ROOM

## 2020-04-23 PROCEDURE — 93005 ELECTROCARDIOGRAM TRACING: CPT

## 2020-04-23 PROCEDURE — 81003 URINALYSIS AUTO W/O SCOPE: CPT

## 2020-04-23 PROCEDURE — 99285 EMERGENCY DEPT VISIT HI MDM: CPT | Mod: 25

## 2020-04-23 PROCEDURE — 93010 EKG 12-LEAD: ICD-10-PCS | Mod: ,,, | Performed by: INTERNAL MEDICINE

## 2020-04-23 PROCEDURE — 99222 1ST HOSP IP/OBS MODERATE 55: CPT | Mod: 24,,, | Performed by: OBSTETRICS & GYNECOLOGY

## 2020-04-23 PROCEDURE — 85025 COMPLETE CBC W/AUTO DIFF WBC: CPT

## 2020-04-23 PROCEDURE — 99222 PR INITIAL HOSPITAL CARE,LEVL II: ICD-10-PCS | Mod: 24,,, | Performed by: OBSTETRICS & GYNECOLOGY

## 2020-04-23 PROCEDURE — U0002 COVID-19 LAB TEST NON-CDC: HCPCS

## 2020-04-23 PROCEDURE — 82570 ASSAY OF URINE CREATININE: CPT

## 2020-04-23 PROCEDURE — 25000003 PHARM REV CODE 250: Performed by: OBSTETRICS & GYNECOLOGY

## 2020-04-23 PROCEDURE — 80053 COMPREHEN METABOLIC PANEL: CPT

## 2020-04-23 PROCEDURE — 83880 ASSAY OF NATRIURETIC PEPTIDE: CPT

## 2020-04-23 PROCEDURE — 63600175 PHARM REV CODE 636 W HCPCS: Performed by: OBSTETRICS & GYNECOLOGY

## 2020-04-23 PROCEDURE — 93010 ELECTROCARDIOGRAM REPORT: CPT | Mod: ,,, | Performed by: INTERNAL MEDICINE

## 2020-04-23 RX ORDER — IBUPROFEN 600 MG/1
600 TABLET ORAL EVERY 8 HOURS PRN
Status: DISCONTINUED | OUTPATIENT
Start: 2020-04-23 | End: 2020-04-25 | Stop reason: HOSPADM

## 2020-04-23 RX ORDER — LABETALOL 200 MG/1
200 TABLET, FILM COATED ORAL EVERY 12 HOURS
Status: DISCONTINUED | OUTPATIENT
Start: 2020-04-23 | End: 2020-04-24

## 2020-04-23 RX ORDER — BUTALBITAL, ACETAMINOPHEN AND CAFFEINE 50; 325; 40 MG/1; MG/1; MG/1
1 TABLET ORAL EVERY 6 HOURS PRN
Status: DISCONTINUED | OUTPATIENT
Start: 2020-04-23 | End: 2020-04-24

## 2020-04-23 RX ORDER — MAGNESIUM SULFATE HEPTAHYDRATE 40 MG/ML
2 INJECTION, SOLUTION INTRAVENOUS CONTINUOUS
Status: DISCONTINUED | OUTPATIENT
Start: 2020-04-23 | End: 2020-04-24

## 2020-04-23 RX ORDER — SERTRALINE HYDROCHLORIDE 50 MG/1
50 TABLET, FILM COATED ORAL DAILY
Status: DISCONTINUED | OUTPATIENT
Start: 2020-04-23 | End: 2020-04-25 | Stop reason: HOSPADM

## 2020-04-23 RX ORDER — MAGNESIUM SULFATE HEPTAHYDRATE 40 MG/ML
4 INJECTION, SOLUTION INTRAVENOUS ONCE
Status: COMPLETED | OUTPATIENT
Start: 2020-04-23 | End: 2020-04-23

## 2020-04-23 RX ORDER — SODIUM CHLORIDE, SODIUM LACTATE, POTASSIUM CHLORIDE, CALCIUM CHLORIDE 600; 310; 30; 20 MG/100ML; MG/100ML; MG/100ML; MG/100ML
INJECTION, SOLUTION INTRAVENOUS CONTINUOUS
Status: DISCONTINUED | OUTPATIENT
Start: 2020-04-23 | End: 2020-04-24

## 2020-04-23 RX ORDER — PANTOPRAZOLE SODIUM 40 MG/1
40 TABLET, DELAYED RELEASE ORAL DAILY
COMMUNITY
End: 2020-05-08 | Stop reason: SDUPTHER

## 2020-04-23 RX ORDER — PANTOPRAZOLE SODIUM 40 MG/1
40 TABLET, DELAYED RELEASE ORAL DAILY
Status: DISCONTINUED | OUTPATIENT
Start: 2020-04-23 | End: 2020-04-25 | Stop reason: HOSPADM

## 2020-04-23 RX ORDER — CALCIUM GLUCONATE 98 MG/ML
1 INJECTION, SOLUTION INTRAVENOUS
Status: DISCONTINUED | OUTPATIENT
Start: 2020-04-23 | End: 2020-04-25 | Stop reason: HOSPADM

## 2020-04-23 RX ORDER — ACETAMINOPHEN 325 MG/1
650 TABLET ORAL EVERY 6 HOURS PRN
Status: DISCONTINUED | OUTPATIENT
Start: 2020-04-23 | End: 2020-04-25 | Stop reason: HOSPADM

## 2020-04-23 RX ADMIN — SODIUM CHLORIDE, SODIUM LACTATE, POTASSIUM CHLORIDE, AND CALCIUM CHLORIDE: .6; .31; .03; .02 INJECTION, SOLUTION INTRAVENOUS at 06:04

## 2020-04-23 RX ADMIN — SERTRALINE HYDROCHLORIDE 50 MG: 50 TABLET ORAL at 09:04

## 2020-04-23 RX ADMIN — ACETAMINOPHEN 650 MG: 325 TABLET ORAL at 01:04

## 2020-04-23 RX ADMIN — ACETAMINOPHEN 650 MG: 325 TABLET ORAL at 06:04

## 2020-04-23 RX ADMIN — BUTALBITAL, ACETAMINOPHEN AND CAFFEINE 1 TABLET: 50; 325; 40 TABLET ORAL at 08:04

## 2020-04-23 RX ADMIN — LABETALOL HYDROCHLORIDE 200 MG: 200 TABLET, FILM COATED ORAL at 08:04

## 2020-04-23 RX ADMIN — PANTOPRAZOLE SODIUM 40 MG: 40 TABLET, DELAYED RELEASE ORAL at 10:04

## 2020-04-23 RX ADMIN — LABETALOL HYDROCHLORIDE 200 MG: 200 TABLET, FILM COATED ORAL at 07:04

## 2020-04-23 RX ADMIN — MAGNESIUM SULFATE HEPTAHYDRATE 40 G: 40 INJECTION, SOLUTION INTRAVENOUS at 06:04

## 2020-04-23 NOTE — SUBJECTIVE & OBJECTIVE
OB History    Para Term  AB Living   2 2 2 0 0 2   SAB TAB Ectopic Multiple Live Births   0 0 0 0 2      # Outcome Date GA Lbr Trever/2nd Weight Sex Delivery Anes PTL Lv   2 Term 20 38w2d 07:44 / 00:06 3.14 kg (6 lb 14.8 oz) F Vag-Spont EPI N DANIELLE      Name: KERVIN GUEVARA      Apgar1: 9  Apgar5: 9   1 Term 16 37w0d  3.289 kg (7 lb 4 oz) F Vag-Spont   DANIELLE      Complications: Severe hyperemesis gravidarum     Past Medical History:   Diagnosis Date    Anemia     Chronic low back pain     History of abnormal cervical Pap smear     History of recurrent UTIs     History of ulcer disease     Irregular menses     Obesity     Pre-eclampsia in postpartum period 2020    Vitamin D deficiency      Past Surgical History:   Procedure Laterality Date    None         PTA Medications   Medication Sig    ergocalciferol (ERGOCALCIFEROL) 50,000 unit Cap Take 2 capsules (100,000 Units total) by mouth every 7 days.    ferrous sulfate 325 (65 FE) MG EC tablet Take 1 tablet (325 mg total) by mouth 2 (two) times daily.    hydrOXYzine pamoate (VISTARIL) 25 MG Cap Take 1 capsule (25 mg total) by mouth 3 (three) times daily.    ibuprofen (ADVIL,MOTRIN) 600 MG tablet Take 1 tablet (600 mg total) by mouth every 6 (six) hours.    pantoprazole (PROTONIX) 40 MG tablet Take 40 mg by mouth once daily.    prenatal vits62/FA/om3/dha/epa (PRENATAL GUMMY ORAL) Take 1 Dose by mouth.    sertraline (ZOLOFT) 100 MG tablet Take 1 tablet (100 mg total) by mouth once daily. (Patient taking differently: Take 50 mg by mouth once daily. )    cyclobenzaprine (FLEXERIL) 10 MG tablet Take 1 tablet (10 mg total) by mouth 3 (three) times daily as needed for Muscle spasms.    levonorgestreL (MIRENA) 20 mcg/24 hours (5 yrs) 52 mg IUD 1 Intra Uterine Device by Intrauterine route once. for 1 dose       Review of patient's allergies indicates:  No Known Allergies     Family History     Problem Relation (Age of Onset)     Asthma Mother    Breast cancer Maternal Aunt    Diabetes Maternal Aunt, Maternal Grandmother    Heart disease Father (55)    Hyperlipidemia Mother, Father    Hypertension Mother, Father    Insulin resistance Sister    No Known Problems Sister, Brother, Brother, Brother, Brother, Brother, Daughter        Tobacco Use    Smoking status: Never Smoker    Smokeless tobacco: Never Used   Substance and Sexual Activity    Alcohol use: Not Currently     Alcohol/week: 0.0 standard drinks    Drug use: No    Sexual activity: Yes     Partners: Male     Birth control/protection: None     Review of Systems   Constitutional: Negative for activity change, chills, fatigue and fever.   Eyes: Positive for visual disturbance.   Respiratory: Positive for shortness of breath. Negative for cough and wheezing.    Cardiovascular: Positive for leg swelling. Negative for chest pain.   Gastrointestinal: Negative for abdominal pain, constipation, nausea and vomiting.   Genitourinary: Negative for dysuria, frequency, hematuria, pelvic pain, urgency, vaginal bleeding (light lochia) and vaginal discharge.   Integumentary:  Negative for rash.   Neurological: Positive for headaches.      Objective:     Vital Signs (Most Recent):  Temp: 98.2 °F (36.8 °C) (04/23/20 0800)  Pulse: 90 (04/23/20 0805)  Resp: 18 (04/23/20 0800)  BP: (!) 143/69 (04/23/20 0803)  SpO2: 99 % (04/23/20 0805) Vital Signs (24h Range):  Temp:  [98.2 °F (36.8 °C)-98.7 °F (37.1 °C)] 98.2 °F (36.8 °C)  Pulse:  [56-96] 90  Resp:  [18-24] 18  SpO2:  [98 %-100 %] 99 %  BP: (137-193)/() 143/69     Weight: 106.1 kg (233 lb 14.5 oz)  Body mass index is 38.92 kg/m².    Patient's last menstrual period was 07/08/2019.    Physical Exam:   Constitutional: She is oriented to person, place, and time. She appears well-developed and well-nourished. No distress.       Cardiovascular: Normal rate, regular rhythm and normal heart sounds.    No murmur heard.   Pulmonary/Chest: Effort normal  and breath sounds normal. No respiratory distress. She has no wheezes. She has no rales.        Abdominal: Soft. Bowel sounds are normal. She exhibits no distension. There is no tenderness. There is no guarding.     Genitourinary: Vagina normal.           Musculoskeletal: She exhibits edema (1+ BLE).   No calf tenderness       Neurological: She is alert and oriented to person, place, and time. She has normal reflexes.   No ankle clonus    Skin: Skin is warm and dry. No rash noted. She is not diaphoretic.        Laboratory:  CBC:   Recent Labs   Lab 04/23/20  0448   WBC 7.98   RBC 4.55   HGB 10.7*   HCT 35.3*      MCV 78*   MCH 23.5*   MCHC 30.3*     CMP:   Recent Labs   Lab 04/23/20  0557   GLU 76   CALCIUM 8.4*   ALBUMIN 3.1*   PROT 7.1      K 3.7   CO2 19*      BUN 13   CREATININE 0.7   ALKPHOS 131   ALT 21   AST 20   BILITOT 0.3     I have personallly reviewed all pertinent lab results from the last 24 hours.    Diagnostic Results:  Labs: Reviewed

## 2020-04-23 NOTE — PHYSICIAN QUERY
PT Name: Anthony Sexton  MR #: [id number]     Physician Query Form - Documentation Clarification      CDS/: Shamar Lopez,[email or web address]        Contact information:[email or web address]    This form is a permanent document in the medical record.     Query Date: April 23, 2020    By submitting this query, we are merely seeking further clarification of documentation. Please utilize your independent clinical judgment when addressing the question(s) below.    The Medical record reflects the following:    Supporting Clinical Findings Location in Medical Record   Positive RPR test  Consulted with MD  1x dose of Bicillin given  FTA antibodies pending  Will notify peds  Babys RPR is negative    FTA-ABS  Value: Non-reactive OB Progress note 4/18@940am              LAB 4/16                                                                                      Doctor, Please specify diagnosis or diagnoses associated with above clinical findings.    Please clarify positive RPR test.    Provider Use Only      [  ] No current or past syphilis infection    [  ] Current syphilis infection    [  ] Past medical history only of syphilis infection    [  ] Other, please specify:___________________________________                                                                                                               [ x ] Clinically Undetermined

## 2020-04-23 NOTE — LACTATION NOTE
Lactation rounds attempted. Primary nurse reports that patient is asleep and she will notify lactation when patient is awake and ready for consult.

## 2020-04-23 NOTE — ASSESSMENT & PLAN NOTE
Patient with severe range pressures on admission. Urine pCr 0.29 and labs are normal with no evidence for HELLP.  Will plan for magnesium infusion. Currently BP is 162/80 will start labetalol 200 mg BID and monitor blood pressures closely.

## 2020-04-23 NOTE — LACTATION NOTE
Lactation Rounds: Mother states that she has been pumping breast at home every 4-6 hours to meet her breastfeeding goals of providing infant with 50% feeds of EBM via bottle.     Mother states her last pumping session was at 2300 on 04/22/2020 and states her breast are engorged with knots. [employer] pump, tubing and collections containers brought to bedside.  Discussed proper pump setting of maintenance phase.  Instructed on proper usage of pump and to adjust suction according to maximum comfort level.  Verified appropriate flange fit, 27 mm.  Educated on the frequency and duration of pumping in order to promote and maintain a full milk supply.  Hands on pumping technique reviewed.  Encouraged hand expression after pumping if needed for engorgement management.  Instructed on cleaning of breast pump parts.  Written instructions also given.  Pt verbalized understanding and appropriate recall for proper milk handling, collection, labeling and storage. Mother state she prefers to pump one breast at a time. Breast pumped bilaterally until soft and knots resolved. 48 mL collected, labeled and stored appropriately. Breast pump kit cleaned and ready for next pumping session.    Mother verbalizes understanding of all education and counseling. Mother denies any further needs or concerns at this time. Mother to call for assistance as needed.

## 2020-04-23 NOTE — ED PROVIDER NOTES
SCRIBE #1 NOTE: I, Babatunde Orourke, am scribing for, and in the presence of, Thomas Esparza MD. I have scribed the entire note.      History      Chief Complaint   Patient presents with    General Illness      has baby here on 16th; states has been having SOB since and was seen here few days ago. states BLE and LUE is swelling, with HA and vision changes       Review of patient's allergies indicates:  No Known Allergies     HPI   HPI    4/23/2020, 4:47 AM   History obtained from the patient      History of Present Illness: Anthony Sexton is a 29 y.o. female patient who presents to the Emergency Department for general illness, onset 1 week PTA. Pt is 1 week postpartum (vaginal delivery) and reports SOB, R postorbital headache, BLE and LUE swelling, and vision changes. Pt presented to the ED on 4/20/20 for similar sxs, and was discharged after a ruling out a PE. Pt had a negative COVID screen on 4/20/20. Symptoms are constant and moderate in severity. No mitigating or exacerbating factors reported. Patient denies any fever, chills, cough, n/v/d, CP, weakness, numbness, dizziness, and all other sxs at this time. No prior Tx reported. Pt is currently breast feeding. No further complaints or concerns at this time.     Arrival mode: Personal vehicle     PCP: Tyesha Leggett MD       Past Medical History:  Past Medical History:   Diagnosis Date    Anemia     Chronic low back pain     History of abnormal cervical Pap smear     History of recurrent UTIs     History of ulcer disease     Irregular menses     Obesity     Pre-eclampsia in postpartum period 4/23/2020    Vitamin D deficiency        Past Surgical History:  Past Surgical History:   Procedure Laterality Date    None           Family History:  Family History   Problem Relation Age of Onset    Asthma Mother     Hyperlipidemia Mother     Hypertension Mother     Hypertension Father     Hyperlipidemia Father     Heart disease Father 55        MI/CAD    No  Known Problems Sister     No Known Problems Brother     Diabetes Maternal Aunt     Diabetes Maternal Grandmother     Insulin resistance Sister     No Known Problems Brother     No Known Problems Brother     No Known Problems Brother     No Known Problems Brother     No Known Problems Daughter     Breast cancer Maternal Aunt     Cancer Neg Hx     Stroke Neg Hx     Brain cancer Neg Hx     Migraines Neg Hx     Colon cancer Neg Hx     Ovarian cancer Neg Hx        Social History:  Social History     Tobacco Use    Smoking status: Never Smoker    Smokeless tobacco: Never Used   Substance and Sexual Activity    Alcohol use: Not Currently     Alcohol/week: 0.0 standard drinks    Drug use: No    Sexual activity: Yes     Partners: Male     Birth control/protection: None       ROS   Review of Systems   Constitutional: Negative for chills, diaphoresis, fatigue and fever.   HENT: Negative for sore throat.    Eyes: Positive for visual disturbance.   Respiratory: Positive for shortness of breath. Negative for cough.    Cardiovascular: Positive for leg swelling (BLE). Negative for chest pain.   Gastrointestinal: Negative for diarrhea, nausea and vomiting.   Genitourinary: Negative for dysuria.   Musculoskeletal: Negative for back pain.        (+) LUE swelling   Skin: Negative for rash.   Neurological: Positive for headaches (R postorbital). Negative for dizziness, seizures, weakness and numbness.   Hematological: Does not bruise/bleed easily.   All other systems reviewed and are negative.    Physical Exam      Initial Vitals [04/23/20 0425]   BP Pulse Resp Temp SpO2   (!) 193/91 67 18 98.7 °F (37.1 °C) 99 %      MAP       --          Physical Exam  Nursing Notes and Vital Signs Reviewed.  Constitutional: Patient is in no acute distress. Obese.  Head: Atraumatic. Normocephalic.  Eyes: PERRL. EOM intact. Conjunctivae are not pale. No scleral icterus.  ENT: Mucous membranes are moist. Oropharynx is clear and  symmetric.    Neck: Supple. Full ROM. No lymphadenopathy.  Cardiovascular: Regular rate. Regular rhythm. No murmurs, rubs, or gallops. Distal pulses are 2+ and symmetric.  Pulmonary/Chest: No respiratory distress. Clear to auscultation bilaterally. No wheezing or rales.  Abdominal: Soft and non-distended.  There is mild RUQ tenderness.  No rebound, guarding, or rigidity. Good bowel sounds.  Musculoskeletal: Moves all extremities. No obvious deformities. Trace BLE edema. No calf tenderness.  Skin: Warm and dry.  Neurological:  Alert, awake, and appropriate.  Normal speech.  No acute focal neurological deficits are appreciated.  Psychiatric: Normal affect. Good eye contact. Appropriate in content.    ED Course    Procedures  ED Vital Signs:  Vitals:    04/23/20 1603 04/23/20 1606 04/23/20 1611 04/23/20 1616   BP: 139/86      Pulse: 78 71 73 74   Resp: 18      Temp: 98.2 °F (36.8 °C)      TempSrc: Axillary      SpO2:  96% 96% 96%   Weight:       Height:        04/23/20 1900 04/23/20 2000 04/23/20 2020 04/23/20 2100   BP: 139/75 134/80  138/71   Pulse: 83 74  73   Resp:       Temp:   98 °F (36.7 °C)    TempSrc:   Oral    SpO2: 98%      Weight:       Height:        04/23/20 2200 04/23/20 2300 04/24/20 0000 04/24/20 0100   BP: (!) 143/81 (!) 147/76 (!) 147/85 (!) 154/87   Pulse: 72 79 78 88   Resp:       Temp:       TempSrc:       SpO2:  98% 97% 98%   Weight:       Height:        04/24/20 0200 04/24/20 0300 04/24/20 0400   BP: (!) 154/89 (!) 154/86 (!) 154/88   Pulse:   75   Resp:      Temp:      TempSrc:      SpO2:   99%   Weight:      Height:          Abnormal Lab Results:  Labs Reviewed   CBC W/ AUTO DIFFERENTIAL - Abnormal; Notable for the following components:       Result Value    Hemoglobin 10.7 (*)     Hematocrit 35.3 (*)     Mean Corpuscular Volume 78 (*)     Mean Corpuscular Hemoglobin 23.5 (*)     Mean Corpuscular Hemoglobin Conc 30.3 (*)     RDW 15.7 (*)     All other components within normal limits    URINALYSIS, REFLEX TO URINE CULTURE - Abnormal; Notable for the following components:    Ketones, UA 2+ (*)     Occult Blood UA Trace (*)     All other components within normal limits    Narrative:     Preferred Collection Type->Urine, Clean Catch   PROTEIN / CREATININE RATIO, URINE - Abnormal; Notable for the following components:    Prot/Creat Ratio, Ur 0.29 (*)     All other components within normal limits    Narrative:     Preferred Collection Type->Urine, Clean Catch        All Lab Results:  Results for orders placed or performed during the hospital encounter of 04/23/20   CBC auto differential   Result Value Ref Range    WBC 7.98 3.90 - 12.70 K/uL    RBC 4.55 4.00 - 5.40 M/uL    Hemoglobin 10.7 (L) 12.0 - 16.0 g/dL    Hematocrit 35.3 (L) 37.0 - 48.5 %    Mean Corpuscular Volume 78 (L) 82 - 98 fL    Mean Corpuscular Hemoglobin 23.5 (L) 27.0 - 31.0 pg    Mean Corpuscular Hemoglobin Conc 30.3 (L) 32.0 - 36.0 g/dL    RDW 15.7 (H) 11.5 - 14.5 %    Platelets 349 150 - 350 K/uL    MPV 12.4 9.2 - 12.9 fL    Immature Granulocytes 0.4 0.0 - 0.5 %    Gran # (ANC) 5.2 1.8 - 7.7 K/uL    Immature Grans (Abs) 0.03 0.00 - 0.04 K/uL    Lymph # 2.1 1.0 - 4.8 K/uL    Mono # 0.5 0.3 - 1.0 K/uL    Eos # 0.1 0.0 - 0.5 K/uL    Baso # 0.04 0.00 - 0.20 K/uL    nRBC 0 0 /100 WBC    Gran% 65.3 38.0 - 73.0 %    Lymph% 26.4 18.0 - 48.0 %    Mono% 6.0 4.0 - 15.0 %    Eosinophil% 1.4 0.0 - 8.0 %    Basophil% 0.5 0.0 - 1.9 %    Differential Method Automated    Urinalysis, Reflex to Urine Culture Urine, Clean Catch   Result Value Ref Range    Specimen UA Urine, Clean Catch     Color, UA Yellow Yellow, Straw, Talia    Appearance, UA Clear Clear    pH, UA 7.0 5.0 - 8.0    Specific Gravity, UA 1.010 1.005 - 1.030    Protein, UA Negative Negative    Glucose, UA Negative Negative    Ketones, UA 2+ (A) Negative    Bilirubin (UA) Negative Negative    Occult Blood UA Trace (A) Negative    Nitrite, UA Negative Negative    Urobilinogen, UA Negative  <2.0 EU/dL    Leukocytes, UA Negative Negative   Protein / creatinine ratio, urine   Result Value Ref Range    Protein, Urine Random 7 0 - 15 mg/dL    Creatinine, Random Ur 24.4 15.0 - 325.0 mg/dL    Prot/Creat Ratio, Ur 0.29 (H) 0.00 - 0.20   Comprehensive metabolic panel   Result Value Ref Range    Sodium 140 136 - 145 mmol/L    Potassium 3.7 3.5 - 5.1 mmol/L    Chloride 109 95 - 110 mmol/L    CO2 19 (L) 23 - 29 mmol/L    Glucose 76 70 - 110 mg/dL    BUN, Bld 13 6 - 20 mg/dL    Creatinine 0.7 0.5 - 1.4 mg/dL    Calcium 8.4 (L) 8.7 - 10.5 mg/dL    Total Protein 7.1 6.0 - 8.4 g/dL    Albumin 3.1 (L) 3.5 - 5.2 g/dL    Total Bilirubin 0.3 0.1 - 1.0 mg/dL    Alkaline Phosphatase 131 55 - 135 U/L    AST 20 10 - 40 U/L    ALT 21 10 - 44 U/L    Anion Gap 12 8 - 16 mmol/L    eGFR if African American >60 >60 mL/min/1.73 m^2    eGFR if non African American >60 >60 mL/min/1.73 m^2   Brain natriuretic peptide   Result Value Ref Range     (H) 0 - 99 pg/mL   COVID-19 Rapid Screening   Result Value Ref Range    SARS-CoV-2 RNA, Amplification, Qual Negative Negative     Imaging Results:  Imaging Results          X-Ray Chest AP Portable (Final result)  Result time 04/23/20 07:21:14    Final result by Figueroa Quintero MD (04/23/20 07:21:14)                 Impression:      No acute abnormality.      Electronically signed by: Figueroa Quintero  Date:    04/23/2020  Time:    07:21             Narrative:    EXAMINATION:  XR CHEST AP PORTABLE    CLINICAL HISTORY:  . Shortness of breath    TECHNIQUE:  Single frontal portable view of the chest was performed.    COMPARISON:  04/20/2020    FINDINGS:  Support devices: None    The lungs are clear, with normal appearance of pulmonary vasculature and no pleural effusion or pneumothorax.    The cardiac silhouette is normal in size. The hilar and mediastinal contours are unremarkable.    Bones are intact.                               5:00 AM: Per ED physician, pt's CXR results: No acute  findings.    The EKG was ordered, reviewed, and independently interpreted by the ED provider.  Interpretation time: 04:55  Rate: 58 BPM  Rhythm: sinus bradycardia  Interpretation: No acute ST changes. No STEMI.           The Emergency Provider reviewed the vital signs and test results, which are outlined above.    ED Discussion     5:51 AM: Discussed pt's case with Dr. Cueto (OB/GYN) who recommends admission to L&D. Dr. Cueto agrees with current care and management of pt and accepts admission.   Admitting Service: OB/GYN  Admitting Physician: Dr. Cueto  Admit to: L&D    5:52 AM: Re-evaluated pt. I have discussed test results, shared treatment plan, and the need for admission with patient at bedside. Pt expresses understanding at this time and agree with all information. All questions answered. Pt has no further questions or concerns at this time. Pt is ready for admit.         ED Medication(s):  Medications   magnesium sulfate in water 40 gram/1,000 mL (4 %) infusion (2 g/hr Intravenous New Bag 4/24/20 0052)   calcium gluconate 100 mg/mL (10%) injection 1 g (has no administration in time range)   lactated ringers infusion ( Intravenous Verify Only 4/24/20 0400)   labetaloL tablet 200 mg (200 mg Oral Given 4/23/20 2049)   sertraline tablet 50 mg (50 mg Oral Given 4/23/20 0909)   pantoprazole EC tablet 40 mg (40 mg Oral Given 4/23/20 1002)   acetaminophen tablet 650 mg (650 mg Oral Given 4/23/20 1842)   butalbital-acetaminophen-caffeine -40 mg per tablet 1 tablet (1 tablet Oral Given 4/24/20 0320)   ibuprofen tablet 600 mg (has no administration in time range)   magnesium sulfate in water 40 gram/1,000 mL (4 %) bolus from bag 4 g (40 g Intravenous Bolus from Bag 4/23/20 0633)          Current Discharge Medication List            Medical Decision Making    Medical Decision Making:   Clinical Tests:   Lab Tests: Ordered and Reviewed  Radiological Study: Ordered and Reviewed  Medical Tests: Ordered and  Reviewed           Scribe Attestation:   Scribe #1: I performed the above scribed service and the documentation accurately describes the services I performed. I attest to the accuracy of the note.    Attending:   Physician Attestation Statement for Scribe #1: I, Thomas Esparza MD, personally performed the services described in this documentation, as scribed by Babatunde Orourke, in my presence, and it is both accurate and complete.          Clinical Impression       ICD-10-CM ICD-9-CM   1. Pre-eclampsia in postpartum period O14.95 642.44   2. SOB (shortness of breath) R06.02 786.05       Disposition:   Disposition: Admitted (to L&D)  Condition: Fair         Thomas Esparza MD  04/24/20 0409

## 2020-04-23 NOTE — HPI
Anthony Sexton is a 29 y.o. female  who is 1 week s/p SVDs presented to the ER for complaints of worsening shortness of breath, headache, vision changes and concerns for swelling. She reports onset of symptoms post partum day 3 with shortness of breath and presented to the ER with evaluation for PE which was negative and she was discharged home. She notes in general all of her symptoms have been quite transient. She continued to have transient SOB and thought she was doing too much activity, improved with rest. Noted bilaterally swelling to legs and then also left arm. Reports brief periods of blurriness and spots to her vision with temporary loss of vision to left eye. Due to worsening HA and SOB she came to the ER again and found to have elevated blood pressures 193/91 with concern for preE. Currently denies HA, vision changes, or shortness of breath, of note magnesium is currently infusing. No cough or fever. She is currently breastfeeding.

## 2020-04-23 NOTE — ASSESSMENT & PLAN NOTE
Likely secondary to preE, currently asymptomatic and previous work up for PE negative. Covid negative.

## 2020-04-23 NOTE — HOSPITAL COURSE
Patient admitted for treatment for postpartum preE. Urine pCr 0.29 and abs are normal with no evidence for HELLP.  Will plan for magnesium infusion and will start labetalol 200 mg BID and monitor blood pressures closely.  HD #2 - magnesium sulfate discontinued after 24 hours.  Nifdipine XL 30 mg BID started.  4/25/20-stable for discharge on HD #3, bp stable on procardia 60mg bid

## 2020-04-23 NOTE — H&P
[facility] -   Obstetrics & Gynecology  History & Physical    Patient Name: Anthony Sexton  MRN: [id number]  Admission Date: 2020  Primary Care Provider: Tyesha Leggett MD    Subjective:     Chief Complaint/Reason for Admission: SOB    History of Present Illness:  Anthony Sexton is a 29 y.o.  female   who is 1 week s/p SVDs presented to the ER for complaints of worsening shortness of breath, headache, vision changes and concerns for swelling. She reports onset of symptoms post partum day 3 with shortness of breath and presented to the ER with evaluation for PE which was negative and she was discharged home. She notes in general all of her symptoms have been quite transient. She continued to have transient SOB and thought she was doing too much activity, improved with rest. Noted bilaterally swelling to legs and then also left arm. Reports brief periods of blurriness and spots to her vision with temporary loss of vision to left eye. Due to worsening HA and SOB she came to the ER again and found to have elevated blood pressures 193/91 with concern for preE. Currently denies HA, vision changes, or shortness of breath, of note magnesium is currently infusing. No cough or fever. She is currently breastfeeding.            OB History    Para Term  AB Living   2 2 2 0 0 2   SAB TAB Ectopic Multiple Live Births   0 0 0 0 2      # Outcome Date GA Lbr Trever/2nd Weight Sex Delivery Anes PTL Lv   2 Term 20 38w2d 07:44 / 00:06 3.14 kg (6 lb 14.8 oz) F Vag-Spont EPI N DANIELLE      Name: KERVIN SEXTON      Apgar1: 9  Apgar5: 9   1 Term 16 37w0d  3.289 kg (7 lb 4 oz) F Vag-Spont   DANIELLE      Complications: Severe hyperemesis gravidarum     Past Medical History:   Diagnosis Date    Anemia     Chronic low back pain     History of abnormal cervical Pap smear     History of recurrent UTIs     History of ulcer disease     Irregular menses     Obesity     Pre-eclampsia in postpartum period  4/23/2020    Vitamin D deficiency      Past Surgical History:   Procedure Laterality Date    None         PTA Medications   Medication Sig    ergocalciferol (ERGOCALCIFEROL) 50,000 unit Cap Take 2 capsules (100,000 Units total) by mouth every 7 days.    ferrous sulfate 325 (65 FE) MG EC tablet Take 1 tablet (325 mg total) by mouth 2 (two) times daily.    hydrOXYzine pamoate (VISTARIL) 25 MG Cap Take 1 capsule (25 mg total) by mouth 3 (three) times daily.    ibuprofen (ADVIL,MOTRIN) 600 MG tablet Take 1 tablet (600 mg total) by mouth every 6 (six) hours.    pantoprazole (PROTONIX) 40 MG tablet Take 40 mg by mouth once daily.    prenatal vits62/FA/om3/dha/epa (PRENATAL GUMMY ORAL) Take 1 Dose by mouth.    sertraline (ZOLOFT) 100 MG tablet Take 1 tablet (100 mg total) by mouth once daily. (Patient taking differently: Take 50 mg by mouth once daily. )    cyclobenzaprine (FLEXERIL) 10 MG tablet Take 1 tablet (10 mg total) by mouth 3 (three) times daily as needed for Muscle spasms.    levonorgestreL (MIRENA) 20 mcg/24 hours (5 yrs) 52 mg IUD 1 Intra Uterine Device by Intrauterine route once. for 1 dose       Review of patient's allergies indicates:  No Known Allergies     Family History     Problem Relation (Age of Onset)    Asthma Mother    Breast cancer Maternal Aunt    Diabetes Maternal Aunt, Maternal Grandmother    Heart disease Father (55)    Hyperlipidemia Mother, Father    Hypertension Mother, Father    Insulin resistance Sister    No Known Problems Sister, Brother, Brother, Brother, Brother, Brother, Daughter        Tobacco Use    Smoking status: Never Smoker    Smokeless tobacco: Never Used   Substance and Sexual Activity    Alcohol use: Not Currently     Alcohol/week: 0.0 standard drinks    Drug use: No    Sexual activity: Yes     Partners: Male     Birth control/protection: None     Review of Systems   Constitutional: Negative for activity change, chills, fatigue and fever.   Eyes: Positive for  visual disturbance.   Respiratory: Positive for shortness of breath. Negative for cough and wheezing.    Cardiovascular: Positive for leg swelling. Negative for chest pain.   Gastrointestinal: Negative for abdominal pain, constipation, nausea and vomiting.   Genitourinary: Negative for dysuria, frequency, hematuria, pelvic pain, urgency, vaginal bleeding (light lochia) and vaginal discharge.   Integumentary:  Negative for rash.   Neurological: Positive for headaches.      Objective:     Vital Signs (Most Recent):  Temp: 98.2 °F (36.8 °C) (04/23/20 0800)  Pulse: 90 (04/23/20 0805)  Resp: 18 (04/23/20 0800)  BP: (!) 143/69 (04/23/20 0803)  SpO2: 99 % (04/23/20 0805) Vital Signs (24h Range):  Temp:  [98.2 °F (36.8 °C)-98.7 °F (37.1 °C)] 98.2 °F (36.8 °C)  Pulse:  [56-96] 90  Resp:  [18-24] 18  SpO2:  [98 %-100 %] 99 %  BP: (137-193)/() 143/69     Weight: 106.1 kg (233 lb 14.5 oz)  Body mass index is 38.92 kg/m².    Patient's last menstrual period was 07/08/2019.    Physical Exam:   Constitutional: She is oriented to person, place, and time. She appears well-developed and well-nourished. No distress.       Cardiovascular: Normal rate, regular rhythm and normal heart sounds.    No murmur heard.   Pulmonary/Chest: Effort normal and breath sounds normal. No respiratory distress. She has no wheezes. She has no rales.        Abdominal: Soft. Bowel sounds are normal. She exhibits no distension. There is no tenderness. There is no guarding.     Genitourinary: Vagina normal.           Musculoskeletal: She exhibits edema (1+ BLE).   No calf tenderness       Neurological: She is alert and oriented to person, place, and time. She has normal reflexes.   No ankle clonus    Skin: Skin is warm and dry. No rash noted. She is not diaphoretic.        Laboratory:  CBC:   Recent Labs   Lab 04/23/20  0448   WBC 7.98   RBC 4.55   HGB 10.7*   HCT 35.3*      MCV 78*   MCH 23.5*   MCHC 30.3*     CMP:   Recent Labs   Lab  04/23/20  0557   GLU 76   CALCIUM 8.4*   ALBUMIN 3.1*   PROT 7.1      K 3.7   CO2 19*      BUN 13   CREATININE 0.7   ALKPHOS 131   ALT 21   AST 20   BILITOT 0.3     I have personallly reviewed all pertinent lab results from the last 24 hours.    Diagnostic Results:  Labs: Reviewed    Assessment/Plan:     * Pre-eclampsia in postpartum period  Patient with severe range pressures on admission. Urine pCr 0.29 and labs are normal with no evidence for HELLP.  Will plan for magnesium infusion. Currently BP is 162/80 will start labetalol 200 mg BID and monitor blood pressures closely.      Gastroesophageal reflux disease without esophagitis  Continue Protonix    History of postpartum depression  Continue home Zoloft    SOB (shortness of breath)  Likely secondary to preE, currently asymptomatic and previous work up for PE negative. Covid negative.    Iron deficiency anemia secondary to inadequate dietary iron intake  Resume iron at discharge as patient reports potential GI upset with it    Morbid obesity with BMI of 40.0-44.9, adult           Deb Bowie PA-C  Obstetrics & Gynecology  [facility] [place]

## 2020-04-24 PROBLEM — R06.02 SOB (SHORTNESS OF BREATH): Status: RESOLVED | Noted: 2020-02-07 | Resolved: 2020-04-24

## 2020-04-24 PROCEDURE — 99232 PR SUBSEQUENT HOSPITAL CARE,LEVL II: ICD-10-PCS | Mod: 24,,, | Performed by: OBSTETRICS & GYNECOLOGY

## 2020-04-24 PROCEDURE — 25000003 PHARM REV CODE 250: Performed by: OBSTETRICS & GYNECOLOGY

## 2020-04-24 PROCEDURE — 51702 INSERT TEMP BLADDER CATH: CPT

## 2020-04-24 PROCEDURE — 11000001 HC ACUTE MED/SURG PRIVATE ROOM

## 2020-04-24 PROCEDURE — 99232 SBSQ HOSP IP/OBS MODERATE 35: CPT | Mod: 24,,, | Performed by: OBSTETRICS & GYNECOLOGY

## 2020-04-24 PROCEDURE — 63600175 PHARM REV CODE 636 W HCPCS: Performed by: OBSTETRICS & GYNECOLOGY

## 2020-04-24 RX ORDER — NIFEDIPINE 30 MG/1
30 TABLET, EXTENDED RELEASE ORAL 2 TIMES DAILY
Status: DISCONTINUED | OUTPATIENT
Start: 2020-04-24 | End: 2020-04-24

## 2020-04-24 RX ORDER — NIFEDIPINE 30 MG/1
60 TABLET, EXTENDED RELEASE ORAL 2 TIMES DAILY
Status: DISCONTINUED | OUTPATIENT
Start: 2020-04-24 | End: 2020-04-25 | Stop reason: HOSPADM

## 2020-04-24 RX ORDER — HYDROXYZINE PAMOATE 25 MG/1
50 CAPSULE ORAL EVERY 6 HOURS PRN
Status: DISCONTINUED | OUTPATIENT
Start: 2020-04-24 | End: 2020-04-25 | Stop reason: HOSPADM

## 2020-04-24 RX ORDER — BUTALBITAL, ACETAMINOPHEN AND CAFFEINE 50; 325; 40 MG/1; MG/1; MG/1
1 TABLET ORAL EVERY 6 HOURS PRN
Status: DISCONTINUED | OUTPATIENT
Start: 2020-04-24 | End: 2020-04-25 | Stop reason: HOSPADM

## 2020-04-24 RX ORDER — NIFEDIPINE 60 MG/1
60 TABLET, EXTENDED RELEASE ORAL 2 TIMES DAILY
Qty: 60 TABLET | Refills: 1 | Status: SHIPPED | OUTPATIENT
Start: 2020-04-24 | End: 2020-05-21

## 2020-04-24 RX ADMIN — SERTRALINE HYDROCHLORIDE 50 MG: 50 TABLET ORAL at 08:04

## 2020-04-24 RX ADMIN — HYDROXYZINE PAMOATE 50 MG: 25 CAPSULE ORAL at 05:04

## 2020-04-24 RX ADMIN — NIFEDIPINE 60 MG: 30 TABLET, FILM COATED, EXTENDED RELEASE ORAL at 08:04

## 2020-04-24 RX ADMIN — IBUPROFEN 600 MG: 600 TABLET, FILM COATED ORAL at 05:04

## 2020-04-24 RX ADMIN — PANTOPRAZOLE SODIUM 40 MG: 40 TABLET, DELAYED RELEASE ORAL at 07:04

## 2020-04-24 RX ADMIN — IBUPROFEN 600 MG: 600 TABLET, FILM COATED ORAL at 02:04

## 2020-04-24 RX ADMIN — MAGNESIUM SULFATE IN WATER 2 G/HR: 40 INJECTION, SOLUTION INTRAVENOUS at 12:04

## 2020-04-24 RX ADMIN — BUTALBITAL, ACETAMINOPHEN AND CAFFEINE 1 TABLET: 50; 325; 40 TABLET ORAL at 03:04

## 2020-04-24 NOTE — LACTATION NOTE
Lactation Note:    Called into patients room. She reports pumping is going well. She is pumping every 4-6 hours. She is getting approx 2-3 oz per pump session. Patient inquired about how to increase her milk supply. Instructed mother to pump at least 8-10 times in 24 hours using hands on pumping, and to hand express after. Mother verbalized understanding.    She reports her infant directly  after birth, but she does not have plans to put infant back to the breast at this time. She plans to exclusively pump. Support and encouragement offered.     Mother given information about lactation consults if she changes her mind to put infant back to the breast. Mother does have a pump at home.    She denies any further questions, concerns, or lactation needs at this time. Instructed to call lactation as needed for assistance. Verbalized understanding.

## 2020-04-24 NOTE — SUBJECTIVE & OBJECTIVE
Interval History: No complaints.  Minimal pain.  Mild headache today.    Scheduled Meds:   NIFEdipine  30 mg Oral BID    pantoprazole  40 mg Oral Daily    sertraline  50 mg Oral Daily     Continuous Infusions:  PRN Meds:acetaminophen, butalbital-acetaminophen-caffeine -40 mg, calcium gluconate, ibuprofen    Review of patient's allergies indicates:  No Known Allergies    Objective:     Vital Signs (Most Recent):  Temp: 98.1 °F (36.7 °C) (04/24/20 0737)  Pulse: 67 (04/24/20 0737)  Resp: 18 (04/24/20 0737)  BP: (!) 150/100 (04/24/20 0737)  SpO2: 99 % (04/24/20 0735) Vital Signs (24h Range):  Temp:  [98 °F (36.7 °C)-98.2 °F (36.8 °C)] 98.1 °F (36.7 °C)  Pulse:  [59-88] 67  Resp:  [18-20] 18  SpO2:  [93 %-100 %] 99 %  BP: (128-154)/() 150/100     Weight: 106.1 kg (233 lb 14.5 oz)  Body mass index is 38.92 kg/m².  Patient's last menstrual period was 07/08/2019.    I&O (Last 24H):    Intake/Output Summary (Last 24 hours) at 4/24/2020 0807  Last data filed at 4/24/2020 0730  Gross per 24 hour   Intake 2068.75 ml   Output 4020 ml   Net -1951.25 ml       Physical Exam:   Constitutional: She is oriented to person, place, and time. She appears well-developed and well-nourished. No distress.      Neck: Neck supple.    Cardiovascular: Normal rate.     Pulmonary/Chest: Effort normal.        Abdominal: Soft. She exhibits no distension. There is no tenderness.             Musculoskeletal: She exhibits no edema or tenderness.       Neurological: She is alert and oriented to person, place, and time.     Psychiatric: She has a normal mood and affect.       Laboratory:  I have personallly reviewed all pertinent lab results from the last 24 hours.

## 2020-04-24 NOTE — NURSING
Pt said her heaviness in chest had gone down a little, but she thinks it's more anxiety. Stated she has taken vistaril in the past and it has helped. Dr Arellano notified and she ordered vistaril. I just administered it and will let Dr Arellano know if does not subside. Her sister is at bedside and they are smiling and laughing.

## 2020-04-24 NOTE — PROGRESS NOTES
[facility] -   Obstetrics & Gynecology  Progress Note    Patient Name: Anthony Sexton  MRN: [id number]  Admission Date: 2020  Primary Care Provider: Tyesha Leggett MD  Principal Problem: Pre-eclampsia in postpartum period    Subjective:     HPI:  Anthony Sexton is a 29 y.o.  female   who is 1 week s/p SVDs presented to the ER for complaints of worsening shortness of breath, headache, vision changes and concerns for swelling. She reports onset of symptoms post partum day 3 with shortness of breath and presented to the ER with evaluation for PE which was negative and she was discharged home. She notes in general all of her symptoms have been quite transient. She continued to have transient SOB and thought she was doing too much activity, improved with rest. Noted bilaterally swelling to legs and then also left arm. Reports brief periods of blurriness and spots to her vision with temporary loss of vision to left eye. Due to worsening HA and SOB she came to the ER again and found to have elevated blood pressures 193/91 with concern for preE. Currently denies HA, vision changes, or shortness of breath, of note magnesium is currently infusing. No cough or fever. She is currently breastfeeding.        Interval History: No complaints.  Minimal pain.  Mild headache today.    Scheduled Meds:   NIFEdipine  30 mg Oral BID    pantoprazole  40 mg Oral Daily    sertraline  50 mg Oral Daily     Continuous Infusions:  PRN Meds:acetaminophen, butalbital-acetaminophen-caffeine -40 mg, calcium gluconate, ibuprofen    Review of patient's allergies indicates:  No Known Allergies    Objective:     Vital Signs (Most Recent):  Temp: 98.1 °F (36.7 °C) (20 0737)  Pulse: 67 (20 0737)  Resp: 18 (20 0737)  BP: (!) 150/100 (20 0737)  SpO2: 99 % (20 0735) Vital Signs (24h Range):  Temp:  [98 °F (36.7 °C)-98.2 °F (36.8 °C)] 98.1 °F (36.7 °C)  Pulse:  [59-88] 67  Resp:  [18-20] 18  SpO2:  [93  %-100 %] 99 %  BP: (128-154)/() 150/100     Weight: 106.1 kg (233 lb 14.5 oz)  Body mass index is 38.92 kg/m².  Patient's last menstrual period was 07/08/2019.    I&O (Last 24H):    Intake/Output Summary (Last 24 hours) at 4/24/2020 0807  Last data filed at 4/24/2020 0730  Gross per 24 hour   Intake 2068.75 ml   Output 4020 ml   Net -1951.25 ml       Physical Exam:   Constitutional: She is oriented to person, place, and time. She appears well-developed and well-nourished. No distress.      Neck: Neck supple.    Cardiovascular: Normal rate.     Pulmonary/Chest: Effort normal.        Abdominal: Soft. She exhibits no distension. There is no tenderness.             Musculoskeletal: She exhibits no edema or tenderness.       Neurological: She is alert and oriented to person, place, and time.     Psychiatric: She has a normal mood and affect.       Laboratory:  I have personallly reviewed all pertinent lab results from the last 24 hours.        Assessment/Plan:     * Pre-eclampsia in postpartum period  Patient s/p 24 hours of magnesium sulfate.  Begin nifedipine XL 30 mg BID today.  Anticipate discharge tomorrow.      Gastroesophageal reflux disease without esophagitis  Continue Protonix    History of postpartum depression  Continue home Zoloft    Iron deficiency anemia secondary to inadequate dietary iron intake  Resume iron at discharge as patient reports potential GI upset with it    Morbid obesity with BMI of 40.0-44.9, adult           Sugar Arellano MD  Obstetrics & Gynecology  [facility] -

## 2020-04-24 NOTE — ASSESSMENT & PLAN NOTE
Patient s/p 24 hours of magnesium sulfate.  Begin nifedipine XL 30 mg BID today.  Anticipate discharge tomorrow.

## 2020-04-24 NOTE — PLAN OF CARE
"Patient's headache is gone for now after taking motrin. Diuresing and measuring urine. IV site WNLs. BP has remained in the 140s/80s today. She is pumping her breastmilk and we are labeling it and bringing it to 5th floor fridge. No visitors at bedside. She started c/o "heaviness" in her chest that started 15 minutes ago. She said it is not the chest pain she had that brought her to hospital and thinks it could be anxiety. I coached her to close her eyes and take some deep breaths. I reassured her that everything is going well etc... I will check on her in 10 minutes to see if it helped.  "

## 2020-04-25 VITALS
SYSTOLIC BLOOD PRESSURE: 137 MMHG | DIASTOLIC BLOOD PRESSURE: 89 MMHG | BODY MASS INDEX: 38.98 KG/M2 | OXYGEN SATURATION: 99 % | WEIGHT: 233.94 LBS | TEMPERATURE: 99 F | HEIGHT: 65 IN | RESPIRATION RATE: 20 BRPM | HEART RATE: 94 BPM

## 2020-04-25 PROCEDURE — 99024 POSTOP FOLLOW-UP VISIT: CPT | Mod: ,,, | Performed by: OBSTETRICS & GYNECOLOGY

## 2020-04-25 PROCEDURE — 99024 PR POST-OP FOLLOW-UP VISIT: ICD-10-PCS | Mod: ,,, | Performed by: OBSTETRICS & GYNECOLOGY

## 2020-04-25 PROCEDURE — 99238 HOSP IP/OBS DSCHRG MGMT 30/<: CPT | Mod: 24,,, | Performed by: OBSTETRICS & GYNECOLOGY

## 2020-04-25 PROCEDURE — 25000003 PHARM REV CODE 250: Performed by: OBSTETRICS & GYNECOLOGY

## 2020-04-25 PROCEDURE — 99238 PR HOSPITAL DISCHARGE DAY,<30 MIN: ICD-10-PCS | Mod: 24,,, | Performed by: OBSTETRICS & GYNECOLOGY

## 2020-04-25 RX ADMIN — NIFEDIPINE 60 MG: 30 TABLET, FILM COATED, EXTENDED RELEASE ORAL at 09:04

## 2020-04-25 RX ADMIN — BUTALBITAL, ACETAMINOPHEN AND CAFFEINE 1 TABLET: 50; 325; 40 TABLET ORAL at 09:04

## 2020-04-25 RX ADMIN — PANTOPRAZOLE SODIUM 40 MG: 40 TABLET, DELAYED RELEASE ORAL at 09:04

## 2020-04-25 RX ADMIN — SERTRALINE HYDROCHLORIDE 50 MG: 50 TABLET ORAL at 09:04

## 2020-04-25 NOTE — PROGRESS NOTES
Discharge instructions reviewed and given to patient. Pt verbalized understanding of follow up appts and instructions. Procardia medication at bedside. Pt verbalized understanding of how to take medication. Gave her her 0900 dose. Awaiting her ride home.

## 2020-04-25 NOTE — PROGRESS NOTES
[facility] -   Obstetrics & Gynecology  Progress Note    Patient Name: Anthony Sexton  MRN: [id number]  Admission Date: 2020  Primary Care Provider: Tyesha Leggett MD  Principal Problem: Pre-eclampsia in postpartum period    Subjective:     HPI:  Anthony Sexton is a 29 y.o.  female   who is 1 week s/p SVDs presented to the ER for complaints of worsening shortness of breath, headache, vision changes and concerns for swelling. She reports onset of symptoms post partum day 3 with shortness of breath and presented to the ER with evaluation for PE which was negative and she was discharged home. She notes in general all of her symptoms have been quite transient. She continued to have transient SOB and thought she was doing too much activity, improved with rest. Noted bilaterally swelling to legs and then also left arm. Reports brief periods of blurriness and spots to her vision with temporary loss of vision to left eye. Due to worsening HA and SOB she came to the ER again and found to have elevated blood pressures 193/91 with concern for preE. Currently denies HA, vision changes, or shortness of breath, of note magnesium is currently infusing. No cough or fever. She is currently breastfeeding.        Interval History:   Patient reports no problems overnight, minimal lochia, no prob void; tolerating regular diet, +ambulation, +breast pumping; had very mild headache last night that resolved without medication, denies headache currently, emotionally well    Scheduled Meds:   NIFEdipine  60 mg Oral BID    pantoprazole  40 mg Oral Daily    sertraline  50 mg Oral Daily     Continuous Infusions:  PRN Meds:acetaminophen, butalbital-acetaminophen-caffeine -40 mg, calcium gluconate, hydrOXYzine pamoate, ibuprofen    Review of patient's allergies indicates:  No Known Allergies    Objective:     Vital Signs (Most Recent):  Temp: 98.7 °F (37.1 °C) (20 0751)  Pulse: 94 (20 0751)  Resp: 20  (04/25/20 0751)  BP: 137/89 (04/25/20 0751)  SpO2: 99 % (04/24/20 0735) Vital Signs (24h Range):  Temp:  [97.6 °F (36.4 °C)-98.7 °F (37.1 °C)] 98.7 °F (37.1 °C)  Pulse:  [72-94] 94  Resp:  [18-20] 20  BP: (109-146)/(70-91) 137/89     Weight: 106.1 kg (233 lb 14.5 oz)  Body mass index is 38.92 kg/m².  Patient's last menstrual period was 07/08/2019.    I&O (Last 24H):    Intake/Output Summary (Last 24 hours) at 4/25/2020 0812  Last data filed at 4/25/2020 0200  Gross per 24 hour   Intake --   Output 4400 ml   Net -4400 ml       Physical Exam:   Constitutional: She is oriented to person, place, and time. She appears well-developed.    HENT:   Head: Normocephalic.    Eyes: Pupils are equal, round, and reactive to light.    Neck: Normal range of motion.    Cardiovascular: Normal rate and regular rhythm.     Pulmonary/Chest: Effort normal and breath sounds normal.        Abdominal: Soft. Bowel sounds are normal. She exhibits no abdominal incision.     Genitourinary:   Genitourinary Comments: Ut fundus -difficult to assess, but non tender           Musculoskeletal: Normal range of motion and moves all extremeties. She exhibits no edema.       Neurological: She is alert and oriented to person, place, and time. She has normal reflexes.    Skin: Skin is warm and dry.    Psychiatric: She has a normal mood and affect. Her behavior is normal. Judgment and thought content normal.       Laboratory:  Recent Lab Results     None        I have personallly reviewed all pertinent lab results from the last 24 hours.    Diagnostic Results:  none    Assessment/Plan:     * Pre-eclampsia in postpartum period  Patient s/p 24 hours of magnesium sulfate.  bp stable on procardia 60mg bid;   Denies preE symptoms  Stable for discharge      Gastroesophageal reflux disease without esophagitis  Continue Protonix    History of postpartum depression  Continue home Zoloft    Iron deficiency anemia secondary to inadequate dietary iron intake  Resume  iron at discharge as patient reports potential GI upset with it    Morbid obesity with BMI of 40.0-44.9, adult           Ally Lott MD  Obstetrics & Gynecology  [facility] [place]

## 2020-04-25 NOTE — PLAN OF CARE
Vitals stable. Blood has remained WNL. Patient denies visual disturbances, but does have a dull headache that comes and goes. Patient is ambulating without difficulty. Voiding spontaneously. Pumping for infant. Frequent checks made to ensure safety. Will continue to monitor.

## 2020-04-25 NOTE — ASSESSMENT & PLAN NOTE
Patient s/p 24 hours of magnesium sulfate.  bp stable on procardia 60mg bid;   Denies preE symptoms  Stable for discharge

## 2020-04-25 NOTE — LACTATION NOTE
Lactation Note:    Mother is taking procardia 60 mg once per day and Vistaril 25 mg PRN QID. Both medications are L2, handouts given. Mother instructed to clear medications with pediatrician also. Mother verbalized understanding.      Mother denies any further lactation needs or concerns at this time. Mother anticipates discharge home today. Lactation discharge information reviewed.  Mother is aware of warm line and outpatient consultations. Encouraged mother to contact lactation with any questions, concerns, or problems. Contact numbers provided, and mother verbalizes understanding.

## 2020-04-25 NOTE — SUBJECTIVE & OBJECTIVE
Interval History:   Patient reports no problems overnight, minimal lochia, no prob void; tolerating regular diet, +ambulation, +breast pumping; had very mild headache last night that resolved without medication, denies headache currently, emotionally well    Scheduled Meds:   NIFEdipine  60 mg Oral BID    pantoprazole  40 mg Oral Daily    sertraline  50 mg Oral Daily     Continuous Infusions:  PRN Meds:acetaminophen, butalbital-acetaminophen-caffeine -40 mg, calcium gluconate, hydrOXYzine pamoate, ibuprofen    Review of patient's allergies indicates:  No Known Allergies    Objective:     Vital Signs (Most Recent):  Temp: 98.7 °F (37.1 °C) (04/25/20 0751)  Pulse: 94 (04/25/20 0751)  Resp: 20 (04/25/20 0751)  BP: 137/89 (04/25/20 0751)  SpO2: 99 % (04/24/20 0735) Vital Signs (24h Range):  Temp:  [97.6 °F (36.4 °C)-98.7 °F (37.1 °C)] 98.7 °F (37.1 °C)  Pulse:  [72-94] 94  Resp:  [18-20] 20  BP: (109-146)/(70-91) 137/89     Weight: 106.1 kg (233 lb 14.5 oz)  Body mass index is 38.92 kg/m².  Patient's last menstrual period was 07/08/2019.    I&O (Last 24H):    Intake/Output Summary (Last 24 hours) at 4/25/2020 0812  Last data filed at 4/25/2020 0200  Gross per 24 hour   Intake --   Output 4400 ml   Net -4400 ml       Physical Exam:   Constitutional: She is oriented to person, place, and time. She appears well-developed.    HENT:   Head: Normocephalic.    Eyes: Pupils are equal, round, and reactive to light.    Neck: Normal range of motion.    Cardiovascular: Normal rate and regular rhythm.     Pulmonary/Chest: Effort normal and breath sounds normal.        Abdominal: Soft. Bowel sounds are normal. She exhibits no abdominal incision.     Genitourinary:   Genitourinary Comments: Ut fundus -difficult to assess, but non tender           Musculoskeletal: Normal range of motion and moves all extremeties. She exhibits no edema.       Neurological: She is alert and oriented to person, place, and time. She has normal  reflexes.    Skin: Skin is warm and dry.    Psychiatric: She has a normal mood and affect. Her behavior is normal. Judgment and thought content normal.       Laboratory:  Recent Lab Results     None        I have personallly reviewed all pertinent lab results from the last 24 hours.    Diagnostic Results:  none

## 2020-04-25 NOTE — DISCHARGE SUMMARY
[facility] -   Obstetrics & Gynecology  Discharge Summary    Patient Name: Anthony Sexton  MRN: [id number]  Admission Date: 2020  Hospital Length of Stay: 2 days  Discharge Date and Time:  2020 8:22 AM  Attending Physician: Meng Cueto MD   Discharging Provider: Ally Lott MD  Primary Care Provider: Tyesha Leggett MD    HPI:  Anthony Sexton is a 29 y.o.  female   who is 1 week s/p SVDs presented to the ER for complaints of worsening shortness of breath, headache, vision changes and concerns for swelling. She reports onset of symptoms post partum day 3 with shortness of breath and presented to the ER with evaluation for PE which was negative and she was discharged home. She notes in general all of her symptoms have been quite transient. She continued to have transient SOB and thought she was doing too much activity, improved with rest. Noted bilaterally swelling to legs and then also left arm. Reports brief periods of blurriness and spots to her vision with temporary loss of vision to left eye. Due to worsening HA and SOB she came to the ER again and found to have elevated blood pressures 193/91 with concern for preE. Currently denies HA, vision changes, or shortness of breath, of note magnesium is currently infusing. No cough or fever. She is currently breastfeeding.        Hospital Course:  Patient admitted for treatment for postpartum preE. Urine pCr 0.29 and abs are normal with no evidence for HELLP.  Will plan for magnesium infusion and will start labetalol 200 mg BID and monitor blood pressures closely.  HD #2 - magnesium sulfate discontinued after 24 hours.  Nifdipine XL 30 mg BID started.  20-stable for discharge on HD #3, bp stable on procardia 60mg bid    * No surgery found *     Consults (From admission, onward)        Status Ordering Provider     Inpatient consult to Lactation  Once     Provider:  (Not yet assigned)    Acknowledged MENG CUETO     Inpatient  consult to Lactation  Once     Provider:  (Not yet assigned)    Acknowledged ROMEO VENTURA     Inpatient consult to Obstetrics / Gynecology  Once     Provider:  Meng Cueto MD    Acknowledged NEAL FISCHER          Significant Diagnostic Studies: Labs: All labs within the past 24 hours have been reviewed    Pending Diagnostic Studies:     None        Final Active Diagnoses:    Diagnosis Date Noted POA    PRINCIPAL PROBLEM:  Pre-eclampsia in postpartum period [O14.95] 04/23/2020 Yes    Gastroesophageal reflux disease without esophagitis [K21.9] 04/23/2020 Yes    History of postpartum depression [Z87.59, Z86.59] 04/18/2020 Not Applicable    Iron deficiency anemia secondary to inadequate dietary iron intake [D50.8] 01/27/2020 Yes    Morbid obesity with BMI of 40.0-44.9, adult [E66.01, Z68.41] 05/09/2017 Not Applicable      Problems Resolved During this Admission:    Diagnosis Date Noted Date Resolved POA    SOB (shortness of breath) [R06.02] 02/07/2020 04/24/2020 Yes        Discharged Condition: good    Disposition: Home or Self Care    Follow Up:  Follow-up Information     Elizabeth Rosales CNM In 1 week.    Specialty:  Obstetrics  Why:  needs appt thursday or friday 4/30 or 5/1); bp check  Contact information:  [id number]  [place] [address] [phone]                 Patient Instructions:      Diet Adult Regular     Order Specific Question Answer Comments   Na restriction, if any: 2gNa      Call MD for:  temperature >100.4     Call MD for:  persistent nausea and vomiting or diarrhea     Call MD for:  severe uncontrolled pain     Call MD for:  redness, tenderness, or signs of infection (pain, swelling, redness, odor or green/yellow discharge around incision site)     Call MD for:  difficulty breathing or increased cough     Call MD for:  persistent dizziness, light-headedness, or visual disturbances     Call MD for:   Order Comments: Bleeding more than 1 pad per hour     Pelvic Rest   Order  Comments: For 6 wks (no tampons, intercourse, douching)     Medications:  Reconciled Home Medications:      Medication List      START taking these medications    NIFEdipine 60 MG (OSM) 24 hr tablet  Commonly known as:  PROCARDIA-XL  Take 1 tablet (60 mg total) by mouth 2 (two) times daily.        CHANGE how you take these medications    sertraline 100 MG tablet  Commonly known as:  ZOLOFT  Take 1 tablet (100 mg total) by mouth once daily.  What changed:  how much to take        CONTINUE taking these medications    cyclobenzaprine 10 MG tablet  Commonly known as:  FLEXERIL  Take 1 tablet (10 mg total) by mouth 3 (three) times daily as needed for Muscle spasms.     ferrous sulfate 325 (65 FE) MG EC tablet  Take 1 tablet (325 mg total) by mouth 2 (two) times daily.     hydrOXYzine pamoate 25 MG Cap  Commonly known as:  VISTARIL  Take 1 capsule (25 mg total) by mouth 3 (three) times daily.     ibuprofen 600 MG tablet  Commonly known as:  ADVIL,MOTRIN  Take 1 tablet (600 mg total) by mouth every 6 (six) hours.     levonorgestreL 20 mcg/24 hours (5 yrs) 52 mg IUD  Commonly known as:  MIRENA  1 Intra Uterine Device by Intrauterine route once. for 1 dose     pantoprazole 40 MG tablet  Commonly known as:  PROTONIX  Take 40 mg by mouth once daily.     PRENATAL GUMMY ORAL  Take 1 Dose by mouth.     VITAMIN D2 50,000 unit Cap  Generic drug:  ergocalciferol  Take 2 capsules (100,000 Units total) by mouth every 7 days.            Ally Lott MD  Obstetrics & Gynecology  [facility]

## 2020-04-27 ENCOUNTER — TELEPHONE (OUTPATIENT)
Dept: PHARMACY | Facility: CLINIC | Age: 30
End: 2020-04-27

## 2020-04-28 ENCOUNTER — PATIENT MESSAGE (OUTPATIENT)
Dept: PHARMACY | Facility: CLINIC | Age: 30
End: 2020-04-28

## 2020-04-28 NOTE — TELEPHONE ENCOUNTER
Patient reached, via [employer], in regards to Mirena IUD coverage - $0 copay. Consultation declined. Consent to deliver IUD to [facility] for insertion granted by patient. No further questions or concerns. Appt scheduled: none. Msg sent to provider's staff to confirm delivery address. TTANGY

## 2020-05-04 ENCOUNTER — PATIENT MESSAGE (OUTPATIENT)
Dept: PSYCHIATRY | Facility: CLINIC | Age: 30
End: 2020-05-04

## 2020-05-05 ENCOUNTER — PATIENT MESSAGE (OUTPATIENT)
Dept: PSYCHIATRY | Facility: CLINIC | Age: 30
End: 2020-05-05

## 2020-05-05 ENCOUNTER — POSTPARTUM VISIT (OUTPATIENT)
Dept: OBSTETRICS AND GYNECOLOGY | Facility: CLINIC | Age: 30
End: 2020-05-05
Payer: COMMERCIAL

## 2020-05-05 VITALS — WEIGHT: 226.44 LBS | BODY MASS INDEX: 37.68 KG/M2

## 2020-05-05 PROBLEM — D50.8 IRON DEFICIENCY ANEMIA SECONDARY TO INADEQUATE DIETARY IRON INTAKE: Status: RESOLVED | Noted: 2020-01-27 | Resolved: 2020-05-05

## 2020-05-05 PROBLEM — A53.0 POSITIVE RPR TEST: Status: RESOLVED | Noted: 2020-04-16 | Resolved: 2020-05-05

## 2020-05-05 PROCEDURE — 99999 PR PBB SHADOW E&M-EST. PATIENT-LVL III: ICD-10-PCS | Mod: PBBFAC,,, | Performed by: ADVANCED PRACTICE MIDWIFE

## 2020-05-05 PROCEDURE — 99999 PR PBB SHADOW E&M-EST. PATIENT-LVL III: CPT | Mod: PBBFAC,,, | Performed by: ADVANCED PRACTICE MIDWIFE

## 2020-05-05 RX ORDER — SERTRALINE HYDROCHLORIDE 100 MG/1
150 TABLET, FILM COATED ORAL DAILY
Qty: 45 TABLET | Refills: 11 | Status: SHIPPED | OUTPATIENT
Start: 2020-05-05 | End: 2021-01-21

## 2020-05-05 NOTE — PROGRESS NOTES
Subjective:       Anthony Sexton is a 29 y.o. female who presents for a postpartum visit. She is 3 weeks postpartum following a spontaneous vaginal delivery. I have fully reviewed the prenatal and intrapartum course. The delivery was at 38 gestational weeks. Outcome: spontaneous vaginal delivery. Anesthesia: epidural. Postpartum course has been complicated for readmission for PP PIH On Nifedipine Baby's course has been normal. Baby is feeding by both breast and bottle - Similac Advance. Bleeding no bleeding. Bowel function is normal. Bladder function is normal. Patient is not sexually active. Contraception method is mirena. Postpartum depression screening: negative.    The following portions of the patient's history were reviewed and updated as appropriate: allergies, current medications, past family history, past medical history, past social history, past surgical history and problem list.    Review of Systems  A comprehensive review of systems was negative.     Objective:      Wt 102.7 kg (226 lb 6.6 oz)   LMP 07/08/2019   Breastfeeding? Yes   BMI 37.68 kg/m²    General:  alert, appears stated age and cooperative    Breasts:  inspection negative, no nipple discharge or bleeding, no masses or nodularity palpable   Lungs: clear to auscultation bilaterally   Heart:  regular rate and rhythm, S1, S2 normal, no murmur, click, rub or gallop   Abdomen: soft, non-tender; bowel sounds normal; no masses,  no organomegaly    Vulva:  normal   Vagina: normal vagina   Cervix:  anteverted, no cervical motion tenderness and no lesions   Corpus: normal size, contour, position, consistency, mobility, non-tender   Adnexa:  normal adnexa   Rectal Exam: Not performed.          Assessment:       normal postpartum exam. Pap smear not done at today's visit.     Plan:      1. Contraception: mirena  2. Return 2 weeks for insertion  3. Follow up in: 2 weeks or as needed.

## 2020-05-07 ENCOUNTER — PATIENT MESSAGE (OUTPATIENT)
Dept: OBSTETRICS AND GYNECOLOGY | Facility: CLINIC | Age: 30
End: 2020-05-07

## 2020-05-08 ENCOUNTER — PATIENT MESSAGE (OUTPATIENT)
Dept: FAMILY MEDICINE | Facility: CLINIC | Age: 30
End: 2020-05-08

## 2020-05-11 ENCOUNTER — TELEPHONE (OUTPATIENT)
Dept: OBSTETRICS AND GYNECOLOGY | Facility: CLINIC | Age: 30
End: 2020-05-11

## 2020-05-11 RX ORDER — PANTOPRAZOLE SODIUM 40 MG/1
40 TABLET, DELAYED RELEASE ORAL 2 TIMES DAILY
Qty: 60 TABLET | Refills: 0 | Status: SHIPPED | OUTPATIENT
Start: 2020-05-11 | End: 2020-06-29 | Stop reason: SDUPTHER

## 2020-05-19 ENCOUNTER — OFFICE VISIT (OUTPATIENT)
Dept: PSYCHIATRY | Facility: CLINIC | Age: 30
End: 2020-05-19
Payer: COMMERCIAL

## 2020-05-19 ENCOUNTER — PROCEDURE VISIT (OUTPATIENT)
Dept: OBSTETRICS AND GYNECOLOGY | Facility: CLINIC | Age: 30
End: 2020-05-19
Payer: COMMERCIAL

## 2020-05-19 VITALS
HEIGHT: 66 IN | DIASTOLIC BLOOD PRESSURE: 70 MMHG | WEIGHT: 226.44 LBS | BODY MASS INDEX: 36.39 KG/M2 | SYSTOLIC BLOOD PRESSURE: 118 MMHG

## 2020-05-19 DIAGNOSIS — F41.1 GENERALIZED ANXIETY DISORDER: ICD-10-CM

## 2020-05-19 DIAGNOSIS — Z30.430 ENCOUNTER FOR INSERTION OF INTRAUTERINE CONTRACEPTIVE DEVICE (IUD): Primary | ICD-10-CM

## 2020-05-19 PROCEDURE — 99214 OFFICE O/P EST MOD 30 MIN: CPT | Mod: 95,,, | Performed by: PSYCHIATRY & NEUROLOGY

## 2020-05-19 PROCEDURE — 81025 URINE PREGNANCY TEST: CPT | Mod: S$GLB,,, | Performed by: ADVANCED PRACTICE MIDWIFE

## 2020-05-19 PROCEDURE — 58300 INSERTION OF INTRAUTERINE DEVICE: ICD-10-PCS | Mod: S$GLB,,, | Performed by: ADVANCED PRACTICE MIDWIFE

## 2020-05-19 PROCEDURE — 99214 PR OFFICE/OUTPT VISIT, EST, LEVL IV, 30-39 MIN: ICD-10-PCS | Mod: 95,,, | Performed by: PSYCHIATRY & NEUROLOGY

## 2020-05-19 PROCEDURE — 81025 PR  URINE PREGNANCY TEST: ICD-10-PCS | Mod: S$GLB,,, | Performed by: ADVANCED PRACTICE MIDWIFE

## 2020-05-19 PROCEDURE — 58300 INSERT INTRAUTERINE DEVICE: CPT | Mod: S$GLB,,, | Performed by: ADVANCED PRACTICE MIDWIFE

## 2020-05-19 RX ORDER — HYDROXYZINE PAMOATE 25 MG/1
25 CAPSULE ORAL 3 TIMES DAILY
Qty: 30 CAPSULE | Refills: 3 | Status: SHIPPED | OUTPATIENT
Start: 2020-05-19 | End: 2020-11-03

## 2020-05-19 RX ORDER — METRONIDAZOLE 500 MG/1
500 TABLET ORAL EVERY 12 HOURS
Qty: 14 TABLET | Refills: 0 | Status: SHIPPED | OUTPATIENT
Start: 2020-05-19 | End: 2020-05-27

## 2020-05-19 NOTE — PROCEDURES
INSERTION OF INTRAUTERINE DEVICE  Date/Time: 5/19/2020 2:30 PM  Performed by: Elizabeth Rosales CNM  Authorized by: Elizabeth Rosales CNM     Consent:     Consent obtained:  Written    Consent given by:  Patient    Procedure risks and benefits discussed: yes      Patient questions answered: yes      Patient agrees, verbalizes understanding, and wants to proceed: yes      Educational handouts given: yes      Instructions and paperwork completed: yes    Procedure:     Pelvic exam performed: yes      Negative GC/chlamydia test: yes      Negative urine pregnancy test: yes      Negative serum pregnancy test: no      Cervix cleaned and prepped: yes      Speculum placed in vagina: yes      Tenaculum applied to cervix: yes      Uterus sounded: yes      IUD inserted with no complications: yes      Strings trimmed: yes    Post-procedure:     Patient tolerated procedure well: yes      Patient will follow up after next period: yes      metronidazol 500mg po 2xd for BV  Decrease nifedapine to 30mg 2xd  To PCP if elevated

## 2020-05-19 NOTE — PROGRESS NOTES
ESTABLISHED OUTPATIENT VISIT   E/M LEVEL 4: [id number]    ENCOUNTER DATE: 6/3/2020  SITE: [facility] [place].         The patient location is:  Patient home  The chief complaint leading to consultation is: anxiety, depression and ADHD  Visit type: Virtual visit with synchronous audio and video  Total time spent with patient: 20  Each patient to whom he or she provides medical services by telemedicine is:  (1) informed of the relationship between the physician and patient and the respective role of any other health care provider with respect to management of the patient; and (2) notified that he or she may decline to receive medical services by telemedicine and may withdraw from such care at any time.       HISTORY      CHIEF COMPLAINT   Anthony Sexton is a 29 y.o. female who presents for followup of anxiety disorder/ hx of ADHD    HPI     Interval History and Content of Current Session:  Interim Events/Subjective Report/Content of Current Session:   Overall she is doing better  Does struggle with being tired and also struggling with anxiety, secondary to events such as new baby  Her appetite is improving.  Energy is slowly improving    Denies feeling depressed   No suicidal ideation  No homicidal ideation  Still has some anxiety and panic attacks    Depressive Disorder: DENIES depressed mood, worthlessness, passive suicidal ideation, active suicidal ideation.    Anxiety Disorder: panic attacks, sleep problems, excessive worry, avoidance symptoms. (improving)   Manic Disorder: DENIES racing thoughts, mixed with depression symptoms, reckless behavior.   Psychotic Disorder: DENIES delusions, hallucinations, overvalued ideas.   Substance Use:  DENIES cigarettes, alcohol, blackouts.           ROS   pertient symptoms in HPI    PFSH  Past Medical History reviewed: Yes  Family History reviewed: Yes  Social History reviewed: Yes    Allergies:   Review of patient's allergies indicates:  No Known Allergies      PSYCHOTROPIC MEDICATIONS   Current Outpatient Medications on File Prior to Visit   Medication Sig Dispense Refill    ergocalciferol (ERGOCALCIFEROL) 50,000 unit Cap Take 2 capsules (100,000 Units total) by mouth every 7 days. 8 capsule 5    ibuprofen (ADVIL,MOTRIN) 600 MG tablet Take 1 tablet (600 mg total) by mouth every 6 (six) hours. 60 tablet 0    pantoprazole (PROTONIX) 40 MG tablet Take 1 tablet (40 mg total) by mouth 2 (two) times daily. 60 tablet 0    prenatal vits62/FA/om3/dha/epa (PRENATAL GUMMY ORAL) Take 1 Dose by mouth.      sertraline (ZOLOFT) 100 MG tablet Take 1.5 tablets (150 mg total) by mouth once daily. 45 tablet 11     No current facility-administered medications on file prior to visit.          EXAMINATION    [unfilled]  There were no vitals filed for this visit.     Wt Readings from Last 2 Encounters:   06/01/20 105.2 kg (231 lb 14.8 oz)   05/29/20 103 kg (227 lb)         CONSTITUTIONAL  General Appearance: well groomed    MUSCULOSKELETAL  No involuntary muscle movements  Normal gait    PSYCHIATRIC   Mental Status Exam:  Appearance: unremarkable, age appropriate  Behavior/Cooperation: appopriate normal, cooperative  Speech:  normal tone, normal rate, normal pitch, normal volume  Language: grossly intact with spontaneous speech  Mood: Anxious, but improved  Affect:  congruent with mood, broad  Thought Process:linear, logical and gaol directed.   Thought Content: no suicidality, no homicidality, no delusions, nor paranoia  Sensorium:  Awake  Alert and Oriented: x3 person, place, situation, no time/date  Memory:    Recent:  Intact; able to report recent events  Attention/concentration: appropriate for age/education.   Insight:Intact  Judgment:  Intact      MEDICAL DECISION MAKING    DIAGNOSES  Encounter Diagnoses   Name Primary?    Post partum depression Yes    Generalized anxiety disorder          PROBLEM LIST AND MANAGEMENT PLANS  Continue zoloft at 150 mg daily   Continue Vistaril as  needed for anxiety              PRESCRIPTION DRUG MANAGEMENT  Compliance: YES  Side Effects: YES  Regimen Adjustments: YES           SSRI  R/B/SE of SSRI reviewed with patient including gi distress, sexual dysfunction, and serotonin syndrome.  Black box warning of increased risk of worsening depression and SI reviewed.  Patient verbalized understanding.    DIAGNOSTIC TESTING  Encounter Diagnoses   Name Primary?    Post partum depression Yes    Generalized anxiety disorder            -Patient was educated on possible side-effects of medications  -Discussed 911 for suicidal or homicidal ideation, worsening symptoms, or adverse reactions to medications  -Patient will contact clinic with any questions or concerns    Lenora Walters

## 2020-05-20 ENCOUNTER — PATIENT MESSAGE (OUTPATIENT)
Dept: OBSTETRICS AND GYNECOLOGY | Facility: CLINIC | Age: 30
End: 2020-05-20

## 2020-05-21 DIAGNOSIS — Z01.84 ANTIBODY RESPONSE EXAMINATION: ICD-10-CM

## 2020-05-21 RX ORDER — NIFEDIPINE 30 MG/1
30 TABLET, EXTENDED RELEASE ORAL 2 TIMES DAILY
Qty: 60 TABLET | Refills: 11 | Status: SHIPPED | OUTPATIENT
Start: 2020-05-21 | End: 2020-05-27 | Stop reason: SDUPTHER

## 2020-05-23 ENCOUNTER — HOSPITAL ENCOUNTER (EMERGENCY)
Facility: HOSPITAL | Age: 30
Discharge: HOME | End: 2020-05-23
Attending: EMERGENCY MEDICINE
Payer: COMMERCIAL

## 2020-05-23 VITALS
BODY MASS INDEX: 37.06 KG/M2 | RESPIRATION RATE: 21 BRPM | TEMPERATURE: 98 F | HEIGHT: 66 IN | WEIGHT: 230.63 LBS | OXYGEN SATURATION: 100 % | HEART RATE: 95 BPM | DIASTOLIC BLOOD PRESSURE: 75 MMHG | SYSTOLIC BLOOD PRESSURE: 118 MMHG

## 2020-05-23 DIAGNOSIS — R07.89 ATYPICAL CHEST PAIN: Primary | ICD-10-CM

## 2020-05-23 DIAGNOSIS — R07.9 CHEST PAIN: ICD-10-CM

## 2020-05-23 DIAGNOSIS — N39.0 ACUTE LOWER UTI (URINARY TRACT INFECTION): ICD-10-CM

## 2020-05-23 LAB
ALBUMIN SERPL BCP-MCNC: 3.7 G/DL (ref 3.5–5.2)
ALP SERPL-CCNC: 169 U/L (ref 55–135)
ALT SERPL W/O P-5'-P-CCNC: 14 U/L (ref 10–44)
ANION GAP SERPL CALC-SCNC: 10 MMOL/L (ref 8–16)
AST SERPL-CCNC: 14 U/L (ref 10–40)
BASOPHILS # BLD AUTO: 0.04 K/UL (ref 0–0.2)
BASOPHILS NFR BLD: 0.4 % (ref 0–1.9)
BILIRUB SERPL-MCNC: 0.1 MG/DL (ref 0.1–1)
BILIRUB UR QL STRIP: NEGATIVE
BNP SERPL-MCNC: <10 PG/ML (ref 0–99)
BUN SERPL-MCNC: 11 MG/DL (ref 6–20)
CALCIUM SERPL-MCNC: 9.3 MG/DL (ref 8.7–10.5)
CHLORIDE SERPL-SCNC: 107 MMOL/L (ref 95–110)
CLARITY UR: CLEAR
CO2 SERPL-SCNC: 23 MMOL/L (ref 23–29)
COLOR UR: YELLOW
CREAT SERPL-MCNC: 0.9 MG/DL (ref 0.5–1.4)
D DIMER PPP IA.FEU-MCNC: 1.56 MG/L FEU
DIFFERENTIAL METHOD: ABNORMAL
EOSINOPHIL # BLD AUTO: 0.1 K/UL (ref 0–0.5)
EOSINOPHIL NFR BLD: 0.5 % (ref 0–8)
ERYTHROCYTE [DISTWIDTH] IN BLOOD BY AUTOMATED COUNT: 15.4 % (ref 11.5–14.5)
EST. GFR  (AFRICAN AMERICAN): >60 ML/MIN/1.73 M^2
EST. GFR  (NON AFRICAN AMERICAN): >60 ML/MIN/1.73 M^2
GLUCOSE SERPL-MCNC: 108 MG/DL (ref 70–110)
GLUCOSE UR QL STRIP: NEGATIVE
HCT VFR BLD AUTO: 40.6 % (ref 37–48.5)
HGB BLD-MCNC: 11.6 G/DL (ref 12–16)
HGB UR QL STRIP: NEGATIVE
IMM GRANULOCYTES # BLD AUTO: 0.04 K/UL (ref 0–0.04)
IMM GRANULOCYTES NFR BLD AUTO: 0.4 % (ref 0–0.5)
KETONES UR QL STRIP: NEGATIVE
LEUKOCYTE ESTERASE UR QL STRIP: ABNORMAL
LYMPHOCYTES # BLD AUTO: 2.7 K/UL (ref 1–4.8)
LYMPHOCYTES NFR BLD: 24.5 % (ref 18–48)
MCH RBC QN AUTO: 23.2 PG (ref 27–31)
MCHC RBC AUTO-ENTMCNC: 28.6 G/DL (ref 32–36)
MCV RBC AUTO: 81 FL (ref 82–98)
MICROSCOPIC COMMENT: NORMAL
MONOCYTES # BLD AUTO: 0.5 K/UL (ref 0.3–1)
MONOCYTES NFR BLD: 4.1 % (ref 4–15)
NEUTROPHILS # BLD AUTO: 7.7 K/UL (ref 1.8–7.7)
NEUTROPHILS NFR BLD: 70.1 % (ref 38–73)
NITRITE UR QL STRIP: NEGATIVE
NRBC BLD-RTO: 0 /100 WBC
PH UR STRIP: 6 [PH] (ref 5–8)
PLATELET # BLD AUTO: 267 K/UL (ref 150–350)
PMV BLD AUTO: 10.8 FL (ref 9.2–12.9)
POTASSIUM SERPL-SCNC: 3.5 MMOL/L (ref 3.5–5.1)
PROT SERPL-MCNC: 7.9 G/DL (ref 6–8.4)
PROT UR QL STRIP: NEGATIVE
RBC # BLD AUTO: 5.01 M/UL (ref 4–5.4)
SODIUM SERPL-SCNC: 140 MMOL/L (ref 136–145)
SP GR UR STRIP: 1.01 (ref 1–1.03)
SQUAMOUS #/AREA URNS HPF: 15 /HPF
TROPONIN I SERPL DL<=0.01 NG/ML-MCNC: 0.01 NG/ML (ref 0–0.03)
URN SPEC COLLECT METH UR: ABNORMAL
UROBILINOGEN UR STRIP-ACNC: NEGATIVE EU/DL
WBC # BLD AUTO: 10.94 K/UL (ref 3.9–12.7)
WBC #/AREA URNS HPF: 4 /HPF (ref 0–5)

## 2020-05-23 PROCEDURE — 25500020 PHARM REV CODE 255: Performed by: EMERGENCY MEDICINE

## 2020-05-23 PROCEDURE — 83880 ASSAY OF NATRIURETIC PEPTIDE: CPT

## 2020-05-23 PROCEDURE — 93005 ELECTROCARDIOGRAM TRACING: CPT

## 2020-05-23 PROCEDURE — 99285 EMERGENCY DEPT VISIT HI MDM: CPT | Mod: 25

## 2020-05-23 PROCEDURE — 25000003 PHARM REV CODE 250: Performed by: EMERGENCY MEDICINE

## 2020-05-23 PROCEDURE — 81000 URINALYSIS NONAUTO W/SCOPE: CPT

## 2020-05-23 PROCEDURE — 85025 COMPLETE CBC W/AUTO DIFF WBC: CPT

## 2020-05-23 PROCEDURE — 63600175 PHARM REV CODE 636 W HCPCS: Performed by: EMERGENCY MEDICINE

## 2020-05-23 PROCEDURE — 80053 COMPREHEN METABOLIC PANEL: CPT

## 2020-05-23 PROCEDURE — 84484 ASSAY OF TROPONIN QUANT: CPT

## 2020-05-23 PROCEDURE — 36415 COLL VENOUS BLD VENIPUNCTURE: CPT

## 2020-05-23 PROCEDURE — 85379 FIBRIN DEGRADATION QUANT: CPT

## 2020-05-23 PROCEDURE — 93010 ELECTROCARDIOGRAM REPORT: CPT | Mod: ,,, | Performed by: INTERNAL MEDICINE

## 2020-05-23 PROCEDURE — 93010 EKG 12-LEAD: ICD-10-PCS | Mod: ,,, | Performed by: INTERNAL MEDICINE

## 2020-05-23 RX ORDER — ASPIRIN 325 MG
325 TABLET ORAL
Status: COMPLETED | OUTPATIENT
Start: 2020-05-23 | End: 2020-05-23

## 2020-05-23 RX ORDER — CEPHALEXIN 500 MG/1
500 CAPSULE ORAL EVERY 12 HOURS
Qty: 6 CAPSULE | Refills: 0 | Status: SHIPPED | OUTPATIENT
Start: 2020-05-23 | End: 2020-05-27

## 2020-05-23 RX ORDER — ACETAMINOPHEN 500 MG
1000 TABLET ORAL
Status: COMPLETED | OUTPATIENT
Start: 2020-05-23 | End: 2020-05-23

## 2020-05-23 RX ORDER — HYDROXYZINE PAMOATE 25 MG/1
25 CAPSULE ORAL
Status: COMPLETED | OUTPATIENT
Start: 2020-05-23 | End: 2020-05-23

## 2020-05-23 RX ADMIN — HYDROXYZINE PAMOATE 25 MG: 25 CAPSULE ORAL at 10:05

## 2020-05-23 RX ADMIN — ACETAMINOPHEN 1000 MG: 500 TABLET ORAL at 10:05

## 2020-05-23 RX ADMIN — ASPIRIN 325 MG: 325 TABLET, FILM COATED ORAL at 07:05

## 2020-05-23 RX ADMIN — SODIUM CHLORIDE, SODIUM LACTATE, POTASSIUM CHLORIDE, AND CALCIUM CHLORIDE 1000 ML: .6; .31; .03; .02 INJECTION, SOLUTION INTRAVENOUS at 09:05

## 2020-05-23 RX ADMIN — IOHEXOL 100 ML: 350 INJECTION, SOLUTION INTRAVENOUS at 08:05

## 2020-05-23 NOTE — ED PROVIDER NOTES
"SCRIBE #1 NOTE: I, Dc Irizarry, am scribing for, and in the presence of, Diego Farley Jr., MD. I have scribed the HPI, ROS, and PEx.     SCRIBE #2 NOTE: I, Dc Cabrales, am scribing for, and in the presence of,  Bandar Lemus MD. I have scribed the remaining portions of the note not scribed by Scribe #1.      History     Chief Complaint   Patient presents with    Chest Pain     Pt states, "I had post partum preeclampsia and I started feeling the same way I was back then, chest hurting through to my back and lightheaded."     Review of patient's allergies indicates:  No Known Allergies      History of Present Illness     HPI    5/23/2020, 6:48 PM  History obtained from the patient      History of Present Illness: Anthony Sexton is a 29 y.o. female patient with a PMHx of anemia, anxiety, chronic lower back pain, UTI, ulcers, post-partum depression, post-partum preeclamspia, and vitamin D deficiency who presents to the Emergency Department for evaluation of constant right-sided chest pain which onset gradually 3 days PTA. Pt states that she had a child 1 month ago, and 2 weeks ago developed post-partum preeclampsia. Pt denies cardiac or pulmonary hx and reports being compliant with all Rx'd medication. Symptoms are worsening and moderate in severity, describing her pain as a dull ache. No mitigating or exacerbating factors reported. Associated sxs include right-sided back pain, light-headedness, leg tingling ("funny feeling"), and malodorous urine. Patient denies any SOB, leg swelling, weakness, dysuria, hematuria, and all other sxs at this time. No prior Tx reported. No further complaints or concerns at this time.       Arrival mode: Personal vehicle    PCP: Tyesha Leggett MD        Past Medical History:  Past Medical History:   Diagnosis Date    Anemia     Anxiety     Chronic low back pain     Depression     History of abnormal cervical Pap smear     History of recurrent UTIs     History of ulcer " disease     Irregular menses     Obesity     Postpartum depression     with last pregnancy.     Pre-eclampsia in postpartum period 4/23/2020    Vitamin D deficiency        Past Surgical History:  Past Surgical History:   Procedure Laterality Date    None           Family History:  Family History   Problem Relation Age of Onset    Asthma Mother     Hyperlipidemia Mother     Hypertension Mother     Hypertension Father     Hyperlipidemia Father     Heart disease Father 55        MI/CAD    No Known Problems Sister     No Known Problems Brother     Diabetes Maternal Aunt     Diabetes Maternal Grandmother     Insulin resistance Sister     No Known Problems Brother     No Known Problems Brother     No Known Problems Brother     No Known Problems Brother     No Known Problems Daughter     Breast cancer Maternal Aunt     Cancer Neg Hx     Stroke Neg Hx     Brain cancer Neg Hx     Migraines Neg Hx     Colon cancer Neg Hx     Ovarian cancer Neg Hx        Social History:  Social History     Tobacco Use    Smoking status: Never Smoker    Smokeless tobacco: Never Used   Substance and Sexual Activity    Alcohol use: Not Currently     Alcohol/week: 0.0 standard drinks    Drug use: No    Sexual activity: Not Currently     Partners: Male     Birth control/protection: None        Review of Systems     Review of Systems   Constitutional: Negative for chills and fever.   HENT: Negative for sore throat.    Respiratory: Negative for cough and shortness of breath.    Cardiovascular: Positive for chest pain (Right-sided; dull ache). Negative for leg swelling.   Gastrointestinal: Negative for abdominal pain, diarrhea, nausea and vomiting.   Genitourinary: Negative for dysuria and hematuria.        (+) Malodorous urine   Musculoskeletal: Positive for back pain (Right-sided). Negative for neck pain and neck stiffness.   Skin: Negative for rash and wound.   Neurological: Positive for light-headedness. Negative  "for dizziness, weakness, numbness and headaches.        (+) "Funny feeling" in legs   Hematological: Does not bruise/bleed easily.   All other systems reviewed and are negative.     Physical Exam     Initial Vitals [05/23/20 1843]   BP Pulse Resp Temp SpO2   (!) 145/86 (!) 134 20 98.8 °F (37.1 °C) 99 %      MAP       --          Physical Exam  Nursing Notes and Vital Signs Reviewed.  Constitutional: Patient is in no acute distress. Well-developed and well-nourished.  Head: Atraumatic. Normocephalic.  Eyes: PERRL. EOM intact. Conjunctivae are not pale. No scleral icterus.  ENT: Mucous membranes are moist. Oropharynx is clear and symmetric.    Neck: Supple. Full ROM.  Cardiovascular: Tachycardic. Regular rhythm. No murmurs, rubs, or gallops. Distal pulses are 2+ and symmetric.  Pulmonary/Chest: No respiratory distress. Clear to auscultation bilaterally. No wheezing or rales.  Abdominal: Soft and non-distended. There is no tenderness to palpation. No rebound or guarding.  Genitourinary: No CVA tenderness  Musculoskeletal: Moves all extremities. No obvious deformities. No edema. No calf tenderness.  Skin: Warm and dry.  Neurological:  Alert, awake, and appropriate.  Normal speech.  No acute focal neurological deficits are appreciated.  Psychiatric: Normal affect. Good eye contact. Appropriate in content.     ED Course   Procedures  ED Vital Signs:  Vitals:    05/23/20 1843 05/23/20 1900 05/23/20 1902 05/23/20 1930   BP: (!) 145/86  (!) 140/82 137/74   Pulse: (!) 134 (!) 131 (!) 137 (!) 123   Resp: 20 20 20   Temp: 98.8 °F (37.1 °C)      TempSrc: Oral      SpO2: 99%  100% 100%   Weight: 104.6 kg (230 lb 9.6 oz)      Height: 5' 5.5" (1.664 m)       05/23/20 2001 05/23/20 2031 05/23/20 2101 05/23/20 2130   BP: 137/77 130/79 119/73 133/80   Pulse: (!) 123 (!) 117 (!) 111 (!) 119   Resp: (!) 21 (!) 22 19 (!) 23   Temp:       TempSrc:       SpO2: 99% 100% 100% 99%   Weight:       Height:        05/23/20 2201 05/23/20 2231 " 05/23/20 2301 05/23/20 2306   BP: 133/81 124/74 118/75    Pulse: 109 99 95    Resp: (!) 22 (!) 21 (!) 21    Temp:    98.1 °F (36.7 °C)   TempSrc:    Oral   SpO2: 99% 99% 100%    Weight:       Height:           Abnormal Lab Results:  Labs Reviewed   CBC W/ AUTO DIFFERENTIAL - Abnormal; Notable for the following components:       Result Value    Hemoglobin 11.6 (*)     Mean Corpuscular Volume 81 (*)     Mean Corpuscular Hemoglobin 23.2 (*)     Mean Corpuscular Hemoglobin Conc 28.6 (*)     RDW 15.4 (*)     All other components within normal limits   COMPREHENSIVE METABOLIC PANEL - Abnormal; Notable for the following components:    Alkaline Phosphatase 169 (*)     All other components within normal limits   D DIMER, QUANTITATIVE - Abnormal; Notable for the following components:    D-Dimer 1.56 (*)     All other components within normal limits   URINALYSIS, REFLEX TO URINE CULTURE - Abnormal; Notable for the following components:    Leukocytes, UA 1+ (*)     All other components within normal limits    Narrative:     Preferred Collection Type->Urine, Clean Catch   TROPONIN I   B-TYPE NATRIURETIC PEPTIDE   D DIMER, QUANTITATIVE   URINALYSIS MICROSCOPIC    Narrative:     Preferred Collection Type->Urine, Clean Catch        All Lab Results:  Results for orders placed or performed during the hospital encounter of 05/23/20   CBC auto differential   Result Value Ref Range    WBC 10.94 3.90 - 12.70 K/uL    RBC 5.01 4.00 - 5.40 M/uL    Hemoglobin 11.6 (L) 12.0 - 16.0 g/dL    Hematocrit 40.6 37.0 - 48.5 %    Mean Corpuscular Volume 81 (L) 82 - 98 fL    Mean Corpuscular Hemoglobin 23.2 (L) 27.0 - 31.0 pg    Mean Corpuscular Hemoglobin Conc 28.6 (L) 32.0 - 36.0 g/dL    RDW 15.4 (H) 11.5 - 14.5 %    Platelets 267 150 - 350 K/uL    MPV 10.8 9.2 - 12.9 fL    Immature Granulocytes 0.4 0.0 - 0.5 %    Gran # (ANC) 7.7 1.8 - 7.7 K/uL    Immature Grans (Abs) 0.04 0.00 - 0.04 K/uL    Lymph # 2.7 1.0 - 4.8 K/uL    Mono # 0.5 0.3 - 1.0 K/uL     Eos # 0.1 0.0 - 0.5 K/uL    Baso # 0.04 0.00 - 0.20 K/uL    nRBC 0 0 /100 WBC    Gran% 70.1 38.0 - 73.0 %    Lymph% 24.5 18.0 - 48.0 %    Mono% 4.1 4.0 - 15.0 %    Eosinophil% 0.5 0.0 - 8.0 %    Basophil% 0.4 0.0 - 1.9 %    Differential Method Automated    Comprehensive metabolic panel   Result Value Ref Range    Sodium 140 136 - 145 mmol/L    Potassium 3.5 3.5 - 5.1 mmol/L    Chloride 107 95 - 110 mmol/L    CO2 23 23 - 29 mmol/L    Glucose 108 70 - 110 mg/dL    BUN, Bld 11 6 - 20 mg/dL    Creatinine 0.9 0.5 - 1.4 mg/dL    Calcium 9.3 8.7 - 10.5 mg/dL    Total Protein 7.9 6.0 - 8.4 g/dL    Albumin 3.7 3.5 - 5.2 g/dL    Total Bilirubin 0.1 0.1 - 1.0 mg/dL    Alkaline Phosphatase 169 (H) 55 - 135 U/L    AST 14 10 - 40 U/L    ALT 14 10 - 44 U/L    Anion Gap 10 8 - 16 mmol/L    eGFR if African American >60 >60 mL/min/1.73 m^2    eGFR if non African American >60 >60 mL/min/1.73 m^2   Troponin I #1   Result Value Ref Range    Troponin I 0.008 0.000 - 0.026 ng/mL   B-Type natriuretic peptide (BNP)   Result Value Ref Range    BNP <10 0 - 99 pg/mL   D dimer, quantitative   Result Value Ref Range    D-Dimer 1.56 (H) <0.50 mg/L FEU   Urinalysis, Reflex to Urine Culture Urine, Clean Catch   Result Value Ref Range    Specimen UA Urine, Clean Catch     Color, UA Yellow Yellow, Straw, Talia    Appearance, UA Clear Clear    pH, UA 6.0 5.0 - 8.0    Specific Gravity, UA 1.010 1.005 - 1.030    Protein, UA Negative Negative    Glucose, UA Negative Negative    Ketones, UA Negative Negative    Bilirubin (UA) Negative Negative    Occult Blood UA Negative Negative    Nitrite, UA Negative Negative    Urobilinogen, UA Negative <2.0 EU/dL    Leukocytes, UA 1+ (A) Negative   Urinalysis Microscopic   Result Value Ref Range    WBC, UA 4 0 - 5 /hpf    Squam Epithel, UA 15 /hpf    Microscopic Comment SEE COMMENT          Imaging Results:  Imaging Results          CTA Chest Non-Coronary (PE Study) (Final result)  Result time 05/23/20 20:59:19     Final result by Pio Mena Jr., MD (05/23/20 20:59:19)                 Impression:      No pulmonary embolus.    No abnormality identified within the chest.    All CT scan at this facility use dose modulation, iterative reconstruction, and/or weight base dosing when appropriate to reduce radiation dose to as low as reasonably achievable.      Electronically signed by: Pio Mena Jr., MD  Date:    05/23/2020  Time:    20:59             Narrative:    EXAMINATION:  CTA CHEST NON CORONARY    CLINICAL HISTORY:  Chest pain, acute, PE suspected, intermed prob, positive D-dimer;    TECHNIQUE:  Axial CTA images performed through the chest after the administration of 100 cc intravenous contrast. Maximum intensity projections were performed and interpreted.    FINDINGS:  There is no evidence of pulmonary embolus. Pulmonary artery caliber is normal. The heart, great vessels, and mediastinal structures are within normal limits. No thoracic adenopathy.    The lungs are clear bilaterally. No pleural effusions.    The upper abdominal visualized organs are within normal limits.    The bones are intact.                               X-Ray Chest AP Portable (Final result)  Result time 05/23/20 19:24:24    Final result by Ward Alvarado MD (05/23/20 19:24:24)                 Impression:      No acute findings.      Electronically signed by: Ward Alvarado MD  Date:    05/23/2020  Time:    19:24             Narrative:    EXAMINATION:  XR CHEST AP PORTABLE    CLINICAL HISTORY:  Acute chest pain, Chest Pain;    COMPARISON:  04/23/2020.    FINDINGS:  Heart size is normal. The lung fields are clear. No acute cardiopulmonary infiltrate.                                 The EKG was ordered, reviewed, and independently interpreted by the ED provider.  Interpretation time: 1847  Rate: 134 BPM  Rhythm: sinus tachycardia  Interpretation: Minimal voltage criteria for LVH, may be normal variant. Cannot rule out anterior infarct,  age undetermined. No STEMI.           The Emergency Provider reviewed the vital signs and test results, which are outlined above.     ED Discussion     8:09 PM: Re-evaluated pt. Pt is resting comfortably and is in no acute distress.  Pt declines any pain or nausea medicine at this time.  D/w pt all pertinent results. Notified pt of elevated d-dimer and explained that pt will be getting a CTA of her chest. D/w pt any concerns expressed at this time. Answered all questions. Pt expresses understanding at this time.    9:00 PM: Dr. Farley transfers care of pt to Dr. Lemus pending lab and radiology results.    HR resolved to lower 90s. Patient states her pain has resolved.    10:56 PM: Reassessed pt at this time.  Pt states her condition has improved at this time. Discussed with pt all pertinent ED information and results. Discussed pt dx and plan of tx. Gave pt all f/u and return to the ED instructions. All questions and concerns were addressed at this time. Pt expresses understanding of information and instructions, and is comfortable with plan to discharge. Pt is stable for discharge.    I discussed with patient and/or family/caretaker that evaluation in the ED does not suggest any emergent or life threatening medical conditions requiring immediate intervention beyond what was provided in the ED, and I believe patient is safe for discharge.  Regardless, an unremarkable evaluation in the ED does not preclude the development or presence of a serious of life threatening condition. As such, patient was instructed to return immediately for any worsening or change in current symptoms.    I have discussed with patient and/or family/caretaker chest pain precautions, specifically to return for worsening chest pain, shortness of breath, fever, or any concern.  I have low suspicion for cardiopulmonary, vascular, infectious, respiratory, or other emergent medical condition based on my evaluation in the ED.    I counseled the  patient on the risks of taking antibiotics, including taking all doses as prescribed. I explained the risk of antibiotic resistance in the future and susceptibility to C. diff infection, severe allergic reactions, and yeast infections.            Medical Decision Making:   Clinical Tests:   Lab Tests: Ordered and Reviewed  Radiological Study: Ordered and Reviewed  Medical Tests: Ordered and Reviewed           ED Medication(s):  Medications   aspirin tablet 325 mg (325 mg Oral Given 5/23/20 1906)   iohexoL (OMNIPAQUE 350) injection 100 mL (100 mLs Intravenous Given 5/23/20 2042)   lactated ringers bolus 1,000 mL (1,000 mLs Intravenous New Bag 5/23/20 2154)   hydrOXYzine pamoate capsule 25 mg (25 mg Oral Given 5/23/20 2211)   acetaminophen tablet 1,000 mg (1,000 mg Oral Given 5/23/20 2211)       Discharge Medication List as of 5/23/2020 10:57 PM      START taking these medications    Details   cephALEXin (KEFLEX) 500 MG capsule Take 1 capsule (500 mg total) by mouth every 12 (twelve) hours. for 3 days, Starting Sat 5/23/2020, Until Tue 5/26/2020, Print             Follow-up Information     Tyesha Leggett MD In 1 week.    Specialty:  Family Medicine  Contact information:  [address] [facility] [address] [phone]             [facility]    Specialty:  Emergency Medicine  Why:  As needed, If symptoms worsen  Contact information:  [address] [facility] [address] [phone]                     Scribe Attestation:   Scribe #1: I performed the above scribed service and the documentation accurately describes the services I performed. I attest to the accuracy of the note.     Attending:   Physician Attestation Statement for Scribe #1: I, Diego Farley Jr., MD, personally performed the services described in this documentation, as scribed by Dc Irizarry, in my presence, and it is both accurate and complete.       Scribe Attestation:   Scribe #2: I performed the  above scribed service and the documentation accurately describes the services I performed. I attest to the accuracy of the note.    Attending Attestation:           Physician Attestation for Scribe:    Physician Attestation Statement for Scribe #2: I, Bandar Lemus MD, reviewed documentation, as scribed by Dc Cabrales in my presence, and it is both accurate and complete. I also acknowledge and confirm the content of the note done by Stephibe #1.           Clinical Impression       ICD-10-CM ICD-9-CM   1. Atypical chest pain R07.89 786.59   2. Chest pain R07.9 786.50   3. Acute lower UTI (urinary tract infection) N39.0 599.0       Disposition:   Disposition: Discharged  Condition: Stable       Bandar Lemus MD  05/24/20 0511

## 2020-05-25 ENCOUNTER — PATIENT MESSAGE (OUTPATIENT)
Dept: FAMILY MEDICINE | Facility: CLINIC | Age: 30
End: 2020-05-25

## 2020-05-27 ENCOUNTER — PATIENT MESSAGE (OUTPATIENT)
Dept: CARDIOLOGY | Facility: CLINIC | Age: 30
End: 2020-05-27

## 2020-05-27 ENCOUNTER — HOSPITAL ENCOUNTER (EMERGENCY)
Facility: HOSPITAL | Age: 30
Discharge: HOME | End: 2020-05-27
Attending: EMERGENCY MEDICINE
Payer: COMMERCIAL

## 2020-05-27 ENCOUNTER — LAB VISIT (OUTPATIENT)
Dept: LAB | Facility: HOSPITAL | Age: 30
End: 2020-05-27
Attending: FAMILY MEDICINE
Payer: COMMERCIAL

## 2020-05-27 ENCOUNTER — OFFICE VISIT (OUTPATIENT)
Dept: FAMILY MEDICINE | Facility: CLINIC | Age: 30
End: 2020-05-27
Payer: COMMERCIAL

## 2020-05-27 VITALS
HEIGHT: 65 IN | RESPIRATION RATE: 20 BRPM | SYSTOLIC BLOOD PRESSURE: 123 MMHG | WEIGHT: 228.63 LBS | DIASTOLIC BLOOD PRESSURE: 79 MMHG | HEART RATE: 73 BPM | OXYGEN SATURATION: 100 % | TEMPERATURE: 99 F | BODY MASS INDEX: 38.09 KG/M2

## 2020-05-27 VITALS — HEART RATE: 94 BPM | DIASTOLIC BLOOD PRESSURE: 78 MMHG | SYSTOLIC BLOOD PRESSURE: 121 MMHG

## 2020-05-27 DIAGNOSIS — I10 HYPERTENSION, UNSPECIFIED TYPE: ICD-10-CM

## 2020-05-27 DIAGNOSIS — E66.9 OBESITY (BMI 30-39.9): ICD-10-CM

## 2020-05-27 DIAGNOSIS — R07.9 CHEST PAIN: ICD-10-CM

## 2020-05-27 DIAGNOSIS — R00.2 PALPITATIONS: Primary | ICD-10-CM

## 2020-05-27 DIAGNOSIS — R00.2 PALPITATIONS: ICD-10-CM

## 2020-05-27 DIAGNOSIS — R00.0 SINUS TACHYCARDIA: Primary | ICD-10-CM

## 2020-05-27 PROBLEM — E66.01 MORBID OBESITY WITH BMI OF 40.0-44.9, ADULT: Status: RESOLVED | Noted: 2017-05-09 | Resolved: 2020-05-27

## 2020-05-27 LAB
ALBUMIN SERPL BCP-MCNC: 4 G/DL (ref 3.5–5.2)
ALP SERPL-CCNC: 176 U/L (ref 55–135)
ALT SERPL W/O P-5'-P-CCNC: 15 U/L (ref 10–44)
AMPHET+METHAMPHET UR QL: NEGATIVE
ANION GAP SERPL CALC-SCNC: 10 MMOL/L (ref 8–16)
AST SERPL-CCNC: 21 U/L (ref 10–40)
BACTERIA #/AREA URNS HPF: ABNORMAL /HPF
BARBITURATES UR QL SCN>200 NG/ML: NEGATIVE
BASOPHILS # BLD AUTO: 0.04 K/UL (ref 0–0.2)
BASOPHILS NFR BLD: 0.4 % (ref 0–1.9)
BENZODIAZ UR QL SCN>200 NG/ML: NEGATIVE
BILIRUB SERPL-MCNC: 0.1 MG/DL (ref 0.1–1)
BILIRUB UR QL STRIP: ABNORMAL
BNP SERPL-MCNC: <10 PG/ML (ref 0–99)
BUN SERPL-MCNC: 13 MG/DL (ref 6–20)
BZE UR QL SCN: NEGATIVE
CALCIUM SERPL-MCNC: 9.3 MG/DL (ref 8.7–10.5)
CANNABINOIDS UR QL SCN: NEGATIVE
CHLORIDE SERPL-SCNC: 107 MMOL/L (ref 95–110)
CK SERPL-CCNC: 151 U/L (ref 20–180)
CLARITY UR: ABNORMAL
CO2 SERPL-SCNC: 23 MMOL/L (ref 23–29)
COLOR UR: ABNORMAL
CREAT SERPL-MCNC: 0.9 MG/DL (ref 0.5–1.4)
CREAT UR-MCNC: 177.3 MG/DL (ref 15–325)
DIFFERENTIAL METHOD: ABNORMAL
EOSINOPHIL # BLD AUTO: 0.1 K/UL (ref 0–0.5)
EOSINOPHIL NFR BLD: 0.4 % (ref 0–8)
ERYTHROCYTE [DISTWIDTH] IN BLOOD BY AUTOMATED COUNT: 15.3 % (ref 11.5–14.5)
EST. GFR  (AFRICAN AMERICAN): >60 ML/MIN/1.73 M^2
EST. GFR  (NON AFRICAN AMERICAN): >60 ML/MIN/1.73 M^2
GLUCOSE SERPL-MCNC: 114 MG/DL (ref 70–110)
GLUCOSE UR QL STRIP: ABNORMAL
HCT VFR BLD AUTO: 43.1 % (ref 37–48.5)
HGB BLD-MCNC: 12.6 G/DL (ref 12–16)
HGB UR QL STRIP: ABNORMAL
IMM GRANULOCYTES # BLD AUTO: 0.04 K/UL (ref 0–0.04)
IMM GRANULOCYTES NFR BLD AUTO: 0.4 % (ref 0–0.5)
KETONES UR QL STRIP: ABNORMAL
LDH SERPL L TO P-CCNC: 172 U/L (ref 110–260)
LEUKOCYTE ESTERASE UR QL STRIP: ABNORMAL
LYMPHOCYTES # BLD AUTO: 2.1 K/UL (ref 1–4.8)
LYMPHOCYTES NFR BLD: 18.6 % (ref 18–48)
MAGNESIUM SERPL-MCNC: 2.1 MG/DL (ref 1.6–2.6)
MAGNESIUM SERPL-MCNC: 2.1 MG/DL (ref 1.6–2.6)
MCH RBC QN AUTO: 23.2 PG (ref 27–31)
MCHC RBC AUTO-ENTMCNC: 29.2 G/DL (ref 32–36)
MCV RBC AUTO: 79 FL (ref 82–98)
METHADONE UR QL SCN>300 NG/ML: NEGATIVE
MICROSCOPIC COMMENT: ABNORMAL
MONOCYTES # BLD AUTO: 0.4 K/UL (ref 0.3–1)
MONOCYTES NFR BLD: 3.5 % (ref 4–15)
NEUTROPHILS # BLD AUTO: 8.7 K/UL (ref 1.8–7.7)
NEUTROPHILS NFR BLD: 76.7 % (ref 38–73)
NITRITE UR QL STRIP: ABNORMAL
NRBC BLD-RTO: 0 /100 WBC
OPIATES UR QL SCN: NEGATIVE
PCP UR QL SCN>25 NG/ML: NEGATIVE
PH UR STRIP: ABNORMAL [PH] (ref 5–8)
PLATELET # BLD AUTO: 267 K/UL (ref 150–350)
PMV BLD AUTO: 10.2 FL (ref 9.2–12.9)
POTASSIUM SERPL-SCNC: 4 MMOL/L (ref 3.5–5.1)
PROT SERPL-MCNC: 8.6 G/DL (ref 6–8.4)
PROT UR QL STRIP: ABNORMAL
RBC # BLD AUTO: 5.44 M/UL (ref 4–5.4)
RBC #/AREA URNS HPF: >100 /HPF (ref 0–4)
SARS-COV-2 RDRP RESP QL NAA+PROBE: NEGATIVE
SODIUM SERPL-SCNC: 140 MMOL/L (ref 136–145)
SP GR UR STRIP: ABNORMAL (ref 1–1.03)
SQUAMOUS #/AREA URNS HPF: 0 /HPF
TOXICOLOGY INFORMATION: NORMAL
TROPONIN I SERPL DL<=0.01 NG/ML-MCNC: 0.01 NG/ML (ref 0–0.03)
TROPONIN I SERPL DL<=0.01 NG/ML-MCNC: 0.02 NG/ML (ref 0–0.03)
TSH SERPL DL<=0.005 MIU/L-ACNC: 1.98 UIU/ML (ref 0.4–4)
TSH SERPL DL<=0.005 MIU/L-ACNC: 3 UIU/ML (ref 0.4–4)
URN SPEC COLLECT METH UR: ABNORMAL
UROBILINOGEN UR STRIP-ACNC: ABNORMAL EU/DL
WBC # BLD AUTO: 11.32 K/UL (ref 3.9–12.7)
WBC #/AREA URNS HPF: 7 /HPF (ref 0–5)

## 2020-05-27 PROCEDURE — 25000003 PHARM REV CODE 250: Performed by: EMERGENCY MEDICINE

## 2020-05-27 PROCEDURE — 93005 ELECTROCARDIOGRAM TRACING: CPT

## 2020-05-27 PROCEDURE — 82550 ASSAY OF CK (CPK): CPT

## 2020-05-27 PROCEDURE — U0002 COVID-19 LAB TEST NON-CDC: HCPCS

## 2020-05-27 PROCEDURE — 84484 ASSAY OF TROPONIN QUANT: CPT | Mod: 91

## 2020-05-27 PROCEDURE — 36415 COLL VENOUS BLD VENIPUNCTURE: CPT

## 2020-05-27 PROCEDURE — 81000 URINALYSIS NONAUTO W/SCOPE: CPT | Mod: 59

## 2020-05-27 PROCEDURE — 85025 COMPLETE CBC W/AUTO DIFF WBC: CPT

## 2020-05-27 PROCEDURE — 83615 LACTATE (LD) (LDH) ENZYME: CPT

## 2020-05-27 PROCEDURE — 96376 TX/PRO/DX INJ SAME DRUG ADON: CPT

## 2020-05-27 PROCEDURE — 99214 PR OFFICE/OUTPT VISIT, EST, LEVL IV, 30-39 MIN: ICD-10-PCS | Mod: 95,,, | Performed by: FAMILY MEDICINE

## 2020-05-27 PROCEDURE — 83880 ASSAY OF NATRIURETIC PEPTIDE: CPT

## 2020-05-27 PROCEDURE — 96374 THER/PROPH/DIAG INJ IV PUSH: CPT

## 2020-05-27 PROCEDURE — 83735 ASSAY OF MAGNESIUM: CPT | Mod: 91

## 2020-05-27 PROCEDURE — 84443 ASSAY THYROID STIM HORMONE: CPT

## 2020-05-27 PROCEDURE — 93010 EKG 12-LEAD: ICD-10-PCS | Mod: ,,, | Performed by: INTERNAL MEDICINE

## 2020-05-27 PROCEDURE — 80307 DRUG TEST PRSMV CHEM ANLYZR: CPT

## 2020-05-27 PROCEDURE — 93010 ELECTROCARDIOGRAM REPORT: CPT | Mod: ,,, | Performed by: INTERNAL MEDICINE

## 2020-05-27 PROCEDURE — 80053 COMPREHEN METABOLIC PANEL: CPT

## 2020-05-27 PROCEDURE — 83735 ASSAY OF MAGNESIUM: CPT

## 2020-05-27 PROCEDURE — 99214 OFFICE O/P EST MOD 30 MIN: CPT | Mod: 95,,, | Performed by: FAMILY MEDICINE

## 2020-05-27 PROCEDURE — 99291 CRITICAL CARE FIRST HOUR: CPT | Mod: 25

## 2020-05-27 PROCEDURE — 84443 ASSAY THYROID STIM HORMONE: CPT | Mod: 91

## 2020-05-27 RX ORDER — NIFEDIPINE 60 MG/1
60 TABLET, EXTENDED RELEASE ORAL 2 TIMES DAILY
Qty: 60 TABLET | Refills: 5 | OUTPATIENT
Start: 2020-05-27 | End: 2020-05-27

## 2020-05-27 RX ORDER — METOPROLOL SUCCINATE 25 MG/1
25 TABLET, EXTENDED RELEASE ORAL DAILY
Qty: 30 TABLET | Refills: 1 | Status: SHIPPED | OUTPATIENT
Start: 2020-05-27 | End: 2020-07-29 | Stop reason: SDUPTHER

## 2020-05-27 RX ORDER — METOPROLOL SUCCINATE 25 MG/1
25 TABLET, EXTENDED RELEASE ORAL ONCE
Status: COMPLETED | OUTPATIENT
Start: 2020-05-27 | End: 2020-05-27

## 2020-05-27 RX ORDER — METOPROLOL TARTRATE 1 MG/ML
5 INJECTION, SOLUTION INTRAVENOUS
Status: COMPLETED | OUTPATIENT
Start: 2020-05-27 | End: 2020-05-27

## 2020-05-27 RX ORDER — METOPROLOL SUCCINATE 25 MG/1
25 TABLET, EXTENDED RELEASE ORAL DAILY
Qty: 30 TABLET | Refills: 1 | Status: SHIPPED | OUTPATIENT
Start: 2020-05-27 | End: 2020-05-27 | Stop reason: SDUPTHER

## 2020-05-27 RX ADMIN — METOPROLOL TARTRATE 5 MG: 5 INJECTION INTRAVENOUS at 08:05

## 2020-05-27 RX ADMIN — METOPROLOL TARTRATE 5 MG: 5 INJECTION INTRAVENOUS at 07:05

## 2020-05-27 RX ADMIN — METOPROLOL SUCCINATE 25 MG: 25 TABLET, EXTENDED RELEASE ORAL at 08:05

## 2020-05-27 NOTE — TELEPHONE ENCOUNTER
Ok for Virtual visit (as long as she has b/p monitor w/cuff at home to measure b/p during visit) w/me. Thanks.

## 2020-05-27 NOTE — PROGRESS NOTES
Anthony Sexton    Chief Complaint   Patient presents with    Blood pressure and Heart rate problems       History of Present Illness:   The patient location is: Home.  The chief complaint leading to consultation is: Blood pressure and heart rate problems.    Visit type: audiovisual    Face to Face time with patient: 25 minutes with 25 minutes of total time spent on the encounter, which includes face to face time and non-face to face time preparing to see the patient (eg, review of tests), Obtaining and/or reviewing separately obtained history, Documenting clinical information in the electronic or other health record, Independently interpreting results (not separately reported) and communicating results to the patient/family/caregiver, or Care coordination (not separately reported).     Each patient to whom he or she provides medical services by telemedicine is:  (1) informed of the relationship between the physician and patient and the respective role of any other health care provider with respect to management of the patient; and (2) notified that he or she may decline to receive medical services by telemedicine and may withdraw from such care at any time.    Notes:     Ms. Sexton visits with me today as she states she is concerned about having elevated blood pressure and elevated heart rate (90's at rest but 100's with activity).  She reports her highest blood pressure levels range 130-140's/90's and highest heart rate levels range 108-109.  Her blood pressure and heart rate during visit with me today (and after walking) or 121/78 and 94.    She reports no history of hypertension or heart rate issues prior to pregnancy.  She states she had vaginal birth of her daughter on April 16, 2020.  She states 1 week following delivery she had preeclampsia and required hospitalization for 2 days.  She states she was discharged home on Procardia XL 60 mg twice daily with help until recently when she states she developed  elevated heart rate.  She reports having ER visit at [facility] on May 23, 2020 for elevated heart rate at which time she states she had EKG performed.  She states she was given IV fluids along with Tylenol and Vistaril. She states at her recent postpartum visit on last week she was told to decrease Procardia XL 60 mg twice daily to 30 mg twice daily which she states she has not done as her blood pressure remains elevated.    She states she occasionally has neck pain and tightness.    She reports having occasional dull chest pain and occasional numbness of her feet with elevated heart rate.    She states she occasionally has headache.  She reports having slight visual change at her right eye since delivery.    She states she eats chocolate every day despite stating she monitors her diet.  She states she exercises little but states her symptoms started after she started exercising.    Otherwise, she denies having fever, chills, fatigue, appetite changes; shortness of breath, cough, wheezing; leg swelling; abdominal pain, nausea, vomiting, diarrhea, constipation; unusual urinary symptoms; back pain; anxiety, depression, homicidal or suicidal thoughts.    She states she continue Zoloft from 50 mg daily for treatment of anxiety and depression per Psychiatry.    She states she is currently breastfeeding.  She states she has not been taking iron daily.        Labs:                    WBC                      10.94               05/23/2020                 HGB                      11.6 (L)            05/23/2020                 HCT                      40.6                05/23/2020                 PLT                      267                 05/23/2020                 CHOL                     181                 04/15/2019                 TRIG                     75                  04/15/2019                 HDL                      37 (L)              04/15/2019                 ALT                      14                   05/23/2020                 AST                      14                  05/23/2020                 NA                       140                 05/23/2020                 K                        3.5                 05/23/2020                 CL                       107                 05/23/2020                 CREATININE               0.9                 05/23/2020                 BUN                      11                  05/23/2020                 CO2                      23                  05/23/2020                 TSH                      0.769               02/07/2020                 HGBA1C                   5.2                 09/09/2019                LDLCALC                  129.0               04/15/2019                Current Outpatient Medications   Medication Sig    cephALEXin (KEFLEX) 500 MG capsule Take 1 capsule (500 mg total) by mouth every 12 (twelve) hours. for 3 days    ergocalciferol (ERGOCALCIFEROL) 50,000 unit Cap Take 2 capsules (100,000 Units total) by mouth every 7 days.    hydrOXYzine pamoate (VISTARIL) 25 MG Cap Take 1 capsule (25 mg total) by mouth 3 (three) times daily. (Patient taking differently: Take 25 mg by mouth as needed. )    ibuprofen (ADVIL,MOTRIN) 600 MG tablet Take 1 tablet (600 mg total) by mouth every 6 (six) hours.    levonorgestreL (MIRENA) 20 mcg/24 hours (5 yrs) 52 mg IUD 1 Intra Uterine Device by Intrauterine route once. for 1 dose    NIFEdipine (PROCARDIA-XL) 60 MG (OSM) 24 hr tablet Take 1 tablet (60 mg total) by mouth 2 (two) times a day.    pantoprazole (PROTONIX) 40 MG tablet Take 1 tablet (40 mg total) by mouth 2 (two) times daily.    prenatal vits62/FA/om3/dha/epa (PRENATAL GUMMY ORAL) Take 1 Dose by mouth.    sertraline (ZOLOFT) 100 MG tablet Take 1.5 tablets (150 mg total) by mouth once daily.     Current Facility-Administered Medications   Medication    levonorgestreL 20 mcg/24 hours (5 yrs) 52 mg IUD 1 Intra Uterine Device       Review of  Systems   Constitutional: Negative for activity change, appetite change, chills, fatigue and fever.   Eyes: Positive for visual disturbance.   Respiratory: Negative for cough, shortness of breath and wheezing.    Cardiovascular: Positive for chest pain and palpitations. Negative for leg swelling.   Gastrointestinal: Negative for abdominal pain, constipation, diarrhea, nausea and vomiting.   Genitourinary: Negative for difficulty urinating and menstrual problem.   Musculoskeletal: Positive for neck pain. Negative for arthralgias and joint swelling.   Neurological: Positive for numbness and headaches.   Psychiatric/Behavioral: Negative for dysphoric mood and suicidal ideas. The patient is not nervous/anxious.         Negative for homicidal ideas. See history of present illness.       Objective:  Physical Exam   Constitutional: She is oriented to person, place, and time. She appears well-developed and well-nourished. No distress.   Pleasant.   Pulmonary/Chest: Effort normal. No respiratory distress.   Musculoskeletal: Normal range of motion.   She moves around her home without problems during virtual visit.   Neurological: She is alert and oriented to person, place, and time.   Skin: She is not diaphoretic.   Psychiatric: She has a normal mood and affect. Her behavior is normal. Judgment and thought content normal.   Vitals reviewed.      ASSESSMENT:  1. Palpitations    2. Hypertension, unspecified type    3. Obesity (BMI 30-39.9)        PLAN:  Anthony was seen today for blood pressure and heart rate problems.    Diagnoses and all orders for this visit:    Palpitations  -     TSH; Future  -     Magnesium; Future    Hypertension, unspecified type  -     NIFEdipine (PROCARDIA-XL) 60 MG (OSM) 24 hr tablet; Take 1 tablet (60 mg total) by mouth 2 (two) times a day.    Obesity (BMI 30-39.9)       Patient advised to call for results.  Continue current medications (including continue Procardia XL 60 mg twice daily), follow low  sodium, low cholesterol, low carb diet, daily walks.  Prescription refill as noted above.  Avoid eating caffeine products.  Keep follow up with specialists.  Flu shot this fall.  Follow up if symptoms worsen or fail to improve.

## 2020-05-28 ENCOUNTER — PATIENT MESSAGE (OUTPATIENT)
Dept: FAMILY MEDICINE | Facility: CLINIC | Age: 30
End: 2020-05-28

## 2020-05-28 NOTE — ED PROVIDER NOTES
SCRIBE #1 NOTE: I, Dc Cabrales, am scribing for, and in the presence of, Deya Zhou MD. I have scribed the entire note.       History     Chief Complaint   Patient presents with    Chest Pain     6 weeks postpartum, c/o chest pain that doesnt go away with lightheadedness.      Review of patient's allergies indicates:  No Known Allergies      History of Present Illness     HPI    5/27/2020, 7:12 PM  History obtained from the patient      History of Present Illness: Anthony Sexton is a 29 y.o. female patient with a history of anxiety, pre-eclampsia, obesity who presents to the Emergency Department for evaluation of palpitations which onset 1-2 weeks ago. Symptoms are episodic and moderate in severity. No mitigating or exacerbating factors reported. Associated sxs include substernal chest pain and TELLEZ. Patient denies any diaphoresis, cough, leg swelling, n/v, and all other sxs at this time. Prior Tx includes procardia 60 mg BID. No further complaints or concerns at this time. Patient is 6 weeks s/p vaginal delivery.      Arrival mode: Personal transportation     PCP: Tyesha Leggett MD      Past Medical History:  Past Medical History:   Diagnosis Date    Anemia     Anxiety     Chronic low back pain     Depression     History of abnormal cervical Pap smear     History of recurrent UTIs     History of ulcer disease     Irregular menses     Obesity     Postpartum depression     with last pregnancy.     Pre-eclampsia in postpartum period 4/23/2020    Vitamin D deficiency        Past Surgical History:  Past Surgical History:   Procedure Laterality Date    None           Family History:  Family History   Problem Relation Age of Onset    Asthma Mother     Hyperlipidemia Mother     Hypertension Mother     Hypertension Father     Hyperlipidemia Father     Heart disease Father 55        MI/CAD    No Known Problems Sister     No Known Problems Brother     Diabetes Maternal Aunt     Diabetes  Maternal Grandmother     Insulin resistance Sister     No Known Problems Brother     No Known Problems Brother     No Known Problems Brother     No Known Problems Brother     No Known Problems Daughter     Breast cancer Maternal Aunt     Cancer Neg Hx     Stroke Neg Hx     Brain cancer Neg Hx     Migraines Neg Hx     Colon cancer Neg Hx     Ovarian cancer Neg Hx        Social History:   Social History     Tobacco Use    Smoking status: Never Smoker    Smokeless tobacco: Never Used   Substance and Sexual Activity    Alcohol use: Not Currently     Alcohol/week: 0.0 standard drinks    Drug use: No    Sexual activity: Not Currently     Partners: Male     Birth control/protection: None        Review of Systems     Review of Systems   Constitutional: Negative for diaphoresis and fever.   HENT: Negative for sore throat.    Respiratory: Positive for shortness of breath. Negative for cough.    Cardiovascular: Positive for chest pain and palpitations. Negative for leg swelling.   Gastrointestinal: Negative for nausea and vomiting.   Genitourinary: Negative for dysuria.   Musculoskeletal: Negative for back pain.   Skin: Negative for rash.   Neurological: Negative for weakness.   Hematological: Does not bruise/bleed easily.   All other systems reviewed and are negative.       Physical Exam     Initial Vitals [05/27/20 1853]   BP Pulse Resp Temp SpO2   (!) 141/85 (!) 137 18 99 °F (37.2 °C) 100 %      MAP       --          Physical Exam  Nursing Notes and Vital Signs Reviewed.  Constitutional: Well-developed and morbidly obese. Patient is in NAD.  Head: Atraumatic. Normocephalic.  Eyes: PERRL. EOM intact. Conjunctivae are not pale. No scleral icterus.  ENT: Mucous membranes are moist. Oropharynx is clear and symmetric.    Neck: Supple. Full ROM. No lymphadenopathy.  Cardiovascular: Tachycardic. Regular rhythm. No murmurs, rubs, or gallops. Distal pulses are 2+ and symmetric.  Pulmonary/Chest: No respiratory  distress. Clear to auscultation bilaterally. No wheezing or rales.  Abdominal: Soft and non-distended.  There is no tenderness.  No rebound, guarding, or rigidity. Good bowel sounds.  Genitourinary: No CVA tenderness  Musculoskeletal: Moves all extremities. No obvious deformities. No calf tenderness.  Skin: Warm and dry.  Neurological:  Alert, awake, and appropriate.  Normal speech.  No acute focal neurological deficits are appreciated.  Psychiatric: Anxious affect. Good eye contact. Appropriate in content.     ED Course   Critical Care  Date/Time: 5/27/2020 9:59 PM  Performed by: Deya Zhou MD  Authorized by: Deya Zhou MD   Direct patient critical care time: 20 minutes  Additional history critical care time: 10 minutes  Ordering / reviewing critical care time: 5 minutes  Documentation critical care time: 7 minutes  Total critical care time (exclusive of procedural time) : 42 minutes  Critical care time was exclusive of separately billable procedures and treating other patients and teaching time.  Critical care was necessary to treat or prevent imminent or life-threatening deterioration of the following conditions: sinus tachycardia.  Critical care was time spent personally by me on the following activities: blood draw for specimens, development of treatment plan with patient or surrogate, interpretation of cardiac output measurements, evaluation of patient's response to treatment, examination of patient, obtaining history from patient or surrogate, ordering and performing treatments and interventions, ordering and review of laboratory studies, ordering and review of radiographic studies, pulse oximetry, re-evaluation of patient's condition and review of old charts.        ED Vital Signs:  Vitals:    05/27/20 1853 05/27/20 1905 05/27/20 1927 05/27/20 2000   BP: (!) 141/85 (!) 142/81  134/82   Pulse: (!) 137 (!) 134 100 102   Resp: 18 20  20   Temp: 99 °F (37.2 °C)      TempSrc: Oral      SpO2:  "100% 100%  100%   Weight: 103.7 kg (228 lb 9.9 oz)      Height: 5' 5" (1.651 m)       05/27/20 2019   BP: 131/81   Pulse: 96   Resp: (!) 21   Temp:    TempSrc:    SpO2: 100%   Weight:    Height:        Abnormal Lab Results:  Labs Reviewed   URINALYSIS, REFLEX TO URINE CULTURE - Abnormal; Notable for the following components:       Result Value    Color, UA Red (*)     Appearance, UA Cloudy (*)     All other components within normal limits    Narrative:     Preferred Collection Type->Urine, Clean Catch   CBC W/ AUTO DIFFERENTIAL - Abnormal; Notable for the following components:    RBC 5.44 (*)     Mean Corpuscular Volume 79 (*)     Mean Corpuscular Hemoglobin 23.2 (*)     Mean Corpuscular Hemoglobin Conc 29.2 (*)     RDW 15.3 (*)     Gran # (ANC) 8.7 (*)     Gran% 76.7 (*)     Mono% 3.5 (*)     All other components within normal limits   COMPREHENSIVE METABOLIC PANEL - Abnormal; Notable for the following components:    Glucose 114 (*)     Total Protein 8.6 (*)     Alkaline Phosphatase 176 (*)     All other components within normal limits   URINALYSIS MICROSCOPIC - Abnormal; Notable for the following components:    RBC, UA >100 (*)     WBC, UA 7 (*)     Bacteria Few (*)     All other components within normal limits    Narrative:     Preferred Collection Type->Urine, Clean Catch   TROPONIN I   B-TYPE NATRIURETIC PEPTIDE   TSH   MAGNESIUM   CK   DRUG SCREEN PANEL, URINE EMERGENCY    Narrative:     Preferred Collection Type->Urine, Clean Catch   SARS-COV-2 RNA AMPLIFICATION, QUAL   LACTATE DEHYDROGENASE   TROPONIN I        All Lab Results:  Results for orders placed or performed during the hospital encounter of 05/27/20   Urinalysis, Reflex to Urine Culture Urine, Clean Catch   Result Value Ref Range    Specimen UA Urine, Clean Catch     Color, UA Red (A) Yellow, Straw, Talia    Appearance, UA Cloudy (A) Clear    pH, UA SEE COMMENT 5.0 - 8.0    Specific Gravity, UA SEE COMMENT 1.005 - 1.030    Protein, UA SEE COMMENT " Negative    Glucose, UA SEE COMMENT Negative    Ketones, UA SEE COMMENT Negative    Bilirubin (UA) SEE COMMENT Negative    Occult Blood UA SEE COMMENT Negative    Nitrite, UA SEE COMMENT Negative    Urobilinogen, UA SEE COMMENT <2.0 EU/dL    Leukocytes, UA SEE COMMENT Negative   CBC auto differential   Result Value Ref Range    WBC 11.32 3.90 - 12.70 K/uL    RBC 5.44 (H) 4.00 - 5.40 M/uL    Hemoglobin 12.6 12.0 - 16.0 g/dL    Hematocrit 43.1 37.0 - 48.5 %    Mean Corpuscular Volume 79 (L) 82 - 98 fL    Mean Corpuscular Hemoglobin 23.2 (L) 27.0 - 31.0 pg    Mean Corpuscular Hemoglobin Conc 29.2 (L) 32.0 - 36.0 g/dL    RDW 15.3 (H) 11.5 - 14.5 %    Platelets 267 150 - 350 K/uL    MPV 10.2 9.2 - 12.9 fL    Immature Granulocytes 0.4 0.0 - 0.5 %    Gran # (ANC) 8.7 (H) 1.8 - 7.7 K/uL    Immature Grans (Abs) 0.04 0.00 - 0.04 K/uL    Lymph # 2.1 1.0 - 4.8 K/uL    Mono # 0.4 0.3 - 1.0 K/uL    Eos # 0.1 0.0 - 0.5 K/uL    Baso # 0.04 0.00 - 0.20 K/uL    nRBC 0 0 /100 WBC    Gran% 76.7 (H) 38.0 - 73.0 %    Lymph% 18.6 18.0 - 48.0 %    Mono% 3.5 (L) 4.0 - 15.0 %    Eosinophil% 0.4 0.0 - 8.0 %    Basophil% 0.4 0.0 - 1.9 %    Differential Method Automated    Comprehensive metabolic panel   Result Value Ref Range    Sodium 140 136 - 145 mmol/L    Potassium 4.0 3.5 - 5.1 mmol/L    Chloride 107 95 - 110 mmol/L    CO2 23 23 - 29 mmol/L    Glucose 114 (H) 70 - 110 mg/dL    BUN, Bld 13 6 - 20 mg/dL    Creatinine 0.9 0.5 - 1.4 mg/dL    Calcium 9.3 8.7 - 10.5 mg/dL    Total Protein 8.6 (H) 6.0 - 8.4 g/dL    Albumin 4.0 3.5 - 5.2 g/dL    Total Bilirubin 0.1 0.1 - 1.0 mg/dL    Alkaline Phosphatase 176 (H) 55 - 135 U/L    AST 21 10 - 40 U/L    ALT 15 10 - 44 U/L    Anion Gap 10 8 - 16 mmol/L    eGFR if African American >60 >60 mL/min/1.73 m^2    eGFR if non African American >60 >60 mL/min/1.73 m^2   Troponin I #1   Result Value Ref Range    Troponin I 0.023 0.000 - 0.026 ng/mL   B-Type natriuretic peptide (BNP)   Result Value Ref Range     BNP <10 0 - 99 pg/mL   TSH   Result Value Ref Range    TSH 3.002 0.400 - 4.000 uIU/mL   Magnesium   Result Value Ref Range    Magnesium 2.1 1.6 - 2.6 mg/dL   CPK   Result Value Ref Range     20 - 180 U/L   Drug screen panel, emergency   Result Value Ref Range    Benzodiazepines Negative     Methadone metabolites Negative     Cocaine (Metab.) Negative     Opiate Scrn, Ur Negative     Barbiturate Screen, Ur Negative     Amphetamine Screen, Ur Negative     THC Negative     Phencyclidine Negative     Creatinine, Random Ur 177.3 15.0 - 325.0 mg/dL    Toxicology Information SEE COMMENT    COVID-19 Rapid Screening   Result Value Ref Range    SARS-CoV-2 RNA, Amplification, Qual Negative Negative   Lactate dehydrogenase   Result Value Ref Range     110 - 260 U/L   Urinalysis Microscopic   Result Value Ref Range    RBC, UA >100 (H) 0 - 4 /hpf    WBC, UA 7 (H) 0 - 5 /hpf    Bacteria Few (A) None-Occ /hpf    Squam Epithel, UA 0 /hpf    Microscopic Comment SEE COMMENT    Troponin I   Result Value Ref Range    Troponin I 0.015 0.000 - 0.026 ng/mL         Imaging Results          X-Ray Chest AP Portable (Final result)  Result time 05/27/20 19:49:00    Final result by Pio Mena Jr., MD (05/27/20 19:49:00)                 Impression:      No acute cardiopulmonary disease.      Electronically signed by: Pio Mena Jr., MD  Date:    05/27/2020  Time:    19:49             Narrative:    EXAMINATION:  XR CHEST AP PORTABLE    CLINICAL HISTORY:  Chest Pain;    COMPARISON:  05/23/2020    FINDINGS:  The lungs are clear. The cardiac silhouette and mediastinum are within normal limits. The remaining osseous structures and soft tissues are within normal limits.                                 The EKG was ordered, reviewed, and independently interpreted by the ED provider.  Interpretation time: 1859  Rate: 125 BPM  Rhythm: sinus tach  Interpretation: cannot rule out anterior infarct, age undetermined. No STEMI.      The  Emergency Provider reviewed the vital signs and test results, which are outlined above.     ED Discussion       9:59 PM: Reassessed pt at this time.  Pt states her condition has improved at this time. Discussed with pt all pertinent ED information and results. Discussed pt dx and plan of tx. Gave pt all f/u and return to the ED instructions. All questions and concerns were addressed at this time. Pt expresses understanding of information and instructions, and is comfortable with plan to discharge. Pt is stable for discharge.    I discussed with patient and/or family/caretaker that evaluation in the ED does not suggest any emergent or life threatening medical conditions requiring immediate intervention beyond what was provided in the ED, and I believe patient is safe for discharge.  Regardless, an unremarkable evaluation in the ED does not preclude the development or presence of a serious of life threatening condition. As such, patient was instructed to return immediately for any worsening or change in current symptoms.    I have discussed with patient and/or family/caretaker chest pain precautions, specifically to return for worsening chest pain, shortness of breath, fever, or any concern.  I have low suspicion for cardiopulmonary, vascular, infectious, respiratory, or other emergent medical condition based on my evaluation in the ED.       MDM        Medical Decision Making:   Clinical Tests:   Lab Tests: Ordered and Reviewed  Radiological Study: Ordered and Reviewed  Medical Tests: Ordered and Reviewed           ED Medication(s):  Medications   metoprolol injection 5 mg (5 mg Intravenous Given 5/27/20 1920)   metoprolol injection 5 mg (5 mg Intravenous Given 5/27/20 2015)   metoprolol succinate (TOPROL-XL) 24 hr tablet 25 mg (25 mg Oral Given 5/27/20 2015)       Current Discharge Medication List      START taking these medications    Details   metoprolol succinate (TOPROL-XL) 25 MG 24 hr tablet Take 1 tablet (25  mg total) by mouth once daily.  Qty: 30 tablet, Refills: 1    Comments: .             Follow-up Information     [facility]. Schedule an appointment as soon as possible for a visit in 2 days.    Specialty:  Cardiology  Why:  Return to the Emergency Room, If symptoms worsen  Contact information:  [id number] [facility] [id number] [phone]  Additional information:  3rd Floor - From [address], take the [facility] exit ([address]). Enter the facility from the [address]                     Sushma Attestation:   Scribe #1: I performed the above scribed service and the documentation accurately describes the services I performed. I attest to the accuracy of the note.     Attending:   Physician Attestation Statement for Scribe #1: I, Deya Zhou MD, personally performed the services described in this documentation, as scribed by Dc Cabrales, in my presence, and it is both accurate and complete.           Clinical Impression       ICD-10-CM ICD-9-CM   1. Sinus tachycardia R00.0 427.89   2. Chest pain R07.9 786.50       Disposition:   Disposition: Discharged  Condition: Stable         Deya Zhou MD  05/27/20 2392

## 2020-05-29 ENCOUNTER — HOSPITAL ENCOUNTER (EMERGENCY)
Facility: HOSPITAL | Age: 30
Discharge: HOME | End: 2020-05-29
Attending: EMERGENCY MEDICINE
Payer: COMMERCIAL

## 2020-05-29 VITALS
SYSTOLIC BLOOD PRESSURE: 135 MMHG | BODY MASS INDEX: 37.98 KG/M2 | DIASTOLIC BLOOD PRESSURE: 72 MMHG | OXYGEN SATURATION: 100 % | RESPIRATION RATE: 20 BRPM | HEART RATE: 55 BPM | TEMPERATURE: 98 F | WEIGHT: 227.94 LBS | HEIGHT: 65 IN

## 2020-05-29 VITALS
DIASTOLIC BLOOD PRESSURE: 78 MMHG | HEIGHT: 65 IN | BODY MASS INDEX: 37.82 KG/M2 | WEIGHT: 227 LBS | RESPIRATION RATE: 20 BRPM | TEMPERATURE: 98 F | HEART RATE: 65 BPM | SYSTOLIC BLOOD PRESSURE: 138 MMHG | OXYGEN SATURATION: 95 %

## 2020-05-29 DIAGNOSIS — R07.89 CHEST WALL PAIN: Primary | ICD-10-CM

## 2020-05-29 DIAGNOSIS — T50.905A MEDICATION SIDE EFFECT, INITIAL ENCOUNTER: Primary | ICD-10-CM

## 2020-05-29 DIAGNOSIS — R07.9 CHEST PAIN: ICD-10-CM

## 2020-05-29 LAB
ALBUMIN SERPL BCP-MCNC: 3.8 G/DL (ref 3.5–5.2)
ALP SERPL-CCNC: 163 U/L (ref 55–135)
ALT SERPL W/O P-5'-P-CCNC: 15 U/L (ref 10–44)
ANION GAP SERPL CALC-SCNC: 10 MMOL/L (ref 8–16)
AST SERPL-CCNC: 17 U/L (ref 10–40)
BASOPHILS # BLD AUTO: 0.03 K/UL (ref 0–0.2)
BASOPHILS NFR BLD: 0.3 % (ref 0–1.9)
BILIRUB SERPL-MCNC: 0.2 MG/DL (ref 0.1–1)
BNP SERPL-MCNC: 12 PG/ML (ref 0–99)
BUN SERPL-MCNC: 13 MG/DL (ref 6–20)
CALCIUM SERPL-MCNC: 9 MG/DL (ref 8.7–10.5)
CHLORIDE SERPL-SCNC: 105 MMOL/L (ref 95–110)
CO2 SERPL-SCNC: 23 MMOL/L (ref 23–29)
CREAT SERPL-MCNC: 0.8 MG/DL (ref 0.5–1.4)
DIFFERENTIAL METHOD: ABNORMAL
EOSINOPHIL # BLD AUTO: 0.1 K/UL (ref 0–0.5)
EOSINOPHIL NFR BLD: 0.6 % (ref 0–8)
ERYTHROCYTE [DISTWIDTH] IN BLOOD BY AUTOMATED COUNT: 15.3 % (ref 11.5–14.5)
EST. GFR  (AFRICAN AMERICAN): >60 ML/MIN/1.73 M^2
EST. GFR  (NON AFRICAN AMERICAN): >60 ML/MIN/1.73 M^2
GLUCOSE SERPL-MCNC: 80 MG/DL (ref 70–110)
HCT VFR BLD AUTO: 39 % (ref 37–48.5)
HGB BLD-MCNC: 11.3 G/DL (ref 12–16)
IMM GRANULOCYTES # BLD AUTO: 0.03 K/UL (ref 0–0.04)
IMM GRANULOCYTES NFR BLD AUTO: 0.3 % (ref 0–0.5)
LYMPHOCYTES # BLD AUTO: 2.9 K/UL (ref 1–4.8)
LYMPHOCYTES NFR BLD: 28.9 % (ref 18–48)
MCH RBC QN AUTO: 23.1 PG (ref 27–31)
MCHC RBC AUTO-ENTMCNC: 29 G/DL (ref 32–36)
MCV RBC AUTO: 80 FL (ref 82–98)
MONOCYTES # BLD AUTO: 0.5 K/UL (ref 0.3–1)
MONOCYTES NFR BLD: 5 % (ref 4–15)
NEUTROPHILS # BLD AUTO: 6.5 K/UL (ref 1.8–7.7)
NEUTROPHILS NFR BLD: 64.9 % (ref 38–73)
NRBC BLD-RTO: 0 /100 WBC
PLATELET # BLD AUTO: 247 K/UL (ref 150–350)
PMV BLD AUTO: 10.4 FL (ref 9.2–12.9)
POTASSIUM SERPL-SCNC: 3.7 MMOL/L (ref 3.5–5.1)
PROT SERPL-MCNC: 7.9 G/DL (ref 6–8.4)
RBC # BLD AUTO: 4.89 M/UL (ref 4–5.4)
SODIUM SERPL-SCNC: 138 MMOL/L (ref 136–145)
TROPONIN I SERPL DL<=0.01 NG/ML-MCNC: 0.01 NG/ML (ref 0–0.03)
WBC # BLD AUTO: 10.02 K/UL (ref 3.9–12.7)

## 2020-05-29 PROCEDURE — 84484 ASSAY OF TROPONIN QUANT: CPT

## 2020-05-29 PROCEDURE — 96374 THER/PROPH/DIAG INJ IV PUSH: CPT

## 2020-05-29 PROCEDURE — 85025 COMPLETE CBC W/AUTO DIFF WBC: CPT

## 2020-05-29 PROCEDURE — S0028 INJECTION, FAMOTIDINE, 20 MG: HCPCS | Performed by: EMERGENCY MEDICINE

## 2020-05-29 PROCEDURE — 93005 ELECTROCARDIOGRAM TRACING: CPT

## 2020-05-29 PROCEDURE — 96375 TX/PRO/DX INJ NEW DRUG ADDON: CPT

## 2020-05-29 PROCEDURE — 80053 COMPREHEN METABOLIC PANEL: CPT

## 2020-05-29 PROCEDURE — 63600175 PHARM REV CODE 636 W HCPCS: Performed by: EMERGENCY MEDICINE

## 2020-05-29 PROCEDURE — 93010 EKG 12-LEAD: ICD-10-PCS | Mod: ,,, | Performed by: INTERNAL MEDICINE

## 2020-05-29 PROCEDURE — 25000003 PHARM REV CODE 250: Performed by: EMERGENCY MEDICINE

## 2020-05-29 PROCEDURE — 99284 EMERGENCY DEPT VISIT MOD MDM: CPT | Mod: 25,27

## 2020-05-29 PROCEDURE — 36415 COLL VENOUS BLD VENIPUNCTURE: CPT

## 2020-05-29 PROCEDURE — 83880 ASSAY OF NATRIURETIC PEPTIDE: CPT

## 2020-05-29 PROCEDURE — 99285 EMERGENCY DEPT VISIT HI MDM: CPT | Mod: 25

## 2020-05-29 PROCEDURE — 93010 ELECTROCARDIOGRAM REPORT: CPT | Mod: ,,, | Performed by: INTERNAL MEDICINE

## 2020-05-29 RX ORDER — DIPHENHYDRAMINE HYDROCHLORIDE 50 MG/ML
12.5 INJECTION INTRAMUSCULAR; INTRAVENOUS
Status: COMPLETED | OUTPATIENT
Start: 2020-05-29 | End: 2020-05-29

## 2020-05-29 RX ORDER — MORPHINE SULFATE 4 MG/ML
4 INJECTION, SOLUTION INTRAMUSCULAR; INTRAVENOUS
Status: COMPLETED | OUTPATIENT
Start: 2020-05-29 | End: 2020-05-29

## 2020-05-29 RX ORDER — HYDROCODONE BITARTRATE AND ACETAMINOPHEN 5; 325 MG/1; MG/1
1 TABLET ORAL EVERY 6 HOURS PRN
Qty: 9 TABLET | Refills: 0 | Status: SHIPPED | OUTPATIENT
Start: 2020-05-29 | End: 2020-07-28

## 2020-05-29 RX ORDER — DEXAMETHASONE SODIUM PHOSPHATE 4 MG/ML
8 INJECTION, SOLUTION INTRA-ARTICULAR; INTRALESIONAL; INTRAMUSCULAR; INTRAVENOUS; SOFT TISSUE
Status: COMPLETED | OUTPATIENT
Start: 2020-05-29 | End: 2020-05-29

## 2020-05-29 RX ORDER — ONDANSETRON 2 MG/ML
4 INJECTION INTRAMUSCULAR; INTRAVENOUS
Status: COMPLETED | OUTPATIENT
Start: 2020-05-29 | End: 2020-05-29

## 2020-05-29 RX ORDER — FAMOTIDINE 10 MG/ML
20 INJECTION INTRAVENOUS
Status: COMPLETED | OUTPATIENT
Start: 2020-05-29 | End: 2020-05-29

## 2020-05-29 RX ADMIN — DIPHENHYDRAMINE HYDROCHLORIDE 12.5 MG: 50 INJECTION, SOLUTION INTRAMUSCULAR; INTRAVENOUS at 04:05

## 2020-05-29 RX ADMIN — MORPHINE SULFATE 4 MG: 4 INJECTION INTRAVENOUS at 03:05

## 2020-05-29 RX ADMIN — DEXAMETHASONE SODIUM PHOSPHATE 8 MG: 4 INJECTION, SOLUTION INTRAMUSCULAR; INTRAVENOUS at 04:05

## 2020-05-29 RX ADMIN — FAMOTIDINE 20 MG: 10 INJECTION, SOLUTION INTRAVENOUS at 04:05

## 2020-05-29 RX ADMIN — LORAZEPAM 0.5 MG: 2 INJECTION INTRAMUSCULAR; INTRAVENOUS at 04:05

## 2020-05-29 RX ADMIN — ONDANSETRON 4 MG: 2 INJECTION INTRAMUSCULAR; INTRAVENOUS at 03:05

## 2020-05-29 NOTE — ED PROVIDER NOTES
SCRIBE #1 NOTE: I, Debbie Rodriguez, am scribing for, and in the presence of, Zeferino Weller Jr., MD. I have scribed the entire note.       History     Chief Complaint   Patient presents with    Chest Pain     6 weeks post partum, chest pain, and headache. Seen recently for the same.     Review of patient's allergies indicates:  No Known Allergies      History of Present Illness     HPI    5/29/2020, 2:46 AM  History obtained from the patient      History of Present Illness: Anthony Sexton is a 29 y.o. female patient with a PMHx of anemia, anxiety, depression, post-partum depression who is 6 week postpartum presents to the Emergency Department for evaluation of CP which onset gradually around 7PM last night. Pt was evaluated for similar sxs yesterday and sxs were improved upon discharge.  Symptoms are constant and moderate in severity.  The patient describes the sxs as heaviness. No mitigating or exacerbating factors reported. Associated sxs include HA and lightheadedness. Patient denies any fever, chills, SOB, diaphoresis, palpitations, extremity weakness/numbness, leg swelling, dizziness, cough, N/V, and all other sxs at this time. No further complaints or concerns at this time.  Patient has had a negative CT PE protocol recently.  Patient is 6 weeks postpartum.  She denies any symptoms immediately post delivery.  The symptoms did not start until 2 weeks ago.        Arrival mode: Personal vehicle      PCP: Tyesha Leggett MD        Past Medical History:  Past Medical History:   Diagnosis Date    Anemia     Anxiety     Chronic low back pain     Depression     History of abnormal cervical Pap smear     History of recurrent UTIs     History of ulcer disease     Irregular menses     Obesity     Postpartum depression     with last pregnancy.     Pre-eclampsia in postpartum period 4/23/2020    Vitamin D deficiency        Past Surgical History:  Past Surgical History:   Procedure Laterality Date    None            Family History:  Family History   Problem Relation Age of Onset    Asthma Mother     Hyperlipidemia Mother     Hypertension Mother     Hypertension Father     Hyperlipidemia Father     Heart disease Father 55        MI/CAD    No Known Problems Sister     No Known Problems Brother     Diabetes Maternal Aunt     Diabetes Maternal Grandmother     Insulin resistance Sister     No Known Problems Brother     No Known Problems Brother     No Known Problems Brother     No Known Problems Brother     No Known Problems Daughter     Breast cancer Maternal Aunt     Cancer Neg Hx     Stroke Neg Hx     Brain cancer Neg Hx     Migraines Neg Hx     Colon cancer Neg Hx     Ovarian cancer Neg Hx        Social History:  Social History     Tobacco Use    Smoking status: Never Smoker    Smokeless tobacco: Never Used   Substance and Sexual Activity    Alcohol use: Not Currently     Alcohol/week: 0.0 standard drinks    Drug use: No    Sexual activity: Not Currently     Partners: Male     Birth control/protection: None        Review of Systems     Review of Systems   Constitutional: Negative for chills, diaphoresis and fever.   HENT: Negative for sore throat.    Respiratory: Negative for cough and shortness of breath.    Cardiovascular: Positive for chest pain. Negative for palpitations and leg swelling.   Gastrointestinal: Negative for nausea and vomiting.   Genitourinary: Negative for dysuria.   Musculoskeletal: Negative for back pain.   Skin: Negative for rash.   Neurological: Positive for light-headedness and headaches. Negative for dizziness, weakness and numbness.   Hematological: Does not bruise/bleed easily.   All other systems reviewed and are negative.       Physical Exam     Initial Vitals [05/29/20 0242]   BP Pulse Resp Temp SpO2   (!) 165/87 83 18 98.1 °F (36.7 °C) 100 %      MAP       --          Physical Exam  Nursing Notes and Vital Signs Reviewed.  Constitutional: Patient is in no acute  "distress. Well-developed and well-nourished.  Head: Atraumatic. Normocephalic.  Eyes: . EOM intact.  No scleral icterus.  ENT: Mucous membranes are moist. Oropharynx is clear and symmetric.  TMs clear.  Nares clear  Neck:. Full ROM.  Trachea midline.  No stridor  Cardiovascular: Regular rate. Regular rhythm. No murmurs, rubs, or gallops. Distal pulses are 2+ and symmetric.  Pulmonary/Chest: No respiratory distress. Clear to auscultation bilaterally. No wheezing or rales. Chest wall tenderness, reproduces chief complaint.   Abdominal: Soft and non-distended.  There is no tenderness.  No rebound, guarding, or rigidity. Good bowel sounds.  Genitourinary: No CVA tenderness.  No suprapubic tenderness  Musculoskeletal: Moves all extremities. No obvious deformities. No edema. No calf tenderness.  Negative Homans there is tenderness to the anterior chest wall on both costosternal borders.  This reproduces her symptoms.  There is no crepitus.  There is no step-off.  Skin: Warm and dry.  Neurological:  Alert, awake, and appropriate.  Normal speech.  No acute focal neurological deficits are appreciated.  Psychiatric: Normal affect. Good eye contact. Appropriate in content.     ED Course   Procedures  ED Vital Signs:  Vitals:    05/29/20 0242 05/29/20 0301 05/29/20 0303   BP: (!) 165/87  139/73   Pulse: 83 61 64   Resp: 18  20   Temp: 98.1 °F (36.7 °C)     TempSrc: Oral     SpO2: 100%  100%   Weight: 103.4 kg (227 lb 15.3 oz)     Height: 5' 5" (1.651 m)         Abnormal Lab Results:  Labs Reviewed   CBC W/ AUTO DIFFERENTIAL - Abnormal; Notable for the following components:       Result Value    Hemoglobin 11.3 (*)     Mean Corpuscular Volume 80 (*)     Mean Corpuscular Hemoglobin 23.1 (*)     Mean Corpuscular Hemoglobin Conc 29.0 (*)     RDW 15.3 (*)     All other components within normal limits   COMPREHENSIVE METABOLIC PANEL - Abnormal; Notable for the following components:    Alkaline Phosphatase 163 (*)     All other " components within normal limits   TROPONIN I   B-TYPE NATRIURETIC PEPTIDE        All Lab Results:  Results for orders placed or performed during the hospital encounter of 05/29/20   CBC auto differential   Result Value Ref Range    WBC 10.02 3.90 - 12.70 K/uL    RBC 4.89 4.00 - 5.40 M/uL    Hemoglobin 11.3 (L) 12.0 - 16.0 g/dL    Hematocrit 39.0 37.0 - 48.5 %    Mean Corpuscular Volume 80 (L) 82 - 98 fL    Mean Corpuscular Hemoglobin 23.1 (L) 27.0 - 31.0 pg    Mean Corpuscular Hemoglobin Conc 29.0 (L) 32.0 - 36.0 g/dL    RDW 15.3 (H) 11.5 - 14.5 %    Platelets 247 150 - 350 K/uL    MPV 10.4 9.2 - 12.9 fL    Immature Granulocytes 0.3 0.0 - 0.5 %    Gran # (ANC) 6.5 1.8 - 7.7 K/uL    Immature Grans (Abs) 0.03 0.00 - 0.04 K/uL    Lymph # 2.9 1.0 - 4.8 K/uL    Mono # 0.5 0.3 - 1.0 K/uL    Eos # 0.1 0.0 - 0.5 K/uL    Baso # 0.03 0.00 - 0.20 K/uL    nRBC 0 0 /100 WBC    Gran% 64.9 38.0 - 73.0 %    Lymph% 28.9 18.0 - 48.0 %    Mono% 5.0 4.0 - 15.0 %    Eosinophil% 0.6 0.0 - 8.0 %    Basophil% 0.3 0.0 - 1.9 %    Differential Method Automated    Comprehensive metabolic panel   Result Value Ref Range    Sodium 138 136 - 145 mmol/L    Potassium 3.7 3.5 - 5.1 mmol/L    Chloride 105 95 - 110 mmol/L    CO2 23 23 - 29 mmol/L    Glucose 80 70 - 110 mg/dL    BUN, Bld 13 6 - 20 mg/dL    Creatinine 0.8 0.5 - 1.4 mg/dL    Calcium 9.0 8.7 - 10.5 mg/dL    Total Protein 7.9 6.0 - 8.4 g/dL    Albumin 3.8 3.5 - 5.2 g/dL    Total Bilirubin 0.2 0.1 - 1.0 mg/dL    Alkaline Phosphatase 163 (H) 55 - 135 U/L    AST 17 10 - 40 U/L    ALT 15 10 - 44 U/L    Anion Gap 10 8 - 16 mmol/L    eGFR if African American >60 >60 mL/min/1.73 m^2    eGFR if non African American >60 >60 mL/min/1.73 m^2   Troponin I #1   Result Value Ref Range    Troponin I 0.007 0.000 - 0.026 ng/mL   B-Type natriuretic peptide (BNP)   Result Value Ref Range    BNP 12 0 - 99 pg/mL         Imaging Results:  Imaging Results          X-Ray Chest AP Portable (In process)                 Per ED physician, pt's CXR results NAF      The EKG was ordered, reviewed, and independently interpreted by the ED provider.  Interpretation time: 0257  Rate: 62 BPM  Rhythm: normal sinus rhythm  Interpretation: Minimal voltage criteria for LVH, may be normal variant. Cannot rule out Anterior infarct, age undetermined. No STEMI. When compared to EKG performed 5/27/2020, there are no significant changes.           The Emergency Provider reviewed the vital signs and test results, which are outlined above.     ED Discussion       3:43 AM: Reassessed pt at this time.  Pt states her condition has improved at this time. Discussed with pt all pertinent ED information and results. Discussed pt dx and plan of tx. Gave pt all f/u and return to the ED instructions. All questions and concerns were addressed at this time. Pt expresses understanding of information and instructions, and is comfortable with plan to discharge. Pt is stable for discharge.    I discussed with patient and/or family/caretaker that evaluation in the ED does not suggest any emergent or life threatening medical conditions requiring immediate intervention beyond what was provided in the ED, and I believe patient is safe for discharge.  Regardless, an unremarkable evaluation in the ED does not preclude the development or presence of a serious of life threatening condition. As such, patient was instructed to return immediately for any worsening or change in current symptoms.    I have discussed with patient and/or family/caretaker chest pain precautions, specifically to return for worsening chest pain, shortness of breath, fever, or any concern.  I have low suspicion for cardiopulmonary, vascular, infectious, respiratory, or other emergent medical condition based on my evaluation in the ED.    3:51 AM  Patient is stable nontoxic.  Patient is complaining of chest pain several weeks.  Cardiac workup is negative today a troponin remains normal her last  troponin 2 days ago.  Her symptoms reproducible pressure and palpation.  She has had a negative PE protocol as well for the same symptoms.  This is unchanged.  Symptoms began several weeks after delivery.  This is not consistent with bone gas embolism or Aldair embolism.  Has had negative PE protocol as well in the interim.  Food patient symptomatically with pain medications reports well.  Patient verbalized understanding agreement with all seems reliable.     Medical Decision Making:   Clinical Tests:   Lab Tests: Ordered and Reviewed  Radiological Study: Ordered and Reviewed  Medical Tests: Ordered and Reviewed           ED Medication(s):  Medications   morphine injection 4 mg (4 mg Intravenous Given 5/29/20 0302)   ondansetron injection 4 mg (4 mg Intravenous Given 5/29/20 0302)       New Prescriptions    HYDROCODONE-ACETAMINOPHEN (NORCO) 5-325 MG PER TABLET    Take 1 tablet by mouth every 6 (six) hours as needed.       Follow-up Information     Tyesha Leggett MD In 2 days.    Specialty:  Family Medicine  Contact information:  [address] [place] [address] [phone]                       Scribe Attestation:   Scribe #1: I performed the above scribed service and the documentation accurately describes the services I performed. I attest to the accuracy of the note.     Attending:   Physician Attestation Statement for Scribe #1: I, Zeferino Weller Jr., MD, personally performed the services described in this documentation, as scribed by Debbie Rodriguez, in my presence, and it is both accurate and complete.           Clinical Impression       ICD-10-CM ICD-9-CM   1. Chest wall pain R07.89 786.52   2. Chest pain R07.9 786.50       Disposition:   Disposition: Discharged  Condition: Stable         Zeferino Weller Jr., MD  05/29/20 0355

## 2020-05-29 NOTE — ED PROVIDER NOTES
"SCRIBE #1 NOTE: I, Debbie Rodriguez, am scribing for, and in the presence of, Zeferino Weller Jr., MD. I have scribed the entire note.       History     Chief Complaint   Patient presents with    Medication Reaction     received 4mg morphine, now complains of tightness in jaw and shivering.      Review of patient's allergies indicates:  No Known Allergies      History of Present Illness     HPI    5/29/2020, 4:18 AM  History obtained from the patient      History of Present Illness: Anthony Sexton is a 29 y.o. female patient with a PMHx of anemia, anxiety, depression, postpartum depression who is 6 week postpartum presents to the Emergency Department for evaluation s/p receiving morphine earlier. She reports feeling "tightness in face" half way home. Symptoms are constant and moderate in severity. No mitigating or exacerbating factors reported. No other sxs reported. Patient denies any fever, chills, SOB, CP, diaphoresis, palpitations, extremity weakness/numbness, leg swelling, dizziness, cough, N/V, and all other sxs at this time. No further complaints or concerns at this time.       Arrival mode: Personal vehicle     PCP: Tyesha Leggett MD        Past Medical History:  Past Medical History:   Diagnosis Date    Anemia     Anxiety     Chronic low back pain     Depression     History of abnormal cervical Pap smear     History of recurrent UTIs     History of ulcer disease     Irregular menses     Obesity     Postpartum depression     with last pregnancy.     Pre-eclampsia in postpartum period 4/23/2020    Vitamin D deficiency        Past Surgical History:  Past Surgical History:   Procedure Laterality Date    None           Family History:  Family History   Problem Relation Age of Onset    Asthma Mother     Hyperlipidemia Mother     Hypertension Mother     Hypertension Father     Hyperlipidemia Father     Heart disease Father 55        MI/CAD    No Known Problems Sister     No Known Problems Brother "     Diabetes Maternal Aunt     Diabetes Maternal Grandmother     Insulin resistance Sister     No Known Problems Brother     No Known Problems Brother     No Known Problems Brother     No Known Problems Brother     No Known Problems Daughter     Breast cancer Maternal Aunt     Cancer Neg Hx     Stroke Neg Hx     Brain cancer Neg Hx     Migraines Neg Hx     Colon cancer Neg Hx     Ovarian cancer Neg Hx        Social History:  Social History     Tobacco Use    Smoking status: Never Smoker    Smokeless tobacco: Never Used   Substance and Sexual Activity    Alcohol use: Not Currently     Alcohol/week: 0.0 standard drinks    Drug use: No    Sexual activity: Not Currently     Partners: Male     Birth control/protection: None        Review of Systems     Review of Systems   Constitutional: Negative for chills, diaphoresis and fever.   HENT: Negative for sore throat.         + tightness in face   Respiratory: Negative for cough and shortness of breath.    Cardiovascular: Negative for chest pain, palpitations and leg swelling.   Gastrointestinal: Negative for nausea and vomiting.   Genitourinary: Negative for dysuria.   Musculoskeletal: Negative for back pain.   Skin: Negative for rash.   Neurological: Negative for dizziness, weakness and numbness.   Hematological: Does not bruise/bleed easily.   All other systems reviewed and are negative.       Physical Exam     Initial Vitals [05/29/20 0414]   BP Pulse Resp Temp SpO2   (!) 140/93 105 20 98.7 °F (37.1 °C) 100 %      MAP       --          Physical Exam  Nursing Notes and Vital Signs Reviewed.  Constitutional: Patient is in no acute distress. Well-developed and well-nourished.  Head: Atraumatic. Normocephalic.  Eyes: PERRL. EOM intact. Conjunctivae are not pale. No scleral icterus.  ENT: Mucous membranes are moist. Oropharynx is clear and symmetric.  patient's jaws trembling.  Patient has tremors of her hands and feet as well.  Airway is widely patent.   "No wheezing  Neck: Supple. Full ROM. No lymphadenopathy.  Cardiovascular: Regular rate. Regular rhythm. No murmurs, rubs, or gallops. Distal pulses are 2+ and symmetric.  Pulmonary/Chest: No respiratory distress. Clear to auscultation bilaterally. No wheezing or rales.  Abdominal: Soft and non-distended.  There is no tenderness.  No rebound, guarding, or rigidity. Good bowel sounds.  Genitourinary: No CVA tenderness  Musculoskeletal: Moves all extremities. No obvious deformities. No edema. No calf tenderness.  Skin: Warm and dry.  Neurological:  Alert, awake, and appropriate.  Normal speech.  No acute focal neurological deficits are appreciated.  Two through 12 intact bilaterally.  Psychiatric: Normal affect. Good eye contact. Appropriate in content.  Patient is anxious.     ED Course   Procedures  ED Vital Signs:  Vitals:    05/29/20 0414 05/29/20 0454 05/29/20 0501   BP: (!) 140/93 (!) 149/88 (!) 146/78   Pulse: 105 (!) 53 (!) 58   Resp: 20     Temp: 98.7 °F (37.1 °C)     TempSrc: Oral     SpO2: 100% 98% 95%   Weight: 103 kg (227 lb)     Height: 5' 5" (1.651 m)         Abnormal Lab Results:  Labs Reviewed - No data to display     All Lab Results:  none    Imaging Results:  Imaging Results    None                 The Emergency Provider reviewed the vital signs and test results, which are outlined above.     ED Discussion     4:45 AM: Pt reports sxs are completely resolved. Will continue to observe a little longer.    5:24 AM: Reassessed pt at this time.  Pt states her condition has improved at this time. Pt reports sxs have completely resolved. Discussed with pt all pertinent ED information. Discussed pt dx and plan of tx. Gave pt all f/u and return to the ED instructions. All questions and concerns were addressed at this time. Pt expresses understanding of information and instructions, and is comfortable with plan to discharge. Pt is stable for discharge.    I discussed with patient and/or family/caretaker that " evaluation in the ED does not suggest any emergent or life threatening medical conditions requiring immediate intervention beyond what was provided in the ED, and I believe patient is safe for discharge.  Regardless, an unremarkable evaluation in the ED does not preclude the development or presence of a serious of life threatening condition. As such, patient was instructed to return immediately for any worsening or change in current symptoms.    5:28 AM  Patient is stable nontoxic.  All symptoms have resolved.  Patient is safe for discharge in opinion.  Patient appears to have had to anxiety secondary to reaction morphine.  This has since resolved.               ED Medication(s):  Medications   dexamethasone injection 8 mg (8 mg Intravenous Given 5/29/20 0445)   diphenhydrAMINE injection 12.5 mg (12.5 mg Intravenous Given 5/29/20 0445)   lorazepam (ATIVAN) injection 0.5 mg (0.5 mg Intravenous Given 5/29/20 0445)   famotidine (PF) injection 20 mg (20 mg Intravenous Given 5/29/20 0445)       New Prescriptions    No medications on file       Follow-up Information     Tyesha Leggett MD.    Specialty:  Family Medicine  Contact information:  [address]  [facility] [address] [phone]                       Scribe Attestation:   Scribe #1: I performed the above scribed service and the documentation accurately describes the services I performed. I attest to the accuracy of the note.     Attending:   Physician Attestation Statement for Scribe #1: I, Zeferino Weller Jr., MD, personally performed the services described in this documentation, as scribed by Debbie Rodriguez, in my presence, and it is both accurate and complete.           Clinical Impression       ICD-10-CM ICD-9-CM   1. Medication side effect, initial encounter T50.905A E947.9       Disposition:   Disposition: Discharged  Condition: Stable         Zeferino Weller Jr., MD  05/29/20 0510

## 2020-05-29 NOTE — DISCHARGE INSTRUCTIONS
Avoid morphine in the future.  Continue all home medications.  Return as needed for any worsening symptoms, problems, questions or concerns for

## 2020-06-01 ENCOUNTER — OFFICE VISIT (OUTPATIENT)
Dept: OBSTETRICS AND GYNECOLOGY | Facility: CLINIC | Age: 30
End: 2020-06-01
Payer: COMMERCIAL

## 2020-06-01 VITALS
BODY MASS INDEX: 38.59 KG/M2 | WEIGHT: 231.94 LBS | SYSTOLIC BLOOD PRESSURE: 124 MMHG | DIASTOLIC BLOOD PRESSURE: 80 MMHG

## 2020-06-01 DIAGNOSIS — Z30.432 ENCOUNTER FOR IUD REMOVAL: Primary | ICD-10-CM

## 2020-06-01 PROBLEM — M54.9 BACK PAIN: Status: RESOLVED | Noted: 2017-06-27 | Resolved: 2020-06-01

## 2020-06-01 PROBLEM — O99.019 ANEMIA DURING PREGNANCY: Status: RESOLVED | Noted: 2020-04-18 | Resolved: 2020-06-01

## 2020-06-01 PROBLEM — M54.2 NECK PAIN: Status: RESOLVED | Noted: 2017-06-27 | Resolved: 2020-06-01

## 2020-06-01 PROBLEM — Z87.59 HISTORY OF POSTPARTUM DEPRESSION: Status: RESOLVED | Noted: 2020-04-18 | Resolved: 2020-06-01

## 2020-06-01 PROBLEM — Z86.59 HISTORY OF POSTPARTUM DEPRESSION: Status: RESOLVED | Noted: 2020-04-18 | Resolved: 2020-06-01

## 2020-06-01 PROBLEM — N62 BREAST HYPERTROPHY IN FEMALE: Status: RESOLVED | Noted: 2017-05-09 | Resolved: 2020-06-01

## 2020-06-01 PROCEDURE — 99999 PR PBB SHADOW E&M-EST. PATIENT-LVL III: ICD-10-PCS | Mod: PBBFAC,,, | Performed by: NURSE PRACTITIONER

## 2020-06-01 PROCEDURE — 58301 REMOVE INTRAUTERINE DEVICE: CPT | Mod: S$GLB,,, | Performed by: NURSE PRACTITIONER

## 2020-06-01 PROCEDURE — 3008F BODY MASS INDEX DOCD: CPT | Mod: CPTII,S$GLB,, | Performed by: NURSE PRACTITIONER

## 2020-06-01 PROCEDURE — 58301 PR REMOVE, INTRAUTERINE DEVICE: ICD-10-PCS | Mod: S$GLB,,, | Performed by: NURSE PRACTITIONER

## 2020-06-01 PROCEDURE — 3008F PR BODY MASS INDEX (BMI) DOCUMENTED: ICD-10-PCS | Mod: CPTII,S$GLB,, | Performed by: NURSE PRACTITIONER

## 2020-06-01 PROCEDURE — 99999 PR PBB SHADOW E&M-EST. PATIENT-LVL III: CPT | Mod: PBBFAC,,, | Performed by: NURSE PRACTITIONER

## 2020-06-01 RX ORDER — ACETAMINOPHEN AND CODEINE PHOSPHATE 120; 12 MG/5ML; MG/5ML
1 SOLUTION ORAL DAILY
Qty: 28 TABLET | Refills: 12 | Status: SHIPPED | OUTPATIENT
Start: 2020-06-01 | End: 2020-08-11 | Stop reason: ALTCHOICE

## 2020-06-01 RX ORDER — NORGESTIMATE AND ETHINYL ESTRADIOL 0.25-0.035
1 KIT ORAL DAILY
Qty: 28 TABLET | Refills: 11 | Status: SHIPPED | OUTPATIENT
Start: 2020-06-01 | End: 2020-06-01

## 2020-06-01 NOTE — PROGRESS NOTES
"CC: IUD removal     Anthony Sexton is a 29 y.o. female  presents for IUD removal. Patient wants to start OCP. Patient is breast feeding.Luna reports that she has had an increase in anxiety and " wants IUD/Mirena removed". Has been seen in ED several times, since IUD placement.     Past Medical History:   Diagnosis Date    Anemia     Anxiety     Chronic low back pain     Depression     History of abnormal cervical Pap smear     History of recurrent UTIs     History of ulcer disease     Irregular menses     Obesity     Postpartum depression     with last pregnancy.     Pre-eclampsia in postpartum period 2020    Vitamin D deficiency      Past Surgical History:   Procedure Laterality Date    None       Social History     Socioeconomic History    Marital status:      Spouse name: Not on file    Number of children: 0    Years of education: Not on file    Highest education level: Not on file   Occupational History    Occupation: RN     Comment: [employer]   Social Needs    Financial resource strain: Not on file    Food insecurity:     Worry: Not on file     Inability: Not on file    Transportation needs:     Medical: Not on file     Non-medical: Not on file   Tobacco Use    Smoking status: Never Smoker    Smokeless tobacco: Never Used   Substance and Sexual Activity    Alcohol use: Not Currently     Alcohol/week: 0.0 standard drinks    Drug use: No    Sexual activity: Not Currently     Partners: Male     Birth control/protection: None   Lifestyle    Physical activity:     Days per week: 0 days     Minutes per session: 0 min    Stress: Not at all   Relationships    Social connections:     Talks on phone: More than three times a week     Gets together: Never     Attends Yarsan[employer] service: 1 to 4 times per year     Active member of club or organization: Yes     Attends meetings of clubs or organizations: Never     Relationship status:    Other Topics Concern    Not " on file   Social History Narrative    She wears seatbelt.     Family History   Problem Relation Age of Onset    Asthma Mother     Hyperlipidemia Mother     Hypertension Mother     Hypertension Father     Hyperlipidemia Father     Heart disease Father 55        MI/CAD    No Known Problems Sister     No Known Problems Brother     Diabetes Maternal Aunt     Diabetes Maternal Grandmother     Insulin resistance Sister     No Known Problems Brother     No Known Problems Brother     No Known Problems Brother     No Known Problems Brother     No Known Problems Daughter     Breast cancer Maternal Aunt     Cancer Neg Hx     Stroke Neg Hx     Brain cancer Neg Hx     Migraines Neg Hx     Colon cancer Neg Hx     Ovarian cancer Neg Hx      OB History        2    Para   2    Term   2       0    AB   0    Living   2       SAB   0    TAB   0    Ectopic   0    Multiple   0    Live Births   2                 /80   Wt 105.2 kg (231 lb 14.8 oz)   LMP 2020   BMI 38.59 kg/m²       ROS:  CARDIOVASCULAR: Denies palpitations or left sided chest pain.   ABDOMEN: No abdominal pain, constipation, diarrhea, nausea, vomiting or rectal bleeding.   URINARY: No frequency, dysuria, hematuria, or burning on urination.  REPRODUCTIVE: See HPI.   PSYCHIATRIC: HPI    PHYSICAL EXAM:  APPEARANCE: Obese female in no acute distress.  PELVIC: Cervix, IUD stings seen with exam   PLAN:  IUD removed intact  [facility]ronor OCP

## 2020-06-02 DIAGNOSIS — R00.0 TACHYCARDIA: Primary | ICD-10-CM

## 2020-06-10 ENCOUNTER — HOSPITAL ENCOUNTER (OUTPATIENT)
Dept: CARDIOLOGY | Facility: HOSPITAL | Age: 30
Discharge: HOME | End: 2020-06-10
Attending: FAMILY MEDICINE
Payer: COMMERCIAL

## 2020-06-10 VITALS — HEIGHT: 65 IN | BODY MASS INDEX: 38.59 KG/M2

## 2020-06-10 DIAGNOSIS — R00.0 TACHYCARDIA: ICD-10-CM

## 2020-06-10 LAB
AORTIC ROOT ANNULUS: 3.03 CM
AV INDEX (PROSTH): 0.7
AV MEAN GRADIENT: 5 MMHG
AV PEAK GRADIENT: 8 MMHG
AV VALVE AREA: 2.23 CM2
AV VELOCITY RATIO: 0.77
CV ECHO LV RWT: 0.54 CM
DOP CALC AO PEAK VEL: 1.44 M/S
DOP CALC AO VTI: 32.21 CM
DOP CALC LVOT AREA: 3.2 CM2
DOP CALC LVOT DIAMETER: 2.01 CM
DOP CALC LVOT PEAK VEL: 1.11 M/S
DOP CALC LVOT STROKE VOLUME: 71.74 CM3
DOP CALCLVOT PEAK VEL VTI: 22.62 CM
E WAVE DECELERATION TIME: 217.77 MSEC
E/A RATIO: 1.02
E/E' RATIO: 5.17 M/S
ECHO LV POSTERIOR WALL: 1.17 CM (ref 0.6–1.1)
FRACTIONAL SHORTENING: 35 % (ref 28–44)
INTERVENTRICULAR SEPTUM: 1.05 CM (ref 0.6–1.1)
IVRT: 79.92 MSEC
LA MAJOR: 4.05 CM
LA WIDTH: 3.02 CM
LEFT ATRIUM SIZE: 3.3 CM
LEFT INTERNAL DIMENSION IN SYSTOLE: 2.81 CM (ref 2.1–4)
LEFT VENTRICLE DIASTOLIC VOLUME: 83.13 ML
LEFT VENTRICLE SYSTOLIC VOLUME: 29.88 ML
LEFT VENTRICULAR INTERNAL DIMENSION IN DIASTOLE: 4.3 CM (ref 3.5–6)
LEFT VENTRICULAR MASS: 165.05 G
LV LATERAL E/E' RATIO: 4.77 M/S
LV SEPTAL E/E' RATIO: 5.64 M/S
MV PEAK A VEL: 0.61 M/S
MV PEAK E VEL: 0.62 M/S
PISA TR MAX VEL: 2.62 M/S
PV PEAK VELOCITY: 0.93 CM/S
RA MAJOR: 3.85 CM
RA PRESSURE: 3 MMHG
RA WIDTH: 2.82 CM
RIGHT VENTRICULAR END-DIASTOLIC DIMENSION: 2.53 CM
SINUS: 2.64 CM
STJ: 2.35 CM
TDI LATERAL: 0.13 M/S
TDI SEPTAL: 0.11 M/S
TDI: 0.12 M/S
TR MAX PG: 27 MMHG
TRICUSPID ANNULAR PLANE SYSTOLIC EXCURSION: 1.81 CM
TV REST PULMONARY ARTERY PRESSURE: 30 MMHG

## 2020-06-10 PROCEDURE — 93306 TTE W/DOPPLER COMPLETE: CPT | Mod: 26,,, | Performed by: INTERNAL MEDICINE

## 2020-06-10 PROCEDURE — 93306 ECHO (CUPID ONLY): ICD-10-PCS | Mod: 26,,, | Performed by: INTERNAL MEDICINE

## 2020-06-10 PROCEDURE — 93306 TTE W/DOPPLER COMPLETE: CPT

## 2020-06-15 ENCOUNTER — OFFICE VISIT (OUTPATIENT)
Dept: CARDIOLOGY | Facility: CLINIC | Age: 30
End: 2020-06-15
Payer: COMMERCIAL

## 2020-06-15 DIAGNOSIS — K21.9 GASTROESOPHAGEAL REFLUX DISEASE WITHOUT ESOPHAGITIS: ICD-10-CM

## 2020-06-15 DIAGNOSIS — R00.2 PALPITATIONS: ICD-10-CM

## 2020-06-15 DIAGNOSIS — G47.30 SLEEP APNEA, UNSPECIFIED TYPE: ICD-10-CM

## 2020-06-15 DIAGNOSIS — R07.9 CHEST PAIN, UNSPECIFIED TYPE: Primary | ICD-10-CM

## 2020-06-15 PROCEDURE — 99214 OFFICE O/P EST MOD 30 MIN: CPT | Mod: 95,,, | Performed by: INTERNAL MEDICINE

## 2020-06-15 PROCEDURE — 99214 PR OFFICE/OUTPT VISIT, EST, LEVL IV, 30-39 MIN: ICD-10-PCS | Mod: 95,,, | Performed by: INTERNAL MEDICINE

## 2020-06-15 NOTE — PROGRESS NOTES
Subjective:   Patient ID:  Anthony Sexton is a 29 y.o. female who presents for cardiac consult of Follow-up    The patient location is: home  The chief complaint leading to consultation is: CV follow up    Visit type: audiovisual    Face to Face time with patient: 8 min  15  minutes of total time spent on the encounter, which includes face to face time and non-face to face time preparing to see the patient (eg, review of tests), Obtaining and/or reviewing separately obtained history, Documenting clinical information in the electronic or other health record, Independently interpreting results (not separately reported) and communicating results to the patient/family/caregiver, or Care coordination (not separately reported).       Each patient to whom he or she provides medical services by telemedicine is:  (1) informed of the relationship between the physician and patient and the respective role of any other health care provider with respect to management of the patient; and (2) notified that he or she may decline to receive medical services by telemedicine and may withdraw from such care at any time.    Notes:     Follow-up  Associated symptoms include chest pain.     The patient came in today for cardiac consult of Follow-up    Referring Provider: Mckenzie Ross NP   Reason for initial consult: chest pain      Anthony Sexton is a 29 y.o. female with current medical conditions ADHD, anemia, back pain, obesity, Vit D def presents for follow up CV eval.     5/1/19  She presented to urgent care and PCP last month for chest pain after started Adderall. She went to ER twice after and had ECGs with old septal infarct with sinus tach. BP elevated, D dimer positive, neg CTA for PE. Trop negative. She was started on Atenolol. She complained more of racing of heart with Adderall but not as much chest pain. Chest pain was in center of chest , felt heavy with tightness in neck and R arm pain. Will have sleep study eval as  well. BP elevated today as well.     6/15/20  She went to ER last month - 6 week postpartum presents to the Emergency Department for evaluation of CP which onset gradually around 7PM last night. Pt was evaluated for similar sxs yesterday and sxs were improved upon discharge.  Symptoms are constant and moderate in severity.  The patient describes the sxs as heaviness. No mitigating or exacerbating factors reported.    Labs, trop, BNP neg, She was given Norco and sent home. ECHO last week was normal. Stress test last year neg.   Baby has been 2 months old. She has started BB palpitations have increased. HR goes up to 130 and drops to 80s to 40s.   She was on Nifedpine post partum. She is on PPI as well. She is taking Zoloft thought was due to anxiety.     BP upper 130s/80.     Patient feels no leg swelling, no PND, no dizziness, no syncope, no CNS symptoms.    Patient has fairly good exercise tolerance.    Patient is compliant with medications.    FH - father - 3 Mis, mother HTN    Results for orders placed during the hospital encounter of 06/10/20   Echo Color Flow Doppler? Yes; Bubble Contrast? No    Narrative · Concentric left ventricular hypertrophy.  · Normal left ventricular systolic function. The estimated ejection   fraction is 60%.  · Normal LV diastolic function.  · Normal central venous pressure (3 mmHg).  · The estimated PA systolic pressure is 30 mmHg.        EKG Conclusions:     1. The EKG portion of this study is negative for ischemia at a moderate workload, and peak heart rate of 166 bpm (92% of predicted).   2. Blood pressure response to exercise was normal (Presenting BP: 144/94 Peak BP: 215/89).   3. No significant arrhythmias were present.   4. There were no symptoms of chest discomfort or significant dyspnea throughout the protocol.   5. The Nesbitt treadmill score was 6 suggesting a low probability for future cardiovascular events.       This document has been electronically    SIGNED BY: Francisco J  MD Foster On: 05/29/2019 17:43      Past Medical History:   Diagnosis Date    Anemia     Anxiety     Chronic low back pain     Depression     History of abnormal cervical Pap smear     History of recurrent UTIs     History of ulcer disease     Irregular menses     Obesity     Postpartum depression     with last pregnancy.     Pre-eclampsia in postpartum period 4/23/2020    Vitamin D deficiency        Past Surgical History:   Procedure Laterality Date    None         Social History     Tobacco Use    Smoking status: Never Smoker    Smokeless tobacco: Never Used   Substance Use Topics    Alcohol use: Not Currently     Alcohol/week: 0.0 standard drinks    Drug use: No       Family History   Problem Relation Age of Onset    Asthma Mother     Hyperlipidemia Mother     Hypertension Mother     Hypertension Father     Hyperlipidemia Father     Heart disease Father 55        MI/CAD    No Known Problems Sister     No Known Problems Brother     Diabetes Maternal Aunt     Diabetes Maternal Grandmother     Insulin resistance Sister     No Known Problems Brother     No Known Problems Brother     No Known Problems Brother     No Known Problems Brother     No Known Problems Daughter     Breast cancer Maternal Aunt     Cancer Neg Hx     Stroke Neg Hx     Brain cancer Neg Hx     Migraines Neg Hx     Colon cancer Neg Hx     Ovarian cancer Neg Hx        Patient's Medications   New Prescriptions    No medications on file   Previous Medications    ERGOCALCIFEROL (ERGOCALCIFEROL) 50,000 UNIT CAP    Take 2 capsules (100,000 Units total) by mouth every 7 days.    HYDROCODONE-ACETAMINOPHEN (NORCO) 5-325 MG PER TABLET    Take 1 tablet by mouth every 6 (six) hours as needed.    HYDROXYZINE PAMOATE (VISTARIL) 25 MG CAP    Take 1 capsule (25 mg total) by mouth 3 (three) times daily.    IBUPROFEN (ADVIL,MOTRIN) 600 MG TABLET    Take 1 tablet (600 mg total) by mouth every 6 (six) hours.    METOPROLOL  SUCCINATE (TOPROL-XL) 25 MG 24 HR TABLET    Take 1 tablet (25 mg total) by mouth once daily.    NORETHINDRONE (ORTHO MICRONOR) 0.35 MG TABLET    Take 1 tablet (0.35 mg total) by mouth once daily.    PANTOPRAZOLE (PROTONIX) 40 MG TABLET    Take 1 tablet (40 mg total) by mouth 2 (two) times daily.    PRENATAL VITS62/FA/OM3/DHA/EPA (PRENATAL GUMMY ORAL)    Take 1 Dose by mouth.    SERTRALINE (ZOLOFT) 100 MG TABLET    Take 1.5 tablets (150 mg total) by mouth once daily.   Modified Medications    No medications on file   Discontinued Medications    No medications on file       Review of Systems   Constitutional: Negative.    HENT: Negative.    Eyes: Negative.    Respiratory: Positive for shortness of breath.    Cardiovascular: Positive for chest pain and palpitations.   Gastrointestinal: Negative.    Genitourinary: Negative.    Musculoskeletal: Negative.    Skin: Negative.    Neurological: Negative.    Endo/Heme/Allergies: Negative.    Psychiatric/Behavioral: Negative.    All 12 systems otherwise negative.      Wt Readings from Last 3 Encounters:   06/01/20 105.2 kg (231 lb 14.8 oz)   05/29/20 103 kg (227 lb)   05/29/20 103.4 kg (227 lb 15.3 oz)     Temp Readings from Last 3 Encounters:   05/29/20 98 °F (36.7 °C) (Oral)   05/29/20 98.1 °F (36.7 °C) (Oral)   05/27/20 98.9 °F (37.2 °C) (Oral)     BP Readings from Last 3 Encounters:   06/01/20 124/80   05/29/20 138/78   05/29/20 135/72     Pulse Readings from Last 3 Encounters:   05/29/20 65   05/29/20 (!) 55   05/27/20 73       LMP 05/27/2020     Objective:   Physical Exam   Constitutional: She is oriented to person, place, and time. She appears well-developed and well-nourished. No distress.   HENT:   Head: Normocephalic and atraumatic.   Nose: Nose normal.   Mouth/Throat: Oropharynx is clear and moist.   Eyes: Conjunctivae and EOM are normal. No scleral icterus.   Neck: Normal range of motion. Neck supple. No JVD present. No thyromegaly present.   Cardiovascular: Normal  rate, regular rhythm, S1 normal and S2 normal. Exam reveals no gallop, no S3, no S4 and no friction rub.   No murmur heard.  Pulmonary/Chest: Effort normal and breath sounds normal. No stridor. No respiratory distress. She has no wheezes. She has no rales. She exhibits no tenderness.   Abdominal: Soft. Bowel sounds are normal. She exhibits no distension and no mass. There is no abdominal tenderness. There is no rebound.   Genitourinary:    Genitourinary Comments: Deferred     Musculoskeletal: Normal range of motion.         General: No tenderness, deformity or edema.   Lymphadenopathy:     She has no cervical adenopathy.   Neurological: She is alert and oriented to person, place, and time. She exhibits normal muscle tone. Coordination normal.   Skin: Skin is warm and dry. No rash noted. She is not diaphoretic. No erythema. No pallor.   Psychiatric: She has a normal mood and affect. Her behavior is normal. Judgment and thought content normal.   Nursing note and vitals reviewed.      Lab Results   Component Value Date     05/29/2020    K 3.7 05/29/2020     05/29/2020    CO2 23 05/29/2020    BUN 13 05/29/2020    CREATININE 0.8 05/29/2020    GLU 80 05/29/2020    HGBA1C 5.2 09/09/2019    MG 2.1 05/27/2020    AST 17 05/29/2020    ALT 15 05/29/2020    ALBUMIN 3.8 05/29/2020    PROT 7.9 05/29/2020    BILITOT 0.2 05/29/2020    WBC 10.02 05/29/2020    HGB 11.3 (L) 05/29/2020    HCT 39.0 05/29/2020    MCV 80 (L) 05/29/2020     05/29/2020    TSH 3.002 05/27/2020    CHOL 181 04/15/2019    HDL 37 (L) 04/15/2019    LDLCALC 129.0 04/15/2019    TRIG 75 04/15/2019    BNP 12 05/29/2020     Assessment:      1. Chest pain, unspecified type    2. Sleep apnea, unspecified type    3. Palpitations    4. Gastroesophageal reflux disease without esophagitis        Plan:   1. Chest pain, atypical; recent ER eval  - neg ER workup initially with neg trop/PE neg  - needs ischemic workup with stress test and 2D ECHO - negative  -  discussed non cardiac causes - r/o SAVANNA/GERD/MSK causes    2. Palpitations  - order Holter 48 hr  - cont BB at 12.5 mg daily - full dose caused bradycardia    3. GERD  - cont PI    4. SAVANNA sx  - refer for sleep study    Thank you for allowing me to participate in this patient's care. Please do not hesitate to contact me with any questions or concerns. Consult note has been forwarded to the referral physician.

## 2020-06-16 ENCOUNTER — TELEPHONE (OUTPATIENT)
Dept: SLEEP MEDICINE | Facility: CLINIC | Age: 30
End: 2020-06-16

## 2020-06-20 DIAGNOSIS — Z01.84 ANTIBODY RESPONSE EXAMINATION: ICD-10-CM

## 2020-06-29 RX ORDER — ACETAMINOPHEN AND CODEINE PHOSPHATE 120; 12 MG/5ML; MG/5ML
1 SOLUTION ORAL DAILY
Qty: 30 TABLET | Refills: 12 | Status: CANCELLED | OUTPATIENT
Start: 2020-06-29 | End: 2020-07-29

## 2020-06-29 RX ORDER — PANTOPRAZOLE SODIUM 40 MG/1
40 TABLET, DELAYED RELEASE ORAL 2 TIMES DAILY
Qty: 60 TABLET | Refills: 0 | Status: SHIPPED | OUTPATIENT
Start: 2020-06-29 | End: 2020-08-31 | Stop reason: SDUPTHER

## 2020-06-29 NOTE — TELEPHONE ENCOUNTER
Spoke with patient, patient stated she sent medication refill on accident. Patient was informed she has 12 refills to call pharmacy when she runs out. Patient verbalized understanding.

## 2020-07-07 RX ORDER — ACETAMINOPHEN AND CODEINE PHOSPHATE 120; 12 MG/5ML; MG/5ML
1 SOLUTION ORAL DAILY
Qty: 30 TABLET | Refills: 12 | Status: CANCELLED | OUTPATIENT
Start: 2020-07-07 | End: 2020-08-06

## 2020-07-07 RX ORDER — METOPROLOL SUCCINATE 25 MG/1
25 TABLET, EXTENDED RELEASE ORAL DAILY
Qty: 30 TABLET | Refills: 1 | Status: CANCELLED | OUTPATIENT
Start: 2020-07-07 | End: 2021-07-07

## 2020-07-20 DIAGNOSIS — Z01.84 ANTIBODY RESPONSE EXAMINATION: ICD-10-CM

## 2020-07-27 ENCOUNTER — CLINICAL SUPPORT (OUTPATIENT)
Dept: INTERNAL MEDICINE | Facility: CLINIC | Age: 30
End: 2020-07-27
Payer: COMMERCIAL

## 2020-07-27 DIAGNOSIS — Z00.00 ANNUAL PHYSICAL EXAM: ICD-10-CM

## 2020-07-27 DIAGNOSIS — Z01.84 ANTIBODY RESPONSE EXAMINATION: ICD-10-CM

## 2020-07-27 DIAGNOSIS — Z00.00 ANNUAL PHYSICAL EXAM: Primary | ICD-10-CM

## 2020-07-27 LAB — SARS-COV-2 IGG SERPLBLD QL IA.RAPID: NEGATIVE

## 2020-07-27 PROCEDURE — 83036 HEMOGLOBIN GLYCOSYLATED A1C: CPT

## 2020-07-27 PROCEDURE — 86769 SARS-COV-2 COVID-19 ANTIBODY: CPT

## 2020-07-27 PROCEDURE — 82306 VITAMIN D 25 HYDROXY: CPT

## 2020-07-27 PROCEDURE — 84443 ASSAY THYROID STIM HORMONE: CPT

## 2020-07-27 PROCEDURE — 80053 COMPREHEN METABOLIC PANEL: CPT

## 2020-07-27 PROCEDURE — 80061 LIPID PANEL: CPT

## 2020-07-27 PROCEDURE — 86803 HEPATITIS C AB TEST: CPT

## 2020-07-27 PROCEDURE — 85025 COMPLETE CBC W/AUTO DIFF WBC: CPT

## 2020-07-28 ENCOUNTER — HOSPITAL ENCOUNTER (EMERGENCY)
Facility: HOSPITAL | Age: 30
Discharge: HOME | End: 2020-07-29
Attending: EMERGENCY MEDICINE
Payer: COMMERCIAL

## 2020-07-28 ENCOUNTER — OFFICE VISIT (OUTPATIENT)
Dept: FAMILY MEDICINE | Facility: CLINIC | Age: 30
End: 2020-07-28
Payer: COMMERCIAL

## 2020-07-28 VITALS
SYSTOLIC BLOOD PRESSURE: 149 MMHG | OXYGEN SATURATION: 98 % | WEIGHT: 249.88 LBS | TEMPERATURE: 98 F | RESPIRATION RATE: 16 BRPM | HEART RATE: 93 BPM | HEIGHT: 65 IN | DIASTOLIC BLOOD PRESSURE: 96 MMHG | BODY MASS INDEX: 41.63 KG/M2

## 2020-07-28 VITALS
SYSTOLIC BLOOD PRESSURE: 124 MMHG | HEART RATE: 91 BPM | OXYGEN SATURATION: 100 % | RESPIRATION RATE: 20 BRPM | TEMPERATURE: 99 F | WEIGHT: 242.5 LBS | BODY MASS INDEX: 40.4 KG/M2 | HEIGHT: 65 IN | DIASTOLIC BLOOD PRESSURE: 71 MMHG

## 2020-07-28 DIAGNOSIS — E66.01 MORBID OBESITY WITH BMI OF 40.0-44.9, ADULT: ICD-10-CM

## 2020-07-28 DIAGNOSIS — I10 HYPERTENSION, UNSPECIFIED TYPE: ICD-10-CM

## 2020-07-28 DIAGNOSIS — E55.9 VITAMIN D DEFICIENCY: ICD-10-CM

## 2020-07-28 DIAGNOSIS — N62 BREAST HYPERTROPHY IN FEMALE: ICD-10-CM

## 2020-07-28 DIAGNOSIS — R00.2 PALPITATIONS: ICD-10-CM

## 2020-07-28 DIAGNOSIS — Z00.00 ANNUAL PHYSICAL EXAM: Primary | ICD-10-CM

## 2020-07-28 DIAGNOSIS — R00.0 CHRONIC TACHYCARDIA: Primary | ICD-10-CM

## 2020-07-28 DIAGNOSIS — R00.0 TACHYCARDIA: ICD-10-CM

## 2020-07-28 LAB
25(OH)D3+25(OH)D2 SERPL-MCNC: 23 NG/ML (ref 30–96)
ALBUMIN SERPL BCP-MCNC: 3.8 G/DL (ref 3.5–5.2)
ALBUMIN SERPL BCP-MCNC: 4.1 G/DL (ref 3.5–5.2)
ALP SERPL-CCNC: 169 U/L (ref 55–135)
ALP SERPL-CCNC: 200 U/L (ref 55–135)
ALT SERPL W/O P-5'-P-CCNC: 13 U/L (ref 10–44)
ALT SERPL W/O P-5'-P-CCNC: 17 U/L (ref 10–44)
ANION GAP SERPL CALC-SCNC: 13 MMOL/L (ref 8–16)
ANION GAP SERPL CALC-SCNC: 9 MMOL/L (ref 8–16)
AST SERPL-CCNC: 18 U/L (ref 10–40)
AST SERPL-CCNC: 19 U/L (ref 10–40)
BASOPHILS # BLD AUTO: 0 K/UL (ref 0–0.2)
BASOPHILS # BLD AUTO: 0.05 K/UL (ref 0–0.2)
BASOPHILS NFR BLD: 0 % (ref 0–1.9)
BASOPHILS NFR BLD: 0.5 % (ref 0–1.9)
BILIRUB SERPL-MCNC: 0.2 MG/DL (ref 0.1–1)
BILIRUB SERPL-MCNC: 0.2 MG/DL (ref 0.1–1)
BNP SERPL-MCNC: 15 PG/ML (ref 0–99)
BUN SERPL-MCNC: 12 MG/DL (ref 6–20)
BUN SERPL-MCNC: 14 MG/DL (ref 6–20)
CALCIUM SERPL-MCNC: 8.9 MG/DL (ref 8.7–10.5)
CALCIUM SERPL-MCNC: 9.9 MG/DL (ref 8.7–10.5)
CHLORIDE SERPL-SCNC: 105 MMOL/L (ref 95–110)
CHLORIDE SERPL-SCNC: 106 MMOL/L (ref 95–110)
CHOLEST SERPL-MCNC: 178 MG/DL (ref 120–199)
CHOLEST/HDLC SERPL: 3.5 {RATIO} (ref 2–5)
CO2 SERPL-SCNC: 22 MMOL/L (ref 23–29)
CO2 SERPL-SCNC: 26 MMOL/L (ref 23–29)
CREAT SERPL-MCNC: 0.7 MG/DL (ref 0.5–1.4)
CREAT SERPL-MCNC: 0.9 MG/DL (ref 0.5–1.4)
D DIMER PPP IA.FEU-MCNC: 1 MG/L FEU
DIFFERENTIAL METHOD: ABNORMAL
DIFFERENTIAL METHOD: ABNORMAL
EOSINOPHIL # BLD AUTO: 0.1 K/UL (ref 0–0.5)
EOSINOPHIL # BLD AUTO: 0.1 K/UL (ref 0–0.5)
EOSINOPHIL NFR BLD: 0.4 % (ref 0–8)
EOSINOPHIL NFR BLD: 0.5 % (ref 0–8)
ERYTHROCYTE [DISTWIDTH] IN BLOOD BY AUTOMATED COUNT: 14.6 % (ref 11.5–14.5)
ERYTHROCYTE [DISTWIDTH] IN BLOOD BY AUTOMATED COUNT: 15.2 % (ref 11.5–14.5)
EST. GFR  (AFRICAN AMERICAN): >60 ML/MIN/1.73 M^2
EST. GFR  (AFRICAN AMERICAN): >60 ML/MIN/1.73 M^2
EST. GFR  (NON AFRICAN AMERICAN): >60 ML/MIN/1.73 M^2
EST. GFR  (NON AFRICAN AMERICAN): >60 ML/MIN/1.73 M^2
ESTIMATED AVG GLUCOSE: 105 MG/DL (ref 68–131)
GLUCOSE SERPL-MCNC: 107 MG/DL (ref 70–110)
GLUCOSE SERPL-MCNC: 54 MG/DL (ref 70–110)
HBA1C MFR BLD HPLC: 5.3 % (ref 4–5.6)
HCT VFR BLD AUTO: 38.7 % (ref 37–48.5)
HCT VFR BLD AUTO: 43.7 % (ref 37–48.5)
HDLC SERPL-MCNC: 51 MG/DL (ref 40–75)
HDLC SERPL: 28.7 % (ref 20–50)
HGB BLD-MCNC: 10.6 G/DL (ref 12–16)
HGB BLD-MCNC: 12.5 G/DL (ref 12–16)
HIV 1+2 AB+HIV1 P24 AG SERPL QL IA: NEGATIVE
IMM GRANULOCYTES # BLD AUTO: 0.06 K/UL (ref 0–0.04)
IMM GRANULOCYTES # BLD AUTO: 0.5 K/UL (ref 0–0.04)
IMM GRANULOCYTES NFR BLD AUTO: 0.6 % (ref 0–0.5)
IMM GRANULOCYTES NFR BLD AUTO: 3.7 % (ref 0–0.5)
LDLC SERPL CALC-MCNC: 113.2 MG/DL (ref 63–159)
LYMPHOCYTES # BLD AUTO: 2.7 K/UL (ref 1–4.8)
LYMPHOCYTES # BLD AUTO: 2.7 K/UL (ref 1–4.8)
LYMPHOCYTES NFR BLD: 19.6 % (ref 18–48)
LYMPHOCYTES NFR BLD: 26.6 % (ref 18–48)
MCH RBC QN AUTO: 22.7 PG (ref 27–31)
MCH RBC QN AUTO: 23.1 PG (ref 27–31)
MCHC RBC AUTO-ENTMCNC: 27.4 G/DL (ref 32–36)
MCHC RBC AUTO-ENTMCNC: 28.6 G/DL (ref 32–36)
MCV RBC AUTO: 80 FL (ref 82–98)
MCV RBC AUTO: 85 FL (ref 82–98)
MONOCYTES # BLD AUTO: 0.5 K/UL (ref 0.3–1)
MONOCYTES # BLD AUTO: 0.6 K/UL (ref 0.3–1)
MONOCYTES NFR BLD: 4.1 % (ref 4–15)
MONOCYTES NFR BLD: 4.8 % (ref 4–15)
NEUTROPHILS # BLD AUTO: 6.8 K/UL (ref 1.8–7.7)
NEUTROPHILS # BLD AUTO: 9.8 K/UL (ref 1.8–7.7)
NEUTROPHILS NFR BLD: 67 % (ref 38–73)
NEUTROPHILS NFR BLD: 72.2 % (ref 38–73)
NONHDLC SERPL-MCNC: 127 MG/DL
NRBC BLD-RTO: 0 /100 WBC
NRBC BLD-RTO: 0 /100 WBC
PLATELET # BLD AUTO: 310 K/UL (ref 150–350)
PLATELET # BLD AUTO: 354 K/UL (ref 150–350)
PMV BLD AUTO: 10.9 FL (ref 9.2–12.9)
PMV BLD AUTO: 11.7 FL (ref 9.2–12.9)
POTASSIUM SERPL-SCNC: 3.6 MMOL/L (ref 3.5–5.1)
POTASSIUM SERPL-SCNC: 4.2 MMOL/L (ref 3.5–5.1)
PROT SERPL-MCNC: 7.9 G/DL (ref 6–8.4)
PROT SERPL-MCNC: 9.1 G/DL (ref 6–8.4)
RBC # BLD AUTO: 4.58 M/UL (ref 4–5.4)
RBC # BLD AUTO: 5.5 M/UL (ref 4–5.4)
SODIUM SERPL-SCNC: 140 MMOL/L (ref 136–145)
SODIUM SERPL-SCNC: 141 MMOL/L (ref 136–145)
TRIGL SERPL-MCNC: 69 MG/DL (ref 30–150)
TROPONIN I SERPL DL<=0.01 NG/ML-MCNC: 0.01 NG/ML (ref 0–0.03)
TSH SERPL DL<=0.005 MIU/L-ACNC: 1.19 UIU/ML (ref 0.4–4)
TSH SERPL DL<=0.005 MIU/L-ACNC: 1.57 UIU/ML (ref 0.4–4)
WBC # BLD AUTO: 10.17 K/UL (ref 3.9–12.7)
WBC # BLD AUTO: 13.59 K/UL (ref 3.9–12.7)

## 2020-07-28 PROCEDURE — 83880 ASSAY OF NATRIURETIC PEPTIDE: CPT

## 2020-07-28 PROCEDURE — 93010 ELECTROCARDIOGRAM REPORT: CPT | Mod: ,,, | Performed by: INTERNAL MEDICINE

## 2020-07-28 PROCEDURE — 84443 ASSAY THYROID STIM HORMONE: CPT | Mod: 91

## 2020-07-28 PROCEDURE — 25000003 PHARM REV CODE 250: Performed by: EMERGENCY MEDICINE

## 2020-07-28 PROCEDURE — 80053 COMPREHEN METABOLIC PANEL: CPT | Mod: 91

## 2020-07-28 PROCEDURE — 85025 COMPLETE CBC W/AUTO DIFF WBC: CPT | Mod: 91

## 2020-07-28 PROCEDURE — 93010 EKG 12-LEAD: ICD-10-PCS | Mod: ,,, | Performed by: INTERNAL MEDICINE

## 2020-07-28 PROCEDURE — 99999 PR PBB SHADOW E&M-EST. PATIENT-LVL V: CPT | Mod: PBBFAC,,, | Performed by: FAMILY MEDICINE

## 2020-07-28 PROCEDURE — 3080F DIAST BP >= 90 MM HG: CPT | Mod: CPTII,S$GLB,, | Performed by: FAMILY MEDICINE

## 2020-07-28 PROCEDURE — 84484 ASSAY OF TROPONIN QUANT: CPT

## 2020-07-28 PROCEDURE — 85379 FIBRIN DEGRADATION QUANT: CPT

## 2020-07-28 PROCEDURE — 86703 HIV-1/HIV-2 1 RESULT ANTBDY: CPT

## 2020-07-28 PROCEDURE — 99999 PR PBB SHADOW E&M-EST. PATIENT-LVL V: ICD-10-PCS | Mod: PBBFAC,,, | Performed by: FAMILY MEDICINE

## 2020-07-28 PROCEDURE — 99395 PR PREVENTIVE VISIT,EST,18-39: ICD-10-PCS | Mod: S$GLB,,, | Performed by: FAMILY MEDICINE

## 2020-07-28 PROCEDURE — 3077F SYST BP >= 140 MM HG: CPT | Mod: CPTII,S$GLB,, | Performed by: FAMILY MEDICINE

## 2020-07-28 PROCEDURE — 3077F PR MOST RECENT SYSTOLIC BLOOD PRESSURE >= 140 MM HG: ICD-10-PCS | Mod: CPTII,S$GLB,, | Performed by: FAMILY MEDICINE

## 2020-07-28 PROCEDURE — 96374 THER/PROPH/DIAG INJ IV PUSH: CPT

## 2020-07-28 PROCEDURE — 93005 ELECTROCARDIOGRAM TRACING: CPT

## 2020-07-28 PROCEDURE — 99285 EMERGENCY DEPT VISIT HI MDM: CPT | Mod: 25

## 2020-07-28 PROCEDURE — 3080F PR MOST RECENT DIASTOLIC BLOOD PRESSURE >= 90 MM HG: ICD-10-PCS | Mod: CPTII,S$GLB,, | Performed by: FAMILY MEDICINE

## 2020-07-28 PROCEDURE — 25500020 PHARM REV CODE 255: Performed by: EMERGENCY MEDICINE

## 2020-07-28 PROCEDURE — 99395 PREV VISIT EST AGE 18-39: CPT | Mod: S$GLB,,, | Performed by: FAMILY MEDICINE

## 2020-07-28 RX ORDER — BUTALBITAL, ACETAMINOPHEN AND CAFFEINE 50; 325; 40 MG/1; MG/1; MG/1
1 TABLET ORAL
Status: COMPLETED | OUTPATIENT
Start: 2020-07-28 | End: 2020-07-28

## 2020-07-28 RX ORDER — METOPROLOL SUCCINATE 25 MG/1
25 TABLET, EXTENDED RELEASE ORAL DAILY
Status: DISCONTINUED | OUTPATIENT
Start: 2020-07-28 | End: 2020-07-29 | Stop reason: HOSPADM

## 2020-07-28 RX ORDER — METOPROLOL TARTRATE 1 MG/ML
5 INJECTION, SOLUTION INTRAVENOUS
Status: COMPLETED | OUTPATIENT
Start: 2020-07-28 | End: 2020-07-28

## 2020-07-28 RX ORDER — NIFEDIPINE 60 MG/1
60 TABLET, EXTENDED RELEASE ORAL DAILY
Qty: 30 TABLET | Refills: 5 | Status: SHIPPED | OUTPATIENT
Start: 2020-07-28 | End: 2020-07-29

## 2020-07-28 RX ADMIN — METOPROLOL TARTRATE 5 MG: 5 INJECTION INTRAVENOUS at 09:07

## 2020-07-28 RX ADMIN — IOHEXOL 100 ML: 350 INJECTION, SOLUTION INTRAVENOUS at 11:07

## 2020-07-28 RX ADMIN — BUTALBITAL, ACETAMINOPHEN, AND CAFFEINE 1 TABLET: 50; 325; 40 TABLET ORAL at 11:07

## 2020-07-28 RX ADMIN — METOPROLOL SUCCINATE 25 MG: 25 TABLET, EXTENDED RELEASE ORAL at 11:07

## 2020-07-28 NOTE — PROGRESS NOTES
HISTORY OF PRESENT ILLNESS: Ms. Barrientos comes in today not fasting and with taking medication for annual wellness examination.     END OF LIFE DECISION: He does not have a living will and does not desire life support and states family is aware of her decision.    Current Outpatient Medications   Medication Sig    ergocalciferol (ERGOCALCIFEROL) 50,000 unit Cap Take 2 capsules (100,000 Units total) by mouth every 7 days. (Patient taking differently: Take 50,000 Units by mouth every 7 days. )    hydrOXYzine pamoate (VISTARIL) 25 MG Cap Take 1 capsule (25 mg total) by mouth 3 (three) times daily. (Patient taking differently: Take 25 mg by mouth as needed. )    ibuprofen (ADVIL,MOTRIN) 600 MG tablet Take 1 tablet (600 mg total) by mouth every 6 (six) hours.    metoprolol succinate (TOPROL-XL) 25 MG 24 hr tablet Take 1 tablet (25 mg total) by mouth once daily.    norethindrone (ORTHO MICRONOR) 0.35 mg tablet Take 1 tablet (0.35 mg total) by mouth once daily.    pantoprazole (PROTONIX) 40 MG tablet Take 1 tablet (40 mg total) by mouth 2 (two) times daily.    prenatal vits62/FA/om3/dha/epa (PRENATAL GUMMY ORAL) Take 1 Dose by mouth.    sertraline (ZOLOFT) 100 MG tablet Take 1.5 tablets (150 mg total) by mouth once daily.      SCREENINGS:       Cholesterol: April 15, 2019.     FFS/Colonoscopy: Never.     Mammogram:  Never.     GYN Exam: April 15, 2019 with GYN Dr. Surekha julian per patient. Scheduled for Dr. Siu on August 3, 2020.     Dexa Scan:  Never.     Eye Exam: January 9, 2020 with Dr. Delgado. She wears contacts/glasses.       HCVAb: Never. She desires.     PPD: Negative in the past.     Immunizations:  Td/Tdap - 2016 per patient.                              Gardasil - Never.                              Zostavax - N./A.                              Pneumovax - In the past.                              Seasonal Flu - October 18, 2019.                           ROS:        GENERAL: Denies fever,  chills, fatigue or unusual weight change. Appetite normal. Does not exercises since blood pressure, heart rate issues.  Does not monitor diet. Weight  116.7 kg (257 lb 2.7                    oz) at August 9, 2019 visit.   SKIN: Denies itching, rash, changes in mole, color or texture of skin or easy bruising.   HEAD:  Denies headaches or recent head trauma.  EYES: Denies change in vision, pain, diplopia, redness or discharge. Wears seatbelt.  EARS: Denies ear pain, discharge, vertigo or decreased hearing.  NOSE: Denies loss of smell, epistaxis or rhinitis.  MOUTH & THROAT: Denies hoarseness or change in voice. Denies excessive gum bleeding or mouth sores.  Denies sore throat.  NODES: Denies swollen glands.  CHEST: Denies TELLEZ, wheezing, cough, or sputum production. Reports slight snoring.  CARDIOVASCULAR: Denies chest pain, PND, orthopnea or reduced exercise tolerance.  Denies palpitations.  Saw Dr. Groves, cardiologist, on Sanjuana 15, 2020 for elevated blood pressure and heart rate and recommended sleep study before blood pressure medication adjustment; however, states advised her to take half pill of metoprolol due to drop inheart rate without help with blood pressure control.  Reports now with headache, dizziness, and floaters due to elevated blood pressure.  ABDOMEN: Denies diarrhea, constipation, nausea, vomiting, abdominal pain, or blood in stool.  URINARY: Denies flank pain, dysuria or hematuria.  GENITOURINARY: Denies flank pain, dysuria, frequency or hematuria. No change in menses. Performs monthly breast self examinations.  Reports desires breast reduction as causes chronic back pain.  ENDOCRINE: Denies thyroid, diabetes or cholesterol problems.  HEME/LYMPH: Denies bleeding problems.  PERIPHERAL VASCULAR:Denies claudication or cyanosis.  MUSCULOSKELETAL: Denies joint stiffness, pain or swelling. Denies edema.  NEUROLOGIC: Denies history of seizures, tremors, paralysis, alteration of gait or  "coordination.  PSYCHIATRIC: Denies mood swings, controlled depression, anxiety, homicidal or suicidal thoughts. Denies acute steep problems.    PE:   VS: BP (!) 149/96 Comment: Rechecked by Dr. Leggett.  Pulse 93   Temp 98.1 °F (36.7 °C) (Temporal)   Resp 16   Ht 5' 5" (1.651 m)   Wt 113.3 kg (249 lb 14.3 oz)   LMP 07/26/2020   SpO2 98%   Breastfeeding No Comment: Irregular menses  BMI 41.58 kg/m²   APPEARANCE:  Well nourished, well developed female, pleasant and obese, alert and oriented in no acute distress.    HEAD: Nontender. Full range of motion.  EYES: PERRL, conjunctiva pink, lids no edema.   EARS: External canal patent, no swelling or redness. TM's shiny and clear.  NOSE: Mucosa and turbinates pink, not swollen. No discharge. Nontender sinuses.  THROAT: No pharyngeal erythema or exudate. No stridor.   NECK: Supple, no mass, thyroid not enlarged.  NODES: No cervical lymph node enlargement.  CHEST: Normal respiratory effort. Lungs clear to auscultation.  CARDIOVASCULAR: Normal S1, S2. No rubs, murmurs or gallops. PMI not displaced. No carotid bruit. Pedal pulses palpable bilaterally. No edema.  ABDOMEN: Bowel sounds present. Not distended. Soft. No tenderness, masses or organomegaly.  BREAST EXAM: Not examined.  PELVIC EXAM: Not examined.  RECTAL EXAM: Not examined.  MUSCULOSKELETAL: No joint deformities or stiffness. She is ambulatory without problems.  SKIN: No rashes or suspicious lesions, normal color and turgor.  NEUROLOGIC:   Cranial Nerves: II-XII grossly intact.   DTR's: Knees, Ankles 2+ and equal bilaterally. Gait & Posture: Normal gait and fine motion.  PSYCHIATRIC:Patient alert, oriented x 3. Mood/Affect normal without acute anxiety or depression noted. Judgment/insight good as she makes appropriate decisions during today's exam.     ASSESSMENT:    ICD-10-CM ICD-9-CM    1. Annual physical exam  Z00.00 V70.0 Hepatitis C Antibody   2. Hypertension, unspecified type  I10 401.9 NIFEdipine " (PROCARDIA-XL) 60 MG (OSM) 24 hr tablet   3. Vitamin D deficiency  E55.9 268.9    4. Breast hypertrophy in female  N62 611.1 Ambulatory referral/consult to Plastic Surgery   5. Morbid obesity with BMI of 40.0-44.9, adult  E66.01 278.01     Z68.41 V85.41        PLAN:  1. Age-appropriate counseling-appropriate low-sodium, low-cholesterol, low carbohydrate diet and exercise daily, monthly breast self exam, annual wellness examination.   2. Patient advised to call for results.  3. Continue current medications.  4. Add back Nifedipine XL 60 mg nightly as directed; medication precautions discussed with patient.  5. Continue home blood pressure monitoring.  6. Keep follow up with specialists.         7. Follow up in about 1 year (around 7/28/2021) for Physical.     Answers for HPI/ROS submitted by the patient on 7/27/2020   activity change: No  unexpected weight change: No  neck pain: Yes  hearing loss: No  rhinorrhea: No  trouble swallowing: No  eye discharge: No  visual disturbance: No  chest tightness: No  wheezing: No  chest pain: No  palpitations: Yes  blood in stool: No  constipation: No  vomiting: No  diarrhea: No  polydipsia: No  polyuria: No  difficulty urinating: No  hematuria: No  menstrual problem: Yes  dysuria: No  joint swelling: No  arthralgias: Yes  headaches: Yes  weakness: No  confusion: No  dysphoric mood: No

## 2020-07-29 ENCOUNTER — OFFICE VISIT (OUTPATIENT)
Dept: CARDIOLOGY | Facility: CLINIC | Age: 30
End: 2020-07-29
Payer: COMMERCIAL

## 2020-07-29 VITALS
BODY MASS INDEX: 40.59 KG/M2 | WEIGHT: 243.63 LBS | HEART RATE: 90 BPM | OXYGEN SATURATION: 99 % | HEIGHT: 65 IN | RESPIRATION RATE: 16 BRPM | SYSTOLIC BLOOD PRESSURE: 120 MMHG | DIASTOLIC BLOOD PRESSURE: 72 MMHG

## 2020-07-29 DIAGNOSIS — I10 ESSENTIAL HYPERTENSION: ICD-10-CM

## 2020-07-29 DIAGNOSIS — R07.9 CHEST PAIN, UNSPECIFIED TYPE: Primary | ICD-10-CM

## 2020-07-29 DIAGNOSIS — R00.2 PALPITATIONS: ICD-10-CM

## 2020-07-29 DIAGNOSIS — K21.9 GASTROESOPHAGEAL REFLUX DISEASE WITHOUT ESOPHAGITIS: ICD-10-CM

## 2020-07-29 DIAGNOSIS — E66.01 MORBID OBESITY WITH BMI OF 40.0-44.9, ADULT: ICD-10-CM

## 2020-07-29 LAB — HCV AB SERPL QL IA: NEGATIVE

## 2020-07-29 PROCEDURE — 3074F SYST BP LT 130 MM HG: CPT | Mod: CPTII,S$GLB,, | Performed by: INTERNAL MEDICINE

## 2020-07-29 PROCEDURE — 99999 PR PBB SHADOW E&M-EST. PATIENT-LVL III: ICD-10-PCS | Mod: PBBFAC,,, | Performed by: INTERNAL MEDICINE

## 2020-07-29 PROCEDURE — 3078F PR MOST RECENT DIASTOLIC BLOOD PRESSURE < 80 MM HG: ICD-10-PCS | Mod: CPTII,S$GLB,, | Performed by: INTERNAL MEDICINE

## 2020-07-29 PROCEDURE — 3074F PR MOST RECENT SYSTOLIC BLOOD PRESSURE < 130 MM HG: ICD-10-PCS | Mod: CPTII,S$GLB,, | Performed by: INTERNAL MEDICINE

## 2020-07-29 PROCEDURE — 99214 OFFICE O/P EST MOD 30 MIN: CPT | Mod: S$GLB,,, | Performed by: INTERNAL MEDICINE

## 2020-07-29 PROCEDURE — 3008F PR BODY MASS INDEX (BMI) DOCUMENTED: ICD-10-PCS | Mod: CPTII,S$GLB,, | Performed by: INTERNAL MEDICINE

## 2020-07-29 PROCEDURE — 3008F BODY MASS INDEX DOCD: CPT | Mod: CPTII,S$GLB,, | Performed by: INTERNAL MEDICINE

## 2020-07-29 PROCEDURE — 99999 PR PBB SHADOW E&M-EST. PATIENT-LVL III: CPT | Mod: PBBFAC,,, | Performed by: INTERNAL MEDICINE

## 2020-07-29 PROCEDURE — 99214 PR OFFICE/OUTPT VISIT, EST, LEVL IV, 30-39 MIN: ICD-10-PCS | Mod: S$GLB,,, | Performed by: INTERNAL MEDICINE

## 2020-07-29 PROCEDURE — 3078F DIAST BP <80 MM HG: CPT | Mod: CPTII,S$GLB,, | Performed by: INTERNAL MEDICINE

## 2020-07-29 RX ORDER — METOPROLOL SUCCINATE 25 MG/1
25 TABLET, EXTENDED RELEASE ORAL DAILY
Qty: 30 TABLET | Refills: 3 | Status: SHIPPED | OUTPATIENT
Start: 2020-07-29 | End: 2020-10-28

## 2020-07-29 RX ORDER — AMLODIPINE BESYLATE 5 MG/1
5 TABLET ORAL DAILY
Qty: 30 TABLET | Refills: 3 | Status: SHIPPED | OUTPATIENT
Start: 2020-07-29 | End: 2020-10-28 | Stop reason: SDUPTHER

## 2020-07-29 NOTE — ED PROVIDER NOTES
"SCRIBE #1 NOTE: I, Dionne Felice, am scribing for, and in the presence of, Deya Zhou MD. I have scribed the entire note.       History     Chief Complaint   Patient presents with    Tachycardia     Recent changes to metoprolol. Called EMS for palpitations.     Review of patient's allergies indicates:  No Known Allergies      History of Present Illness     HPI    7/28/2020, 8:35 PM  History obtained from the patient      History of Present Illness: Anthony Sexton is a 29 y.o. female patient with a PMHx of pre-eclampsia in postpartum period, anemia, anxiety, UTI, obesity, postpartum depression and vitamin D deficiency who presents to the Emergency Department for evaluation of palpitations which suddenly this afternoon while she was walking from her kitchen to her living room. She states, "my heart felt like it was beating out of its chest." Pt reports having similar sxs in the past. Symptoms are constant and moderate in severity. No mitigating or exacerbating factors reported. Associated sxs include HA. Pt notes that x1.5 months ago her medication was switched from procardia to 25 mg metoprolol. Pt now takes 1/2 a tablet of 25 mg metoprolol daily. Patient denies any fever, chills, numbness, weakness, n/v/d, abd pain, CP, SOB, leg swelling, and all other sxs at this time. No further complaints or concerns at this time.       Arrival mode: Ambulance services     PCP: Tyesha Leggett MD        Past Medical History:  Past Medical History:   Diagnosis Date    Anemia     Anxiety     Chronic low back pain     Depression     History of abnormal cervical Pap smear     History of recurrent UTIs     History of ulcer disease     Irregular menses     Obesity     Postpartum depression     with last pregnancy.     Pre-eclampsia in postpartum period 4/23/2020    Vitamin D deficiency        Past Surgical History:  Past Surgical History:   Procedure Laterality Date    None           Family History:  Family History "   Problem Relation Age of Onset    Asthma Mother     Hyperlipidemia Mother     Hypertension Mother     Hypertension Father     Hyperlipidemia Father     Heart disease Father 55        MI/CAD    No Known Problems Sister     No Known Problems Brother     Diabetes Maternal Aunt     Diabetes Maternal Grandmother     Insulin resistance Sister     No Known Problems Brother     No Known Problems Brother     No Known Problems Brother     No Known Problems Brother     No Known Problems Daughter     Breast cancer Maternal Aunt     Cancer Neg Hx     Stroke Neg Hx     Brain cancer Neg Hx     Migraines Neg Hx     Colon cancer Neg Hx     Ovarian cancer Neg Hx        Social History:  Social History     Tobacco Use    Smoking status: Never Smoker    Smokeless tobacco: Never Used   Substance and Sexual Activity    Alcohol use: Not Currently     Alcohol/week: 0.0 standard drinks     Frequency: Monthly or less     Drinks per session: 1 or 2     Binge frequency: Never    Drug use: No    Sexual activity: Not Currently     Partners: Male     Birth control/protection: None        Review of Systems     Review of Systems   Constitutional: Negative for chills and fever.   HENT: Negative for sore throat.    Respiratory: Negative for shortness of breath.    Cardiovascular: Positive for palpitations. Negative for chest pain and leg swelling.   Gastrointestinal: Negative for abdominal pain, diarrhea, nausea and vomiting.   Genitourinary: Negative for dysuria.   Musculoskeletal: Negative for back pain.   Skin: Negative for rash.   Neurological: Positive for headaches. Negative for weakness and numbness.   Hematological: Does not bruise/bleed easily.   All other systems reviewed and are negative.     Physical Exam     Initial Vitals   BP Pulse Resp Temp SpO2   07/28/20 2025 07/28/20 2025 07/28/20 2025 07/28/20 2127 07/28/20 2025   123/83 (!) 134 20 98.8 °F (37.1 °C) 99 %      MAP       --                 Physical  "Exam  Nursing Notes and Vital Signs Reviewed.  Constitutional: Patient is in no acute distress. Well-developed and well-nourished.  Head: Atraumatic. Normocephalic.  Eyes: PERRL. EOM intact. Conjunctivae are not pale. No scleral icterus.  ENT: Mucous membranes are moist. Oropharynx is clear and symmetric.    Neck: Supple. Full ROM. No lymphadenopathy.  Cardiovascular: Tachycardic. Regular rhythm. No murmurs, rubs, or gallops. Distal pulses are 2+ and symmetric.  Pulmonary/Chest: No respiratory distress. Clear to auscultation bilaterally. No wheezing or rales.  Abdominal: Soft and non-distended.  There is no tenderness.  No rebound, guarding, or rigidity. Good bowel sounds.  Genitourinary: No CVA tenderness  Musculoskeletal: Moves all extremities. No obvious deformities. No edema. No calf tenderness.  Skin: Warm and dry.  Neurological:  Alert, awake, and appropriate.  Normal speech.  No acute focal neurological deficits are appreciated.  Psychiatric: Normal affect. Good eye contact. Appropriate in content.     ED Course   Procedures  ED Vital Signs:  Vitals:    07/28/20 2025 07/28/20 2050 07/28/20 2127 07/28/20 2133   BP: 123/83      Pulse: (!) 134 (!) 129  (!) 123   Resp: 20   20   Temp:   98.8 °F (37.1 °C)    TempSrc:   Oral    SpO2: 99%   100%   Weight: 110 kg (242 lb 8.1 oz)      Height: 5' 5" (1.651 m)       07/28/20 2150 07/28/20 2257 07/28/20 2311 07/28/20 2340   BP: 138/89  122/72 124/71   Pulse: 105 91 95 91   Resp: 20 20 20   Temp:       TempSrc:       SpO2: 100% 100% 100% 100%   Weight:       Height:           Abnormal Lab Results:  Labs Reviewed   CBC W/ AUTO DIFFERENTIAL - Abnormal; Notable for the following components:       Result Value    WBC 13.59 (*)     RBC 5.50 (*)     Mean Corpuscular Volume 80 (*)     Mean Corpuscular Hemoglobin 22.7 (*)     Mean Corpuscular Hemoglobin Conc 28.6 (*)     RDW 14.6 (*)     Platelets 354 (*)     Immature Granulocytes 3.7 (*)     Gran # (ANC) 9.8 (*)     Immature " Grans (Abs) 0.50 (*)     All other components within normal limits   COMPREHENSIVE METABOLIC PANEL - Abnormal; Notable for the following components:    CO2 22 (*)     Total Protein 9.1 (*)     Alkaline Phosphatase 200 (*)     All other components within normal limits   D DIMER, QUANTITATIVE - Abnormal; Notable for the following components:    D-Dimer 1.00 (*)     All other components within normal limits   HIV 1 / 2 ANTIBODY   TROPONIN I   B-TYPE NATRIURETIC PEPTIDE   TSH        All Lab Results:  Results for orders placed or performed during the hospital encounter of 07/28/20   HIV 1/2 Ag/Ab (4th Gen)   Result Value Ref Range    HIV 1/2 Ag/Ab Negative Negative   CBC auto differential   Result Value Ref Range    WBC 13.59 (H) 3.90 - 12.70 K/uL    RBC 5.50 (H) 4.00 - 5.40 M/uL    Hemoglobin 12.5 12.0 - 16.0 g/dL    Hematocrit 43.7 37.0 - 48.5 %    Mean Corpuscular Volume 80 (L) 82 - 98 fL    Mean Corpuscular Hemoglobin 22.7 (L) 27.0 - 31.0 pg    Mean Corpuscular Hemoglobin Conc 28.6 (L) 32.0 - 36.0 g/dL    RDW 14.6 (H) 11.5 - 14.5 %    Platelets 354 (H) 150 - 350 K/uL    MPV 10.9 9.2 - 12.9 fL    Immature Granulocytes 3.7 (H) 0.0 - 0.5 %    Gran # (ANC) 9.8 (H) 1.8 - 7.7 K/uL    Immature Grans (Abs) 0.50 (H) 0.00 - 0.04 K/uL    Lymph # 2.7 1.0 - 4.8 K/uL    Mono # 0.6 0.3 - 1.0 K/uL    Eos # 0.1 0.0 - 0.5 K/uL    Baso # 0.00 0.00 - 0.20 K/uL    nRBC 0 0 /100 WBC    Gran% 72.2 38.0 - 73.0 %    Lymph% 19.6 18.0 - 48.0 %    Mono% 4.1 4.0 - 15.0 %    Eosinophil% 0.4 0.0 - 8.0 %    Basophil% 0.0 0.0 - 1.9 %    Differential Method Automated    Comprehensive metabolic panel   Result Value Ref Range    Sodium 140 136 - 145 mmol/L    Potassium 3.6 3.5 - 5.1 mmol/L    Chloride 105 95 - 110 mmol/L    CO2 22 (L) 23 - 29 mmol/L    Glucose 107 70 - 110 mg/dL    BUN, Bld 14 6 - 20 mg/dL    Creatinine 0.9 0.5 - 1.4 mg/dL    Calcium 9.9 8.7 - 10.5 mg/dL    Total Protein 9.1 (H) 6.0 - 8.4 g/dL    Albumin 4.1 3.5 - 5.2 g/dL    Total  Bilirubin 0.2 0.1 - 1.0 mg/dL    Alkaline Phosphatase 200 (H) 55 - 135 U/L    AST 18 10 - 40 U/L    ALT 17 10 - 44 U/L    Anion Gap 13 8 - 16 mmol/L    eGFR if African American >60 >60 mL/min/1.73 m^2    eGFR if non African American >60 >60 mL/min/1.73 m^2   Troponin I #1   Result Value Ref Range    Troponin I 0.012 0.000 - 0.026 ng/mL   B-Type natriuretic peptide (BNP)   Result Value Ref Range    BNP 15 0 - 99 pg/mL   D dimer, quantitative   Result Value Ref Range    D-Dimer 1.00 (H) <0.50 mg/L FEU   TSH   Result Value Ref Range    TSH 1.186 0.400 - 4.000 uIU/mL       Imaging Results:  Imaging Results          CTA Chest Non-Coronary (PE Study) (Final result)  Result time 07/28/20 23:41:11    Final result by Domo Contreras MD (07/28/20 23:41:11)                 Impression:      1.  No pulmonary embolus.    2.  No abnormality identified within the chest.    All CT scans at this facility are performed  using dose modulation techniques as appropriate to performed exam including the following:  automated exposure control; adjustment of mA and/or kV according to the patients size (this includes techniques or standardized protocols for targeted exams where dose is matched to indication/reason for exam: i.e. extremities or head);  iterative reconstruction technique.      Electronically signed by: Domo Contreras MD  Date:    07/28/2020  Time:    23:41             Narrative:    EXAMINATION:  CTA CHEST NON CORONARY    CLINICAL HISTORY:  PE suspected, intermediate prob, positive D-dimer;    TECHNIQUE:  Axial CTA images performed through the chest after the administration of 100 cc intravenous contrast. 3D MIP images were performed and interpreted.    COMPARISON:  CT angiogram of the chest, 05/23/2020    FINDINGS:  There are no filling defects in the pulmonary arteries to indicate a pulmonary embolism.  The pulmonary arteries have a normal caliber throughout.  Normal heart size.  No pericardial effusion.  No mediastinal or hilar  lymphadenopathy.  Normal caliber thoracic aorta.  The lungs are clear.  No pleural effusion or pneumothorax or pulmonary edema.  The thoracic osseous structures are unremarkable.  Images of the upper abdomen show no acute abnormality.                               X-Ray Chest AP Portable (Final result)  Result time 07/28/20 22:02:28    Final result by Domo Contreras MD (07/28/20 22:02:28)                 Impression:      No acute cardiopulmonary disease.      Electronically signed by: Domo Contreras MD  Date:    07/28/2020  Time:    22:02             Narrative:    EXAMINATION:  XR CHEST AP PORTABLE    CLINICAL HISTORY:  palpitations;    COMPARISON:  Chest x-ray, 05/29/2020    FINDINGS:  The lungs are clear. The cardiac silhouette is within normal limits. The bones are intact.                                 The EKG was ordered, reviewed, and independently interpreted by the ED provider.  Interpretation time: 2045  Rate: 129 BPM  Rhythm: sinus tachycardia  Interpretation: Possible L atrial enlargement. LVH. Nonspecific T wave abnormality. No STEMI.             The Emergency Provider reviewed the vital signs and test results, which are outlined above.     ED Discussion     11:44 PM: Reassessed pt at this time.  Pt states her condition has improved at this time. Discussed with pt all pertinent ED information and results. Discussed pt dx and plan of tx. Gave pt all f/u and return to the ED instructions. All questions and concerns were addressed at this time. Pt expresses understanding of information and instructions, and is comfortable with plan to discharge. Pt is stable for discharge.    I discussed with patient and/or family/caretaker that evaluation in the ED does not suggest any emergent or life threatening medical conditions requiring immediate intervention beyond what was provided in the ED, and I believe patient is safe for discharge.  Regardless, an unremarkable evaluation in the ED does not preclude the  development or presence of a serious of life threatening condition. As such, patient was instructed to return immediately for any worsening or change in current symptoms.         Medical Decision Making:   Clinical Tests:   Lab Tests: Reviewed and Ordered  Radiological Study: Reviewed and Ordered  Medical Tests: Reviewed and Ordered           ED Medication(s):  Medications   metoprolol succinate (TOPROL-XL) 24 hr tablet 25 mg (25 mg Oral Given 7/28/20 2308)   metoprolol injection 5 mg (5 mg Intravenous Given 7/28/20 2126)   metoprolol injection 5 mg (5 mg Intravenous Given 7/28/20 2137)   butalbital-acetaminophen-caffeine -40 mg per tablet 1 tablet (1 tablet Oral Given 7/28/20 2308)   iohexoL (OMNIPAQUE 350) injection 100 mL (100 mLs Intravenous Given 7/28/20 2334)       Discharge Medication List as of 7/28/2020 11:44 PM          Follow-up Information     Dorian Groves Md, MD. Schedule an appointment as soon as possible for a visit in 2 days.    Specialties: Cardiology, Internal Medicine  Why: Return to the Emergency Room, If symptoms worsen  Contact information:  [id number]  [place] [address]  [phone]                       Scribe Attestation:   Scribe #1: I performed the above scribed service and the documentation accurately describes the services I performed. I attest to the accuracy of the note.     Attending:   Physician Attestation Statement for Scribe #1: I, Deya Zhou MD, personally performed the services described in this documentation, as scribed by Dionne Viveros, in my presence, and it is both accurate and complete.           Clinical Impression       ICD-10-CM ICD-9-CM   1. Chronic tachycardia  R00.0 785.0   2. Tachycardia  R00.0 785.0   3. Palpitations  R00.2 785.1       Disposition:   Disposition: Discharged  Condition: Stable         Deya Zhou MD  07/29/20 0004       Deya Zhou MD  07/29/20 0004       Deay Zhou MD  07/29/20 0005

## 2020-07-29 NOTE — PROGRESS NOTES
Subjective:   Patient ID:  Anthony Sexton is a 29 y.o. female who presents for cardiac consult of Palpitations and Dizziness        Follow-up  Associated symptoms include chest pain.   Palpitations   Associated symptoms include chest pain, dizziness and shortness of breath.   Dizziness:    Associated symptoms: palpitations and chest pain.    The patient came in today for cardiac consult of Palpitations and Dizziness    Referring Provider: Mckenzie Ross NP   Reason for initial consult: chest pain      Anthony Sexton is a 29 y.o. female with current medical conditions ADHD, anemia, back pain, obesity, Vit D def presents for follow up CV eval.     5/1/19  She presented to urgent care and PCP last month for chest pain after started Adderall. She went to ER twice after and had ECGs with old septal infarct with sinus tach. BP elevated, D dimer positive, neg CTA for PE. Trop negative. She was started on Atenolol. She complained more of racing of heart with Adderall but not as much chest pain. Chest pain was in center of chest , felt heavy with tightness in neck and R arm pain. Will have sleep study eval as well. BP elevated today as well.     6/15/20  She went to ER last month - 6 week postpartum presents to the Emergency Department for evaluation of CP which onset gradually around 7PM last night. Pt was evaluated for similar sxs yesterday and sxs were improved upon discharge.  Symptoms are constant and moderate in severity.  The patient describes the sxs as heaviness. No mitigating or exacerbating factors reported.  Labs, trop, BNP neg, She was given Norco and sent home. ECHO last week was normal. Stress test last year neg.   Baby has been 2 months old. She has started BB palpitations have increased. HR goes up to 130 and drops to 80s to 40s.   She was on Nifedpine post partum. She is on PPI as well. She is taking Zoloft thought was due to anxiety.   BP upper 130s/80.     7/29/20  She started taking procardia and  "experienced palpitations. Went to ER for palpitations which suddenly this afternoon while she was walking from her kitchen to her living room. She states, "my heart felt like it was beating out of its chest." Pt reports having similar sxs in the past. Symptoms are constant and moderate in severity. No mitigating or exacerbating factors reported. Associated sxs include HA. Pt notes that x1.5 months ago her medication was switched from procardia to 25 mg metoprolol. Pt now takes 1/2 a tablet of 25 mg metoprolol daily.    In ER given BB symptoms improved.     Patient feels no leg swelling, no PND, no dizziness, no syncope, no CNS symptoms.    Patient has fairly good exercise tolerance.    Patient is compliant with medications.    FH - father - 3 Mis, mother HTN    Results for orders placed during the hospital encounter of 06/10/20   Echo Color Flow Doppler? Yes; Bubble Contrast? No    Narrative · Concentric left ventricular hypertrophy.  · Normal left ventricular systolic function. The estimated ejection   fraction is 60%.  · Normal LV diastolic function.  · Normal central venous pressure (3 mmHg).  · The estimated PA systolic pressure is 30 mmHg.        EKG Conclusions:     1. The EKG portion of this study is negative for ischemia at a moderate workload, and peak heart rate of 166 bpm (92% of predicted).   2. Blood pressure response to exercise was normal (Presenting BP: 144/94 Peak BP: 215/89).   3. No significant arrhythmias were present.   4. There were no symptoms of chest discomfort or significant dyspnea throughout the protocol.   5. The Nesbitt treadmill score was 6 suggesting a low probability for future cardiovascular events.       This document has been electronically    SIGNED BY: Francisco J Hutchison MD On: 05/29/2019 17:43      Past Medical History:   Diagnosis Date    Anemia     Anxiety     Chronic low back pain     Depression     History of abnormal cervical Pap smear     History of recurrent UTIs     " History of ulcer disease     Irregular menses     Obesity     Postpartum depression     with last pregnancy.     Pre-eclampsia in postpartum period 4/23/2020    Vitamin D deficiency        Past Surgical History:   Procedure Laterality Date    None         Social History     Tobacco Use    Smoking status: Never Smoker    Smokeless tobacco: Never Used   Substance Use Topics    Alcohol use: Not Currently     Alcohol/week: 0.0 standard drinks     Frequency: Monthly or less     Drinks per session: 1 or 2     Binge frequency: Never    Drug use: No       Family History   Problem Relation Age of Onset    Asthma Mother     Hyperlipidemia Mother     Hypertension Mother     Hypertension Father     Hyperlipidemia Father     Heart disease Father 55        MI/CAD    No Known Problems Sister     No Known Problems Brother     Diabetes Maternal Aunt     Diabetes Maternal Grandmother     Insulin resistance Sister     No Known Problems Brother     No Known Problems Brother     No Known Problems Brother     No Known Problems Brother     No Known Problems Daughter     Breast cancer Maternal Aunt     Cancer Neg Hx     Stroke Neg Hx     Brain cancer Neg Hx     Migraines Neg Hx     Colon cancer Neg Hx     Ovarian cancer Neg Hx        Patient's Medications   New Prescriptions    AMLODIPINE (NORVASC) 5 MG TABLET    Take 1 tablet (5 mg total) by mouth once daily.   Previous Medications    ERGOCALCIFEROL (ERGOCALCIFEROL) 50,000 UNIT CAP    Take 2 capsules (100,000 Units total) by mouth every 7 days.    HYDROXYZINE PAMOATE (VISTARIL) 25 MG CAP    Take 1 capsule (25 mg total) by mouth 3 (three) times daily.    IBUPROFEN (ADVIL,MOTRIN) 600 MG TABLET    Take 1 tablet (600 mg total) by mouth every 6 (six) hours.    NORETHINDRONE (ORTHO MICRONOR) 0.35 MG TABLET    Take 1 tablet (0.35 mg total) by mouth once daily.    PANTOPRAZOLE (PROTONIX) 40 MG TABLET    Take 1 tablet (40 mg total) by mouth 2 (two) times daily.     "PRENATAL VITS62/FA/OM3/DHA/EPA (PRENATAL GUMMY ORAL)    Take 1 Dose by mouth.    SERTRALINE (ZOLOFT) 100 MG TABLET    Take 1.5 tablets (150 mg total) by mouth once daily.   Modified Medications    Modified Medication Previous Medication    METOPROLOL SUCCINATE (TOPROL-XL) 25 MG 24 HR TABLET metoprolol succinate (TOPROL-XL) 25 MG 24 hr tablet       Take 1 tablet (25 mg total) by mouth once daily.    Take 1 tablet (25 mg total) by mouth once daily.   Discontinued Medications    NIFEDIPINE (PROCARDIA-XL) 60 MG (OSM) 24 HR TABLET    Take 1 tablet (60 mg total) by mouth once daily.       Review of Systems   Constitutional: Negative.    HENT: Negative.    Eyes: Negative.    Respiratory: Positive for shortness of breath.    Cardiovascular: Positive for chest pain and palpitations.   Gastrointestinal: Negative.    Genitourinary: Negative.    Musculoskeletal: Negative.    Skin: Negative.    Neurological: Positive for dizziness.   Endo/Heme/Allergies: Negative.    Psychiatric/Behavioral: Negative.    All 12 systems otherwise negative.      Wt Readings from Last 3 Encounters:   07/29/20 110.5 kg (243 lb 9.7 oz)   07/28/20 110 kg (242 lb 8.1 oz)   07/28/20 113.3 kg (249 lb 14.3 oz)     Temp Readings from Last 3 Encounters:   07/28/20 98.8 °F (37.1 °C) (Oral)   07/28/20 98.1 °F (36.7 °C) (Temporal)   05/29/20 98 °F (36.7 °C) (Oral)     BP Readings from Last 3 Encounters:   07/29/20 120/72   07/28/20 124/71   07/28/20 (!) 149/96     Pulse Readings from Last 3 Encounters:   07/29/20 90   07/28/20 91   07/28/20 93       /72 (BP Location: Left arm, Patient Position: Sitting, BP Method: Medium (Manual))   Pulse 90   Resp 16   Ht 5' 5" (1.651 m)   Wt 110.5 kg (243 lb 9.7 oz)   LMP 07/26/2020   SpO2 99%   BMI 40.54 kg/m²     Objective:   Physical Exam   Constitutional: She is oriented to person, place, and time. She appears well-developed and well-nourished. No distress.   HENT:   Head: Normocephalic and atraumatic. "   Nose: Nose normal.   Mouth/Throat: Oropharynx is clear and moist.   Eyes: Conjunctivae and EOM are normal. No scleral icterus.   Neck: Normal range of motion. Neck supple. No JVD present. No thyromegaly present.   Cardiovascular: Normal rate, regular rhythm, S1 normal and S2 normal. Exam reveals no gallop, no S3, no S4 and no friction rub.   No murmur heard.  Pulmonary/Chest: Effort normal and breath sounds normal. No stridor. No respiratory distress. She has no wheezes. She has no rales. She exhibits no tenderness.   Abdominal: Soft. Bowel sounds are normal. She exhibits no distension and no mass. There is no abdominal tenderness. There is no rebound.   Genitourinary:    Genitourinary Comments: Deferred     Musculoskeletal: Normal range of motion.         General: No tenderness, deformity or edema.   Lymphadenopathy:     She has no cervical adenopathy.   Neurological: She is alert and oriented to person, place, and time. She exhibits normal muscle tone. Coordination normal.   Skin: Skin is warm and dry. No rash noted. She is not diaphoretic. No erythema. No pallor.   Psychiatric: She has a normal mood and affect. Her behavior is normal. Judgment and thought content normal.   Nursing note and vitals reviewed.      Lab Results   Component Value Date     07/28/2020    K 3.6 07/28/2020     07/28/2020    CO2 22 (L) 07/28/2020    BUN 14 07/28/2020    CREATININE 0.9 07/28/2020     07/28/2020    HGBA1C 5.3 07/28/2020    MG 2.1 05/27/2020    AST 18 07/28/2020    ALT 17 07/28/2020    ALBUMIN 4.1 07/28/2020    PROT 9.1 (H) 07/28/2020    BILITOT 0.2 07/28/2020    WBC 13.59 (H) 07/28/2020    HGB 12.5 07/28/2020    HCT 43.7 07/28/2020    MCV 80 (L) 07/28/2020     (H) 07/28/2020    TSH 1.186 07/28/2020    CHOL 178 07/28/2020    HDL 51 07/28/2020    LDLCALC 113.2 07/28/2020    TRIG 69 07/28/2020    BNP 15 07/28/2020     Assessment:      1. Chest pain, unspecified type    2. Morbid obesity with BMI of  40.0-44.9, adult    3. Essential hypertension    4. Gastroesophageal reflux disease without esophagitis    5. Palpitations        Plan:   1. Chest pain, atypical;  - neg ER workup initially with neg trop/PE neg  - needs ischemic workup with stress test and 2D ECHO - negative  - discussed non cardiac causes - r/o SAVANNA/GERD/MSK causes    2. Palpitations  - order Holter 48 hr - defer  - TOprol 25mg now, watch for bradycardia    3. GERD  - cont PPI    4. SAVANNA sx  - refer for sleep study - pending    5. Obesity  - neesd weight loss    6. HTN  - change to Norvasc  - cont BB    Thank you for allowing me to participate in this patient's care. Please do not hesitate to contact me with any questions or concerns. Consult note has been forwarded to the referral physician.

## 2020-07-30 ENCOUNTER — OFFICE VISIT (OUTPATIENT)
Dept: SLEEP MEDICINE | Facility: CLINIC | Age: 30
End: 2020-07-30
Payer: COMMERCIAL

## 2020-07-30 DIAGNOSIS — E66.01 MORBID OBESITY WITH BMI OF 40.0-44.9, ADULT: ICD-10-CM

## 2020-07-30 DIAGNOSIS — G47.30 SLEEP APNEA, UNSPECIFIED TYPE: ICD-10-CM

## 2020-07-30 DIAGNOSIS — I10 ESSENTIAL HYPERTENSION: ICD-10-CM

## 2020-07-30 DIAGNOSIS — G47.33 OSA (OBSTRUCTIVE SLEEP APNEA): Primary | ICD-10-CM

## 2020-07-30 PROCEDURE — 99204 OFFICE O/P NEW MOD 45 MIN: CPT | Mod: 95,,, | Performed by: INTERNAL MEDICINE

## 2020-07-30 PROCEDURE — 99204 PR OFFICE/OUTPT VISIT, NEW, LEVL IV, 45-59 MIN: ICD-10-PCS | Mod: 95,,, | Performed by: INTERNAL MEDICINE

## 2020-07-30 NOTE — PATIENT INSTRUCTIONS
Your provider has scheduled you for a sleep study.   You should be receiving a phone call from the sleep lab shortly after your study has been approved by your insurance. Please make sure you have your current phone numbers in the [facility]. If you do not hear from anyone in the next 10 business days, please call the sleep lab at [phone] to schedule your sleep study. The sleep studies are performed at [facility] seven nights a week.  When you are scheduling your sleep study, they will also make you a follow up appointment with your provider. This follow up appointment will be 10-14 days after your sleep study to review the results. If it is noted that you do have sleep apnea on your initial sleep study, you may receive a call back for a second night study with the CPAP before you come back to the office.

## 2020-07-30 NOTE — PROGRESS NOTES
new to my practice    The patient location is: HOME  The chief complaint leading to consultation is: SLEEP APENA    Visit type: audiovisual    Face to Face time with patient: 15  25 minutes of total time spent on the encounter, which includes face to face time and non-face to face time preparing to see the patient (eg, review of tests), Obtaining and/or reviewing separately obtained history, Documenting clinical information in the electronic or other health record, Independently interpreting results (not separately reported) and communicating results to the patient/family/caregiver, or Care coordination (not separately reported).         Each patient to whom he or she provides medical services by telemedicine is:  (1) informed of the relationship between the physician and patient and the respective role of any other health care provider with respect to management of the patient; and (2) notified that he or she may decline to receive medical services by telemedicine and may withdraw from such care at any time.    Notes:                                            Pulmonary Outpatient   Visit     Subjective:       Patient ID: Anthony Sexton is a 29 y.o. female.  Patient Active Problem List   Diagnosis    Morbid obesity with BMI of 40.0-44.9, adult    Breast hypertrophy in female    Vitamin D deficiency    Gastroesophageal reflux disease without esophagitis    Hypertension      Immunization History   Administered Date(s) Administered    DTaP 1988, 02/08/1989, 06/07/1989, 03/23/1990    HIB 08/08/1990    Hepatitis B, Pediatric/Adolescent 10/23/1998, 12/15/1998, 03/04/1999    IPV 1988, 02/08/1989, 06/07/1989, 03/23/1990    Influenza 09/01/2018    Influenza - Quadrivalent - PF (6 months and older) 11/30/2007, 10/18/2019    Influenza Split 11/30/2007    MMR 03/23/1990, 02/24/2006    Meningococcal Polysaccharide (23-Valent) (MPSV4) 02/24/2006    PPD Test 1988, 12/28/2010, 12/30/2010    Td  (ADULT) 02/24/2006    Tdap 02/24/2006, 09/14/2016, 02/21/2020      Social History     Tobacco Use   Smoking Status Never Smoker   Smokeless Tobacco Never Used     EPWORTH SLEEPINESS SCALE 7/30/2020   Sitting and reading 1   Watching TV 3   Sitting, inactive in a public place (e.g. a theatre or a meeting) 0   As a passenger in a car for an hour without a break 3   Lying down to rest in the afternoon when circumstances permit 3   Sitting and talking to someone 0   Sitting quietly after a lunch without alcohol 3   In a car, while stopped for a few minutes in traffic 0   Total score 13         Chief Complaint: Sleep Apnea      Anthony Sexton is 29 y.o.  Asked to see me by Dr gema Rizvi for SAVANNA  Recent baby delivery: new Dx hypertension and resting HR> 100  Patient on Norvasc and metoprolol  Snoring and daytime sleepiness.   Bed time 10 pm, wake time 4 am  Sleep interrupted by   [place] 13  STOP - BANG Questionnaire:     1. Snoring : Do you snore loudly ?    Yes    2. Tired : Do you often feel tired, fatigued, or sleepy during daytime? Yes    3. Observed: Has anyone observed you stop breathing during your sleep?   Yes     4. Blood pressure : Do you have or are you being treated for high blood pressure?   Yes    5. BMI :BMI more than 35 kg/m2?   Yes    6. Age : Age over 50 yr old?   No    7. Neck circumference: Neck circumference greater than 40 cm?   ?    8. Gender: Gender male?   No    High risk of SAVANNA: Yes 5        References:   STOP Questionnaire   A Tool to Screen Patients for Obstructive Sleep Apnea: JAYY Davenport.R.C.P.C., HARDIK Lopez.B.B.S., Jesica Oliver M.D.,Sugar Farah, Ph.D., HARDIK Bazzi.B.B.S.,_ Bailey Sheikh.,_ Moris Burrows M.D., JAYY Clemons.R.C.P.C.; Anesthesiology 2008; 108:812-21 Copyright © 2008, the American Society of Anesthesiologists, Inc. [employer]     Sleep disorders inventory reviewed  Sleep latency 20 min  Legs  feel odd and restless at night  Cyst sudden sleep attacks  Vivid dreams  Grind teeth at night  Treated for anxiety and depression  Endorses snoring, nocturnal diaphoresis, dry mouth at night.  Finding difficulty worry and hard to relax              Review of Systems   Respiratory: Positive for apnea and snoring.    Cardiovascular: Positive for palpitations.   All other systems reviewed and are negative.      Outpatient Encounter Medications as of 7/30/2020   Medication Sig Dispense Refill    amLODIPine (NORVASC) 5 MG tablet Take 1 tablet (5 mg total) by mouth once daily. 30 tablet 3    ergocalciferol (ERGOCALCIFEROL) 50,000 unit Cap Take 2 capsules (100,000 Units total) by mouth every 7 days. (Patient taking differently: Take 50,000 Units by mouth every 7 days. ) 8 capsule 5    hydrOXYzine pamoate (VISTARIL) 25 MG Cap Take 1 capsule (25 mg total) by mouth 3 (three) times daily. (Patient taking differently: Take 25 mg by mouth as needed. ) 30 capsule 3    ibuprofen (ADVIL,MOTRIN) 600 MG tablet Take 1 tablet (600 mg total) by mouth every 6 (six) hours. 60 tablet 0    metoprolol succinate (TOPROL-XL) 25 MG 24 hr tablet Take 1 tablet (25 mg total) by mouth once daily. 30 tablet 3    norethindrone (ORTHO MICRONOR) 0.35 mg tablet Take 1 tablet (0.35 mg total) by mouth once daily. 28 tablet 12    pantoprazole (PROTONIX) 40 MG tablet Take 1 tablet (40 mg total) by mouth 2 (two) times daily. 60 tablet 0    prenatal vits62/FA/om3/dha/epa (PRENATAL GUMMY ORAL) Take 1 Dose by mouth.      sertraline (ZOLOFT) 100 MG tablet Take 1.5 tablets (150 mg total) by mouth once daily. 45 tablet 11     No facility-administered encounter medications on file as of 7/30/2020.        Objective:     Vital Signs (Most Recent)   ]  Wt Readings from Last 2 Encounters:   07/29/20 110.5 kg (243 lb 9.7 oz)   07/28/20 110 kg (242 lb 8.1 oz)       Physical Exam   Constitutional: She appears well-developed and well-nourished. She is obese.    Pulmonary/Chest: Effort normal.   Nursing note and vitals reviewed.      Laboratory  Lab Results   Component Value Date    WBC 13.59 (H) 07/28/2020    RBC 5.50 (H) 07/28/2020    HGB 12.5 07/28/2020    HCT 43.7 07/28/2020    MCV 80 (L) 07/28/2020    MCH 22.7 (L) 07/28/2020    MCHC 28.6 (L) 07/28/2020    RDW 14.6 (H) 07/28/2020     (H) 07/28/2020    MPV 10.9 07/28/2020    GRAN 9.8 (H) 07/28/2020    GRAN 72.2 07/28/2020    LYMPH 2.7 07/28/2020    LYMPH 19.6 07/28/2020    MONO 0.6 07/28/2020    MONO 4.1 07/28/2020    EOS 0.1 07/28/2020    BASO 0.00 07/28/2020    EOSINOPHIL 0.4 07/28/2020    BASOPHIL 0.0 07/28/2020       BMP  Lab Results   Component Value Date     07/28/2020    K 3.6 07/28/2020     07/28/2020    CO2 22 (L) 07/28/2020    BUN 14 07/28/2020    CREATININE 0.9 07/28/2020    CALCIUM 9.9 07/28/2020    ANIONGAP 13 07/28/2020    ESTGFRAFRICA >60 07/28/2020    EGFRNONAA >60 07/28/2020    AST 18 07/28/2020    ALT 17 07/28/2020    PROT 9.1 (H) 07/28/2020       Lab Results   Component Value Date    BNP 15 07/28/2020    BNP 12 05/29/2020    BNP <10 05/27/2020    BNP <10 05/23/2020     (H) 04/23/2020     (H) 04/20/2020       Lab Results   Component Value Date    TSH 1.186 07/28/2020       Lab Results   Component Value Date    SEDRATE 31 (H) 12/22/2009       Lab Results   Component Value Date    CRP 34.4 (H) 04/20/2020     No results found for: IGE     No results found for: ASPERGILLUS  No results found for: AFUMIGATUSCL     No results found for: ACE     Diagnostic Results:  I have personally reviewed today the following studies:    Recent chest CT scan reviewed  Recent chest x-ray reviewed    Assessment/Plan:     Problem List Items Addressed This Visit     Morbid obesity with BMI of 40.0-44.9, adult     General weight loss/lifestyle modification strategies discussed (elicit support from others; identify saboteurs; non-food rewards).  Diet interventions: low calorie (1000 kCal/d)  deficit diet           Hypertension     Stable currently on Norvasc           Other Visit Diagnoses     SAVANNA (obstructive sleep apnea)    -  Primary    Relevant Orders    Home Sleep Studies    Sleep apnea, unspecified type        Relevant Orders    Home Sleep Studies        My recommendation at this point would be to set up a HOME SLEEP TEST or INLAB POLYSOMNOGRAPHY  through [facility] ( [phone].    We have discussed weight loss , non supine position, good sleep hygiene, and  other interventions to foster good natural healthy sleep.  We have discussed behavioral modifications, as well.  After her study, she  could certainly try PAP therapy or out suitable alternatives       Follow up in about 4 weeks (around 8/27/2020), or weight loss, home sleep study.    This note was prepared using voice recognition system and is likely to have sound alike errors that may have been overlooked even after proof reading.  Please call me with any questions    Discussed diagnosis, its evaluation, treatment and usual course. All questions answered.      Harley Mack MD

## 2020-07-30 NOTE — LETTER
July 30, 2020      Dorian Groves MD  [address]  [place] [address]           [facility]  [address]  [place] [address]  Phone: [phone]  Fax: [phone]          Patient: Anthony Sexton   MR Number: [id number]   YOB: 1990   Date of Visit: 7/30/2020       Dear Dr. Dorian Groves:    Thank you for referring Anthony Sexton to me for evaluation. Attached you will find relevant portions of my assessment and plan of care.    If you have questions, please do not hesitate to call me. I look forward to following Anthony Sexton along with you.    Sincerely,    Harley Mack MD    Enclosure  CC:  No Recipients    If you would like to receive this communication electronically, please contact [email or web address] or [phone] to request more information on [employer] access.    For providers and/or their staff who would like to refer a patient to [facility], please contact us through our one-stop-shop provider referral line, [facility], at [phone].    If you feel you have received this communication in error or would no longer like to receive these types of communications, please e-mail [email or web address]

## 2020-08-03 DIAGNOSIS — E55.9 VITAMIN D DEFICIENCY: ICD-10-CM

## 2020-08-04 DIAGNOSIS — E55.9 VITAMIN D DEFICIENCY: ICD-10-CM

## 2020-08-04 RX ORDER — ERGOCALCIFEROL 1.25 MG/1
100000 CAPSULE ORAL
Qty: 8 CAPSULE | Refills: 5 | OUTPATIENT
Start: 2020-08-04

## 2020-08-04 RX ORDER — ERGOCALCIFEROL 1.25 MG/1
100000 CAPSULE ORAL
Qty: 8 CAPSULE | Refills: 5 | Status: CANCELLED | OUTPATIENT
Start: 2020-08-04

## 2020-08-05 RX ORDER — ERGOCALCIFEROL 1.25 MG/1
50000 CAPSULE ORAL
Qty: 4 CAPSULE | Refills: 5 | Status: SHIPPED | OUTPATIENT
Start: 2020-08-05 | End: 2021-03-08 | Stop reason: SDUPTHER

## 2020-08-06 ENCOUNTER — OFFICE VISIT (OUTPATIENT)
Dept: PSYCHIATRY | Facility: CLINIC | Age: 30
End: 2020-08-06
Payer: COMMERCIAL

## 2020-08-06 DIAGNOSIS — F41.1 GENERALIZED ANXIETY DISORDER: Primary | ICD-10-CM

## 2020-08-06 PROCEDURE — 99214 OFFICE O/P EST MOD 30 MIN: CPT | Mod: S$GLB,,, | Performed by: PSYCHIATRY & NEUROLOGY

## 2020-08-06 PROCEDURE — 99214 PR OFFICE/OUTPT VISIT, EST, LEVL IV, 30-39 MIN: ICD-10-PCS | Mod: S$GLB,,, | Performed by: PSYCHIATRY & NEUROLOGY

## 2020-08-06 RX ORDER — LORAZEPAM 1 MG/1
1 TABLET ORAL EVERY 6 HOURS PRN
Qty: 30 TABLET | Refills: 3 | Status: SHIPPED | OUTPATIENT
Start: 2020-08-06 | End: 2021-11-03

## 2020-08-06 NOTE — PROGRESS NOTES
ESTABLISHED OUTPATIENT VISIT   E/M LEVEL 4: [id number]    ENCOUNTER DATE: 8/6/2020  SITE: [facility] [place].             HISTORY      CHIEF COMPLAINT   Anthony Sexton is a 29 y.o. female who presents for followup of anxiety disorder/ post partum depression     HPI     Interval History and Content of Current Session:  Interim Events/Subjective Report/Content of Current Session:   Has been dealing with anxiety due to her health   She has been diagnosed with tachycardia  Now she has increased anxiety attacks when she is worrying about her health  Had a really negative experience she could not get her heart rate to slow down thought that she was going to die    Asked her mom to bring her her children because she thought she was going to pass way   Has had to decrease the dose of the zoloft because she becomes increasingly somnolent at 150 mg  Now taking 100 mg of zoloft  Increased anxiety due to fear that she will have an anxiety attack    GAD7 8/4/2020   1. Feeling nervous, anxious, or on edge? 3   2. Not being able to stop or control worrying? 3   3. Worrying too much about different things? 3   4. Trouble relaxing? 2   5. Being so restless that it is hard to sit still? 1   6. Becoming easily annoyed or irritable? 1   7. Feeling afraid as if something awful might happen? 3   SENTHIL-7 Score 16     Little interest or pleasure in doing things: (P) Several days  Feeling down, depressed, or hopeless: (P) Several days  Trouble falling or staying asleep, or sleeping too much: (P) Several days  Feeling tired or having little energy: (P) Nearly every day  Poor appetite or overeating: (P) Several days  Feeling bad about yourself - or that you are a failure or have let yourself or your family down: (P) Nearly every day  Trouble concentrating on things, such as reading the newspaper or watching television: (P) Nearly every day  Moving or speaking so slowly that other people could have noticed. Or the opposite - being so  fidgety or restless that you have been moving around a lot more than usual: (P) Several days  PHQ-9 Total Score: (P) 14        No suicidal ideation   No  Homicidal ideation         ROS   pertient symptoms in HPI    PFSH  Past Medical History reviewed: Yes  Family History reviewed: Yes  Social History reviewed: Yes    Allergies:   Review of patient's allergies indicates:  No Known Allergies     PSYCHOTROPIC MEDICATIONS   Current Outpatient Medications on File Prior to Visit   Medication Sig Dispense Refill    amLODIPine (NORVASC) 5 MG tablet Take 1 tablet (5 mg total) by mouth once daily. 30 tablet 3    ergocalciferol (ERGOCALCIFEROL) 50,000 unit Cap Take 1 capsule (50,000 Units total) by mouth every 7 days. 4 capsule 5    hydrOXYzine pamoate (VISTARIL) 25 MG Cap Take 1 capsule (25 mg total) by mouth 3 (three) times daily. (Patient taking differently: Take 25 mg by mouth as needed. ) 30 capsule 3    ibuprofen (ADVIL,MOTRIN) 600 MG tablet Take 1 tablet (600 mg total) by mouth every 6 (six) hours. 60 tablet 0    metoprolol succinate (TOPROL-XL) 25 MG 24 hr tablet Take 1 tablet (25 mg total) by mouth once daily. 30 tablet 3    norethindrone (ORTHO MICRONOR) 0.35 mg tablet Take 1 tablet (0.35 mg total) by mouth once daily. 28 tablet 12    pantoprazole (PROTONIX) 40 MG tablet Take 1 tablet (40 mg total) by mouth 2 (two) times daily. 60 tablet 0    prenatal vits62/FA/om3/dha/epa (PRENATAL GUMMY ORAL) Take 1 Dose by mouth.      sertraline (ZOLOFT) 100 MG tablet Take 1.5 tablets (150 mg total) by mouth once daily. 45 tablet 11     No current facility-administered medications on file prior to visit.          EXAMINATION    [unfilled]  There were no vitals filed for this visit.     Wt Readings from Last 2 Encounters:   07/29/20 110.5 kg (243 lb 9.7 oz)   07/28/20 110 kg (242 lb 8.1 oz)         CONSTITUTIONAL  General Appearance: well groomed    MUSCULOSKELETAL  No involuntary muscle movements  Normal gait    PSYCHIATRIC    Mental Status Exam:  Appearance: unremarkable, age appropriate  Behavior/Cooperation: appopriate normal, cooperative  Speech:  normal tone, normal rate, normal pitch, normal volume  Language: grossly intact with spontaneous speech  Mood: Anxious, worsened   Affect:  congruent with mood,  Thought Process:linear, logical and gaol directed.   Thought Content: no suicidality, no homicidality, no delusions, nor paranoia  Sensorium:  Awake  Alert and Oriented: x3 person, place, situation, no time/date  Memory:    Recent:  Intact; able to report recent events  Attention/concentration: appropriate for age/education.   Insight:Intact  Judgment:  Intact      MEDICAL DECISION MAKING    DIAGNOSES  Encounter Diagnoses   Name Primary?    Generalized anxiety disorder Yes    Post partum depression          PROBLEM LIST AND MANAGEMENT PLANS  Continue zoloft at 100 mg daily    Orders Placed This Encounter    LORazepam (ATIVAN) 1 MG tablet     genesight testing              PRESCRIPTION DRUG MANAGEMENT  Compliance: YES  Side Effects: YES  Regimen Adjustments: YES           SSRI  R/B/SE of SSRI reviewed with patient including gi distress, sexual dysfunction, and serotonin syndrome.  Black box warning of increased risk of worsening depression and SI reviewed.  Patient verbalized understanding.    DIAGNOSTIC TESTING  Encounter Diagnoses   Name Primary?    Generalized anxiety disorder Yes    Post partum depression            -Patient was educated on possible side-effects of medications  -Discussed 911 for suicidal or homicidal ideation, worsening symptoms, or adverse reactions to medications  -Patient will contact clinic with any questions or concerns    Lenora Walters

## 2020-08-10 ENCOUNTER — PATIENT MESSAGE (OUTPATIENT)
Dept: OBSTETRICS AND GYNECOLOGY | Facility: CLINIC | Age: 30
End: 2020-08-10

## 2020-08-10 DIAGNOSIS — Z30.011 ENCOUNTER FOR INITIAL PRESCRIPTION OF CONTRACEPTIVE PILLS: Primary | ICD-10-CM

## 2020-08-11 RX ORDER — NORETHINDRONE ACETATE AND ETHINYL ESTRADIOL 1MG-20(21)
1 KIT ORAL DAILY
Qty: 28 TABLET | Refills: 11 | Status: SHIPPED | OUTPATIENT
Start: 2020-08-11 | End: 2021-01-06

## 2020-08-17 ENCOUNTER — TELEPHONE (OUTPATIENT)
Dept: FAMILY MEDICINE | Facility: CLINIC | Age: 30
End: 2020-08-17

## 2020-08-17 ENCOUNTER — PROCEDURE VISIT (OUTPATIENT)
Dept: SLEEP MEDICINE | Facility: CLINIC | Age: 30
End: 2020-08-17
Payer: COMMERCIAL

## 2020-08-17 DIAGNOSIS — R06.83 PRIMARY SNORING: Primary | ICD-10-CM

## 2020-08-17 DIAGNOSIS — G47.33 OSA (OBSTRUCTIVE SLEEP APNEA): ICD-10-CM

## 2020-08-17 DIAGNOSIS — G47.30 SLEEP APNEA, UNSPECIFIED TYPE: ICD-10-CM

## 2020-08-17 PROCEDURE — 95800 SLP STDY UNATTENDED: CPT

## 2020-08-17 PROCEDURE — 95800 PR SLEEP STUDY, UNATTENDED, RECORD HEART RATE/O2 SAT/RESP ANAL/SLEEP TIME: ICD-10-PCS | Mod: 26,,, | Performed by: INTERNAL MEDICINE

## 2020-08-17 PROCEDURE — 95800 SLP STDY UNATTENDED: CPT | Mod: 26,,, | Performed by: INTERNAL MEDICINE

## 2020-08-17 NOTE — PROCEDURES
Home Sleep Studies    Date/Time: 8/17/2020 9:00 AM  Performed by: Harley Mack MD  Authorized by: Harley Mack MD       PHYSICIAN INTERPRETATION AND COMMENTS: Clinically significant sleep disordered breathing is not identified, and  pulse rate arousals occur very frequently. mild snoring recorded. Saturation caitlyn was 85%. Continued follow-up to ensure resolution  of symptoms. Sleep hygiene. Weight loss. Repeat testing may be considered if symptoms persist .  CLINICAL HISTORY: 30 year old female presented with: 14.3 inch neck, BMI of 40, an [place] sleepiness score of 9, history  of hypertension, depression and symptoms of nocturnal snoring. Based on the clinical history, the patient has a high pre-test  probability of having mild SAVANNA. recent baby delivery. Recent diagnosis hypertension. Resting heart rate greater than 100. daytime  sleepiness. Sleep is interrupted by childcare. Stop Bang score: 5.

## 2020-08-17 NOTE — TELEPHONE ENCOUNTER
Pt states when she walks short distances her face will go numb   If she sits for a little while it goes away  Would like to know what she should do about elevated heart rate and blood pressure also

## 2020-08-17 NOTE — TELEPHONE ENCOUNTER
I see Pulmonary does not feel she has sleep apnea based on home sleep study. Please ask if she contacted Dr. Groves regarding b/p and heart rate?  Is numbness a new symptom?

## 2020-08-17 NOTE — TELEPHONE ENCOUNTER
What about contacted her heart doctor? And, what levels are b/p and heart rate now on new medicine Amlodipine?

## 2020-08-17 NOTE — Clinical Note
PHYSICIAN INTERPRETATION AND COMMENTS: Clinically significant sleep disordered breathing is not identified, and  pulse rate arousals occur very frequently. mild snoring recorded. Saturation caitlyn was 85%. Continued follow-up to ensure resolution  of symptoms. Sleep hygiene. Weight loss. Repeat testing may be considered if symptoms persist .  CLINICAL HISTORY: 30 year old female presented with: 14.3 inch neck, BMI of 40, an [place] sleepiness score of 9, history  of hypertension, depression and symptoms of nocturnal snoring. Based on the clinical history, the patient has a high pre-test  probability of having mild SAVANNA. recent baby delivery. Recent diagnosis hypertension. Resting heart rate greater than 100. daytime  sleepiness. Sleep is interrupted by childcare. Stop Bang score: 5.

## 2020-08-17 NOTE — TELEPHONE ENCOUNTER
----- Message from Wendy Umana sent at 8/17/2020  4:00 PM CDT -----  Regarding: questions  Contact: pt [phone]  Pt is calling to speak with someone in the office . Please contact pt [phone]

## 2020-08-19 ENCOUNTER — HOSPITAL ENCOUNTER (OUTPATIENT)
Dept: CARDIOLOGY | Facility: HOSPITAL | Age: 30
Discharge: HOME | End: 2020-08-19
Attending: INTERNAL MEDICINE
Payer: COMMERCIAL

## 2020-08-19 DIAGNOSIS — R00.2 PALPITATIONS: ICD-10-CM

## 2020-08-19 PROCEDURE — 93227 XTRNL ECG REC<48 HR R&I: CPT | Mod: ,,, | Performed by: INTERNAL MEDICINE

## 2020-08-19 PROCEDURE — 93225 XTRNL ECG REC<48 HRS REC: CPT

## 2020-08-19 PROCEDURE — 93227 HOLTER MONITOR - 48 HOUR (CUPID ONLY): ICD-10-PCS | Mod: ,,, | Performed by: INTERNAL MEDICINE

## 2020-08-21 ENCOUNTER — LAB VISIT (OUTPATIENT)
Dept: INTERNAL MEDICINE | Facility: CLINIC | Age: 30
End: 2020-08-21
Payer: COMMERCIAL

## 2020-08-21 DIAGNOSIS — R19.7 DIARRHEA, UNSPECIFIED TYPE: ICD-10-CM

## 2020-08-21 DIAGNOSIS — Z20.822 EXPOSURE TO COVID-19 VIRUS: ICD-10-CM

## 2020-08-21 PROCEDURE — U0003 INFECTIOUS AGENT DETECTION BY NUCLEIC ACID (DNA OR RNA); SEVERE ACUTE RESPIRATORY SYNDROME CORONAVIRUS 2 (SARS-COV-2) (CORONAVIRUS DISEASE [COVID-19]), AMPLIFIED PROBE TECHNIQUE, MAKING USE OF HIGH THROUGHPUT TECHNOLOGIES AS DESCRIBED BY CMS-2020-01-R: HCPCS

## 2020-08-22 LAB
OHS CV EVENT MONITOR DAY: 2
OHS CV HOLTER LENGTH DECIMAL HOURS: 96
OHS CV HOLTER LENGTH HOURS: 48
OHS CV HOLTER LENGTH MINUTES: 0
SARS-COV-2 RNA RESP QL NAA+PROBE: NOT DETECTED

## 2020-09-01 ENCOUNTER — CLINICAL SUPPORT (OUTPATIENT)
Dept: INTERNAL MEDICINE | Facility: CLINIC | Age: 30
End: 2020-09-01
Payer: COMMERCIAL

## 2020-09-01 DIAGNOSIS — Z00.00 ROUTINE GENERAL MEDICAL EXAMINATION AT A HEALTH CARE FACILITY: Primary | ICD-10-CM

## 2020-09-01 PROCEDURE — 99412 PR PREVENT COUNSEL,GROUP,60 MIN: ICD-10-PCS | Mod: S$GLB,,, | Performed by: DIETITIAN, REGISTERED

## 2020-09-01 PROCEDURE — 99412 PREVENTIVE COUNSELING GROUP: CPT | Mod: S$GLB,,, | Performed by: DIETITIAN, REGISTERED

## 2020-09-01 RX ORDER — PANTOPRAZOLE SODIUM 40 MG/1
40 TABLET, DELAYED RELEASE ORAL 2 TIMES DAILY
Qty: 60 TABLET | Refills: 1 | Status: SHIPPED | OUTPATIENT
Start: 2020-09-01 | End: 2020-12-01 | Stop reason: SDUPTHER

## 2020-09-04 ENCOUNTER — TELEPHONE (OUTPATIENT)
Dept: FAMILY MEDICINE | Facility: CLINIC | Age: 30
End: 2020-09-04

## 2020-09-04 RX ORDER — AZITHROMYCIN 250 MG/1
TABLET, FILM COATED ORAL
Qty: 6 TABLET | Refills: 0 | Status: SHIPPED | OUTPATIENT
Start: 2020-09-04 | End: 2020-09-09

## 2020-09-04 NOTE — TELEPHONE ENCOUNTER
Pt c/o ear & sinus pressure.  She has been using Claritin and Flonase.  She would like to know what else she can take.  Please advise./rpr

## 2020-09-09 ENCOUNTER — CLINICAL SUPPORT (OUTPATIENT)
Dept: URGENT CARE | Facility: CLINIC | Age: 30
End: 2020-09-09
Payer: COMMERCIAL

## 2020-09-09 ENCOUNTER — TELEPHONE (OUTPATIENT)
Dept: PRIMARY CARE CLINIC | Facility: OTHER | Age: 30
End: 2020-09-09

## 2020-09-09 DIAGNOSIS — R52 BODY ACHES: ICD-10-CM

## 2020-09-09 DIAGNOSIS — R05.9 COUGH: Primary | ICD-10-CM

## 2020-09-09 LAB
CTP QC/QA: YES
SARS-COV-2 RDRP RESP QL NAA+PROBE: NEGATIVE

## 2020-09-09 PROCEDURE — U0002 COVID-19 LAB TEST NON-CDC: HCPCS | Mod: S$GLB,,, | Performed by: FAMILY MEDICINE

## 2020-09-09 PROCEDURE — U0002: ICD-10-PCS | Mod: S$GLB,,, | Performed by: FAMILY MEDICINE

## 2020-09-11 ENCOUNTER — TELEPHONE (OUTPATIENT)
Dept: OTOLARYNGOLOGY | Facility: CLINIC | Age: 30
End: 2020-09-11

## 2020-09-11 ENCOUNTER — PATIENT MESSAGE (OUTPATIENT)
Dept: OTOLARYNGOLOGY | Facility: CLINIC | Age: 30
End: 2020-09-11

## 2020-09-11 ENCOUNTER — OFFICE VISIT (OUTPATIENT)
Dept: OTOLARYNGOLOGY | Facility: CLINIC | Age: 30
End: 2020-09-11
Payer: COMMERCIAL

## 2020-09-11 DIAGNOSIS — H91.91 HEARING LOSS OF RIGHT EAR, UNSPECIFIED HEARING LOSS TYPE: ICD-10-CM

## 2020-09-11 DIAGNOSIS — H66.91 RIGHT OTITIS MEDIA, UNSPECIFIED OTITIS MEDIA TYPE: ICD-10-CM

## 2020-09-11 DIAGNOSIS — H92.01 OTALGIA, RIGHT: Primary | ICD-10-CM

## 2020-09-11 PROCEDURE — 99203 PR OFFICE/OUTPT VISIT, NEW, LEVL III, 30-44 MIN: ICD-10-PCS | Mod: 95,,, | Performed by: OTOLARYNGOLOGY

## 2020-09-11 PROCEDURE — 99203 OFFICE O/P NEW LOW 30 MIN: CPT | Mod: 95,,, | Performed by: OTOLARYNGOLOGY

## 2020-09-11 RX ORDER — AMOXICILLIN AND CLAVULANATE POTASSIUM 875; 125 MG/1; MG/1
1 TABLET, FILM COATED ORAL 2 TIMES DAILY
Qty: 20 TABLET | Refills: 0 | Status: SHIPPED | OUTPATIENT
Start: 2020-09-11 | End: 2020-09-21

## 2020-09-11 NOTE — TELEPHONE ENCOUNTER
Spoke with the pt. She stated that she would like to keep the virtual visit because it was the earliest apt available.

## 2020-09-11 NOTE — PROGRESS NOTES
Otolaryngology Telemedicine Note    Anthony Sexton  Encounter Date: 9/11/2020   YOB: 1990  Referring Physician: Aaareferral Self  No address on file   PCP: Tyesha Leggett MD    This is a telehealth visit that was performed at [facility]. Verbal consent to participate in video visit was obtained. This particular visit occurred during the 2020 COVID19 outbreak.     Each patient to whom he or she provides medical services by telemedicine is:  (1) informed of the relationship between the physician and patient and the respective role of any other health care provider with respect to management of the patient; and (2) notified that he or she may decline to receive medical services by telemedicine and may withdraw from such care at any time.  I discussed with the patient that:  - I would evaluate the patient and recommend diagnostics and treatments based on my assessment  - Our sessions are not being recorded and that personal health information is protected  - Our team would provide follow up care in person if/when the patient needs it    The patient location is: [place], [place]  The chief complaint leading to consultation is: sinus infection  Visit type: Virtual visit with synchronous audio and video  Total time spent with patient: 16 minutes    HPI: Anthony Sexton is a 30 y.o. female here with reports of ear pain, decreased hearing and some congestion. Reports having sinus infection a few weeks back. Given steroid shot and zpack. Nasal congestion and facial pressure have mostly cleared. Has a headache when lays down at times. No rhinorrhea or post nasal drip.    Right ear feels clogged. Is having some pain as well. Does report decreased hearing. Reports she saw a doctor who gave her a steroid shot and zpack for presumed ear infection. Felt improvement for a few days but came back. No tenderness on palpation of ear. No drainage. No issues on the left. Minor sore throat but feels more related to ear. Felt  small knot on right neck yesterday. Neck a little sore.    Was told to use Afrin but then pharmacist told her to not use for more than a few days a now just using saline and flonase.    No prior ear surgeries. No trauma.     Review of Systems   Constitutional: Negative for chills, fever and weight loss.   HENT: Positive for congestion, ear pain, hearing loss and sore throat. Negative for ear discharge, sinus pain and tinnitus.    Eyes: Negative for blurred vision and double vision.   Respiratory: Negative for cough and shortness of breath.    Cardiovascular: Negative for chest pain and palpitations.   Gastrointestinal: Negative for nausea and vomiting.   Musculoskeletal: Positive for neck pain. Negative for joint pain.   Skin: Negative for rash.   Neurological: Negative for dizziness, focal weakness and headaches.   Psychiatric/Behavioral: Negative for depression. The patient is not nervous/anxious.         Review of patient's allergies indicates:  No Known Allergies    Past Medical History:   Diagnosis Date    Anemia     Anxiety     Chronic low back pain     Depression     History of abnormal cervical Pap smear     History of recurrent UTIs     History of ulcer disease     Irregular menses     Obesity     Postpartum depression     with last pregnancy.     Pre-eclampsia in postpartum period 4/23/2020    Vitamin D deficiency        Past Surgical History:   Procedure Laterality Date    None         Social History     Socioeconomic History    Marital status:      Spouse name: Not on file    Number of children: 0    Years of education: Not on file    Highest education level: Not on file   Occupational History    Occupation: RN     Comment: [employer]   Social Needs    Financial resource strain: Not very hard    Food insecurity     Worry: Never true     Inability: Never true    Transportation needs     Medical: No     Non-medical: No   Tobacco Use    Smoking status: Never Smoker     Smokeless tobacco: Never Used   Substance and Sexual Activity    Alcohol use: Not Currently     Alcohol/week: 0.0 standard drinks     Frequency: Monthly or less     Drinks per session: 1 or 2     Binge frequency: Never    Drug use: No    Sexual activity: Not Currently     Partners: Male     Birth control/protection: None   Lifestyle    Physical activity     Days per week: 2 days     Minutes per session: 20 min    Stress: Rather much   Relationships    Social connections     Talks on phone: More than three times a week     Gets together: Once a week     Attends Zoroastrian service: 1 to 4 times per year     Active member of club or organization: Yes     Attends meetings of clubs or organizations: 1 to 4 times per year     Relationship status:    Other Topics Concern    Not on file   Social History Narrative    She wears seatbelt.       Family History   Problem Relation Age of Onset    Asthma Mother     Hyperlipidemia Mother     Hypertension Mother     Hypertension Father     Hyperlipidemia Father     Heart disease Father 55        MI/CAD    No Known Problems Sister     No Known Problems Brother     Diabetes Maternal Aunt     Diabetes Maternal Grandmother     Insulin resistance Sister     No Known Problems Brother     No Known Problems Brother     No Known Problems Brother     No Known Problems Brother     No Known Problems Daughter     Breast cancer Maternal Aunt     Cancer Neg Hx     Stroke Neg Hx     Brain cancer Neg Hx     Migraines Neg Hx     Colon cancer Neg Hx     Ovarian cancer Neg Hx        Outpatient Encounter Medications as of 9/11/2020   Medication Sig Dispense Refill    amLODIPine (NORVASC) 5 MG tablet Take 1 tablet (5 mg total) by mouth once daily. 30 tablet 3    amoxicillin-clavulanate 875-125mg (AUGMENTIN) 875-125 mg per tablet Take 1 tablet by mouth 2 (two) times daily. for 10 days 20 tablet 0    ergocalciferol (ERGOCALCIFEROL) 50,000 unit Cap Take 1 capsule  (50,000 Units total) by mouth every 7 days. 4 capsule 5    hydrOXYzine pamoate (VISTARIL) 25 MG Cap Take 1 capsule (25 mg total) by mouth 3 (three) times daily. (Patient taking differently: Take 25 mg by mouth as needed. ) 30 capsule 3    ibuprofen (ADVIL,MOTRIN) 600 MG tablet Take 1 tablet (600 mg total) by mouth every 6 (six) hours. 60 tablet 0    LORazepam (ATIVAN) 1 MG tablet Take 1 tablet (1 mg total) by mouth every 6 (six) hours as needed for Anxiety. 30 tablet 3    metoprolol succinate (TOPROL-XL) 25 MG 24 hr tablet Take 1 tablet (25 mg total) by mouth once daily. 30 tablet 3    norethindrone-ethinyl estradiol (JUNEL FE 1/20) 1 mg-20 mcg (21)/75 mg (7) per tablet Take 1 tablet by mouth once daily. 28 tablet 11    pantoprazole (PROTONIX) 40 MG tablet Take 1 tablet (40 mg total) by mouth 2 (two) times daily. 60 tablet 1    prenatal vits62/FA/om3/dha/epa (PRENATAL GUMMY ORAL) Take 1 Dose by mouth.      sertraline (ZOLOFT) 100 MG tablet Take 1.5 tablets (150 mg total) by mouth once daily. 45 tablet 11     No facility-administered encounter medications on file as of 9/11/2020.        Physical Exam:  There were no vitals filed for this visit.    Constitutional  General Appearance: well nourished, well-developed, alert, oriented, in no acute distress  Communication: ability, understanding, voice quality normal  Head and Face  Inspection: normocephalic, atraumatic, no scars, lesions or masses    Eyes  Ocular Motility / Alignment: normal alignment, motility, no proptosis  Conjunctiva: not injected  Eyelids: no entropion or ectropion, no edema  Ears  Hearing: speech reception thresholds grossly normal  External Ears: no auricle lesions, no apparent edema or erythema of tragus/lateral aspect of canal      Nose  External Nose: no lesions, trauma or deformity  Oral Cavity / Oropharynx  Lips: upper and lower lips pink and moist  Tongue: normal mobility, no obvious lesions  Oropharynx: difficult to get good exam on  video, no obvious exudates or erythema  Neck  Inspection: no erythema, no tenderness per patient report on palpation of self, no obvious large masses visible  Chest / Respiratory  Chest: no stridor or retractions, normal effort and expansion  Neurological  Cranial Nerves: grossly intact  General: alert and oriented  Psychiatric  Mood and Affect: no depression, anxiety or agitation        Pertinent Data:  ? LABS:  ? AUDIO:  ? PATH:  ? CULTURE:      I personally reviewed the following pertinent data at today's visit:      Pertinent Imaging:  ? XRAY:  ? Ultrasound:  ? CT Scan:  ? MRI Scan:  ? PET/CT Scan:    I personally reviewed the following images:    Summary of Outside Records:      Assessment and Plan:  Anthony Sexton is a 30 y.o. female with     Otalgia, right    Hearing loss of right ear, unspecified hearing loss type    Right otitis media, unspecified otitis media type  -     amoxicillin-clavulanate 875-125mg (AUGMENTIN) 875-125 mg per tablet; Take 1 tablet by mouth 2 (two) times daily. for 10 days  Dispense: 20 tablet; Refill: 0     Will give script for Augmentin if pain seems to persist or worsen in ear over weekend to treat possible otitis media. Suspicion that she may just have some ETD with serous effusion related to her recent sinus infection but limited on virtual visit due to inability to see ear exam. Advised follow up in person in [place] next week to ensure hearing loss from effusion or infection and to be sure she is getting proper treatment. Discussed anatomy and function of eustachian tube.  Counseled on gentle auto-insufflation, use of nasal steroid spray and oral antihistamine.        JANELL Carr MD  [facility]

## 2020-09-15 ENCOUNTER — PATIENT OUTREACH (OUTPATIENT)
Dept: ADMINISTRATIVE | Facility: OTHER | Age: 30
End: 2020-09-15

## 2020-09-15 ENCOUNTER — CLINICAL SUPPORT (OUTPATIENT)
Dept: AUDIOLOGY | Facility: CLINIC | Age: 30
End: 2020-09-15
Payer: COMMERCIAL

## 2020-09-15 ENCOUNTER — OFFICE VISIT (OUTPATIENT)
Dept: OTOLARYNGOLOGY | Facility: CLINIC | Age: 30
End: 2020-09-15
Payer: COMMERCIAL

## 2020-09-15 VITALS
SYSTOLIC BLOOD PRESSURE: 131 MMHG | BODY MASS INDEX: 42.23 KG/M2 | TEMPERATURE: 98 F | WEIGHT: 253.75 LBS | HEART RATE: 79 BPM | DIASTOLIC BLOOD PRESSURE: 92 MMHG

## 2020-09-15 DIAGNOSIS — H69.93 ETD (EUSTACHIAN TUBE DYSFUNCTION), BILATERAL: Primary | ICD-10-CM

## 2020-09-15 DIAGNOSIS — H93.8X3 EAR FULLNESS, BILATERAL: Primary | ICD-10-CM

## 2020-09-15 PROCEDURE — 92567 TYMPANOMETRY: CPT | Mod: S$GLB,,, | Performed by: AUDIOLOGIST-HEARING AID FITTER

## 2020-09-15 PROCEDURE — 3080F DIAST BP >= 90 MM HG: CPT | Mod: CPTII,S$GLB,, | Performed by: PHYSICIAN ASSISTANT

## 2020-09-15 PROCEDURE — 3075F SYST BP GE 130 - 139MM HG: CPT | Mod: CPTII,S$GLB,, | Performed by: PHYSICIAN ASSISTANT

## 2020-09-15 PROCEDURE — 92557 PR COMPREHENSIVE HEARING TEST: ICD-10-PCS | Mod: S$GLB,,, | Performed by: AUDIOLOGIST-HEARING AID FITTER

## 2020-09-15 PROCEDURE — 99999 PR PBB SHADOW E&M-EST. PATIENT-LVL I: ICD-10-PCS | Mod: PBBFAC,,, | Performed by: AUDIOLOGIST-HEARING AID FITTER

## 2020-09-15 PROCEDURE — 99999 PR PBB SHADOW E&M-EST. PATIENT-LVL III: CPT | Mod: PBBFAC,,, | Performed by: PHYSICIAN ASSISTANT

## 2020-09-15 PROCEDURE — 99999 PR PBB SHADOW E&M-EST. PATIENT-LVL I: CPT | Mod: PBBFAC,,, | Performed by: AUDIOLOGIST-HEARING AID FITTER

## 2020-09-15 PROCEDURE — 3075F PR MOST RECENT SYSTOLIC BLOOD PRESS GE 130-139MM HG: ICD-10-PCS | Mod: CPTII,S$GLB,, | Performed by: PHYSICIAN ASSISTANT

## 2020-09-15 PROCEDURE — 92557 COMPREHENSIVE HEARING TEST: CPT | Mod: S$GLB,,, | Performed by: AUDIOLOGIST-HEARING AID FITTER

## 2020-09-15 PROCEDURE — 99999 PR PBB SHADOW E&M-EST. PATIENT-LVL III: ICD-10-PCS | Mod: PBBFAC,,, | Performed by: PHYSICIAN ASSISTANT

## 2020-09-15 PROCEDURE — 99214 PR OFFICE/OUTPT VISIT, EST, LEVL IV, 30-39 MIN: ICD-10-PCS | Mod: S$GLB,,, | Performed by: PHYSICIAN ASSISTANT

## 2020-09-15 PROCEDURE — 3008F BODY MASS INDEX DOCD: CPT | Mod: CPTII,S$GLB,, | Performed by: PHYSICIAN ASSISTANT

## 2020-09-15 PROCEDURE — 3080F PR MOST RECENT DIASTOLIC BLOOD PRESSURE >= 90 MM HG: ICD-10-PCS | Mod: CPTII,S$GLB,, | Performed by: PHYSICIAN ASSISTANT

## 2020-09-15 PROCEDURE — 92567 PR TYMPA2METRY: ICD-10-PCS | Mod: S$GLB,,, | Performed by: AUDIOLOGIST-HEARING AID FITTER

## 2020-09-15 PROCEDURE — 3008F PR BODY MASS INDEX (BMI) DOCUMENTED: ICD-10-PCS | Mod: CPTII,S$GLB,, | Performed by: PHYSICIAN ASSISTANT

## 2020-09-15 PROCEDURE — 99214 OFFICE O/P EST MOD 30 MIN: CPT | Mod: S$GLB,,, | Performed by: PHYSICIAN ASSISTANT

## 2020-09-15 NOTE — PROGRESS NOTES
Health Maintenance Due   Topic Date Due    Pap Smear  05/24/2020    Influenza Vaccine (1) 08/01/2020     Updates were requested from care everywhere.  Chart was reviewed for overdue Proactive [facility] ([facility]) topics (CRS, Breast Cancer Screening, Eye exam)  Health Maintenance has been updated.  LINKS immunization registry triggered.  Immunizations were reconciled.

## 2020-09-15 NOTE — PROGRESS NOTES
Subjective:   Patient ID: Anthony Sexton is a 30 y.o. female.    Chief Complaint: Sinus Problem, Sinusitis, and Otitis Media    Ms. Sexton is a very pleasant 29 yo female here to see me today with complaints of bilateral ear pain /pressure and muffled hearing intermittently. She was recently treated for sinus infection with antibitoic and steroid. Her sinus symptoms have resolved but ear pressure has remained.     Review of patient's allergies indicates:  No Known Allergies        Review of Systems   Constitutional: Negative for chills, fatigue, fever and unexpected weight change.   HENT: Positive for ear pain. Negative for congestion, dental problem, ear discharge, facial swelling, hearing loss, nosebleeds, postnasal drip, rhinorrhea, sinus pressure, sneezing, sore throat, tinnitus, trouble swallowing and voice change.    Eyes: Negative for redness, itching and visual disturbance.   Respiratory: Negative for cough, choking, shortness of breath and wheezing.    Cardiovascular: Negative for chest pain and palpitations.   Gastrointestinal: Negative for abdominal pain.        No reflux.   Musculoskeletal: Negative for gait problem.   Skin: Negative for rash.   Neurological: Negative for dizziness, light-headedness and headaches.         Objective:   BP (!) 131/92   Pulse 79   Temp 98.1 °F (36.7 °C) (Tympanic)   Wt 115.1 kg (253 lb 12 oz)   BMI 42.23 kg/m²     Physical Exam  Constitutional:       General: She is not in acute distress.     Appearance: She is well-developed.   HENT:      Head: Normocephalic and atraumatic.      Right Ear: Tympanic membrane, ear canal and external ear normal.      Left Ear: Tympanic membrane, ear canal and external ear normal.      Nose: Nose normal. No nasal deformity, septal deviation, mucosal edema or rhinorrhea.      Right Sinus: No maxillary sinus tenderness or frontal sinus tenderness.      Left Sinus: No maxillary sinus tenderness or frontal sinus tenderness.      Mouth/Throat:       Mouth: Mucous membranes are not pale and not dry.      Dentition: No dental caries.      Pharynx: Uvula midline. No oropharyngeal exudate or posterior oropharyngeal erythema.   Eyes:      General: Lids are normal. No scleral icterus.     Extraocular Movements:      Right eye: Normal extraocular motion and no nystagmus.      Left eye: Normal extraocular motion and no nystagmus.      Conjunctiva/sclera: Conjunctivae normal.      Right eye: Right conjunctiva is not injected. No chemosis.     Left eye: Left conjunctiva is not injected. No chemosis.     Pupils: Pupils are equal, round, and reactive to light.   Neck:      Thyroid: No thyroid mass or thyromegaly.      Trachea: Trachea and phonation normal. No tracheal tenderness or tracheal deviation.   Pulmonary:      Effort: Pulmonary effort is normal. No respiratory distress.      Breath sounds: No stridor.   Abdominal:      General: There is no distension.   Lymphadenopathy:      Head:      Right side of head: No submental, submandibular, preauricular, posterior auricular or occipital adenopathy.      Left side of head: No submental, submandibular, preauricular, posterior auricular or occipital adenopathy.      Cervical: No cervical adenopathy.   Skin:     General: Skin is warm and dry.      Findings: No erythema or rash.   Neurological:      Mental Status: She is alert and oriented to person, place, and time.      Cranial Nerves: No cranial nerve deficit.   Psychiatric:         Behavior: Behavior normal.        protection in noise.            Assessment:     1. ETD (Eustachian tube dysfunction), bilateral        Plan:     ETD (Eustachian tube dysfunction), bilateral      We had a long discussion regarding the anatomy and function of the eustachian tube.  We discussed that the eustachian tube acts as a pump to keep the appropriate amount of pressure behind the ear drum.  I gave the patient a prescription for a nasal steroid spray to be used on a daily basis, and  we discussed that it will take 2-3 weeks of daily use to achieve maximal effectiveness.

## 2020-09-15 NOTE — PROGRESS NOTES
Referring Provider:JA Linn FABIEN Sexton was seen 09/15/2020 for an audiological evaluation.  Patient complains of ear fullness since having a sinus infection for the last several weeks. She reports trying to clear her ears frequently but has had no relief. Denies recent noise exposure and family hx of HL.    Results reveal normal hearing in each ear from 250-8000 Hz.   Speech Reception Thresholds were  0 dBHL for the right ear and 0 dBHL for the left ear.   Word recognition scores were excellent for the right ear and excellent for the left ear.   Tympanograms were Type A, normal for the right ear and Type A, normal for the left ear.    Patient was counseled on the above findings.    Recommendations:  1. ENT: Sinuses  2. Hearing protection in noise.

## 2020-09-22 ENCOUNTER — CLINICAL SUPPORT (OUTPATIENT)
Dept: INTERNAL MEDICINE | Facility: CLINIC | Age: 30
End: 2020-09-22
Payer: COMMERCIAL

## 2020-09-22 DIAGNOSIS — Z00.00 ROUTINE GENERAL MEDICAL EXAMINATION AT A HEALTH CARE FACILITY: Primary | ICD-10-CM

## 2020-09-22 PROCEDURE — 99412 PR PREVENT COUNSEL,GROUP,60 MIN: ICD-10-PCS | Mod: S$GLB,,, | Performed by: DIETITIAN, REGISTERED

## 2020-09-22 PROCEDURE — 99412 PREVENTIVE COUNSELING GROUP: CPT | Mod: S$GLB,,, | Performed by: DIETITIAN, REGISTERED

## 2020-09-29 ENCOUNTER — CLINICAL SUPPORT (OUTPATIENT)
Dept: INTERNAL MEDICINE | Facility: CLINIC | Age: 30
End: 2020-09-29
Payer: COMMERCIAL

## 2020-09-29 ENCOUNTER — PATIENT MESSAGE (OUTPATIENT)
Dept: OTHER | Facility: OTHER | Age: 30
End: 2020-09-29

## 2020-09-29 DIAGNOSIS — Z00.00 ROUTINE GENERAL MEDICAL EXAMINATION AT A HEALTH CARE FACILITY: Primary | ICD-10-CM

## 2020-09-29 PROCEDURE — 99412 PR PREVENT COUNSEL,GROUP,60 MIN: ICD-10-PCS | Mod: S$GLB,,, | Performed by: DIETITIAN, REGISTERED

## 2020-09-29 PROCEDURE — 99412 PREVENTIVE COUNSELING GROUP: CPT | Mod: S$GLB,,, | Performed by: DIETITIAN, REGISTERED

## 2020-10-01 ENCOUNTER — CLINICAL SUPPORT (OUTPATIENT)
Dept: INTERNAL MEDICINE | Facility: CLINIC | Age: 30
End: 2020-10-01
Payer: COMMERCIAL

## 2020-10-01 DIAGNOSIS — Z00.00 ROUTINE GENERAL MEDICAL EXAMINATION AT A HEALTH CARE FACILITY: Primary | ICD-10-CM

## 2020-10-01 PROCEDURE — 99999 PR PBB SHADOW E&M-EST. PATIENT-LVL I: CPT | Mod: PBBFAC,,,

## 2020-10-01 PROCEDURE — 99999 PR PBB SHADOW E&M-EST. PATIENT-LVL I: ICD-10-PCS | Mod: PBBFAC,,,

## 2020-10-01 PROCEDURE — 99412 PR PREVENT COUNSEL,GROUP,60 MIN: ICD-10-PCS | Mod: S$GLB,,, | Performed by: DIETITIAN, REGISTERED

## 2020-10-01 PROCEDURE — 99412 PREVENTIVE COUNSELING GROUP: CPT | Mod: S$GLB,,, | Performed by: DIETITIAN, REGISTERED

## 2020-10-05 ENCOUNTER — PATIENT MESSAGE (OUTPATIENT)
Dept: ADMINISTRATIVE | Facility: OTHER | Age: 30
End: 2020-10-05

## 2020-10-06 ENCOUNTER — CLINICAL SUPPORT (OUTPATIENT)
Dept: INTERNAL MEDICINE | Facility: CLINIC | Age: 30
End: 2020-10-06
Payer: COMMERCIAL

## 2020-10-06 ENCOUNTER — PATIENT MESSAGE (OUTPATIENT)
Dept: ADMINISTRATIVE | Facility: HOSPITAL | Age: 30
End: 2020-10-06

## 2020-10-06 DIAGNOSIS — Z00.00 ROUTINE GENERAL MEDICAL EXAMINATION AT A HEALTH CARE FACILITY: Primary | ICD-10-CM

## 2020-10-06 PROCEDURE — 99412 PR PREVENT COUNSEL,GROUP,60 MIN: ICD-10-PCS | Mod: S$GLB,,, | Performed by: DIETITIAN, REGISTERED

## 2020-10-06 PROCEDURE — 99412 PREVENTIVE COUNSELING GROUP: CPT | Mod: S$GLB,,, | Performed by: DIETITIAN, REGISTERED

## 2020-10-07 ENCOUNTER — OFFICE VISIT (OUTPATIENT)
Dept: PLASTIC SURGERY | Facility: CLINIC | Age: 30
End: 2020-10-07
Payer: COMMERCIAL

## 2020-10-07 VITALS
HEART RATE: 94 BPM | BODY MASS INDEX: 40.74 KG/M2 | WEIGHT: 253.5 LBS | HEIGHT: 66 IN | DIASTOLIC BLOOD PRESSURE: 77 MMHG | SYSTOLIC BLOOD PRESSURE: 133 MMHG

## 2020-10-07 DIAGNOSIS — M54.9 CHRONIC UPPER BACK PAIN: ICD-10-CM

## 2020-10-07 DIAGNOSIS — M54.2 CHRONIC NECK PAIN: ICD-10-CM

## 2020-10-07 DIAGNOSIS — G89.29 CHRONIC UPPER BACK PAIN: ICD-10-CM

## 2020-10-07 DIAGNOSIS — N62 MACROMASTIA: Primary | ICD-10-CM

## 2020-10-07 DIAGNOSIS — G89.29 CHRONIC NECK PAIN: ICD-10-CM

## 2020-10-07 DIAGNOSIS — R21 EXCORIATED RASH: ICD-10-CM

## 2020-10-07 PROCEDURE — 3078F PR MOST RECENT DIASTOLIC BLOOD PRESSURE < 80 MM HG: ICD-10-PCS | Mod: CPTII,S$GLB,, | Performed by: SURGERY

## 2020-10-07 PROCEDURE — 99999 PR PBB SHADOW E&M-EST. PATIENT-LVL III: ICD-10-PCS | Mod: PBBFAC,,, | Performed by: SURGERY

## 2020-10-07 PROCEDURE — 99999 PR PBB SHADOW E&M-EST. PATIENT-LVL III: CPT | Mod: PBBFAC,,, | Performed by: SURGERY

## 2020-10-07 PROCEDURE — 3008F PR BODY MASS INDEX (BMI) DOCUMENTED: ICD-10-PCS | Mod: CPTII,S$GLB,, | Performed by: SURGERY

## 2020-10-07 PROCEDURE — 99203 PR OFFICE/OUTPT VISIT, NEW, LEVL III, 30-44 MIN: ICD-10-PCS | Mod: S$GLB,,, | Performed by: SURGERY

## 2020-10-07 PROCEDURE — 3078F DIAST BP <80 MM HG: CPT | Mod: CPTII,S$GLB,, | Performed by: SURGERY

## 2020-10-07 PROCEDURE — 3075F PR MOST RECENT SYSTOLIC BLOOD PRESS GE 130-139MM HG: ICD-10-PCS | Mod: CPTII,S$GLB,, | Performed by: SURGERY

## 2020-10-07 PROCEDURE — 3075F SYST BP GE 130 - 139MM HG: CPT | Mod: CPTII,S$GLB,, | Performed by: SURGERY

## 2020-10-07 PROCEDURE — 99203 OFFICE O/P NEW LOW 30 MIN: CPT | Mod: S$GLB,,, | Performed by: SURGERY

## 2020-10-07 PROCEDURE — 3008F BODY MASS INDEX DOCD: CPT | Mod: CPTII,S$GLB,, | Performed by: SURGERY

## 2020-10-07 NOTE — PROGRESS NOTES
Patient presents [facility] with a chief complaint of chronic neck and back pain secondary to heavy pendulous breasts she has been complaining of this pain for approximately 7 years.  She has tried nonsteroidal anti-inflammatory medications, narcotics including Norco, and muscle relaxants all without relief of the pain.  Patient also complains of deep shoulder grooving secondary to her brassiere straps, and macerating rashes underneath her breasts treated with medicated ointments and creams.  She has also used Diflucan hydrocortisone creams for the chronic macerating rash without relief.    Physical examination shows the patient to be 5 ft 5 in in height weight 116 kg and have 40 K cup breasts.  Breasts show macromastia  Shoulder exam shows grooving  Skin beneath her breasts show evidence of previous rashes.    The assessment  1.  Chronic back pain  2.  Chronic neck pain  3.  Chronic macerating rashes  4.  Macromastia    The plan is a bilateral breast reduction removing approximately 1600 g of breast tissue from each side.

## 2020-10-10 ENCOUNTER — PATIENT MESSAGE (OUTPATIENT)
Dept: ADMINISTRATIVE | Facility: OTHER | Age: 30
End: 2020-10-10

## 2020-10-13 ENCOUNTER — CLINICAL SUPPORT (OUTPATIENT)
Dept: INTERNAL MEDICINE | Facility: CLINIC | Age: 30
End: 2020-10-13
Payer: COMMERCIAL

## 2020-10-13 DIAGNOSIS — Z00.00 ROUTINE GENERAL MEDICAL EXAMINATION AT A HEALTH CARE FACILITY: Primary | ICD-10-CM

## 2020-10-13 PROCEDURE — 99412 PR PREVENT COUNSEL,GROUP,60 MIN: ICD-10-PCS | Mod: S$GLB,,, | Performed by: DIETITIAN, REGISTERED

## 2020-10-13 PROCEDURE — 99412 PREVENTIVE COUNSELING GROUP: CPT | Mod: S$GLB,,, | Performed by: DIETITIAN, REGISTERED

## 2020-10-20 ENCOUNTER — CLINICAL SUPPORT (OUTPATIENT)
Dept: INTERNAL MEDICINE | Facility: CLINIC | Age: 30
End: 2020-10-20
Payer: COMMERCIAL

## 2020-10-20 DIAGNOSIS — Z00.00 ROUTINE GENERAL MEDICAL EXAMINATION AT A HEALTH CARE FACILITY: Primary | ICD-10-CM

## 2020-10-20 PROCEDURE — 99412 PR PREVENT COUNSEL,GROUP,60 MIN: ICD-10-PCS | Mod: S$GLB,,, | Performed by: DIETITIAN, REGISTERED

## 2020-10-20 PROCEDURE — 99412 PREVENTIVE COUNSELING GROUP: CPT | Mod: S$GLB,,, | Performed by: DIETITIAN, REGISTERED

## 2020-10-27 ENCOUNTER — CLINICAL SUPPORT (OUTPATIENT)
Dept: INTERNAL MEDICINE | Facility: CLINIC | Age: 30
End: 2020-10-27
Payer: COMMERCIAL

## 2020-10-27 DIAGNOSIS — Z00.00 ROUTINE GENERAL MEDICAL EXAMINATION AT A HEALTH CARE FACILITY: Primary | ICD-10-CM

## 2020-10-27 PROCEDURE — 99412 PREVENTIVE COUNSELING GROUP: CPT | Mod: S$GLB,,, | Performed by: DIETITIAN, REGISTERED

## 2020-10-27 PROCEDURE — 99412 PR PREVENT COUNSEL,GROUP,60 MIN: ICD-10-PCS | Mod: S$GLB,,, | Performed by: DIETITIAN, REGISTERED

## 2020-10-28 ENCOUNTER — OFFICE VISIT (OUTPATIENT)
Dept: CARDIOLOGY | Facility: CLINIC | Age: 30
End: 2020-10-28
Payer: COMMERCIAL

## 2020-10-28 VITALS
OXYGEN SATURATION: 98 % | DIASTOLIC BLOOD PRESSURE: 80 MMHG | SYSTOLIC BLOOD PRESSURE: 138 MMHG | WEIGHT: 259.06 LBS | BODY MASS INDEX: 42.45 KG/M2 | HEART RATE: 94 BPM

## 2020-10-28 DIAGNOSIS — E66.01 MORBID OBESITY WITH BMI OF 40.0-44.9, ADULT: ICD-10-CM

## 2020-10-28 DIAGNOSIS — I10 ESSENTIAL HYPERTENSION: Primary | ICD-10-CM

## 2020-10-28 DIAGNOSIS — K21.9 GASTROESOPHAGEAL REFLUX DISEASE WITHOUT ESOPHAGITIS: ICD-10-CM

## 2020-10-28 DIAGNOSIS — F41.1 GENERALIZED ANXIETY DISORDER: ICD-10-CM

## 2020-10-28 PROCEDURE — 99999 PR PBB SHADOW E&M-EST. PATIENT-LVL III: CPT | Mod: PBBFAC,,, | Performed by: INTERNAL MEDICINE

## 2020-10-28 PROCEDURE — 3008F BODY MASS INDEX DOCD: CPT | Mod: CPTII,S$GLB,, | Performed by: INTERNAL MEDICINE

## 2020-10-28 PROCEDURE — 99214 OFFICE O/P EST MOD 30 MIN: CPT | Mod: S$GLB,,, | Performed by: INTERNAL MEDICINE

## 2020-10-28 PROCEDURE — 3008F PR BODY MASS INDEX (BMI) DOCUMENTED: ICD-10-PCS | Mod: CPTII,S$GLB,, | Performed by: INTERNAL MEDICINE

## 2020-10-28 PROCEDURE — 3079F PR MOST RECENT DIASTOLIC BLOOD PRESSURE 80-89 MM HG: ICD-10-PCS | Mod: CPTII,S$GLB,, | Performed by: INTERNAL MEDICINE

## 2020-10-28 PROCEDURE — 3079F DIAST BP 80-89 MM HG: CPT | Mod: CPTII,S$GLB,, | Performed by: INTERNAL MEDICINE

## 2020-10-28 PROCEDURE — 99999 PR PBB SHADOW E&M-EST. PATIENT-LVL III: ICD-10-PCS | Mod: PBBFAC,,, | Performed by: INTERNAL MEDICINE

## 2020-10-28 PROCEDURE — 3075F PR MOST RECENT SYSTOLIC BLOOD PRESS GE 130-139MM HG: ICD-10-PCS | Mod: CPTII,S$GLB,, | Performed by: INTERNAL MEDICINE

## 2020-10-28 PROCEDURE — 3075F SYST BP GE 130 - 139MM HG: CPT | Mod: CPTII,S$GLB,, | Performed by: INTERNAL MEDICINE

## 2020-10-28 PROCEDURE — 99214 PR OFFICE/OUTPT VISIT, EST, LEVL IV, 30-39 MIN: ICD-10-PCS | Mod: S$GLB,,, | Performed by: INTERNAL MEDICINE

## 2020-10-28 RX ORDER — AMLODIPINE BESYLATE 10 MG/1
10 TABLET ORAL DAILY
Qty: 30 TABLET | Refills: 3 | Status: SHIPPED | OUTPATIENT
Start: 2020-10-28 | End: 2020-11-03 | Stop reason: DRUGHIGH

## 2020-10-28 RX ORDER — HYDROCHLOROTHIAZIDE 12.5 MG/1
12.5 TABLET ORAL DAILY PRN
Qty: 30 TABLET | Refills: 3 | Status: SHIPPED | OUTPATIENT
Start: 2020-10-28 | End: 2021-01-06 | Stop reason: SDUPTHER

## 2020-10-28 NOTE — PROGRESS NOTES
Subjective:   Patient ID:  Anthony Sexton is a 30 y.o. female who presents for cardiac consult of Chest Pain        Palpitations   Associated symptoms include chest pain, dizziness and shortness of breath.   Dizziness:    Associated symptoms: palpitations and chest pain.  Follow-up  Associated symptoms include chest pain.     The patient came in today for cardiac consult of Chest Pain    Referring Provider: Mckenzie Ross NP   Reason for initial consult: chest pain      Anthony Sexton is a 30 y.o. female with current medical conditions HTN, ADHD, anemia, back pain, obesity, Vit D def presents for follow up CV eval.     5/1/19  She presented to urgent care and PCP last month for chest pain after started Adderall. She went to ER twice after and had ECGs with old septal infarct with sinus tach. BP elevated, D dimer positive, neg CTA for PE. Trop negative. She was started on Atenolol. She complained more of racing of heart with Adderall but not as much chest pain. Chest pain was in center of chest , felt heavy with tightness in neck and R arm pain. Will have sleep study eval as well. BP elevated today as well.     6/15/20  She went to ER last month - 6 week postpartum presents to the Emergency Department for evaluation of CP which onset gradually around 7PM last night. Pt was evaluated for similar sxs yesterday and sxs were improved upon discharge.  Symptoms are constant and moderate in severity.  The patient describes the sxs as heaviness. No mitigating or exacerbating factors reported.  Labs, trop, BNP neg, She was given Norco and sent home. ECHO last week was normal. Stress test last year neg.   Baby has been 2 months old. She has started BB palpitations have increased. HR goes up to 130 and drops to 80s to 40s.   She was on Nifedpine post partum. She is on PPI as well. She is taking Zoloft thought was due to anxiety.   BP upper 130s/80.     7/29/20  She started taking procardia and experienced palpitations.  "Went to ER for palpitations which suddenly this afternoon while she was walking from her kitchen to her living room. She states, "my heart felt like it was beating out of its chest." Pt reports having similar sxs in the past. Symptoms are constant and moderate in severity. No mitigating or exacerbating factors reported. Associated sxs include HA. Pt notes that x1.5 months ago her medication was switched from procardia to 25 mg metoprolol. Pt now takes 1/2 a tablet of 25 mg metoprolol daily.  In ER given BB symptoms improved.     10/28/20  Has occ mild atypical CP lasts 15 sec. She has mild edema, stopped BB due to fatigue and bradycardia. Sleep study neg.     Patient feels no PND, no dizziness, no syncope, no CNS symptoms.    Patient has fairly good exercise tolerance.    Patient is compliant with medications.    FH - father - 3 Mis, mother HTN    Results for orders placed during the hospital encounter of 06/10/20   Echo Color Flow Doppler? Yes; Bubble Contrast? No    Narrative · Concentric left ventricular hypertrophy.  · Normal left ventricular systolic function. The estimated ejection   fraction is 60%.  · Normal LV diastolic function.  · Normal central venous pressure (3 mmHg).  · The estimated PA systolic pressure is 30 mmHg.        EKG Conclusions:     1. The EKG portion of this study is negative for ischemia at a moderate workload, and peak heart rate of 166 bpm (92% of predicted).   2. Blood pressure response to exercise was normal (Presenting BP: 144/94 Peak BP: 215/89).   3. No significant arrhythmias were present.   4. There were no symptoms of chest discomfort or significant dyspnea throughout the protocol.   5. The Nesbitt treadmill score was 6 suggesting a low probability for future cardiovascular events.       This document has been electronically    SIGNED BY: Francisco J Hutchison MD On: 05/29/2019 17:43      Past Medical History:   Diagnosis Date    Anemia     Anxiety     Chronic low back pain     " Depression     History of abnormal cervical Pap smear     History of recurrent UTIs     History of ulcer disease     Irregular menses     Obesity     Postpartum depression     with last pregnancy.     Pre-eclampsia in postpartum period 4/23/2020    Vitamin D deficiency        Past Surgical History:   Procedure Laterality Date    None         Social History     Tobacco Use    Smoking status: Never Smoker    Smokeless tobacco: Never Used   Substance Use Topics    Alcohol use: Not Currently     Alcohol/week: 0.0 standard drinks     Frequency: Monthly or less     Drinks per session: 1 or 2     Binge frequency: Never    Drug use: No       Family History   Problem Relation Age of Onset    Asthma Mother     Hyperlipidemia Mother     Hypertension Mother     Hypertension Father     Hyperlipidemia Father     Heart disease Father 55        MI/CAD    No Known Problems Sister     No Known Problems Brother     Diabetes Maternal Aunt     Diabetes Maternal Grandmother     Insulin resistance Sister     No Known Problems Brother     No Known Problems Brother     No Known Problems Brother     No Known Problems Brother     No Known Problems Daughter     Breast cancer Maternal Aunt     Cancer Neg Hx     Stroke Neg Hx     Brain cancer Neg Hx     Migraines Neg Hx     Colon cancer Neg Hx     Ovarian cancer Neg Hx        Patient's Medications   New Prescriptions    HYDROCHLOROTHIAZIDE (HYDRODIURIL) 12.5 MG TAB    Take 1 tablet (12.5 mg total) by mouth daily as needed (leg swelling).   Previous Medications    ERGOCALCIFEROL (ERGOCALCIFEROL) 50,000 UNIT CAP    Take 1 capsule (50,000 Units total) by mouth every 7 days.    HYDROXYZINE PAMOATE (VISTARIL) 25 MG CAP    Take 1 capsule (25 mg total) by mouth 3 (three) times daily.    IBUPROFEN (ADVIL,MOTRIN) 600 MG TABLET    Take 1 tablet (600 mg total) by mouth every 6 (six) hours.    LORAZEPAM (ATIVAN) 1 MG TABLET    Take 1 tablet (1 mg total) by mouth every 6  (six) hours as needed for Anxiety.    NORETHINDRONE-ETHINYL ESTRADIOL (JUNEL FE 1/20) 1 MG-20 MCG (21)/75 MG (7) PER TABLET    Take 1 tablet by mouth once daily.    PANTOPRAZOLE (PROTONIX) 40 MG TABLET    Take 1 tablet (40 mg total) by mouth 2 (two) times daily.    PRENATAL VITS62/FA/OM3/DHA/EPA (PRENATAL GUMMY ORAL)    Take 1 Dose by mouth.    SERTRALINE (ZOLOFT) 100 MG TABLET    Take 1.5 tablets (150 mg total) by mouth once daily.   Modified Medications    Modified Medication Previous Medication    AMLODIPINE (NORVASC) 10 MG TABLET amLODIPine (NORVASC) 5 MG tablet       Take 1 tablet (10 mg total) by mouth once daily.    Take 1 tablet (5 mg total) by mouth once daily.   Discontinued Medications    METOPROLOL SUCCINATE (TOPROL-XL) 25 MG 24 HR TABLET    Take 1 tablet (25 mg total) by mouth once daily.       Review of Systems   Constitutional: Negative.    HENT: Negative.    Eyes: Negative.    Respiratory: Positive for shortness of breath.    Cardiovascular: Positive for chest pain and palpitations.   Gastrointestinal: Negative.    Genitourinary: Negative.    Musculoskeletal: Negative.    Skin: Negative.    Neurological: Positive for dizziness.   Endo/Heme/Allergies: Negative.    Psychiatric/Behavioral: Negative.    All 12 systems otherwise negative.      Wt Readings from Last 3 Encounters:   10/28/20 117.5 kg (259 lb 0.7 oz)   10/07/20 115 kg (253 lb 8.5 oz)   09/15/20 115.1 kg (253 lb 12 oz)     Temp Readings from Last 3 Encounters:   09/15/20 98.1 °F (36.7 °C) (Tympanic)   09/09/20 98.8 °F (37.1 °C) (Temporal)   07/28/20 98.8 °F (37.1 °C) (Oral)     BP Readings from Last 3 Encounters:   10/28/20 138/80   10/07/20 133/77   09/15/20 (!) 131/92     Pulse Readings from Last 3 Encounters:   10/28/20 94   10/07/20 94   09/15/20 79       /80 (BP Location: Right arm, Patient Position: Sitting, BP Method: Medium (Manual))   Pulse 94   Wt 117.5 kg (259 lb 0.7 oz)   SpO2 98%   BMI 42.45 kg/m²     Objective:    Physical Exam   Constitutional: She is oriented to person, place, and time. She appears well-developed and well-nourished. No distress.   HENT:   Head: Normocephalic and atraumatic.   Nose: Nose normal.   Mouth/Throat: Oropharynx is clear and moist.   Eyes: Conjunctivae and EOM are normal. No scleral icterus.   Neck: Normal range of motion. Neck supple. No JVD present. No thyromegaly present.   Cardiovascular: Normal rate, regular rhythm, S1 normal and S2 normal. Exam reveals no gallop, no S3, no S4 and no friction rub.   No murmur heard.  Pulmonary/Chest: Effort normal and breath sounds normal. No stridor. No respiratory distress. She has no wheezes. She has no rales. She exhibits no tenderness.   Abdominal: Soft. Bowel sounds are normal. She exhibits no distension and no mass. There is no abdominal tenderness. There is no rebound.   Genitourinary:    Genitourinary Comments: Deferred     Musculoskeletal: Normal range of motion.         General: No tenderness, deformity or edema.   Lymphadenopathy:     She has no cervical adenopathy.   Neurological: She is alert and oriented to person, place, and time. She exhibits normal muscle tone. Coordination normal.   Skin: Skin is warm and dry. No rash noted. She is not diaphoretic. No erythema. No pallor.   Psychiatric: She has a normal mood and affect. Her behavior is normal. Judgment and thought content normal.   Nursing note and vitals reviewed.      Lab Results   Component Value Date     07/28/2020    K 3.6 07/28/2020     07/28/2020    CO2 22 (L) 07/28/2020    BUN 14 07/28/2020    CREATININE 0.9 07/28/2020     07/28/2020    HGBA1C 5.3 07/28/2020    MG 2.1 05/27/2020    AST 18 07/28/2020    ALT 17 07/28/2020    ALBUMIN 4.1 07/28/2020    PROT 9.1 (H) 07/28/2020    BILITOT 0.2 07/28/2020    WBC 13.59 (H) 07/28/2020    HGB 12.5 07/28/2020    HCT 43.7 07/28/2020    MCV 80 (L) 07/28/2020     (H) 07/28/2020    TSH 1.186 07/28/2020    CHOL 178  07/28/2020    HDL 51 07/28/2020    LDLCALC 113.2 07/28/2020    TRIG 69 07/28/2020    BNP 15 07/28/2020     Assessment:      1. Essential hypertension    2. Post partum depression    3. Generalized anxiety disorder    4. Morbid obesity with BMI of 40.0-44.9, adult    5. Gastroesophageal reflux disease without esophagitis        Plan:   1. Chest pain, atypical;  - neg ER workup initially with neg trop/PE neg  -  stress test and 2D ECHO - negative  - discussed non cardiac causes - r/o SAVANNA/GERD/MSK causes    2. Palpitations  - order Holter 48 hr - defer  - DC toprol    3. GERD  - cont PPI    4. Obesity  - neesd weight loss    5. HTN  - change to Norvasc - increase to 10 mg, monitor edema  - PRN HCTZ    6. Depression  - cont tx per PCP    Thank you for allowing me to participate in this patient's care. Please do not hesitate to contact me with any questions or concerns. Consult note has been forwarded to the referral physician.

## 2020-11-02 ENCOUNTER — HOSPITAL ENCOUNTER (OUTPATIENT)
Dept: RADIOLOGY | Facility: CLINIC | Age: 30
Discharge: HOME | End: 2020-11-02
Attending: PHYSICIAN ASSISTANT
Payer: COMMERCIAL

## 2020-11-02 ENCOUNTER — OFFICE VISIT (OUTPATIENT)
Dept: URGENT CARE | Facility: CLINIC | Age: 30
End: 2020-11-02
Payer: COMMERCIAL

## 2020-11-02 VITALS
BODY MASS INDEX: 40.18 KG/M2 | SYSTOLIC BLOOD PRESSURE: 142 MMHG | HEART RATE: 139 BPM | HEIGHT: 66 IN | OXYGEN SATURATION: 100 % | DIASTOLIC BLOOD PRESSURE: 86 MMHG | TEMPERATURE: 98 F | WEIGHT: 250 LBS

## 2020-11-02 DIAGNOSIS — R00.0 SINUS TACHYCARDIA: ICD-10-CM

## 2020-11-02 DIAGNOSIS — R00.2 PALPITATIONS: ICD-10-CM

## 2020-11-02 DIAGNOSIS — R00.2 PALPITATIONS: Primary | ICD-10-CM

## 2020-11-02 PROCEDURE — 99214 PR OFFICE/OUTPT VISIT, EST, LEVL IV, 30-39 MIN: ICD-10-PCS | Mod: S$GLB,,, | Performed by: PHYSICIAN ASSISTANT

## 2020-11-02 PROCEDURE — 93005 EKG 12-LEAD: ICD-10-PCS | Mod: S$GLB,,, | Performed by: PHYSICIAN ASSISTANT

## 2020-11-02 PROCEDURE — 71046 X-RAY EXAM CHEST 2 VIEWS: CPT | Mod: S$GLB,,, | Performed by: RADIOLOGY

## 2020-11-02 PROCEDURE — 93010 EKG 12-LEAD: ICD-10-PCS | Mod: S$GLB,,, | Performed by: INTERNAL MEDICINE

## 2020-11-02 PROCEDURE — 71046 XR CHEST PA AND LATERAL: ICD-10-PCS | Mod: S$GLB,,, | Performed by: RADIOLOGY

## 2020-11-02 PROCEDURE — 99214 OFFICE O/P EST MOD 30 MIN: CPT | Mod: S$GLB,,, | Performed by: PHYSICIAN ASSISTANT

## 2020-11-02 PROCEDURE — 93010 ELECTROCARDIOGRAM REPORT: CPT | Mod: S$GLB,,, | Performed by: INTERNAL MEDICINE

## 2020-11-02 PROCEDURE — 93005 ELECTROCARDIOGRAM TRACING: CPT | Mod: S$GLB,,, | Performed by: PHYSICIAN ASSISTANT

## 2020-11-02 RX ORDER — VERAPAMIL HYDROCHLORIDE 120 MG/1
120 CAPSULE, EXTENDED RELEASE ORAL DAILY
Qty: 30 CAPSULE | Refills: 0 | Status: SHIPPED | OUTPATIENT
Start: 2020-11-02 | End: 2021-01-06

## 2020-11-02 NOTE — PATIENT INSTRUCTIONS
Continue your amlodipine 10mg with PRN HCTZ for swelling. Add verapamil 120mg daily and start today. Discontinue amlodipine if you are getting hypotensive, very dizzy, fainting, etc. Closely monitor BLOOD pressure while taking. Follow up with Dr. Groves next week. His nurse will call to schedule. Go straight to ER if any severe uncontrolled palpitations, severe chest pain, shortness of breath or unilateral leg swelling.     Understanding Heart Palpitations    Heart palpitations are a symptom. Its the feeling you have when your heartbeat seems to be racing, pounding, skipping, or fluttering. Heart palpitations are most often felt in the chest. Sometimes, they may also be felt in the neck.  What causes heart palpitations?  In most cases, heart palpitations are caused by:  · Stress or anxiety  · Exercise  · Pregnancy  · Some medicines  · Caffeine  · Nicotine  · Alcohol  · Illegal drugs, such as cocaine  · Health problems, such as anemia or overactive thyroid  In some cases, heart palpitations may be caused by a problem with the heart. Abnormal heart rhythms (arrhythmias) are the main concern. They may need to be managed by you and your healthcare provider or treated right away.  How are heart palpitations treated?  Treatments for heart palpitations depend on the cause. Options may include:  · Managing the things that trigger your heart palpitations. This could mean:  ¨ Learning ways to reduce stress and anxiety  ¨ Avoiding caffeine, nicotine, alcohol, or illegal drugs  ¨ Stopping the use of certain medicines, under your doctors guidance  · Medicines, procedures, or surgery to treat an arrhythmia or other health problem that is causing your symptoms  What are the complications of heart palpitations?  Complications of heart palpitations are rare unless they are caused by a problem such as an arrhythmia. In such cases, complications can include:  · Fainting  · Heart failure. This problem occurs when the heart is so  weak it no longer pumps blood well.  · Blood clots and stroke  · Sudden cardiac arrest. This problem occurs when the heart suddenly stops beating.  When should I call my healthcare provider?  Call your healthcare provider right away if you have any of these:  · Fever of 100.4°F (38°C) or higher, or as directed  · Symptoms that dont get better with treatment, or symptoms that get worse  · New symptoms, such as chest pain, shortness of breath, dizziness, or fainting   Date Last Reviewed: 5/1/2016 © 2000-2017 [employer]. [address] [place] [address]. All rights reserved. This information is not intended as a substitute for professional medical care. Always follow your healthcare professional's instructions.        Understanding Tachycardia    The heart has an electrical system that sends signals to control the heartbeat. Any abnormal change in the speed or pattern of the heartbeat is called an arrhythmia. If you have an arrhythmia that causes the heart to beat faster than normal, this is known as tachycardia. There are many types of tachycardia. They can affect the hearts upper chambers (atria), the hearts lower chambers (ventricles), or both.  What causes tachycardia?  Many things can cause tachycardia, including:  · Damage to heart tissue from heart disease, past heart attack, or heart surgery  · Abnormal electrical pathways in the heart  · Problems with the hearts structure that you are born with   · High blood pressure  · Overactive thyroid  · Use of certain medicines  · Severe blood loss or anemia  · Dehydration  · Severe stress, fear, or anxiety  · Smoking  · Too much alcohol or caffeine  · Abuse of certain street drugs, such as cocaine  · Infections  · Certain inflammatory conditions  · Chronic  pain syndromes  What are the symptoms of tachycardia?  Tachycardia can cause a fast, pounding, or irregular heartbeat. It can also make it harder for the heart to pump blood efficiently  to the body. This may cause symptoms such as:  · Shortness of breath  · Tiredness  · Dizziness or fainting  · Chest pain  Some people with tachycardia may have no symptoms at all.  How is tachycardiatreated?  Treatment for tachycardia depends on the cause. It also depends on the type you have and how severe your symptoms are. Tachycardia in the ventricles is often more serious than in the atria. For this reason, it may need to be treated right away. Possible treatments include:  · Treating the underlying cause. For instance, if a medicine is causing your tachycardia, changing the dosage or stopping the medicine with your doctors guidance may correct the problem.  · Lifestyle changes. These include getting enough sleep and reducing stress. They also include avoiding caffeine, alcohol, tobacco, and street drugs.  · Vagal maneuvers.These are techniques that may help interrupt a fast heartbeat and slow it down. One example is to take a deep breath and bear down hard while holding your breath.   · Medicines. These may be used to help slow down a fast heartbeat. They may also be used to regulate the pattern of the heartbeat.  · Electrical cardioversion. Special pads or paddles are used to send one or more brief  electrical shocks to the heart. This can help restore the heartbeat to normal.  · Ablation. A long thin tube called a catheter is inserted into a blood vessel and threaded to the heart. The catheter sends out hot or cold energy to the areas causing abnormal signals. This destroys the problem tissue or cells. This may stop certain types of tachycardia.  · Pacemaker. This is a device that is placed just under the skin in the chest. It sends paced signals to make the heart beat at a more normal rate and rhythm. You may need this when certain medicines that treat tachycardia also result in a slow heart rate.    · Implantable cardioverter defibrillator (ICD). This is a device that is placed just under the skin in the  chest or armpit. The ICD monitors your heart rate. When needed, it sends controlled burst of signals to the heart to overdrive a tachycardia.  It can also send a single stronger shock to the heart to stop a life-threatening type of tachycardia, if needed.  · Surgery. During surgery, different techniques may be used to create scar tissue in the areas of the heart causing abnormal signals. This may help stop certain types of tachycardia.  What are the complications of tachycardia?  These can include:  · Blood clots or stroke  · Heart failure. With this problem, the heart muscle is so weak it no longer pumps blood well.  · Sudden cardiac arrest. This is when the heart suddenly stops beating.  When should I call my healthcare provider?  Call your healthcare provider right away if you have any of these:  · Symptoms that dont get better with treatment, or get worse  · New symptoms   Date Last Reviewed: 5/1/2016  © 6882-5068 [employer]. [address] [place] [address], [place] [address]. All rights reserved. This information is not intended as a substitute for professional medical care. Always follow your healthcare professional's instructions.

## 2020-11-02 NOTE — PROGRESS NOTES
"Subjective:       Patient ID: Anthony Sexton is a 30 y.o. female.    Vitals:  height is 5' 5.5" (1.664 m) and weight is 113.4 kg (250 lb). Her temporal temperature is 97.9 °F (36.6 °C). Her blood pressure is 142/86 (abnormal) and her pulse is 139 (abnormal). Her oxygen saturation is 100%.     Chief Complaint: Tachycardia    Pt is a 30 y.o. female with a history of sinus tachycardia, palpitations, HTN and obesity who presents today with palpitations x 3 days. Was intermittent with episodes lasting minutes at a time, but has been constant for the last 1-2 hours. Reports feeling like her heart is beating fast, not irregular. No CP or SOB. No leg pain or swelling, recent surgeries, recent travel or smoking history.  She states that her blood pressure medication was increased (amlodipine from 5 to 10mg) and toprol d/c last week by her cardiologist due to bradycardia from toprol and believes the med change is the cause of it.  She states this has happened before with procardia and was wondering if the medication changed caused.  Has been worked up multiple x for this recently with negative CTA for PE this summer, normal holters and echos. Normal stress test last year. Denies any recent stressors, stimulants     Palpitations   This is a new problem. The current episode started in the past 7 days. The problem occurs constantly. The problem has been gradually worsening. On average, each episode lasts 3 days. Nothing (blood pressure medication) aggravates the symptoms. Pertinent negatives include no anxiety, chest pain, coughing, dizziness, fever, nausea, shortness of breath, syncope, vomiting or weakness. Treatments tried: CCB. The treatment provided no relief.       Constitution: Negative for chills, fatigue and fever.   HENT: Negative for congestion and sore throat.    Neck: Negative for painful lymph nodes.   Cardiovascular: Positive for leg swelling and palpitations. Negative for chest trauma, chest pain, sob on exertion " and passing out.   Eyes: Negative for double vision and blurred vision.   Respiratory: Negative for cough and shortness of breath.    Gastrointestinal: Negative for nausea, vomiting and diarrhea.   Genitourinary: Negative for dysuria, frequency, urgency and history of kidney stones.   Musculoskeletal: Negative for joint pain, joint swelling, muscle cramps and muscle ache.   Skin: Negative for color change, pale, rash and bruising.   Allergic/Immunologic: Negative for seasonal allergies.   Neurological: Positive for headaches. Negative for dizziness, history of vertigo, light-headedness and passing out.   Hematologic/Lymphatic: Negative for swollen lymph nodes.   Psychiatric/Behavioral: Negative for nervous/anxious, sleep disturbance and depression. The patient is not nervous/anxious.        Objective:      Physical Exam   Constitutional: She is oriented to person, place, and time. She appears well-developed. She is cooperative.  Non-toxic appearance. She does not appear ill. No distress.   HENT:   Head: Normocephalic and atraumatic.   Ears:   Right Ear: Hearing, tympanic membrane, external ear and ear canal normal.   Left Ear: Hearing, tympanic membrane, external ear and ear canal normal.   Nose: Nose normal. No mucosal edema, rhinorrhea or nasal deformity. No epistaxis. Right sinus exhibits no maxillary sinus tenderness and no frontal sinus tenderness. Left sinus exhibits no maxillary sinus tenderness and no frontal sinus tenderness.   Mouth/Throat: Uvula is midline, oropharynx is clear and moist and mucous membranes are normal. No trismus in the jaw. Normal dentition. No uvula swelling. No posterior oropharyngeal erythema.   Eyes: Conjunctivae and lids are normal. Right eye exhibits no discharge. Left eye exhibits no discharge. No scleral icterus.   Neck: Trachea normal, normal range of motion, full passive range of motion without pain and phonation normal. Neck supple.   Cardiovascular: Regular rhythm, normal  heart sounds and normal pulses. Tachycardia present.   Pulses:       Radial pulses are 2+ on the right side and 2+ on the left side.        Dorsalis pedis pulses are 2+ on the right side and 2+ on the left side.   Pulmonary/Chest: Effort normal and breath sounds normal. No respiratory distress.   Abdominal: Soft. Normal appearance and bowel sounds are normal. She exhibits no distension, no pulsatile midline mass and no mass. There is no abdominal tenderness.   Musculoskeletal: Normal range of motion.         General: No deformity.      Right lower leg: No edema.      Left lower leg: No edema.   Neurological: She is alert and oriented to person, place, and time. She exhibits normal muscle tone. Coordination normal.   Skin: Skin is warm, dry, intact, not diaphoretic and not pale. Psychiatric: Her speech is normal and behavior is normal. Judgment and thought content normal.   Nursing note and vitals reviewed.      ECG reviewed and interpreted by me:  now, sinus tachycardia, possible LVH, No ST changes or STEMI. Impression: sinus tachycardia     X-ray Chest Pa And Lateral    Result Date: 11/2/2020  EXAMINATION: XR CHEST PA AND LATERAL CLINICAL HISTORY: Palpitations TECHNIQUE: PA and lateral views of the chest were performed. COMPARISON: Chest radiograph July 28, 2020 FINDINGS: Lungs are clear without focal consolidation, edema, or mass.  There is no pneumothorax or pleural effusion.  Cardiomediastinal silhouette is within normal limits.  No acute osseous abnormality.     As above. Electronically signed by: Inder Pereira Date:    11/02/2020 Time:    16:05    Assessment:       1. Palpitations    2. Sinus tachycardia        Plan:       HR persistently 130s-150s. Sinus tachycardia. Discussed case with Dr. Groves @ [id number]. He recommends start verapamil now. Have pt continue amlodipine but closely monitor for hypotension and symptoms. He will have his nurse call her to arrange f/u to see her in clinic next week. Pt was  given strong ER precautions for any emergent symptoms.     Palpitations  -     EKG 12-lead; Future  -     X-Ray Chest PA And Lateral; Future; Expected date: 11/02/2020    Sinus tachycardia    Other orders  -     verapamiL (VERELAN) 120 MG C24P; Take 1 capsule (120 mg total) by mouth once daily.  Dispense: 30 capsule; Refill: 0        Ary Penn PA-C  [facility]          Patient Instructions       Continue your amlodipine 10mg with PRN HCTZ for swelling. Add verapamil 120mg daily and start today. Discontinue amlodipine if you are getting hypotensive, very dizzy, fainting, etc. Closely monitor BLOOD pressure while taking. Follow up with Dr. Groves next week. His nurse will call to schedule. Go straight to ER if any severe uncontrolled palpitations, severe chest pain, shortness of breath or unilateral leg swelling.     Understanding Heart Palpitations    Heart palpitations are a symptom. Its the feeling you have when your heartbeat seems to be racing, pounding, skipping, or fluttering. Heart palpitations are most often felt in the chest. Sometimes, they may also be felt in the neck.  What causes heart palpitations?  In most cases, heart palpitations are caused by:  · Stress or anxiety  · Exercise  · Pregnancy  · Some medicines  · Caffeine  · Nicotine  · Alcohol  · Illegal drugs, such as cocaine  · Health problems, such as anemia or overactive thyroid  In some cases, heart palpitations may be caused by a problem with the heart. Abnormal heart rhythms (arrhythmias) are the main concern. They may need to be managed by you and your healthcare provider or treated right away.  How are heart palpitations treated?  Treatments for heart palpitations depend on the cause. Options may include:  · Managing the things that trigger your heart palpitations. This could mean:  ¨ Learning ways to reduce stress and anxiety  ¨ Avoiding caffeine, nicotine, alcohol, or illegal drugs  ¨ Stopping the use of certain  medicines, under your doctors guidance  · Medicines, procedures, or surgery to treat an arrhythmia or other health problem that is causing your symptoms  What are the complications of heart palpitations?  Complications of heart palpitations are rare unless they are caused by a problem such as an arrhythmia. In such cases, complications can include:  · Fainting  · Heart failure. This problem occurs when the heart is so weak it no longer pumps blood well.  · Blood clots and stroke  · Sudden cardiac arrest. This problem occurs when the heart suddenly stops beating.  When should I call my healthcare provider?  Call your healthcare provider right away if you have any of these:  · Fever of 100.4°F (38°C) or higher, or as directed  · Symptoms that dont get better with treatment, or symptoms that get worse  · New symptoms, such as chest pain, shortness of breath, dizziness, or fainting   Date Last Reviewed: 5/1/2016  © 7013-4035 [employer]. [address] [place] [address]. All rights reserved. This information is not intended as a substitute for professional medical care. Always follow your healthcare professional's instructions.        Understanding Tachycardia    The heart has an electrical system that sends signals to control the heartbeat. Any abnormal change in the speed or pattern of the heartbeat is called an arrhythmia. If you have an arrhythmia that causes the heart to beat faster than normal, this is known as tachycardia. There are many types of tachycardia. They can affect the hearts upper chambers (atria), the hearts lower chambers (ventricles), or both.  What causes tachycardia?  Many things can cause tachycardia, including:  · Damage to heart tissue from heart disease, past heart attack, or heart surgery  · Abnormal electrical pathways in the heart  · Problems with the hearts structure that you are born with   · High blood pressure  · Overactive thyroid  · Use of certain  medicines  · Severe blood loss or anemia  · Dehydration  · Severe stress, fear, or anxiety  · Smoking  · Too much alcohol or caffeine  · Abuse of certain street drugs, such as cocaine  · Infections  · Certain inflammatory conditions  · Chronic  pain syndromes  What are the symptoms of tachycardia?  Tachycardia can cause a fast, pounding, or irregular heartbeat. It can also make it harder for the heart to pump blood efficiently to the body. This may cause symptoms such as:  · Shortness of breath  · Tiredness  · Dizziness or fainting  · Chest pain  Some people with tachycardia may have no symptoms at all.  How is tachycardiatreated?  Treatment for tachycardia depends on the cause. It also depends on the type you have and how severe your symptoms are. Tachycardia in the ventricles is often more serious than in the atria. For this reason, it may need to be treated right away. Possible treatments include:  · Treating the underlying cause. For instance, if a medicine is causing your tachycardia, changing the dosage or stopping the medicine with your doctors guidance may correct the problem.  · Lifestyle changes. These include getting enough sleep and reducing stress. They also include avoiding caffeine, alcohol, tobacco, and street drugs.  · Vagal maneuvers.These are techniques that may help interrupt a fast heartbeat and slow it down. One example is to take a deep breath and bear down hard while holding your breath.   · Medicines. These may be used to help slow down a fast heartbeat. They may also be used to regulate the pattern of the heartbeat.  · Electrical cardioversion. Special pads or paddles are used to send one or more brief  electrical shocks to the heart. This can help restore the heartbeat to normal.  · Ablation. A long thin tube called a catheter is inserted into a blood vessel and threaded to the heart. The catheter sends out hot or cold energy to the areas causing abnormal signals. This destroys the  problem tissue or cells. This may stop certain types of tachycardia.  · Pacemaker. This is a device that is placed just under the skin in the chest. It sends paced signals to make the heart beat at a more normal rate and rhythm. You may need this when certain medicines that treat tachycardia also result in a slow heart rate.    · Implantable cardioverter defibrillator (ICD). This is a device that is placed just under the skin in the chest or armpit. The ICD monitors your heart rate. When needed, it sends controlled burst of signals to the heart to overdrive a tachycardia.  It can also send a single stronger shock to the heart to stop a life-threatening type of tachycardia, if needed.  · Surgery. During surgery, different techniques may be used to create scar tissue in the areas of the heart causing abnormal signals. This may help stop certain types of tachycardia.  What are the complications of tachycardia?  These can include:  · Blood clots or stroke  · Heart failure. With this problem, the heart muscle is so weak it no longer pumps blood well.  · Sudden cardiac arrest. This is when the heart suddenly stops beating.  When should I call my healthcare provider?  Call your healthcare provider right away if you have any of these:  · Symptoms that dont get better with treatment, or get worse  · New symptoms   Date Last Reviewed: 5/1/2016  © 6003-8375 [employer]. [address] [place] [address], [place] [address]. All rights reserved. This information is not intended as a substitute for professional medical care. Always follow your healthcare professional's instructions.

## 2020-11-03 ENCOUNTER — CLINICAL SUPPORT (OUTPATIENT)
Dept: INTERNAL MEDICINE | Facility: CLINIC | Age: 30
End: 2020-11-03
Payer: COMMERCIAL

## 2020-11-03 ENCOUNTER — OFFICE VISIT (OUTPATIENT)
Dept: FAMILY MEDICINE | Facility: CLINIC | Age: 30
End: 2020-11-03
Payer: COMMERCIAL

## 2020-11-03 VITALS
SYSTOLIC BLOOD PRESSURE: 138 MMHG | BODY MASS INDEX: 42.79 KG/M2 | WEIGHT: 261.13 LBS | DIASTOLIC BLOOD PRESSURE: 82 MMHG | OXYGEN SATURATION: 100 % | RESPIRATION RATE: 18 BRPM | HEART RATE: 96 BPM | TEMPERATURE: 98 F

## 2020-11-03 DIAGNOSIS — I10 ESSENTIAL HYPERTENSION: Primary | ICD-10-CM

## 2020-11-03 DIAGNOSIS — Z00.00 ROUTINE GENERAL MEDICAL EXAMINATION AT A HEALTH CARE FACILITY: Primary | ICD-10-CM

## 2020-11-03 DIAGNOSIS — R00.2 PALPITATIONS: ICD-10-CM

## 2020-11-03 PROCEDURE — 3079F PR MOST RECENT DIASTOLIC BLOOD PRESSURE 80-89 MM HG: ICD-10-PCS | Mod: CPTII,S$GLB,, | Performed by: FAMILY MEDICINE

## 2020-11-03 PROCEDURE — 99213 PR OFFICE/OUTPT VISIT, EST, LEVL III, 20-29 MIN: ICD-10-PCS | Mod: S$GLB,,, | Performed by: FAMILY MEDICINE

## 2020-11-03 PROCEDURE — 99412 PREVENTIVE COUNSELING GROUP: CPT | Mod: S$GLB,,, | Performed by: DIETITIAN, REGISTERED

## 2020-11-03 PROCEDURE — 3075F PR MOST RECENT SYSTOLIC BLOOD PRESS GE 130-139MM HG: ICD-10-PCS | Mod: CPTII,S$GLB,, | Performed by: FAMILY MEDICINE

## 2020-11-03 PROCEDURE — 99213 OFFICE O/P EST LOW 20 MIN: CPT | Mod: S$GLB,,, | Performed by: FAMILY MEDICINE

## 2020-11-03 PROCEDURE — 3075F SYST BP GE 130 - 139MM HG: CPT | Mod: CPTII,S$GLB,, | Performed by: FAMILY MEDICINE

## 2020-11-03 PROCEDURE — 3079F DIAST BP 80-89 MM HG: CPT | Mod: CPTII,S$GLB,, | Performed by: FAMILY MEDICINE

## 2020-11-03 PROCEDURE — 99999 PR PBB SHADOW E&M-EST. PATIENT-LVL IV: CPT | Mod: PBBFAC,,, | Performed by: FAMILY MEDICINE

## 2020-11-03 PROCEDURE — 99999 PR PBB SHADOW E&M-EST. PATIENT-LVL IV: ICD-10-PCS | Mod: PBBFAC,,, | Performed by: FAMILY MEDICINE

## 2020-11-03 PROCEDURE — 3008F PR BODY MASS INDEX (BMI) DOCUMENTED: ICD-10-PCS | Mod: CPTII,S$GLB,, | Performed by: FAMILY MEDICINE

## 2020-11-03 PROCEDURE — 3008F BODY MASS INDEX DOCD: CPT | Mod: CPTII,S$GLB,, | Performed by: FAMILY MEDICINE

## 2020-11-03 PROCEDURE — 99412 PR PREVENT COUNSEL,GROUP,60 MIN: ICD-10-PCS | Mod: S$GLB,,, | Performed by: DIETITIAN, REGISTERED

## 2020-11-03 RX ORDER — AMLODIPINE BESYLATE 5 MG/1
5 TABLET ORAL DAILY
Qty: 30 TABLET | Refills: 5 | Status: SHIPPED | OUTPATIENT
Start: 2020-11-03 | End: 2021-07-29

## 2020-11-03 NOTE — PROGRESS NOTES
"Anthony Sexton    Chief Complaint   Patient presents with    Follow-up     urgent care follow up        History of Present Illness:   Ms. Sexton comes in today for urgent care follow-up.    She states she saw Dr. Groves, cardiologist, October 28, 2020; she states he recommended she increase amlodipine 5 mg daily to 10 mg daily to improve blood pressure control.  She states she took amlodipine 10 mg daily on Saturday and had elevated heart rate.  She took amlodipine 5 mg daily on Sunday and felt fine.  She states she took amlodipine 10 mg daily again on yesterday and again had elevated heart rate; as a result, she went to urgent care for further evaluation.  She states the attendant from urgent care spoke with Dr. Groves and was told by Dr. Groves to add Verapamil when 20 mg to her current blood pressure regimen.  She states she took verapamil 120 mg last night and felt okay; she did not have elevation of heart rate.    She states she has not taken blood pressure medications this morning.  She reports blood pressure and heart rate levels today have been: 138/68, 68 at 5:30 a.m.; 113/76, 72 at 7:30 a.m. a.m.; 11 13/72, 71 at 11:30 a.m.; 123/86, 86 at 2:30 p.m.    She states she feels a "burning sensation" right now; she states the sensation does not feel normal.    Otherwise, she denies having fever, chills, fatigue, appetite changes; shortness of breath, cough, wheezing; chest pain, leg swelling; abdominal pain, nausea, vomiting, diarrhea, constipation; unusual urinary symptoms; back pain; headache; anxiety, depression, homicidal or suicidal thoughts.    She states she has been walking and drinking more water.      Hypertension  This is a recurrent problem. The current episode started more than 1 month ago. The problem is unchanged. The problem is resistant. Associated symptoms include anxiety, malaise/fatigue, palpitations and peripheral edema. Pertinent negatives include no blurred vision, chest pain, headaches, " orthopnea, PND, shortness of breath or sweats. Agents associated with hypertension include oral contraceptives. Risk factors for coronary artery disease include family history and obesity. Past treatments include beta blockers and calcium channel blockers. The current treatment provides mild improvement. There are no compliance problems.        Current Outpatient Medications   Medication Sig    ergocalciferol (ERGOCALCIFEROL) 50,000 unit Cap Take 1 capsule (50,000 Units total) by mouth every 7 days.    hydroCHLOROthiazide (HYDRODIURIL) 12.5 MG Tab Take 1 tablet (12.5 mg total) by mouth daily as needed (leg swelling).    norethindrone-ethinyl estradiol (JUNEL FE 1/20) 1 mg-20 mcg (21)/75 mg (7) per tablet Take 1 tablet by mouth once daily.    pantoprazole (PROTONIX) 40 MG tablet Take 1 tablet (40 mg total) by mouth 2 (two) times daily.    prenatal vits62/FA/om3/dha/epa (PRENATAL GUMMY ORAL) Take 1 Dose by mouth.    sertraline (ZOLOFT) 100 MG tablet Take 1.5 tablets (150 mg total) by mouth once daily.    verapamiL (VERELAN) 120 MG C24P Take 1 capsule (120 mg total) by mouth once daily.    amLODIPine (NORVASC) 10 MG tablet Take 1 tablet (10 mg total) by mouth once daily.    LORazepam (ATIVAN) 1 MG tablet Take 1 tablet (1 mg total) by mouth every 6 (six) hours as needed for Anxiety.       Review of Systems   Constitutional: Positive for malaise/fatigue. Negative for activity change, appetite change, chills, fatigue and fever.   Eyes: Negative for blurred vision and visual disturbance.   Respiratory: Negative for cough, shortness of breath and wheezing.    Cardiovascular: Positive for palpitations. Negative for chest pain, orthopnea, leg swelling and PND.        See history of present illness.   Gastrointestinal: Negative for abdominal pain, constipation, diarrhea, nausea and vomiting.   Genitourinary: Negative for difficulty urinating and menstrual problem.   Musculoskeletal: Negative for back pain.    Neurological: Negative for headaches.   Psychiatric/Behavioral: Negative for dysphoric mood and suicidal ideas. The patient is not nervous/anxious.         Negative for homicidal ideas.       Objective:  Physical Exam  Vitals signs reviewed.   Constitutional:       General: She is not in acute distress.     Appearance: Normal appearance. She is well-developed. She is obese. She is not ill-appearing, toxic-appearing or diaphoretic.      Comments: Pleasant.   Neck:      Musculoskeletal: Normal range of motion and neck supple. No muscular tenderness.   Cardiovascular:      Rate and Rhythm: Normal rate and regular rhythm.      Pulses: Normal pulses.      Heart sounds: Normal heart sounds. No murmur.   Pulmonary:      Effort: Pulmonary effort is normal. No respiratory distress.      Breath sounds: Normal breath sounds. No wheezing.   Abdominal:      General: Bowel sounds are normal. There is no distension.      Palpations: Abdomen is soft. There is no mass.      Tenderness: There is no abdominal tenderness. There is no guarding or rebound.   Musculoskeletal: Normal range of motion.         General: No swelling or tenderness.      Comments: She is ambulatory without problems.   Lymphadenopathy:      Cervical: No cervical adenopathy.   Skin:     General: Skin is warm.   Neurological:      General: No focal deficit present.      Mental Status: She is alert and oriented to person, place, and time.   Psychiatric:         Mood and Affect: Mood normal.         Behavior: Behavior normal.         Thought Content: Thought content normal.         Judgment: Judgment normal.         ASSESSMENT:  1. Essential hypertension    2. Palpitations        PLAN:  Anthony was seen today for follow-up.    Diagnoses and all orders for this visit:    Essential hypertension  -     amLODIPine (NORVASC) 5 MG tablet; Take 1 tablet (5 mg total) by mouth once daily.    Palpitations    I advised patient to continue current medications but take Amlodipine  10 mg - 1/2 pill (5 mg) daily to see how she feels.  Follow low sodium, low cholesterol, low carb diet, daily walks.  Keep follow up with specialists.  Follow up if symptoms worsen or fail to improve.

## 2020-11-05 ENCOUNTER — TELEPHONE (OUTPATIENT)
Dept: URGENT CARE | Facility: CLINIC | Age: 30
End: 2020-11-05

## 2020-11-06 ENCOUNTER — CLINICAL SUPPORT (OUTPATIENT)
Dept: INTERNAL MEDICINE | Facility: CLINIC | Age: 30
End: 2020-11-06
Payer: COMMERCIAL

## 2020-11-06 DIAGNOSIS — Z01.84 ANTIBODY RESPONSE EXAM: Primary | ICD-10-CM

## 2020-11-06 DIAGNOSIS — R20.8 BURNING SENSATION: ICD-10-CM

## 2020-11-06 DIAGNOSIS — Z01.84 ANTIBODY RESPONSE EXAM: ICD-10-CM

## 2020-11-06 DIAGNOSIS — R20.8 BURNING SENSATION: Primary | ICD-10-CM

## 2020-11-06 PROCEDURE — 86769 SARS-COV-2 COVID-19 ANTIBODY: CPT

## 2020-11-07 LAB — SARS-COV-2 IGG SERPLBLD QL IA.RAPID: NEGATIVE

## 2020-11-10 ENCOUNTER — CLINICAL SUPPORT (OUTPATIENT)
Dept: INTERNAL MEDICINE | Facility: CLINIC | Age: 30
End: 2020-11-10
Payer: COMMERCIAL

## 2020-11-10 DIAGNOSIS — Z00.00 ROUTINE GENERAL MEDICAL EXAMINATION AT A HEALTH CARE FACILITY: Primary | ICD-10-CM

## 2020-11-10 PROCEDURE — 99412 PREVENTIVE COUNSELING GROUP: CPT | Mod: S$GLB,,, | Performed by: DIETITIAN, REGISTERED

## 2020-11-10 PROCEDURE — 99412 PR PREVENT COUNSEL,GROUP,60 MIN: ICD-10-PCS | Mod: S$GLB,,, | Performed by: DIETITIAN, REGISTERED

## 2020-11-17 ENCOUNTER — CLINICAL SUPPORT (OUTPATIENT)
Dept: INTERNAL MEDICINE | Facility: CLINIC | Age: 30
End: 2020-11-17
Payer: COMMERCIAL

## 2020-11-17 DIAGNOSIS — Z00.00 ROUTINE GENERAL MEDICAL EXAMINATION AT A HEALTH CARE FACILITY: Primary | ICD-10-CM

## 2020-11-17 PROCEDURE — 99412 PREVENTIVE COUNSELING GROUP: CPT | Mod: S$GLB,,, | Performed by: DIETITIAN, REGISTERED

## 2020-11-17 PROCEDURE — 99412 PR PREVENT COUNSEL,GROUP,60 MIN: ICD-10-PCS | Mod: S$GLB,,, | Performed by: DIETITIAN, REGISTERED

## 2020-11-24 ENCOUNTER — CLINICAL SUPPORT (OUTPATIENT)
Dept: INTERNAL MEDICINE | Facility: CLINIC | Age: 30
End: 2020-11-24
Payer: COMMERCIAL

## 2020-11-24 DIAGNOSIS — Z00.00 ROUTINE GENERAL MEDICAL EXAMINATION AT A HEALTH CARE FACILITY: Primary | ICD-10-CM

## 2020-11-24 PROCEDURE — 99412 PR PREVENT COUNSEL,GROUP,60 MIN: ICD-10-PCS | Mod: S$GLB,,, | Performed by: DIETITIAN, REGISTERED

## 2020-11-24 PROCEDURE — 99412 PREVENTIVE COUNSELING GROUP: CPT | Mod: S$GLB,,, | Performed by: DIETITIAN, REGISTERED

## 2020-12-01 RX ORDER — PANTOPRAZOLE SODIUM 40 MG/1
40 TABLET, DELAYED RELEASE ORAL 2 TIMES DAILY
Qty: 60 TABLET | Refills: 1 | Status: SHIPPED | OUTPATIENT
Start: 2020-12-01 | End: 2021-01-31 | Stop reason: SDUPTHER

## 2020-12-10 ENCOUNTER — OFFICE VISIT (OUTPATIENT)
Dept: FAMILY MEDICINE | Facility: CLINIC | Age: 30
End: 2020-12-10
Payer: COMMERCIAL

## 2020-12-10 VITALS
SYSTOLIC BLOOD PRESSURE: 120 MMHG | BODY MASS INDEX: 43.1 KG/M2 | WEIGHT: 263 LBS | OXYGEN SATURATION: 97 % | TEMPERATURE: 97 F | DIASTOLIC BLOOD PRESSURE: 80 MMHG | HEART RATE: 125 BPM

## 2020-12-10 DIAGNOSIS — R05.9 COUGH: ICD-10-CM

## 2020-12-10 DIAGNOSIS — J06.9 UPPER RESPIRATORY TRACT INFECTION, UNSPECIFIED TYPE: Primary | ICD-10-CM

## 2020-12-10 LAB
CTP QC/QA: YES
CTP QC/QA: YES
FLUAV AG NPH QL: NEGATIVE
FLUBV AG NPH QL: NEGATIVE
S PYO RRNA THROAT QL PROBE: NEGATIVE

## 2020-12-10 PROCEDURE — 99213 PR OFFICE/OUTPT VISIT, EST, LEVL III, 20-29 MIN: ICD-10-PCS | Mod: 25,S$GLB,, | Performed by: NURSE PRACTITIONER

## 2020-12-10 PROCEDURE — 87880 STREP A ASSAY W/OPTIC: CPT | Mod: QW,S$GLB,, | Performed by: NURSE PRACTITIONER

## 2020-12-10 PROCEDURE — 87804 POCT INFLUENZA A/B: ICD-10-PCS | Mod: 59,QW,S$GLB, | Performed by: NURSE PRACTITIONER

## 2020-12-10 PROCEDURE — 99999 PR PBB SHADOW E&M-EST. PATIENT-LVL III: CPT | Mod: PBBFAC,,, | Performed by: NURSE PRACTITIONER

## 2020-12-10 PROCEDURE — 1126F PR PAIN SEVERITY QUANTIFIED, NO PAIN PRESENT: ICD-10-PCS | Mod: S$GLB,,, | Performed by: NURSE PRACTITIONER

## 2020-12-10 PROCEDURE — 87804 INFLUENZA ASSAY W/OPTIC: CPT | Mod: 59,QW,S$GLB, | Performed by: NURSE PRACTITIONER

## 2020-12-10 PROCEDURE — 3074F PR MOST RECENT SYSTOLIC BLOOD PRESSURE < 130 MM HG: ICD-10-PCS | Mod: CPTII,S$GLB,, | Performed by: NURSE PRACTITIONER

## 2020-12-10 PROCEDURE — 1126F AMNT PAIN NOTED NONE PRSNT: CPT | Mod: S$GLB,,, | Performed by: NURSE PRACTITIONER

## 2020-12-10 PROCEDURE — 3008F PR BODY MASS INDEX (BMI) DOCUMENTED: ICD-10-PCS | Mod: CPTII,S$GLB,, | Performed by: NURSE PRACTITIONER

## 2020-12-10 PROCEDURE — 3074F SYST BP LT 130 MM HG: CPT | Mod: CPTII,S$GLB,, | Performed by: NURSE PRACTITIONER

## 2020-12-10 PROCEDURE — 87880 POCT RAPID STREP A: ICD-10-PCS | Mod: QW,S$GLB,, | Performed by: NURSE PRACTITIONER

## 2020-12-10 PROCEDURE — 99213 OFFICE O/P EST LOW 20 MIN: CPT | Mod: 25,S$GLB,, | Performed by: NURSE PRACTITIONER

## 2020-12-10 PROCEDURE — 3079F PR MOST RECENT DIASTOLIC BLOOD PRESSURE 80-89 MM HG: ICD-10-PCS | Mod: CPTII,S$GLB,, | Performed by: NURSE PRACTITIONER

## 2020-12-10 PROCEDURE — 3079F DIAST BP 80-89 MM HG: CPT | Mod: CPTII,S$GLB,, | Performed by: NURSE PRACTITIONER

## 2020-12-10 PROCEDURE — U0003 INFECTIOUS AGENT DETECTION BY NUCLEIC ACID (DNA OR RNA); SEVERE ACUTE RESPIRATORY SYNDROME CORONAVIRUS 2 (SARS-COV-2) (CORONAVIRUS DISEASE [COVID-19]), AMPLIFIED PROBE TECHNIQUE, MAKING USE OF HIGH THROUGHPUT TECHNOLOGIES AS DESCRIBED BY CMS-2020-01-R: HCPCS

## 2020-12-10 PROCEDURE — 3008F BODY MASS INDEX DOCD: CPT | Mod: CPTII,S$GLB,, | Performed by: NURSE PRACTITIONER

## 2020-12-10 PROCEDURE — 99999 PR PBB SHADOW E&M-EST. PATIENT-LVL III: ICD-10-PCS | Mod: PBBFAC,,, | Performed by: NURSE PRACTITIONER

## 2020-12-10 RX ORDER — ONDANSETRON 8 MG/1
8 TABLET, ORALLY DISINTEGRATING ORAL 2 TIMES DAILY
Qty: 12 TABLET | Refills: 1 | Status: SHIPPED | OUTPATIENT
Start: 2020-12-10 | End: 2021-04-20

## 2020-12-10 RX ORDER — PROMETHAZINE HYDROCHLORIDE AND DEXTROMETHORPHAN HYDROBROMIDE 6.25; 15 MG/5ML; MG/5ML
5 SYRUP ORAL 3 TIMES DAILY PRN
Qty: 118 ML | Refills: 0 | Status: SHIPPED | OUTPATIENT
Start: 2020-12-10 | End: 2020-12-20

## 2020-12-10 NOTE — LETTER
[address] ? [place], [address] ? Phone [phone] ? Fax [phone]           Return to Work/School    Patient: Anthony Sexton  YOB: 1990   Date: 12/10/2020      To Whom It May Concern:     Anthony Sexton was in contact with/seen in my office on 12/10/2020. COVID-19 is present in our communities across the state. Not all patients are eligible or appropriate to be tested. In this situation, your employee meets the following criteria:     Anthony Sexton has met the criteria for COVID-19 testing based upon symptoms, travel, and/or potential exposure. The test has been completed and is pending results at this time. During this time the employee is not able to work and should be quarantined per the Centers for Disease Control timelines.      If you have any questions or concerns, or if I can be of further assistance, please do not hesitate to contact me.     Sincerely,    Hortencia Ramsay NP

## 2020-12-11 ENCOUNTER — TELEPHONE (OUTPATIENT)
Dept: FAMILY MEDICINE | Facility: CLINIC | Age: 30
End: 2020-12-11

## 2020-12-11 LAB — SARS-COV-2 RNA RESP QL NAA+PROBE: NOT DETECTED

## 2020-12-11 NOTE — LETTER
[address] ? [place], [address] ? Phone [phone] ? Fax [phone]           Return to Work/School    Patient: Anthony Sexton  YOB: 1990   Date: 12/11/2020      To Whom It May Concern:     Anthony Sexton was in contact with/seen in my office on 12/10/2020. COVID-19 is present in our communities across the state. Not all patients are eligible or appropriate to be tested. In this situation, your employee meets the following criteria:     Anthony Sexton has met the criteria for COVID-19 testing and has a NEGATIVE result. The employee can return to work on 12/14/2020.     If you have any questions or concerns, or if I can be of further assistance, please do not hesitate to contact me.     Sincerely,    Hortencia Ramsay NP

## 2020-12-11 NOTE — TELEPHONE ENCOUNTER
S/w pt in regards to her daughters COVID results which were negative. She stated that she saw her results through [employer] and is requesting an updated excuse saying she can go back to work on Monday. Informed that Hortencia is out today but I will send an excuse shortly. Pt verbalized understanding.    Sent pt an excuse through [employer]. Please sign off on message behind me. Thank you

## 2020-12-15 ENCOUNTER — OFFICE VISIT (OUTPATIENT)
Dept: URGENT CARE | Facility: CLINIC | Age: 30
End: 2020-12-15
Payer: COMMERCIAL

## 2020-12-15 VITALS
HEART RATE: 83 BPM | OXYGEN SATURATION: 100 % | DIASTOLIC BLOOD PRESSURE: 82 MMHG | SYSTOLIC BLOOD PRESSURE: 132 MMHG | HEIGHT: 66 IN | TEMPERATURE: 98 F | BODY MASS INDEX: 42.27 KG/M2 | WEIGHT: 263 LBS | RESPIRATION RATE: 18 BRPM

## 2020-12-15 DIAGNOSIS — J03.80 ACUTE BACTERIAL TONSILLITIS: Primary | ICD-10-CM

## 2020-12-15 DIAGNOSIS — J02.9 PHARYNGITIS, UNSPECIFIED ETIOLOGY: ICD-10-CM

## 2020-12-15 DIAGNOSIS — B96.89 ACUTE BACTERIAL TONSILLITIS: Primary | ICD-10-CM

## 2020-12-15 LAB
CTP QC/QA: YES
MOLECULAR STREP A: NEGATIVE

## 2020-12-15 PROCEDURE — 3008F BODY MASS INDEX DOCD: CPT | Mod: CPTII,S$GLB,, | Performed by: NURSE PRACTITIONER

## 2020-12-15 PROCEDURE — 99214 PR OFFICE/OUTPT VISIT, EST, LEVL IV, 30-39 MIN: ICD-10-PCS | Mod: S$GLB,,, | Performed by: NURSE PRACTITIONER

## 2020-12-15 PROCEDURE — 1125F PR PAIN SEVERITY QUANTIFIED, PAIN PRESENT: ICD-10-PCS | Mod: S$GLB,,, | Performed by: NURSE PRACTITIONER

## 2020-12-15 PROCEDURE — 3008F PR BODY MASS INDEX (BMI) DOCUMENTED: ICD-10-PCS | Mod: CPTII,S$GLB,, | Performed by: NURSE PRACTITIONER

## 2020-12-15 PROCEDURE — 1125F AMNT PAIN NOTED PAIN PRSNT: CPT | Mod: S$GLB,,, | Performed by: NURSE PRACTITIONER

## 2020-12-15 PROCEDURE — 99214 OFFICE O/P EST MOD 30 MIN: CPT | Mod: S$GLB,,, | Performed by: NURSE PRACTITIONER

## 2020-12-15 PROCEDURE — 87651 STREP A DNA AMP PROBE: CPT | Mod: QW,S$GLB,, | Performed by: NURSE PRACTITIONER

## 2020-12-15 PROCEDURE — 87651 POCT STREP A MOLECULAR: ICD-10-PCS | Mod: QW,S$GLB,, | Performed by: NURSE PRACTITIONER

## 2020-12-15 RX ORDER — AMOXICILLIN 875 MG/1
875 TABLET, FILM COATED ORAL 2 TIMES DAILY
Qty: 20 TABLET | Refills: 0 | Status: SHIPPED | OUTPATIENT
Start: 2020-12-15 | End: 2020-12-25

## 2020-12-15 NOTE — PATIENT INSTRUCTIONS
· Take antibiotics as prescribed.  · You may take Tylenol or Ibuprofen as needed for fever, throat pain, or body aches.   · Take an over the counter 24 hour antihistamine such as Claritin or Zyrtec for postnasal drip and other allergy symptoms.  · Cool air humidifier to help moisten throat and nasal passages.  · Avoid cigarette smoke.  · For sore throat, gargling with warm salt water, Cepacol throat lozenges, or Chloraseptic spray may help with pain.  · Please go to the ER for any worsening in your condition including: hives, rash, increased pain or swelling to throat, persistent fever that does not improve with Tylenol/Motrin use, dark urine, severe headache, vision changes, neck stiffness, lethargy, or for any other new or concerning symptoms.

## 2020-12-15 NOTE — PROGRESS NOTES
"Subjective:       Patient ID: Anthony Sexton is a 30 y.o. female.    Vitals:  height is 5' 5.5" (1.664 m) and weight is 119.3 kg (263 lb). Her temporal temperature is 97.9 °F (36.6 °C). Her blood pressure is 132/82 and her pulse is 83. Her respiration is 18 and oxygen saturation is 100%.     Chief Complaint: Sore Throat    Patient present with sore throat complaints. Patient complains of sore throat and trouble swallowing starting today. Taken Covid and strep test on 12/10/20 (both neg) request another strep. Taken no OTC med. No known exposure to strep. Patient stated there's white puss back of throat.     Sore Throat   This is a new problem. The current episode started today. The problem has been gradually worsening. The pain is worse on the left side. There has been no fever. The pain is at a severity of 5/10. The pain is moderate. Associated symptoms include trouble swallowing. Pertinent negatives include no congestion, coughing, ear pain, shortness of breath, stridor or vomiting. She has tried nothing for the symptoms.       Constitution: Negative. Negative for chills, sweating, fatigue and fever.   HENT: Positive for sore throat, trouble swallowing and voice change. Negative for ear pain, congestion, sinus pain and sinus pressure.    Neck: negative. Negative for painful lymph nodes.   Cardiovascular: Negative.    Eyes: Negative.  Negative for eye redness.   Respiratory: Negative for chest tightness, cough, sputum production, bloody sputum, COPD, shortness of breath, stridor, wheezing and asthma.    Gastrointestinal: Negative.  Negative for nausea and vomiting.   Endocrine: negative.   Genitourinary: Negative.    Musculoskeletal: Negative.  Negative for muscle ache.   Skin: Negative.  Negative for rash.   Allergic/Immunologic: Negative.  Negative for seasonal allergies and asthma.   Neurological: Negative.    Hematologic/Lymphatic: Negative.  Negative for swollen lymph nodes.   Psychiatric/Behavioral: Negative. "        Objective:      Physical Exam   Constitutional: She is oriented to person, place, and time. She appears well-developed. She is cooperative.  Non-toxic appearance. She does not appear ill. No distress.   HENT:   Head: Normocephalic and atraumatic.   Ears:   Right Ear: Hearing and external ear normal.   Left Ear: Hearing and external ear normal.   Nose: Nose normal. No mucosal edema, rhinorrhea or nasal deformity. No epistaxis. Right sinus exhibits no maxillary sinus tenderness and no frontal sinus tenderness. Left sinus exhibits no maxillary sinus tenderness and no frontal sinus tenderness.   Mouth/Throat: Uvula is midline and mucous membranes are normal. No trismus in the jaw. Normal dentition. No uvula swelling. Posterior oropharyngeal edema and posterior oropharyngeal erythema present. No oropharyngeal exudate. Tonsils are 3+ on the right. Tonsils are 3+ on the left. Tonsillar exudate.   Eyes: Conjunctivae and lids are normal. No scleral icterus.   Neck: Trachea normal, full passive range of motion without pain and phonation normal. Neck supple. No neck rigidity. No edema and no erythema present.   Cardiovascular: Normal rate, regular rhythm, normal heart sounds and normal pulses.   Pulmonary/Chest: Effort normal and breath sounds normal. No respiratory distress. She has no decreased breath sounds. She has no rhonchi.   Abdominal: Normal appearance.   Musculoskeletal: Normal range of motion.         General: No deformity.   Neurological: She is alert and oriented to person, place, and time. She exhibits normal muscle tone. Coordination normal.   Skin: Skin is warm, dry, intact, not diaphoretic and not pale. Psychiatric: Her speech is normal and behavior is normal. Judgment and thought content normal.   Nursing note and vitals reviewed.        Assessment:       1. Acute bacterial tonsillitis    2. Pharyngitis, unspecified etiology        Plan:         Acute bacterial tonsillitis  -     amoxicillin (AMOXIL)  875 MG tablet; Take 1 tablet (875 mg total) by mouth 2 (two) times daily. for 10 days  Dispense: 20 tablet; Refill: 0    Pharyngitis, unspecified etiology  -     POCT Strep A, Molecular  -     Cancel: Culture, Throat  -     amoxicillin (AMOXIL) 875 MG tablet; Take 1 tablet (875 mg total) by mouth 2 (two) times daily. for 10 days  Dispense: 20 tablet; Refill: 0         Results for orders placed or performed in visit on 12/15/20   POCT Strep A, Molecular   Result Value Ref Range    Molecular Strep A, POC Negative Negative     Acceptable Yes      Lab result reviewed and discussed with patient.    · Take antibiotics as prescribed.  · You may take Tylenol or Ibuprofen as needed for fever, throat pain, or body aches.   · Take an over the counter 24 hour antihistamine such as Claritin or Zyrtec for postnasal drip and other allergy symptoms.  · Cool air humidifier to help moisten throat and nasal passages.  · Avoid cigarette smoke.  · For sore throat, gargling with warm salt water, Cepacol throat lozenges, or Chloraseptic spray may help with pain.  · Please go to the ER for any worsening in your condition including: hives, rash, increased pain or swelling to throat, persistent fever that does not improve with Tylenol/Motrin use, dark urine, severe headache, vision changes, neck stiffness, lethargy, or for any other new or concerning symptoms.

## 2021-01-06 ENCOUNTER — HOSPITAL ENCOUNTER (OUTPATIENT)
Dept: CARDIOLOGY | Facility: HOSPITAL | Age: 31
Discharge: HOME | End: 2021-01-06
Attending: INTERNAL MEDICINE
Payer: COMMERCIAL

## 2021-01-06 ENCOUNTER — TELEPHONE (OUTPATIENT)
Dept: PSYCHIATRY | Facility: CLINIC | Age: 31
End: 2021-01-06

## 2021-01-06 ENCOUNTER — OFFICE VISIT (OUTPATIENT)
Dept: CARDIOLOGY | Facility: CLINIC | Age: 31
End: 2021-01-06
Payer: COMMERCIAL

## 2021-01-06 ENCOUNTER — PATIENT MESSAGE (OUTPATIENT)
Dept: PSYCHIATRY | Facility: CLINIC | Age: 31
End: 2021-01-06

## 2021-01-06 ENCOUNTER — PATIENT MESSAGE (OUTPATIENT)
Dept: SURGERY | Facility: CLINIC | Age: 31
End: 2021-01-06

## 2021-01-06 VITALS
DIASTOLIC BLOOD PRESSURE: 70 MMHG | HEART RATE: 75 BPM | SYSTOLIC BLOOD PRESSURE: 120 MMHG | BODY MASS INDEX: 42.52 KG/M2 | HEIGHT: 66 IN | WEIGHT: 264.56 LBS | RESPIRATION RATE: 16 BRPM | OXYGEN SATURATION: 99 %

## 2021-01-06 DIAGNOSIS — R00.2 PALPITATIONS: ICD-10-CM

## 2021-01-06 DIAGNOSIS — R07.9 CHEST PAIN, UNSPECIFIED TYPE: ICD-10-CM

## 2021-01-06 DIAGNOSIS — K21.9 GASTROESOPHAGEAL REFLUX DISEASE WITHOUT ESOPHAGITIS: ICD-10-CM

## 2021-01-06 DIAGNOSIS — I10 ESSENTIAL HYPERTENSION: ICD-10-CM

## 2021-01-06 DIAGNOSIS — F41.1 GENERALIZED ANXIETY DISORDER: ICD-10-CM

## 2021-01-06 DIAGNOSIS — E66.01 MORBID OBESITY WITH BMI OF 40.0-44.9, ADULT: ICD-10-CM

## 2021-01-06 DIAGNOSIS — R07.9 CHEST PAIN, UNSPECIFIED TYPE: Primary | ICD-10-CM

## 2021-01-06 LAB
CV STRESS BASE HR: 119 BPM
DIASTOLIC BLOOD PRESSURE: 82 MMHG
OHS CV CPX 1 MINUTE RECOVERY HEART RATE: 157 BPM
OHS CV CPX 85 PERCENT MAX PREDICTED HEART RATE MALE: 151
OHS CV CPX ESTIMATED METS: 6
OHS CV CPX MAX PREDICTED HEART RATE: 178
OHS CV CPX PATIENT IS FEMALE: 1
OHS CV CPX PATIENT IS MALE: 0
OHS CV CPX PEAK DIASTOLIC BLOOD PRESSURE: 86 MMHG
OHS CV CPX PEAK HEAR RATE: 171 BPM
OHS CV CPX PEAK RATE PRESSURE PRODUCT: NORMAL
OHS CV CPX PEAK SYSTOLIC BLOOD PRESSURE: 176 MMHG
OHS CV CPX PERCENT MAX PREDICTED HEART RATE ACHIEVED: 96
OHS CV CPX RATE PRESSURE PRODUCT PRESENTING: NORMAL
STRESS ECHO POST EXERCISE DUR MIN: 4 MINUTES
STRESS ECHO POST EXERCISE DUR SEC: 24 SECONDS
SYSTOLIC BLOOD PRESSURE: 131 MMHG

## 2021-01-06 PROCEDURE — 3008F BODY MASS INDEX DOCD: CPT | Mod: CPTII,S$GLB,, | Performed by: INTERNAL MEDICINE

## 2021-01-06 PROCEDURE — 93016 CV STRESS TEST SUPVJ ONLY: CPT | Mod: ,,, | Performed by: INTERNAL MEDICINE

## 2021-01-06 PROCEDURE — 3074F SYST BP LT 130 MM HG: CPT | Mod: CPTII,S$GLB,, | Performed by: INTERNAL MEDICINE

## 2021-01-06 PROCEDURE — 99999 PR PBB SHADOW E&M-EST. PATIENT-LVL IV: CPT | Mod: PBBFAC,,, | Performed by: INTERNAL MEDICINE

## 2021-01-06 PROCEDURE — 99999 PR PBB SHADOW E&M-EST. PATIENT-LVL IV: ICD-10-PCS | Mod: PBBFAC,,, | Performed by: INTERNAL MEDICINE

## 2021-01-06 PROCEDURE — 3074F PR MOST RECENT SYSTOLIC BLOOD PRESSURE < 130 MM HG: ICD-10-PCS | Mod: CPTII,S$GLB,, | Performed by: INTERNAL MEDICINE

## 2021-01-06 PROCEDURE — 3078F PR MOST RECENT DIASTOLIC BLOOD PRESSURE < 80 MM HG: ICD-10-PCS | Mod: CPTII,S$GLB,, | Performed by: INTERNAL MEDICINE

## 2021-01-06 PROCEDURE — 93016 EXERCISE STRESS - EKG (CUPID ONLY): ICD-10-PCS | Mod: ,,, | Performed by: INTERNAL MEDICINE

## 2021-01-06 PROCEDURE — 93018 CV STRESS TEST I&R ONLY: CPT | Mod: ,,, | Performed by: INTERNAL MEDICINE

## 2021-01-06 PROCEDURE — 3078F DIAST BP <80 MM HG: CPT | Mod: CPTII,S$GLB,, | Performed by: INTERNAL MEDICINE

## 2021-01-06 PROCEDURE — 99214 PR OFFICE/OUTPT VISIT, EST, LEVL IV, 30-39 MIN: ICD-10-PCS | Mod: S$GLB,,, | Performed by: INTERNAL MEDICINE

## 2021-01-06 PROCEDURE — 93017 CV STRESS TEST TRACING ONLY: CPT

## 2021-01-06 PROCEDURE — 99214 OFFICE O/P EST MOD 30 MIN: CPT | Mod: S$GLB,,, | Performed by: INTERNAL MEDICINE

## 2021-01-06 PROCEDURE — 93018 EXERCISE STRESS - EKG (CUPID ONLY): ICD-10-PCS | Mod: ,,, | Performed by: INTERNAL MEDICINE

## 2021-01-06 PROCEDURE — 3008F PR BODY MASS INDEX (BMI) DOCUMENTED: ICD-10-PCS | Mod: CPTII,S$GLB,, | Performed by: INTERNAL MEDICINE

## 2021-01-06 RX ORDER — HYDROCHLOROTHIAZIDE 25 MG/1
25 TABLET ORAL DAILY PRN
Qty: 30 TABLET | Refills: 3 | Status: SHIPPED | OUTPATIENT
Start: 2021-01-06 | End: 2021-06-05

## 2021-01-20 ENCOUNTER — IMMUNIZATION (OUTPATIENT)
Dept: INTERNAL MEDICINE | Facility: CLINIC | Age: 31
End: 2021-01-20
Payer: COMMERCIAL

## 2021-01-20 DIAGNOSIS — Z23 NEED FOR VACCINATION: Primary | ICD-10-CM

## 2021-01-20 PROCEDURE — 91300 COVID-19, MRNA, LNP-S, PF, 30 MCG/0.3 ML DOSE VACCINE: CPT | Mod: PBBFAC | Performed by: FAMILY MEDICINE

## 2021-01-21 ENCOUNTER — OFFICE VISIT (OUTPATIENT)
Dept: PSYCHIATRY | Facility: CLINIC | Age: 31
End: 2021-01-21
Payer: COMMERCIAL

## 2021-01-21 DIAGNOSIS — F41.1 GENERALIZED ANXIETY DISORDER: Primary | ICD-10-CM

## 2021-01-21 DIAGNOSIS — F33.1 MODERATE EPISODE OF RECURRENT MAJOR DEPRESSIVE DISORDER: ICD-10-CM

## 2021-01-21 PROCEDURE — 99214 PR OFFICE/OUTPT VISIT, EST, LEVL IV, 30-39 MIN: ICD-10-PCS | Mod: 95,,, | Performed by: PSYCHIATRY & NEUROLOGY

## 2021-01-21 PROCEDURE — 99214 OFFICE O/P EST MOD 30 MIN: CPT | Mod: 95,,, | Performed by: PSYCHIATRY & NEUROLOGY

## 2021-01-21 RX ORDER — FLUOXETINE HYDROCHLORIDE 20 MG/1
20 CAPSULE ORAL DAILY
Qty: 30 CAPSULE | Refills: 3 | Status: SHIPPED | OUTPATIENT
Start: 2021-01-21 | End: 2021-03-24

## 2021-01-22 ENCOUNTER — OFFICE VISIT (OUTPATIENT)
Dept: OPHTHALMOLOGY | Facility: CLINIC | Age: 31
End: 2021-01-22
Payer: COMMERCIAL

## 2021-01-22 DIAGNOSIS — H52.203 MYOPIA OF BOTH EYES WITH ASTIGMATISM: Primary | ICD-10-CM

## 2021-01-22 DIAGNOSIS — H52.13 MYOPIA OF BOTH EYES WITH ASTIGMATISM: Primary | ICD-10-CM

## 2021-01-22 PROCEDURE — 92015 PR REFRACTION: ICD-10-PCS | Mod: S$GLB,,, | Performed by: OPTOMETRIST

## 2021-01-22 PROCEDURE — 99999 PR PBB SHADOW E&M-EST. PATIENT-LVL III: ICD-10-PCS | Mod: PBBFAC,,, | Performed by: OPTOMETRIST

## 2021-01-22 PROCEDURE — 92014 COMPRE OPH EXAM EST PT 1/>: CPT | Mod: S$GLB,,, | Performed by: OPTOMETRIST

## 2021-01-22 PROCEDURE — 99999 PR PBB SHADOW E&M-EST. PATIENT-LVL III: CPT | Mod: PBBFAC,,, | Performed by: OPTOMETRIST

## 2021-01-22 PROCEDURE — 1126F PR PAIN SEVERITY QUANTIFIED, NO PAIN PRESENT: ICD-10-PCS | Mod: S$GLB,,, | Performed by: OPTOMETRIST

## 2021-01-22 PROCEDURE — 92015 DETERMINE REFRACTIVE STATE: CPT | Mod: S$GLB,,, | Performed by: OPTOMETRIST

## 2021-01-22 PROCEDURE — 1126F AMNT PAIN NOTED NONE PRSNT: CPT | Mod: S$GLB,,, | Performed by: OPTOMETRIST

## 2021-01-22 PROCEDURE — 92014 PR EYE EXAM, EST PATIENT,COMPREHESV: ICD-10-PCS | Mod: S$GLB,,, | Performed by: OPTOMETRIST

## 2021-01-27 ENCOUNTER — OFFICE VISIT (OUTPATIENT)
Dept: NEUROLOGY | Facility: CLINIC | Age: 31
End: 2021-01-27
Payer: COMMERCIAL

## 2021-01-27 DIAGNOSIS — M54.12 RADICULOPATHY, CERVICAL REGION: ICD-10-CM

## 2021-01-27 DIAGNOSIS — R51.9 NONINTRACTABLE HEADACHE, UNSPECIFIED CHRONICITY PATTERN, UNSPECIFIED HEADACHE TYPE: Primary | ICD-10-CM

## 2021-01-27 DIAGNOSIS — G08 DURAL VENOUS SINUS THROMBOSIS: ICD-10-CM

## 2021-01-27 PROCEDURE — 99204 OFFICE O/P NEW MOD 45 MIN: CPT | Mod: 95,,, | Performed by: NEUROLOGICAL SURGERY

## 2021-01-27 PROCEDURE — 99204 PR OFFICE/OUTPT VISIT, NEW, LEVL IV, 45-59 MIN: ICD-10-PCS | Mod: 95,,, | Performed by: NEUROLOGICAL SURGERY

## 2021-01-27 RX ORDER — UBROGEPANT 100 MG/1
100 TABLET ORAL ONCE AS NEEDED
Qty: 10 TABLET | Refills: 2 | Status: SHIPPED | OUTPATIENT
Start: 2021-01-27 | End: 2021-02-02

## 2021-02-01 RX ORDER — PANTOPRAZOLE SODIUM 40 MG/1
40 TABLET, DELAYED RELEASE ORAL 2 TIMES DAILY
Qty: 60 TABLET | Refills: 2 | Status: SHIPPED | OUTPATIENT
Start: 2021-02-01 | End: 2021-05-11 | Stop reason: CLARIF

## 2021-02-10 ENCOUNTER — IMMUNIZATION (OUTPATIENT)
Dept: INTERNAL MEDICINE | Facility: CLINIC | Age: 31
End: 2021-02-10
Payer: COMMERCIAL

## 2021-02-10 DIAGNOSIS — Z23 NEED FOR VACCINATION: Primary | ICD-10-CM

## 2021-02-10 PROCEDURE — 0002A COVID-19, MRNA, LNP-S, PF, 30 MCG/0.3 ML DOSE VACCINE: CPT | Mod: PBBFAC | Performed by: FAMILY MEDICINE

## 2021-02-10 PROCEDURE — 91300 COVID-19, MRNA, LNP-S, PF, 30 MCG/0.3 ML DOSE VACCINE: CPT | Mod: PBBFAC | Performed by: FAMILY MEDICINE

## 2021-02-11 ENCOUNTER — OFFICE VISIT (OUTPATIENT)
Dept: OBSTETRICS AND GYNECOLOGY | Facility: CLINIC | Age: 31
End: 2021-02-11
Payer: COMMERCIAL

## 2021-02-11 VITALS
WEIGHT: 266.56 LBS | DIASTOLIC BLOOD PRESSURE: 70 MMHG | HEIGHT: 66 IN | SYSTOLIC BLOOD PRESSURE: 138 MMHG | BODY MASS INDEX: 42.84 KG/M2

## 2021-02-11 DIAGNOSIS — N64.4 NIPPLE PAIN: ICD-10-CM

## 2021-02-11 DIAGNOSIS — Z01.419 WELL WOMAN EXAM WITH ROUTINE GYNECOLOGICAL EXAM: Primary | ICD-10-CM

## 2021-02-11 DIAGNOSIS — Z12.4 CERVICAL CANCER SCREENING: ICD-10-CM

## 2021-02-11 DIAGNOSIS — R10.2 PELVIC PRESSURE IN FEMALE: ICD-10-CM

## 2021-02-11 PROCEDURE — 99999 PR PBB SHADOW E&M-EST. PATIENT-LVL III: ICD-10-PCS | Mod: PBBFAC,,, | Performed by: OBSTETRICS & GYNECOLOGY

## 2021-02-11 PROCEDURE — 99395 PREV VISIT EST AGE 18-39: CPT | Mod: S$GLB,,, | Performed by: OBSTETRICS & GYNECOLOGY

## 2021-02-11 PROCEDURE — 88141 PR  CYTOPATH CERV/VAG INTERPRET: ICD-10-PCS | Mod: ,,, | Performed by: PATHOLOGY

## 2021-02-11 PROCEDURE — 3075F PR MOST RECENT SYSTOLIC BLOOD PRESS GE 130-139MM HG: ICD-10-PCS | Mod: CPTII,S$GLB,, | Performed by: OBSTETRICS & GYNECOLOGY

## 2021-02-11 PROCEDURE — 3008F BODY MASS INDEX DOCD: CPT | Mod: CPTII,S$GLB,, | Performed by: OBSTETRICS & GYNECOLOGY

## 2021-02-11 PROCEDURE — 88141 CYTOPATH C/V INTERPRET: CPT | Mod: ,,, | Performed by: PATHOLOGY

## 2021-02-11 PROCEDURE — 87624 HPV HI-RISK TYP POOLED RSLT: CPT

## 2021-02-11 PROCEDURE — 3075F SYST BP GE 130 - 139MM HG: CPT | Mod: CPTII,S$GLB,, | Performed by: OBSTETRICS & GYNECOLOGY

## 2021-02-11 PROCEDURE — 1126F AMNT PAIN NOTED NONE PRSNT: CPT | Mod: S$GLB,,, | Performed by: OBSTETRICS & GYNECOLOGY

## 2021-02-11 PROCEDURE — 3078F PR MOST RECENT DIASTOLIC BLOOD PRESSURE < 80 MM HG: ICD-10-PCS | Mod: CPTII,S$GLB,, | Performed by: OBSTETRICS & GYNECOLOGY

## 2021-02-11 PROCEDURE — 99999 PR PBB SHADOW E&M-EST. PATIENT-LVL III: CPT | Mod: PBBFAC,,, | Performed by: OBSTETRICS & GYNECOLOGY

## 2021-02-11 PROCEDURE — 99395 PR PREVENTIVE VISIT,EST,18-39: ICD-10-PCS | Mod: S$GLB,,, | Performed by: OBSTETRICS & GYNECOLOGY

## 2021-02-11 PROCEDURE — 3078F DIAST BP <80 MM HG: CPT | Mod: CPTII,S$GLB,, | Performed by: OBSTETRICS & GYNECOLOGY

## 2021-02-11 PROCEDURE — 1126F PR PAIN SEVERITY QUANTIFIED, NO PAIN PRESENT: ICD-10-PCS | Mod: S$GLB,,, | Performed by: OBSTETRICS & GYNECOLOGY

## 2021-02-11 PROCEDURE — 3008F PR BODY MASS INDEX (BMI) DOCUMENTED: ICD-10-PCS | Mod: CPTII,S$GLB,, | Performed by: OBSTETRICS & GYNECOLOGY

## 2021-02-11 PROCEDURE — 88175 CYTOPATH C/V AUTO FLUID REDO: CPT | Performed by: PATHOLOGY

## 2021-02-11 RX ORDER — FLUCONAZOLE 150 MG/1
150 TABLET ORAL EVERY OTHER DAY
Qty: 3 TABLET | Refills: 0 | Status: SHIPPED | OUTPATIENT
Start: 2021-02-11 | End: 2021-02-28

## 2021-02-12 ENCOUNTER — HOSPITAL ENCOUNTER (OUTPATIENT)
Dept: RADIOLOGY | Facility: HOSPITAL | Age: 31
Discharge: HOME | End: 2021-02-12
Attending: NEUROLOGICAL SURGERY
Payer: COMMERCIAL

## 2021-02-12 ENCOUNTER — CLINICAL SUPPORT (OUTPATIENT)
Dept: INTERNAL MEDICINE | Facility: CLINIC | Age: 31
End: 2021-02-12
Payer: COMMERCIAL

## 2021-02-12 ENCOUNTER — TELEPHONE (OUTPATIENT)
Dept: RADIOLOGY | Facility: HOSPITAL | Age: 31
End: 2021-02-12

## 2021-02-12 DIAGNOSIS — R51.9 NONINTRACTABLE HEADACHE, UNSPECIFIED CHRONICITY PATTERN, UNSPECIFIED HEADACHE TYPE: ICD-10-CM

## 2021-02-12 DIAGNOSIS — M54.12 RADICULOPATHY, CERVICAL REGION: ICD-10-CM

## 2021-02-12 DIAGNOSIS — R51.9 FACIAL PAIN: Primary | ICD-10-CM

## 2021-02-12 DIAGNOSIS — R51.9 FACIAL PAIN: ICD-10-CM

## 2021-02-12 LAB
CREAT SERPL-MCNC: 0.7 MG/DL (ref 0.5–1.4)
EST. GFR  (AFRICAN AMERICAN): >60 ML/MIN/1.73 M^2
EST. GFR  (NON AFRICAN AMERICAN): >60 ML/MIN/1.73 M^2

## 2021-02-12 PROCEDURE — 72156 MRI NECK SPINE W/O & W/DYE: CPT | Mod: 26,,, | Performed by: RADIOLOGY

## 2021-02-12 PROCEDURE — 70553 MRI BRAIN STEM W/O & W/DYE: CPT | Mod: 26,,, | Performed by: RADIOLOGY

## 2021-02-12 PROCEDURE — 72156 MRI CERVICAL SPINE DEMYELINATING W W/O CONTRAST: ICD-10-PCS | Mod: 26,,, | Performed by: RADIOLOGY

## 2021-02-12 PROCEDURE — 82565 ASSAY OF CREATININE: CPT

## 2021-02-12 PROCEDURE — 25500020 PHARM REV CODE 255: Performed by: NEUROLOGICAL SURGERY

## 2021-02-12 PROCEDURE — 70553 MRI BRAIN DEMYELINATING W/ WO CONTRAST: ICD-10-PCS | Mod: 26,,, | Performed by: RADIOLOGY

## 2021-02-12 PROCEDURE — 72156 MRI NECK SPINE W/O & W/DYE: CPT | Mod: TC

## 2021-02-12 PROCEDURE — 70553 MRI BRAIN STEM W/O & W/DYE: CPT | Mod: TC

## 2021-02-12 PROCEDURE — A9585 GADOBUTROL INJECTION: HCPCS | Performed by: NEUROLOGICAL SURGERY

## 2021-02-12 RX ORDER — GADOBUTROL 604.72 MG/ML
10 INJECTION INTRAVENOUS
Status: COMPLETED | OUTPATIENT
Start: 2021-02-12 | End: 2021-02-12

## 2021-02-12 RX ADMIN — GADOBUTROL 10 ML: 604.72 INJECTION INTRAVENOUS at 01:02

## 2021-02-15 ENCOUNTER — PATIENT MESSAGE (OUTPATIENT)
Dept: PLASTIC SURGERY | Facility: CLINIC | Age: 31
End: 2021-02-15

## 2021-02-17 ENCOUNTER — PATIENT MESSAGE (OUTPATIENT)
Dept: NEUROLOGY | Facility: CLINIC | Age: 31
End: 2021-02-17

## 2021-02-17 ENCOUNTER — TELEPHONE (OUTPATIENT)
Dept: RADIOLOGY | Facility: HOSPITAL | Age: 31
End: 2021-02-17

## 2021-02-18 DIAGNOSIS — R51.9 NONINTRACTABLE HEADACHE, UNSPECIFIED CHRONICITY PATTERN, UNSPECIFIED HEADACHE TYPE: Primary | ICD-10-CM

## 2021-02-18 LAB
HPV HR 12 DNA SPEC QL NAA+PROBE: POSITIVE
HPV16 AG SPEC QL: NEGATIVE
HPV18 DNA SPEC QL NAA+PROBE: NEGATIVE

## 2021-02-18 RX ORDER — RIMEGEPANT SULFATE 75 MG/75MG
75 TABLET, ORALLY DISINTEGRATING ORAL ONCE AS NEEDED
Qty: 8 TABLET | Refills: 2 | Status: SHIPPED | OUTPATIENT
Start: 2021-02-18 | End: 2021-03-09

## 2021-02-19 ENCOUNTER — HOSPITAL ENCOUNTER (OUTPATIENT)
Dept: RADIOLOGY | Facility: HOSPITAL | Age: 31
Discharge: HOME | End: 2021-02-19
Attending: OBSTETRICS & GYNECOLOGY
Payer: COMMERCIAL

## 2021-02-19 DIAGNOSIS — R10.2 PELVIC PRESSURE IN FEMALE: ICD-10-CM

## 2021-02-19 PROCEDURE — 76830 TRANSVAGINAL US NON-OB: CPT | Mod: 26,,, | Performed by: RADIOLOGY

## 2021-02-19 PROCEDURE — 76830 US PELVIS COMP WITH TRANSVAG NON-OB (XPD): ICD-10-PCS | Mod: 26,,, | Performed by: RADIOLOGY

## 2021-02-19 PROCEDURE — 76856 US EXAM PELVIC COMPLETE: CPT | Mod: 26,,, | Performed by: RADIOLOGY

## 2021-02-19 PROCEDURE — 76856 US PELVIS COMP WITH TRANSVAG NON-OB (XPD): ICD-10-PCS | Mod: 26,,, | Performed by: RADIOLOGY

## 2021-02-19 PROCEDURE — 76856 US EXAM PELVIC COMPLETE: CPT | Mod: TC

## 2021-02-25 ENCOUNTER — PATIENT MESSAGE (OUTPATIENT)
Dept: PLASTIC SURGERY | Facility: CLINIC | Age: 31
End: 2021-02-25

## 2021-02-25 DIAGNOSIS — Z01.818 PRE-OP TESTING: Primary | ICD-10-CM

## 2021-02-26 ENCOUNTER — OFFICE VISIT (OUTPATIENT)
Dept: CARDIOLOGY | Facility: CLINIC | Age: 31
End: 2021-02-26
Payer: COMMERCIAL

## 2021-02-26 VITALS
OXYGEN SATURATION: 97 % | WEIGHT: 264.31 LBS | HEIGHT: 66 IN | SYSTOLIC BLOOD PRESSURE: 128 MMHG | DIASTOLIC BLOOD PRESSURE: 74 MMHG | BODY MASS INDEX: 42.48 KG/M2 | RESPIRATION RATE: 16 BRPM | HEART RATE: 100 BPM

## 2021-02-26 DIAGNOSIS — G47.30 SLEEP APNEA, UNSPECIFIED TYPE: ICD-10-CM

## 2021-02-26 DIAGNOSIS — E66.01 MORBID OBESITY WITH BMI OF 40.0-44.9, ADULT: ICD-10-CM

## 2021-02-26 DIAGNOSIS — R00.2 PALPITATIONS: ICD-10-CM

## 2021-02-26 DIAGNOSIS — R07.9 CHEST PAIN, UNSPECIFIED TYPE: Primary | ICD-10-CM

## 2021-02-26 DIAGNOSIS — K21.9 GASTROESOPHAGEAL REFLUX DISEASE WITHOUT ESOPHAGITIS: ICD-10-CM

## 2021-02-26 DIAGNOSIS — I10 ESSENTIAL HYPERTENSION: ICD-10-CM

## 2021-02-26 PROCEDURE — 99214 OFFICE O/P EST MOD 30 MIN: CPT | Mod: S$GLB,,, | Performed by: INTERNAL MEDICINE

## 2021-02-26 PROCEDURE — 3074F PR MOST RECENT SYSTOLIC BLOOD PRESSURE < 130 MM HG: ICD-10-PCS | Mod: CPTII,S$GLB,, | Performed by: INTERNAL MEDICINE

## 2021-02-26 PROCEDURE — 1126F AMNT PAIN NOTED NONE PRSNT: CPT | Mod: S$GLB,,, | Performed by: INTERNAL MEDICINE

## 2021-02-26 PROCEDURE — 99214 PR OFFICE/OUTPT VISIT, EST, LEVL IV, 30-39 MIN: ICD-10-PCS | Mod: S$GLB,,, | Performed by: INTERNAL MEDICINE

## 2021-02-26 PROCEDURE — 3008F BODY MASS INDEX DOCD: CPT | Mod: CPTII,S$GLB,, | Performed by: INTERNAL MEDICINE

## 2021-02-26 PROCEDURE — 3078F DIAST BP <80 MM HG: CPT | Mod: CPTII,S$GLB,, | Performed by: INTERNAL MEDICINE

## 2021-02-26 PROCEDURE — 99999 PR PBB SHADOW E&M-EST. PATIENT-LVL V: ICD-10-PCS | Mod: PBBFAC,,, | Performed by: INTERNAL MEDICINE

## 2021-02-26 PROCEDURE — 99999 PR PBB SHADOW E&M-EST. PATIENT-LVL V: CPT | Mod: PBBFAC,,, | Performed by: INTERNAL MEDICINE

## 2021-02-26 PROCEDURE — 3008F PR BODY MASS INDEX (BMI) DOCUMENTED: ICD-10-PCS | Mod: CPTII,S$GLB,, | Performed by: INTERNAL MEDICINE

## 2021-02-26 PROCEDURE — 3074F SYST BP LT 130 MM HG: CPT | Mod: CPTII,S$GLB,, | Performed by: INTERNAL MEDICINE

## 2021-02-26 PROCEDURE — 3078F PR MOST RECENT DIASTOLIC BLOOD PRESSURE < 80 MM HG: ICD-10-PCS | Mod: CPTII,S$GLB,, | Performed by: INTERNAL MEDICINE

## 2021-02-26 PROCEDURE — 1126F PR PAIN SEVERITY QUANTIFIED, NO PAIN PRESENT: ICD-10-PCS | Mod: S$GLB,,, | Performed by: INTERNAL MEDICINE

## 2021-03-04 ENCOUNTER — CLINICAL SUPPORT (OUTPATIENT)
Dept: OTHER | Facility: CLINIC | Age: 31
End: 2021-03-04
Payer: COMMERCIAL

## 2021-03-04 DIAGNOSIS — Z00.8 ENCOUNTER FOR OTHER GENERAL EXAMINATION: ICD-10-CM

## 2021-03-04 DIAGNOSIS — N62 MACROMASTIA: Primary | ICD-10-CM

## 2021-03-05 VITALS — BODY MASS INDEX: 43.99 KG/M2 | HEIGHT: 65 IN

## 2021-03-05 LAB
GLUCOSE SERPL-MCNC: 103 MG/DL (ref 60–140)
HDLC SERPL-MCNC: 43 MG/DL
POC CHOLESTEROL, LDL (DOCK): 127 MG/DL
POC CHOLESTEROL, TOTAL: 190 MG/DL
TRIGL SERPL-MCNC: 94 MG/DL

## 2021-03-08 DIAGNOSIS — E55.9 VITAMIN D DEFICIENCY: ICD-10-CM

## 2021-03-09 RX ORDER — ERGOCALCIFEROL 1.25 MG/1
50000 CAPSULE ORAL
Qty: 4 CAPSULE | Refills: 5 | Status: SHIPPED | OUTPATIENT
Start: 2021-03-09 | End: 2021-04-22 | Stop reason: SDUPTHER

## 2021-03-10 LAB
FINAL PATHOLOGIC DIAGNOSIS: ABNORMAL
Lab: ABNORMAL

## 2021-03-23 DIAGNOSIS — E55.9 VITAMIN D DEFICIENCY: ICD-10-CM

## 2021-03-23 RX ORDER — ERGOCALCIFEROL 1.25 MG/1
50000 CAPSULE ORAL
Qty: 4 CAPSULE | Refills: 5 | Status: SHIPPED | OUTPATIENT
Start: 2021-03-23 | End: 2021-04-22 | Stop reason: SDUPTHER

## 2021-03-24 ENCOUNTER — OFFICE VISIT (OUTPATIENT)
Dept: PSYCHIATRY | Facility: CLINIC | Age: 31
End: 2021-03-24
Payer: COMMERCIAL

## 2021-03-24 DIAGNOSIS — F41.1 GENERALIZED ANXIETY DISORDER: Primary | ICD-10-CM

## 2021-03-24 DIAGNOSIS — F33.1 MODERATE EPISODE OF RECURRENT MAJOR DEPRESSIVE DISORDER: ICD-10-CM

## 2021-03-24 PROCEDURE — 99214 OFFICE O/P EST MOD 30 MIN: CPT | Mod: 95,,, | Performed by: PSYCHIATRY & NEUROLOGY

## 2021-03-24 PROCEDURE — 99214 PR OFFICE/OUTPT VISIT, EST, LEVL IV, 30-39 MIN: ICD-10-PCS | Mod: 95,,, | Performed by: PSYCHIATRY & NEUROLOGY

## 2021-03-24 RX ORDER — FLUOXETINE HYDROCHLORIDE 40 MG/1
40 CAPSULE ORAL DAILY
Qty: 30 CAPSULE | Refills: 4 | Status: SHIPPED | OUTPATIENT
Start: 2021-03-24 | End: 2021-06-04

## 2021-04-07 ENCOUNTER — TELEPHONE (OUTPATIENT)
Dept: PHARMACY | Facility: CLINIC | Age: 31
End: 2021-04-07

## 2021-04-11 NOTE — LETTER
November 2, 2020      [facility]  [address]  [place] [address]  Phone: [phone]       Patient: Anthony Sexton   YOB: 1990  Date of Visit: 11/02/2020    To Whom It May Concern:    Jorge Sexton  was at [facility] on 11/02/2020. She may return to work/school on 11/3/20 with no restrictions as long as she is feeling better. If you have any questions or concerns, or if I can be of further assistance, please do not hesitate to contact me.    Sincerely,    Ary Penn PA-C     
yes...

## 2021-04-15 ENCOUNTER — PATIENT MESSAGE (OUTPATIENT)
Dept: FAMILY MEDICINE | Facility: CLINIC | Age: 31
End: 2021-04-15

## 2021-04-15 DIAGNOSIS — Z00.00 WELLNESS EXAMINATION: Primary | ICD-10-CM

## 2021-04-20 ENCOUNTER — LAB VISIT (OUTPATIENT)
Dept: LAB | Facility: HOSPITAL | Age: 31
End: 2021-04-20
Attending: FAMILY MEDICINE
Payer: COMMERCIAL

## 2021-04-20 ENCOUNTER — PROCEDURE VISIT (OUTPATIENT)
Dept: OBSTETRICS AND GYNECOLOGY | Facility: CLINIC | Age: 31
End: 2021-04-20
Payer: COMMERCIAL

## 2021-04-20 VITALS
HEIGHT: 65 IN | BODY MASS INDEX: 45.36 KG/M2 | DIASTOLIC BLOOD PRESSURE: 76 MMHG | WEIGHT: 272.25 LBS | SYSTOLIC BLOOD PRESSURE: 138 MMHG

## 2021-04-20 DIAGNOSIS — Z00.00 WELLNESS EXAMINATION: ICD-10-CM

## 2021-04-20 DIAGNOSIS — B96.89 BACTERIAL VAGINOSIS: ICD-10-CM

## 2021-04-20 DIAGNOSIS — R87.610 ATYPICAL SQUAMOUS CELLS OF UNDETERMINED SIGNIFICANCE ON CYTOLOGIC SMEAR OF CERVIX (ASC-US): Primary | ICD-10-CM

## 2021-04-20 DIAGNOSIS — N76.0 BACTERIAL VAGINOSIS: ICD-10-CM

## 2021-04-20 PROBLEM — R87.810 ATYPICAL SQUAMOUS CELL CHANGES OF UNDETERMINED SIGNIFICANCE (ASCUS) ON CERVICAL CYTOLOGY WITH POSITIVE HIGH RISK HUMAN PAPILLOMA VIRUS (HPV): Status: ACTIVE | Noted: 2021-04-20

## 2021-04-20 LAB
25(OH)D3+25(OH)D2 SERPL-MCNC: 15 NG/ML (ref 30–96)
ALBUMIN SERPL BCP-MCNC: 3.2 G/DL (ref 3.5–5.2)
ALP SERPL-CCNC: 102 U/L (ref 55–135)
ALT SERPL W/O P-5'-P-CCNC: 11 U/L (ref 10–44)
ANION GAP SERPL CALC-SCNC: 5 MMOL/L (ref 8–16)
AST SERPL-CCNC: 15 U/L (ref 10–40)
BASOPHILS # BLD AUTO: 0.04 K/UL (ref 0–0.2)
BASOPHILS NFR BLD: 0.4 % (ref 0–1.9)
BILIRUB SERPL-MCNC: 0.3 MG/DL (ref 0.1–1)
BUN SERPL-MCNC: 12 MG/DL (ref 6–20)
CALCIUM SERPL-MCNC: 8.6 MG/DL (ref 8.7–10.5)
CHLORIDE SERPL-SCNC: 106 MMOL/L (ref 95–110)
CHOLEST SERPL-MCNC: 170 MG/DL (ref 120–199)
CHOLEST/HDLC SERPL: 4 {RATIO} (ref 2–5)
CO2 SERPL-SCNC: 26 MMOL/L (ref 23–29)
CREAT SERPL-MCNC: 0.7 MG/DL (ref 0.5–1.4)
DIFFERENTIAL METHOD: ABNORMAL
EOSINOPHIL # BLD AUTO: 0 K/UL (ref 0–0.5)
EOSINOPHIL NFR BLD: 0.4 % (ref 0–8)
ERYTHROCYTE [DISTWIDTH] IN BLOOD BY AUTOMATED COUNT: 15.8 % (ref 11.5–14.5)
EST. GFR  (AFRICAN AMERICAN): >60 ML/MIN/1.73 M^2
EST. GFR  (NON AFRICAN AMERICAN): >60 ML/MIN/1.73 M^2
ESTIMATED AVG GLUCOSE: 120 MG/DL (ref 68–131)
GLUCOSE SERPL-MCNC: 87 MG/DL (ref 70–110)
HBA1C MFR BLD: 5.8 % (ref 4–5.6)
HCT VFR BLD AUTO: 38 % (ref 37–48.5)
HDLC SERPL-MCNC: 42 MG/DL (ref 40–75)
HDLC SERPL: 24.7 % (ref 20–50)
HGB BLD-MCNC: 10.9 G/DL (ref 12–16)
IMM GRANULOCYTES # BLD AUTO: 0.03 K/UL (ref 0–0.04)
IMM GRANULOCYTES NFR BLD AUTO: 0.3 % (ref 0–0.5)
LDLC SERPL CALC-MCNC: 112.8 MG/DL (ref 63–159)
LYMPHOCYTES # BLD AUTO: 2.3 K/UL (ref 1–4.8)
LYMPHOCYTES NFR BLD: 22.8 % (ref 18–48)
MCH RBC QN AUTO: 22.3 PG (ref 27–31)
MCHC RBC AUTO-ENTMCNC: 28.7 G/DL (ref 32–36)
MCV RBC AUTO: 78 FL (ref 82–98)
MONOCYTES # BLD AUTO: 0.5 K/UL (ref 0.3–1)
MONOCYTES NFR BLD: 5.1 % (ref 4–15)
NEUTROPHILS # BLD AUTO: 7.2 K/UL (ref 1.8–7.7)
NEUTROPHILS NFR BLD: 71 % (ref 38–73)
NONHDLC SERPL-MCNC: 128 MG/DL
NRBC BLD-RTO: 0 /100 WBC
PLATELET # BLD AUTO: 321 K/UL (ref 150–450)
PMV BLD AUTO: 11.7 FL (ref 9.2–12.9)
POTASSIUM SERPL-SCNC: 4.3 MMOL/L (ref 3.5–5.1)
PROT SERPL-MCNC: 7.7 G/DL (ref 6–8.4)
RBC # BLD AUTO: 4.89 M/UL (ref 4–5.4)
SODIUM SERPL-SCNC: 137 MMOL/L (ref 136–145)
TRIGL SERPL-MCNC: 76 MG/DL (ref 30–150)
TSH SERPL DL<=0.005 MIU/L-ACNC: 0.89 UIU/ML (ref 0.4–4)
WBC # BLD AUTO: 10.19 K/UL (ref 3.9–12.7)

## 2021-04-20 PROCEDURE — 36415 COLL VENOUS BLD VENIPUNCTURE: CPT | Performed by: FAMILY MEDICINE

## 2021-04-20 PROCEDURE — 57454 COLPOSCOPY-TODAY: ICD-10-PCS | Mod: S$GLB,,, | Performed by: OBSTETRICS & GYNECOLOGY

## 2021-04-20 PROCEDURE — 88305 TISSUE EXAM BY PATHOLOGIST: CPT | Performed by: PATHOLOGY

## 2021-04-20 PROCEDURE — 57454 BX/CURETT OF CERVIX W/SCOPE: CPT | Mod: S$GLB,,, | Performed by: OBSTETRICS & GYNECOLOGY

## 2021-04-20 PROCEDURE — 84443 ASSAY THYROID STIM HORMONE: CPT | Performed by: FAMILY MEDICINE

## 2021-04-20 PROCEDURE — 85025 COMPLETE CBC W/AUTO DIFF WBC: CPT | Performed by: FAMILY MEDICINE

## 2021-04-20 PROCEDURE — 82306 VITAMIN D 25 HYDROXY: CPT | Performed by: FAMILY MEDICINE

## 2021-04-20 PROCEDURE — 83036 HEMOGLOBIN GLYCOSYLATED A1C: CPT | Performed by: FAMILY MEDICINE

## 2021-04-20 PROCEDURE — 86803 HEPATITIS C AB TEST: CPT | Performed by: FAMILY MEDICINE

## 2021-04-20 PROCEDURE — 87210 SMEAR WET MOUNT SALINE/INK: CPT | Mod: QW,S$GLB,, | Performed by: OBSTETRICS & GYNECOLOGY

## 2021-04-20 PROCEDURE — 88305 TISSUE EXAM BY PATHOLOGIST: CPT | Mod: 26,91,, | Performed by: PATHOLOGY

## 2021-04-20 PROCEDURE — 87210 PR  SMEAR,STAIN,WET MNT,INTERP: ICD-10-PCS | Mod: QW,S$GLB,, | Performed by: OBSTETRICS & GYNECOLOGY

## 2021-04-20 PROCEDURE — 80053 COMPREHEN METABOLIC PANEL: CPT | Performed by: FAMILY MEDICINE

## 2021-04-20 PROCEDURE — 86703 HIV-1/HIV-2 1 RESULT ANTBDY: CPT | Performed by: FAMILY MEDICINE

## 2021-04-20 PROCEDURE — 80061 LIPID PANEL: CPT | Performed by: FAMILY MEDICINE

## 2021-04-20 PROCEDURE — 88305 TISSUE EXAM BY PATHOLOGIST: ICD-10-PCS | Mod: 26,,, | Performed by: PATHOLOGY

## 2021-04-20 RX ORDER — METRONIDAZOLE 500 MG/1
500 TABLET ORAL 2 TIMES DAILY
Qty: 14 TABLET | Refills: 0 | Status: SHIPPED | OUTPATIENT
Start: 2021-04-20 | End: 2021-04-27

## 2021-04-21 LAB
HCV AB SERPL QL IA: NEGATIVE
HIV 1+2 AB+HIV1 P24 AG SERPL QL IA: NEGATIVE

## 2021-04-22 ENCOUNTER — OFFICE VISIT (OUTPATIENT)
Dept: FAMILY MEDICINE | Facility: CLINIC | Age: 31
End: 2021-04-22
Payer: COMMERCIAL

## 2021-04-22 VITALS
SYSTOLIC BLOOD PRESSURE: 116 MMHG | TEMPERATURE: 99 F | HEIGHT: 65 IN | HEART RATE: 102 BPM | OXYGEN SATURATION: 100 % | WEIGHT: 268.88 LBS | BODY MASS INDEX: 44.8 KG/M2 | DIASTOLIC BLOOD PRESSURE: 78 MMHG

## 2021-04-22 DIAGNOSIS — E66.01 MORBID OBESITY WITH BMI OF 40.0-44.9, ADULT: ICD-10-CM

## 2021-04-22 DIAGNOSIS — R73.09 ELEVATED HEMOGLOBIN A1C: ICD-10-CM

## 2021-04-22 DIAGNOSIS — K21.9 GASTROESOPHAGEAL REFLUX DISEASE WITHOUT ESOPHAGITIS: ICD-10-CM

## 2021-04-22 DIAGNOSIS — Z00.00 ANNUAL PHYSICAL EXAM: Primary | ICD-10-CM

## 2021-04-22 DIAGNOSIS — E55.9 VITAMIN D DEFICIENCY: ICD-10-CM

## 2021-04-22 DIAGNOSIS — I10 ESSENTIAL HYPERTENSION: ICD-10-CM

## 2021-04-22 PROBLEM — R73.03 PREDIABETES: Status: RESOLVED | Noted: 2021-04-22 | Resolved: 2021-04-22

## 2021-04-22 PROBLEM — R73.03 PREDIABETES: Status: ACTIVE | Noted: 2021-04-22

## 2021-04-22 PROCEDURE — 1126F PR PAIN SEVERITY QUANTIFIED, NO PAIN PRESENT: ICD-10-PCS | Mod: S$GLB,,, | Performed by: FAMILY MEDICINE

## 2021-04-22 PROCEDURE — 99999 PR PBB SHADOW E&M-EST. PATIENT-LVL III: ICD-10-PCS | Mod: PBBFAC,,, | Performed by: FAMILY MEDICINE

## 2021-04-22 PROCEDURE — 99395 PREV VISIT EST AGE 18-39: CPT | Mod: S$GLB,,, | Performed by: FAMILY MEDICINE

## 2021-04-22 PROCEDURE — 3008F BODY MASS INDEX DOCD: CPT | Mod: CPTII,S$GLB,, | Performed by: FAMILY MEDICINE

## 2021-04-22 PROCEDURE — 3008F PR BODY MASS INDEX (BMI) DOCUMENTED: ICD-10-PCS | Mod: CPTII,S$GLB,, | Performed by: FAMILY MEDICINE

## 2021-04-22 PROCEDURE — 99999 PR PBB SHADOW E&M-EST. PATIENT-LVL III: CPT | Mod: PBBFAC,,, | Performed by: FAMILY MEDICINE

## 2021-04-22 PROCEDURE — 99395 PR PREVENTIVE VISIT,EST,18-39: ICD-10-PCS | Mod: S$GLB,,, | Performed by: FAMILY MEDICINE

## 2021-04-22 PROCEDURE — 1126F AMNT PAIN NOTED NONE PRSNT: CPT | Mod: S$GLB,,, | Performed by: FAMILY MEDICINE

## 2021-04-22 RX ORDER — ERGOCALCIFEROL 1.25 MG/1
50000 CAPSULE ORAL
Qty: 8 CAPSULE | Refills: 11 | Status: SHIPPED | OUTPATIENT
Start: 2021-04-22 | End: 2022-05-02 | Stop reason: SDUPTHER

## 2021-04-28 LAB
FINAL PATHOLOGIC DIAGNOSIS: NORMAL
FINAL PATHOLOGIC DIAGNOSIS: NORMAL
GROSS: NORMAL
GROSS: NORMAL
Lab: NORMAL
Lab: NORMAL

## 2021-05-03 ENCOUNTER — TELEPHONE (OUTPATIENT)
Dept: OBSTETRICS AND GYNECOLOGY | Facility: CLINIC | Age: 31
End: 2021-05-03

## 2021-05-03 DIAGNOSIS — F41.9 ANXIETY DUE TO INVASIVE PROCEDURE: Primary | ICD-10-CM

## 2021-05-03 RX ORDER — DIAZEPAM 5 MG/1
5 TABLET ORAL
Qty: 1 TABLET | Refills: 0 | Status: SHIPPED | OUTPATIENT
Start: 2021-05-03 | End: 2021-11-01 | Stop reason: SDUPTHER

## 2021-05-10 ENCOUNTER — PATIENT MESSAGE (OUTPATIENT)
Dept: ADMINISTRATIVE | Facility: OTHER | Age: 31
End: 2021-05-10

## 2021-05-10 ENCOUNTER — OFFICE VISIT (OUTPATIENT)
Dept: FAMILY MEDICINE | Facility: CLINIC | Age: 31
End: 2021-05-10
Payer: COMMERCIAL

## 2021-05-10 ENCOUNTER — HOSPITAL ENCOUNTER (OUTPATIENT)
Dept: RADIOLOGY | Facility: HOSPITAL | Age: 31
Discharge: HOME | End: 2021-05-10
Attending: FAMILY MEDICINE
Payer: COMMERCIAL

## 2021-05-10 VITALS
OXYGEN SATURATION: 100 % | WEIGHT: 265.31 LBS | TEMPERATURE: 98 F | HEIGHT: 65 IN | BODY MASS INDEX: 44.2 KG/M2 | DIASTOLIC BLOOD PRESSURE: 70 MMHG | SYSTOLIC BLOOD PRESSURE: 120 MMHG | HEART RATE: 100 BPM

## 2021-05-10 DIAGNOSIS — I10 ESSENTIAL HYPERTENSION: ICD-10-CM

## 2021-05-10 DIAGNOSIS — K21.9 GASTROESOPHAGEAL REFLUX DISEASE WITHOUT ESOPHAGITIS: ICD-10-CM

## 2021-05-10 DIAGNOSIS — E66.01 MORBID OBESITY WITH BMI OF 40.0-44.9, ADULT: ICD-10-CM

## 2021-05-10 DIAGNOSIS — R06.02 SHORTNESS OF BREATH: ICD-10-CM

## 2021-05-10 DIAGNOSIS — R06.02 SHORTNESS OF BREATH: Primary | ICD-10-CM

## 2021-05-10 LAB
CTP QC/QA: YES
SARS-COV-2 RDRP RESP QL NAA+PROBE: NEGATIVE

## 2021-05-10 PROCEDURE — 99214 OFFICE O/P EST MOD 30 MIN: CPT | Mod: S$GLB,,, | Performed by: FAMILY MEDICINE

## 2021-05-10 PROCEDURE — 93005 ELECTROCARDIOGRAM TRACING: CPT

## 2021-05-10 PROCEDURE — 99999 PR PBB SHADOW E&M-EST. PATIENT-LVL III: ICD-10-PCS | Mod: PBBFAC,,, | Performed by: FAMILY MEDICINE

## 2021-05-10 PROCEDURE — 1125F AMNT PAIN NOTED PAIN PRSNT: CPT | Mod: S$GLB,,, | Performed by: FAMILY MEDICINE

## 2021-05-10 PROCEDURE — 99285 EMERGENCY DEPT VISIT HI MDM: CPT | Mod: 25

## 2021-05-10 PROCEDURE — 3008F BODY MASS INDEX DOCD: CPT | Mod: CPTII,S$GLB,, | Performed by: FAMILY MEDICINE

## 2021-05-10 PROCEDURE — 99214 PR OFFICE/OUTPT VISIT, EST, LEVL IV, 30-39 MIN: ICD-10-PCS | Mod: S$GLB,,, | Performed by: FAMILY MEDICINE

## 2021-05-10 PROCEDURE — 71046 X-RAY EXAM CHEST 2 VIEWS: CPT | Mod: 26,,, | Performed by: RADIOLOGY

## 2021-05-10 PROCEDURE — 3008F PR BODY MASS INDEX (BMI) DOCUMENTED: ICD-10-PCS | Mod: CPTII,S$GLB,, | Performed by: FAMILY MEDICINE

## 2021-05-10 PROCEDURE — 71046 X-RAY EXAM CHEST 2 VIEWS: CPT | Mod: TC,FY,PO

## 2021-05-10 PROCEDURE — 93010 EKG 12-LEAD: ICD-10-PCS | Mod: ,,, | Performed by: INTERNAL MEDICINE

## 2021-05-10 PROCEDURE — U0002 COVID-19 LAB TEST NON-CDC: HCPCS | Performed by: NURSE PRACTITIONER

## 2021-05-10 PROCEDURE — 71046 XR CHEST PA AND LATERAL: ICD-10-PCS | Mod: 26,,, | Performed by: RADIOLOGY

## 2021-05-10 PROCEDURE — 93010 ELECTROCARDIOGRAM REPORT: CPT | Mod: ,,, | Performed by: INTERNAL MEDICINE

## 2021-05-10 PROCEDURE — 1125F PR PAIN SEVERITY QUANTIFIED, PAIN PRESENT: ICD-10-PCS | Mod: S$GLB,,, | Performed by: FAMILY MEDICINE

## 2021-05-10 PROCEDURE — 99999 PR PBB SHADOW E&M-EST. PATIENT-LVL III: CPT | Mod: PBBFAC,,, | Performed by: FAMILY MEDICINE

## 2021-05-11 ENCOUNTER — OFFICE VISIT (OUTPATIENT)
Dept: GASTROENTEROLOGY | Facility: CLINIC | Age: 31
End: 2021-05-11
Payer: COMMERCIAL

## 2021-05-11 ENCOUNTER — HOSPITAL ENCOUNTER (EMERGENCY)
Facility: HOSPITAL | Age: 31
Discharge: HOME | End: 2021-05-11
Attending: EMERGENCY MEDICINE
Payer: COMMERCIAL

## 2021-05-11 ENCOUNTER — PATIENT MESSAGE (OUTPATIENT)
Dept: GASTROENTEROLOGY | Facility: CLINIC | Age: 31
End: 2021-05-11

## 2021-05-11 ENCOUNTER — TELEPHONE (OUTPATIENT)
Dept: GASTROENTEROLOGY | Facility: CLINIC | Age: 31
End: 2021-05-11

## 2021-05-11 ENCOUNTER — HOSPITAL ENCOUNTER (OUTPATIENT)
Dept: RADIOLOGY | Facility: HOSPITAL | Age: 31
Discharge: HOME | End: 2021-05-11
Attending: FAMILY MEDICINE
Payer: COMMERCIAL

## 2021-05-11 VITALS
TEMPERATURE: 98 F | HEIGHT: 65 IN | SYSTOLIC BLOOD PRESSURE: 125 MMHG | RESPIRATION RATE: 18 BRPM | OXYGEN SATURATION: 100 % | WEIGHT: 265.88 LBS | BODY MASS INDEX: 44.3 KG/M2 | HEART RATE: 65 BPM | DIASTOLIC BLOOD PRESSURE: 75 MMHG

## 2021-05-11 DIAGNOSIS — R06.02 SHORTNESS OF BREATH: ICD-10-CM

## 2021-05-11 DIAGNOSIS — R07.9 CHEST PAIN: ICD-10-CM

## 2021-05-11 DIAGNOSIS — K21.9 GASTROESOPHAGEAL REFLUX DISEASE, UNSPECIFIED WHETHER ESOPHAGITIS PRESENT: Primary | ICD-10-CM

## 2021-05-11 DIAGNOSIS — R07.9 CHEST PAIN, UNSPECIFIED TYPE: ICD-10-CM

## 2021-05-11 LAB
ALBUMIN SERPL BCP-MCNC: 3.6 G/DL (ref 3.5–5.2)
ALP SERPL-CCNC: 108 U/L (ref 55–135)
ALT SERPL W/O P-5'-P-CCNC: 12 U/L (ref 10–44)
ANION GAP SERPL CALC-SCNC: 10 MMOL/L (ref 8–16)
AST SERPL-CCNC: 15 U/L (ref 10–40)
B-HCG UR QL: NEGATIVE
BASOPHILS # BLD AUTO: 0.06 K/UL (ref 0–0.2)
BASOPHILS NFR BLD: 0.6 % (ref 0–1.9)
BILIRUB SERPL-MCNC: 0.2 MG/DL (ref 0.1–1)
BNP SERPL-MCNC: 12 PG/ML (ref 0–99)
BUN SERPL-MCNC: 14 MG/DL (ref 6–20)
CALCIUM SERPL-MCNC: 8.9 MG/DL (ref 8.7–10.5)
CHLORIDE SERPL-SCNC: 106 MMOL/L (ref 95–110)
CO2 SERPL-SCNC: 24 MMOL/L (ref 23–29)
CREAT SERPL-MCNC: 0.9 MG/DL (ref 0.5–1.4)
DIFFERENTIAL METHOD: ABNORMAL
EOSINOPHIL # BLD AUTO: 0.1 K/UL (ref 0–0.5)
EOSINOPHIL NFR BLD: 0.6 % (ref 0–8)
ERYTHROCYTE [DISTWIDTH] IN BLOOD BY AUTOMATED COUNT: 15.1 % (ref 11.5–14.5)
EST. GFR  (AFRICAN AMERICAN): >60 ML/MIN/1.73 M^2
EST. GFR  (NON AFRICAN AMERICAN): >60 ML/MIN/1.73 M^2
GLUCOSE SERPL-MCNC: 93 MG/DL (ref 70–110)
HCT VFR BLD AUTO: 37.3 % (ref 37–48.5)
HGB BLD-MCNC: 10.7 G/DL (ref 12–16)
IMM GRANULOCYTES # BLD AUTO: 0.04 K/UL (ref 0–0.04)
IMM GRANULOCYTES NFR BLD AUTO: 0.4 % (ref 0–0.5)
LYMPHOCYTES # BLD AUTO: 3.2 K/UL (ref 1–4.8)
LYMPHOCYTES NFR BLD: 30 % (ref 18–48)
MCH RBC QN AUTO: 22.2 PG (ref 27–31)
MCHC RBC AUTO-ENTMCNC: 28.7 G/DL (ref 32–36)
MCV RBC AUTO: 77 FL (ref 82–98)
MONOCYTES # BLD AUTO: 0.5 K/UL (ref 0.3–1)
MONOCYTES NFR BLD: 4.9 % (ref 4–15)
NEUTROPHILS # BLD AUTO: 6.7 K/UL (ref 1.8–7.7)
NEUTROPHILS NFR BLD: 63.5 % (ref 38–73)
NRBC BLD-RTO: 0 /100 WBC
PLATELET # BLD AUTO: 322 K/UL (ref 150–450)
PMV BLD AUTO: 10.2 FL (ref 9.2–12.9)
POTASSIUM SERPL-SCNC: 4 MMOL/L (ref 3.5–5.1)
PROT SERPL-MCNC: 8 G/DL (ref 6–8.4)
RBC # BLD AUTO: 4.83 M/UL (ref 4–5.4)
SODIUM SERPL-SCNC: 140 MMOL/L (ref 136–145)
TROPONIN I SERPL DL<=0.01 NG/ML-MCNC: <0.006 NG/ML (ref 0–0.03)
WBC # BLD AUTO: 10.53 K/UL (ref 3.9–12.7)

## 2021-05-11 PROCEDURE — 84484 ASSAY OF TROPONIN QUANT: CPT | Performed by: NURSE PRACTITIONER

## 2021-05-11 PROCEDURE — 71275 CTA CHEST NON CORONARY: ICD-10-PCS | Mod: 26,,, | Performed by: RADIOLOGY

## 2021-05-11 PROCEDURE — 71275 CT ANGIOGRAPHY CHEST: CPT | Mod: TC

## 2021-05-11 PROCEDURE — 25500020 PHARM REV CODE 255: Performed by: FAMILY MEDICINE

## 2021-05-11 PROCEDURE — 80053 COMPREHEN METABOLIC PANEL: CPT | Performed by: NURSE PRACTITIONER

## 2021-05-11 PROCEDURE — 71275 CT ANGIOGRAPHY CHEST: CPT | Mod: 26,,, | Performed by: RADIOLOGY

## 2021-05-11 PROCEDURE — 99204 PR OFFICE/OUTPT VISIT, NEW, LEVL IV, 45-59 MIN: ICD-10-PCS | Mod: 95,,, | Performed by: INTERNAL MEDICINE

## 2021-05-11 PROCEDURE — 85025 COMPLETE CBC W/AUTO DIFF WBC: CPT | Performed by: NURSE PRACTITIONER

## 2021-05-11 PROCEDURE — 81025 URINE PREGNANCY TEST: CPT | Performed by: NURSE PRACTITIONER

## 2021-05-11 PROCEDURE — 99204 OFFICE O/P NEW MOD 45 MIN: CPT | Mod: 95,,, | Performed by: INTERNAL MEDICINE

## 2021-05-11 PROCEDURE — 83880 ASSAY OF NATRIURETIC PEPTIDE: CPT | Performed by: NURSE PRACTITIONER

## 2021-05-11 RX ORDER — ESOMEPRAZOLE MAGNESIUM 40 MG/1
40 CAPSULE, DELAYED RELEASE ORAL
Qty: 30 CAPSULE | Refills: 5 | Status: SHIPPED | OUTPATIENT
Start: 2021-05-11 | End: 2021-09-17

## 2021-05-11 RX ADMIN — IOHEXOL 100 ML: 350 INJECTION, SOLUTION INTRAVENOUS at 12:05

## 2021-05-12 ENCOUNTER — PATIENT MESSAGE (OUTPATIENT)
Dept: GASTROENTEROLOGY | Facility: CLINIC | Age: 31
End: 2021-05-12

## 2021-05-12 ENCOUNTER — TELEPHONE (OUTPATIENT)
Dept: GASTROENTEROLOGY | Facility: CLINIC | Age: 31
End: 2021-05-12

## 2021-05-12 DIAGNOSIS — R07.9 CHEST PAIN, UNSPECIFIED TYPE: ICD-10-CM

## 2021-05-12 DIAGNOSIS — K21.9 GASTROESOPHAGEAL REFLUX DISEASE, UNSPECIFIED WHETHER ESOPHAGITIS PRESENT: Primary | ICD-10-CM

## 2021-05-13 ENCOUNTER — PATIENT MESSAGE (OUTPATIENT)
Dept: PLASTIC SURGERY | Facility: CLINIC | Age: 31
End: 2021-05-13

## 2021-05-13 ENCOUNTER — TELEPHONE (OUTPATIENT)
Dept: PLASTIC SURGERY | Facility: CLINIC | Age: 31
End: 2021-05-13

## 2021-05-14 ENCOUNTER — OFFICE VISIT (OUTPATIENT)
Dept: PSYCHIATRY | Facility: CLINIC | Age: 31
End: 2021-05-14
Payer: COMMERCIAL

## 2021-05-14 DIAGNOSIS — F41.1 GENERALIZED ANXIETY DISORDER: Primary | ICD-10-CM

## 2021-05-14 DIAGNOSIS — F90.0 ATTENTION DEFICIT HYPERACTIVITY DISORDER (ADHD), PREDOMINANTLY INATTENTIVE TYPE: ICD-10-CM

## 2021-05-14 PROCEDURE — 99214 PR OFFICE/OUTPT VISIT, EST, LEVL IV, 30-39 MIN: ICD-10-PCS | Mod: 95,,, | Performed by: PSYCHIATRY & NEUROLOGY

## 2021-05-14 PROCEDURE — 99214 OFFICE O/P EST MOD 30 MIN: CPT | Mod: 95,,, | Performed by: PSYCHIATRY & NEUROLOGY

## 2021-05-14 RX ORDER — BUSPIRONE HYDROCHLORIDE 7.5 MG/1
7.5 TABLET ORAL 2 TIMES DAILY
Qty: 60 TABLET | Refills: 11 | Status: SHIPPED | OUTPATIENT
Start: 2021-05-14 | End: 2021-06-04

## 2021-05-17 ENCOUNTER — PATIENT MESSAGE (OUTPATIENT)
Dept: ENDOSCOPY | Facility: HOSPITAL | Age: 31
End: 2021-05-17

## 2021-05-17 ENCOUNTER — PATIENT MESSAGE (OUTPATIENT)
Dept: SURGERY | Facility: HOSPITAL | Age: 31
End: 2021-05-17

## 2021-05-19 ENCOUNTER — PATIENT MESSAGE (OUTPATIENT)
Dept: SURGERY | Facility: HOSPITAL | Age: 31
End: 2021-05-19

## 2021-05-19 ENCOUNTER — PATIENT MESSAGE (OUTPATIENT)
Dept: PSYCHIATRY | Facility: CLINIC | Age: 31
End: 2021-05-19

## 2021-05-20 ENCOUNTER — PATIENT MESSAGE (OUTPATIENT)
Dept: ENDOSCOPY | Facility: HOSPITAL | Age: 31
End: 2021-05-20

## 2021-05-26 ENCOUNTER — ANESTHESIA EVENT (OUTPATIENT)
Dept: SURGERY | Facility: HOSPITAL | Age: 31
End: 2021-05-26
Payer: COMMERCIAL

## 2021-05-26 ENCOUNTER — TELEPHONE (OUTPATIENT)
Dept: PLASTIC SURGERY | Facility: CLINIC | Age: 31
End: 2021-05-26

## 2021-05-26 ENCOUNTER — PATIENT MESSAGE (OUTPATIENT)
Dept: PLASTIC SURGERY | Facility: CLINIC | Age: 31
End: 2021-05-26

## 2021-05-27 ENCOUNTER — HOSPITAL ENCOUNTER (OUTPATIENT)
Facility: HOSPITAL | Age: 31
Discharge: HOME | End: 2021-05-27
Attending: SURGERY | Admitting: SURGERY
Payer: COMMERCIAL

## 2021-05-27 ENCOUNTER — ANESTHESIA (OUTPATIENT)
Dept: SURGERY | Facility: HOSPITAL | Age: 31
End: 2021-05-27
Payer: COMMERCIAL

## 2021-05-27 VITALS
BODY MASS INDEX: 41.95 KG/M2 | WEIGHT: 261 LBS | HEART RATE: 78 BPM | SYSTOLIC BLOOD PRESSURE: 118 MMHG | HEIGHT: 66 IN | RESPIRATION RATE: 18 BRPM | OXYGEN SATURATION: 97 % | DIASTOLIC BLOOD PRESSURE: 71 MMHG | TEMPERATURE: 98 F

## 2021-05-27 DIAGNOSIS — N62 MACROMASTIA: Primary | ICD-10-CM

## 2021-05-27 LAB
B-HCG UR QL: NEGATIVE
CTP QC/QA: YES

## 2021-05-27 PROCEDURE — 64461 BILATERAL ERECTOR SPINAE PLANE SINGLE INJECTION BLOCK: ICD-10-PCS | Mod: 59,50,, | Performed by: ANESTHESIOLOGY

## 2021-05-27 PROCEDURE — 63600175 PHARM REV CODE 636 W HCPCS: Performed by: STUDENT IN AN ORGANIZED HEALTH CARE EDUCATION/TRAINING PROGRAM

## 2021-05-27 PROCEDURE — 27201423 OPTIME MED/SURG SUP & DEVICES STERILE SUPPLY: Performed by: SURGERY

## 2021-05-27 PROCEDURE — 81025 URINE PREGNANCY TEST: CPT | Performed by: STUDENT IN AN ORGANIZED HEALTH CARE EDUCATION/TRAINING PROGRAM

## 2021-05-27 PROCEDURE — 64461 PVB THORACIC SINGLE INJ SITE: CPT | Performed by: STUDENT IN AN ORGANIZED HEALTH CARE EDUCATION/TRAINING PROGRAM

## 2021-05-27 PROCEDURE — 64461 PVB THORACIC SINGLE INJ SITE: CPT | Mod: 59,50,, | Performed by: ANESTHESIOLOGY

## 2021-05-27 PROCEDURE — 71000015 HC POSTOP RECOV 1ST HR: Performed by: SURGERY

## 2021-05-27 PROCEDURE — D9220A PRA ANESTHESIA: Mod: ,,, | Performed by: STUDENT IN AN ORGANIZED HEALTH CARE EDUCATION/TRAINING PROGRAM

## 2021-05-27 PROCEDURE — 71000044 HC DOSC ROUTINE RECOVERY FIRST HOUR: Performed by: SURGERY

## 2021-05-27 PROCEDURE — 25000003 PHARM REV CODE 250: Performed by: STUDENT IN AN ORGANIZED HEALTH CARE EDUCATION/TRAINING PROGRAM

## 2021-05-27 PROCEDURE — 19318 PR REDUCTION OF LARGE BREAST: ICD-10-PCS | Mod: 50,,, | Performed by: SURGERY

## 2021-05-27 PROCEDURE — 37000009 HC ANESTHESIA EA ADD 15 MINS: Performed by: SURGERY

## 2021-05-27 PROCEDURE — 71000016 HC POSTOP RECOV ADDL HR: Performed by: SURGERY

## 2021-05-27 PROCEDURE — C1729 CATH, DRAINAGE: HCPCS | Performed by: SURGERY

## 2021-05-27 PROCEDURE — D9220A PRA ANESTHESIA: ICD-10-PCS | Mod: ,,, | Performed by: STUDENT IN AN ORGANIZED HEALTH CARE EDUCATION/TRAINING PROGRAM

## 2021-05-27 PROCEDURE — 36000706: Performed by: SURGERY

## 2021-05-27 PROCEDURE — 88305 TISSUE EXAM BY PATHOLOGIST: ICD-10-PCS | Mod: 26,,, | Performed by: PATHOLOGY

## 2021-05-27 PROCEDURE — 88305 TISSUE EXAM BY PATHOLOGIST: CPT | Mod: 59 | Performed by: PATHOLOGY

## 2021-05-27 PROCEDURE — 37000008 HC ANESTHESIA 1ST 15 MINUTES: Performed by: SURGERY

## 2021-05-27 PROCEDURE — 88305 TISSUE EXAM BY PATHOLOGIST: CPT | Mod: 26,,, | Performed by: PATHOLOGY

## 2021-05-27 PROCEDURE — C1751 CATH, INF, PER/CENT/MIDLINE: HCPCS | Performed by: STUDENT IN AN ORGANIZED HEALTH CARE EDUCATION/TRAINING PROGRAM

## 2021-05-27 PROCEDURE — 19318 BREAST REDUCTION: CPT | Mod: 50,,, | Performed by: SURGERY

## 2021-05-27 PROCEDURE — 36000707: Performed by: SURGERY

## 2021-05-27 PROCEDURE — 25000003 PHARM REV CODE 250: Performed by: SURGERY

## 2021-05-27 RX ORDER — AMOXICILLIN 250 MG
2 CAPSULE ORAL DAILY
Qty: 20 TABLET | Refills: 0 | Status: SHIPPED | OUTPATIENT
Start: 2021-05-27 | End: 2021-06-05

## 2021-05-27 RX ORDER — HEPARIN SODIUM 5000 [USP'U]/ML
5000 INJECTION, SOLUTION INTRAVENOUS; SUBCUTANEOUS ONCE
Status: COMPLETED | OUTPATIENT
Start: 2021-05-27 | End: 2021-05-27

## 2021-05-27 RX ORDER — CEPHALEXIN 500 MG/1
500 CAPSULE ORAL EVERY 12 HOURS
Qty: 10 CAPSULE | Refills: 0 | Status: SHIPPED | OUTPATIENT
Start: 2021-05-27 | End: 2021-06-01

## 2021-05-27 RX ORDER — MIDAZOLAM HYDROCHLORIDE 1 MG/ML
0.5 INJECTION INTRAMUSCULAR; INTRAVENOUS
Status: DISCONTINUED | OUTPATIENT
Start: 2021-05-27 | End: 2021-05-27 | Stop reason: HOSPADM

## 2021-05-27 RX ORDER — SODIUM CHLORIDE 0.9 % (FLUSH) 0.9 %
10 SYRINGE (ML) INJECTION
Status: DISCONTINUED | OUTPATIENT
Start: 2021-05-27 | End: 2021-05-27 | Stop reason: HOSPADM

## 2021-05-27 RX ORDER — ONDANSETRON 4 MG/1
4 TABLET, FILM COATED ORAL EVERY 8 HOURS PRN
Qty: 20 TABLET | Refills: 0 | Status: SHIPPED | OUTPATIENT
Start: 2021-05-27 | End: 2021-06-05

## 2021-05-27 RX ORDER — ONDANSETRON 2 MG/ML
4 INJECTION INTRAMUSCULAR; INTRAVENOUS EVERY 12 HOURS PRN
Status: DISCONTINUED | OUTPATIENT
Start: 2021-05-27 | End: 2021-05-27 | Stop reason: HOSPADM

## 2021-05-27 RX ORDER — HYDROMORPHONE HYDROCHLORIDE 1 MG/ML
0.2 INJECTION, SOLUTION INTRAMUSCULAR; INTRAVENOUS; SUBCUTANEOUS EVERY 5 MIN PRN
Status: DISCONTINUED | OUTPATIENT
Start: 2021-05-27 | End: 2021-05-27 | Stop reason: HOSPADM

## 2021-05-27 RX ORDER — OXYCODONE AND ACETAMINOPHEN 5; 325 MG/1; MG/1
1 TABLET ORAL EVERY 4 HOURS PRN
Qty: 30 TABLET | Refills: 0 | Status: SHIPPED | OUTPATIENT
Start: 2021-05-27 | End: 2021-06-05

## 2021-05-27 RX ORDER — PROPOFOL 10 MG/ML
VIAL (ML) INTRAVENOUS
Status: DISCONTINUED | OUTPATIENT
Start: 2021-05-27 | End: 2021-05-27

## 2021-05-27 RX ORDER — FENTANYL CITRATE 50 UG/ML
INJECTION, SOLUTION INTRAMUSCULAR; INTRAVENOUS
Status: DISCONTINUED | OUTPATIENT
Start: 2021-05-27 | End: 2021-05-27

## 2021-05-27 RX ORDER — LIDOCAINE HYDROCHLORIDE AND EPINEPHRINE 10; 10 MG/ML; UG/ML
INJECTION, SOLUTION INFILTRATION; PERINEURAL
Status: DISCONTINUED | OUTPATIENT
Start: 2021-05-27 | End: 2021-05-27 | Stop reason: HOSPADM

## 2021-05-27 RX ORDER — PHENYLEPHRINE HCL IN 0.9% NACL 1 MG/10 ML
SYRINGE (ML) INTRAVENOUS
Status: DISCONTINUED | OUTPATIENT
Start: 2021-05-27 | End: 2021-05-27

## 2021-05-27 RX ORDER — SUCCINYLCHOLINE CHLORIDE 20 MG/ML
INJECTION INTRAMUSCULAR; INTRAVENOUS
Status: DISCONTINUED | OUTPATIENT
Start: 2021-05-27 | End: 2021-05-27

## 2021-05-27 RX ORDER — LIDOCAINE HYDROCHLORIDE 10 MG/ML
1 INJECTION, SOLUTION EPIDURAL; INFILTRATION; INTRACAUDAL; PERINEURAL ONCE
Status: DISCONTINUED | OUTPATIENT
Start: 2021-05-27 | End: 2021-05-27 | Stop reason: HOSPADM

## 2021-05-27 RX ORDER — ROCURONIUM BROMIDE 10 MG/ML
INJECTION, SOLUTION INTRAVENOUS
Status: DISCONTINUED | OUTPATIENT
Start: 2021-05-27 | End: 2021-05-27

## 2021-05-27 RX ORDER — ONDANSETRON 2 MG/ML
4 INJECTION INTRAMUSCULAR; INTRAVENOUS DAILY PRN
Status: DISCONTINUED | OUTPATIENT
Start: 2021-05-27 | End: 2021-05-27 | Stop reason: HOSPADM

## 2021-05-27 RX ORDER — METOCLOPRAMIDE HYDROCHLORIDE 5 MG/ML
5 INJECTION INTRAMUSCULAR; INTRAVENOUS EVERY 6 HOURS PRN
Status: DISCONTINUED | OUTPATIENT
Start: 2021-05-27 | End: 2021-05-27 | Stop reason: HOSPADM

## 2021-05-27 RX ORDER — CEFAZOLIN SODIUM 1 G/3ML
INJECTION, POWDER, FOR SOLUTION INTRAMUSCULAR; INTRAVENOUS
Status: DISCONTINUED | OUTPATIENT
Start: 2021-05-27 | End: 2021-05-27

## 2021-05-27 RX ORDER — ACETAMINOPHEN 10 MG/ML
INJECTION, SOLUTION INTRAVENOUS
Status: DISCONTINUED | OUTPATIENT
Start: 2021-05-27 | End: 2021-05-27

## 2021-05-27 RX ORDER — MUPIROCIN 20 MG/G
OINTMENT TOPICAL
Status: DISCONTINUED | OUTPATIENT
Start: 2021-05-27 | End: 2021-05-27 | Stop reason: HOSPADM

## 2021-05-27 RX ORDER — CEFAZOLIN SODIUM 1 G/3ML
2 INJECTION, POWDER, FOR SOLUTION INTRAMUSCULAR; INTRAVENOUS
Status: DISCONTINUED | OUTPATIENT
Start: 2021-05-27 | End: 2021-05-27 | Stop reason: HOSPADM

## 2021-05-27 RX ORDER — FENTANYL CITRATE 50 UG/ML
25 INJECTION, SOLUTION INTRAMUSCULAR; INTRAVENOUS EVERY 5 MIN PRN
Status: COMPLETED | OUTPATIENT
Start: 2021-05-27 | End: 2021-05-27

## 2021-05-27 RX ORDER — ONDANSETRON 2 MG/ML
INJECTION INTRAMUSCULAR; INTRAVENOUS
Status: DISCONTINUED | OUTPATIENT
Start: 2021-05-27 | End: 2021-05-27

## 2021-05-27 RX ORDER — LIDOCAINE HYDROCHLORIDE 20 MG/ML
INJECTION, SOLUTION EPIDURAL; INFILTRATION; INTRACAUDAL; PERINEURAL
Status: DISCONTINUED | OUTPATIENT
Start: 2021-05-27 | End: 2021-05-27

## 2021-05-27 RX ORDER — KETAMINE HCL IN 0.9 % NACL 50 MG/5 ML
SYRINGE (ML) INTRAVENOUS
Status: DISCONTINUED | OUTPATIENT
Start: 2021-05-27 | End: 2021-05-27

## 2021-05-27 RX ORDER — NEOSTIGMINE METHYLSULFATE 0.5 MG/ML
INJECTION, SOLUTION INTRAVENOUS
Status: DISCONTINUED | OUTPATIENT
Start: 2021-05-27 | End: 2021-05-27

## 2021-05-27 RX ORDER — FENTANYL CITRATE 50 UG/ML
25 INJECTION, SOLUTION INTRAMUSCULAR; INTRAVENOUS EVERY 5 MIN PRN
Status: DISCONTINUED | OUTPATIENT
Start: 2021-05-27 | End: 2021-05-27 | Stop reason: HOSPADM

## 2021-05-27 RX ORDER — DEXAMETHASONE SODIUM PHOSPHATE 4 MG/ML
INJECTION, SOLUTION INTRA-ARTICULAR; INTRALESIONAL; INTRAMUSCULAR; INTRAVENOUS; SOFT TISSUE
Status: DISCONTINUED | OUTPATIENT
Start: 2021-05-27 | End: 2021-05-27

## 2021-05-27 RX ADMIN — ACETAMINOPHEN 1000 MG: 10 INJECTION INTRAVENOUS at 10:05

## 2021-05-27 RX ADMIN — LIDOCAINE HYDROCHLORIDE 100 MG: 20 INJECTION, SOLUTION EPIDURAL; INFILTRATION; INTRACAUDAL at 09:05

## 2021-05-27 RX ADMIN — HYDROMORPHONE HYDROCHLORIDE 0.2 MG: 1 INJECTION, SOLUTION INTRAMUSCULAR; INTRAVENOUS; SUBCUTANEOUS at 02:05

## 2021-05-27 RX ADMIN — PROPOFOL 200 MG: 10 INJECTION, EMULSION INTRAVENOUS at 09:05

## 2021-05-27 RX ADMIN — ONDANSETRON 4 MG: 2 INJECTION INTRAMUSCULAR; INTRAVENOUS at 09:05

## 2021-05-27 RX ADMIN — FENTANYL CITRATE 25 MCG: 50 INJECTION, SOLUTION INTRAMUSCULAR; INTRAVENOUS at 01:05

## 2021-05-27 RX ADMIN — SUCCINYLCHOLINE CHLORIDE 180 MG: 20 INJECTION, SOLUTION INTRAMUSCULAR; INTRAVENOUS at 09:05

## 2021-05-27 RX ADMIN — MIDAZOLAM 2 MG: 1 INJECTION INTRAMUSCULAR; INTRAVENOUS at 08:05

## 2021-05-27 RX ADMIN — METOCLOPRAMIDE 5 MG: 5 INJECTION, SOLUTION INTRAMUSCULAR; INTRAVENOUS at 09:05

## 2021-05-27 RX ADMIN — Medication 20 MG: at 10:05

## 2021-05-27 RX ADMIN — Medication 100 MCG: at 10:05

## 2021-05-27 RX ADMIN — FENTANYL CITRATE 100 MCG: 50 INJECTION, SOLUTION INTRAMUSCULAR; INTRAVENOUS at 09:05

## 2021-05-27 RX ADMIN — Medication 200 MCG: at 09:05

## 2021-05-27 RX ADMIN — ROCURONIUM BROMIDE 50 MG: 10 INJECTION, SOLUTION INTRAVENOUS at 09:05

## 2021-05-27 RX ADMIN — Medication 100 MCG: at 12:05

## 2021-05-27 RX ADMIN — NEOSTIGMINE METHYLSULFATE 3 MG: 1 INJECTION INTRAVENOUS at 12:05

## 2021-05-27 RX ADMIN — HEPARIN SODIUM 5000 UNITS: 5000 INJECTION INTRAVENOUS; SUBCUTANEOUS at 08:05

## 2021-05-27 RX ADMIN — GLYCOPYRROLATE 0.6 MG: 0.2 INJECTION, SOLUTION INTRAMUSCULAR; INTRAVITREAL at 12:05

## 2021-05-27 RX ADMIN — Medication 30 MG: at 09:05

## 2021-05-27 RX ADMIN — ONDANSETRON 4 MG: 2 INJECTION INTRAMUSCULAR; INTRAVENOUS at 03:05

## 2021-05-27 RX ADMIN — FENTANYL CITRATE 50 MCG: 50 INJECTION INTRAMUSCULAR; INTRAVENOUS at 08:05

## 2021-05-27 RX ADMIN — CEFAZOLIN 2 G: 330 INJECTION, POWDER, FOR SOLUTION INTRAMUSCULAR; INTRAVENOUS at 09:05

## 2021-05-27 RX ADMIN — DEXAMETHASONE SODIUM PHOSPHATE 8 MG: 4 INJECTION INTRA-ARTICULAR; INTRALESIONAL; INTRAMUSCULAR; INTRAVENOUS; SOFT TISSUE at 09:05

## 2021-06-02 ENCOUNTER — OFFICE VISIT (OUTPATIENT)
Dept: PLASTIC SURGERY | Facility: CLINIC | Age: 31
End: 2021-06-02
Payer: COMMERCIAL

## 2021-06-02 VITALS
BODY MASS INDEX: 41.95 KG/M2 | HEIGHT: 66 IN | HEART RATE: 107 BPM | SYSTOLIC BLOOD PRESSURE: 134 MMHG | DIASTOLIC BLOOD PRESSURE: 80 MMHG | WEIGHT: 261 LBS

## 2021-06-02 DIAGNOSIS — Z09 SURGERY FOLLOW-UP EXAMINATION: Primary | ICD-10-CM

## 2021-06-02 PROCEDURE — 99999 PR PBB SHADOW E&M-EST. PATIENT-LVL III: ICD-10-PCS | Mod: PBBFAC,,, | Performed by: SURGERY

## 2021-06-02 PROCEDURE — 3008F PR BODY MASS INDEX (BMI) DOCUMENTED: ICD-10-PCS | Mod: CPTII,S$GLB,, | Performed by: SURGERY

## 2021-06-02 PROCEDURE — 3008F BODY MASS INDEX DOCD: CPT | Mod: CPTII,S$GLB,, | Performed by: SURGERY

## 2021-06-02 PROCEDURE — 99999 PR PBB SHADOW E&M-EST. PATIENT-LVL III: CPT | Mod: PBBFAC,,, | Performed by: SURGERY

## 2021-06-02 PROCEDURE — 1126F AMNT PAIN NOTED NONE PRSNT: CPT | Mod: S$GLB,,, | Performed by: SURGERY

## 2021-06-02 PROCEDURE — 99024 PR POST-OP FOLLOW-UP VISIT: ICD-10-PCS | Mod: S$GLB,,, | Performed by: SURGERY

## 2021-06-02 PROCEDURE — 1126F PR PAIN SEVERITY QUANTIFIED, NO PAIN PRESENT: ICD-10-PCS | Mod: S$GLB,,, | Performed by: SURGERY

## 2021-06-02 PROCEDURE — 99024 POSTOP FOLLOW-UP VISIT: CPT | Mod: S$GLB,,, | Performed by: SURGERY

## 2021-06-03 ENCOUNTER — TELEPHONE (OUTPATIENT)
Dept: PULMONOLOGY | Facility: CLINIC | Age: 31
End: 2021-06-03

## 2021-06-03 LAB
FINAL PATHOLOGIC DIAGNOSIS: NORMAL
Lab: NORMAL

## 2021-06-04 ENCOUNTER — OFFICE VISIT (OUTPATIENT)
Dept: PSYCHIATRY | Facility: CLINIC | Age: 31
End: 2021-06-04
Payer: COMMERCIAL

## 2021-06-04 ENCOUNTER — PATIENT MESSAGE (OUTPATIENT)
Dept: PLASTIC SURGERY | Facility: CLINIC | Age: 31
End: 2021-06-04

## 2021-06-04 DIAGNOSIS — F41.1 GENERALIZED ANXIETY DISORDER: Primary | ICD-10-CM

## 2021-06-04 DIAGNOSIS — F34.1 DYSTHYMIA: ICD-10-CM

## 2021-06-04 PROCEDURE — 99214 PR OFFICE/OUTPT VISIT, EST, LEVL IV, 30-39 MIN: ICD-10-PCS | Mod: 95,,, | Performed by: PSYCHIATRY & NEUROLOGY

## 2021-06-04 PROCEDURE — 99214 OFFICE O/P EST MOD 30 MIN: CPT | Mod: 95,,, | Performed by: PSYCHIATRY & NEUROLOGY

## 2021-06-04 RX ORDER — CITALOPRAM 20 MG/1
20 TABLET, FILM COATED ORAL DAILY
Qty: 30 TABLET | Refills: 5 | Status: SHIPPED | OUTPATIENT
Start: 2021-06-04 | End: 2021-06-05

## 2021-06-05 ENCOUNTER — OFFICE VISIT (OUTPATIENT)
Dept: INTERNAL MEDICINE | Facility: CLINIC | Age: 31
End: 2021-06-05
Payer: COMMERCIAL

## 2021-06-05 ENCOUNTER — LAB VISIT (OUTPATIENT)
Dept: LAB | Facility: HOSPITAL | Age: 31
End: 2021-06-05
Attending: INTERNAL MEDICINE
Payer: COMMERCIAL

## 2021-06-05 VITALS
OXYGEN SATURATION: 100 % | HEIGHT: 66 IN | TEMPERATURE: 97 F | BODY MASS INDEX: 40.67 KG/M2 | WEIGHT: 253.06 LBS | DIASTOLIC BLOOD PRESSURE: 70 MMHG | HEART RATE: 86 BPM | SYSTOLIC BLOOD PRESSURE: 114 MMHG

## 2021-06-05 DIAGNOSIS — R42 DIZZINESS: Primary | ICD-10-CM

## 2021-06-05 DIAGNOSIS — R42 DIZZINESS: ICD-10-CM

## 2021-06-05 LAB
ANION GAP SERPL CALC-SCNC: 7 MMOL/L (ref 8–16)
BASOPHILS # BLD AUTO: 0.04 K/UL (ref 0–0.2)
BASOPHILS NFR BLD: 0.4 % (ref 0–1.9)
BUN SERPL-MCNC: 8 MG/DL (ref 6–20)
CALCIUM SERPL-MCNC: 9.1 MG/DL (ref 8.7–10.5)
CHLORIDE SERPL-SCNC: 107 MMOL/L (ref 95–110)
CO2 SERPL-SCNC: 24 MMOL/L (ref 23–29)
CREAT SERPL-MCNC: 0.7 MG/DL (ref 0.5–1.4)
DIFFERENTIAL METHOD: ABNORMAL
EOSINOPHIL # BLD AUTO: 0.1 K/UL (ref 0–0.5)
EOSINOPHIL NFR BLD: 1 % (ref 0–8)
ERYTHROCYTE [DISTWIDTH] IN BLOOD BY AUTOMATED COUNT: 15.3 % (ref 11.5–14.5)
EST. GFR  (AFRICAN AMERICAN): >60 ML/MIN/1.73 M^2
EST. GFR  (NON AFRICAN AMERICAN): >60 ML/MIN/1.73 M^2
GLUCOSE SERPL-MCNC: 82 MG/DL (ref 70–110)
HCT VFR BLD AUTO: 34.9 % (ref 37–48.5)
HGB BLD-MCNC: 10 G/DL (ref 12–16)
IMM GRANULOCYTES # BLD AUTO: 0.03 K/UL (ref 0–0.04)
IMM GRANULOCYTES NFR BLD AUTO: 0.3 % (ref 0–0.5)
LYMPHOCYTES # BLD AUTO: 2 K/UL (ref 1–4.8)
LYMPHOCYTES NFR BLD: 18.9 % (ref 18–48)
MCH RBC QN AUTO: 21.9 PG (ref 27–31)
MCHC RBC AUTO-ENTMCNC: 28.7 G/DL (ref 32–36)
MCV RBC AUTO: 77 FL (ref 82–98)
MONOCYTES # BLD AUTO: 0.7 K/UL (ref 0.3–1)
MONOCYTES NFR BLD: 6.9 % (ref 4–15)
NEUTROPHILS # BLD AUTO: 7.5 K/UL (ref 1.8–7.7)
NEUTROPHILS NFR BLD: 72.5 % (ref 38–73)
NRBC BLD-RTO: 0 /100 WBC
PLATELET # BLD AUTO: 348 K/UL (ref 150–450)
PMV BLD AUTO: 10.2 FL (ref 9.2–12.9)
POTASSIUM SERPL-SCNC: 4.5 MMOL/L (ref 3.5–5.1)
RBC # BLD AUTO: 4.56 M/UL (ref 4–5.4)
SODIUM SERPL-SCNC: 138 MMOL/L (ref 136–145)
WBC # BLD AUTO: 10.3 K/UL (ref 3.9–12.7)

## 2021-06-05 PROCEDURE — 3008F BODY MASS INDEX DOCD: CPT | Mod: CPTII,S$GLB,, | Performed by: INTERNAL MEDICINE

## 2021-06-05 PROCEDURE — 1125F PR PAIN SEVERITY QUANTIFIED, PAIN PRESENT: ICD-10-PCS | Mod: S$GLB,,, | Performed by: INTERNAL MEDICINE

## 2021-06-05 PROCEDURE — 99999 PR PBB SHADOW E&M-EST. PATIENT-LVL IV: ICD-10-PCS | Mod: PBBFAC,,, | Performed by: INTERNAL MEDICINE

## 2021-06-05 PROCEDURE — 99214 OFFICE O/P EST MOD 30 MIN: CPT | Mod: S$GLB,,, | Performed by: INTERNAL MEDICINE

## 2021-06-05 PROCEDURE — 80048 BASIC METABOLIC PNL TOTAL CA: CPT | Performed by: INTERNAL MEDICINE

## 2021-06-05 PROCEDURE — 99999 PR PBB SHADOW E&M-EST. PATIENT-LVL IV: CPT | Mod: PBBFAC,,, | Performed by: INTERNAL MEDICINE

## 2021-06-05 PROCEDURE — 1125F AMNT PAIN NOTED PAIN PRSNT: CPT | Mod: S$GLB,,, | Performed by: INTERNAL MEDICINE

## 2021-06-05 PROCEDURE — 36415 COLL VENOUS BLD VENIPUNCTURE: CPT | Performed by: INTERNAL MEDICINE

## 2021-06-05 PROCEDURE — 3008F PR BODY MASS INDEX (BMI) DOCUMENTED: ICD-10-PCS | Mod: CPTII,S$GLB,, | Performed by: INTERNAL MEDICINE

## 2021-06-05 PROCEDURE — 85025 COMPLETE CBC W/AUTO DIFF WBC: CPT | Performed by: INTERNAL MEDICINE

## 2021-06-05 PROCEDURE — 99214 PR OFFICE/OUTPT VISIT, EST, LEVL IV, 30-39 MIN: ICD-10-PCS | Mod: S$GLB,,, | Performed by: INTERNAL MEDICINE

## 2021-06-07 ENCOUNTER — CLINICAL SUPPORT (OUTPATIENT)
Dept: CARDIOLOGY | Facility: CLINIC | Age: 31
End: 2021-06-07
Payer: COMMERCIAL

## 2021-06-07 DIAGNOSIS — R42 DIZZINESS: ICD-10-CM

## 2021-06-07 PROCEDURE — 93005 ELECTROCARDIOGRAM TRACING: CPT

## 2021-06-07 PROCEDURE — 93010 EKG 12-LEAD: ICD-10-PCS | Mod: S$GLB,,, | Performed by: INTERNAL MEDICINE

## 2021-06-07 PROCEDURE — 93010 ELECTROCARDIOGRAM REPORT: CPT | Mod: S$GLB,,, | Performed by: INTERNAL MEDICINE

## 2021-06-08 ENCOUNTER — PATIENT MESSAGE (OUTPATIENT)
Dept: INTERNAL MEDICINE | Facility: CLINIC | Age: 31
End: 2021-06-08

## 2021-06-08 ENCOUNTER — PATIENT MESSAGE (OUTPATIENT)
Dept: PLASTIC SURGERY | Facility: CLINIC | Age: 31
End: 2021-06-08

## 2021-06-16 ENCOUNTER — OFFICE VISIT (OUTPATIENT)
Dept: PLASTIC SURGERY | Facility: CLINIC | Age: 31
End: 2021-06-16
Payer: COMMERCIAL

## 2021-06-16 VITALS
BODY MASS INDEX: 40.67 KG/M2 | WEIGHT: 253.06 LBS | HEART RATE: 77 BPM | HEIGHT: 66 IN | SYSTOLIC BLOOD PRESSURE: 112 MMHG | DIASTOLIC BLOOD PRESSURE: 87 MMHG

## 2021-06-16 DIAGNOSIS — Z09 SURGERY FOLLOW-UP EXAMINATION: Primary | ICD-10-CM

## 2021-06-16 PROCEDURE — 1125F AMNT PAIN NOTED PAIN PRSNT: CPT | Mod: S$GLB,,, | Performed by: PHYSICIAN ASSISTANT

## 2021-06-16 PROCEDURE — 3008F PR BODY MASS INDEX (BMI) DOCUMENTED: ICD-10-PCS | Mod: CPTII,S$GLB,, | Performed by: PHYSICIAN ASSISTANT

## 2021-06-16 PROCEDURE — 1125F PR PAIN SEVERITY QUANTIFIED, PAIN PRESENT: ICD-10-PCS | Mod: S$GLB,,, | Performed by: PHYSICIAN ASSISTANT

## 2021-06-16 PROCEDURE — 99999 PR PBB SHADOW E&M-EST. PATIENT-LVL III: CPT | Mod: PBBFAC,,, | Performed by: PHYSICIAN ASSISTANT

## 2021-06-16 PROCEDURE — 3008F BODY MASS INDEX DOCD: CPT | Mod: CPTII,S$GLB,, | Performed by: PHYSICIAN ASSISTANT

## 2021-06-16 PROCEDURE — 99024 PR POST-OP FOLLOW-UP VISIT: ICD-10-PCS | Mod: S$GLB,,, | Performed by: PHYSICIAN ASSISTANT

## 2021-06-16 PROCEDURE — 99024 POSTOP FOLLOW-UP VISIT: CPT | Mod: S$GLB,,, | Performed by: PHYSICIAN ASSISTANT

## 2021-06-16 PROCEDURE — 99999 PR PBB SHADOW E&M-EST. PATIENT-LVL III: ICD-10-PCS | Mod: PBBFAC,,, | Performed by: PHYSICIAN ASSISTANT

## 2021-07-07 ENCOUNTER — OFFICE VISIT (OUTPATIENT)
Dept: OBSTETRICS AND GYNECOLOGY | Facility: CLINIC | Age: 31
End: 2021-07-07
Payer: COMMERCIAL

## 2021-07-07 VITALS
WEIGHT: 260.56 LBS | HEIGHT: 66 IN | DIASTOLIC BLOOD PRESSURE: 80 MMHG | BODY MASS INDEX: 41.88 KG/M2 | SYSTOLIC BLOOD PRESSURE: 122 MMHG

## 2021-07-07 DIAGNOSIS — R10.2 PELVIC PAIN: Primary | ICD-10-CM

## 2021-07-07 DIAGNOSIS — Z30.41 ENCOUNTER FOR SURVEILLANCE OF CONTRACEPTIVE PILLS: ICD-10-CM

## 2021-07-07 PROCEDURE — 99999 PR PBB SHADOW E&M-EST. PATIENT-LVL III: ICD-10-PCS | Mod: PBBFAC,,, | Performed by: NURSE PRACTITIONER

## 2021-07-07 PROCEDURE — 3008F PR BODY MASS INDEX (BMI) DOCUMENTED: ICD-10-PCS | Mod: CPTII,S$GLB,, | Performed by: NURSE PRACTITIONER

## 2021-07-07 PROCEDURE — 99214 PR OFFICE/OUTPT VISIT, EST, LEVL IV, 30-39 MIN: ICD-10-PCS | Mod: S$GLB,,, | Performed by: NURSE PRACTITIONER

## 2021-07-07 PROCEDURE — 99214 OFFICE O/P EST MOD 30 MIN: CPT | Mod: S$GLB,,, | Performed by: NURSE PRACTITIONER

## 2021-07-07 PROCEDURE — 81025 URINE PREGNANCY TEST: CPT | Mod: S$GLB,,, | Performed by: NURSE PRACTITIONER

## 2021-07-07 PROCEDURE — 3008F BODY MASS INDEX DOCD: CPT | Mod: CPTII,S$GLB,, | Performed by: NURSE PRACTITIONER

## 2021-07-07 PROCEDURE — 99999 PR PBB SHADOW E&M-EST. PATIENT-LVL III: CPT | Mod: PBBFAC,,, | Performed by: NURSE PRACTITIONER

## 2021-07-07 PROCEDURE — 81025 PR  URINE PREGNANCY TEST: ICD-10-PCS | Mod: S$GLB,,, | Performed by: NURSE PRACTITIONER

## 2021-07-07 RX ORDER — NORETHINDRONE ACETATE AND ETHINYL ESTRADIOL 1MG-20(21)
1 KIT ORAL DAILY
Qty: 28 TABLET | Refills: 11 | Status: SHIPPED | OUTPATIENT
Start: 2021-07-07 | End: 2021-11-03

## 2021-07-07 RX ORDER — CITALOPRAM 10 MG/1
10 TABLET ORAL DAILY
COMMUNITY
End: 2021-09-14

## 2021-07-14 ENCOUNTER — TELEPHONE (OUTPATIENT)
Dept: OBSTETRICS AND GYNECOLOGY | Facility: CLINIC | Age: 31
End: 2021-07-14

## 2021-07-27 ENCOUNTER — PATIENT MESSAGE (OUTPATIENT)
Dept: PLASTIC SURGERY | Facility: CLINIC | Age: 31
End: 2021-07-27

## 2021-07-29 ENCOUNTER — OFFICE VISIT (OUTPATIENT)
Dept: FAMILY MEDICINE | Facility: CLINIC | Age: 31
End: 2021-07-29
Payer: COMMERCIAL

## 2021-07-29 VITALS
HEART RATE: 82 BPM | WEIGHT: 262.88 LBS | OXYGEN SATURATION: 99 % | HEIGHT: 66 IN | SYSTOLIC BLOOD PRESSURE: 122 MMHG | DIASTOLIC BLOOD PRESSURE: 83 MMHG | TEMPERATURE: 98 F | BODY MASS INDEX: 42.25 KG/M2

## 2021-07-29 DIAGNOSIS — J30.9 ALLERGIC RHINITIS, UNSPECIFIED SEASONALITY, UNSPECIFIED TRIGGER: Primary | ICD-10-CM

## 2021-07-29 DIAGNOSIS — H69.93 DYSFUNCTION OF BOTH EUSTACHIAN TUBES: ICD-10-CM

## 2021-07-29 PROBLEM — N62 MACROMASTIA: Status: RESOLVED | Noted: 2021-05-27 | Resolved: 2021-07-29

## 2021-07-29 PROBLEM — N64.4 NIPPLE PAIN: Status: RESOLVED | Noted: 2021-02-11 | Resolved: 2021-07-29

## 2021-07-29 PROBLEM — N62 BREAST HYPERTROPHY IN FEMALE: Status: RESOLVED | Noted: 2017-05-09 | Resolved: 2021-07-29

## 2021-07-29 PROCEDURE — 3074F PR MOST RECENT SYSTOLIC BLOOD PRESSURE < 130 MM HG: ICD-10-PCS | Mod: CPTII,S$GLB,, | Performed by: REGISTERED NURSE

## 2021-07-29 PROCEDURE — 1159F PR MEDICATION LIST DOCUMENTED IN MEDICAL RECORD: ICD-10-PCS | Mod: CPTII,S$GLB,, | Performed by: REGISTERED NURSE

## 2021-07-29 PROCEDURE — 3074F SYST BP LT 130 MM HG: CPT | Mod: CPTII,S$GLB,, | Performed by: REGISTERED NURSE

## 2021-07-29 PROCEDURE — 1159F MED LIST DOCD IN RCRD: CPT | Mod: CPTII,S$GLB,, | Performed by: REGISTERED NURSE

## 2021-07-29 PROCEDURE — 99999 PR PBB SHADOW E&M-EST. PATIENT-LVL III: CPT | Mod: PBBFAC,,, | Performed by: REGISTERED NURSE

## 2021-07-29 PROCEDURE — 1126F PR PAIN SEVERITY QUANTIFIED, NO PAIN PRESENT: ICD-10-PCS | Mod: CPTII,S$GLB,, | Performed by: REGISTERED NURSE

## 2021-07-29 PROCEDURE — 1126F AMNT PAIN NOTED NONE PRSNT: CPT | Mod: CPTII,S$GLB,, | Performed by: REGISTERED NURSE

## 2021-07-29 PROCEDURE — 1160F PR REVIEW ALL MEDS BY PRESCRIBER/CLIN PHARMACIST DOCUMENTED: ICD-10-PCS | Mod: CPTII,S$GLB,, | Performed by: REGISTERED NURSE

## 2021-07-29 PROCEDURE — 99213 OFFICE O/P EST LOW 20 MIN: CPT | Mod: S$GLB,,, | Performed by: REGISTERED NURSE

## 2021-07-29 PROCEDURE — 3044F HG A1C LEVEL LT 7.0%: CPT | Mod: CPTII,S$GLB,, | Performed by: REGISTERED NURSE

## 2021-07-29 PROCEDURE — 1160F RVW MEDS BY RX/DR IN RCRD: CPT | Mod: CPTII,S$GLB,, | Performed by: REGISTERED NURSE

## 2021-07-29 PROCEDURE — 3044F PR MOST RECENT HEMOGLOBIN A1C LEVEL <7.0%: ICD-10-PCS | Mod: CPTII,S$GLB,, | Performed by: REGISTERED NURSE

## 2021-07-29 PROCEDURE — 99213 PR OFFICE/OUTPT VISIT, EST, LEVL III, 20-29 MIN: ICD-10-PCS | Mod: S$GLB,,, | Performed by: REGISTERED NURSE

## 2021-07-29 PROCEDURE — 99999 PR PBB SHADOW E&M-EST. PATIENT-LVL III: ICD-10-PCS | Mod: PBBFAC,,, | Performed by: REGISTERED NURSE

## 2021-07-29 PROCEDURE — 3079F DIAST BP 80-89 MM HG: CPT | Mod: CPTII,S$GLB,, | Performed by: REGISTERED NURSE

## 2021-07-29 PROCEDURE — 3079F PR MOST RECENT DIASTOLIC BLOOD PRESSURE 80-89 MM HG: ICD-10-PCS | Mod: CPTII,S$GLB,, | Performed by: REGISTERED NURSE

## 2021-07-29 PROCEDURE — 3008F PR BODY MASS INDEX (BMI) DOCUMENTED: ICD-10-PCS | Mod: CPTII,S$GLB,, | Performed by: REGISTERED NURSE

## 2021-07-29 PROCEDURE — 3008F BODY MASS INDEX DOCD: CPT | Mod: CPTII,S$GLB,, | Performed by: REGISTERED NURSE

## 2021-07-29 RX ORDER — METHYLPREDNISOLONE 4 MG/1
TABLET ORAL
Qty: 1 PACKAGE | Refills: 0 | Status: SHIPPED | OUTPATIENT
Start: 2021-07-29 | End: 2021-09-14

## 2021-07-30 ENCOUNTER — CLINICAL SUPPORT (OUTPATIENT)
Dept: REHABILITATION | Facility: HOSPITAL | Age: 31
End: 2021-07-30
Payer: COMMERCIAL

## 2021-07-30 DIAGNOSIS — R10.2 PELVIC PRESSURE IN FEMALE: Primary | ICD-10-CM

## 2021-07-30 DIAGNOSIS — R10.2 PELVIC PAIN: ICD-10-CM

## 2021-07-30 DIAGNOSIS — M53.3 COCCYX PAIN: ICD-10-CM

## 2021-07-30 PROCEDURE — 97112 NEUROMUSCULAR REEDUCATION: CPT

## 2021-07-30 PROCEDURE — 97165 OT EVAL LOW COMPLEX 30 MIN: CPT

## 2021-07-30 PROCEDURE — 97110 THERAPEUTIC EXERCISES: CPT

## 2021-08-03 ENCOUNTER — PATIENT MESSAGE (OUTPATIENT)
Dept: PLASTIC SURGERY | Facility: CLINIC | Age: 31
End: 2021-08-03

## 2021-08-25 ENCOUNTER — OFFICE VISIT (OUTPATIENT)
Dept: URGENT CARE | Facility: CLINIC | Age: 31
End: 2021-08-25
Payer: COMMERCIAL

## 2021-08-25 VITALS
OXYGEN SATURATION: 100 % | BODY MASS INDEX: 41.8 KG/M2 | DIASTOLIC BLOOD PRESSURE: 74 MMHG | TEMPERATURE: 98 F | WEIGHT: 260.13 LBS | SYSTOLIC BLOOD PRESSURE: 136 MMHG | RESPIRATION RATE: 16 BRPM | HEART RATE: 93 BPM | HEIGHT: 66 IN

## 2021-08-25 DIAGNOSIS — E66.01 MORBID OBESITY WITH BMI OF 40.0-44.9, ADULT: ICD-10-CM

## 2021-08-25 DIAGNOSIS — F45.8 BRUXISM: ICD-10-CM

## 2021-08-25 DIAGNOSIS — R51.9 SINUS HEADACHE: ICD-10-CM

## 2021-08-25 DIAGNOSIS — Z20.822 SUSPECTED COVID-19 VIRUS INFECTION: Primary | ICD-10-CM

## 2021-08-25 DIAGNOSIS — J02.9 PHARYNGITIS, UNSPECIFIED ETIOLOGY: ICD-10-CM

## 2021-08-25 LAB
CTP QC/QA: YES
SARS-COV-2 RDRP RESP QL NAA+PROBE: NEGATIVE

## 2021-08-25 PROCEDURE — 3075F PR MOST RECENT SYSTOLIC BLOOD PRESS GE 130-139MM HG: ICD-10-PCS | Mod: CPTII,S$GLB,, | Performed by: NURSE PRACTITIONER

## 2021-08-25 PROCEDURE — 3008F BODY MASS INDEX DOCD: CPT | Mod: CPTII,S$GLB,, | Performed by: NURSE PRACTITIONER

## 2021-08-25 PROCEDURE — 99999 PR PBB SHADOW E&M-EST. PATIENT-LVL IV: CPT | Mod: PBBFAC,,, | Performed by: NURSE PRACTITIONER

## 2021-08-25 PROCEDURE — 99214 PR OFFICE/OUTPT VISIT, EST, LEVL IV, 30-39 MIN: ICD-10-PCS | Mod: 25,S$GLB,, | Performed by: NURSE PRACTITIONER

## 2021-08-25 PROCEDURE — 3008F PR BODY MASS INDEX (BMI) DOCUMENTED: ICD-10-PCS | Mod: CPTII,S$GLB,, | Performed by: NURSE PRACTITIONER

## 2021-08-25 PROCEDURE — U0002 COVID-19 LAB TEST NON-CDC: HCPCS | Mod: QW,S$GLB,, | Performed by: NURSE PRACTITIONER

## 2021-08-25 PROCEDURE — 1159F MED LIST DOCD IN RCRD: CPT | Mod: CPTII,S$GLB,, | Performed by: NURSE PRACTITIONER

## 2021-08-25 PROCEDURE — 3078F PR MOST RECENT DIASTOLIC BLOOD PRESSURE < 80 MM HG: ICD-10-PCS | Mod: CPTII,S$GLB,, | Performed by: NURSE PRACTITIONER

## 2021-08-25 PROCEDURE — 99214 OFFICE O/P EST MOD 30 MIN: CPT | Mod: 25,S$GLB,, | Performed by: NURSE PRACTITIONER

## 2021-08-25 PROCEDURE — 3075F SYST BP GE 130 - 139MM HG: CPT | Mod: CPTII,S$GLB,, | Performed by: NURSE PRACTITIONER

## 2021-08-25 PROCEDURE — 96372 PR INJECTION,THERAP/PROPH/DIAG2ST, IM OR SUBCUT: ICD-10-PCS | Mod: S$GLB,,, | Performed by: NURSE PRACTITIONER

## 2021-08-25 PROCEDURE — 3078F DIAST BP <80 MM HG: CPT | Mod: CPTII,S$GLB,, | Performed by: NURSE PRACTITIONER

## 2021-08-25 PROCEDURE — 3044F PR MOST RECENT HEMOGLOBIN A1C LEVEL <7.0%: ICD-10-PCS | Mod: CPTII,S$GLB,, | Performed by: NURSE PRACTITIONER

## 2021-08-25 PROCEDURE — 3044F HG A1C LEVEL LT 7.0%: CPT | Mod: CPTII,S$GLB,, | Performed by: NURSE PRACTITIONER

## 2021-08-25 PROCEDURE — 1159F PR MEDICATION LIST DOCUMENTED IN MEDICAL RECORD: ICD-10-PCS | Mod: CPTII,S$GLB,, | Performed by: NURSE PRACTITIONER

## 2021-08-25 PROCEDURE — 1125F PR PAIN SEVERITY QUANTIFIED, PAIN PRESENT: ICD-10-PCS | Mod: CPTII,S$GLB,, | Performed by: NURSE PRACTITIONER

## 2021-08-25 PROCEDURE — 96372 THER/PROPH/DIAG INJ SC/IM: CPT | Mod: S$GLB,,, | Performed by: NURSE PRACTITIONER

## 2021-08-25 PROCEDURE — 1125F AMNT PAIN NOTED PAIN PRSNT: CPT | Mod: CPTII,S$GLB,, | Performed by: NURSE PRACTITIONER

## 2021-08-25 PROCEDURE — U0002: ICD-10-PCS | Mod: QW,S$GLB,, | Performed by: NURSE PRACTITIONER

## 2021-08-25 PROCEDURE — 99999 PR PBB SHADOW E&M-EST. PATIENT-LVL IV: ICD-10-PCS | Mod: PBBFAC,,, | Performed by: NURSE PRACTITIONER

## 2021-08-25 RX ORDER — BENZONATATE 100 MG/1
200 CAPSULE ORAL 3 TIMES DAILY PRN
Qty: 60 CAPSULE | Refills: 1 | Status: SHIPPED | OUTPATIENT
Start: 2021-08-25 | End: 2021-09-14

## 2021-08-25 RX ORDER — KETOROLAC TROMETHAMINE 30 MG/ML
30 INJECTION, SOLUTION INTRAMUSCULAR; INTRAVENOUS
Status: COMPLETED | OUTPATIENT
Start: 2021-08-25 | End: 2021-08-25

## 2021-08-25 RX ADMIN — KETOROLAC TROMETHAMINE 30 MG: 30 INJECTION, SOLUTION INTRAMUSCULAR; INTRAVENOUS at 06:08

## 2021-09-01 ENCOUNTER — PATIENT MESSAGE (OUTPATIENT)
Dept: PLASTIC SURGERY | Facility: CLINIC | Age: 31
End: 2021-09-01

## 2021-09-01 ENCOUNTER — PATIENT MESSAGE (OUTPATIENT)
Dept: OPHTHALMOLOGY | Facility: CLINIC | Age: 31
End: 2021-09-01

## 2021-09-13 ENCOUNTER — OFFICE VISIT (OUTPATIENT)
Dept: PSYCHIATRY | Facility: CLINIC | Age: 31
End: 2021-09-13
Payer: COMMERCIAL

## 2021-09-13 ENCOUNTER — DOCUMENTATION ONLY (OUTPATIENT)
Dept: REHABILITATION | Facility: HOSPITAL | Age: 31
End: 2021-09-13

## 2021-09-13 DIAGNOSIS — F34.1 DYSTHYMIA: ICD-10-CM

## 2021-09-13 DIAGNOSIS — F41.1 GENERALIZED ANXIETY DISORDER: Primary | ICD-10-CM

## 2021-09-13 PROBLEM — R10.2 PELVIC PRESSURE IN FEMALE: Status: RESOLVED | Noted: 2021-02-11 | Resolved: 2021-09-13

## 2021-09-13 PROBLEM — M53.3 COCCYX PAIN: Status: RESOLVED | Noted: 2021-07-30 | Resolved: 2021-09-13

## 2021-09-13 PROCEDURE — 3044F PR MOST RECENT HEMOGLOBIN A1C LEVEL <7.0%: ICD-10-PCS | Mod: CPTII,S$GLB,, | Performed by: STUDENT IN AN ORGANIZED HEALTH CARE EDUCATION/TRAINING PROGRAM

## 2021-09-13 PROCEDURE — 3044F HG A1C LEVEL LT 7.0%: CPT | Mod: CPTII,S$GLB,, | Performed by: STUDENT IN AN ORGANIZED HEALTH CARE EDUCATION/TRAINING PROGRAM

## 2021-09-13 PROCEDURE — 90791 PR PSYCHIATRIC DIAGNOSTIC EVALUATION: ICD-10-PCS | Mod: S$GLB,,, | Performed by: STUDENT IN AN ORGANIZED HEALTH CARE EDUCATION/TRAINING PROGRAM

## 2021-09-13 PROCEDURE — 1159F MED LIST DOCD IN RCRD: CPT | Mod: CPTII,S$GLB,, | Performed by: STUDENT IN AN ORGANIZED HEALTH CARE EDUCATION/TRAINING PROGRAM

## 2021-09-13 PROCEDURE — 1159F PR MEDICATION LIST DOCUMENTED IN MEDICAL RECORD: ICD-10-PCS | Mod: CPTII,S$GLB,, | Performed by: STUDENT IN AN ORGANIZED HEALTH CARE EDUCATION/TRAINING PROGRAM

## 2021-09-13 PROCEDURE — 90791 PSYCH DIAGNOSTIC EVALUATION: CPT | Mod: S$GLB,,, | Performed by: STUDENT IN AN ORGANIZED HEALTH CARE EDUCATION/TRAINING PROGRAM

## 2021-09-13 PROCEDURE — 99999 PR PBB SHADOW E&M-EST. PATIENT-LVL I: ICD-10-PCS | Mod: PBBFAC,,, | Performed by: STUDENT IN AN ORGANIZED HEALTH CARE EDUCATION/TRAINING PROGRAM

## 2021-09-13 PROCEDURE — 99999 PR PBB SHADOW E&M-EST. PATIENT-LVL I: CPT | Mod: PBBFAC,,, | Performed by: STUDENT IN AN ORGANIZED HEALTH CARE EDUCATION/TRAINING PROGRAM

## 2021-09-14 ENCOUNTER — OFFICE VISIT (OUTPATIENT)
Dept: PSYCHIATRY | Facility: CLINIC | Age: 31
End: 2021-09-14
Payer: COMMERCIAL

## 2021-09-14 DIAGNOSIS — F41.1 GENERALIZED ANXIETY DISORDER: Primary | ICD-10-CM

## 2021-09-14 DIAGNOSIS — F90.0 ADHD, PREDOMINANTLY INATTENTIVE TYPE: ICD-10-CM

## 2021-09-14 DIAGNOSIS — F34.1 DYSTHYMIA: ICD-10-CM

## 2021-09-14 PROCEDURE — 3044F PR MOST RECENT HEMOGLOBIN A1C LEVEL <7.0%: ICD-10-PCS | Mod: CPTII,95,, | Performed by: PSYCHOLOGIST

## 2021-09-14 PROCEDURE — 1159F MED LIST DOCD IN RCRD: CPT | Mod: CPTII,95,, | Performed by: PSYCHOLOGIST

## 2021-09-14 PROCEDURE — 99215 OFFICE O/P EST HI 40 MIN: CPT | Mod: 95,,, | Performed by: PSYCHOLOGIST

## 2021-09-14 PROCEDURE — 99215 PR OFFICE/OUTPT VISIT, EST, LEVL V, 40-54 MIN: ICD-10-PCS | Mod: 95,,, | Performed by: PSYCHOLOGIST

## 2021-09-14 PROCEDURE — 1159F PR MEDICATION LIST DOCUMENTED IN MEDICAL RECORD: ICD-10-PCS | Mod: CPTII,95,, | Performed by: PSYCHOLOGIST

## 2021-09-14 PROCEDURE — 3044F HG A1C LEVEL LT 7.0%: CPT | Mod: CPTII,95,, | Performed by: PSYCHOLOGIST

## 2021-09-14 RX ORDER — BUPROPION HYDROCHLORIDE 100 MG/1
100 TABLET, EXTENDED RELEASE ORAL 2 TIMES DAILY
Qty: 60 TABLET | Refills: 1 | Status: SHIPPED | OUTPATIENT
Start: 2021-09-14 | End: 2022-02-14

## 2021-09-14 RX ORDER — BUSPIRONE HYDROCHLORIDE 7.5 MG/1
7.5 TABLET ORAL 2 TIMES DAILY
Qty: 60 TABLET | Refills: 1 | Status: SHIPPED | OUTPATIENT
Start: 2021-09-14 | End: 2021-12-20 | Stop reason: SDUPTHER

## 2021-09-17 ENCOUNTER — LAB VISIT (OUTPATIENT)
Dept: LAB | Facility: HOSPITAL | Age: 31
End: 2021-09-17
Attending: NURSE PRACTITIONER
Payer: COMMERCIAL

## 2021-09-17 ENCOUNTER — OFFICE VISIT (OUTPATIENT)
Dept: INTERNAL MEDICINE | Facility: CLINIC | Age: 31
End: 2021-09-17
Payer: COMMERCIAL

## 2021-09-17 VITALS
TEMPERATURE: 98 F | WEIGHT: 270.31 LBS | OXYGEN SATURATION: 99 % | BODY MASS INDEX: 43.44 KG/M2 | SYSTOLIC BLOOD PRESSURE: 136 MMHG | HEIGHT: 66 IN | DIASTOLIC BLOOD PRESSURE: 88 MMHG | HEART RATE: 99 BPM

## 2021-09-17 DIAGNOSIS — R07.89 CHEST DISCOMFORT: ICD-10-CM

## 2021-09-17 DIAGNOSIS — D64.9 ANEMIA, UNSPECIFIED TYPE: ICD-10-CM

## 2021-09-17 DIAGNOSIS — K21.9 GASTROESOPHAGEAL REFLUX DISEASE WITHOUT ESOPHAGITIS: ICD-10-CM

## 2021-09-17 DIAGNOSIS — Z82.69 FAMILY HISTORY OF SJOGREN'S DISEASE: ICD-10-CM

## 2021-09-17 DIAGNOSIS — R07.89 CHEST DISCOMFORT: Primary | ICD-10-CM

## 2021-09-17 LAB
ALBUMIN SERPL BCP-MCNC: 3.5 G/DL (ref 3.5–5.2)
ALP SERPL-CCNC: 98 U/L (ref 55–135)
ALT SERPL W/O P-5'-P-CCNC: 17 U/L (ref 10–44)
ANION GAP SERPL CALC-SCNC: 10 MMOL/L (ref 8–16)
AST SERPL-CCNC: 25 U/L (ref 10–40)
BASOPHILS # BLD AUTO: 0.04 K/UL (ref 0–0.2)
BASOPHILS NFR BLD: 0.5 % (ref 0–1.9)
BILIRUB SERPL-MCNC: 0.3 MG/DL (ref 0.1–1)
BUN SERPL-MCNC: 8 MG/DL (ref 6–20)
CALCIUM SERPL-MCNC: 9.5 MG/DL (ref 8.7–10.5)
CHLORIDE SERPL-SCNC: 105 MMOL/L (ref 95–110)
CO2 SERPL-SCNC: 22 MMOL/L (ref 23–29)
CREAT SERPL-MCNC: 0.9 MG/DL (ref 0.5–1.4)
DIFFERENTIAL METHOD: ABNORMAL
EOSINOPHIL # BLD AUTO: 0.1 K/UL (ref 0–0.5)
EOSINOPHIL NFR BLD: 0.6 % (ref 0–8)
ERYTHROCYTE [DISTWIDTH] IN BLOOD BY AUTOMATED COUNT: 16.2 % (ref 11.5–14.5)
EST. GFR  (AFRICAN AMERICAN): >60 ML/MIN/1.73 M^2
EST. GFR  (NON AFRICAN AMERICAN): >60 ML/MIN/1.73 M^2
GLUCOSE SERPL-MCNC: 78 MG/DL (ref 70–110)
HCT VFR BLD AUTO: 40 % (ref 37–48.5)
HGB BLD-MCNC: 11.3 G/DL (ref 12–16)
IMM GRANULOCYTES # BLD AUTO: 0.03 K/UL (ref 0–0.04)
IMM GRANULOCYTES NFR BLD AUTO: 0.4 % (ref 0–0.5)
LYMPHOCYTES # BLD AUTO: 2.3 K/UL (ref 1–4.8)
LYMPHOCYTES NFR BLD: 27.2 % (ref 18–48)
MCH RBC QN AUTO: 21.9 PG (ref 27–31)
MCHC RBC AUTO-ENTMCNC: 28.3 G/DL (ref 32–36)
MCV RBC AUTO: 78 FL (ref 82–98)
MONOCYTES # BLD AUTO: 0.4 K/UL (ref 0.3–1)
MONOCYTES NFR BLD: 5 % (ref 4–15)
NEUTROPHILS # BLD AUTO: 5.6 K/UL (ref 1.8–7.7)
NEUTROPHILS NFR BLD: 66.3 % (ref 38–73)
NRBC BLD-RTO: 0 /100 WBC
PLATELET # BLD AUTO: 315 K/UL (ref 150–450)
PMV BLD AUTO: 11.5 FL (ref 9.2–12.9)
POTASSIUM SERPL-SCNC: 4.5 MMOL/L (ref 3.5–5.1)
PROT SERPL-MCNC: 8.1 G/DL (ref 6–8.4)
RBC # BLD AUTO: 5.15 M/UL (ref 4–5.4)
SODIUM SERPL-SCNC: 137 MMOL/L (ref 136–145)
TROPONIN I SERPL DL<=0.01 NG/ML-MCNC: <0.006 NG/ML (ref 0–0.03)
WBC # BLD AUTO: 8.45 K/UL (ref 3.9–12.7)

## 2021-09-17 PROCEDURE — 3079F PR MOST RECENT DIASTOLIC BLOOD PRESSURE 80-89 MM HG: ICD-10-PCS | Mod: CPTII,S$GLB,, | Performed by: NURSE PRACTITIONER

## 2021-09-17 PROCEDURE — 93010 EKG 12-LEAD: ICD-10-PCS | Mod: S$GLB,,, | Performed by: INTERNAL MEDICINE

## 2021-09-17 PROCEDURE — 36415 COLL VENOUS BLD VENIPUNCTURE: CPT | Mod: PO | Performed by: NURSE PRACTITIONER

## 2021-09-17 PROCEDURE — 3044F PR MOST RECENT HEMOGLOBIN A1C LEVEL <7.0%: ICD-10-PCS | Mod: CPTII,S$GLB,, | Performed by: NURSE PRACTITIONER

## 2021-09-17 PROCEDURE — 85025 COMPLETE CBC W/AUTO DIFF WBC: CPT | Performed by: NURSE PRACTITIONER

## 2021-09-17 PROCEDURE — 3008F BODY MASS INDEX DOCD: CPT | Mod: CPTII,S$GLB,, | Performed by: NURSE PRACTITIONER

## 2021-09-17 PROCEDURE — 93010 ELECTROCARDIOGRAM REPORT: CPT | Mod: S$GLB,,, | Performed by: INTERNAL MEDICINE

## 2021-09-17 PROCEDURE — 1159F MED LIST DOCD IN RCRD: CPT | Mod: CPTII,S$GLB,, | Performed by: NURSE PRACTITIONER

## 2021-09-17 PROCEDURE — 99999 PR PBB SHADOW E&M-EST. PATIENT-LVL IV: CPT | Mod: PBBFAC,,, | Performed by: NURSE PRACTITIONER

## 2021-09-17 PROCEDURE — 80053 COMPREHEN METABOLIC PANEL: CPT | Performed by: NURSE PRACTITIONER

## 2021-09-17 PROCEDURE — 1160F PR REVIEW ALL MEDS BY PRESCRIBER/CLIN PHARMACIST DOCUMENTED: ICD-10-PCS | Mod: CPTII,S$GLB,, | Performed by: NURSE PRACTITIONER

## 2021-09-17 PROCEDURE — 82728 ASSAY OF FERRITIN: CPT | Performed by: NURSE PRACTITIONER

## 2021-09-17 PROCEDURE — 1160F RVW MEDS BY RX/DR IN RCRD: CPT | Mod: CPTII,S$GLB,, | Performed by: NURSE PRACTITIONER

## 2021-09-17 PROCEDURE — 84466 ASSAY OF TRANSFERRIN: CPT | Performed by: NURSE PRACTITIONER

## 2021-09-17 PROCEDURE — 3075F PR MOST RECENT SYSTOLIC BLOOD PRESS GE 130-139MM HG: ICD-10-PCS | Mod: CPTII,S$GLB,, | Performed by: NURSE PRACTITIONER

## 2021-09-17 PROCEDURE — 93005 ELECTROCARDIOGRAM TRACING: CPT

## 2021-09-17 PROCEDURE — 1159F PR MEDICATION LIST DOCUMENTED IN MEDICAL RECORD: ICD-10-PCS | Mod: CPTII,S$GLB,, | Performed by: NURSE PRACTITIONER

## 2021-09-17 PROCEDURE — 3075F SYST BP GE 130 - 139MM HG: CPT | Mod: CPTII,S$GLB,, | Performed by: NURSE PRACTITIONER

## 2021-09-17 PROCEDURE — 99999 PR PBB SHADOW E&M-EST. PATIENT-LVL IV: ICD-10-PCS | Mod: PBBFAC,,, | Performed by: NURSE PRACTITIONER

## 2021-09-17 PROCEDURE — 99214 OFFICE O/P EST MOD 30 MIN: CPT | Mod: S$GLB,,, | Performed by: NURSE PRACTITIONER

## 2021-09-17 PROCEDURE — 84484 ASSAY OF TROPONIN QUANT: CPT | Performed by: NURSE PRACTITIONER

## 2021-09-17 PROCEDURE — 3079F DIAST BP 80-89 MM HG: CPT | Mod: CPTII,S$GLB,, | Performed by: NURSE PRACTITIONER

## 2021-09-17 PROCEDURE — 3008F PR BODY MASS INDEX (BMI) DOCUMENTED: ICD-10-PCS | Mod: CPTII,S$GLB,, | Performed by: NURSE PRACTITIONER

## 2021-09-17 PROCEDURE — 83540 ASSAY OF IRON: CPT | Performed by: NURSE PRACTITIONER

## 2021-09-17 PROCEDURE — 99214 PR OFFICE/OUTPT VISIT, EST, LEVL IV, 30-39 MIN: ICD-10-PCS | Mod: S$GLB,,, | Performed by: NURSE PRACTITIONER

## 2021-09-17 PROCEDURE — 3044F HG A1C LEVEL LT 7.0%: CPT | Mod: CPTII,S$GLB,, | Performed by: NURSE PRACTITIONER

## 2021-09-17 RX ORDER — OMEPRAZOLE 20 MG/1
CAPSULE, DELAYED RELEASE ORAL
Qty: 60 CAPSULE | Refills: 1 | Status: SHIPPED | OUTPATIENT
Start: 2021-09-17 | End: 2021-10-01 | Stop reason: ALTCHOICE

## 2021-09-18 LAB
FERRITIN SERPL-MCNC: 77 NG/ML (ref 20–300)
IRON SERPL-MCNC: 44 UG/DL (ref 30–160)
SATURATED IRON: 10 % (ref 20–50)
TOTAL IRON BINDING CAPACITY: 420 UG/DL (ref 250–450)
TRANSFERRIN SERPL-MCNC: 284 MG/DL (ref 200–375)

## 2021-09-22 ENCOUNTER — PATIENT MESSAGE (OUTPATIENT)
Dept: INTERNAL MEDICINE | Facility: CLINIC | Age: 31
End: 2021-09-22

## 2021-09-22 DIAGNOSIS — D50.9 IRON DEFICIENCY ANEMIA, UNSPECIFIED IRON DEFICIENCY ANEMIA TYPE: Primary | ICD-10-CM

## 2021-09-24 ENCOUNTER — CLINICAL SUPPORT (OUTPATIENT)
Dept: INTERNAL MEDICINE | Facility: CLINIC | Age: 31
End: 2021-09-24
Payer: COMMERCIAL

## 2021-09-24 ENCOUNTER — PATIENT MESSAGE (OUTPATIENT)
Dept: RHEUMATOLOGY | Facility: CLINIC | Age: 31
End: 2021-09-24

## 2021-09-24 ENCOUNTER — OFFICE VISIT (OUTPATIENT)
Dept: RHEUMATOLOGY | Facility: CLINIC | Age: 31
End: 2021-09-24
Payer: COMMERCIAL

## 2021-09-24 VITALS
HEART RATE: 79 BPM | WEIGHT: 268.06 LBS | SYSTOLIC BLOOD PRESSURE: 136 MMHG | HEIGHT: 65 IN | DIASTOLIC BLOOD PRESSURE: 87 MMHG | BODY MASS INDEX: 44.66 KG/M2

## 2021-09-24 DIAGNOSIS — M25.50 ARTHRALGIA, UNSPECIFIED JOINT: ICD-10-CM

## 2021-09-24 DIAGNOSIS — Z82.69 FAMILY HISTORY OF SJOGREN'S DISEASE: ICD-10-CM

## 2021-09-24 DIAGNOSIS — M67.90 TENDINOPATHY: ICD-10-CM

## 2021-09-24 DIAGNOSIS — E55.9 VITAMIN D DEFICIENCY: ICD-10-CM

## 2021-09-24 DIAGNOSIS — R73.09 ELEVATED HEMOGLOBIN A1C: ICD-10-CM

## 2021-09-24 DIAGNOSIS — Z71.89 COUNSELING ON HEALTH PROMOTION AND DISEASE PREVENTION: ICD-10-CM

## 2021-09-24 DIAGNOSIS — M25.50 ARTHRALGIA, UNSPECIFIED JOINT: Primary | ICD-10-CM

## 2021-09-24 DIAGNOSIS — R76.8 ANA POSITIVE: ICD-10-CM

## 2021-09-24 LAB
25(OH)D3+25(OH)D2 SERPL-MCNC: 30 NG/ML (ref 30–96)
CCP AB SER IA-ACNC: <0.5 U/ML
CRP SERPL-MCNC: 15.9 MG/L (ref 0–8.2)
ERYTHROCYTE [SEDIMENTATION RATE] IN BLOOD BY WESTERGREN METHOD: 108 MM/HR (ref 0–36)
ESTIMATED AVG GLUCOSE: 114 MG/DL (ref 68–131)
HBA1C MFR BLD: 5.6 % (ref 4–5.6)
RHEUMATOID FACT SERPL-ACNC: <13 IU/ML (ref 0–15)

## 2021-09-24 PROCEDURE — 3079F DIAST BP 80-89 MM HG: CPT | Mod: CPTII,S$GLB,, | Performed by: INTERNAL MEDICINE

## 2021-09-24 PROCEDURE — 86431 RHEUMATOID FACTOR QUANT: CPT | Performed by: INTERNAL MEDICINE

## 2021-09-24 PROCEDURE — 86140 C-REACTIVE PROTEIN: CPT | Performed by: INTERNAL MEDICINE

## 2021-09-24 PROCEDURE — 86039 ANTINUCLEAR ANTIBODIES (ANA): CPT | Performed by: INTERNAL MEDICINE

## 2021-09-24 PROCEDURE — 86200 CCP ANTIBODY: CPT | Performed by: INTERNAL MEDICINE

## 2021-09-24 PROCEDURE — 1160F RVW MEDS BY RX/DR IN RCRD: CPT | Mod: CPTII,S$GLB,, | Performed by: INTERNAL MEDICINE

## 2021-09-24 PROCEDURE — 99999 PR PBB SHADOW E&M-EST. PATIENT-LVL III: CPT | Mod: PBBFAC,,, | Performed by: INTERNAL MEDICINE

## 2021-09-24 PROCEDURE — 1159F MED LIST DOCD IN RCRD: CPT | Mod: CPTII,S$GLB,, | Performed by: INTERNAL MEDICINE

## 2021-09-24 PROCEDURE — 86038 ANTINUCLEAR ANTIBODIES: CPT | Performed by: INTERNAL MEDICINE

## 2021-09-24 PROCEDURE — 3075F PR MOST RECENT SYSTOLIC BLOOD PRESS GE 130-139MM HG: ICD-10-PCS | Mod: CPTII,S$GLB,, | Performed by: INTERNAL MEDICINE

## 2021-09-24 PROCEDURE — 99999 PR PBB SHADOW E&M-EST. PATIENT-LVL III: ICD-10-PCS | Mod: PBBFAC,,, | Performed by: INTERNAL MEDICINE

## 2021-09-24 PROCEDURE — 99244 PR OFFICE CONSULTATION,LEVEL IV: ICD-10-PCS | Mod: S$GLB,,, | Performed by: INTERNAL MEDICINE

## 2021-09-24 PROCEDURE — 99244 OFF/OP CNSLTJ NEW/EST MOD 40: CPT | Mod: S$GLB,,, | Performed by: INTERNAL MEDICINE

## 2021-09-24 PROCEDURE — 3075F SYST BP GE 130 - 139MM HG: CPT | Mod: CPTII,S$GLB,, | Performed by: INTERNAL MEDICINE

## 2021-09-24 PROCEDURE — 83036 HEMOGLOBIN GLYCOSYLATED A1C: CPT | Performed by: FAMILY MEDICINE

## 2021-09-24 PROCEDURE — 3044F PR MOST RECENT HEMOGLOBIN A1C LEVEL <7.0%: ICD-10-PCS | Mod: CPTII,S$GLB,, | Performed by: INTERNAL MEDICINE

## 2021-09-24 PROCEDURE — 85652 RBC SED RATE AUTOMATED: CPT | Performed by: INTERNAL MEDICINE

## 2021-09-24 PROCEDURE — 3008F PR BODY MASS INDEX (BMI) DOCUMENTED: ICD-10-PCS | Mod: CPTII,S$GLB,, | Performed by: INTERNAL MEDICINE

## 2021-09-24 PROCEDURE — 3008F BODY MASS INDEX DOCD: CPT | Mod: CPTII,S$GLB,, | Performed by: INTERNAL MEDICINE

## 2021-09-24 PROCEDURE — 82306 VITAMIN D 25 HYDROXY: CPT | Performed by: FAMILY MEDICINE

## 2021-09-24 PROCEDURE — 1160F PR REVIEW ALL MEDS BY PRESCRIBER/CLIN PHARMACIST DOCUMENTED: ICD-10-PCS | Mod: CPTII,S$GLB,, | Performed by: INTERNAL MEDICINE

## 2021-09-24 PROCEDURE — 3044F HG A1C LEVEL LT 7.0%: CPT | Mod: CPTII,S$GLB,, | Performed by: INTERNAL MEDICINE

## 2021-09-24 PROCEDURE — 86235 NUCLEAR ANTIGEN ANTIBODY: CPT | Mod: 59 | Performed by: INTERNAL MEDICINE

## 2021-09-24 PROCEDURE — 1159F PR MEDICATION LIST DOCUMENTED IN MEDICAL RECORD: ICD-10-PCS | Mod: CPTII,S$GLB,, | Performed by: INTERNAL MEDICINE

## 2021-09-24 PROCEDURE — 3079F PR MOST RECENT DIASTOLIC BLOOD PRESSURE 80-89 MM HG: ICD-10-PCS | Mod: CPTII,S$GLB,, | Performed by: INTERNAL MEDICINE

## 2021-09-27 LAB
ANA PATTERN 1: NORMAL
ANA SER QL IF: POSITIVE
ANA TITR SER IF: NORMAL {TITER}

## 2021-09-28 ENCOUNTER — PATIENT MESSAGE (OUTPATIENT)
Dept: RHEUMATOLOGY | Facility: CLINIC | Age: 31
End: 2021-09-28

## 2021-09-29 ENCOUNTER — PATIENT MESSAGE (OUTPATIENT)
Dept: RHEUMATOLOGY | Facility: CLINIC | Age: 31
End: 2021-09-29

## 2021-09-29 ENCOUNTER — PATIENT MESSAGE (OUTPATIENT)
Dept: GASTROENTEROLOGY | Facility: CLINIC | Age: 31
End: 2021-09-29

## 2021-09-29 ENCOUNTER — PATIENT MESSAGE (OUTPATIENT)
Dept: OBSTETRICS AND GYNECOLOGY | Facility: CLINIC | Age: 31
End: 2021-09-29

## 2021-09-29 LAB
ANTI SM ANTIBODY: 0.08 RATIO (ref 0–0.99)
ANTI SM/RNP ANTIBODY: 0.09 RATIO (ref 0–0.99)
ANTI-SM INTERPRETATION: NEGATIVE
ANTI-SM/RNP INTERPRETATION: NEGATIVE
ANTI-SSA ANTIBODY: 0.1 RATIO (ref 0–0.99)
ANTI-SSA INTERPRETATION: NEGATIVE
ANTI-SSB ANTIBODY: 0.07 RATIO (ref 0–0.99)
ANTI-SSB INTERPRETATION: NEGATIVE
DSDNA AB SER-ACNC: NORMAL [IU]/ML

## 2021-09-29 RX ORDER — HYDROXYCHLOROQUINE SULFATE 200 MG/1
200 TABLET, FILM COATED ORAL 2 TIMES DAILY
Qty: 60 TABLET | Refills: 3 | Status: SHIPPED | OUTPATIENT
Start: 2021-09-29 | End: 2021-10-29

## 2021-09-30 ENCOUNTER — TELEPHONE (OUTPATIENT)
Dept: GASTROENTEROLOGY | Facility: CLINIC | Age: 31
End: 2021-09-30

## 2021-09-30 DIAGNOSIS — R07.89 ATYPICAL CHEST PAIN: ICD-10-CM

## 2021-09-30 DIAGNOSIS — K21.9 GASTROESOPHAGEAL REFLUX DISEASE, UNSPECIFIED WHETHER ESOPHAGITIS PRESENT: Primary | ICD-10-CM

## 2021-10-01 ENCOUNTER — TELEPHONE (OUTPATIENT)
Dept: PREADMISSION TESTING | Facility: HOSPITAL | Age: 31
End: 2021-10-01

## 2021-10-01 ENCOUNTER — OFFICE VISIT (OUTPATIENT)
Dept: URGENT CARE | Facility: CLINIC | Age: 31
End: 2021-10-01
Payer: COMMERCIAL

## 2021-10-01 VITALS
BODY MASS INDEX: 44.5 KG/M2 | TEMPERATURE: 99 F | SYSTOLIC BLOOD PRESSURE: 130 MMHG | WEIGHT: 267.44 LBS | HEART RATE: 88 BPM | DIASTOLIC BLOOD PRESSURE: 84 MMHG | OXYGEN SATURATION: 99 %

## 2021-10-01 DIAGNOSIS — I10 HYPERTENSION, UNSPECIFIED TYPE: ICD-10-CM

## 2021-10-01 DIAGNOSIS — R11.0 NAUSEA: ICD-10-CM

## 2021-10-01 DIAGNOSIS — K21.00 GASTROESOPHAGEAL REFLUX DISEASE WITH ESOPHAGITIS WITHOUT HEMORRHAGE: ICD-10-CM

## 2021-10-01 DIAGNOSIS — E66.01 MORBID OBESITY WITH BMI OF 40.0-44.9, ADULT: Primary | ICD-10-CM

## 2021-10-01 PROCEDURE — 3008F PR BODY MASS INDEX (BMI) DOCUMENTED: ICD-10-PCS | Mod: CPTII,S$GLB,, | Performed by: NURSE PRACTITIONER

## 2021-10-01 PROCEDURE — 3075F PR MOST RECENT SYSTOLIC BLOOD PRESS GE 130-139MM HG: ICD-10-PCS | Mod: CPTII,S$GLB,, | Performed by: NURSE PRACTITIONER

## 2021-10-01 PROCEDURE — 3079F DIAST BP 80-89 MM HG: CPT | Mod: CPTII,S$GLB,, | Performed by: NURSE PRACTITIONER

## 2021-10-01 PROCEDURE — 1159F MED LIST DOCD IN RCRD: CPT | Mod: CPTII,S$GLB,, | Performed by: NURSE PRACTITIONER

## 2021-10-01 PROCEDURE — 3079F PR MOST RECENT DIASTOLIC BLOOD PRESSURE 80-89 MM HG: ICD-10-PCS | Mod: CPTII,S$GLB,, | Performed by: NURSE PRACTITIONER

## 2021-10-01 PROCEDURE — 3044F HG A1C LEVEL LT 7.0%: CPT | Mod: CPTII,S$GLB,, | Performed by: NURSE PRACTITIONER

## 2021-10-01 PROCEDURE — 99999 PR PBB SHADOW E&M-EST. PATIENT-LVL IV: ICD-10-PCS | Mod: PBBFAC,,, | Performed by: NURSE PRACTITIONER

## 2021-10-01 PROCEDURE — 1159F PR MEDICATION LIST DOCUMENTED IN MEDICAL RECORD: ICD-10-PCS | Mod: CPTII,S$GLB,, | Performed by: NURSE PRACTITIONER

## 2021-10-01 PROCEDURE — 3008F BODY MASS INDEX DOCD: CPT | Mod: CPTII,S$GLB,, | Performed by: NURSE PRACTITIONER

## 2021-10-01 PROCEDURE — 3075F SYST BP GE 130 - 139MM HG: CPT | Mod: CPTII,S$GLB,, | Performed by: NURSE PRACTITIONER

## 2021-10-01 PROCEDURE — 99214 OFFICE O/P EST MOD 30 MIN: CPT | Mod: S$GLB,,, | Performed by: NURSE PRACTITIONER

## 2021-10-01 PROCEDURE — 1160F PR REVIEW ALL MEDS BY PRESCRIBER/CLIN PHARMACIST DOCUMENTED: ICD-10-PCS | Mod: CPTII,S$GLB,, | Performed by: NURSE PRACTITIONER

## 2021-10-01 PROCEDURE — 99214 PR OFFICE/OUTPT VISIT, EST, LEVL IV, 30-39 MIN: ICD-10-PCS | Mod: S$GLB,,, | Performed by: NURSE PRACTITIONER

## 2021-10-01 PROCEDURE — 99999 PR PBB SHADOW E&M-EST. PATIENT-LVL IV: CPT | Mod: PBBFAC,,, | Performed by: NURSE PRACTITIONER

## 2021-10-01 PROCEDURE — 3044F PR MOST RECENT HEMOGLOBIN A1C LEVEL <7.0%: ICD-10-PCS | Mod: CPTII,S$GLB,, | Performed by: NURSE PRACTITIONER

## 2021-10-01 PROCEDURE — 1160F RVW MEDS BY RX/DR IN RCRD: CPT | Mod: CPTII,S$GLB,, | Performed by: NURSE PRACTITIONER

## 2021-10-01 RX ORDER — ESOMEPRAZOLE MAGNESIUM 40 MG/1
40 CAPSULE, DELAYED RELEASE ORAL
Qty: 30 CAPSULE | Refills: 0 | Status: ON HOLD | OUTPATIENT
Start: 2021-10-01 | End: 2021-10-04 | Stop reason: SDUPTHER

## 2021-10-01 RX ORDER — ONDANSETRON 4 MG/1
4 TABLET, ORALLY DISINTEGRATING ORAL EVERY 6 HOURS PRN
Qty: 15 TABLET | Refills: 0 | Status: SHIPPED | OUTPATIENT
Start: 2021-10-01 | End: 2021-11-03

## 2021-10-02 ENCOUNTER — LAB VISIT (OUTPATIENT)
Dept: PRIMARY CARE CLINIC | Facility: CLINIC | Age: 31
End: 2021-10-02
Payer: COMMERCIAL

## 2021-10-02 DIAGNOSIS — K21.9 GASTROESOPHAGEAL REFLUX DISEASE, UNSPECIFIED WHETHER ESOPHAGITIS PRESENT: ICD-10-CM

## 2021-10-02 PROCEDURE — U0003 INFECTIOUS AGENT DETECTION BY NUCLEIC ACID (DNA OR RNA); SEVERE ACUTE RESPIRATORY SYNDROME CORONAVIRUS 2 (SARS-COV-2) (CORONAVIRUS DISEASE [COVID-19]), AMPLIFIED PROBE TECHNIQUE, MAKING USE OF HIGH THROUGHPUT TECHNOLOGIES AS DESCRIBED BY CMS-2020-01-R: HCPCS | Performed by: INTERNAL MEDICINE

## 2021-10-02 PROCEDURE — U0005 INFEC AGEN DETEC AMPLI PROBE: HCPCS | Performed by: INTERNAL MEDICINE

## 2021-10-04 ENCOUNTER — HOSPITAL ENCOUNTER (OUTPATIENT)
Facility: HOSPITAL | Age: 31
Discharge: HOME | End: 2021-10-04
Attending: INTERNAL MEDICINE | Admitting: INTERNAL MEDICINE
Payer: COMMERCIAL

## 2021-10-04 ENCOUNTER — ANESTHESIA EVENT (OUTPATIENT)
Dept: ENDOSCOPY | Facility: HOSPITAL | Age: 31
End: 2021-10-04
Payer: COMMERCIAL

## 2021-10-04 ENCOUNTER — ANESTHESIA (OUTPATIENT)
Dept: ENDOSCOPY | Facility: HOSPITAL | Age: 31
End: 2021-10-04
Payer: COMMERCIAL

## 2021-10-04 VITALS
HEART RATE: 76 BPM | BODY MASS INDEX: 44.22 KG/M2 | TEMPERATURE: 97 F | DIASTOLIC BLOOD PRESSURE: 74 MMHG | SYSTOLIC BLOOD PRESSURE: 119 MMHG | RESPIRATION RATE: 15 BRPM | HEIGHT: 65 IN | WEIGHT: 265.44 LBS | OXYGEN SATURATION: 99 %

## 2021-10-04 DIAGNOSIS — K21.9 GASTROESOPHAGEAL REFLUX DISEASE WITHOUT ESOPHAGITIS: ICD-10-CM

## 2021-10-04 DIAGNOSIS — E66.01 MORBID OBESITY WITH BMI OF 40.0-44.9, ADULT: ICD-10-CM

## 2021-10-04 DIAGNOSIS — R07.89 ATYPICAL CHEST PAIN: Primary | ICD-10-CM

## 2021-10-04 DIAGNOSIS — R09.A2 GLOBUS SENSATION: ICD-10-CM

## 2021-10-04 DIAGNOSIS — K21.00 GASTROESOPHAGEAL REFLUX DISEASE WITH ESOPHAGITIS WITHOUT HEMORRHAGE: ICD-10-CM

## 2021-10-04 LAB
B-HCG UR QL: NEGATIVE
CTP QC/QA: YES
SARS-COV-2 RDRP RESP QL NAA+PROBE: NEGATIVE
SARS-COV-2 RNA RESP QL NAA+PROBE: NOT DETECTED

## 2021-10-04 PROCEDURE — 63600175 PHARM REV CODE 636 W HCPCS: Performed by: NURSE ANESTHETIST, CERTIFIED REGISTERED

## 2021-10-04 PROCEDURE — 43235 PR EGD, FLEX, DIAGNOSTIC: ICD-10-PCS | Mod: ,,, | Performed by: INTERNAL MEDICINE

## 2021-10-04 PROCEDURE — D9220A PRA ANESTHESIA: Mod: ,,, | Performed by: NURSE ANESTHETIST, CERTIFIED REGISTERED

## 2021-10-04 PROCEDURE — U0002 COVID-19 LAB TEST NON-CDC: HCPCS | Performed by: INTERNAL MEDICINE

## 2021-10-04 PROCEDURE — 63600175 PHARM REV CODE 636 W HCPCS: Performed by: INTERNAL MEDICINE

## 2021-10-04 PROCEDURE — 25000003 PHARM REV CODE 250: Performed by: NURSE ANESTHETIST, CERTIFIED REGISTERED

## 2021-10-04 PROCEDURE — D9220A PRA ANESTHESIA: ICD-10-PCS | Mod: ,,, | Performed by: NURSE ANESTHETIST, CERTIFIED REGISTERED

## 2021-10-04 PROCEDURE — 81025 URINE PREGNANCY TEST: CPT | Performed by: INTERNAL MEDICINE

## 2021-10-04 PROCEDURE — 37000008 HC ANESTHESIA 1ST 15 MINUTES: Performed by: INTERNAL MEDICINE

## 2021-10-04 PROCEDURE — 43235 EGD DIAGNOSTIC BRUSH WASH: CPT | Performed by: INTERNAL MEDICINE

## 2021-10-04 PROCEDURE — 43235 EGD DIAGNOSTIC BRUSH WASH: CPT | Mod: ,,, | Performed by: INTERNAL MEDICINE

## 2021-10-04 RX ORDER — ESOMEPRAZOLE MAGNESIUM 40 MG/1
40 CAPSULE, DELAYED RELEASE ORAL 2 TIMES DAILY
Qty: 60 CAPSULE | Refills: 3 | Status: SHIPPED | OUTPATIENT
Start: 2021-10-04 | End: 2022-02-14

## 2021-10-04 RX ORDER — SODIUM CHLORIDE, SODIUM LACTATE, POTASSIUM CHLORIDE, CALCIUM CHLORIDE 600; 310; 30; 20 MG/100ML; MG/100ML; MG/100ML; MG/100ML
INJECTION, SOLUTION INTRAVENOUS CONTINUOUS
Status: DISCONTINUED | OUTPATIENT
Start: 2021-10-04 | End: 2021-10-04 | Stop reason: HOSPADM

## 2021-10-04 RX ORDER — PROPOFOL 10 MG/ML
VIAL (ML) INTRAVENOUS
Status: DISCONTINUED | OUTPATIENT
Start: 2021-10-04 | End: 2021-10-04

## 2021-10-04 RX ORDER — LIDOCAINE HYDROCHLORIDE 20 MG/ML
INJECTION, SOLUTION EPIDURAL; INFILTRATION; INTRACAUDAL; PERINEURAL
Status: DISCONTINUED | OUTPATIENT
Start: 2021-10-04 | End: 2021-10-04

## 2021-10-04 RX ORDER — SODIUM CHLORIDE 0.9 % (FLUSH) 0.9 %
10 SYRINGE (ML) INJECTION
Status: DISCONTINUED | OUTPATIENT
Start: 2021-10-04 | End: 2021-10-04 | Stop reason: HOSPADM

## 2021-10-04 RX ORDER — SODIUM CHLORIDE, SODIUM LACTATE, POTASSIUM CHLORIDE, CALCIUM CHLORIDE 600; 310; 30; 20 MG/100ML; MG/100ML; MG/100ML; MG/100ML
INJECTION, SOLUTION INTRAVENOUS CONTINUOUS PRN
Status: DISCONTINUED | OUTPATIENT
Start: 2021-10-04 | End: 2021-10-04

## 2021-10-04 RX ADMIN — PROPOFOL 120 MG: 10 INJECTION, EMULSION INTRAVENOUS at 12:10

## 2021-10-04 RX ADMIN — PROPOFOL 40 MG: 10 INJECTION, EMULSION INTRAVENOUS at 12:10

## 2021-10-04 RX ADMIN — LIDOCAINE HYDROCHLORIDE 20 MG: 20 INJECTION, SOLUTION EPIDURAL; INFILTRATION; INTRACAUDAL; PERINEURAL at 12:10

## 2021-10-04 RX ADMIN — SODIUM CHLORIDE, SODIUM LACTATE, POTASSIUM CHLORIDE, AND CALCIUM CHLORIDE: 600; 310; 30; 20 INJECTION, SOLUTION INTRAVENOUS at 12:10

## 2021-10-04 RX ADMIN — SODIUM CHLORIDE, SODIUM LACTATE, POTASSIUM CHLORIDE, AND CALCIUM CHLORIDE: .6; .31; .03; .02 INJECTION, SOLUTION INTRAVENOUS at 12:10

## 2021-10-04 RX ADMIN — LIDOCAINE HYDROCHLORIDE 60 MG: 20 INJECTION, SOLUTION EPIDURAL; INFILTRATION; INTRACAUDAL; PERINEURAL at 12:10

## 2021-10-27 ENCOUNTER — TELEPHONE (OUTPATIENT)
Dept: HEMATOLOGY/ONCOLOGY | Facility: CLINIC | Age: 31
End: 2021-10-27
Payer: COMMERCIAL

## 2021-11-01 ENCOUNTER — OFFICE VISIT (OUTPATIENT)
Dept: HEMATOLOGY/ONCOLOGY | Facility: CLINIC | Age: 31
End: 2021-11-01
Payer: COMMERCIAL

## 2021-11-01 VITALS
HEIGHT: 65 IN | HEART RATE: 86 BPM | DIASTOLIC BLOOD PRESSURE: 80 MMHG | SYSTOLIC BLOOD PRESSURE: 126 MMHG | WEIGHT: 270.06 LBS | BODY MASS INDEX: 45 KG/M2 | TEMPERATURE: 97 F | OXYGEN SATURATION: 99 %

## 2021-11-01 DIAGNOSIS — D50.0 IRON DEFICIENCY ANEMIA DUE TO CHRONIC BLOOD LOSS: ICD-10-CM

## 2021-11-01 DIAGNOSIS — F41.9 ANXIETY DUE TO INVASIVE PROCEDURE: ICD-10-CM

## 2021-11-01 PROCEDURE — 3008F BODY MASS INDEX DOCD: CPT | Mod: CPTII,S$GLB,, | Performed by: INTERNAL MEDICINE

## 2021-11-01 PROCEDURE — 1160F RVW MEDS BY RX/DR IN RCRD: CPT | Mod: CPTII,S$GLB,, | Performed by: INTERNAL MEDICINE

## 2021-11-01 PROCEDURE — 1159F MED LIST DOCD IN RCRD: CPT | Mod: CPTII,S$GLB,, | Performed by: INTERNAL MEDICINE

## 2021-11-01 PROCEDURE — 3079F PR MOST RECENT DIASTOLIC BLOOD PRESSURE 80-89 MM HG: ICD-10-PCS | Mod: CPTII,S$GLB,, | Performed by: INTERNAL MEDICINE

## 2021-11-01 PROCEDURE — 3074F PR MOST RECENT SYSTOLIC BLOOD PRESSURE < 130 MM HG: ICD-10-PCS | Mod: CPTII,S$GLB,, | Performed by: INTERNAL MEDICINE

## 2021-11-01 PROCEDURE — 99214 PR OFFICE/OUTPT VISIT, EST, LEVL IV, 30-39 MIN: ICD-10-PCS | Mod: S$GLB,,, | Performed by: INTERNAL MEDICINE

## 2021-11-01 PROCEDURE — 1160F PR REVIEW ALL MEDS BY PRESCRIBER/CLIN PHARMACIST DOCUMENTED: ICD-10-PCS | Mod: CPTII,S$GLB,, | Performed by: INTERNAL MEDICINE

## 2021-11-01 PROCEDURE — 99999 PR PBB SHADOW E&M-EST. PATIENT-LVL IV: CPT | Mod: PBBFAC,,, | Performed by: INTERNAL MEDICINE

## 2021-11-01 PROCEDURE — 1159F PR MEDICATION LIST DOCUMENTED IN MEDICAL RECORD: ICD-10-PCS | Mod: CPTII,S$GLB,, | Performed by: INTERNAL MEDICINE

## 2021-11-01 PROCEDURE — 99999 PR PBB SHADOW E&M-EST. PATIENT-LVL IV: ICD-10-PCS | Mod: PBBFAC,,, | Performed by: INTERNAL MEDICINE

## 2021-11-01 PROCEDURE — 3044F HG A1C LEVEL LT 7.0%: CPT | Mod: CPTII,S$GLB,, | Performed by: INTERNAL MEDICINE

## 2021-11-01 PROCEDURE — 99214 OFFICE O/P EST MOD 30 MIN: CPT | Mod: S$GLB,,, | Performed by: INTERNAL MEDICINE

## 2021-11-01 PROCEDURE — 3008F PR BODY MASS INDEX (BMI) DOCUMENTED: ICD-10-PCS | Mod: CPTII,S$GLB,, | Performed by: INTERNAL MEDICINE

## 2021-11-01 PROCEDURE — 3079F DIAST BP 80-89 MM HG: CPT | Mod: CPTII,S$GLB,, | Performed by: INTERNAL MEDICINE

## 2021-11-01 PROCEDURE — 3074F SYST BP LT 130 MM HG: CPT | Mod: CPTII,S$GLB,, | Performed by: INTERNAL MEDICINE

## 2021-11-01 PROCEDURE — 3044F PR MOST RECENT HEMOGLOBIN A1C LEVEL <7.0%: ICD-10-PCS | Mod: CPTII,S$GLB,, | Performed by: INTERNAL MEDICINE

## 2021-11-01 RX ORDER — METHYLPREDNISOLONE SOD SUCC 125 MG
125 VIAL (EA) INJECTION ONCE AS NEEDED
Status: CANCELLED | OUTPATIENT
Start: 2021-11-01

## 2021-11-01 RX ORDER — HEPARIN 100 UNIT/ML
500 SYRINGE INTRAVENOUS
Status: CANCELLED | OUTPATIENT
Start: 2021-11-01

## 2021-11-01 RX ORDER — DIPHENHYDRAMINE HYDROCHLORIDE 50 MG/ML
50 INJECTION INTRAMUSCULAR; INTRAVENOUS ONCE AS NEEDED
Status: CANCELLED | OUTPATIENT
Start: 2021-11-01

## 2021-11-01 RX ORDER — EPINEPHRINE 0.3 MG/.3ML
0.3 INJECTION SUBCUTANEOUS ONCE AS NEEDED
Status: CANCELLED | OUTPATIENT
Start: 2021-11-01

## 2021-11-01 RX ORDER — SODIUM CHLORIDE 0.9 % (FLUSH) 0.9 %
10 SYRINGE (ML) INJECTION
Status: CANCELLED | OUTPATIENT
Start: 2021-11-01

## 2021-11-02 RX ORDER — DIAZEPAM 5 MG/1
5 TABLET ORAL
Qty: 1 TABLET | Refills: 0 | Status: SHIPPED | OUTPATIENT
Start: 2021-11-02 | End: 2021-11-03 | Stop reason: SDUPTHER

## 2021-11-03 ENCOUNTER — PATIENT MESSAGE (OUTPATIENT)
Dept: ENDOSCOPY | Facility: HOSPITAL | Age: 31
End: 2021-11-03
Payer: COMMERCIAL

## 2021-11-03 ENCOUNTER — LAB VISIT (OUTPATIENT)
Dept: LAB | Facility: HOSPITAL | Age: 31
End: 2021-11-03
Attending: OBSTETRICS & GYNECOLOGY
Payer: COMMERCIAL

## 2021-11-03 ENCOUNTER — PROCEDURE VISIT (OUTPATIENT)
Dept: OBSTETRICS AND GYNECOLOGY | Facility: CLINIC | Age: 31
End: 2021-11-03
Payer: COMMERCIAL

## 2021-11-03 ENCOUNTER — TELEPHONE (OUTPATIENT)
Dept: OBSTETRICS AND GYNECOLOGY | Facility: CLINIC | Age: 31
End: 2021-11-03
Payer: COMMERCIAL

## 2021-11-03 VITALS
DIASTOLIC BLOOD PRESSURE: 78 MMHG | BODY MASS INDEX: 44.63 KG/M2 | SYSTOLIC BLOOD PRESSURE: 118 MMHG | HEIGHT: 65 IN | WEIGHT: 267.88 LBS

## 2021-11-03 DIAGNOSIS — Z11.3 SCREEN FOR STD (SEXUALLY TRANSMITTED DISEASE): ICD-10-CM

## 2021-11-03 DIAGNOSIS — N87.1 DYSPLASIA OF CERVIX, HIGH GRADE CIN 2: Primary | ICD-10-CM

## 2021-11-03 DIAGNOSIS — F41.9 ANXIETY DUE TO INVASIVE PROCEDURE: ICD-10-CM

## 2021-11-03 DIAGNOSIS — B96.89 BACTERIAL VAGINOSIS: ICD-10-CM

## 2021-11-03 DIAGNOSIS — N76.0 BACTERIAL VAGINOSIS: ICD-10-CM

## 2021-11-03 PROCEDURE — 88305 TISSUE EXAM BY PATHOLOGIST: CPT | Mod: 26,,, | Performed by: PATHOLOGY

## 2021-11-03 PROCEDURE — 57454 COLPOSCOPY-TODAY: ICD-10-PCS | Mod: S$GLB,,, | Performed by: OBSTETRICS & GYNECOLOGY

## 2021-11-03 PROCEDURE — 86592 SYPHILIS TEST NON-TREP QUAL: CPT | Performed by: OBSTETRICS & GYNECOLOGY

## 2021-11-03 PROCEDURE — 87210 PR  SMEAR,STAIN,WET MNT,INTERP: ICD-10-PCS | Mod: QW,S$GLB,, | Performed by: OBSTETRICS & GYNECOLOGY

## 2021-11-03 PROCEDURE — 87491 CHLMYD TRACH DNA AMP PROBE: CPT | Performed by: OBSTETRICS & GYNECOLOGY

## 2021-11-03 PROCEDURE — 88305 TISSUE EXAM BY PATHOLOGIST: ICD-10-PCS | Mod: 26,,, | Performed by: PATHOLOGY

## 2021-11-03 PROCEDURE — 87389 HIV-1 AG W/HIV-1&-2 AB AG IA: CPT | Performed by: OBSTETRICS & GYNECOLOGY

## 2021-11-03 PROCEDURE — 88305 TISSUE EXAM BY PATHOLOGIST: CPT | Performed by: PATHOLOGY

## 2021-11-03 PROCEDURE — 86803 HEPATITIS C AB TEST: CPT | Performed by: OBSTETRICS & GYNECOLOGY

## 2021-11-03 PROCEDURE — 57454 BX/CURETT OF CERVIX W/SCOPE: CPT | Mod: S$GLB,,, | Performed by: OBSTETRICS & GYNECOLOGY

## 2021-11-03 PROCEDURE — 87210 SMEAR WET MOUNT SALINE/INK: CPT | Mod: QW,S$GLB,, | Performed by: OBSTETRICS & GYNECOLOGY

## 2021-11-03 PROCEDURE — 87591 N.GONORRHOEAE DNA AMP PROB: CPT | Performed by: OBSTETRICS & GYNECOLOGY

## 2021-11-03 PROCEDURE — 87340 HEPATITIS B SURFACE AG IA: CPT | Performed by: OBSTETRICS & GYNECOLOGY

## 2021-11-03 PROCEDURE — 36415 COLL VENOUS BLD VENIPUNCTURE: CPT | Performed by: OBSTETRICS & GYNECOLOGY

## 2021-11-03 RX ORDER — DIAZEPAM 5 MG/1
5 TABLET ORAL
Qty: 1 TABLET | Refills: 0 | Status: SHIPPED | OUTPATIENT
Start: 2021-11-03 | End: 2022-03-10

## 2021-11-03 RX ORDER — METRONIDAZOLE 7.5 MG/G
1 GEL VAGINAL DAILY
Qty: 70 G | Refills: 0 | Status: SHIPPED | OUTPATIENT
Start: 2021-11-03 | End: 2021-11-08

## 2021-11-04 LAB
HBV SURFACE AG SERPL QL IA: NEGATIVE
HCV AB SERPL QL IA: NEGATIVE
HIV 1+2 AB+HIV1 P24 AG SERPL QL IA: NEGATIVE
RPR SER QL: NORMAL

## 2021-11-04 RX ORDER — SODIUM, POTASSIUM,MAG SULFATES 17.5-3.13G
1 SOLUTION, RECONSTITUTED, ORAL ORAL DAILY
Qty: 1 KIT | Refills: 0 | Status: SHIPPED | OUTPATIENT
Start: 2021-11-04 | End: 2021-11-06

## 2021-11-05 LAB
C TRACH DNA SPEC QL NAA+PROBE: NOT DETECTED
N GONORRHOEA DNA SPEC QL NAA+PROBE: NOT DETECTED

## 2021-11-16 ENCOUNTER — INFUSION (OUTPATIENT)
Dept: INFUSION THERAPY | Facility: HOSPITAL | Age: 31
End: 2021-11-16
Attending: INTERNAL MEDICINE
Payer: COMMERCIAL

## 2021-11-16 ENCOUNTER — LAB VISIT (OUTPATIENT)
Dept: LAB | Facility: HOSPITAL | Age: 31
End: 2021-11-16
Attending: INTERNAL MEDICINE
Payer: COMMERCIAL

## 2021-11-16 VITALS
DIASTOLIC BLOOD PRESSURE: 76 MMHG | OXYGEN SATURATION: 99 % | TEMPERATURE: 98 F | RESPIRATION RATE: 16 BRPM | SYSTOLIC BLOOD PRESSURE: 129 MMHG | HEART RATE: 69 BPM

## 2021-11-16 DIAGNOSIS — D50.0 IRON DEFICIENCY ANEMIA DUE TO CHRONIC BLOOD LOSS: ICD-10-CM

## 2021-11-16 DIAGNOSIS — D50.0 IRON DEFICIENCY ANEMIA DUE TO CHRONIC BLOOD LOSS: Primary | ICD-10-CM

## 2021-11-16 LAB
B-HCG UR QL: NEGATIVE
FINAL PATHOLOGIC DIAGNOSIS: NORMAL
Lab: NORMAL

## 2021-11-16 PROCEDURE — 25000003 PHARM REV CODE 250: Performed by: INTERNAL MEDICINE

## 2021-11-16 PROCEDURE — 81025 URINE PREGNANCY TEST: CPT | Performed by: INTERNAL MEDICINE

## 2021-11-16 PROCEDURE — 96365 THER/PROPH/DIAG IV INF INIT: CPT

## 2021-11-16 PROCEDURE — 63600175 PHARM REV CODE 636 W HCPCS: Mod: JG | Performed by: INTERNAL MEDICINE

## 2021-11-16 RX ORDER — METHYLPREDNISOLONE SOD SUCC 125 MG
125 VIAL (EA) INJECTION ONCE AS NEEDED
Status: CANCELLED | OUTPATIENT
Start: 2021-11-16

## 2021-11-16 RX ORDER — SODIUM CHLORIDE 0.9 % (FLUSH) 0.9 %
10 SYRINGE (ML) INJECTION
Status: CANCELLED | OUTPATIENT
Start: 2021-11-16

## 2021-11-16 RX ORDER — HEPARIN 100 UNIT/ML
500 SYRINGE INTRAVENOUS
Status: CANCELLED | OUTPATIENT
Start: 2021-11-16

## 2021-11-16 RX ORDER — DIPHENHYDRAMINE HYDROCHLORIDE 50 MG/ML
50 INJECTION INTRAMUSCULAR; INTRAVENOUS ONCE AS NEEDED
Status: CANCELLED | OUTPATIENT
Start: 2021-11-16

## 2021-11-16 RX ORDER — EPINEPHRINE 0.3 MG/.3ML
0.3 INJECTION SUBCUTANEOUS ONCE AS NEEDED
Status: CANCELLED | OUTPATIENT
Start: 2021-11-16

## 2021-11-16 RX ADMIN — FERUMOXYTOL 510 MG: 510 INJECTION INTRAVENOUS at 01:11

## 2021-12-02 ENCOUNTER — IMMUNIZATION (OUTPATIENT)
Dept: PHARMACY | Facility: CLINIC | Age: 31
End: 2021-12-02
Payer: COMMERCIAL

## 2021-12-02 DIAGNOSIS — Z23 NEED FOR VACCINATION: Primary | ICD-10-CM

## 2021-12-14 ENCOUNTER — LAB VISIT (OUTPATIENT)
Dept: LAB | Facility: HOSPITAL | Age: 31
End: 2021-12-14
Attending: NURSE PRACTITIONER
Payer: COMMERCIAL

## 2021-12-14 ENCOUNTER — OFFICE VISIT (OUTPATIENT)
Dept: FAMILY MEDICINE | Facility: CLINIC | Age: 31
End: 2021-12-14
Payer: COMMERCIAL

## 2021-12-14 VITALS
SYSTOLIC BLOOD PRESSURE: 122 MMHG | BODY MASS INDEX: 45.43 KG/M2 | HEART RATE: 129 BPM | OXYGEN SATURATION: 100 % | DIASTOLIC BLOOD PRESSURE: 70 MMHG | WEIGHT: 272.69 LBS | TEMPERATURE: 98 F | HEIGHT: 65 IN

## 2021-12-14 DIAGNOSIS — G44.209 ACUTE NON INTRACTABLE TENSION-TYPE HEADACHE: Primary | ICD-10-CM

## 2021-12-14 DIAGNOSIS — R53.82 CHRONIC FATIGUE: ICD-10-CM

## 2021-12-14 DIAGNOSIS — G44.209 ACUTE NON INTRACTABLE TENSION-TYPE HEADACHE: ICD-10-CM

## 2021-12-14 DIAGNOSIS — E66.01 MORBID OBESITY WITH BMI OF 40.0-44.9, ADULT: ICD-10-CM

## 2021-12-14 PROCEDURE — 82607 VITAMIN B-12: CPT | Performed by: NURSE PRACTITIONER

## 2021-12-14 PROCEDURE — 3078F DIAST BP <80 MM HG: CPT | Mod: CPTII,S$GLB,, | Performed by: NURSE PRACTITIONER

## 2021-12-14 PROCEDURE — 3008F BODY MASS INDEX DOCD: CPT | Mod: CPTII,S$GLB,, | Performed by: NURSE PRACTITIONER

## 2021-12-14 PROCEDURE — 99999 PR PBB SHADOW E&M-EST. PATIENT-LVL III: CPT | Mod: PBBFAC,,, | Performed by: NURSE PRACTITIONER

## 2021-12-14 PROCEDURE — 99214 OFFICE O/P EST MOD 30 MIN: CPT | Mod: S$GLB,,, | Performed by: NURSE PRACTITIONER

## 2021-12-14 PROCEDURE — 3074F SYST BP LT 130 MM HG: CPT | Mod: CPTII,S$GLB,, | Performed by: NURSE PRACTITIONER

## 2021-12-14 PROCEDURE — 3074F PR MOST RECENT SYSTOLIC BLOOD PRESSURE < 130 MM HG: ICD-10-PCS | Mod: CPTII,S$GLB,, | Performed by: NURSE PRACTITIONER

## 2021-12-14 PROCEDURE — 36415 COLL VENOUS BLD VENIPUNCTURE: CPT | Mod: PO | Performed by: NURSE PRACTITIONER

## 2021-12-14 PROCEDURE — 99214 PR OFFICE/OUTPT VISIT, EST, LEVL IV, 30-39 MIN: ICD-10-PCS | Mod: S$GLB,,, | Performed by: NURSE PRACTITIONER

## 2021-12-14 PROCEDURE — 3078F PR MOST RECENT DIASTOLIC BLOOD PRESSURE < 80 MM HG: ICD-10-PCS | Mod: CPTII,S$GLB,, | Performed by: NURSE PRACTITIONER

## 2021-12-14 PROCEDURE — 99999 PR PBB SHADOW E&M-EST. PATIENT-LVL III: ICD-10-PCS | Mod: PBBFAC,,, | Performed by: NURSE PRACTITIONER

## 2021-12-14 PROCEDURE — 3008F PR BODY MASS INDEX (BMI) DOCUMENTED: ICD-10-PCS | Mod: CPTII,S$GLB,, | Performed by: NURSE PRACTITIONER

## 2021-12-14 RX ORDER — BUTALBITAL, ACETAMINOPHEN AND CAFFEINE 50; 325; 40 MG/1; MG/1; MG/1
1 TABLET ORAL EVERY 4 HOURS PRN
Qty: 30 TABLET | Refills: 0 | Status: SHIPPED | OUTPATIENT
Start: 2021-12-14 | End: 2022-01-13

## 2021-12-15 ENCOUNTER — PATIENT MESSAGE (OUTPATIENT)
Dept: HEMATOLOGY/ONCOLOGY | Facility: CLINIC | Age: 31
End: 2021-12-15
Payer: COMMERCIAL

## 2021-12-15 LAB — VIT B12 SERPL-MCNC: 414 PG/ML (ref 210–950)

## 2021-12-17 ENCOUNTER — PATIENT MESSAGE (OUTPATIENT)
Dept: PSYCHIATRY | Facility: CLINIC | Age: 31
End: 2021-12-17
Payer: COMMERCIAL

## 2021-12-20 DIAGNOSIS — F41.1 GENERALIZED ANXIETY DISORDER: ICD-10-CM

## 2021-12-20 RX ORDER — BUSPIRONE HYDROCHLORIDE 7.5 MG/1
7.5 TABLET ORAL 2 TIMES DAILY
Qty: 60 TABLET | Refills: 0 | Status: SHIPPED | OUTPATIENT
Start: 2021-12-20 | End: 2022-02-14

## 2021-12-28 ENCOUNTER — LAB VISIT (OUTPATIENT)
Dept: PRIMARY CARE CLINIC | Facility: OTHER | Age: 31
End: 2021-12-28
Attending: INTERNAL MEDICINE
Payer: COMMERCIAL

## 2021-12-28 DIAGNOSIS — R11.0 NAUSEA: ICD-10-CM

## 2021-12-28 DIAGNOSIS — R06.02 SHORTNESS OF BREATH: ICD-10-CM

## 2021-12-28 DIAGNOSIS — R05.9 COUGH: ICD-10-CM

## 2021-12-28 PROCEDURE — U0003 INFECTIOUS AGENT DETECTION BY NUCLEIC ACID (DNA OR RNA); SEVERE ACUTE RESPIRATORY SYNDROME CORONAVIRUS 2 (SARS-COV-2) (CORONAVIRUS DISEASE [COVID-19]), AMPLIFIED PROBE TECHNIQUE, MAKING USE OF HIGH THROUGHPUT TECHNOLOGIES AS DESCRIBED BY CMS-2020-01-R: HCPCS | Performed by: INTERNAL MEDICINE

## 2021-12-30 LAB
SARS-COV-2 RNA RESP QL NAA+PROBE: NOT DETECTED
SARS-COV-2- CYCLE NUMBER: NORMAL

## 2022-01-12 ENCOUNTER — TELEPHONE (OUTPATIENT)
Dept: RHEUMATOLOGY | Facility: CLINIC | Age: 32
End: 2022-01-12
Payer: COMMERCIAL

## 2022-01-12 NOTE — TELEPHONE ENCOUNTER
Attempt to call patient to confirm appointment. Patent did not answer, Left message on patients voice mail to call back at earliest convenience to confirm or reschedule p.t apt.     Jose Alvarado  Medical Assistant

## 2022-01-14 ENCOUNTER — PATIENT MESSAGE (OUTPATIENT)
Dept: PSYCHIATRY | Facility: CLINIC | Age: 32
End: 2022-01-14

## 2022-01-25 ENCOUNTER — OFFICE VISIT (OUTPATIENT)
Dept: OPHTHALMOLOGY | Facility: CLINIC | Age: 32
End: 2022-01-25
Payer: COMMERCIAL

## 2022-01-25 DIAGNOSIS — H52.203 MYOPIA OF BOTH EYES WITH ASTIGMATISM: Primary | ICD-10-CM

## 2022-01-25 DIAGNOSIS — H52.13 MYOPIA OF BOTH EYES WITH ASTIGMATISM: Primary | ICD-10-CM

## 2022-01-25 PROCEDURE — 92310 CONTACT LENS FITTING OU: CPT | Mod: S$GLB,,, | Performed by: OPTOMETRIST

## 2022-01-25 PROCEDURE — 92014 COMPRE OPH EXAM EST PT 1/>: CPT | Mod: S$GLB,,, | Performed by: OPTOMETRIST

## 2022-01-25 PROCEDURE — 92310 PR CONTACT LENS FITTING (NO CHANGE): ICD-10-PCS | Mod: S$GLB,,, | Performed by: OPTOMETRIST

## 2022-01-25 PROCEDURE — 92015 PR REFRACTION: ICD-10-PCS | Mod: S$GLB,,, | Performed by: OPTOMETRIST

## 2022-01-25 PROCEDURE — 92015 DETERMINE REFRACTIVE STATE: CPT | Mod: S$GLB,,, | Performed by: OPTOMETRIST

## 2022-01-25 PROCEDURE — 92014 PR EYE EXAM, EST PATIENT,COMPREHESV: ICD-10-PCS | Mod: S$GLB,,, | Performed by: OPTOMETRIST

## 2022-01-25 PROCEDURE — 99999 PR PBB SHADOW E&M-EST. PATIENT-LVL III: CPT | Mod: PBBFAC,,, | Performed by: OPTOMETRIST

## 2022-01-25 PROCEDURE — 99999 PR PBB SHADOW E&M-EST. PATIENT-LVL III: ICD-10-PCS | Mod: PBBFAC,,, | Performed by: OPTOMETRIST

## 2022-01-25 NOTE — PROGRESS NOTES
HPI     Myopia of both eyes with astigmatism     Comments: Yearly exam/   update glasses and contacts              Comments     No dm  No sx's          Last edited by Carlie Matute on 1/25/2022  8:59 AM. (History)            Assessment /Plan     For exam results, see Encounter Report.    Myopia of both eyes with astigmatism      Eyeglass Final Rx     Eyeglass Final Rx       Sphere Cylinder Axis    Right -5.00 +0.75 070    Left -6.50 +1.25 100    Expiration Date: 1/26/2023              Contact Lens Prescription (1/25/2022)        Brand Base Curve Diameter Sphere Cylinder Axis    Right Acuvue Oasys for Astigmatism 8.6 14.5 -4.00 -0.75 160    Left Acuvue Oasys for Astigmatism 8.6 14.5 -5.00 -1.25 010    Expiration Date: 1/26/2023    Replacement: Every 2 weeks    Wearing Schedule: Daily wear          Dispensed trial contact lenses today. Patient is to wear lenses for 1 week.   Ok to order supply if no problems. RTC PRN if any problems arise.    Otherwise, RTC 1 yr for dilated eye exam.  Discussed above and answered questions.

## 2022-02-03 ENCOUNTER — PATIENT MESSAGE (OUTPATIENT)
Dept: PSYCHIATRY | Facility: CLINIC | Age: 32
End: 2022-02-03
Payer: COMMERCIAL

## 2022-02-06 NOTE — PROGRESS NOTES
"Subjective:       Patient ID: Anthony Sexton is a 31 y.o. female.    Chief Complaint: Headache and Fatigue  Pt reports to clinic with chief complaint of headache x3 days.  Reports brain fog.  No vision changes, photophobia, phonophobia or n/v. Reports difficulty completing task.  Awakening with headache.  Begins in frontal region and radiates to neck, shoulders and back.  Tylenol takes "edge off".      Past Medical History:   Diagnosis Date    Anemia     Anxiety     Chronic low back pain     Depression     History of abnormal cervical Pap smear     History of recurrent UTIs     History of ulcer disease     Irregular menses     Obesity     Postpartum depression     with last pregnancy.     Pre-eclampsia in postpartum period 4/23/2020    Vitamin D deficiency      Headache   This is a new problem. The current episode started yesterday. The problem occurs constantly. The problem has been unchanged. The pain is located in the frontal region. The pain radiates to the upper back, right neck, left neck, right shoulder and left shoulder. The pain quality is not similar to prior headaches. The quality of the pain is described as aching. Pertinent negatives include no abdominal pain, hearing loss, nausea, neck pain, phonophobia, photophobia, rhinorrhea, vomiting or weakness. Nothing aggravates the symptoms. She has tried acetaminophen for the symptoms. Her past medical history is significant for obesity.   Fatigue  This is a new problem. The current episode started in the past 7 days. Associated symptoms include fatigue and headaches. Pertinent negatives include no abdominal pain, arthralgias, chest pain, joint swelling, nausea, neck pain, vomiting or weakness.     Review of Systems   Constitutional: Positive for activity change and fatigue. Negative for unexpected weight change.   HENT: Negative for hearing loss, rhinorrhea and trouble swallowing.    Eyes: Positive for visual disturbance. Negative for photophobia " and discharge.   Respiratory: Negative for chest tightness and wheezing.    Cardiovascular: Positive for palpitations. Negative for chest pain.   Gastrointestinal: Negative for abdominal pain, blood in stool, constipation, diarrhea, nausea and vomiting.   Endocrine: Negative for polydipsia and polyuria.   Genitourinary: Negative for difficulty urinating, dysuria, hematuria and menstrual problem.   Musculoskeletal: Negative for arthralgias, joint swelling and neck pain.   Neurological: Positive for headaches. Negative for weakness.   Psychiatric/Behavioral: Negative for confusion and dysphoric mood.       Objective:      Physical Exam  Vitals reviewed.   Constitutional:       Appearance: She is well-developed and well-nourished.   HENT:      Head: Normocephalic.      Mouth/Throat:      Pharynx: No oropharyngeal exudate.   Eyes:      Extraocular Movements: EOM normal.      Pupils: Pupils are equal, round, and reactive to light.   Neck:      Vascular: No JVD.      Trachea: No tracheal deviation.   Cardiovascular:      Rate and Rhythm: Normal rate and regular rhythm.      Pulses: Intact distal pulses.      Heart sounds: Normal heart sounds. No murmur heard.      Pulmonary:      Effort: Pulmonary effort is normal. No respiratory distress.      Breath sounds: Normal breath sounds.   Abdominal:      General: Bowel sounds are normal. There is no distension.      Palpations: Abdomen is soft.      Tenderness: There is no abdominal tenderness.   Musculoskeletal:         General: Normal range of motion.      Cervical back: Normal range of motion.   Skin:     General: Skin is warm and dry.   Neurological:      Mental Status: She is alert and oriented to person, place, and time.      GCS: GCS eye subscore is 4. GCS verbal subscore is 5. GCS motor subscore is 6.      Motor: Motor function is intact.      Deep Tendon Reflexes: Reflexes are normal and symmetric.   Psychiatric:         Mood and Affect: Mood and affect normal.          Speech: Speech normal.         Behavior: Behavior normal.         Assessment:       1. Acute non intractable tension-type headache    2. Morbid obesity with BMI of 40.0-44.9, adult    3. Chronic fatigue        Plan:   Acute non intractable tension-type headache  -     Vitamin B12; Future; Expected date: 12/14/2021  -     butalbital-acetaminophen-caffeine -40 mg (FIORICET, ESGIC) -40 mg per tablet; Take 1 tablet by mouth every 4 (four) hours as needed for Pain or Headaches.  Dispense: 30 tablet; Refill: 0    Morbid obesity with BMI of 40.0-44.9, adult  -     semaglutide (OZEMPIC) 0.25 mg or 0.5 mg(2 mg/1.5 mL) pen injector; Inject 0.25 mg into the skin every 7 days.  Dispense: 1 pen; Refill: 0  S/e effects discussed. Diet and exercise.  Chronic fatigue      No follow-ups on file.

## 2022-02-07 ENCOUNTER — CLINICAL SUPPORT (OUTPATIENT)
Dept: AUDIOLOGY | Facility: CLINIC | Age: 32
End: 2022-02-07
Payer: COMMERCIAL

## 2022-02-07 ENCOUNTER — OFFICE VISIT (OUTPATIENT)
Dept: OTOLARYNGOLOGY | Facility: CLINIC | Age: 32
End: 2022-02-07
Payer: COMMERCIAL

## 2022-02-07 VITALS — HEIGHT: 65 IN | BODY MASS INDEX: 44.88 KG/M2 | WEIGHT: 269.38 LBS

## 2022-02-07 DIAGNOSIS — M26.622 TMJ TENDERNESS, LEFT: ICD-10-CM

## 2022-02-07 DIAGNOSIS — H92.02 OTALGIA OF LEFT EAR: Primary | ICD-10-CM

## 2022-02-07 DIAGNOSIS — M26.622 TMJ TENDERNESS, LEFT: Primary | ICD-10-CM

## 2022-02-07 PROCEDURE — 3008F BODY MASS INDEX DOCD: CPT | Mod: CPTII,S$GLB,, | Performed by: PHYSICIAN ASSISTANT

## 2022-02-07 PROCEDURE — 92567 PR TYMPA2METRY: ICD-10-PCS | Mod: S$GLB,,, | Performed by: AUDIOLOGIST-HEARING AID FITTER

## 2022-02-07 PROCEDURE — 1159F MED LIST DOCD IN RCRD: CPT | Mod: CPTII,S$GLB,, | Performed by: PHYSICIAN ASSISTANT

## 2022-02-07 PROCEDURE — 99213 PR OFFICE/OUTPT VISIT, EST, LEVL III, 20-29 MIN: ICD-10-PCS | Mod: S$GLB,,, | Performed by: PHYSICIAN ASSISTANT

## 2022-02-07 PROCEDURE — 99999 PR PBB SHADOW E&M-EST. PATIENT-LVL III: ICD-10-PCS | Mod: PBBFAC,,, | Performed by: PHYSICIAN ASSISTANT

## 2022-02-07 PROCEDURE — 99999 PR PBB SHADOW E&M-EST. PATIENT-LVL I: ICD-10-PCS | Mod: PBBFAC,,, | Performed by: AUDIOLOGIST-HEARING AID FITTER

## 2022-02-07 PROCEDURE — 92567 TYMPANOMETRY: CPT | Mod: S$GLB,,, | Performed by: AUDIOLOGIST-HEARING AID FITTER

## 2022-02-07 PROCEDURE — 1159F PR MEDICATION LIST DOCUMENTED IN MEDICAL RECORD: ICD-10-PCS | Mod: CPTII,S$GLB,, | Performed by: PHYSICIAN ASSISTANT

## 2022-02-07 PROCEDURE — 3008F PR BODY MASS INDEX (BMI) DOCUMENTED: ICD-10-PCS | Mod: CPTII,S$GLB,, | Performed by: PHYSICIAN ASSISTANT

## 2022-02-07 PROCEDURE — 99213 OFFICE O/P EST LOW 20 MIN: CPT | Mod: S$GLB,,, | Performed by: PHYSICIAN ASSISTANT

## 2022-02-07 PROCEDURE — 99999 PR PBB SHADOW E&M-EST. PATIENT-LVL I: CPT | Mod: PBBFAC,,, | Performed by: AUDIOLOGIST-HEARING AID FITTER

## 2022-02-07 PROCEDURE — 99999 PR PBB SHADOW E&M-EST. PATIENT-LVL III: CPT | Mod: PBBFAC,,, | Performed by: PHYSICIAN ASSISTANT

## 2022-02-07 NOTE — PROGRESS NOTES
"Subjective:   Patient ID: Anthony Sexton is a 31 y.o. female.    Chief Complaint: Otalgia (Pt states ears have been hurting for the past 4 mos. States the left one feels more full than the right.)    32 yo female with pain to her left ear. She states that the only relief she gets is when she pushes in front of her left ear. She went to dentist about was told they saw no signs of grinding teeth. Denies any hearing loss, drainage. She feels that the pain is worse when she is lying down to sleep at night or first thing in morning.     Review of patient's allergies indicates:   Allergen Reactions    Procardia [nifedipine]            Review of Systems   HENT: Positive for ear pain, nosebleeds, sinus pressure and trouble swallowing.    Eyes: Positive for photophobia.   Cardiovascular: Negative.    Gastrointestinal: Negative.    Endocrine: Positive for heat intolerance.   Genitourinary: Negative.    Musculoskeletal: Positive for back pain and neck pain.   Skin: Negative.    Allergic/Immunologic: Negative.    Neurological: Positive for dizziness, light-headedness and headaches.   Hematological: Negative.    Psychiatric/Behavioral: Positive for decreased concentration.         Objective:   Ht 5' 5" (1.651 m)   Wt 122.2 kg (269 lb 6.4 oz)   BMI 44.83 kg/m²     Physical Exam  Constitutional:       General: She is not in acute distress.     Appearance: She is well-developed and well-nourished.   HENT:      Head: Normocephalic and atraumatic.      Right Ear: Tympanic membrane, ear canal and external ear normal.      Left Ear: Tympanic membrane, ear canal and external ear normal.      Nose: Nose normal. No nasal deformity, septal deviation, mucosal edema, rhinorrhea or epistaxis.      Right Sinus: No maxillary sinus tenderness or frontal sinus tenderness.      Left Sinus: No maxillary sinus tenderness or frontal sinus tenderness.      Mouth/Throat:      Mouth: Oropharynx is clear and moist and mucous membranes are normal. " "Mucous membranes are not pale and not dry.      Dentition: No dental caries.      Pharynx: Uvula midline. No oropharyngeal exudate or posterior oropharyngeal erythema.   Eyes:      General: Lids are normal. No scleral icterus.     Extraocular Movements: EOM normal.      Right eye: Normal extraocular motion and no nystagmus.      Left eye: Normal extraocular motion and no nystagmus.      Conjunctiva/sclera: Conjunctivae normal.      Right eye: Right conjunctiva is not injected. No chemosis.     Left eye: Left conjunctiva is not injected. No chemosis.     Pupils: Pupils are equal, round, and reactive to light.   Neck:      Thyroid: No thyroid mass or thyromegaly.      Trachea: Trachea and phonation normal. No tracheal tenderness or tracheal deviation.   Cardiovascular:      Pulses: Intact distal pulses.   Pulmonary:      Effort: Pulmonary effort is normal. No respiratory distress.      Breath sounds: No stridor.   Abdominal:      General: There is no distension.   Lymphadenopathy:      Head:      Right side of head: No submental, submandibular, preauricular, posterior auricular or occipital adenopathy.      Left side of head: No submental, submandibular, preauricular, posterior auricular or occipital adenopathy.      Cervical: No cervical adenopathy.   Skin:     General: Skin is warm and dry.      Findings: No erythema or rash.   Neurological:      Mental Status: She is alert and oriented to person, place, and time.      Cranial Nerves: No cranial nerve deficit.   Psychiatric:         Mood and Affect: Mood and affect normal.         Behavior: Behavior normal.      Right ear: Type "A": .57 mL @ 2 daPa. ECV: .6 mL  Left ear: Type "A":  .71 mL @ 9 daPa. ECV: .7 mL             Assessment:     1. TMJ tenderness, left        Plan:     TMJ tenderness, left      We had a long discussion regarding the underlying pathology of temporomandibular joint dysfunction (TMD) as the cause of ear pain.  We further discussed conservative " measures to treat TMD including avoiding gum and other foods that require lots of chewing, warm compresses, and scheduled antinflammatories.  If the pain persists, the patient will then schedule an appointment with a dentist for further evaluation.

## 2022-02-07 NOTE — PROGRESS NOTES
"Anthony Sexton was seen 02/07/2022 per Carlie Zhou PA-C for tympanogram testing.  Right ear: Type "A": .57 mL @ 2 daPa. ECV: .6 mL  Left ear: Type "A":  .71 mL @ 9 daPa. ECV: .7 mL    "

## 2022-02-11 ENCOUNTER — TELEPHONE (OUTPATIENT)
Dept: PSYCHIATRY | Facility: CLINIC | Age: 32
End: 2022-02-11
Payer: COMMERCIAL

## 2022-02-11 RX ORDER — METRONIDAZOLE 500 MG/1
500 TABLET ORAL EVERY 12 HOURS
Qty: 14 TABLET | Refills: 0 | Status: SHIPPED | OUTPATIENT
Start: 2022-02-11 | End: 2022-03-10

## 2022-02-11 RX ORDER — FLUCONAZOLE 150 MG/1
150 TABLET ORAL DAILY
Qty: 2 TABLET | Refills: 0 | Status: SHIPPED | OUTPATIENT
Start: 2022-02-11 | End: 2022-02-12

## 2022-02-14 ENCOUNTER — OFFICE VISIT (OUTPATIENT)
Dept: PSYCHIATRY | Facility: CLINIC | Age: 32
End: 2022-02-14
Payer: COMMERCIAL

## 2022-02-14 DIAGNOSIS — F41.1 GENERALIZED ANXIETY DISORDER: Primary | ICD-10-CM

## 2022-02-14 DIAGNOSIS — F90.0 ATTENTION DEFICIT HYPERACTIVITY DISORDER (ADHD), PREDOMINANTLY INATTENTIVE TYPE: ICD-10-CM

## 2022-02-14 DIAGNOSIS — F34.1 DYSTHYMIA: ICD-10-CM

## 2022-02-14 PROCEDURE — 99214 PR OFFICE/OUTPT VISIT, EST, LEVL IV, 30-39 MIN: ICD-10-PCS | Mod: 95,,, | Performed by: PSYCHOLOGIST

## 2022-02-14 PROCEDURE — 1159F MED LIST DOCD IN RCRD: CPT | Mod: CPTII,95,, | Performed by: PSYCHOLOGIST

## 2022-02-14 PROCEDURE — 1159F PR MEDICATION LIST DOCUMENTED IN MEDICAL RECORD: ICD-10-PCS | Mod: CPTII,95,, | Performed by: PSYCHOLOGIST

## 2022-02-14 PROCEDURE — 99214 OFFICE O/P EST MOD 30 MIN: CPT | Mod: 95,,, | Performed by: PSYCHOLOGIST

## 2022-02-14 RX ORDER — DULOXETIN HYDROCHLORIDE 20 MG/1
20 CAPSULE, DELAYED RELEASE ORAL DAILY
Qty: 7 CAPSULE | Refills: 0 | Status: SHIPPED | OUTPATIENT
Start: 2022-02-14 | End: 2022-05-03 | Stop reason: SDUPTHER

## 2022-02-14 RX ORDER — DULOXETIN HYDROCHLORIDE 30 MG/1
30 CAPSULE, DELAYED RELEASE ORAL DAILY
Qty: 30 CAPSULE | Refills: 1 | Status: SHIPPED | OUTPATIENT
Start: 2022-02-14 | End: 2022-05-02

## 2022-02-14 NOTE — PATIENT INSTRUCTIONS
"[facility]   PATIENT INFORMATION    We appreciate the opportunity to participate in your medical care and hope the following protocols will make it easier for you to receive quality treatment in our department.    PUNCTUALITY: Your appointment is scheduled for a fixed amount of time, reserved especially for you.  To get the benefit of your appointment, please arrive at least 15 minutes early to allow time for traffic, parking and registration.  Should you arrive more than 15 minutes late to your appointment, you will be rescheduled in order to assure your clinician has adequate time to assess you and provide helpful care.      APPOINTMENTS: Appointments are made by the nursing/front office staff or through the patient portal. Providers do not have access  to schedule appointments. Walk in appointments are not available. FOR EMERGENCIES, PLEASE GO THE CLOSEST EMERGENCY ROOM.    CANCELLATION/MISSED APPOINTMENTS:   In order to receive quality care, all appointments must be kept.  If you are unable to keep an appointment, please reschedule at least 3 days prior if possible. Late cancellations (within 24 hours of the appointment) and repeated no-show appointments may result in dismissal from the clinic. After two no show/late cancellation visits, you will receive a notice letter, alerting you to keep visits to prevent department dismissal. If another visit is missed after receipt of the notice, you will be discharged from the clinic. This policy is in effect to allow for other individuals on a long waiting list to be seen as soon as possible. Unlike other branches of medicine where several individuals can be scheduled in a 30 minute time slot, only one individual can be scheduled in any time slot in Psychiatry.     MESSAGES: For simple questions/concerns, you may contact your individual providers electronically through the "[facility]" portal or by calling [phone] " with messages relayed via office staff. If relevant, include pharmacy name and phone number, date of last visit and next scheduled visit, phone number where you can be reached throughout the day, and whether leaving a voicemail or message on an answering machine is acceptable. Messages will be returned by the Medical Assistant or Office Staff after your provider has reviewed the message.  Please allow 24 hours for a returned message before leaving another message. Messages will be checked each workday (Monday through Friday) during office hours (8:00 a.m. and 5:00 p.m.) and returned at most within one business day.  You may leave a non-urgent message after hours. Note that psychotherapy and medication management are not appropriate by telephone or the patient portal.    PRESCRIPTION REFILLS:  Please communicate with your prescriber about any refills you need during your appointment. You may also request refills through the [facility] portal (preferred) or by calling the clinic. Prescriptions will be filled during office hours.      Please do not wait until you are completely out of medication to request refills. Same day refills are not always possible. Patients may experience symptoms of withdrawal if they run out of medications. The patient assumes all responsibility when there is an issue with non-compliance with follow-up appointments and medications.   Some medications are controlled and regulated by the FDA and [employer]. Some of these medications can not be refilled before 30 days and require a face to face appointment.     PAPERWORK REQUESTS: If you have any forms or letters that need to be completed by your doctor, please present these at the beginning of the appointment to ensure that information needed to complete them is obtained during the office visit. Paperwork will be returned within 7-10 business days. Staff will call you to  the paperwork when completed.    SPECIAL EVALUATIONS: Please note that  "our department is treatment-focused. As such, we focus on treatment-oriented evaluations and do not perform specialty or "forensic" evaluations. Examples are listed below.     Disability: We do not do disability evaluations.  Please contact Social Security Administration for evaluations and determinations. You will then sign releases allowing for records from your treatment providers to be forwarded to Social Security Administration to use in their evaluation.   Gun Permit: We do not offer Sound Judgment Evaluations or assessments leading to gun ownership, nor do we fill out or file paperwork relevant to owning, concealing or purchasing a firearm.   Emotional Support      Animals (JOSE GUADALUPE): We do not provide documentation, including letters, to aid in the acclamation that an Emotional Support Animal is required. Note that ESAs are not trained to perform tasks or recognize particular signs or symptoms. Rather, they are distinguished by the close, emotional, and supportive bond between the animal and the owner.       SAMPLES: We do not provide samples of any medications. If you have financial difficulties and are on a limited income, you may qualify for Patient Assistance Programs from various pharmaceutical companies. This will require that you complete paperwork with your financial information, but this does not guarantee that the company will approve the application. Alternative medication options can be discussed.    REFERRALS/COORDINATION: You will be referred to other providers if we feel unable to adequately diagnose or treat your particular condition, or if collaboration with another provider would allow for better management of your condition.    This document is for information purposes only. Please refer to the full disclaimer and copyright statement available at http://www.[facility].health.wa.gov.au regarding the information from this website before making use of such information.  See website " www.cci.health.wa.gov.au for more handouts and resources.    What is Sleep Hygiene?  Sleep hygiene is the term used to describe good sleep habits. Considerable research has gone into developing a set of guidelines and tips which are designed to enhance good sleeping, and there is much evidence to suggest that these strategies can provide long-term solutions to sleep difficulties. There are many medications which are used to treat insomnia,  but these tend to be only effective in the short-term. Ongoing use of sleeping pills may lead to dependence and interfere with developing good sleep habits independent of medication,  thereby prolonging sleep difficulties. Talk to your health professional about what is right for you, but we recommend good sleep hygiene as an important part of treating insomnia,  either with other strategies such as medication or cognitive therapy or alone.    Sleep Hygiene Tips  1) Get regular. One of the best ways to train your body to sleep well is to go to bed and get up at more or less the same time every day, even on weekends and days off! This regular rhythm will make you feel better and will give your body something to work from.  2) Sleep when sleepy. Only try to sleep when you actually feel tired or sleepy, rather than spending too much time awake in bed.  3) Get up & try again. If you havent been able to get to sleep after about 20 minutes or more, get up and do something calming or boring until you feel sleepy, then return to bed and try again. Sit quietly on the couch with the lights off (bright light will tell your brain that it is time to wake up), or read something boring like the phone book. Avoid doing anything that is too stimulating or interesting, as this will wake you up even more.  4) Avoid caffeine & nicotine. It is best to avoid consuming any caffeine (in coffee, tea, cola drinks, chocolate, and some medications) or nicotine (cigarettes) for at least 4-6 hours before  going to bed. These substances act as stimulants and interfere with the ability to fall asleep   5) Avoid alcohol. It is also best to avoid alcohol for at least 4-6 hours before going to bed. Many people believe that alcohol is relaxing and helps them to get to sleep at first, but it actually interrupts the quality of sleep.  6) Bed is for sleeping. Try not to use your bed for anything other than sleeping and sex, so that your body comes to associate bed with sleep. If you use bed as a place to watch TV, eat, read, work on your laptop, pay bills, and other things, your body will not learn this connection.  7) No naps. It is best to avoid taking naps during the day, to make sure that you are tired at bedtime. If you cant make it through the day without a nap, make sure it is for less than an hour and before 3pm.  8) Sleep rituals. You can develop your own rituals of things to remind your body that it is time to sleep - some people find it useful to do relaxing stretches or breathing exercises for 15 minutes before bed each night, or sit calmly with a cup of caffeine-free tea.  9) Bathtime. Having a hot bath 1-2 hours before bedtime can be useful, as it will raise your body temperature, causing you to feel sleepy as your body temperature drops again. Research shows that sleepiness is associated with a drop in body temperature.  10) No clock-watching. Many people who struggle with sleep tend to watch the clock too much. Frequently checking the clock during the night can wake you up (especially if you turn  on the light to read the time) and reinforces negative thoughts such as Oh no, look how late it is, Ill never get to sleep or its so early, I have only slept for 5 hours, this is  terrible.  11) Use a sleep diary. This worksheet can be a useful way of making sure you have the right facts about your sleep, rather than making assumptions. Because a diary involves watching  the clock (see point 10) it is a good  idea to only use it for two weeks to get an idea of what is going and then perhaps two months down the track to see how you are progressing.  12) Exercise. Regular exercise is a good idea to help with good sleep, but try not to do strenuous exercise in the 4 hours before bedtime. Morning walks are a great way to start the day feeling refreshed!  13) Eat right. A healthy, balanced diet will help you to sleep well, but timing is important. Some people find that a very empty stomach at bedtime is distracting, so it can be useful  to have a light snack, but a heavy meal soon before bed can also interrupt sleep. Some people recommend a warm glass of milk, which contains tryptophan, which acts as a natural  sleep inducer.  14) The right space. It is very important that your bed and bedroom are quiet and comfortable for sleeping. A cooler room with enough blankets to stay warm is best, and make sure you have curtains or an eyemask to block out early morning light and earplugs if there is noise outside your room.  15) Keep daytime routine the same. Even if you have a bad night sleep and are tired it is important that you try to keep your daytime activities the same as you had planned. That is,  dont avoid activities because you feel tired. This can reinforce the insomnia.    Call In if problems  Call Report Side Effects   Encouraged to follow up with primary care / Gen Med MD for continued monitoring of general health and wellness  Call 051 Or go to ER if Acute Concerns (especially if any thoughts of harm to self or other)       Kenya Sanchez, PhD, MP  Advanced Practice Medical Psychologist  [facility]  [place] [address]  [phone]   [phone] fax

## 2022-02-14 NOTE — PROGRESS NOTES
Outpatient Psychiatry Follow-Up Visit    2/14/2022    Timeframe: Corona Virus Outbreak     The patient location is: Patient's car/ Patient reported that his/her location at the time of this visit was in the [facility]     Visit type: Virtual visit with synchronous audio and video     Each patient to whom he or she provides medical services by telemedicine is: (1) informed of the relationship between the physician and patient and the respective role of any other health care provider with respect to management of the patient; and (2) notified that he or she may decline to receive medical services by telemedicine and may withdraw from such care at any time.    I also informed patient of the following:   Kenya Sanchez, PhD, MPAP:  [place] medical license number: [id number]    My contact info:  [facility] / 2nd Floor  [address] [place] [address]   Ph: [phone]    If technology issues, call office phone: Ph: [phone]  If crisis: Dial 911 or go to nearest Emergency Room (ER)  If questions related to privacy practices: contact [facility] Information Department: [phone]    Chief Complaint:  Anthony Sexton is a 31 y.o. female who presents today for follow-up of anxiety and inattention/distractibility .       Impressions/Plan from last visit: Anthony attended her visit. She reported that she has been taking Celexa for about a month and started having had headaches. She saw a provider and was told that she was grinding her teeth. She stopped taking Celexa about a week or two ago, and her headaches have stopped. Anxiety and dysthymia have been problematic. 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 She reported that she started treatment for anxiety when in nursing school in 2010; later, ADHD was diagnosed. She had medication back then--no therapy or counseling. She thinks that she had Xanax and Zoloft back  "then and did not like the way she felt with it. She said that she did not take Xanax very often--was very sleepy with Zoloft even with taking it at night; took about 2-3 months. [She tried it again later and was also very sleepy.] She took Adderall in the past--worked but had tachycardia and palpitations. In 2019, she started treatment with Dr. Walters. Most antidepressants made her sleepy--except for Prozac--it made her feel weird. She has high anxiety--tries to distract herself from anxiety. She worries about something happening--especially started during pregnancy with her daughter when she was bedridden. Kids are 4 and 1. She works full time at [employer]--she used to be in Process Relations; now is in research. She is struggling currently with focus at work. Because of her history with tachycardia with stimulants, we agreed to try Wellbutrin and buspirone. We will start at lower doses since she has historically been sensitive. Medicines--Wellbutrin  mg bid, buspirone 7.5 mg bid (start with 1/2 tab bid).     Prior medicines--buspirone, Prozac, Klonopin, Lexapro, Celexa, Zoloft, hydroxyzine, Adderall XR; Effexor (she thinks); Ativan.       is below (last two years).    Interval History and Content of Current Session: Anthony attended her virtual visit. Since we last met, Anthony missed her appt and had stopped her medicines because she had a "mild constant dull chest discomfort." More recently, she started having anxiety/panic and requested to restart her medicines. We restarted buspirone. She reported that she started having TMJ with buspirone and stopped it--was having a "pounding headache." She has not been on Wellbutrin and has only been taking buspirone once daily. She has not been to therapy--she has two kids under 5 and is in a new position (works in cardiology research). She has been very busy. We reviewed her medicines--agreed to change to Cymbalta, starting low and increasing after a week to 30 mg. We " also discussed the need to monitor her cognitions (distortions)--she will try to get back into therapy, too.     Prior medicines--buspirone, Prozac, Klonopin, Lexapro, Celexa, Zoloft, hydroxyzine, Adderall XR; Effexor (she thinks?); Pristiq, Ativan, Wellbutrin.      since May 2020.        GAD7 2/14/2022 1/14/2022 9/14/2021   1. Feeling nervous, anxious, or on edge? 1 1 1   2. Not being able to stop or control worrying? 1 2 3   3. Worrying too much about different things? 1 1 1   4. Trouble relaxing? 1 1 2   5. Being so restless that it is hard to sit still? 0 0 0   6. Becoming easily annoyed or irritable? 1 1 0   7. Feeling afraid as if something awful might happen? 1 2 3   SENTHIL-7 Score 6 8 10      0-4 = Minimal anxiety  5-9 = Mild anxiety  10-14 = Moderate anxiety  15-21 = Severe anxiety       Review of Systems   · PSYCHIATRIC: Pertinant items are noted in the narrative.    Past Medical, Family and Social History: The patient's past medical, family and social history have been reviewed and updated as appropriate within the electronic medical record - see encounter notes.      Current Outpatient Medications:     diazePAM (VALIUM) 5 MG tablet, Take 1 tablet (5 mg total) by mouth On call Procedure for Anxiety. (Patient not taking: Reported on 2/7/2022), Disp: 1 tablet, Rfl: 0    DULoxetine (CYMBALTA) 20 MG capsule, Take 1 capsule (20 mg total) by mouth once daily. for 7 days, Disp: 7 capsule, Rfl: 0    DULoxetine (CYMBALTA) 30 MG capsule, Take 1 capsule (30 mg total) by mouth once daily., Disp: 30 capsule, Rfl: 1    ergocalciferol (VITAMIN D2) 50,000 unit Cap, Take 1 capsule (50,000 Units total) by mouth twice a week., Disp: 8 capsule, Rfl: 11    metroNIDAZOLE (FLAGYL) 500 MG tablet, Take 1 tablet (500 mg total) by mouth every 12 (twelve) hours., Disp: 14 tablet, Rfl: 0    semaglutide (OZEMPIC) 0.25 mg or 0.5 mg(2 mg/1.5 mL) pen injector, Inject 0.25 mg into the skin every 7 days., Disp: 1 pen, Rfl:  0    Compliance: partial    Side effects: TMJ, headache    Risk Parameters:  Patient reports no suicidal ideation  Patient reports no homicidal ideation  Patient reports no self-injurious behavior  Patient reports no violent behavior    Exam (detailed: at least 9 elements; comprehensive: all 15 elements)   Constitutional  Vitals:  Most recent vital signs were reviewed.   Last 3 sets of Vitals    Vitals - 1 value per visit 1/25/2022 2/7/2022 2/7/2022   SYSTOLIC - - -   DIASTOLIC - - -   Pulse - - -   Temp - - -   Resp - - -   SPO2 - - -   Weight (lb) - - 269.4   Weight (kg) - - 122.2   Height - - 65   BMI (Calculated) - - 44.8   VISIT REPORT - - -   Pain Score  0 0 -   Some recent data might be hidden          General:  age appropriate, casually dressed, neatly groomed     Musculoskeletal  Muscle Strength/Tone:  no tremor, no tic   Gait & Station:  video visit     Psychiatric  Speech:  no latency; no press   Behavior: wnl   Mood & Affect:  anxious  congruent and appropriate   Thought Process:  normal and logical   Associations:  intact   Thought Content:  normal, no suicidality, no homicidality, delusions, or paranoia   Insight:  intact   Judgement: behavior is adequate to circumstances   Orientation:  grossly intact   Memory: intact for content of interview   Language: grossly intact   Attention Span & Concentration:  Grossly intact   Fund of Knowledge:  intact and appropriate to age and level of education     Assessment and Diagnosis   Status/Progress: Based on the examination today, the patient's problem(s) is/are inadequately controlled.  New problems (side effects) have been presented today.   Co-morbidities and side effects are complicating management of the primary condition.  There are no active rule-out diagnoses for this patient at this time.     General Impression:     Encounter Diagnoses   Name Primary?    Generalized anxiety disorder Yes    Dysthymia     Attention deficit hyperactivity disorder  (ADHD), predominantly inattentive type          Intervention/Counseling/Treatment Plan   · Medication Management: Discussed risks, benefits, and alternatives to treatment plan documented above with patient. I answered all patient questions related to this plan, and patient expressed understanding and agreement.   start Cymbalta 20 mg for 7 days, then 30 mg; stop buspirone (only taking once daily)  · therapy   · Personal time twice weekly (after work before going home?)    Medication List with Changes/Refills   New Medications    DULOXETINE (CYMBALTA) 20 MG CAPSULE    Take 1 capsule (20 mg total) by mouth once daily. for 7 days    DULOXETINE (CYMBALTA) 30 MG CAPSULE    Take 1 capsule (30 mg total) by mouth once daily.   Current Medications    DIAZEPAM (VALIUM) 5 MG TABLET    Take 1 tablet (5 mg total) by mouth On call Procedure for Anxiety.    ERGOCALCIFEROL (VITAMIN D2) 50,000 UNIT CAP    Take 1 capsule (50,000 Units total) by mouth twice a week.    METRONIDAZOLE (FLAGYL) 500 MG TABLET    Take 1 tablet (500 mg total) by mouth every 12 (twelve) hours.    SEMAGLUTIDE (OZEMPIC) 0.25 MG OR 0.5 MG(2 MG/1.5 ML) PEN INJECTOR    Inject 0.25 mg into the skin every 7 days.   Discontinued Medications    BUPROPION (WELLBUTRIN SR) 100 MG TBSR 12 HR TABLET    Take 1 tablet (100 mg total) by mouth 2 (two) times daily.    BUSPIRONE (BUSPAR) 7.5 MG TABLET    Take 1 tablet (7.5 mg total) by mouth 2 (two) times daily.    ESOMEPRAZOLE (NEXIUM) 40 MG CAPSULE    Take 1 capsule (40 mg total) by mouth 2 (two) times daily.        Return to Clinic: 1 month    Time spent with pt including note preparation: 26 minutes       Kenya Sanchez, PhD, MP  Advanced Practice Medical Psychologist  [facility]  [place] [id number] [phone]   [phone] fax

## 2022-02-16 ENCOUNTER — CLINICAL SUPPORT (OUTPATIENT)
Dept: OTHER | Facility: CLINIC | Age: 32
End: 2022-02-16
Payer: COMMERCIAL

## 2022-02-16 DIAGNOSIS — Z00.8 ENCOUNTER FOR OTHER GENERAL EXAMINATION: ICD-10-CM

## 2022-02-17 VITALS — BODY MASS INDEX: 44.15 KG/M2 | HEIGHT: 66 IN

## 2022-02-17 LAB
HDLC SERPL-MCNC: 37 MG/DL
POC CHOLESTEROL, LDL (DOCK): 136 MG/DL
POC CHOLESTEROL, TOTAL: 187 MG/DL
POC GLUCOSE, FASTING: 91 MG/DL (ref 60–110)
TRIGL SERPL-MCNC: 70 MG/DL

## 2022-03-10 ENCOUNTER — LAB VISIT (OUTPATIENT)
Dept: LAB | Facility: HOSPITAL | Age: 32
End: 2022-03-10
Attending: FAMILY MEDICINE
Payer: COMMERCIAL

## 2022-03-10 ENCOUNTER — TELEPHONE (OUTPATIENT)
Dept: PSYCHIATRY | Facility: CLINIC | Age: 32
End: 2022-03-10
Payer: COMMERCIAL

## 2022-03-10 ENCOUNTER — OFFICE VISIT (OUTPATIENT)
Dept: FAMILY MEDICINE | Facility: CLINIC | Age: 32
End: 2022-03-10
Payer: COMMERCIAL

## 2022-03-10 VITALS
WEIGHT: 271.38 LBS | HEART RATE: 103 BPM | BODY MASS INDEX: 43.62 KG/M2 | DIASTOLIC BLOOD PRESSURE: 72 MMHG | TEMPERATURE: 98 F | SYSTOLIC BLOOD PRESSURE: 124 MMHG | HEIGHT: 66 IN | OXYGEN SATURATION: 98 %

## 2022-03-10 DIAGNOSIS — R07.89 CHEST WALL PAIN: ICD-10-CM

## 2022-03-10 DIAGNOSIS — E66.01 MORBID OBESITY WITH BMI OF 40.0-44.9, ADULT: ICD-10-CM

## 2022-03-10 DIAGNOSIS — R06.02 SHORTNESS OF BREATH: ICD-10-CM

## 2022-03-10 LAB — TSH SERPL DL<=0.005 MIU/L-ACNC: 0.73 UIU/ML (ref 0.4–4)

## 2022-03-10 PROCEDURE — 3074F PR MOST RECENT SYSTOLIC BLOOD PRESSURE < 130 MM HG: ICD-10-PCS | Mod: CPTII,S$GLB,, | Performed by: FAMILY MEDICINE

## 2022-03-10 PROCEDURE — 3008F BODY MASS INDEX DOCD: CPT | Mod: CPTII,S$GLB,, | Performed by: FAMILY MEDICINE

## 2022-03-10 PROCEDURE — 99999 PR PBB SHADOW E&M-EST. PATIENT-LVL IV: ICD-10-PCS | Mod: PBBFAC,,, | Performed by: FAMILY MEDICINE

## 2022-03-10 PROCEDURE — 84443 ASSAY THYROID STIM HORMONE: CPT | Performed by: FAMILY MEDICINE

## 2022-03-10 PROCEDURE — 3008F PR BODY MASS INDEX (BMI) DOCUMENTED: ICD-10-PCS | Mod: CPTII,S$GLB,, | Performed by: FAMILY MEDICINE

## 2022-03-10 PROCEDURE — 99999 PR PBB SHADOW E&M-EST. PATIENT-LVL IV: CPT | Mod: PBBFAC,,, | Performed by: FAMILY MEDICINE

## 2022-03-10 PROCEDURE — 3078F DIAST BP <80 MM HG: CPT | Mod: CPTII,S$GLB,, | Performed by: FAMILY MEDICINE

## 2022-03-10 PROCEDURE — 1159F PR MEDICATION LIST DOCUMENTED IN MEDICAL RECORD: ICD-10-PCS | Mod: CPTII,S$GLB,, | Performed by: FAMILY MEDICINE

## 2022-03-10 PROCEDURE — 3078F PR MOST RECENT DIASTOLIC BLOOD PRESSURE < 80 MM HG: ICD-10-PCS | Mod: CPTII,S$GLB,, | Performed by: FAMILY MEDICINE

## 2022-03-10 PROCEDURE — 3074F SYST BP LT 130 MM HG: CPT | Mod: CPTII,S$GLB,, | Performed by: FAMILY MEDICINE

## 2022-03-10 PROCEDURE — 36415 COLL VENOUS BLD VENIPUNCTURE: CPT | Mod: PO | Performed by: FAMILY MEDICINE

## 2022-03-10 PROCEDURE — 1159F MED LIST DOCD IN RCRD: CPT | Mod: CPTII,S$GLB,, | Performed by: FAMILY MEDICINE

## 2022-03-10 PROCEDURE — 99214 PR OFFICE/OUTPT VISIT, EST, LEVL IV, 30-39 MIN: ICD-10-PCS | Mod: S$GLB,,, | Performed by: FAMILY MEDICINE

## 2022-03-10 PROCEDURE — 1160F PR REVIEW ALL MEDS BY PRESCRIBER/CLIN PHARMACIST DOCUMENTED: ICD-10-PCS | Mod: CPTII,S$GLB,, | Performed by: FAMILY MEDICINE

## 2022-03-10 PROCEDURE — 99214 OFFICE O/P EST MOD 30 MIN: CPT | Mod: S$GLB,,, | Performed by: FAMILY MEDICINE

## 2022-03-10 PROCEDURE — 1160F RVW MEDS BY RX/DR IN RCRD: CPT | Mod: CPTII,S$GLB,, | Performed by: FAMILY MEDICINE

## 2022-03-10 RX ORDER — SEMAGLUTIDE 1.34 MG/ML
1 INJECTION, SOLUTION SUBCUTANEOUS
Qty: 1 PEN | Refills: 0 | Status: SHIPPED | OUTPATIENT
Start: 2022-03-10 | End: 2022-03-22 | Stop reason: ALTCHOICE

## 2022-03-10 NOTE — PROGRESS NOTES
Anthony Sexton    Chief Complaint   Patient presents with    Shortness of Breath    Chest Pain       History of Present Illness:   Ms. Sexton comes in today with complaint of having constant pain at the top of her breast (right more than left) which wraps around to her back and up/across her shoulders.  She reports having possible shortness of breath due to anxiety as she is worried about the pain.  She denies having associated trauma or rash.  She states she must take ibuprofen 800 mg 3 times daily to help with pain.  She reports having rare cough but denies having associated fever, chills, fatigue, appetite changes; chest pain, palpitations, leg swelling; abdominal pain, nausea, vomiting, diarrhea, constipation; unusual urinary symptoms; headaches; depression, homicidal or suicidal thoughts; polydipsia, polyuria, polyphagia, hot or cold intolerance.  She reports no history of asthma.  She is status post bilateral breast reduction in April 2021. She states she consumes caffeine products daily.  She reports family history of breast cancer-maternal aunt.    She states she has been taking Ozempic weekly therapy for weight reduction management but without help with losing weight.  She states she exercises by walking 2 to 3 times a week, 20-30 minutes each time.  She states she monitors her diet.  She asks about Wegovy for weight loss and asks for thyroid blood check. She reports no history of pancreatitis, gall bladder disease, thyroid disease.    Otherwise, she denies having fever, chills, fatigue, appetite changes; wheezing; chest pain, palpitations, leg swelling; abdominal pain, nausea, vomiting, diarrhea, constipation; unusual urinary symptoms; polydipsia, polyphagia, polyuria, hot or cold intolerance; rash; headache; depression, homicidal or suicidal thoughts.      She denies tobacco, alcohol or illicit drug use.        Current Outpatient Medications   Medication Sig    ergocalciferol (VITAMIN D2) 50,000 unit  Cap Take 1 capsule (50,000 Units total) by mouth twice a week.    DULoxetine (CYMBALTA) 30 MG capsule Take 1 capsule (30 mg total) by mouth once daily. (Patient not taking: Reported on 3/10/2022)    semaglutide (OZEMPIC) 0.25 mg or 0.5 mg(2 mg/1.5 mL) pen injector Inject 0.25 mg into the skin every 7 days. (Patient not taking: Reported on 3/10/2022)       Review of Systems   Constitutional: Positive for unexpected weight change. Negative for activity change, appetite change, chills, fatigue and fever.   Respiratory: Positive for shortness of breath. Negative for cough and wheezing.    Cardiovascular: Negative for chest pain and palpitations.   Gastrointestinal: Negative for abdominal pain, constipation, diarrhea, nausea and vomiting.   Endocrine: Negative for cold intolerance, heat intolerance, polydipsia, polyphagia and polyuria.   Genitourinary: Negative for difficulty urinating and menstrual problem.   Musculoskeletal: Positive for back pain and myalgias.   Skin: Negative for rash.   Neurological: Negative for headaches.   Psychiatric/Behavioral: Negative for dysphoric mood and suicidal ideas. The patient is nervous/anxious.         Negative for homicidal ideas.       Objective:  Physical Exam  Vitals reviewed.   Constitutional:       General: She is not in acute distress.     Appearance: Normal appearance. She is well-developed. She is obese. She is not ill-appearing, toxic-appearing or diaphoretic.      Comments: Pleasant.   Cardiovascular:      Rate and Rhythm: Normal rate and regular rhythm.      Pulses: Normal pulses.      Heart sounds: Normal heart sounds. No murmur heard.  Pulmonary:      Effort: Pulmonary effort is normal. No respiratory distress.      Breath sounds: Normal breath sounds. No wheezing.      Comments: Tender at anterior chest wall (right > left).  Chest:      Chest wall: Tenderness present.   Breasts:      Right: Tenderness present. No mass, nipple discharge, skin change or axillary  adenopathy.      Left: No mass, nipple discharge, skin change, tenderness or axillary adenopathy.        Comments: Slightly tender at right upper breast. Well-healed scars at breasts s/p reduction.  Abdominal:      General: Bowel sounds are normal. There is no distension.      Palpations: Abdomen is soft. There is no mass.      Tenderness: There is no abdominal tenderness. There is no guarding or rebound.   Musculoskeletal:         General: Tenderness present. No swelling. Normal range of motion.      Cervical back: Normal range of motion and neck supple. No tenderness. No muscular tenderness.      Comments: She is ambulatory without problems. Tender at upper back (right > left) with full range of motion noted.   Lymphadenopathy:      Cervical: No cervical adenopathy.      Upper Body:      Right upper body: No axillary adenopathy.      Left upper body: No axillary adenopathy.   Skin:     General: Skin is warm.      Findings: No rash.   Neurological:      General: No focal deficit present.      Mental Status: She is alert and oriented to person, place, and time.   Psychiatric:         Mood and Affect: Mood normal.         Behavior: Behavior normal.         Thought Content: Thought content normal.         Judgment: Judgment normal.         ASSESSMENT:  1. Shortness of breath    2. Chest wall pain    3. Morbid obesity with BMI of 40.0-44.9, adult        PLAN:  Anthony was seen today for shortness of breath and chest pain.    Diagnoses and all orders for this visit:    Shortness of breath  -     X-Ray Chest PA And Lateral; Future    Chest wall pain  -     X-Ray Chest PA And Lateral; Future    Morbid obesity with BMI of 40.0-44.9, adult  -     TSH; Future  -     semaglutide (OZEMPIC) 0.25 mg or 0.5 mg(2 mg/1.5 mL) pen injector; Inject 0.5 mg into the skin every 7 days.  -     semaglutide (OZEMPIC) 1 mg/dose (4 mg/3 mL); Inject 1 mg into the skin every 7 days.       Patient advised to call for results.  Continue current  medications, follow low sodium, low cholesterol, low carb diet, daily walks.  Wean from caffeine.  Try OTC Vit E 200 to 400 units daily.  I advised patient Ozempic and Wegovy are the same medication.  I recommended she again try Ozempic at 0.5 mg weekly x 4 weeks, and if tolerated, then increase to 1 mg weekly. She verbalized understanding and agreed.  Medication precautions discussed with patient. Prescription refills as noted above.  Follow up if symptoms worsen or fail to improve.

## 2022-03-10 NOTE — PATIENT INSTRUCTIONS
Please call Dr. Leggett for your results.  Wean from caffeine.  Try OTC Vit E 200 to 400 units daily.  Ozempic start back at 0.5 mg weekly x 4 weeks, then increase to 1 mg weekly.    Thanks.

## 2022-03-10 NOTE — TELEPHONE ENCOUNTER
Care Due:                  Date            Visit Type   Department     Provider  --------------------------------------------------------------------------------                                MYCHART                              FOLLOWUP/OF  [facility]  Last Visit: 03-      [facility] VISIT   MEDICINE       Tyesha Leggett                              [facility]                              ANNUAL                              CHECKUP/PHY  [facility]  Next Visit: 05-      [facility]       Tyesha Leggett                                                            Last  Test          Frequency    Reason                     Performed    Due Date  --------------------------------------------------------------------------------    HBA1C.......  6 months...  semaglutide..............  09- 03-    Powered by [employer]. Reference number: [id number].   3/10/2022 3:12:02 PM CST

## 2022-03-15 NOTE — TELEPHONE ENCOUNTER
This Rx Request does not qualify for assessment with the OR.    Please review protocol details and the Care Due Message for extra clinical information    Reasons Rx Request may be deferred:  Visit criteria overdue  Labs/Vitals overdue/ abnormal  Patient has been seen in the ED/Hospital since the last PCP visit  Medication is outside of scope   Provider is a non-participating provider   Change request from pharmacy- see pharmacy comment for details (Pharmacy comment: Alternative Requested:OVER $800 WITH PATIENT INSURANCE.)

## 2022-03-16 ENCOUNTER — HOSPITAL ENCOUNTER (OUTPATIENT)
Dept: RADIOLOGY | Facility: HOSPITAL | Age: 32
Discharge: HOME | End: 2022-03-16
Attending: FAMILY MEDICINE
Payer: COMMERCIAL

## 2022-03-16 DIAGNOSIS — R06.02 SHORTNESS OF BREATH: ICD-10-CM

## 2022-03-16 DIAGNOSIS — R07.89 CHEST WALL PAIN: ICD-10-CM

## 2022-03-16 PROCEDURE — 71046 X-RAY EXAM CHEST 2 VIEWS: CPT | Mod: 26,,, | Performed by: RADIOLOGY

## 2022-03-16 PROCEDURE — 71046 X-RAY EXAM CHEST 2 VIEWS: CPT | Mod: TC

## 2022-03-16 PROCEDURE — 71046 XR CHEST PA AND LATERAL: ICD-10-PCS | Mod: 26,,, | Performed by: RADIOLOGY

## 2022-03-22 ENCOUNTER — PATIENT MESSAGE (OUTPATIENT)
Dept: FAMILY MEDICINE | Facility: CLINIC | Age: 32
End: 2022-03-22
Payer: COMMERCIAL

## 2022-03-22 DIAGNOSIS — E66.01 MORBID OBESITY WITH BMI OF 40.0-44.9, ADULT: ICD-10-CM

## 2022-03-22 RX ORDER — ORAL SEMAGLUTIDE 3 MG/1
3 TABLET ORAL DAILY
Qty: 30 TABLET | Refills: 0 | Status: SHIPPED | OUTPATIENT
Start: 2022-03-22 | End: 2022-05-02

## 2022-03-22 RX ORDER — SEMAGLUTIDE 1.34 MG/ML
INJECTION, SOLUTION SUBCUTANEOUS
OUTPATIENT
Start: 2022-03-22 | End: 2023-03-22

## 2022-03-22 RX ORDER — ORAL SEMAGLUTIDE 3 MG/1
TABLET ORAL
Qty: 30 TABLET | Refills: 0 | OUTPATIENT
Start: 2022-03-22

## 2022-03-22 NOTE — TELEPHONE ENCOUNTER
I spoke w/pt regarding Rx issue. She states she is okay with trying oral Rybelsus 3 mg daily (30 minutes before the first food, beverage, or other medications of the day) to see is covered.

## 2022-03-22 NOTE — TELEPHONE ENCOUNTER
No new care gaps identified.  Powered by [employer]. Reference number: [id number].   3/22/2022 5:47:48 PM CDT

## 2022-03-23 ENCOUNTER — TELEPHONE (OUTPATIENT)
Dept: FAMILY MEDICINE | Facility: CLINIC | Age: 32
End: 2022-03-23
Payer: COMMERCIAL

## 2022-03-23 DIAGNOSIS — E66.01 MORBID OBESITY WITH BMI OF 40.0-44.9, ADULT: Primary | ICD-10-CM

## 2022-03-23 RX ORDER — METFORMIN HYDROCHLORIDE 500 MG/1
500 TABLET ORAL 2 TIMES DAILY WITH MEALS
Qty: 60 TABLET | Refills: 5 | Status: SHIPPED | OUTPATIENT
Start: 2022-03-23 | End: 2022-05-02

## 2022-03-23 NOTE — TELEPHONE ENCOUNTER
Pt portal message    We can try metformin. Phentermine made my heart race terribly. Like in the 120s while sitting.

## 2022-03-23 NOTE — TELEPHONE ENCOUNTER
Ok to try Metformin 500 mg twice daily as noted below.  Keep 5/27/2022 appt w/me. Thanks.      I have put the following orders and/or medications to this note.  Please advise pt and assist.    No orders of the defined types were placed in this encounter.      Medications Ordered This Encounter   Medications    metFORMIN (GLUCOPHAGE) 500 MG tablet     Sig: Take 1 tablet (500 mg total) by mouth 2 (two) times daily with meals.     Dispense:  60 tablet     Refill:  5

## 2022-04-07 ENCOUNTER — TELEPHONE (OUTPATIENT)
Dept: PSYCHIATRY | Facility: CLINIC | Age: 32
End: 2022-04-07
Payer: COMMERCIAL

## 2022-04-12 ENCOUNTER — OFFICE VISIT (OUTPATIENT)
Dept: URGENT CARE | Facility: CLINIC | Age: 32
End: 2022-04-12
Payer: COMMERCIAL

## 2022-04-12 VITALS
OXYGEN SATURATION: 100 % | WEIGHT: 269 LBS | BODY MASS INDEX: 44.82 KG/M2 | HEART RATE: 82 BPM | RESPIRATION RATE: 18 BRPM | TEMPERATURE: 99 F | DIASTOLIC BLOOD PRESSURE: 80 MMHG | SYSTOLIC BLOOD PRESSURE: 137 MMHG | HEIGHT: 65 IN

## 2022-04-12 DIAGNOSIS — R68.89 FLU-LIKE SYMPTOMS: ICD-10-CM

## 2022-04-12 DIAGNOSIS — J02.9 SORE THROAT: Primary | ICD-10-CM

## 2022-04-12 DIAGNOSIS — J10.1 INFLUENZA A: ICD-10-CM

## 2022-04-12 DIAGNOSIS — R05.9 COUGH: ICD-10-CM

## 2022-04-12 LAB
CTP QC/QA: YES
CTP QC/QA: YES
MOLECULAR STREP A: NEGATIVE
POC MOLECULAR INFLUENZA A AGN: POSITIVE
POC MOLECULAR INFLUENZA B AGN: NEGATIVE

## 2022-04-12 PROCEDURE — 3079F PR MOST RECENT DIASTOLIC BLOOD PRESSURE 80-89 MM HG: ICD-10-PCS | Mod: CPTII,S$GLB,, | Performed by: NURSE PRACTITIONER

## 2022-04-12 PROCEDURE — 87502 POCT INFLUENZA A/B MOLECULAR: ICD-10-PCS | Mod: QW,S$GLB,, | Performed by: NURSE PRACTITIONER

## 2022-04-12 PROCEDURE — 3079F DIAST BP 80-89 MM HG: CPT | Mod: CPTII,S$GLB,, | Performed by: NURSE PRACTITIONER

## 2022-04-12 PROCEDURE — 1160F RVW MEDS BY RX/DR IN RCRD: CPT | Mod: CPTII,S$GLB,, | Performed by: NURSE PRACTITIONER

## 2022-04-12 PROCEDURE — 87502 INFLUENZA DNA AMP PROBE: CPT | Mod: QW,S$GLB,, | Performed by: NURSE PRACTITIONER

## 2022-04-12 PROCEDURE — 1160F PR REVIEW ALL MEDS BY PRESCRIBER/CLIN PHARMACIST DOCUMENTED: ICD-10-PCS | Mod: CPTII,S$GLB,, | Performed by: NURSE PRACTITIONER

## 2022-04-12 PROCEDURE — 1159F PR MEDICATION LIST DOCUMENTED IN MEDICAL RECORD: ICD-10-PCS | Mod: CPTII,S$GLB,, | Performed by: NURSE PRACTITIONER

## 2022-04-12 PROCEDURE — 99214 PR OFFICE/OUTPT VISIT, EST, LEVL IV, 30-39 MIN: ICD-10-PCS | Mod: S$GLB,,, | Performed by: NURSE PRACTITIONER

## 2022-04-12 PROCEDURE — 1159F MED LIST DOCD IN RCRD: CPT | Mod: CPTII,S$GLB,, | Performed by: NURSE PRACTITIONER

## 2022-04-12 PROCEDURE — 87651 STREP A DNA AMP PROBE: CPT | Mod: QW,S$GLB,, | Performed by: NURSE PRACTITIONER

## 2022-04-12 PROCEDURE — 87651 POCT STREP A MOLECULAR: ICD-10-PCS | Mod: QW,S$GLB,, | Performed by: NURSE PRACTITIONER

## 2022-04-12 PROCEDURE — 3008F BODY MASS INDEX DOCD: CPT | Mod: CPTII,S$GLB,, | Performed by: NURSE PRACTITIONER

## 2022-04-12 PROCEDURE — 3008F PR BODY MASS INDEX (BMI) DOCUMENTED: ICD-10-PCS | Mod: CPTII,S$GLB,, | Performed by: NURSE PRACTITIONER

## 2022-04-12 PROCEDURE — 99214 OFFICE O/P EST MOD 30 MIN: CPT | Mod: S$GLB,,, | Performed by: NURSE PRACTITIONER

## 2022-04-12 PROCEDURE — 3075F PR MOST RECENT SYSTOLIC BLOOD PRESS GE 130-139MM HG: ICD-10-PCS | Mod: CPTII,S$GLB,, | Performed by: NURSE PRACTITIONER

## 2022-04-12 PROCEDURE — 3075F SYST BP GE 130 - 139MM HG: CPT | Mod: CPTII,S$GLB,, | Performed by: NURSE PRACTITIONER

## 2022-04-12 RX ORDER — PROMETHAZINE HYDROCHLORIDE AND DEXTROMETHORPHAN HYDROBROMIDE 6.25; 15 MG/5ML; MG/5ML
5 SYRUP ORAL
Qty: 180 ML | Refills: 0 | Status: SHIPPED | OUTPATIENT
Start: 2022-04-12 | End: 2022-05-02

## 2022-04-12 RX ORDER — FLUTICASONE PROPIONATE 50 MCG
2 SPRAY, SUSPENSION (ML) NASAL DAILY
Qty: 16 G | Refills: 1 | Status: SHIPPED | OUTPATIENT
Start: 2022-04-12 | End: 2023-06-01 | Stop reason: SDUPTHER

## 2022-04-12 RX ORDER — OSELTAMIVIR PHOSPHATE 75 MG/1
75 CAPSULE ORAL 2 TIMES DAILY
Qty: 10 CAPSULE | Refills: 0 | Status: SHIPPED | OUTPATIENT
Start: 2022-04-12 | End: 2022-04-17

## 2022-04-12 NOTE — PATIENT INSTRUCTIONS
PLAN: Lab work POCT rapid strep screen/ negative,   POCT Influenza A & B/ positive A  Advise increase p.o. fluids--water/juice & rest  Meds:  Tamilfu, Flonase and Phenergan DM  / no refills  Simply saline nasal wash to irrigate sinuses and for congestion/runny nose.  Cool mist humidifier/vaporizer.  Practice good handwashing.  Advise use of green tea with honey  Tylenol or Ibuprofen for fever, headache and body aches.  Warm salt water gargles for throat comfort.  Chloraseptic spray or lozenges for throat comfort.  Advise follow up with PCP in 2-3 days for recheck  Advise go to ER if symptoms worsen or fail to improve with treatment.  Instructions, follow up, and supportive care as per AVS.  AVS provided and reviewed with patient including supportive care, follow up, and red flag symptoms.   Patient verbalizes understanding and agrees with treatment plan. Discharged from Urgent Care in stable condition.  Given work excuse

## 2022-04-12 NOTE — PROGRESS NOTES
"Subjective:       Patient ID: Anthony Sexton is a 31 y.o. female.    Vitals:  height is 5' 5" (1.651 m) and weight is 122 kg (269 lb). Her tympanic temperature is 99.3 °F (37.4 °C). Her blood pressure is 137/80 and her pulse is 82. Her respiration is 18 and oxygen saturation is 100%.     Chief Complaint: Sore Throat    Pt presents today with Flu and Strep Concerns. Pt states that for appx 2-4 day she has been experiencing headaches,chills,sore throat, ear fullness,cough,snezzing,and bodyaches. Pt states that she has taken OTC medication for her symptoms.    Sore Throat   This is a new problem. The current episode started in the past 7 days. The problem has been gradually worsening. Neither side of throat is experiencing more pain than the other. There has been no fever. The pain is at a severity of 4/10. The pain is mild. Associated symptoms include congestion, coughing, diarrhea, ear pain, headaches, a plugged ear sensation and swollen glands. Pertinent negatives include no abdominal pain, drooling or ear discharge. She has had exposure to strep. She has tried acetaminophen for the symptoms. The treatment provided no relief.       HENT: Positive for ear pain, congestion and sore throat. Negative for ear discharge and drooling.    Respiratory: Positive for cough.    Gastrointestinal: Positive for diarrhea. Negative for abdominal pain.   Neurological: Positive for headaches.          Past Medical History:   Diagnosis Date    Anemia     Anxiety     Chronic low back pain     Depression     History of abnormal cervical Pap smear     History of recurrent UTIs     History of ulcer disease     Irregular menses     Obesity     Postpartum depression     with last pregnancy.     Pre-eclampsia in postpartum period 4/23/2020    Vitamin D deficiency          .  Past Surgical History:   Procedure Laterality Date    ESOPHAGOGASTRODUODENOSCOPY N/A 5/26/2021    Procedure: EGD (ESOPHAGOGASTRODUODENOSCOPY);  Surgeon: " Alka Olmstead MD;  Location: [facility] ENDO;  Service: Endoscopy;  Laterality: N/A;    ESOPHAGOGASTRODUODENOSCOPY N/A 10/4/2021    Procedure: EGD (ESOPHAGOGASTRODUODENOSCOPY);  Surgeon: Alka Olmstead MD;  Location: [facility] ENDO;  Service: Endoscopy;  Laterality: N/A;    None      REDUCTION OF BOTH BREASTS Bilateral 5/27/2021    Procedure: MAMMOPLASTY, REDUCTION, BILATERAL;  Surgeon: Daniel Ba MD;  Location: [address];  Service: Plastics;  Laterality: Bilateral;  Left breast tissue: 1584  Right breast tissue: 1438         Social History     Socioeconomic History    Marital status:     Number of children: 0   Occupational History    Occupation: RN     Comment: [employer]   Tobacco Use    Smoking status: Never Smoker    Smokeless tobacco: Never Used   Substance and Sexual Activity    Alcohol use: Not Currently     Alcohol/week: 0.0 standard drinks    Drug use: No    Sexual activity: Yes     Partners: Male     Birth control/protection: None   Social History Narrative    She wears seatbelt.     Social Determinants of Health     Financial Resource Strain: Low Risk     Difficulty of Paying Living Expenses: Not hard at all   Food Insecurity: No Food Insecurity    Worried About Running Out of Food in the Last Year: Never true    Ran Out of Food in the Last Year: Never true   Transportation Needs: No Transportation Needs    Lack of Transportation (Medical): No    Lack of Transportation (Non-Medical): No   Physical Activity: Insufficiently Active    Days of Exercise per Week: 2 days    Minutes of Exercise per Session: 20 min   Stress: No Stress Concern Present    Feeling of Stress : Only a little   Social Connections: Unknown    Frequency of Communication with Friends and Family: More than three times a week    Frequency of Social Gatherings with Friends and Family: Once a week    Active Member of Clubs or Organizations: Yes    Attends Club or Organization Meetings: Never     Marital Status:    Housing Stability: High Risk    Unable to Pay for Housing in the Last Year: No    Number of Places Lived in the Last Year: 3    Unstable Housing in the Last Year: No       Family History   Problem Relation Age of Onset    Asthma Mother     Hyperlipidemia Mother     Hypertension Mother     Hypertension Father     Hyperlipidemia Father     Heart disease Father 55        MI/CAD    No Known Problems Sister     No Known Problems Brother     Diabetes Maternal Aunt     Diabetes Maternal Grandmother     Insulin resistance Sister     No Known Problems Brother     No Known Problems Brother     No Known Problems Brother     No Known Problems Brother     No Known Problems Daughter     Breast cancer Maternal Aunt     Cancer Neg Hx     Stroke Neg Hx     Brain cancer Neg Hx     Migraines Neg Hx     Colon cancer Neg Hx     Ovarian cancer Neg Hx     Anesthesia problems Neg Hx          ROS:  GENERAL: fever, chills with body aches  SKIN: No rashes, itching or changes in color or texture of skin.   HEENT:  Reports sore throat, sneezing, runny nose, nasal congestion, post nasal drip, headache  NODES: No masses or lesions. Denies swollen glands.   CHEST: reports cough   CARDIOVASCULAR: Denies chest pain, shortness of breath.  ABDOMEN: Appetite fine. No weight loss. Denies diarrhea, abdominal pain  MUSCULOSKELETAL: No joint stiffness or swelling. Denies back pain.  NEUROLOGIC: No history of seizures, paralysis, alteration of gait or coordination.  PSYCHIATRIC: Denies mood swings, depression or suicidal thoughts.    PE:   APPEARANCE: Well nourished, well developed, in mild distress. Temp: 99.3, pulse ox; 100%  V/S: Reviewed.  SKIN: Normal skin turgor, no lesions.  HEENT: Turbinates injected, minimal red pharynx. TM's poor light reflex bilateral, no facial tenderness.  CHEST: Lungs clear to auscultation. No wheezing  CARDIOVASCULAR: Regular rate and rhythm..  NEUROLOGIC: No sensory  deficits. Gait & Posture: Normal, No cerebellar signs.  MENTAL STATUS: Patient alert, oriented x 3 & conversant.    PLAN: Lab work POCT rapid strep screen/ negative,   POCT Influenza A & B/ positive A  Advise increase p.o. fluids--water/juice & rest  Meds:  Tamilfu, Flonase and Phenergan DM  / no refills  Simply saline nasal wash to irrigate sinuses and for congestion/runny nose.  Cool mist humidifier/vaporizer.  Practice good handwashing.  Advise use of green tea with honey  Tylenol or Ibuprofen for fever, headache and body aches.  Warm salt water gargles for throat comfort.  Chloraseptic spray or lozenges for throat comfort.  Advise follow up with PCP in 2-3 days for recheck  Advise go to ER if symptoms worsen or fail to improve with treatment.  Instructions, follow up, and supportive care as per AVS.  AVS provided and reviewed with patient including supportive care, follow up, and red flag symptoms.   Patient verbalizes understanding and agrees with treatment plan. Discharged from Urgent Care in stable condition.  Given work excuse    DIAGNOSIS:  Cough  Pharyngitis  Flu like symptoms  Influenza A

## 2022-04-15 ENCOUNTER — TELEPHONE (OUTPATIENT)
Dept: URGENT CARE | Facility: CLINIC | Age: 32
End: 2022-04-15
Payer: COMMERCIAL

## 2022-04-19 DIAGNOSIS — D50.0 IRON DEFICIENCY ANEMIA DUE TO CHRONIC BLOOD LOSS: Primary | ICD-10-CM

## 2022-04-26 DIAGNOSIS — D50.0 IRON DEFICIENCY ANEMIA DUE TO CHRONIC BLOOD LOSS: Primary | ICD-10-CM

## 2022-05-02 ENCOUNTER — HOSPITAL ENCOUNTER (OUTPATIENT)
Dept: CARDIOLOGY | Facility: HOSPITAL | Age: 32
Discharge: HOME | End: 2022-05-02
Payer: COMMERCIAL

## 2022-05-02 ENCOUNTER — OFFICE VISIT (OUTPATIENT)
Dept: INTERNAL MEDICINE | Facility: CLINIC | Age: 32
End: 2022-05-02
Payer: COMMERCIAL

## 2022-05-02 ENCOUNTER — HOSPITAL ENCOUNTER (OUTPATIENT)
Dept: RADIOLOGY | Facility: HOSPITAL | Age: 32
Discharge: HOME | End: 2022-05-02
Payer: COMMERCIAL

## 2022-05-02 ENCOUNTER — OFFICE VISIT (OUTPATIENT)
Dept: CARDIOLOGY | Facility: CLINIC | Age: 32
End: 2022-05-02
Payer: COMMERCIAL

## 2022-05-02 VITALS
BODY MASS INDEX: 44.54 KG/M2 | DIASTOLIC BLOOD PRESSURE: 84 MMHG | HEART RATE: 114 BPM | OXYGEN SATURATION: 98 % | WEIGHT: 267.63 LBS | SYSTOLIC BLOOD PRESSURE: 132 MMHG

## 2022-05-02 VITALS
WEIGHT: 272.06 LBS | OXYGEN SATURATION: 99 % | HEART RATE: 85 BPM | SYSTOLIC BLOOD PRESSURE: 122 MMHG | DIASTOLIC BLOOD PRESSURE: 82 MMHG | BODY MASS INDEX: 45.33 KG/M2 | HEIGHT: 65 IN

## 2022-05-02 DIAGNOSIS — R42 DIZZINESS: ICD-10-CM

## 2022-05-02 DIAGNOSIS — E66.01 MORBID OBESITY WITH BMI OF 40.0-44.9, ADULT: ICD-10-CM

## 2022-05-02 DIAGNOSIS — R06.02 SHORTNESS OF BREATH: ICD-10-CM

## 2022-05-02 DIAGNOSIS — R11.0 NAUSEA: ICD-10-CM

## 2022-05-02 DIAGNOSIS — E55.9 VITAMIN D DEFICIENCY: ICD-10-CM

## 2022-05-02 DIAGNOSIS — R06.09 DOE (DYSPNEA ON EXERTION): ICD-10-CM

## 2022-05-02 DIAGNOSIS — R07.89 CHEST WALL PAIN: ICD-10-CM

## 2022-05-02 DIAGNOSIS — F41.1 GENERALIZED ANXIETY DISORDER: ICD-10-CM

## 2022-05-02 DIAGNOSIS — R79.89 POSITIVE D DIMER: ICD-10-CM

## 2022-05-02 DIAGNOSIS — R07.89 CHEST WALL PAIN: Primary | ICD-10-CM

## 2022-05-02 DIAGNOSIS — R07.89 OTHER CHEST PAIN: Primary | ICD-10-CM

## 2022-05-02 DIAGNOSIS — R07.89 ATYPICAL CHEST PAIN: ICD-10-CM

## 2022-05-02 PROCEDURE — 71046 XR CHEST PA AND LATERAL: ICD-10-PCS | Mod: 26,,, | Performed by: RADIOLOGY

## 2022-05-02 PROCEDURE — 93010 ELECTROCARDIOGRAM REPORT: CPT | Mod: S$GLB,,, | Performed by: INTERNAL MEDICINE

## 2022-05-02 PROCEDURE — 99999 PR PBB SHADOW E&M-EST. PATIENT-LVL III: CPT | Mod: PBBFAC,,,

## 2022-05-02 PROCEDURE — 3008F BODY MASS INDEX DOCD: CPT | Mod: CPTII,S$GLB,,

## 2022-05-02 PROCEDURE — 99215 OFFICE O/P EST HI 40 MIN: CPT | Mod: S$GLB,,,

## 2022-05-02 PROCEDURE — 3075F SYST BP GE 130 - 139MM HG: CPT | Mod: CPTII,S$GLB,,

## 2022-05-02 PROCEDURE — 3008F PR BODY MASS INDEX (BMI) DOCUMENTED: ICD-10-PCS | Mod: CPTII,S$GLB,,

## 2022-05-02 PROCEDURE — 3008F BODY MASS INDEX DOCD: CPT | Mod: CPTII,S$GLB,, | Performed by: INTERNAL MEDICINE

## 2022-05-02 PROCEDURE — 3075F PR MOST RECENT SYSTOLIC BLOOD PRESS GE 130-139MM HG: ICD-10-PCS | Mod: CPTII,S$GLB,,

## 2022-05-02 PROCEDURE — 3079F DIAST BP 80-89 MM HG: CPT | Mod: CPTII,S$GLB,, | Performed by: INTERNAL MEDICINE

## 2022-05-02 PROCEDURE — 99999 PR PBB SHADOW E&M-EST. PATIENT-LVL III: CPT | Mod: PBBFAC,,, | Performed by: INTERNAL MEDICINE

## 2022-05-02 PROCEDURE — 93010 EKG 12-LEAD: ICD-10-PCS | Mod: S$GLB,,, | Performed by: INTERNAL MEDICINE

## 2022-05-02 PROCEDURE — 3008F PR BODY MASS INDEX (BMI) DOCUMENTED: ICD-10-PCS | Mod: CPTII,S$GLB,, | Performed by: INTERNAL MEDICINE

## 2022-05-02 PROCEDURE — 3079F PR MOST RECENT DIASTOLIC BLOOD PRESSURE 80-89 MM HG: ICD-10-PCS | Mod: CPTII,S$GLB,,

## 2022-05-02 PROCEDURE — 1160F PR REVIEW ALL MEDS BY PRESCRIBER/CLIN PHARMACIST DOCUMENTED: ICD-10-PCS | Mod: CPTII,S$GLB,, | Performed by: INTERNAL MEDICINE

## 2022-05-02 PROCEDURE — 1160F RVW MEDS BY RX/DR IN RCRD: CPT | Mod: CPTII,S$GLB,,

## 2022-05-02 PROCEDURE — 3079F DIAST BP 80-89 MM HG: CPT | Mod: CPTII,S$GLB,,

## 2022-05-02 PROCEDURE — 93005 ELECTROCARDIOGRAM TRACING: CPT | Mod: S$GLB,,,

## 2022-05-02 PROCEDURE — 71046 X-RAY EXAM CHEST 2 VIEWS: CPT | Mod: 26,,, | Performed by: RADIOLOGY

## 2022-05-02 PROCEDURE — 3074F PR MOST RECENT SYSTOLIC BLOOD PRESSURE < 130 MM HG: ICD-10-PCS | Mod: CPTII,S$GLB,, | Performed by: INTERNAL MEDICINE

## 2022-05-02 PROCEDURE — 1159F MED LIST DOCD IN RCRD: CPT | Mod: CPTII,S$GLB,, | Performed by: INTERNAL MEDICINE

## 2022-05-02 PROCEDURE — 99215 PR OFFICE/OUTPT VISIT, EST, LEVL V, 40-54 MIN: ICD-10-PCS | Mod: S$GLB,,,

## 2022-05-02 PROCEDURE — 71046 X-RAY EXAM CHEST 2 VIEWS: CPT | Mod: TC

## 2022-05-02 PROCEDURE — 99214 OFFICE O/P EST MOD 30 MIN: CPT | Mod: S$GLB,,, | Performed by: INTERNAL MEDICINE

## 2022-05-02 PROCEDURE — 1160F RVW MEDS BY RX/DR IN RCRD: CPT | Mod: CPTII,S$GLB,, | Performed by: INTERNAL MEDICINE

## 2022-05-02 PROCEDURE — 1159F PR MEDICATION LIST DOCUMENTED IN MEDICAL RECORD: ICD-10-PCS | Mod: CPTII,S$GLB,, | Performed by: INTERNAL MEDICINE

## 2022-05-02 PROCEDURE — 1159F MED LIST DOCD IN RCRD: CPT | Mod: CPTII,S$GLB,,

## 2022-05-02 PROCEDURE — 93005 EKG 12-LEAD: ICD-10-PCS | Mod: S$GLB,,,

## 2022-05-02 PROCEDURE — 3079F PR MOST RECENT DIASTOLIC BLOOD PRESSURE 80-89 MM HG: ICD-10-PCS | Mod: CPTII,S$GLB,, | Performed by: INTERNAL MEDICINE

## 2022-05-02 PROCEDURE — 3074F SYST BP LT 130 MM HG: CPT | Mod: CPTII,S$GLB,, | Performed by: INTERNAL MEDICINE

## 2022-05-02 PROCEDURE — 1159F PR MEDICATION LIST DOCUMENTED IN MEDICAL RECORD: ICD-10-PCS | Mod: CPTII,S$GLB,,

## 2022-05-02 PROCEDURE — 1160F PR REVIEW ALL MEDS BY PRESCRIBER/CLIN PHARMACIST DOCUMENTED: ICD-10-PCS | Mod: CPTII,S$GLB,,

## 2022-05-02 PROCEDURE — 99214 PR OFFICE/OUTPT VISIT, EST, LEVL IV, 30-39 MIN: ICD-10-PCS | Mod: S$GLB,,, | Performed by: INTERNAL MEDICINE

## 2022-05-02 PROCEDURE — 99999 PR PBB SHADOW E&M-EST. PATIENT-LVL III: ICD-10-PCS | Mod: PBBFAC,,, | Performed by: INTERNAL MEDICINE

## 2022-05-02 PROCEDURE — 99999 PR PBB SHADOW E&M-EST. PATIENT-LVL III: ICD-10-PCS | Mod: PBBFAC,,,

## 2022-05-02 RX ORDER — ONDANSETRON 4 MG/1
4 TABLET, FILM COATED ORAL EVERY 6 HOURS PRN
Qty: 40 TABLET | Refills: 1 | Status: SHIPPED | OUTPATIENT
Start: 2022-05-02 | End: 2022-08-30 | Stop reason: SDUPTHER

## 2022-05-02 RX ORDER — COLCHICINE 0.6 MG/1
0.6 CAPSULE ORAL 2 TIMES DAILY
Qty: 60 CAPSULE | Refills: 5 | Status: SHIPPED | OUTPATIENT
Start: 2022-05-02 | End: 2022-05-18

## 2022-05-02 NOTE — PROGRESS NOTES
Anthony Sexton  05/02/2022  [id number]    Tyesha Leggett MD  Patient Care Team:  Tyesha Leggett MD as PCP - General (Family Medicine)  Emily Lott LPN as Care Coordinator (Internal Medicine)  Rebecca Butler as Digital Medicine Health           Visit Type:an urgent visit for a new problem    Chief Complaint:  Chest wall pain      History of Present Illness:    She had the flu two weeks ago  She has been having chest  Pain under right breast/back since having the flu    She does have nausea  She has been using Zofran     SOB yesterday while walking  Does have TELLEZ at times  Walking long distances does make her feel like she wants to faint    She has anxiety  No new stressors  being followed by psych  Started taking Cymbalta      History:  Past Medical History:   Diagnosis Date    Anemia     Anxiety     Chronic low back pain     Depression     History of abnormal cervical Pap smear     History of recurrent UTIs     History of ulcer disease     Irregular menses     Obesity     Postpartum depression     with last pregnancy.     Pre-eclampsia in postpartum period 4/23/2020    Vitamin D deficiency      Past Surgical History:   Procedure Laterality Date    ESOPHAGOGASTRODUODENOSCOPY N/A 5/26/2021    Procedure: EGD (ESOPHAGOGASTRODUODENOSCOPY);  Surgeon: Alka Olmstead MD;  Location: [facility];  Service: Endoscopy;  Laterality: N/A;    ESOPHAGOGASTRODUODENOSCOPY N/A 10/4/2021    Procedure: EGD (ESOPHAGOGASTRODUODENOSCOPY);  Surgeon: Alka Olmstead MD;  [facility];  Service: Endoscopy;  Laterality: N/A;    None      REDUCTION OF BOTH BREASTS Bilateral 5/27/2021    Procedure: MAMMOPLASTY, REDUCTION, BILATERAL;  Surgeon: Daniel Ba MD;  Location: [address];  Service: Plastics;  Laterality: Bilateral;  Left breast tissue: 1584  Right breast tissue: 1438     Family History   Problem Relation Age of Onset    Asthma Mother     Hyperlipidemia Mother     Hypertension  Mother     Hypertension Father     Hyperlipidemia Father     Heart disease Father 55        MI/CAD    No Known Problems Sister     No Known Problems Brother     Diabetes Maternal Aunt     Diabetes Maternal Grandmother     Insulin resistance Sister     No Known Problems Brother     No Known Problems Brother     No Known Problems Brother     No Known Problems Brother     No Known Problems Daughter     Breast cancer Maternal Aunt     Cancer Neg Hx     Stroke Neg Hx     Brain cancer Neg Hx     Migraines Neg Hx     Colon cancer Neg Hx     Ovarian cancer Neg Hx     Anesthesia problems Neg Hx      Social History     Socioeconomic History    Marital status:     Number of children: 0   Occupational History    Occupation: RN     Comment: [employer]   Tobacco Use    Smoking status: Never Smoker    Smokeless tobacco: Never Used   Substance and Sexual Activity    Alcohol use: Not Currently     Alcohol/week: 0.0 standard drinks    Drug use: No    Sexual activity: Yes     Partners: Male     Birth control/protection: None   Social History Narrative    She wears seatbelt.     Social Determinants of Health     Financial Resource Strain: Low Risk     Difficulty of Paying Living Expenses: Not hard at all   Food Insecurity: No Food Insecurity    Worried About Running Out of Food in the Last Year: Never true    Ran Out of Food in the Last Year: Never true   Transportation Needs: No Transportation Needs    Lack of Transportation (Medical): No    Lack of Transportation (Non-Medical): No   Physical Activity: Insufficiently Active    Days of Exercise per Week: 2 days    Minutes of Exercise per Session: 20 min   Stress: No Stress Concern Present    Feeling of Stress : Only a little   Social Connections: Unknown    Frequency of Communication with Friends and Family: More than three times a week    Frequency of Social Gatherings with Friends and Family: Once a week    Active Member of Clubs or  Organizations: Yes    Attends Club or Organization Meetings: Never    Marital Status:    Housing Stability: High Risk    Unable to Pay for Housing in the Last Year: No    Number of Places Lived in the Last Year: 3    Unstable Housing in the Last Year: No     Patient Active Problem List   Diagnosis    Morbid obesity with BMI of 40.0-44.9, adult    Vitamin D deficiency    Gastroesophageal reflux disease without esophagitis    Hypertension    Post partum depression    Generalized anxiety disorder    Atypical squamous cell changes of undetermined significance (ASCUS) on cervical cytology with positive high risk human papilloma virus (HPV)    Elevated hemoglobin A1c    Atypical chest pain    Iron deficiency anemia due to chronic blood loss    Dysplasia of cervix, high grade CATHY 2    Bacterial vaginosis     Review of patient's allergies indicates:   Allergen Reactions    Procardia [nifedipine]        The following were reviewed at this visit: active problem list, medication list, allergies, family history, social history, and health maintenance.    Medications:  Current Outpatient Medications on File Prior to Visit   Medication Sig Dispense Refill    DULoxetine (CYMBALTA) 30 MG capsule Take 1 capsule (30 mg total) by mouth once daily. (Patient not taking: Reported on 3/10/2022) 30 capsule 1    ergocalciferol (VITAMIN D2) 50,000 unit Cap Take 1 capsule (50,000 Units total) by mouth twice a week. 8 capsule 11    fluticasone propionate (FLONASE) 50 mcg/actuation nasal spray 2 sprays (100 mcg total) by Each Nostril route once daily. 16 g 1    metFORMIN (GLUCOPHAGE) 500 MG tablet Take 1 tablet (500 mg total) by mouth 2 (two) times daily with meals. 60 tablet 5    promethazine-dextromethorphan (PROMETHAZINE-DM) 6.25-15 mg/5 mL Syrp Take 5 mLs by mouth every 6 to 8 hours as needed. 180 mL 0    semaglutide (RYBELSUS) 3 mg tablet Take 1 tablet (3 mg total) by mouth once daily. Administer 30 minutes  before the first food, beverage, or other medications of the day. 30 tablet 0     No current facility-administered medications on file prior to visit.       Medications have been reviewed and reconciled with patient at this visit.  Barriers to medications reviewed with patient.    Adverse reactions to current medications reviewed with patient..    Over the counter medications reviewed and reconciled with patient.    Exam:  Wt Readings from Last 3 Encounters:   04/12/22 122 kg (269 lb)   03/10/22 123.1 kg (271 lb 6.2 oz)   02/07/22 122.2 kg (269 lb 6.4 oz)     Temp Readings from Last 3 Encounters:   04/12/22 99.3 °F (37.4 °C) (Tympanic)   03/10/22 98.1 °F (36.7 °C)   12/14/21 97.7 °F (36.5 °C)     BP Readings from Last 3 Encounters:   04/12/22 137/80   03/10/22 124/72   12/14/21 122/70     Pulse Readings from Last 3 Encounters:   04/12/22 82   03/10/22 103   12/14/21 (!) 129     There is no height or weight on file to calculate BMI.      Review of Systems   Constitutional: Negative.  Negative for chills and fever.   HENT: Negative.  Negative for congestion, sinus pain and sore throat.    Eyes: Negative for blurred vision and double vision.   Respiratory: Negative for cough, sputum production, shortness of breath and wheezing.    Cardiovascular: Positive for chest pain and palpitations. Negative for leg swelling.   Gastrointestinal: Negative for abdominal pain, constipation, diarrhea, heartburn, nausea and vomiting.   Genitourinary: Negative.    Musculoskeletal: Positive for back pain.   Skin: Negative.  Negative for rash.   Neurological: Positive for dizziness.   Endo/Heme/Allergies: Negative.  Negative for polydipsia. Does not bruise/bleed easily.   Psychiatric/Behavioral: Negative for depression and substance abuse.     Physical Exam  Vitals and nursing note reviewed.   Constitutional:       General: She is not in acute distress.     Appearance: She is well-developed. She is obese. She is not diaphoretic.   HENT:       Head: Normocephalic and atraumatic.      Right Ear: External ear normal.      Left Ear: External ear normal.      Nose: Nose normal.   Eyes:      General:         Right eye: No discharge.         Left eye: No discharge.      Conjunctiva/sclera: Conjunctivae normal.      Pupils: Pupils are equal, round, and reactive to light.   Neck:      Thyroid: No thyromegaly.   Cardiovascular:      Rate and Rhythm: Regular rhythm. Tachycardia present.      Pulses: Normal pulses.      Heart sounds: Normal heart sounds. No murmur heard.  Pulmonary:      Effort: Pulmonary effort is normal. No respiratory distress.      Breath sounds: Normal breath sounds. No wheezing.   Abdominal:      General: Bowel sounds are normal.   Musculoskeletal:         General: Normal range of motion.      Cervical back: Normal range of motion and neck supple.   Lymphadenopathy:      Cervical: No cervical adenopathy.   Skin:     General: Skin is warm and dry.      Capillary Refill: Capillary refill takes less than 2 seconds.   Neurological:      Mental Status: She is alert and oriented to person, place, and time.      Cranial Nerves: No cranial nerve deficit.   Psychiatric:         Behavior: Behavior normal.         Thought Content: Thought content normal.         Judgment: Judgment normal.         Laboratory Reviewed ({Yes)  Lab Results   Component Value Date    WBC 8.45 09/17/2021    HGB 11.3 (L) 09/17/2021    HCT 40.0 09/17/2021     09/17/2021    CHOL 170 04/20/2021    TRIG 76 04/20/2021    HDL 42 04/20/2021    ALT 17 09/17/2021    AST 25 09/17/2021     09/17/2021    K 4.5 09/17/2021     09/17/2021    CREATININE 0.9 09/17/2021    BUN 8 09/17/2021    CO2 22 (L) 09/17/2021    TSH 0.733 03/10/2022    HGBA1C 5.6 09/24/2021     Diagnoses and all orders for this visit:    Chest wall pain  -     X-Ray Chest PA And Lateral; Future  -     IN OFFICE EKG 12-LEAD (to Muse)    Nausea  -     ondansetron (ZOFRAN) 4 MG tablet; Take 1 tablet (4  mg total) by mouth every 6 (six) hours as needed for Nausea.    Morbid obesity with BMI of 40.0-44.9, adult    Atypical chest pain  -     IN OFFICE EKG 12-LEAD (to Muse)    Positive D dimer  -     IN OFFICE EKG 12-LEAD (to Muse)    TELLEZ (dyspnea on exertion)  -     X-Ray Chest PA And Lateral; Future  -     IN OFFICE EKG 12-LEAD (to Muse)    Shortness of breath  -     X-Ray Chest PA And Lateral; Future  -     IN OFFICE EKG 12-LEAD (to Muse)    Generalized anxiety disorder  Being followed by psych. Cont current POC     Dizziness  -     CBC Auto Differential; Future  -     Comprehensive Metabolic Panel; Future  -     TSH; Future      Pt's D-dimer has been elevated for over three years  Will get CXR and EKG and schedule appt with cards  If SOB continues, may need ref to pulm for PFTs  Will check CBC to see if SOB is related to low iron levels         Care Plan/Goals: Reviewed    Goals       Blood Pressure < 140/90 (pt-stated)           Follow up: No follow-ups on file.    After visit summary was printed and given to patient upon discharge today.  Patient goals and care plan are included in After Visit Summary.

## 2022-05-02 NOTE — PROGRESS NOTES
Subjective:   Patient ID:  Anthony Sexton is a 31 y.o. 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   Flu recovered. But has had chest pain on right chest and beneath the breast. And on the right upper back. The pain was dull constantly and 4/10 in sevverity, worse with long diatnce walking. Leaning forward made the pain better. No palpitation but some lightheadedness  Had similar pain one yr ago.   No regular exercise. Lives with husbad and 2 kids. No smoking and drinking  Took some Tylenol and ibuprofen 800 mg tid fro 5 days. No improvement  Some dizziness when standing up with tachy  H/o elevated d dimer and Chest CTA negative for OE in    Chronic elevation of ESr and crp and f/u at rheumatology service  Father had heart attack at 53   echo  · Concentric left ventricular hypertrophy.  · Normal left ventricular systolic function. The estimated ejection fraction is 60%.  · Normal LV diastolic function.  · Normal central venous pressure (3 mmHg).  · The estimated PA systolic pressure is 30 mmHg.                              Past Medical History:   Diagnosis Date    Anemia     Anxiety     Chronic low back pain     Depression     History of abnormal cervical Pap smear     History of recurrent UTIs     History of ulcer disease     Irregular menses     Obesity     Postpartum depression     with last pregnancy.     Pre-eclampsia in postpartum period 4/23/2020    Vitamin D deficiency        Past Surgical History:   Procedure Laterality Date    ESOPHAGOGASTRODUODENOSCOPY N/A 5/26/2021    Procedure: EGD (ESOPHAGOGASTRODUODENOSCOPY);  Surgeon: Alka Olmstead MD;  [facility];  Service: Endoscopy;  Laterality: N/A;    ESOPHAGOGASTRODUODENOSCOPY N/A 10/4/2021    Procedure: EGD  (ESOPHAGOGASTRODUODENOSCOPY);  Surgeon: Alka Olmstead MD;  Location: [facility];  Service: Endoscopy;  Laterality: N/A;    None      REDUCTION OF BOTH BREASTS Bilateral 5/27/2021    Procedure: MAMMOPLASTY, REDUCTION, BILATERAL;  Surgeon: Daniel Ba MD;  Location: [facility] OR [address] [place] [address];  Service: Plastics;  Laterality: Bilateral;  Left breast tissue: 1584  Right breast tissue: 1438       Social History     Tobacco Use    Smoking status: Never Smoker    Smokeless tobacco: Never Used   Substance Use Topics    Alcohol use: Not Currently     Alcohol/week: 0.0 standard drinks    Drug use: No       Family History   Problem Relation Age of Onset    Asthma Mother     Hyperlipidemia Mother     Hypertension Mother     Hypertension Father     Hyperlipidemia Father     Heart disease Father 55        MI/CAD    No Known Problems Sister     No Known Problems Brother     Diabetes Maternal Aunt     Diabetes Maternal Grandmother     Insulin resistance Sister     No Known Problems Brother     No Known Problems Brother     No Known Problems Brother     No Known Problems Brother     No Known Problems Daughter     Breast cancer Maternal Aunt     Cancer Neg Hx     Stroke Neg Hx     Brain cancer Neg Hx     Migraines Neg Hx     Colon cancer Neg Hx     Ovarian cancer Neg Hx     Anesthesia problems Neg Hx          Review of Systems   Constitutional: Negative for decreased appetite, diaphoresis, fever, malaise/fatigue and night sweats.   HENT: Negative for nosebleeds.    Eyes: Negative for blurred vision and double vision.   Cardiovascular: Positive for chest pain. Negative for claudication, dyspnea on exertion, irregular heartbeat, leg swelling, near-syncope, orthopnea, palpitations, paroxysmal nocturnal dyspnea and syncope.   Respiratory: Negative for cough, shortness of breath, sleep disturbances due to breathing, snoring, sputum production and wheezing.    Endocrine: Negative for cold intolerance  and polyuria.   Hematologic/Lymphatic: Does not bruise/bleed easily.   Skin: Negative for rash.   Musculoskeletal: Negative for back pain, falls, joint pain, joint swelling and neck pain.   Gastrointestinal: Negative for abdominal pain, heartburn, nausea and vomiting.   Genitourinary: Negative for dysuria, frequency and hematuria.   Neurological: Negative for difficulty with concentration, dizziness, focal weakness, headaches, light-headedness, numbness, seizures and weakness.   Psychiatric/Behavioral: Negative for depression, memory loss and substance abuse. The patient does not have insomnia.    Allergic/Immunologic: Negative for HIV exposure and hives.       Objective:   Physical Exam  HENT:      Head: Normocephalic.   Eyes:      Pupils: Pupils are equal, round, and reactive to light.   Neck:      Thyroid: No thyromegaly.      Vascular: Normal carotid pulses. No carotid bruit or JVD.   Cardiovascular:      Rate and Rhythm: Normal rate and regular rhythm.  No extrasystoles are present.     Chest Wall: PMI is not displaced.      Pulses: Normal pulses.      Heart sounds: Normal heart sounds. No murmur heard.    No gallop. No S3 sounds.   Pulmonary:      Effort: No respiratory distress.      Breath sounds: Normal breath sounds. No stridor.   Abdominal:      General: Bowel sounds are normal.      Palpations: Abdomen is soft.      Tenderness: There is no abdominal tenderness. There is no rebound.   Musculoskeletal:         General: Normal range of motion.   Skin:     Findings: No rash.   Neurological:      Mental Status: She is alert and oriented to person, place, and time.   Psychiatric:         Behavior: Behavior normal.         Lab Results   Component Value Date    CHOL 170 04/20/2021    CHOL 178 07/28/2020    CHOL 181 04/15/2019     Lab Results   Component Value Date    HDL 42 04/20/2021    HDL 51 07/28/2020    HDL 37 (L) 04/15/2019     Lab Results   Component Value Date    LDLCALC 112.8 04/20/2021    LDLCALC 113.2  07/28/2020    LDLCALC 129.0 04/15/2019     Lab Results   Component Value Date    TRIG 76 04/20/2021    TRIG 69 07/28/2020    TRIG 75 04/15/2019     Lab Results   Component Value Date    CHOLHDL 24.7 04/20/2021    CHOLHDL 28.7 07/28/2020    CHOLHDL 20.4 04/15/2019       Chemistry        Component Value Date/Time     05/02/2022 1318    K 4.3 05/02/2022 1318     05/02/2022 1318    CO2 26 05/02/2022 1318    BUN 10 05/02/2022 1318    CREATININE 0.7 05/02/2022 1318    GLU 77 05/02/2022 1318        Component Value Date/Time    CALCIUM 9.6 05/02/2022 1318    ALKPHOS 92 05/02/2022 1318    AST 19 05/02/2022 1318    ALT 20 05/02/2022 1318    BILITOT 0.3 05/02/2022 1318    ESTGFRAFRICA >60.0 05/02/2022 1318    EGFRNONAA >60.0 05/02/2022 1318          Lab Results   Component Value Date    HGBA1C 5.6 09/24/2021     Lab Results   Component Value Date    TSH 0.793 05/02/2022     No results found for: INR, PROTIME  Lab Results   Component Value Date    WBC 9.94 05/02/2022    HGB 12.1 05/02/2022    HCT 41.6 05/02/2022    MCV 83 05/02/2022     05/02/2022     BMP  Sodium   Date Value Ref Range Status   05/02/2022 137 136 - 145 mmol/L Final     Potassium   Date Value Ref Range Status   05/02/2022 4.3 3.5 - 5.1 mmol/L Final     Chloride   Date Value Ref Range Status   05/02/2022 104 95 - 110 mmol/L Final     CO2   Date Value Ref Range Status   05/02/2022 26 23 - 29 mmol/L Final     BUN   Date Value Ref Range Status   05/02/2022 10 6 - 20 mg/dL Final     Creatinine   Date Value Ref Range Status   05/02/2022 0.7 0.5 - 1.4 mg/dL Final     Calcium   Date Value Ref Range Status   05/02/2022 9.6 8.7 - 10.5 mg/dL Final     Anion Gap   Date Value Ref Range Status   05/02/2022 7 (L) 8 - 16 mmol/L Final     eGFR if    Date Value Ref Range Status   05/02/2022 >60.0 >60 mL/min/1.73 m^2 Final     eGFR if non    Date Value Ref Range Status   05/02/2022 >60.0 >60 mL/min/1.73 m^2 Final     Comment:      Calculation used to obtain the estimated glomerular filtration  rate (eGFR) is the CKD-EPI equation.        BNP  @LABRCNTIP(BNP,BNPTRIAGEBLO)@  @LABRCNTIP(troponini)@  Estimated Creatinine Clearance: 150.9 mL/min (based on SCr of 0.7 mg/dL).  No results found in the last 24 hours.  X-Ray Chest PA And Lateral    Result Date: 5/2/2022  No acute findings. Electronically signed by: Dc Delgado MD Date:    05/02/2022 Time:    13:57    No results found in the last 24 hours.    Assessment:      1. Other chest pain    2. Atypical chest pain    3. Morbid obesity with BMI of 40.0-44.9, adult        Constantly chest dull pain worse with leaning forward  H/o type A flu 4 weeks ago    Plan:   Check ECHO and troponin to r/in pericarditis  Add colchicine 0.6 mg bid  Contiune Ibuprofen 800 mg tid and ASA for 2 to 4 weeks  Add Prilosec to avoid GERD  RTC as needed  Encourage hydration

## 2022-05-03 ENCOUNTER — PATIENT MESSAGE (OUTPATIENT)
Dept: PSYCHIATRY | Facility: CLINIC | Age: 32
End: 2022-05-03
Payer: COMMERCIAL

## 2022-05-03 ENCOUNTER — TELEPHONE (OUTPATIENT)
Dept: CARDIOLOGY | Facility: CLINIC | Age: 32
End: 2022-05-03
Payer: COMMERCIAL

## 2022-05-03 DIAGNOSIS — F41.1 GENERALIZED ANXIETY DISORDER: ICD-10-CM

## 2022-05-03 DIAGNOSIS — F34.1 DYSTHYMIA: ICD-10-CM

## 2022-05-03 RX ORDER — ERGOCALCIFEROL 1.25 MG/1
50000 CAPSULE ORAL
Qty: 8 CAPSULE | Refills: 11 | Status: SHIPPED | OUTPATIENT
Start: 2022-05-05 | End: 2023-06-27 | Stop reason: SDUPTHER

## 2022-05-03 RX ORDER — DULOXETIN HYDROCHLORIDE 20 MG/1
20 CAPSULE, DELAYED RELEASE ORAL DAILY
Qty: 30 CAPSULE | Refills: 0 | Status: SHIPPED | OUTPATIENT
Start: 2022-05-03 | End: 2022-06-09 | Stop reason: SDUPTHER

## 2022-05-03 NOTE — TELEPHONE ENCOUNTER
LV:3/10/22  LA[facility]:  Upcoming Appt: 5/27/22    Pt requesting Vitamin D2 50,000 units  Last fill 4/22/21

## 2022-05-03 NOTE — TELEPHONE ENCOUNTER
Pt contacted about results, lvm for pt to call back.            ----- Message from Sajan Stevens MD sent at 5/3/2022 12:12 PM CDT -----  Troponin negative

## 2022-05-06 RX ORDER — CIPROFLOXACIN 500 MG/1
500 TABLET ORAL EVERY 12 HOURS
Qty: 10 TABLET | Refills: 0 | Status: SHIPPED | OUTPATIENT
Start: 2022-05-06 | End: 2022-05-18

## 2022-05-16 ENCOUNTER — TELEPHONE (OUTPATIENT)
Dept: PSYCHIATRY | Facility: CLINIC | Age: 32
End: 2022-05-16
Payer: COMMERCIAL

## 2022-05-18 ENCOUNTER — OFFICE VISIT (OUTPATIENT)
Dept: PSYCHIATRY | Facility: CLINIC | Age: 32
End: 2022-05-18
Payer: COMMERCIAL

## 2022-05-18 DIAGNOSIS — F90.0 ATTENTION DEFICIT HYPERACTIVITY DISORDER (ADHD), PREDOMINANTLY INATTENTIVE TYPE: ICD-10-CM

## 2022-05-18 DIAGNOSIS — F41.1 GENERALIZED ANXIETY DISORDER: Primary | ICD-10-CM

## 2022-05-18 PROCEDURE — 99215 PR OFFICE/OUTPT VISIT, EST, LEVL V, 40-54 MIN: ICD-10-PCS | Mod: 95,,, | Performed by: PSYCHOLOGIST

## 2022-05-18 PROCEDURE — 99215 OFFICE O/P EST HI 40 MIN: CPT | Mod: 95,,, | Performed by: PSYCHOLOGIST

## 2022-05-18 RX ORDER — OMEPRAZOLE 20 MG/1
20 CAPSULE, DELAYED RELEASE ORAL DAILY
COMMUNITY
End: 2022-05-27 | Stop reason: SDUPTHER

## 2022-05-18 NOTE — PROGRESS NOTES
"  Outpatient Psychiatry Follow-Up Visit    5/18/2022    Timeframe: Corona Virus Outbreak     The patient location is: Patient's home/ Patient reported that his/her location at the time of this visit was in the [facility]     Visit type: Virtual visit with synchronous audio and video     Each patient to whom he or she provides medical services by telehealth is: (1) informed of the relationship between the medical psychologist and patient and the respective role of any other health care provider with respect to management of the patient; and (2) notified that he or she may decline to receive medical services by telehealth and may withdraw from such care at any time.    I also informed patient of the following:   Kenya Sanchez, PhD, MPAP:  [place] medical license number: [id number]    My contact info:  [facility] / 2nd Floor  [address] [place] [address]   Ph: [phone]    If technology issues, call office phone: Ph: [phone]  If crisis: Dial 911 or go to nearest Emergency Room (ER)  If questions related to privacy practices: contact [facility] Information Department: [phone]    Chief Complaint:  Anthony Sexton is a 31 y.o. female who presents today for follow-up of depression, anxiety and inattention/distractibility .       Impressions/Plan from last visit: Anthony attended her virtual visit. Since we last met, Anthony missed her appt and had stopped her medicines because she had a "mild constant dull chest discomfort." More recently, she started having anxiety/panic and requested to restart her medicines. We restarted buspirone. She reported that she started having TMJ with buspirone and stopped it--was having a "pounding headache." She has not been on Wellbutrin and has only been taking buspirone once daily. She has not been to therapy--she has two kids under 5 and is in a new position (works in cardiology research). She has been very busy. We " "reviewed her medicines--agreed to change to Cymbalta, starting low and increasing after a week to 30 mg. We also discussed the need to monitor her cognitions (distortions)--she will try to get back into therapy, too.     Prior medicines--buspirone, Prozac, Klonopin, Lexapro, Celexa, Zoloft, hydroxyzine, Adderall XR; Effexor (she thinks?); Pristiq, Ativan, Wellbutrin.       since May 2020.    Interval History and Content of Current Session: Anthony attended her virtual visit. She said that things are getting better. She has been taking Cymbalta 20 mg and has been nauseated, even when taking it at night. She has been having some doomsday thoughts at times--used to be daily; now about once a week. This happens more when she is idle--in tub or when driving. We discussed her "accepting" these thoughts rather than trying to avoid them or distract herself from them. For example, if she thinks that she will drive off the bridge and die, acknowledge that if there's a "freak accident," that could happen; but otherwise, she is a cautious  and is mindful of her surroundings so the likelihood of that happening is extremely low. We want to instill a sense of confidence in her ability to take care of herself. She reported that she has also been struggling at work with her focus. 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 Previously, she reportedly did not have a good experience on Adderall and had side effects when on Wellbutrin. We considered guanfacine or Strattera, but we agreed to implement some behavioral changes while we work toward anxiety stabilization and then will re-assess. She is interested in therapy again but had trouble getting in with Dr. Bartlett. She will check with EAP and call the clinic to see one of the providers at [facility] for therapy. Regarding medicine, we agreed to continue Cymbalta 20 mg for another two weeks, but she will take it right " after dinner in the evenings. If the nausea continues, we will change to Viibryd 5 mg until we meet again in 4-6 weeks.    Prior medicines--buspirone, Prozac, Klonopin, Lexapro, Celexa, Zoloft, hydroxyzine, Adderall XR; Effexor (she thinks); Pristiq; Wellbutrin; Ativan.      since November 2021.        GAD7 5/18/2022 2/14/2022 1/14/2022   1. Feeling nervous, anxious, or on edge? 1 1 1   2. Not being able to stop or control worrying? 1 1 2   3. Worrying too much about different things? 1 1 1   4. Trouble relaxing? 1 1 1   5. Being so restless that it is hard to sit still? 1 0 0   6. Becoming easily annoyed or irritable? 1 1 1   7. Feeling afraid as if something awful might happen? 2 1 2   SENTHIL-7 Score 8 6 8      0-4 = Minimal anxiety  5-9 = Mild anxiety  10-14 = Moderate anxiety  15-21 = Severe anxiety       Review of Systems   · PSYCHIATRIC: Pertinant items are noted in the narrative.    Past Medical, Family and Social History: The patient's past medical, family and social history have been reviewed and updated as appropriate within the electronic medical record - see encounter notes.      Current Outpatient Medications:     omeprazole (PRILOSEC) 20 MG capsule, Take 20 mg by mouth once daily., Disp: , Rfl:     cyclobenzaprine (FLEXERIL) 10 MG tablet, Take 1 tablet by mouth at bedtime as needed for jaw soreness, Disp: 40 tablet, Rfl: 0    DULoxetine (CYMBALTA) 20 MG capsule, Take 1 capsule (20 mg total) by mouth once daily., Disp: 30 capsule, Rfl: 0    ergocalciferol (VITAMIN D2) 50,000 unit Cap, Take 1 capsule (50,000 Units total) by mouth twice a week., Disp: 8 capsule, Rfl: 11    fluticasone propionate (FLONASE) 50 mcg/actuation nasal spray, 2 sprays (100 mcg total) by Each Nostril route once daily., Disp: 16 g, Rfl: 1    ibuprofen (ADVIL,MOTRIN) 800 MG tablet, Take 1 tablet by mouth every 4 to 6 hours as needed for pain. Do not exceed 4 tablets per day, Disp: 24 tablet, Rfl: 0    ondansetron (ZOFRAN) 4  MG tablet, Take 1 tablet (4 mg total) by mouth every 6 (six) hours as needed for Nausea., Disp: 40 tablet, Rfl: 1    Compliance: yes    Side effects: nausea    Risk Parameters:  Patient reports no suicidal ideation  Patient reports no homicidal ideation  Patient reports no self-injurious behavior  Patient reports no violent behavior    Exam (detailed: at least 9 elements; comprehensive: all 15 elements)   Constitutional  Vitals:  Most recent vital signs were reviewed.   Last 3 sets of Vitals    Vitals - 1 value per visit 5/2/2022 5/2/2022 5/2/2022   SYSTOLIC - 132 122   DIASTOLIC - 84 82   Pulse - 114 85   Temp - - -   Resp - - -   SPO2 - 98 99   Weight (lb) - 267.64 272.05   Weight (kg) - 121.4 123.4   Height - - 65   BMI (Calculated) - - 45.3   VISIT REPORT - - -   Waist Measurements - - -   Pain Score  4 - -   Some recent data might be hidden          General:  age appropriate, casually dressed, neatly groomed     Musculoskeletal  Muscle Strength/Tone:  no tremor, no tic   Gait & Station:  video visit     Psychiatric  Speech:  no latency; no press   Behavior: wnl   Mood & Affect:  euthymic, sometimes anxious  congruent and appropriate   Thought Process:  normal and logical   Associations:  intact   Thought Content:  normal, no suicidality, no homicidality, delusions, or paranoia   Insight:  has awareness of illness   Judgement: behavior is adequate to circumstances   Orientation:  grossly intact   Memory: intact for content of interview   Language: grossly intact   Attention Span & Concentration:  Grossly intact   Fund of Knowledge:  intact and appropriate to age and level of education     Assessment and Diagnosis   Status/Progress: Based on the examination today, the patient's problem(s) is/are improved and adequately but not ideally controlled.  New problems have not been presented today.   Co-morbidities are complicating management of the primary condition.  There are no active rule-out diagnoses for this  "patient at this time.     General Impression:     Encounter Diagnoses   Name Primary?    Generalized anxiety disorder Yes    Attention deficit hyperactivity disorder (ADHD), predominantly inattentive type          Intervention/Counseling/Treatment Plan   · Medication Management: Discussed risks, benefits, and alternatives to treatment plan documented above with patient. I answered all patient questions related to this plan, and patient expressed understanding and agreement.   continue Cymbalta 20 mg for now--no refill need--take right after dinner  · EAP counseling in clinic   · Cognitive intervention during doomsday thinking--likelihood and evidence  · When reading for long periods, break into smaller chunks of time (may use a timer) and make process more "active" by highlighting and/or making notes off of reading material    Medication List with Changes/Refills   Current Medications    CYCLOBENZAPRINE (FLEXERIL) 10 MG TABLET    Take 1 tablet by mouth at bedtime as needed for jaw soreness    DULOXETINE (CYMBALTA) 20 MG CAPSULE    Take 1 capsule (20 mg total) by mouth once daily.    ERGOCALCIFEROL (VITAMIN D2) 50,000 UNIT CAP    Take 1 capsule (50,000 Units total) by mouth twice a week.    FLUTICASONE PROPIONATE (FLONASE) 50 MCG/ACTUATION NASAL SPRAY    2 sprays (100 mcg total) by Each Nostril route once daily.    IBUPROFEN (ADVIL,MOTRIN) 800 MG TABLET    Take 1 tablet by mouth every 4 to 6 hours as needed for pain. Do not exceed 4 tablets per day    OMEPRAZOLE (PRILOSEC) 20 MG CAPSULE    Take 20 mg by mouth once daily.    ONDANSETRON (ZOFRAN) 4 MG TABLET    Take 1 tablet (4 mg total) by mouth every 6 (six) hours as needed for Nausea.   Discontinued Medications    CIPROFLOXACIN HCL (CIPRO) 500 MG TABLET    Take 1 tablet (500 mg total) by mouth every 12 (twelve) hours.    COLCHICINE (MITIGARE) 0.6 MG CAP    Take 1 capsule (0.6 mg total) by mouth 2 (two) times a day.        Return to Clinic: 6 weeks    Time spent " with pt including note preparation: 43 minutes       Kenya Sanchez, PhD, MP  Advanced Practice Medical Psychologist  [facility]  [address]  [place] [address] [phone]   [phone] fax

## 2022-05-18 NOTE — PATIENT INSTRUCTIONS
"[facility]   PATIENT INFORMATION    We appreciate the opportunity to participate in your medical care and hope the following protocols will make it easier for you to receive quality treatment in our department.    PUNCTUALITY: Your appointment is scheduled for a fixed amount of time, reserved especially for you.  To get the benefit of your appointment, please arrive at least 15 minutes early to allow time for traffic, parking and registration.  Should you arrive more than 15 minutes late to your appointment, you will be rescheduled in order to assure your clinician has adequate time to assess you and provide helpful care.      APPOINTMENTS: Appointments are made by the nursing/front office staff or through the patient portal. Providers do not have access  to schedule appointments. Walk in appointments are not available. FOR EMERGENCIES, PLEASE GO THE CLOSEST EMERGENCY ROOM.    CANCELLATION/MISSED APPOINTMENTS:   In order to receive quality care, all appointments must be kept.  If you are unable to keep an appointment, please reschedule at least 3 days prior if possible. Late cancellations (within 24 hours of the appointment) and repeated no-show appointments may result in dismissal from the clinic. After two no show/late cancellation visits, you will receive a notice letter, alerting you to keep visits to prevent department dismissal. If another visit is missed after receipt of the notice, you will be discharged from the clinic. This policy is in effect to allow for other individuals on a long waiting list to be seen as soon as possible. Unlike other branches of medicine where several individuals can be scheduled in a 30 minute time slot, only one individual can be scheduled in any time slot in Psychiatry.     MESSAGES: For simple questions/concerns, you may contact your individual providers electronically through the "[facility]" portal or by calling [phone] " with messages relayed via office staff. If relevant, include pharmacy name and phone number, date of last visit and next scheduled visit, phone number where you can be reached throughout the day, and whether leaving a voicemail or message on an answering machine is acceptable. Messages will be returned by the Medical Assistant or Office Staff after your provider has reviewed the message.  Please allow 24 hours for a returned message before leaving another message. Messages will be checked each workday (Monday through Friday) during office hours (8:00 a.m. and 5:00 p.m.) and returned at most within one business day.  You may leave a non-urgent message after hours. Note that psychotherapy and medication management are not appropriate by telephone or the patient portal.    PRESCRIPTION REFILLS:  Please communicate with your prescriber about any refills you need during your appointment. You may also request refills through the [facility] portal (preferred) or by calling the clinic. Prescriptions will be filled during office hours.     Please do not wait until you are completely out of medication to request refills. Same day refills are not always possible. Patients may experience symptoms of withdrawal if they run out of medications. The patient assumes all responsibility when there is an issue with non-compliance with follow-up appointments and medications.  Some medications are controlled and regulated by the FDA and [employer]. Some of these medications can not be refilled before 30 days and require a face to face appointment.     PAPERWORK REQUESTS: If you have any forms or letters that need to be completed by your doctor, please present these at the beginning of the appointment to ensure that information needed to complete them is obtained during the office visit. Paperwork will be returned within 7-10 business days. Staff will call you to  the paperwork when completed.    SPECIAL EVALUATIONS: Please note that our  "department is treatment-focused. As such, we focus on treatment-oriented evaluations and do not perform specialty or "forensic" evaluations. Examples are listed below.    Disability: We do not do disability evaluations.  Please contact Social Security Administration for evaluations and determinations. You will then sign releases allowing for records from your treatment providers to be forwarded to Social Security Administration to use in their evaluation.  Gun Permit: We do not offer Sound Judgment Evaluations or assessments leading to gun ownership, nor do we fill out or file paperwork relevant to owning, concealing or purchasing a firearm.  Emotional Support     Animals (JOSE GUADALUPE): We do not provide documentation, including letters, to aid in the acclamation that an Emotional Support Animal is required. Note that ESAs are not trained to perform tasks or recognize particular signs or symptoms. Rather, they are distinguished by the close, emotional, and supportive bond between the animal and the owner.       SAMPLES: We do not provide samples of any medications. If you have financial difficulties and are on a limited income, you may qualify for Patient Assistance Programs from various pharmaceutical companies. This will require that you complete paperwork with your financial information, but this does not guarantee that the company will approve the application. Alternative medication options can be discussed.    REFERRALS/COORDINATION: You will be referred to other providers if we feel unable to adequately diagnose or treat your particular condition, or if collaboration with another provider would allow for better management of your condition.       Call In if problems  Call Report Side Effects   Encouraged to follow up with primary care / Gen Med MD for continued monitoring of general health and wellness  Call 911 Or go to ER if Acute Concerns (especially if any thoughts of harm to self or other)  Cognitive " "intervention during doomsday thinking--likelihood and evidence  When reading for long periods, break into smaller chunks of time (may use a timer) and make process more "active" by highlighting and/or making notes off of reading material          Kenya Sanchez, PhD, MP  Advanced Practice Medical Psychologist  [facility]  [address]  [place] [id number] [phone]   [phone] fax   "

## 2022-05-19 ENCOUNTER — PATIENT MESSAGE (OUTPATIENT)
Dept: FAMILY MEDICINE | Facility: CLINIC | Age: 32
End: 2022-05-19
Payer: COMMERCIAL

## 2022-05-19 DIAGNOSIS — Z00.00 WELLNESS EXAMINATION: Primary | ICD-10-CM

## 2022-05-20 ENCOUNTER — TELEPHONE (OUTPATIENT)
Dept: FAMILY MEDICINE | Facility: CLINIC | Age: 32
End: 2022-05-20
Payer: COMMERCIAL

## 2022-05-20 NOTE — TELEPHONE ENCOUNTER
"Pt poral message    "Hi Dr. Leggett! I have my annual physical scheduled for next Friday at 7:40AM. I like to review my lab work in the office with you during the visit. Would you put in the orders for my yearly blood work so that I can have them drawn prior to my appointment?     [facility] [employer] [facility] will cover the following labs under Wellness:  CMP, CBC, A1c, Lipid Profile, TSH, and HIV    I will also need a Vitamin D lab order as well since I take Vitamin D rx weekly.     Thanks and see you soon!  Anthony"    Please advise  "

## 2022-05-24 ENCOUNTER — LAB VISIT (OUTPATIENT)
Dept: LAB | Facility: HOSPITAL | Age: 32
End: 2022-05-24
Attending: FAMILY MEDICINE
Payer: COMMERCIAL

## 2022-05-24 DIAGNOSIS — Z00.00 WELLNESS EXAMINATION: ICD-10-CM

## 2022-05-24 LAB
ALBUMIN SERPL BCP-MCNC: 3.6 G/DL (ref 3.5–5.2)
ALP SERPL-CCNC: 102 U/L (ref 55–135)
ALT SERPL W/O P-5'-P-CCNC: 24 U/L (ref 10–44)
ANION GAP SERPL CALC-SCNC: 10 MMOL/L (ref 8–16)
AST SERPL-CCNC: 19 U/L (ref 10–40)
BASOPHILS # BLD AUTO: 0.03 K/UL (ref 0–0.2)
BASOPHILS NFR BLD: 0.4 % (ref 0–1.9)
BILIRUB SERPL-MCNC: 0.3 MG/DL (ref 0.1–1)
BUN SERPL-MCNC: 11 MG/DL (ref 6–20)
CALCIUM SERPL-MCNC: 9.5 MG/DL (ref 8.7–10.5)
CHLORIDE SERPL-SCNC: 105 MMOL/L (ref 95–110)
CHOLEST SERPL-MCNC: 191 MG/DL (ref 120–199)
CHOLEST/HDLC SERPL: 4.3 {RATIO} (ref 2–5)
CO2 SERPL-SCNC: 23 MMOL/L (ref 23–29)
CREAT SERPL-MCNC: 0.7 MG/DL (ref 0.5–1.4)
DIFFERENTIAL METHOD: ABNORMAL
EOSINOPHIL # BLD AUTO: 0.1 K/UL (ref 0–0.5)
EOSINOPHIL NFR BLD: 0.7 % (ref 0–8)
ERYTHROCYTE [DISTWIDTH] IN BLOOD BY AUTOMATED COUNT: 13.7 % (ref 11.5–14.5)
EST. GFR  (AFRICAN AMERICAN): >60 ML/MIN/1.73 M^2
EST. GFR  (NON AFRICAN AMERICAN): >60 ML/MIN/1.73 M^2
ESTIMATED AVG GLUCOSE: 108 MG/DL (ref 68–131)
GLUCOSE SERPL-MCNC: 89 MG/DL (ref 70–110)
HBA1C MFR BLD: 5.4 % (ref 4–5.6)
HCT VFR BLD AUTO: 40.3 % (ref 37–48.5)
HDLC SERPL-MCNC: 44 MG/DL (ref 40–75)
HDLC SERPL: 23 % (ref 20–50)
HGB BLD-MCNC: 12.1 G/DL (ref 12–16)
IMM GRANULOCYTES # BLD AUTO: 0.02 K/UL (ref 0–0.04)
IMM GRANULOCYTES NFR BLD AUTO: 0.3 % (ref 0–0.5)
LDLC SERPL CALC-MCNC: 134.2 MG/DL (ref 63–159)
LYMPHOCYTES # BLD AUTO: 2 K/UL (ref 1–4.8)
LYMPHOCYTES NFR BLD: 26.5 % (ref 18–48)
MCH RBC QN AUTO: 24.1 PG (ref 27–31)
MCHC RBC AUTO-ENTMCNC: 30 G/DL (ref 32–36)
MCV RBC AUTO: 80 FL (ref 82–98)
MONOCYTES # BLD AUTO: 0.4 K/UL (ref 0.3–1)
MONOCYTES NFR BLD: 5 % (ref 4–15)
NEUTROPHILS # BLD AUTO: 4.9 K/UL (ref 1.8–7.7)
NEUTROPHILS NFR BLD: 67.1 % (ref 38–73)
NONHDLC SERPL-MCNC: 147 MG/DL
NRBC BLD-RTO: 0 /100 WBC
PLATELET # BLD AUTO: 325 K/UL (ref 150–450)
PMV BLD AUTO: 11.1 FL (ref 9.2–12.9)
POTASSIUM SERPL-SCNC: 4.2 MMOL/L (ref 3.5–5.1)
PROT SERPL-MCNC: 7.7 G/DL (ref 6–8.4)
RBC # BLD AUTO: 5.03 M/UL (ref 4–5.4)
SODIUM SERPL-SCNC: 138 MMOL/L (ref 136–145)
TRIGL SERPL-MCNC: 64 MG/DL (ref 30–150)
TSH SERPL DL<=0.005 MIU/L-ACNC: 0.88 UIU/ML (ref 0.4–4)
WBC # BLD AUTO: 7.36 K/UL (ref 3.9–12.7)

## 2022-05-24 PROCEDURE — 87389 HIV-1 AG W/HIV-1&-2 AB AG IA: CPT | Performed by: FAMILY MEDICINE

## 2022-05-24 PROCEDURE — 80061 LIPID PANEL: CPT | Performed by: FAMILY MEDICINE

## 2022-05-24 PROCEDURE — 80053 COMPREHEN METABOLIC PANEL: CPT | Performed by: FAMILY MEDICINE

## 2022-05-24 PROCEDURE — 83036 HEMOGLOBIN GLYCOSYLATED A1C: CPT | Performed by: FAMILY MEDICINE

## 2022-05-24 PROCEDURE — 84443 ASSAY THYROID STIM HORMONE: CPT | Performed by: FAMILY MEDICINE

## 2022-05-24 PROCEDURE — 85025 COMPLETE CBC W/AUTO DIFF WBC: CPT | Performed by: FAMILY MEDICINE

## 2022-05-24 PROCEDURE — 36415 COLL VENOUS BLD VENIPUNCTURE: CPT | Performed by: FAMILY MEDICINE

## 2022-05-25 LAB — HIV 1+2 AB+HIV1 P24 AG SERPL QL IA: NEGATIVE

## 2022-05-27 ENCOUNTER — OFFICE VISIT (OUTPATIENT)
Dept: FAMILY MEDICINE | Facility: CLINIC | Age: 32
End: 2022-05-27
Payer: COMMERCIAL

## 2022-05-27 VITALS
HEART RATE: 77 BPM | SYSTOLIC BLOOD PRESSURE: 126 MMHG | WEIGHT: 268.5 LBS | TEMPERATURE: 98 F | OXYGEN SATURATION: 99 % | BODY MASS INDEX: 44.73 KG/M2 | HEIGHT: 65 IN | DIASTOLIC BLOOD PRESSURE: 78 MMHG

## 2022-05-27 DIAGNOSIS — Z00.00 ANNUAL PHYSICAL EXAM: Primary | ICD-10-CM

## 2022-05-27 DIAGNOSIS — N76.0 ACUTE VAGINITIS: ICD-10-CM

## 2022-05-27 DIAGNOSIS — I10 ESSENTIAL HYPERTENSION: ICD-10-CM

## 2022-05-27 DIAGNOSIS — E66.01 MORBID OBESITY WITH BMI OF 40.0-44.9, ADULT: ICD-10-CM

## 2022-05-27 DIAGNOSIS — E55.9 VITAMIN D DEFICIENCY: ICD-10-CM

## 2022-05-27 DIAGNOSIS — K21.9 GASTROESOPHAGEAL REFLUX DISEASE WITHOUT ESOPHAGITIS: ICD-10-CM

## 2022-05-27 DIAGNOSIS — F41.1 GENERALIZED ANXIETY DISORDER: ICD-10-CM

## 2022-05-27 PROCEDURE — 3078F PR MOST RECENT DIASTOLIC BLOOD PRESSURE < 80 MM HG: ICD-10-PCS | Mod: CPTII,S$GLB,, | Performed by: FAMILY MEDICINE

## 2022-05-27 PROCEDURE — 3008F PR BODY MASS INDEX (BMI) DOCUMENTED: ICD-10-PCS | Mod: CPTII,S$GLB,, | Performed by: FAMILY MEDICINE

## 2022-05-27 PROCEDURE — 1159F PR MEDICATION LIST DOCUMENTED IN MEDICAL RECORD: ICD-10-PCS | Mod: CPTII,S$GLB,, | Performed by: FAMILY MEDICINE

## 2022-05-27 PROCEDURE — 3074F SYST BP LT 130 MM HG: CPT | Mod: CPTII,S$GLB,, | Performed by: FAMILY MEDICINE

## 2022-05-27 PROCEDURE — 3044F PR MOST RECENT HEMOGLOBIN A1C LEVEL <7.0%: ICD-10-PCS | Mod: CPTII,S$GLB,, | Performed by: FAMILY MEDICINE

## 2022-05-27 PROCEDURE — 1160F RVW MEDS BY RX/DR IN RCRD: CPT | Mod: CPTII,S$GLB,, | Performed by: FAMILY MEDICINE

## 2022-05-27 PROCEDURE — 1159F MED LIST DOCD IN RCRD: CPT | Mod: CPTII,S$GLB,, | Performed by: FAMILY MEDICINE

## 2022-05-27 PROCEDURE — 3078F DIAST BP <80 MM HG: CPT | Mod: CPTII,S$GLB,, | Performed by: FAMILY MEDICINE

## 2022-05-27 PROCEDURE — 99999 PR PBB SHADOW E&M-EST. PATIENT-LVL V: ICD-10-PCS | Mod: PBBFAC,,, | Performed by: FAMILY MEDICINE

## 2022-05-27 PROCEDURE — 99395 PR PREVENTIVE VISIT,EST,18-39: ICD-10-PCS | Mod: S$GLB,,, | Performed by: FAMILY MEDICINE

## 2022-05-27 PROCEDURE — 99999 PR PBB SHADOW E&M-EST. PATIENT-LVL V: CPT | Mod: PBBFAC,,, | Performed by: FAMILY MEDICINE

## 2022-05-27 PROCEDURE — 87481 CANDIDA DNA AMP PROBE: CPT | Mod: 59 | Performed by: FAMILY MEDICINE

## 2022-05-27 PROCEDURE — 87801 DETECT AGNT MULT DNA AMPLI: CPT | Performed by: FAMILY MEDICINE

## 2022-05-27 PROCEDURE — 3008F BODY MASS INDEX DOCD: CPT | Mod: CPTII,S$GLB,, | Performed by: FAMILY MEDICINE

## 2022-05-27 PROCEDURE — 87591 N.GONORRHOEAE DNA AMP PROB: CPT | Mod: 59 | Performed by: FAMILY MEDICINE

## 2022-05-27 PROCEDURE — 87491 CHLMYD TRACH DNA AMP PROBE: CPT | Performed by: FAMILY MEDICINE

## 2022-05-27 PROCEDURE — 3044F HG A1C LEVEL LT 7.0%: CPT | Mod: CPTII,S$GLB,, | Performed by: FAMILY MEDICINE

## 2022-05-27 PROCEDURE — 3074F PR MOST RECENT SYSTOLIC BLOOD PRESSURE < 130 MM HG: ICD-10-PCS | Mod: CPTII,S$GLB,, | Performed by: FAMILY MEDICINE

## 2022-05-27 PROCEDURE — 1160F PR REVIEW ALL MEDS BY PRESCRIBER/CLIN PHARMACIST DOCUMENTED: ICD-10-PCS | Mod: CPTII,S$GLB,, | Performed by: FAMILY MEDICINE

## 2022-05-27 PROCEDURE — 99395 PREV VISIT EST AGE 18-39: CPT | Mod: S$GLB,,, | Performed by: FAMILY MEDICINE

## 2022-05-27 RX ORDER — OMEPRAZOLE 40 MG/1
40 CAPSULE, DELAYED RELEASE ORAL DAILY
Qty: 30 CAPSULE | Refills: 11 | Status: SHIPPED | OUTPATIENT
Start: 2022-05-27 | End: 2023-02-15 | Stop reason: SDUPTHER

## 2022-05-27 NOTE — PROGRESS NOTES
HISTORY OF PRESENT ILLNESS: Ms. Barrientos comes in today fasting and without taking medication for annual wellness examination.     END OF LIFE DECISION: She does not have a living will and does not desire life support and states family is aware of her decision.     Current Outpatient Medications   Medication Sig    DULoxetine (CYMBALTA) 20 MG capsule Take 1 capsule (20 mg total) by mouth once daily.    ergocalciferol (VITAMIN D2) 50,000 unit Cap Take 1 capsule (50,000 Units total) by mouth twice a week. (Patient taking differently: Take 50,000 Units by mouth every 7 days.)    fluticasone propionate (FLONASE) 50 mcg/actuation nasal spray 2 sprays (100 mcg total) by Each Nostril route once daily.    ibuprofen (ADVIL,MOTRIN) 800 MG tablet Take 1 tablet by mouth every 4 to 6 hours as needed for pain. Do not exceed 4 tablets per day    omeprazole (PRILOSEC) 20 MG capsule Take 20 mg by mouth once daily.    ondansetron (ZOFRAN) 4 MG tablet Take 1 tablet (4 mg total) by mouth every 6 (six) hours as needed for Nausea.     SCREENINGS:       Cholesterol: May 24, 2021.     FFS/Colonoscopy: Never.     Mammogram:  Never.     GYN Exam: February 11, 2021with GYN  Dr. Siu - ASCUS, HPV+ with November 3, 2021 - mild dysplasia/HPV effect.      Dexa Scan:  Never.     Eye Exam: January 25, 2022 with Dr. Delgado. She wears contacts/glasses.       PPD: Negative in the past.     Immunizations:  Td/Tdap - 2016 per patient.                              Gardasil - Never.                              Zostavax - N./A.                              Pneumovax - In the past.                              Seasonal Flu - September 20, 2021.                              Covid-19 (Pfizer) vaccine - January 20, 2021, February 10, 2021, December 2, 2021.                           ROS:        GENERAL: Denies fever, chills, or unusual weight change. Reports slight fatigue. Appetite normal. Does not exercises but monitors diet by eating 2 times per  day; she desires to                      see bariatric surgeon as she could not tolerate Metformin due to diarrhea. Weight 123.1 kg (271 lb 6.2 oz) at March 10, 2021 visit.   SKIN: Denies itching, rash, changes in mole, color or texture of skin or easy bruising.   HEAD:  Denies headaches or recent head trauma. Saw Wesley Leahy MD, neurologist, on January 27, 2021 for headaches with 2-month follow up advised. Reports has been told bruxism causes headaches.  EYES: Denies change in vision, pain, diplopia, redness or discharge. Wears seatbelt.  EARS: Denies ear pain, discharge, vertigo or decreased hearing.  NOSE: Denies loss of smell, epistaxis or rhinitis.  MOUTH & THROAT: Denies hoarseness or change in voice. Denies excessive gum bleeding or mouth sores.  Denies sore throat.  NODES: Denies swollen glands.  CHEST: Denies TELLEZ, wheezing, cough, or sputum production. Reports slight snoring.          CARDIOVASCULAR: Denies chest pain, PND, orthopnea or reduced exercise tolerance.  Denies palpitations.  No longer follows with Dr. Groves, cardiologist, but saw Dr. Stevens,                           cardiologist, on May 2, 2022 for chest pain and advised check ECHO and troponin to r/in pericarditis; add colchicine 0.6 mg bid, contiune Ibuprofen 800 mg tid and ASA for 2 to 4 weeks,         add Prilosec to avoid GERD, encouraged hydration, and RTC as needed; reports took Iuprofen and Colchicine (although not took Colchicine) with help.  ABDOMEN: Denies diarrhea, constipation, nausea, vomiting, abdominal pain, or blood in stool. Reports continues to burp all day and takes Pepcid in the middle of the day to help. EGD in 2021 was okay. Reports takes Prilosec 20 mg daily.  URINARY: Denies flank pain, dysuria or hematuria.  GENITOURINARY: Denies flank pain, dysuria, frequency or hematuria. No change in menses. Performs monthly breast self examinations. Reports vaginal discharge, odorous x 2 weeks; reports  and sexually  "active and does not douche. Desires STD checks.    ENDOCRINE: Denies thyroid, diabetes or cholesterol problems.  HEME/LYMPH: Denies bleeding problems.  PERIPHERAL VASCULAR:Denies claudication or cyanosis.  MUSCULOSKELETAL: Denies joint stiffness, pain or swelling. Denies edema.  NEUROLOGIC: Denies history of seizures, tremors, paralysis, alteration of gait or coordination.  PSYCHIATRIC: Denies mood swings, controlled depression, anxiety, homicidal or suicidal thoughts. Denies acute steep problems. Reports follows with psychiatry for anxiety, depression and stable on Cymbalta with slight side effect tolerable.     PE:   VS: Blood Pressure 126/78   Pulse 77   Temperature 97.6 °F (36.4 °C)   Height 5' 5" (1.651 m)   Weight 121.8 kg (268 lb 8.3 oz)   Last Menstrual Period 05/16/2022   Oxygen Saturation 99%   Body Mass Index 44.68 kg/m²   APPEARANCE:  Well nourished, well developed female, pleasant and obese, alert and oriented in no acute distress.    HEAD: Nontender. Full range of motion.  EYES: PERRL, conjunctiva pink, lids no edema.   EARS: External canal patent, no swelling or redness. TM's shiny and clear.  NOSE: Mucosa and turbinates pink, not swollen. No discharge. Nontender sinuses.  THROAT: No pharyngeal erythema or exudate. No stridor.   NECK: Supple, no mass, thyroid not enlarged.  NODES: No cervical, axillary or inguinal lymph node enlargement.  CHEST: Normal respiratory effort. Lungs clear to auscultation.  CARDIOVASCULAR: Normal S1, S2. No rubs, murmurs or gallops. PMI not displaced. No carotid bruit. Pedal pulses palpable bilaterally. No edema.  ABDOMEN: Bowel sounds present. Not distended. Soft. No tenderness, masses or organomegaly.  BREAST EXAM: Symmetrical, no external lesions, no discharge, no masses palpated but healed surgical scars noted.  PELVIC EXAM: No external lesions noted, thin, white discharge in vaginal vault noted. Cervix appears normal, bimanual exam showed no uterine " abnormalities and no adnexal masses. Urethra and bladder intact.  RECTAL EXAM: Not examined.  PELVIC EXAM: Not examined.  MUSCULOSKELETAL: No joint deformities or stiffness. She is ambulatory without problems.  SKIN: No rashes or suspicious lesions, normal color and turgor.  NEUROLOGIC:   Cranial Nerves: II-XII grossly intact.   DTR's: Knees, Ankles 2+ and equal bilaterally. Gait & Posture: Normal gait and fine motion.  PSYCHIATRIC:Patient alert, oriented x 3. Mood/Affect normal without acute anxiety or depression noted. Judgment/insight good as she makes appropriate decisions during today's exam.    CMP  Sodium   Date Value Ref Range Status   05/24/2022 138 136 - 145 mmol/L Final     Potassium   Date Value Ref Range Status   05/24/2022 4.2 3.5 - 5.1 mmol/L Final     Chloride   Date Value Ref Range Status   05/24/2022 105 95 - 110 mmol/L Final     CO2   Date Value Ref Range Status   05/24/2022 23 23 - 29 mmol/L Final     Glucose   Date Value Ref Range Status   05/24/2022 89 70 - 110 mg/dL Final     BUN   Date Value Ref Range Status   05/24/2022 11 6 - 20 mg/dL Final     Creatinine   Date Value Ref Range Status   05/24/2022 0.7 0.5 - 1.4 mg/dL Final     Calcium   Date Value Ref Range Status   05/24/2022 9.5 8.7 - 10.5 mg/dL Final     Total Protein   Date Value Ref Range Status   05/24/2022 7.7 6.0 - 8.4 g/dL Final     Albumin   Date Value Ref Range Status   05/24/2022 3.6 3.5 - 5.2 g/dL Final     Total Bilirubin   Date Value Ref Range Status   05/24/2022 0.3 0.1 - 1.0 mg/dL Final     Comment:     For infants and newborns, interpretation of results should be based  on gestational age, weight and in agreement with clinical  observations.    Premature Infant recommended reference ranges:  Up to 24 hours.............<8.0 mg/dL  Up to 48 hours............<12.0 mg/dL  3-5 days..................<15.0 mg/dL  6-29 days.................<15.0 mg/dL       Alkaline Phosphatase   Date Value Ref Range Status   05/24/2022 102 55 -  135 U/L Final     AST   Date Value Ref Range Status   05/24/2022 19 10 - 40 U/L Final     ALT   Date Value Ref Range Status   05/24/2022 24 10 - 44 U/L Final     Anion Gap   Date Value Ref Range Status   05/24/2022 10 8 - 16 mmol/L Final     eGFR if    Date Value Ref Range Status   05/24/2022 >60.0 >60 mL/min/1.73 m^2 Final     eGFR if non    Date Value Ref Range Status   05/24/2022 >60.0 >60 mL/min/1.73 m^2 Final     Comment:     Calculation used to obtain the estimated glomerular filtration  rate (eGFR) is the CKD-EPI equation.        Lab Results   Component Value Date    WBC 7.36 05/24/2022    HGB 12.1 05/24/2022    HCT 40.3 05/24/2022    MCV 80 (L) 05/24/2022     05/24/2022     Lab Results   Component Value Date    TSH 0.875 05/24/2022     Lab Results   Component Value Date    LDLCALC 134.2 05/24/2022     Lab Results   Component Value Date    HGBA1C 5.4 05/24/2022     Lab Results   Component Value Date    [id number] Negative 05/24/2022     ASSESSMENT:    ICD-10-CM ICD-9-CM    1. Annual physical exam  Z00.00 V70.0 Vaginosis Screen by DNA Probe      C. trachomatis/N. gonorrhoeae by AMP DNA   2. Essential hypertension  I10 401.9    3. Vitamin D deficiency  E55.9 268.9    4. Gastroesophageal reflux disease without esophagitis  K21.9 530.81 omeprazole (PRILOSEC) 40 MG capsule   5. Generalized anxiety disorder  F41.1 300.02    6. Acute vaginitis  N76.0 616.10    7. Morbid obesity with BMI of 40.0-44.9, adult  E66.01 278.01 Ambulatory referral/consult to Bariatric Surgery    Z68.41 V85.41      PLAN:  1. Age-appropriate counseling-appropriate low-sodium, low-cholesterol, low carbohydrate diet and exercise daily, monthly breast self exam, annual wellness examination.   2. Patient advised to call for results.  3. Continue current medications.  4. Increase Prilosec 20 mg daily to 40 mg daily.   5. Keep follow up with specialists.  6. Flu shot this fall.  7. Follow up in about 52 weeks  (around 5/26/2023) for physical.     Answers for HPI/ROS submitted by the patient on 5/24/2022  activity change: No  unexpected weight change: Yes  neck pain: No  hearing loss: No  rhinorrhea: No  trouble swallowing: No  eye discharge: No  visual disturbance: No  chest tightness: No  wheezing: No  chest pain: No  palpitations: No  blood in stool: No  constipation: No  vomiting: No  diarrhea: No  polydipsia: No  polyuria: No  difficulty urinating: No  hematuria: No  menstrual problem: No  dysuria: No  joint swelling: No  arthralgias: No  headaches: No  weakness: No  confusion: No  dysphoric mood: No

## 2022-05-30 ENCOUNTER — PATIENT MESSAGE (OUTPATIENT)
Dept: CARDIOLOGY | Facility: HOSPITAL | Age: 32
End: 2022-05-30
Payer: COMMERCIAL

## 2022-05-30 LAB
C TRACH DNA SPEC QL NAA+PROBE: NOT DETECTED
N GONORRHOEA DNA SPEC QL NAA+PROBE: NOT DETECTED

## 2022-05-31 ENCOUNTER — PATIENT MESSAGE (OUTPATIENT)
Dept: PSYCHIATRY | Facility: CLINIC | Age: 32
End: 2022-05-31
Payer: COMMERCIAL

## 2022-05-31 DIAGNOSIS — B96.89 BV (BACTERIAL VAGINOSIS): Primary | ICD-10-CM

## 2022-05-31 DIAGNOSIS — N76.0 BV (BACTERIAL VAGINOSIS): Primary | ICD-10-CM

## 2022-05-31 LAB
BACTERIAL VAGINOSIS DNA: POSITIVE
CANDIDA GLABRATA DNA: NEGATIVE
CANDIDA KRUSEI DNA: NEGATIVE
CANDIDA RRNA VAG QL PROBE: NEGATIVE
T VAGINALIS RRNA GENITAL QL PROBE: NEGATIVE

## 2022-05-31 RX ORDER — METRONIDAZOLE 500 MG/1
500 TABLET ORAL EVERY 12 HOURS
Qty: 14 TABLET | Refills: 0 | Status: SHIPPED | OUTPATIENT
Start: 2022-05-31 | End: 2022-06-07

## 2022-06-06 ENCOUNTER — OFFICE VISIT (OUTPATIENT)
Dept: OTOLARYNGOLOGY | Facility: CLINIC | Age: 32
End: 2022-06-06
Payer: COMMERCIAL

## 2022-06-06 VITALS
BODY MASS INDEX: 45.53 KG/M2 | SYSTOLIC BLOOD PRESSURE: 132 MMHG | HEART RATE: 95 BPM | WEIGHT: 273.56 LBS | DIASTOLIC BLOOD PRESSURE: 85 MMHG | TEMPERATURE: 99 F

## 2022-06-06 DIAGNOSIS — J03.90 ACUTE ADENOIDITIS: Primary | ICD-10-CM

## 2022-06-06 PROCEDURE — 1159F PR MEDICATION LIST DOCUMENTED IN MEDICAL RECORD: ICD-10-PCS | Mod: CPTII,S$GLB,, | Performed by: STUDENT IN AN ORGANIZED HEALTH CARE EDUCATION/TRAINING PROGRAM

## 2022-06-06 PROCEDURE — 99999 PR PBB SHADOW E&M-EST. PATIENT-LVL III: CPT | Mod: PBBFAC,,, | Performed by: STUDENT IN AN ORGANIZED HEALTH CARE EDUCATION/TRAINING PROGRAM

## 2022-06-06 PROCEDURE — 3044F HG A1C LEVEL LT 7.0%: CPT | Mod: CPTII,S$GLB,, | Performed by: STUDENT IN AN ORGANIZED HEALTH CARE EDUCATION/TRAINING PROGRAM

## 2022-06-06 PROCEDURE — 3079F DIAST BP 80-89 MM HG: CPT | Mod: CPTII,S$GLB,, | Performed by: STUDENT IN AN ORGANIZED HEALTH CARE EDUCATION/TRAINING PROGRAM

## 2022-06-06 PROCEDURE — 3008F PR BODY MASS INDEX (BMI) DOCUMENTED: ICD-10-PCS | Mod: CPTII,S$GLB,, | Performed by: STUDENT IN AN ORGANIZED HEALTH CARE EDUCATION/TRAINING PROGRAM

## 2022-06-06 PROCEDURE — 3044F PR MOST RECENT HEMOGLOBIN A1C LEVEL <7.0%: ICD-10-PCS | Mod: CPTII,S$GLB,, | Performed by: STUDENT IN AN ORGANIZED HEALTH CARE EDUCATION/TRAINING PROGRAM

## 2022-06-06 PROCEDURE — 3079F PR MOST RECENT DIASTOLIC BLOOD PRESSURE 80-89 MM HG: ICD-10-PCS | Mod: CPTII,S$GLB,, | Performed by: STUDENT IN AN ORGANIZED HEALTH CARE EDUCATION/TRAINING PROGRAM

## 2022-06-06 PROCEDURE — 3075F PR MOST RECENT SYSTOLIC BLOOD PRESS GE 130-139MM HG: ICD-10-PCS | Mod: CPTII,S$GLB,, | Performed by: STUDENT IN AN ORGANIZED HEALTH CARE EDUCATION/TRAINING PROGRAM

## 2022-06-06 PROCEDURE — 3008F BODY MASS INDEX DOCD: CPT | Mod: CPTII,S$GLB,, | Performed by: STUDENT IN AN ORGANIZED HEALTH CARE EDUCATION/TRAINING PROGRAM

## 2022-06-06 PROCEDURE — 92511 PR NASOPHARYNGOSCOPY: ICD-10-PCS | Mod: S$GLB,,, | Performed by: STUDENT IN AN ORGANIZED HEALTH CARE EDUCATION/TRAINING PROGRAM

## 2022-06-06 PROCEDURE — 3075F SYST BP GE 130 - 139MM HG: CPT | Mod: CPTII,S$GLB,, | Performed by: STUDENT IN AN ORGANIZED HEALTH CARE EDUCATION/TRAINING PROGRAM

## 2022-06-06 PROCEDURE — 99214 PR OFFICE/OUTPT VISIT, EST, LEVL IV, 30-39 MIN: ICD-10-PCS | Mod: 25,S$GLB,, | Performed by: STUDENT IN AN ORGANIZED HEALTH CARE EDUCATION/TRAINING PROGRAM

## 2022-06-06 PROCEDURE — 99214 OFFICE O/P EST MOD 30 MIN: CPT | Mod: 25,S$GLB,, | Performed by: STUDENT IN AN ORGANIZED HEALTH CARE EDUCATION/TRAINING PROGRAM

## 2022-06-06 PROCEDURE — 99999 PR PBB SHADOW E&M-EST. PATIENT-LVL III: ICD-10-PCS | Mod: PBBFAC,,, | Performed by: STUDENT IN AN ORGANIZED HEALTH CARE EDUCATION/TRAINING PROGRAM

## 2022-06-06 PROCEDURE — 92511 NASOPHARYNGOSCOPY: CPT | Mod: S$GLB,,, | Performed by: STUDENT IN AN ORGANIZED HEALTH CARE EDUCATION/TRAINING PROGRAM

## 2022-06-06 PROCEDURE — 1159F MED LIST DOCD IN RCRD: CPT | Mod: CPTII,S$GLB,, | Performed by: STUDENT IN AN ORGANIZED HEALTH CARE EDUCATION/TRAINING PROGRAM

## 2022-06-06 RX ORDER — PREDNISONE 10 MG/1
TABLET ORAL
Qty: 24 TABLET | Refills: 0 | Status: SHIPPED | OUTPATIENT
Start: 2022-06-06 | End: 2022-07-01

## 2022-06-06 RX ORDER — LEVOFLOXACIN 500 MG/1
500 TABLET, FILM COATED ORAL DAILY
Qty: 10 TABLET | Refills: 0 | Status: SHIPPED | OUTPATIENT
Start: 2022-06-06 | End: 2022-06-17

## 2022-06-06 NOTE — PROGRESS NOTES
"Chief complaint:    Chief Complaint   Patient presents with    Sinus Problem     Patient states that she has been experieincing sinus issues ongoing for 3 years. Patient states that she has tried many OTC medications and treatment which provide no relief of symptoms. Patient mainly complains of "mucus plug" in the back of her nose going into the opening of her throat. Patient states she can feel it but cannot clear it.            Referring Provider:  No referring provider defined for this encounter.      History of present illness:     Ms. Sexton is a 31 y.o. presenting for evaluation of mucus plug in nose.     She  has been referred by Dr. Mijares ref. provider found.        Notices something blocking back of nose and throat. Feels like its in the middle.   First noticed about 2-3 weeks. She had the flu 3 weeks ago.  Does have bilateral intermittent nasal congestion. Also with more discolored rhinorrhea and facial pressure of forehead and behind eyes.  Denies total obstruction on either side. Nose always dry.   Denies epistaxis.   Has tried flonase and saline.   She has seasonal allergies. Uses flonase once a year for a week or so.      History      Past Medical History:   Past Medical History:   Diagnosis Date    Anemia     Anxiety     Chronic low back pain     Depression     History of abnormal cervical Pap smear     History of recurrent UTIs     History of ulcer disease     Irregular menses     Obesity     Postpartum depression     with last pregnancy.     Pre-eclampsia in postpartum period 4/23/2020    Vitamin D deficiency          Past Surgical History:  Past Surgical History:   Procedure Laterality Date    ESOPHAGOGASTRODUODENOSCOPY N/A 5/26/2021    Procedure: EGD (ESOPHAGOGASTRODUODENOSCOPY);  Surgeon: Alka Olmstead MD;  Location: [facility];  Service: Endoscopy;  Laterality: N/A;    ESOPHAGOGASTRODUODENOSCOPY N/A 10/4/2021    Procedure: EGD (ESOPHAGOGASTRODUODENOSCOPY);  Surgeon: Alka MORTON" MD Ishan;  Location: [facility];  Service: Endoscopy;  Laterality: N/A;    None      REDUCTION OF BOTH BREASTS Bilateral 5/27/2021    Procedure: MAMMOPLASTY, REDUCTION, BILATERAL;  Surgeon: Daniel Ba MD;  Location: [facility] OR [address] [place] [address];  Service: Plastics;  Laterality: Bilateral;  Left breast tissue: 1584  Right breast tissue: 1438         Medications: Medication list reviewed. She  has a current medication list which includes the following prescription(s): ergocalciferol, fluticasone propionate, ibuprofen, metronidazole, omeprazole, ondansetron, and duloxetine.     Allergies:   Review of patient's allergies indicates:   Allergen Reactions    Procardia [nifedipine]          Family history: family history includes Asthma in her mother; Breast cancer in her maternal aunt; Diabetes in her maternal aunt and maternal grandmother; Heart disease (age of onset: 55) in her father; Hyperlipidemia in her father and mother; Hypertension in her father and mother; Insulin resistance in her sister; No Known Problems in her brother, brother, brother, brother, brother, daughter, and sister.         Social History          Alcohol use:  reports previous alcohol use.            Tobacco:  reports that she has never smoked. She has never used smokeless tobacco.         Physical Examination      Vitals: Blood pressure 132/85, pulse 95, temperature 98.6 °F (37 °C), temperature source Temporal, weight 124.1 kg (273 lb 9.5 oz), last menstrual period 05/16/2022.      General: Well developed, well nourished, well hydrated.     Voice: no dysphonia, no dysarthria      Head/Face: Normocephalic, atraumatic. No scars or lesions. Facial musculature equal.     Eyes: No scleral icterus or conjunctival hemorrhage. EOMI. PERRLA.     Ears:     · Right ear: No gross deformity. EAC is clear of debris and erythema. TM are intact with a pneumatized middle ear. No signs of retraction, fluid or infection.      · Left ear: No gross  deformity. EAC is clear of debris and erythema. TM are intact with a pneumatized middle ear. No signs of retraction, fluid or infection.      Nose: No gross deformity or lesions. No purulent discharge. No significant NSD.      Mouth/Oropharynx: Lips without any lesions. No mucosal lesions within the oropharynx. No tonsillar exudate or lesions. Pharyngeal walls symmetrical. Uvula midline. Tongue midline without lesions.     Neck: Trachea midline. No masses. No thyromegaly or nodules palpated.     Lymphatic: No lymphadenopathy in the neck.     Extremities: No cyanosis. Warm and well-perfused.     Skin: No scars or lesions on face or neck.      Neurologic: Moving all extremities without gross abnormality.CN II-XII grossly intact. House-Brackmann 1/6. No signs of nystagmus.          Data reviewed      Review of records:      I reviewed records from the referring provider's office visits.  These describe the history, workup, and/or treatment of this problem thus far.    Procedures:    Procedure - Fiberoptic Nasopharyngoscopy    Surgeon: Corey Lucio M.D. .      Anesthesia: topical 0.05% oxymetazoline with 4% lidocaine      Complications: None.     Description of Procedure: With the patient in the sitting position, topical lidocaine and oxymetazoline was applied to the nose. The scope was passed through the nose. Examination was carried out of the nose, sinuses, and nasopharynx. The scope was removed. The patient tolerated the procedure well.      Findings: No masses or lesions in the nose, no mucopurulent drainage from bilateral middle meatus. Moderate adenoid inflammation, erythema, some exudates.         Assessment/Plan:    Acute adenoiditis     levaquin  Steroid taper  Continue saline irrigations  Return to clinic 1-2 months to ensure resolution, sooner if worsening         Corey Lucio MD  [facility]   [facility]  [address]  [place] [address]  P:  [phone]  F: [phone]

## 2022-06-08 ENCOUNTER — PATIENT MESSAGE (OUTPATIENT)
Dept: PSYCHIATRY | Facility: CLINIC | Age: 32
End: 2022-06-08
Payer: COMMERCIAL

## 2022-06-09 DIAGNOSIS — F34.1 DYSTHYMIA: ICD-10-CM

## 2022-06-09 DIAGNOSIS — F41.1 GENERALIZED ANXIETY DISORDER: ICD-10-CM

## 2022-06-09 RX ORDER — DULOXETIN HYDROCHLORIDE 20 MG/1
20 CAPSULE, DELAYED RELEASE ORAL DAILY
Qty: 30 CAPSULE | Refills: 2 | Status: SHIPPED | OUTPATIENT
Start: 2022-06-09 | End: 2022-09-29

## 2022-06-12 ENCOUNTER — PATIENT MESSAGE (OUTPATIENT)
Dept: OTOLARYNGOLOGY | Facility: CLINIC | Age: 32
End: 2022-06-12
Payer: COMMERCIAL

## 2022-06-12 ENCOUNTER — OFFICE VISIT (OUTPATIENT)
Dept: URGENT CARE | Facility: CLINIC | Age: 32
End: 2022-06-12
Payer: COMMERCIAL

## 2022-06-12 VITALS
WEIGHT: 273 LBS | SYSTOLIC BLOOD PRESSURE: 142 MMHG | RESPIRATION RATE: 18 BRPM | BODY MASS INDEX: 45.48 KG/M2 | OXYGEN SATURATION: 100 % | HEART RATE: 92 BPM | DIASTOLIC BLOOD PRESSURE: 89 MMHG | HEIGHT: 65 IN | TEMPERATURE: 98 F

## 2022-06-12 DIAGNOSIS — K30 ACID INDIGESTION: Primary | ICD-10-CM

## 2022-06-12 PROCEDURE — 3008F PR BODY MASS INDEX (BMI) DOCUMENTED: ICD-10-PCS | Mod: CPTII,S$GLB,, | Performed by: NURSE PRACTITIONER

## 2022-06-12 PROCEDURE — 3044F HG A1C LEVEL LT 7.0%: CPT | Mod: CPTII,S$GLB,, | Performed by: NURSE PRACTITIONER

## 2022-06-12 PROCEDURE — 1160F PR REVIEW ALL MEDS BY PRESCRIBER/CLIN PHARMACIST DOCUMENTED: ICD-10-PCS | Mod: CPTII,S$GLB,, | Performed by: NURSE PRACTITIONER

## 2022-06-12 PROCEDURE — 3008F BODY MASS INDEX DOCD: CPT | Mod: CPTII,S$GLB,, | Performed by: NURSE PRACTITIONER

## 2022-06-12 PROCEDURE — 3079F PR MOST RECENT DIASTOLIC BLOOD PRESSURE 80-89 MM HG: ICD-10-PCS | Mod: CPTII,S$GLB,, | Performed by: NURSE PRACTITIONER

## 2022-06-12 PROCEDURE — 3079F DIAST BP 80-89 MM HG: CPT | Mod: CPTII,S$GLB,, | Performed by: NURSE PRACTITIONER

## 2022-06-12 PROCEDURE — 99214 PR OFFICE/OUTPT VISIT, EST, LEVL IV, 30-39 MIN: ICD-10-PCS | Mod: S$GLB,,, | Performed by: NURSE PRACTITIONER

## 2022-06-12 PROCEDURE — 3044F PR MOST RECENT HEMOGLOBIN A1C LEVEL <7.0%: ICD-10-PCS | Mod: CPTII,S$GLB,, | Performed by: NURSE PRACTITIONER

## 2022-06-12 PROCEDURE — 3077F SYST BP >= 140 MM HG: CPT | Mod: CPTII,S$GLB,, | Performed by: NURSE PRACTITIONER

## 2022-06-12 PROCEDURE — 1159F MED LIST DOCD IN RCRD: CPT | Mod: CPTII,S$GLB,, | Performed by: NURSE PRACTITIONER

## 2022-06-12 PROCEDURE — 1159F PR MEDICATION LIST DOCUMENTED IN MEDICAL RECORD: ICD-10-PCS | Mod: CPTII,S$GLB,, | Performed by: NURSE PRACTITIONER

## 2022-06-12 PROCEDURE — 3077F PR MOST RECENT SYSTOLIC BLOOD PRESSURE >= 140 MM HG: ICD-10-PCS | Mod: CPTII,S$GLB,, | Performed by: NURSE PRACTITIONER

## 2022-06-12 PROCEDURE — 1160F RVW MEDS BY RX/DR IN RCRD: CPT | Mod: CPTII,S$GLB,, | Performed by: NURSE PRACTITIONER

## 2022-06-12 PROCEDURE — 99214 OFFICE O/P EST MOD 30 MIN: CPT | Mod: S$GLB,,, | Performed by: NURSE PRACTITIONER

## 2022-06-12 RX ORDER — MAG HYDROX/ALUMINUM HYD/SIMETH 200-200-20
30 SUSPENSION, ORAL (FINAL DOSE FORM) ORAL
Status: COMPLETED | OUTPATIENT
Start: 2022-06-12 | End: 2022-06-12

## 2022-06-12 RX ORDER — LIDOCAINE HYDROCHLORIDE 20 MG/ML
10 SOLUTION OROPHARYNGEAL
Status: COMPLETED | OUTPATIENT
Start: 2022-06-12 | End: 2022-06-12

## 2022-06-12 RX ADMIN — LIDOCAINE HYDROCHLORIDE 10 ML: 20 SOLUTION OROPHARYNGEAL at 05:06

## 2022-06-12 RX ADMIN — Medication 30 ML: at 05:06

## 2022-06-12 NOTE — PROGRESS NOTES
"Subjective:       Patient ID: Anthony Sexton is a 31 y.o. female.    Vitals:  height is 5' 5" (1.651 m) and weight is 123.8 kg (273 lb). Her tympanic temperature is 98.4 °F (36.9 °C). Her blood pressure is 142/89 (abnormal) and her pulse is 92. Her respiration is 18 and oxygen saturation is 100%.     Chief Complaint: Shortness of Breath    Pt presents today with SOB and pain under breast for appx 2days. This is similar to pain she has had in the past related to her GERD. Pt states that the pain goes around her back to under her breast. Mostly on the right side.Is on a steroid taper and levofloxacin for adenoiditis. Monitoring her oxygen sats at home and always % but feels some difficulty in breathing which has been present since diagnosed with the adenitis. No fever, cough.      Shortness of Breath  This is a new problem. The current episode started in the past 7 days. The problem occurs constantly. The problem has been gradually worsening. Pertinent negatives include no abdominal pain, chest pain, claudication, coryza, ear pain, fever, headaches, hemoptysis, leg pain, leg swelling, neck pain, orthopnea, PND, rash, rhinorrhea, sore throat, sputum production, swollen glands, syncope, vomiting or wheezing. Nothing aggravates the symptoms. The patient has no known risk factors for DVT/PE. She has tried nothing for the symptoms. The treatment provided no relief.       Constitution: Negative for chills and fever.   HENT: Negative for ear pain, facial swelling, postnasal drip, sinus pain and sore throat.    Neck: Negative for neck pain.   Cardiovascular: Negative for chest pain, leg swelling and passing out.   Respiratory: Positive for shortness of breath. Negative for sputum production, bloody sputum, wheezing and asthma.    Gastrointestinal: Negative for abdominal pain and vomiting.   Genitourinary: Negative for dysuria.   Skin: Negative for rash.   Allergic/Immunologic: Negative for asthma.   Neurological: " Negative for headaches and disorientation.   Hematologic/Lymphatic: Negative for easy bruising/bleeding. Does not bruise/bleed easily.   Psychiatric/Behavioral: Negative for disorientation and confusion.       Objective:      Physical Exam   Constitutional: She is oriented to person, place, and time. She appears well-developed. She is cooperative.  Non-toxic appearance. She does not appear ill. No distress.   HENT:   Head: Normocephalic and atraumatic.   Ears:   Right Ear: Hearing, tympanic membrane, external ear and ear canal normal.   Left Ear: Hearing, tympanic membrane, external ear and ear canal normal.   Nose: Nose normal. No mucosal edema, rhinorrhea or nasal deformity. No epistaxis. Right sinus exhibits no maxillary sinus tenderness and no frontal sinus tenderness. Left sinus exhibits no maxillary sinus tenderness and no frontal sinus tenderness.   Mouth/Throat: Uvula is midline, oropharynx is clear and moist and mucous membranes are normal. No trismus in the jaw. Normal dentition. No uvula swelling. No oropharyngeal exudate, posterior oropharyngeal edema or posterior oropharyngeal erythema.   Eyes: Conjunctivae and lids are normal. No scleral icterus.   Neck: Trachea normal and phonation normal. Neck supple. No edema present. No erythema present. No neck rigidity present.   Cardiovascular: Normal rate, regular rhythm, normal heart sounds and normal pulses.   Pulmonary/Chest: Effort normal and breath sounds normal. No respiratory distress. She has no decreased breath sounds. She has no rhonchi.   Abdominal: Normal appearance and bowel sounds are normal. She exhibits no distension, no abdominal bruit, no pulsatile midline mass and no mass. Soft. There is no abdominal tenderness. There is negative Crowder's sign.   Musculoskeletal: Normal range of motion.         General: No deformity. Normal range of motion.   Neurological: She is alert and oriented to person, place, and time. She has normal strength. She  exhibits normal muscle tone. Coordination normal.   Skin: Skin is warm, dry, intact, not diaphoretic and not pale.   Psychiatric: Her speech is normal and behavior is normal. Judgment and thought content normal.   Nursing note and vitals reviewed.        Assessment:       1. Acid indigestion          Plan:         Acid indigestion  -     LIDOcaine HCl 2% oral solution 10 mL  -     aluminum-magnesium hydroxide-simethicone 200-200-20 mg/5 mL suspension 30 mL         Suspect the antibiotics and steroids are aggravating her GERD. She is on omeprazole. She is in no acute distress. Vital signs stable. Advised to follow up with ENT tomorrow

## 2022-06-14 ENCOUNTER — PATIENT MESSAGE (OUTPATIENT)
Dept: OTOLARYNGOLOGY | Facility: CLINIC | Age: 32
End: 2022-06-14
Payer: COMMERCIAL

## 2022-06-16 ENCOUNTER — OFFICE VISIT (OUTPATIENT)
Dept: OTOLARYNGOLOGY | Facility: CLINIC | Age: 32
End: 2022-06-16
Payer: COMMERCIAL

## 2022-06-16 VITALS
BODY MASS INDEX: 37.79 KG/M2 | DIASTOLIC BLOOD PRESSURE: 85 MMHG | HEART RATE: 86 BPM | WEIGHT: 227.06 LBS | TEMPERATURE: 98 F | SYSTOLIC BLOOD PRESSURE: 133 MMHG

## 2022-06-16 DIAGNOSIS — J03.90 ACUTE ADENOIDITIS: Primary | ICD-10-CM

## 2022-06-16 PROCEDURE — 1159F PR MEDICATION LIST DOCUMENTED IN MEDICAL RECORD: ICD-10-PCS | Mod: CPTII,S$GLB,, | Performed by: OTOLARYNGOLOGY

## 2022-06-16 PROCEDURE — 99999 PR PBB SHADOW E&M-EST. PATIENT-LVL III: ICD-10-PCS | Mod: PBBFAC,,, | Performed by: OTOLARYNGOLOGY

## 2022-06-16 PROCEDURE — 3079F DIAST BP 80-89 MM HG: CPT | Mod: CPTII,S$GLB,, | Performed by: OTOLARYNGOLOGY

## 2022-06-16 PROCEDURE — 3044F PR MOST RECENT HEMOGLOBIN A1C LEVEL <7.0%: ICD-10-PCS | Mod: CPTII,S$GLB,, | Performed by: OTOLARYNGOLOGY

## 2022-06-16 PROCEDURE — 3075F SYST BP GE 130 - 139MM HG: CPT | Mod: CPTII,S$GLB,, | Performed by: OTOLARYNGOLOGY

## 2022-06-16 PROCEDURE — 3079F PR MOST RECENT DIASTOLIC BLOOD PRESSURE 80-89 MM HG: ICD-10-PCS | Mod: CPTII,S$GLB,, | Performed by: OTOLARYNGOLOGY

## 2022-06-16 PROCEDURE — 3075F PR MOST RECENT SYSTOLIC BLOOD PRESS GE 130-139MM HG: ICD-10-PCS | Mod: CPTII,S$GLB,, | Performed by: OTOLARYNGOLOGY

## 2022-06-16 PROCEDURE — 3008F PR BODY MASS INDEX (BMI) DOCUMENTED: ICD-10-PCS | Mod: CPTII,S$GLB,, | Performed by: OTOLARYNGOLOGY

## 2022-06-16 PROCEDURE — 3044F HG A1C LEVEL LT 7.0%: CPT | Mod: CPTII,S$GLB,, | Performed by: OTOLARYNGOLOGY

## 2022-06-16 PROCEDURE — 99999 PR PBB SHADOW E&M-EST. PATIENT-LVL III: CPT | Mod: PBBFAC,,, | Performed by: OTOLARYNGOLOGY

## 2022-06-16 PROCEDURE — 31231 PR NASAL ENDOSCOPY, DX: ICD-10-PCS | Mod: S$GLB,,, | Performed by: OTOLARYNGOLOGY

## 2022-06-16 PROCEDURE — 1159F MED LIST DOCD IN RCRD: CPT | Mod: CPTII,S$GLB,, | Performed by: OTOLARYNGOLOGY

## 2022-06-16 PROCEDURE — 99214 OFFICE O/P EST MOD 30 MIN: CPT | Mod: 25,S$GLB,, | Performed by: OTOLARYNGOLOGY

## 2022-06-16 PROCEDURE — 99214 PR OFFICE/OUTPT VISIT, EST, LEVL IV, 30-39 MIN: ICD-10-PCS | Mod: 25,S$GLB,, | Performed by: OTOLARYNGOLOGY

## 2022-06-16 PROCEDURE — 31231 NASAL ENDOSCOPY DX: CPT | Mod: S$GLB,,, | Performed by: OTOLARYNGOLOGY

## 2022-06-16 PROCEDURE — 3008F BODY MASS INDEX DOCD: CPT | Mod: CPTII,S$GLB,, | Performed by: OTOLARYNGOLOGY

## 2022-06-16 NOTE — PROGRESS NOTES
Referring Provider:    Yosephreferral Self  No address on file  Subjective:   Patient: Anthony Sexton [id number], :1990   Visit date:2022 12:22 PM    Chief Complaint: see below  HPI:       dry throat (Pt states continues to have the feeling of something stuck in back of nose, c/o dry nose)    Prior notes reviewed  Clinical documentation obtained by nursing staff reviewed.     Patient was recently seen by Dr. Lucio.  At that time she had adenoid hypertrophy with purulent drainage present.  She was treated with levofloxacin.  Since then, she has had resolution of the the posterior nasal obstruction and drainage.  At this point her only complaint is dry nose and dry throat.  She denies dry eyes.  She has tried nasal saline      Objective:     Physical Exam:  Vitals:  /85   Pulse 86   Temp 98.2 °F (36.8 °C) (Temporal)   Wt 103 kg (227 lb 1.2 oz)   LMP 2022 (Approximate)   BMI 37.79 kg/m²   General appearance:  Well developed, well nourished    Ears:  Otoscopy of external auditory canals and tympanic membranes was normal, clinical speech reception thresholds grossly intact, no mass/lesion of auricle.    Nose:  No masses/lesions of external nose, nasal mucosa, septum, and turbinates were within normal limits.    Mouth:  No mass/lesion of lips, teeth, gums, hard/soft palate, tongue, tonsils, or oropharynx.    Neck & Lymphatics:  No cervical lymphadenopathy, no neck mass/crepitus/ asymmetry, trachea is midline, no thyroid enlargement/tenderness/mass.              []  Data Reviewed:    Lab Results   Component Value Date    WBC 7.36 2022    HGB 12.1 2022    HCT 40.3 2022    MCV 80 (L) 2022    EOSINOPHIL 0.7 2022       FLEXIBLE NASOLARYNGOSCOPY    Endoscopic Exam Findings:  -     Nasal secretions: - no discolored secretions noted - bilaterally  -     Nasal septum: no significant deviation and no perforation appreciated   -     Inferior turbinate: - normal mucosa without  significant hypertrophy - bilaterally  -     Middle turbinate: - normal mucosa without significant hypertrophy - bilaterally  -     Other findings: - no mass or obstructive lesion -    -     Laryngeal mucosa is normal  -     Posterior commissure has no  hypertrophy  -     Lingual tonsils have mild  Hypertrophy without exudate or erythema  -     Adenoids have mild  hypertrophy  -     Right vocal fold: normal mobility     mass/lesion: none  -     Left vocal fold: normal mobility     mass/lesion: none  -     Other findings: none            Assessment & Plan:   Her infection appears to be resolved.  She no longer has nasal obstruction or drainage.  Her exam is unremarkable.  She does report some issues with the dryness of the nose and throat.  I recommend the use of Ayr gel.  She will follow-up as needed.       Thank you for allowing me to participate in the care of Anthony.       Pio Hoffman MD, FACS [facility]   [facility]  [id number] [place] [address].  [place] [address]  P: [phone]  F: [phone]      Total time spent in the care of this patient    New  3  [] 30-44 min  4  [] 45-59 min  5  [] 60-74 min    Established  3  [] 20-29 min  4  [x] 30-39 min  5  [] 40-54 min      [x] preparing to see the patient (eg, review of tests)  [x] obtaining and/or reviewing separately obtained history  [x] performing a medically appropriate examination and/or evaluation  [x] counseling and educating the patient/family/caregiver  [] ordering medications, tests, or procedures  [] referring and communicating with other health care professionals (when not separately reported)  [x] documenting clinical information in the electronic or other health record  [] independently interpreting results (not separately reported) and communicating results to the  patient/ family/caregiver  [] care coordination (not separately reported)

## 2022-06-29 ENCOUNTER — TELEPHONE (OUTPATIENT)
Dept: PSYCHIATRY | Facility: CLINIC | Age: 32
End: 2022-06-29
Payer: COMMERCIAL

## 2022-07-01 ENCOUNTER — OFFICE VISIT (OUTPATIENT)
Dept: PSYCHIATRY | Facility: CLINIC | Age: 32
End: 2022-07-01
Payer: COMMERCIAL

## 2022-07-01 DIAGNOSIS — F41.1 GENERALIZED ANXIETY DISORDER: Primary | ICD-10-CM

## 2022-07-01 DIAGNOSIS — F90.0 ATTENTION DEFICIT HYPERACTIVITY DISORDER (ADHD), PREDOMINANTLY INATTENTIVE TYPE: ICD-10-CM

## 2022-07-01 PROCEDURE — 1159F PR MEDICATION LIST DOCUMENTED IN MEDICAL RECORD: ICD-10-PCS | Mod: CPTII,95,, | Performed by: PSYCHOLOGIST

## 2022-07-01 PROCEDURE — 1159F MED LIST DOCD IN RCRD: CPT | Mod: CPTII,95,, | Performed by: PSYCHOLOGIST

## 2022-07-01 PROCEDURE — 3044F PR MOST RECENT HEMOGLOBIN A1C LEVEL <7.0%: ICD-10-PCS | Mod: CPTII,95,, | Performed by: PSYCHOLOGIST

## 2022-07-01 PROCEDURE — 3044F HG A1C LEVEL LT 7.0%: CPT | Mod: CPTII,95,, | Performed by: PSYCHOLOGIST

## 2022-07-01 PROCEDURE — 99213 PR OFFICE/OUTPT VISIT, EST, LEVL III, 20-29 MIN: ICD-10-PCS | Mod: 95,,, | Performed by: PSYCHOLOGIST

## 2022-07-01 PROCEDURE — 99213 OFFICE O/P EST LOW 20 MIN: CPT | Mod: 95,,, | Performed by: PSYCHOLOGIST

## 2022-07-01 NOTE — PROGRESS NOTES
"Outpatient Psychiatry Follow-Up Visit    7/1/2022    Timeframe: Corona Virus Outbreak     The patient location is: Patient's home/ Patient reported that his/her location at the time of this visit was in the [facility]     Visit type: Virtual visit with synchronous audio and video     Each patient to whom he or she provides medical services by telehealth is: (1) informed of the relationship between the medical psychologist and patient and the respective role of any other health care provider with respect to management of the patient; and (2) notified that he or she may decline to receive medical services by telehealth and may withdraw from such care at any time.    I also informed patient of the following:   Kenya Sanchez, PhD, MPAP:  [place] medical license number: [id number]    My contact info:  [facility] / 2nd Floor  [address] [place] [address]   Ph: [phone]    If technology issues, call office phone: Ph: [phone]  If crisis: Dial 911 or go to nearest Emergency Room (ER)  If questions related to privacy practices: contact [facility] Information Department: [phone]    Chief Complaint:  Anthony Sexton is a 31 y.o. female who presents today for follow-up of anxiety and inattention/distractibility .       Impressions/Plan from last visit: Anthony attended her virtual visit. She said that things are getting better. She has been taking Cymbalta 20 mg and has been nauseated, even when taking it at night. She has been having some doomsday thoughts at times--used to be daily; now about once a week. This happens more when she is idle--in tub or when driving. We discussed her "accepting" these thoughts rather than trying to avoid them or distract herself from them. For example, if she thinks that she will drive off the bridge and die, acknowledge that if there's a "freak accident," that could happen; but otherwise, she is a cautious  and is " "mindful of her surroundings so the likelihood of that happening is extremely low. We want to instill a sense of confidence in her ability to take care of herself. She reported that she has also been struggling at work with her focus. 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 Previously, she reportedly did not have a good experience on Adderall and had side effects when on Wellbutrin. We considered guanfacine or Strattera, but we agreed to implement some behavioral changes while we work toward anxiety stabilization and then will re-assess. She is interested in therapy again but had trouble getting in with Dr. Bartlett. She will check with EAP and call the clinic to see one of the providers at [facility] for therapy. Regarding medicine, we agreed to continue Cymbalta 20 mg for another two weeks, but she will take it right after dinner in the evenings. If the nausea continues, we will change to Viibryd 5 mg until we meet again in 4-6 weeks.     Prior medicines--buspirone, Prozac, Klonopin, Lexapro, Celexa, Zoloft, hydroxyzine, Adderall XR; Effexor (she thinks); Pristiq; Wellbutrin; Ativan.       since November 2021.    Interval History and Content of Current Session: Anthony attended her virtual visit. She is better--nausea has "completely gone away." She ais that she is having one panic attack a week--better than in the past when it was several times a week. Her racing thoughts have gone away--no more "impending death thoughts." She will be seeing Dr. Bartlett later this month. At this time, we are continuing her Cymbalta 20 mg.      GAD7 7/1/2022 5/18/2022 2/14/2022   1. Feeling nervous, anxious, or on edge? 1 1 1   2. Not being able to stop or control worrying? 0 1 1   3. Worrying too much about different things? 1 1 1   4. Trouble relaxing? 0 1 1   5. Being so restless that it is hard to sit still? 0 1 0   6. Becoming easily annoyed or irritable? 1 1 1   7. " Feeling afraid as if something awful might happen? 1 2 1   SENTHIL-7 Score 4 8 6      0-4 = Minimal anxiety  5-9 = Mild anxiety  10-14 = Moderate anxiety  15-21 = Severe anxiety       Review of Systems   · PSYCHIATRIC: Pertinant items are noted in the narrative.    Past Medical, Family and Social History: The patient's past medical, family and social history have been reviewed and updated as appropriate within the electronic medical record - see encounter notes.      Current Outpatient Medications:     DULoxetine (CYMBALTA) 20 MG capsule, Take 1 capsule (20 mg total) by mouth once daily., Disp: 30 capsule, Rfl: 2    ergocalciferol (VITAMIN D2) 50,000 unit Cap, Take 1 capsule (50,000 Units total) by mouth twice a week. (Patient taking differently: Take 50,000 Units by mouth every 7 days.), Disp: 8 capsule, Rfl: 11    fluticasone propionate (FLONASE) 50 mcg/actuation nasal spray, 2 sprays (100 mcg total) by Each Nostril route once daily., Disp: 16 g, Rfl: 1    ibuprofen (ADVIL,MOTRIN) 800 MG tablet, Take 1 tablet by mouth every 4 to 6 hours as needed for pain. Do not exceed 4 tablets per day, Disp: 24 tablet, Rfl: 0    omeprazole (PRILOSEC) 40 MG capsule, Take 1 capsule (40 mg total) by mouth once daily., Disp: 30 capsule, Rfl: 11    ondansetron (ZOFRAN) 4 MG tablet, Take 1 tablet (4 mg total) by mouth every 6 (six) hours as needed for Nausea., Disp: 40 tablet, Rfl: 1    predniSONE (DELTASONE) 10 MG tablet, Take 3 tablets a day for 4 days (1 before breakfast, 1 after lunch, 1 before bed). Then take 2 tablets a day for 4 days (1 before breakfast, 1 before bed). Then take 1 tablet a day for 4 days (1 before breakfast)., Disp: 24 tablet, Rfl: 0    Compliance: yes    Side effects: None    Risk Parameters:  Patient reports no suicidal ideation  Patient reports no homicidal ideation  Patient reports no self-injurious behavior  Patient reports no violent behavior    Exam (detailed: at least 9 elements; comprehensive: all  15 elements)   Constitutional  Vitals:  Most recent vital signs were reviewed.   Last 3 sets of Vitals    Vitals - 1 value per visit 6/12/2022 6/12/2022 6/16/2022   SYSTOLIC - 142 133   DIASTOLIC - 89 85   Pulse - 92 86   Temp - 98.4 98.2   Resp - 18 -   SPO2 - 100 -   Weight (lb) - 273 227.07   Weight (kg) - 123.832 103   Height - 65 -   BMI (Calculated) - 45.4 -   VISIT REPORT - - -   Waist Measurements - - -   Pain Score  6 - -   Some recent data might be hidden          General:  age appropriate, casually dressed, neatly groomed     Musculoskeletal  Muscle Strength/Tone:  no tremor, no tic   Gait & Station:  video visit     Psychiatric  Speech:  no latency; no press   Behavior: wnl   Mood & Affect:  euthymic  congruent and appropriate   Thought Process:  normal and logical   Associations:  intact   Thought Content:  normal, no suicidality, no homicidality, delusions, or paranoia   Insight:  has awareness of illness   Judgement: behavior is adequate to circumstances   Orientation:  grossly intact   Memory: intact for content of interview   Language: grossly intact   Attention Span & Concentration:  Grossly intact   Fund of Knowledge:  intact and appropriate to age and level of education     Assessment and Diagnosis   Status/Progress: Based on the examination today, the patient's problem(s) is/are improved.  New problems have not been presented today.   Co-morbidities are complicating management of the primary condition.  There are no active rule-out diagnoses for this patient at this time.     General Impression:     Encounter Diagnoses   Name Primary?    Generalized anxiety disorder Yes    Attention deficit hyperactivity disorder (ADHD), predominantly inattentive type          Intervention/Counseling/Treatment Plan   · Medication Management: Discussed risks, benefits, and alternatives to treatment plan documented above with patient. I answered all patient questions related to this plan, and patient expressed  understanding and agreement.   continue Cymbalta 20 mg  · continue therapy    Medication List with Changes/Refills   Current Medications    DULOXETINE (CYMBALTA) 20 MG CAPSULE    Take 1 capsule (20 mg total) by mouth once daily.    ERGOCALCIFEROL (VITAMIN D2) 50,000 UNIT CAP    Take 1 capsule (50,000 Units total) by mouth twice a week.    FLUTICASONE PROPIONATE (FLONASE) 50 MCG/ACTUATION NASAL SPRAY    2 sprays (100 mcg total) by Each Nostril route once daily.    IBUPROFEN (ADVIL,MOTRIN) 800 MG TABLET    Take 1 tablet by mouth every 4 to 6 hours as needed for pain. Do not exceed 4 tablets per day    OMEPRAZOLE (PRILOSEC) 40 MG CAPSULE    Take 1 capsule (40 mg total) by mouth once daily.    ONDANSETRON (ZOFRAN) 4 MG TABLET    Take 1 tablet (4 mg total) by mouth every 6 (six) hours as needed for Nausea.    PREDNISONE (DELTASONE) 10 MG TABLET    Take 3 tablets a day for 4 days (1 before breakfast, 1 after lunch, 1 before bed). Then take 2 tablets a day for 4 days (1 before breakfast, 1 before bed). Then take 1 tablet a day for 4 days (1 before breakfast).        Return to Clinic: 3 months, or sooner if needed    Time spent with pt including note preparation: 14 minutes       Kenya Sanchez, PhD, MP  Advanced Practice Medical Psychologist  [facility] [address]  [place] [address]  [phone]   [phone] fax

## 2022-07-01 NOTE — PATIENT INSTRUCTIONS
"[facility]   PATIENT INFORMATION    We appreciate the opportunity to participate in your medical care and hope the following protocols will make it easier for you to receive quality treatment in our department.    PUNCTUALITY: Your appointment is scheduled for a fixed amount of time, reserved especially for you.  To get the benefit of your appointment, please arrive at least 15 minutes early to allow time for traffic, parking and registration.  Should you arrive more than 15 minutes late to your appointment, you will be rescheduled in order to assure your clinician has adequate time to assess you and provide helpful care.      APPOINTMENTS: Appointments are made by the nursing/front office staff or through the patient portal. Providers do not have access  to schedule appointments. Walk in appointments are not available. FOR EMERGENCIES, PLEASE GO THE CLOSEST EMERGENCY ROOM.    CANCELLATION/MISSED APPOINTMENTS:   In order to receive quality care, all appointments must be kept.  If you are unable to keep an appointment, please reschedule at least 3 days prior if possible. Late cancellations (within 24 hours of the appointment) and repeated no-show appointments may result in dismissal from the clinic. After two no show/late cancellation visits, you will receive a notice letter, alerting you to keep visits to prevent department dismissal. If another visit is missed after receipt of the notice, you will be discharged from the clinic. This policy is in effect to allow for other individuals on a long waiting list to be seen as soon as possible. Unlike other branches of medicine where several individuals can be scheduled in a 30 minute time slot, only one individual can be scheduled in any time slot in Psychiatry.     MESSAGES: For simple questions/concerns, you may contact your individual providers electronically through the "[facility]" portal or by calling [phone] " with messages relayed via office staff. If relevant, include pharmacy name and phone number, date of last visit and next scheduled visit, phone number where you can be reached throughout the day, and whether leaving a voicemail or message on an answering machine is acceptable. Messages will be returned by the Medical Assistant or Office Staff after your provider has reviewed the message.  Please allow 24 hours for a returned message before leaving another message. Messages will be checked each workday (Monday through Friday) during office hours (8:00 a.m. and 5:00 p.m.) and returned at most within one business day.  You may leave a non-urgent message after hours. Note that psychotherapy and medication management are not appropriate by telephone or the patient portal.    PRESCRIPTION REFILLS:  Please communicate with your prescriber about any refills you need during your appointment. You may also request refills through the [facility] portal (preferred) or by calling the clinic. Prescriptions will be filled during office hours.     Please do not wait until you are completely out of medication to request refills. Same day refills are not always possible. Patients may experience symptoms of withdrawal if they run out of medications. The patient assumes all responsibility when there is an issue with non-compliance with follow-up appointments and medications.  Some medications are controlled and regulated by the FDA and [employer]. Some of these medications can not be refilled before 30 days and require a face to face appointment.     PAPERWORK REQUESTS: If you have any forms or letters that need to be completed by your doctor, please present these at the beginning of the appointment to ensure that information needed to complete them is obtained during the office visit. Paperwork will be returned within 7-10 business days. Staff will call you to  the paperwork when completed.    SPECIAL EVALUATIONS: Please note that our  "department is treatment-focused. As such, we focus on treatment-oriented evaluations and do not perform specialty or "forensic" evaluations. Examples are listed below.    Disability: We do not do disability evaluations.  Please contact Social Security Administration for evaluations and determinations. You will then sign releases allowing for records from your treatment providers to be forwarded to Social Security Administration to use in their evaluation.  Gun Permit: We do not offer Sound Judgment Evaluations or assessments leading to gun ownership, nor do we fill out or file paperwork relevant to owning, concealing or purchasing a firearm.  Emotional Support     Animals (JOSE GUADALUPE): We do not provide documentation, including letters, to aid in the acclamation that an Emotional Support Animal is required. Note that ESAs are not trained to perform tasks or recognize particular signs or symptoms. Rather, they are distinguished by the close, emotional, and supportive bond between the animal and the owner.       SAMPLES: We do not provide samples of any medications. If you have financial difficulties and are on a limited income, you may qualify for Patient Assistance Programs from various pharmaceutical companies. This will require that you complete paperwork with your financial information, but this does not guarantee that the company will approve the application. Alternative medication options can be discussed.    REFERRALS/COORDINATION: You will be referred to other providers if we feel unable to adequately diagnose or treat your particular condition, or if collaboration with another provider would allow for better management of your condition.       Call In if problems  Call Report Side Effects   Encouraged to follow up with primary care / Gen Med MD for continued monitoring of general health and wellness  Call 911 Or go to ER if Acute Concerns (especially if any thoughts of harm to self or other)          Kenya Sanchez, " PhD, MP  Advanced Practice Medical Psychologist  [facility]  [address]  [place] [address] [phone]   [phone] fax

## 2022-07-18 ENCOUNTER — OCCUPATIONAL HEALTH (OUTPATIENT)
Dept: URGENT CARE | Facility: CLINIC | Age: 32
End: 2022-07-18
Payer: COMMERCIAL

## 2022-07-18 DIAGNOSIS — U07.1 COVID-19 VIRUS DETECTED: ICD-10-CM

## 2022-07-18 DIAGNOSIS — R50.9 FEVER, UNSPECIFIED FEVER CAUSE: Primary | ICD-10-CM

## 2022-07-18 DIAGNOSIS — U07.1 COVID-19: Primary | ICD-10-CM

## 2022-07-18 LAB
CTP QC/QA: YES
SARS-COV-2 RDRP RESP QL NAA+PROBE: POSITIVE

## 2022-07-18 PROCEDURE — U0002: ICD-10-PCS | Mod: QW,S$GLB,, | Performed by: EMERGENCY MEDICINE

## 2022-07-18 PROCEDURE — U0002 COVID-19 LAB TEST NON-CDC: HCPCS | Mod: QW,S$GLB,, | Performed by: EMERGENCY MEDICINE

## 2022-07-18 RX ORDER — PROMETHAZINE HYDROCHLORIDE AND PHENYLEPHRINE HYDROCHLORIDE 6.25; 5 MG/5ML; MG/5ML
5 SYRUP ORAL EVERY 6 HOURS PRN
Qty: 118 ML | Refills: 0 | Status: SHIPPED | OUTPATIENT
Start: 2022-07-18 | End: 2022-07-28

## 2022-07-27 ENCOUNTER — OFFICE VISIT (OUTPATIENT)
Dept: PSYCHIATRY | Facility: CLINIC | Age: 32
End: 2022-07-27
Payer: COMMERCIAL

## 2022-07-27 DIAGNOSIS — F90.0 ATTENTION DEFICIT HYPERACTIVITY DISORDER (ADHD), PREDOMINANTLY INATTENTIVE TYPE: ICD-10-CM

## 2022-07-27 DIAGNOSIS — F41.1 GAD (GENERALIZED ANXIETY DISORDER): Primary | ICD-10-CM

## 2022-07-27 DIAGNOSIS — F34.1 DYSTHYMIA: ICD-10-CM

## 2022-07-27 PROCEDURE — 1159F PR MEDICATION LIST DOCUMENTED IN MEDICAL RECORD: ICD-10-PCS | Mod: CPTII,95,, | Performed by: STUDENT IN AN ORGANIZED HEALTH CARE EDUCATION/TRAINING PROGRAM

## 2022-07-27 PROCEDURE — 3044F HG A1C LEVEL LT 7.0%: CPT | Mod: CPTII,95,, | Performed by: STUDENT IN AN ORGANIZED HEALTH CARE EDUCATION/TRAINING PROGRAM

## 2022-07-27 PROCEDURE — 90834 PR PSYCHOTHERAPY W/PATIENT, 45 MIN: ICD-10-PCS | Mod: 95,,, | Performed by: STUDENT IN AN ORGANIZED HEALTH CARE EDUCATION/TRAINING PROGRAM

## 2022-07-27 PROCEDURE — 3044F PR MOST RECENT HEMOGLOBIN A1C LEVEL <7.0%: ICD-10-PCS | Mod: CPTII,95,, | Performed by: STUDENT IN AN ORGANIZED HEALTH CARE EDUCATION/TRAINING PROGRAM

## 2022-07-27 PROCEDURE — 1159F MED LIST DOCD IN RCRD: CPT | Mod: CPTII,95,, | Performed by: STUDENT IN AN ORGANIZED HEALTH CARE EDUCATION/TRAINING PROGRAM

## 2022-07-27 PROCEDURE — 90834 PSYTX W PT 45 MINUTES: CPT | Mod: 95,,, | Performed by: STUDENT IN AN ORGANIZED HEALTH CARE EDUCATION/TRAINING PROGRAM

## 2022-07-27 NOTE — PROGRESS NOTES
[facility]  Psychology  Progress Note  Individual Psychotherapy (PhD/LCSW)    Patient Name: Anthony Sexton  MRN: [id number]    Patient Class: [facility]- [facility]  Primary Care Provider: Tyesha Leggett MD    Psychiatry Initial Visit (PhD/LCSW)  Individual Psychotherapy - CPT 12544    Date: 7/27/2022    Site: [place]    Referral source: Lenora Walters MD    Clinical status of patient: Outpatient    Anthony Sexton, a 31 y.o. female, for initial evaluation visit.  Met with patient.    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     Substance use:   Alcohol: none   Drugs: none   Tobacco: none   Caffeine: none    Current medications and drug reactions (include OTC, herbal): see medication list     Strengths and liabilities: Strength: Patient accepts guidance/feedback, Strength: Patient is expressive/articulate., Strength: Patient is intelligent., Strength: Patient is motivated for change., Strength: Patient has positive support network., Strength: Patient has reasonable judgment., Strength: Patient is stable., Liability: Patient lacks coping skills.    Current Evaluation:     Mental Status Exam:  General Appearance:  unremarkable, age appropriate   Speech: normal tone,  normal rate, normal pitch, normal volume      Level of Cooperation: cooperative      Thought Processes: normal and logical   Mood: euthymic      Thought Content: normal, no suicidality, no homicidality, delusions, or paranoia   Affect: congruent and appropriate   Orientation: Oriented x3   Memory: recent >  intact   Attention Span & Concentration: intact   Fund of General Knowledge: intact and appropriate to age and level of education   Abstract Reasoning: not assessed   Judgment & Insight: fair     Language  intact     Summary: Met with patient for the first time since September 2021. Patient stated that things have improved but still struggles with anxiety and panic attacks. The patient described many marital difficulties related to her 's depression that have resulted in a lot of stress and unhappiness for the patient. The patient and her  have been together for over 20 years and have not had any other relationships. The patient is uncertain if she can remain in the marriage but worries how her [employer]ian[employer] family would react if she decided to leave her . She also feels pressure to not abandon him because she is the only family he has. We discussed the importance of having an outlet for her stressors and how stress reduction could help with her panic symptoms. The patient will scheduled more appointments so that we can work on stress reduction and processing her feelings related to her relationship.     Diagnostic Impression - Plan:       ICD-10-CM ICD-9-CM   1. SENTHIL (generalized anxiety disorder)  F41.1 300.02   2. Attention deficit hyperactivity disorder (ADHD), predominantly inattentive type  F90.0 314.00   3. Dysthymia  F34.1 300.4       Plan:individual psychotherapy    Return to Clinic: as scheduled    Length of Service (minutes): 45          Barbra Bartlett, PhD  Psychologist  [facility]

## 2022-08-02 ENCOUNTER — RESEARCH ENCOUNTER (OUTPATIENT)
Dept: RESEARCH | Facility: HOSPITAL | Age: 32
End: 2022-08-02

## 2022-08-02 ENCOUNTER — PATIENT MESSAGE (OUTPATIENT)
Dept: OPTOMETRY | Facility: CLINIC | Age: 32
End: 2022-08-02
Payer: COMMERCIAL

## 2022-08-02 NOTE — RESEARCH
Met with patient at the [facility] research exam room regarding participation in IRB protocol #2022.001, [facility] [employer] study. Pt was agreeable.    The Informed Consent Form (ICF) was reviewed with pt. The discussion included:  - participation is voluntary  - pt can change their mind about participating  - pt was informed that participation in this study would not preclude them from participating in any other research if offered  - specimens will be used by [facility] researchers in this study  - specimens collected (blood, saliva, nasal swab, urine, stool) include only those discussed with the patient at the time of consent and are indicated on the ICF   - specimens will be used in a multi-omics study regarding Covid-19 severity and biomarkers that are involved  - all specimens released to researchers will be stripped of identifiers  - no personal information will be released to any parties outside of this research study  - there will be no other physical risks outside of those involved in standard of care procedure.     Pt had a chance to ask any questions, and all questions were answered. Pt willingly and independently signed ICF.    A copy of ICF was given to pt with instructions to call with any questions that may arise or if they should change their mind regarding participation in [facility] study.    Four questionnaires and two take home sample collection kits were provided to the pt at the end of this encounter. The patient was instructed on how to collect and return these study items. Pt was then informed of a $50 stipend that would be offered to them upon return of these questionnaires and samples.    The following specimens were collected from the pt at the time of this encounter: nasal swab, saliva, and blood.

## 2022-08-09 ENCOUNTER — TELEPHONE (OUTPATIENT)
Dept: PSYCHIATRY | Facility: CLINIC | Age: 32
End: 2022-08-09
Payer: COMMERCIAL

## 2022-08-11 ENCOUNTER — OFFICE VISIT (OUTPATIENT)
Dept: PSYCHIATRY | Facility: CLINIC | Age: 32
End: 2022-08-11
Payer: COMMERCIAL

## 2022-08-11 DIAGNOSIS — F90.0 ATTENTION DEFICIT HYPERACTIVITY DISORDER (ADHD), PREDOMINANTLY INATTENTIVE TYPE: ICD-10-CM

## 2022-08-11 DIAGNOSIS — F34.1 DYSTHYMIA: ICD-10-CM

## 2022-08-11 DIAGNOSIS — F41.1 GAD (GENERALIZED ANXIETY DISORDER): Primary | ICD-10-CM

## 2022-08-11 PROCEDURE — 1159F PR MEDICATION LIST DOCUMENTED IN MEDICAL RECORD: ICD-10-PCS | Mod: CPTII,95,, | Performed by: STUDENT IN AN ORGANIZED HEALTH CARE EDUCATION/TRAINING PROGRAM

## 2022-08-11 PROCEDURE — 3044F PR MOST RECENT HEMOGLOBIN A1C LEVEL <7.0%: ICD-10-PCS | Mod: CPTII,95,, | Performed by: STUDENT IN AN ORGANIZED HEALTH CARE EDUCATION/TRAINING PROGRAM

## 2022-08-11 PROCEDURE — 90834 PR PSYCHOTHERAPY W/PATIENT, 45 MIN: ICD-10-PCS | Mod: 95,,, | Performed by: STUDENT IN AN ORGANIZED HEALTH CARE EDUCATION/TRAINING PROGRAM

## 2022-08-11 PROCEDURE — 90834 PSYTX W PT 45 MINUTES: CPT | Mod: 95,,, | Performed by: STUDENT IN AN ORGANIZED HEALTH CARE EDUCATION/TRAINING PROGRAM

## 2022-08-11 PROCEDURE — 1159F MED LIST DOCD IN RCRD: CPT | Mod: CPTII,95,, | Performed by: STUDENT IN AN ORGANIZED HEALTH CARE EDUCATION/TRAINING PROGRAM

## 2022-08-11 PROCEDURE — 3044F HG A1C LEVEL LT 7.0%: CPT | Mod: CPTII,95,, | Performed by: STUDENT IN AN ORGANIZED HEALTH CARE EDUCATION/TRAINING PROGRAM

## 2022-08-11 NOTE — PROGRESS NOTES
[facility]  Psychology  Progress Note  Individual Psychotherapy (PhD/LCSW)    Patient Name: Anthony Sexton  MRN: [id number]    Patient Class: OP- [facility]  Primary Care Provider: Tyesha Leggett MD    Psychiatry Initial Visit (PhD/LCSW)  Individual Psychotherapy - CPT 64580    Date: 8/11/2022    Site: [place]    Referral source: Lenora Walters MD    Clinical status of patient: Outpatient    Anthony Sexton, a 32 y.o. female, for initial evaluation visit.  Met with patient.    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     Substance use:   Alcohol: none   Drugs: none   Tobacco: none   Caffeine: none    Current medications and drug reactions (include OTC, herbal): see medication list     Strengths and liabilities: Strength: Patient accepts guidance/feedback, Strength: Patient is expressive/articulate., Strength: Patient is intelligent., Strength: Patient is motivated for change., Strength: Patient has positive support network., Strength: Patient has reasonable judgment., Strength: Patient is stable., Liability: Patient lacks coping skills.    Current Evaluation:     Mental Status Exam:  General Appearance:  unremarkable, age appropriate   Speech: normal tone,  normal rate, normal pitch, normal volume      Level of Cooperation: cooperative      Thought Processes: normal and logical   Mood: euthymic      Thought Content: normal, no suicidality, no homicidality, delusions, or paranoia   Affect: congruent and appropriate   Orientation: Oriented x3   Memory: recent >  intact   Attention Span & Concentration: intact   Fund of General Knowledge: intact and appropriate to age and level of education   Abstract Reasoning: not assessed   Judgment & Insight: fair     Language  intact     Summary: The patient discussed her concerns with her  and he was receptive to the feedback. She has already noticed positive changes in him and is appreciative of the effort. She mentioned that she typically is non-confrontational because she has fears of retaliation or of losing close relationships. The patient reported that she does not trust her  with money or with keeping his word because of experiences that she has had with her mother. Though she is close to her mother and feels supported by her emotionally, she has a shopping addiction and is terrible with money. This has resulted in the patient maintaining all control of finances, even though this control is stressful for the patient. We discussed the need to release control so that she is not so overwhelmed by the demands that she has placed on herself. The patient's homework is to make a list of her responsibilities and identify which ones she could delegate to others and which ones she fears letting go of.     Diagnostic Impression - Plan:       ICD-10-CM ICD-9-CM   1. SENTHIL (generalized anxiety disorder)  F41.1 300.02   2. Attention deficit hyperactivity disorder (ADHD), predominantly inattentive type  F90.0 314.00   3. Dysthymia  F34.1 300.4       Plan:individual psychotherapy    Return to Clinic: as scheduled    Length of Service (minutes): 45          Barbra Bartlett, PhD  Psychologist  [facility]

## 2022-08-22 RX ORDER — IBUPROFEN 800 MG/1
TABLET ORAL
Qty: 24 TABLET | Refills: 0 | Status: SHIPPED | OUTPATIENT
Start: 2022-08-22 | End: 2022-12-07 | Stop reason: SDUPTHER

## 2022-08-22 NOTE — TELEPHONE ENCOUNTER
LV:5/27/22  LAV:5/27/22  Upcoming Appt: 5/26/23    Pt requesting ibuprofen (ADVIL,MOTRIN) 800 MG tablet  Last fill  5/3/22

## 2022-08-25 ENCOUNTER — PATIENT MESSAGE (OUTPATIENT)
Dept: PSYCHIATRY | Facility: CLINIC | Age: 32
End: 2022-08-25

## 2022-08-25 ENCOUNTER — OFFICE VISIT (OUTPATIENT)
Dept: PSYCHIATRY | Facility: CLINIC | Age: 32
End: 2022-08-25
Payer: COMMERCIAL

## 2022-08-25 DIAGNOSIS — F34.1 DYSTHYMIA: ICD-10-CM

## 2022-08-25 DIAGNOSIS — F90.0 ATTENTION DEFICIT HYPERACTIVITY DISORDER (ADHD), PREDOMINANTLY INATTENTIVE TYPE: ICD-10-CM

## 2022-08-25 DIAGNOSIS — F41.1 GAD (GENERALIZED ANXIETY DISORDER): Primary | ICD-10-CM

## 2022-08-25 PROCEDURE — 90834 PR PSYCHOTHERAPY W/PATIENT, 45 MIN: ICD-10-PCS | Mod: 95,,, | Performed by: STUDENT IN AN ORGANIZED HEALTH CARE EDUCATION/TRAINING PROGRAM

## 2022-08-25 PROCEDURE — 1159F PR MEDICATION LIST DOCUMENTED IN MEDICAL RECORD: ICD-10-PCS | Mod: CPTII,95,, | Performed by: STUDENT IN AN ORGANIZED HEALTH CARE EDUCATION/TRAINING PROGRAM

## 2022-08-25 PROCEDURE — 3044F PR MOST RECENT HEMOGLOBIN A1C LEVEL <7.0%: ICD-10-PCS | Mod: CPTII,95,, | Performed by: STUDENT IN AN ORGANIZED HEALTH CARE EDUCATION/TRAINING PROGRAM

## 2022-08-25 PROCEDURE — 3044F HG A1C LEVEL LT 7.0%: CPT | Mod: CPTII,95,, | Performed by: STUDENT IN AN ORGANIZED HEALTH CARE EDUCATION/TRAINING PROGRAM

## 2022-08-25 PROCEDURE — 90834 PSYTX W PT 45 MINUTES: CPT | Mod: 95,,, | Performed by: STUDENT IN AN ORGANIZED HEALTH CARE EDUCATION/TRAINING PROGRAM

## 2022-08-25 PROCEDURE — 1159F MED LIST DOCD IN RCRD: CPT | Mod: CPTII,95,, | Performed by: STUDENT IN AN ORGANIZED HEALTH CARE EDUCATION/TRAINING PROGRAM

## 2022-08-25 NOTE — PROGRESS NOTES
[facility] Psych  Psychology  Progress Note  Individual Psychotherapy (PhD/LCSW)    Patient Name: Anthony Sexton  MRN: [id number]    Patient Class: OP- [facility] Outpatient [facility]  Primary Care Provider: Tyesha Leggett MD    Psychiatry Initial Visit (PhD/LCSW)  Individual Psychotherapy - CPT 80810    Date: 8/25/2022    Site: [place]    Referral source: Lenora Walters MD    Clinical status of patient: Outpatient    Anthony Sexton, a 32 y.o. female, for initial evaluation visit.  Met with patient.    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     Substance use:   Alcohol: none   Drugs: none   Tobacco: none   Caffeine: none    Current medications and drug reactions (include OTC, herbal): see medication list     Strengths and liabilities: Strength: Patient accepts guidance/feedback, Strength: Patient is expressive/articulate., Strength: Patient is intelligent., Strength: Patient is motivated for change., Strength: Patient has positive support network., Strength: Patient has reasonable judgment., Strength: Patient is stable., Liability: Patient lacks coping skills.    Current Evaluation:     Mental Status Exam:  General Appearance:  unremarkable, age appropriate   Speech: normal tone,  "normal rate, normal pitch, normal volume      Level of Cooperation: cooperative      Thought Processes: normal and logical   Mood: euthymic      Thought Content: normal, no suicidality, no homicidality, delusions, or paranoia   Affect: congruent and appropriate   Orientation: Oriented x3   Memory: recent >  intact   Attention Span & Concentration: intact   Fund of General Knowledge: intact and appropriate to age and level of education   Abstract Reasoning: not assessed   Judgment & Insight: fair     Language  intact     Summary: The patient reported feeling exhausted and not having any time for herself. We created a timeline of her day from the moment she wakes up until she goes to be at night in an effort to find space in the day that she can devote to herself. After creating the timeline, we noticed that there is space when she first wakes up in the morning and after dinner before her children go to bed. We also discussed setting boundaries with her family by creating set times in the day where they cannot call her unless it is an emergency. The patient received a copy of the schedule that we created in our session and was encouraged to discuss it with her  so that he can not only understand her daily schedule but can also support her need for self-care. We will discuss how the "me time" works next session and will modify if necessary.    Diagnostic Impression - Plan:       ICD-10-CM ICD-9-CM   1. SENTHIL (generalized anxiety disorder)  F41.1 300.02   2. Attention deficit hyperactivity disorder (ADHD), predominantly inattentive type  F90.0 314.00   3. Dysthymia  F34.1 300.4       Plan:individual psychotherapy    Return to Clinic: as scheduled    Length of Service (minutes): 45          Barbra Bartlett, PhD  Psychologist  [facility]    "

## 2022-08-26 ENCOUNTER — OFFICE VISIT (OUTPATIENT)
Dept: URGENT CARE | Facility: CLINIC | Age: 32
End: 2022-08-26
Payer: COMMERCIAL

## 2022-08-26 VITALS
SYSTOLIC BLOOD PRESSURE: 154 MMHG | DIASTOLIC BLOOD PRESSURE: 75 MMHG | BODY MASS INDEX: 43.39 KG/M2 | TEMPERATURE: 99 F | RESPIRATION RATE: 18 BRPM | OXYGEN SATURATION: 99 % | HEART RATE: 110 BPM | HEIGHT: 66 IN | WEIGHT: 270 LBS

## 2022-08-26 DIAGNOSIS — J06.9 VIRAL URI: Primary | ICD-10-CM

## 2022-08-26 DIAGNOSIS — R00.2 HEART PALPITATIONS: ICD-10-CM

## 2022-08-26 DIAGNOSIS — R11.0 NAUSEA: ICD-10-CM

## 2022-08-26 DIAGNOSIS — E86.0 DEHYDRATION: ICD-10-CM

## 2022-08-26 LAB
B-HCG UR QL: NEGATIVE
CTP QC/QA: YES

## 2022-08-26 PROCEDURE — 3078F DIAST BP <80 MM HG: CPT | Mod: CPTII,S$GLB,, | Performed by: PHYSICIAN ASSISTANT

## 2022-08-26 PROCEDURE — 1160F PR REVIEW ALL MEDS BY PRESCRIBER/CLIN PHARMACIST DOCUMENTED: ICD-10-PCS | Mod: CPTII,S$GLB,, | Performed by: PHYSICIAN ASSISTANT

## 2022-08-26 PROCEDURE — 93005 EKG 12-LEAD: ICD-10-PCS | Mod: S$GLB,,, | Performed by: PHYSICIAN ASSISTANT

## 2022-08-26 PROCEDURE — 1159F PR MEDICATION LIST DOCUMENTED IN MEDICAL RECORD: ICD-10-PCS | Mod: CPTII,S$GLB,, | Performed by: PHYSICIAN ASSISTANT

## 2022-08-26 PROCEDURE — 3008F BODY MASS INDEX DOCD: CPT | Mod: CPTII,S$GLB,, | Performed by: PHYSICIAN ASSISTANT

## 2022-08-26 PROCEDURE — 1159F MED LIST DOCD IN RCRD: CPT | Mod: CPTII,S$GLB,, | Performed by: PHYSICIAN ASSISTANT

## 2022-08-26 PROCEDURE — 1160F RVW MEDS BY RX/DR IN RCRD: CPT | Mod: CPTII,S$GLB,, | Performed by: PHYSICIAN ASSISTANT

## 2022-08-26 PROCEDURE — 93005 ELECTROCARDIOGRAM TRACING: CPT | Mod: S$GLB,,, | Performed by: PHYSICIAN ASSISTANT

## 2022-08-26 PROCEDURE — 93010 EKG 12-LEAD: ICD-10-PCS | Mod: S$GLB,,, | Performed by: INTERNAL MEDICINE

## 2022-08-26 PROCEDURE — 3044F HG A1C LEVEL LT 7.0%: CPT | Mod: CPTII,S$GLB,, | Performed by: PHYSICIAN ASSISTANT

## 2022-08-26 PROCEDURE — 81025 URINE PREGNANCY TEST: CPT | Mod: S$GLB,,, | Performed by: PHYSICIAN ASSISTANT

## 2022-08-26 PROCEDURE — 99214 OFFICE O/P EST MOD 30 MIN: CPT | Mod: S$GLB,,, | Performed by: PHYSICIAN ASSISTANT

## 2022-08-26 PROCEDURE — 3044F PR MOST RECENT HEMOGLOBIN A1C LEVEL <7.0%: ICD-10-PCS | Mod: CPTII,S$GLB,, | Performed by: PHYSICIAN ASSISTANT

## 2022-08-26 PROCEDURE — 81025 POCT URINE PREGNANCY: ICD-10-PCS | Mod: S$GLB,,, | Performed by: PHYSICIAN ASSISTANT

## 2022-08-26 PROCEDURE — 93010 ELECTROCARDIOGRAM REPORT: CPT | Mod: S$GLB,,, | Performed by: INTERNAL MEDICINE

## 2022-08-26 PROCEDURE — 3078F PR MOST RECENT DIASTOLIC BLOOD PRESSURE < 80 MM HG: ICD-10-PCS | Mod: CPTII,S$GLB,, | Performed by: PHYSICIAN ASSISTANT

## 2022-08-26 PROCEDURE — 3077F PR MOST RECENT SYSTOLIC BLOOD PRESSURE >= 140 MM HG: ICD-10-PCS | Mod: CPTII,S$GLB,, | Performed by: PHYSICIAN ASSISTANT

## 2022-08-26 PROCEDURE — 99214 PR OFFICE/OUTPT VISIT, EST, LEVL IV, 30-39 MIN: ICD-10-PCS | Mod: S$GLB,,, | Performed by: PHYSICIAN ASSISTANT

## 2022-08-26 PROCEDURE — 3077F SYST BP >= 140 MM HG: CPT | Mod: CPTII,S$GLB,, | Performed by: PHYSICIAN ASSISTANT

## 2022-08-26 PROCEDURE — 3008F PR BODY MASS INDEX (BMI) DOCUMENTED: ICD-10-PCS | Mod: CPTII,S$GLB,, | Performed by: PHYSICIAN ASSISTANT

## 2022-08-26 NOTE — PROGRESS NOTES
"Subjective:       Patient ID: Anthony Sexton is a 32 y.o. female.    Vitals:  height is 5' 5.5" (1.664 m) and weight is 122.5 kg (270 lb). Her temperature is 98.7 °F (37.1 °C). Her blood pressure is 154/75 (abnormal) and her pulse is 110. Her respiration is 18 and oxygen saturation is 99%.     Chief Complaint: Nasal Congestion    Pt c/o nasal congestion, heart palpitations, back pain, and nausea x2 days. Pt states she has tested for covid the last 3 days with all (-) results.  Mild low back pain.  States her heart rate has been elevated for the last few hours.  LMP on 8/2/2022.  States she has had some breast discomfort.    Palpitations   This is a new problem. The current episode started yesterday. The problem occurs constantly. The problem has been gradually worsening. Nothing aggravates the symptoms. Associated symptoms include anxiety, chest pain, dizziness, an irregular heartbeat and nausea. Pertinent negatives include no chest fullness, coughing, diaphoresis, fever, malaise/fatigue, near-syncope, numbness, shortness of breath, syncope, vomiting or weakness. She has tried nothing for the symptoms. The treatment provided no relief.       Constitution: Negative for sweating and fever.   Cardiovascular: Positive for chest pain and palpitations. Negative for passing out.   Respiratory: Negative for cough and shortness of breath.    Gastrointestinal: Positive for nausea. Negative for vomiting.   Neurological: Positive for dizziness. Negative for numbness.   Psychiatric/Behavioral: Positive for nervous/anxious. The patient is nervous/anxious.        Objective:      Physical Exam   Constitutional: She is oriented to person, place, and time. She appears well-developed. She is cooperative.  Non-toxic appearance. She does not appear ill. No distress.   HENT:   Head: Normocephalic and atraumatic.   Ears:   Right Ear: Hearing, tympanic membrane, external ear and ear canal normal.   Left Ear: Hearing, tympanic membrane, " external ear and ear canal normal.   Nose: Nose normal. No mucosal edema, rhinorrhea or nasal deformity. No epistaxis. Right sinus exhibits no maxillary sinus tenderness and no frontal sinus tenderness. Left sinus exhibits no maxillary sinus tenderness and no frontal sinus tenderness.   Mouth/Throat: Uvula is midline, oropharynx is clear and moist and mucous membranes are normal. Mucous membranes are moist. No trismus in the jaw. Normal dentition. No uvula swelling. No posterior oropharyngeal erythema.   Eyes: Conjunctivae and lids are normal. Pupils are equal, round, and reactive to light. Right eye exhibits no discharge. Left eye exhibits no discharge. No scleral icterus.   Neck: Trachea normal and phonation normal. Neck supple.   Cardiovascular: Regular rhythm, normal heart sounds and normal pulses. Tachycardia present.   Pulmonary/Chest: Effort normal and breath sounds normal. No respiratory distress.   Abdominal: Normal appearance. She exhibits no distension, no pulsatile midline mass and no mass. Soft. flat abdomen There is no abdominal tenderness.   Musculoskeletal: Normal range of motion.         General: No deformity. Normal range of motion.   Neurological: She is alert and oriented to person, place, and time. She exhibits normal muscle tone. Coordination normal.   Skin: Skin is warm, dry, intact, not diaphoretic and not pale.   Psychiatric: Her speech is normal and behavior is normal. Judgment and thought content normal.   Nursing note and vitals reviewed.        Assessment:       1. Viral URI    2. Heart palpitations    3. Nausea    4. Dehydration          Plan:         Viral URI    Heart palpitations  -     IN OFFICE EKG 12-LEAD (to Muse)  -     IN OFFICE EKG 12-LEAD (to Muse)    Nausea  -     POCT urine pregnancy    Dehydration       EKG shows sinus tachycardia.  No STEMI.  Compared to eKg from 5/2022 with tachycardia new.    Oral hydration in clinic with HR getting to 110.        Hydrate at home.   Increase oral intake.  Monitor symptoms and go to the ER if new or worsening symptoms. Develop.  Take a decongestant.  Mucinex.  Tylenol and/or Ibuprofen for fever, headaches, or body aches.  Follow up with PCP as needed.

## 2022-08-30 ENCOUNTER — HOSPITAL ENCOUNTER (OUTPATIENT)
Dept: RADIOLOGY | Facility: HOSPITAL | Age: 32
Discharge: HOME | End: 2022-08-30
Attending: FAMILY MEDICINE
Payer: COMMERCIAL

## 2022-08-30 ENCOUNTER — OFFICE VISIT (OUTPATIENT)
Dept: FAMILY MEDICINE | Facility: CLINIC | Age: 32
End: 2022-08-30
Payer: COMMERCIAL

## 2022-08-30 VITALS
RESPIRATION RATE: 18 BRPM | OXYGEN SATURATION: 99 % | WEIGHT: 274.94 LBS | BODY MASS INDEX: 44.19 KG/M2 | HEART RATE: 100 BPM | TEMPERATURE: 99 F | SYSTOLIC BLOOD PRESSURE: 132 MMHG | HEIGHT: 66 IN | DIASTOLIC BLOOD PRESSURE: 88 MMHG

## 2022-08-30 DIAGNOSIS — R11.0 NAUSEA: ICD-10-CM

## 2022-08-30 DIAGNOSIS — R10.11 RIGHT UPPER QUADRANT PAIN: ICD-10-CM

## 2022-08-30 DIAGNOSIS — E66.01 MORBID OBESITY WITH BMI OF 45.0-49.9, ADULT: ICD-10-CM

## 2022-08-30 DIAGNOSIS — R10.11 RIGHT UPPER QUADRANT PAIN: Primary | ICD-10-CM

## 2022-08-30 PROCEDURE — 1159F MED LIST DOCD IN RCRD: CPT | Mod: CPTII,S$GLB,, | Performed by: FAMILY MEDICINE

## 2022-08-30 PROCEDURE — 99214 OFFICE O/P EST MOD 30 MIN: CPT | Mod: S$GLB,,, | Performed by: FAMILY MEDICINE

## 2022-08-30 PROCEDURE — 3008F PR BODY MASS INDEX (BMI) DOCUMENTED: ICD-10-PCS | Mod: CPTII,S$GLB,, | Performed by: FAMILY MEDICINE

## 2022-08-30 PROCEDURE — 3079F PR MOST RECENT DIASTOLIC BLOOD PRESSURE 80-89 MM HG: ICD-10-PCS | Mod: CPTII,S$GLB,, | Performed by: FAMILY MEDICINE

## 2022-08-30 PROCEDURE — 99999 PR PBB SHADOW E&M-EST. PATIENT-LVL IV: ICD-10-PCS | Mod: PBBFAC,,, | Performed by: FAMILY MEDICINE

## 2022-08-30 PROCEDURE — 3008F BODY MASS INDEX DOCD: CPT | Mod: CPTII,S$GLB,, | Performed by: FAMILY MEDICINE

## 2022-08-30 PROCEDURE — 3075F SYST BP GE 130 - 139MM HG: CPT | Mod: CPTII,S$GLB,, | Performed by: FAMILY MEDICINE

## 2022-08-30 PROCEDURE — 1159F PR MEDICATION LIST DOCUMENTED IN MEDICAL RECORD: ICD-10-PCS | Mod: CPTII,S$GLB,, | Performed by: FAMILY MEDICINE

## 2022-08-30 PROCEDURE — 99214 PR OFFICE/OUTPT VISIT, EST, LEVL IV, 30-39 MIN: ICD-10-PCS | Mod: S$GLB,,, | Performed by: FAMILY MEDICINE

## 2022-08-30 PROCEDURE — 3079F DIAST BP 80-89 MM HG: CPT | Mod: CPTII,S$GLB,, | Performed by: FAMILY MEDICINE

## 2022-08-30 PROCEDURE — 3075F PR MOST RECENT SYSTOLIC BLOOD PRESS GE 130-139MM HG: ICD-10-PCS | Mod: CPTII,S$GLB,, | Performed by: FAMILY MEDICINE

## 2022-08-30 PROCEDURE — 99999 PR PBB SHADOW E&M-EST. PATIENT-LVL IV: CPT | Mod: PBBFAC,,, | Performed by: FAMILY MEDICINE

## 2022-08-30 PROCEDURE — 1160F PR REVIEW ALL MEDS BY PRESCRIBER/CLIN PHARMACIST DOCUMENTED: ICD-10-PCS | Mod: CPTII,S$GLB,, | Performed by: FAMILY MEDICINE

## 2022-08-30 PROCEDURE — 76700 US ABDOMEN COMPLETE: ICD-10-PCS | Mod: 26,,, | Performed by: RADIOLOGY

## 2022-08-30 PROCEDURE — 76700 US EXAM ABDOM COMPLETE: CPT | Mod: TC

## 2022-08-30 PROCEDURE — 3044F PR MOST RECENT HEMOGLOBIN A1C LEVEL <7.0%: ICD-10-PCS | Mod: CPTII,S$GLB,, | Performed by: FAMILY MEDICINE

## 2022-08-30 PROCEDURE — 3044F HG A1C LEVEL LT 7.0%: CPT | Mod: CPTII,S$GLB,, | Performed by: FAMILY MEDICINE

## 2022-08-30 PROCEDURE — 76700 US EXAM ABDOM COMPLETE: CPT | Mod: 26,,, | Performed by: RADIOLOGY

## 2022-08-30 PROCEDURE — 1160F RVW MEDS BY RX/DR IN RCRD: CPT | Mod: CPTII,S$GLB,, | Performed by: FAMILY MEDICINE

## 2022-08-30 RX ORDER — ONDANSETRON 4 MG/1
4 TABLET, FILM COATED ORAL EVERY 6 HOURS PRN
Qty: 40 TABLET | Refills: 1 | Status: SHIPPED | OUTPATIENT
Start: 2022-08-30 | End: 2024-02-29

## 2022-08-30 RX ORDER — HYDROCODONE BITARTRATE AND ACETAMINOPHEN 5; 325 MG/1; MG/1
1 TABLET ORAL EVERY 6 HOURS PRN
Qty: 20 TABLET | Refills: 0 | Status: SHIPPED | OUTPATIENT
Start: 2022-08-30 | End: 2022-09-04

## 2022-08-30 NOTE — PROGRESS NOTES
Anthony Sexton    Chief Complaint   Patient presents with    Back Pain    Abdominal Pain    Nausea       History of Present Illness:   Ms. Sexton comes in today with complaint of having constant right upper quadrant pain with radiation around her back and upper right shoulder for 2 days.  She states the pain lasts hours, then eases up.  She states pain lasted a long time last night.  She states she takes ibuprofen alternating with Tylenol with very little help last night.  She states she also takes omeprazole and Pepto-Bismol without help.  She reports having associated fatigue, decreased appetite, nausea and states her stools vary from diarrhea to constipation.    Otherwise, she denies having fever, chills, fatigue; shortness of breath, cough, wheezing; chest pain, palpitations, leg swelling; vomiting; unusual urinary symptoms; polydipsia, polyphagia, polyuria, hot or cold intolerance; rashes; headache; anxiety, depression, homicidal or suicidal thoughts.       She states she was evaluated at urgent care on August 26, 2022 for palpitations, nausea, dehydration, viral URI and was given water.    She denies tobacco, alcohol, or illicit drug use.    She is fasting today.  She states her father brought her today.    She states she is scheduled to see ENT later today for swollen adenoids.    Abdominal Pain  This is a new problem. The current episode started more than 1 month ago. The onset quality is gradual. The problem occurs constantly. The most recent episode lasted 4 Hours. The problem has been gradually worsening. The pain is located in the RUQ. The pain is at a severity of 7/10. The pain is moderate. The quality of the pain is aching, cramping, dull, a sensation of fullness and colicky. Associated symptoms include anorexia, arthralgias, constipation, diarrhea, myalgias and nausea. Pertinent negatives include no belching, dysuria, fever, flatus, frequency, headaches, hematochezia, hematuria, melena, vomiting or  weight loss. The pain is aggravated by being still, certain positions and eating. The pain is relieved by Nothing. She has tried acetaminophen, antacids and proton pump inhibitors for the symptoms. The treatment provided mild relief. Her past medical history is significant for GERD. There is no history of abdominal surgery, colon cancer, Crohn's disease, gallstones, irritable bowel syndrome, pancreatitis, PUD or ulcerative colitis. Patient's medical history does not include kidney stones and UTI.     Current Outpatient Medications   Medication Sig    ergocalciferol (VITAMIN D2) 50,000 unit Cap Take 1 capsule (50,000 Units total) by mouth twice a week. (Patient taking differently: Take 50,000 Units by mouth every 7 days.)    fluticasone propionate (FLONASE) 50 mcg/actuation nasal spray 2 sprays (100 mcg total) by Each Nostril route once daily.    ibuprofen (ADVIL,MOTRIN) 800 MG tablet Take 1 tablet by mouth every 4 to 6 hours as needed for pain. Do not exceed 4 tablets per day    omeprazole (PRILOSEC) 40 MG capsule Take 1 capsule (40 mg total) by mouth once daily.    ondansetron (ZOFRAN) 4 MG tablet Take 1 tablet (4 mg total) by mouth every 6 (six) hours as needed for Nausea.    DULoxetine (CYMBALTA) 20 MG capsule Take 1 capsule (20 mg total) by mouth once daily. (Patient not taking: Reported on 8/30/2022)       Review of Systems   Constitutional:  Positive for appetite change and fatigue. Negative for chills, fever and weight loss.   Respiratory:  Negative for cough, shortness of breath and wheezing.    Cardiovascular:  Negative for chest pain, palpitations and leg swelling.   Gastrointestinal:  Positive for abdominal pain, anorexia, constipation, diarrhea and nausea. Negative for flatus, hematochezia, melena and vomiting.   Endocrine: Negative for cold intolerance, heat intolerance, polydipsia, polyphagia and polyuria.   Genitourinary:  Negative for difficulty urinating, dysuria, frequency, hematuria and menstrual  problem.   Musculoskeletal:  Positive for arthralgias, back pain and myalgias.   Skin:  Negative for rash.   Neurological:  Negative for headaches.   Psychiatric/Behavioral:  Negative for dysphoric mood and suicidal ideas. The patient is not nervous/anxious.         Negative for homicidal ideas.     Objective:  Physical Exam  Vitals reviewed.   Constitutional:       General: She is not in acute distress.     Appearance: Normal appearance. She is well-developed. She is obese. She is not ill-appearing, toxic-appearing or diaphoretic.      Comments: Pleasant.   Cardiovascular:      Rate and Rhythm: Normal rate and regular rhythm.      Pulses: Normal pulses.      Heart sounds: Normal heart sounds. No murmur heard.  Pulmonary:      Effort: Pulmonary effort is normal. No respiratory distress.      Breath sounds: Normal breath sounds. No wheezing.   Chest:      Chest wall: No tenderness.   Breasts:     Right: Tenderness present. No mass, nipple discharge or skin change.      Left: No mass, nipple discharge, skin change or tenderness.      Comments: Slightly tender at right upper breast. Well-healed scars at breasts s/p reduction.  Abdominal:      General: Bowel sounds are normal. There is no distension.      Palpations: Abdomen is soft. There is no mass.      Tenderness: There is abdominal tenderness. There is no right CVA tenderness, left CVA tenderness, guarding or rebound.      Comments: Slightly tender at right upper quadrant abdomen without acute abdomen noted.   Musculoskeletal:         General: No swelling or tenderness. Normal range of motion.      Cervical back: Normal range of motion and neck supple. No tenderness. No muscular tenderness.      Comments: She is ambulatory without problems. Non tender back.   Lymphadenopathy:      Cervical: No cervical adenopathy.      Upper Body:      Right upper body: No axillary adenopathy.      Left upper body: No axillary adenopathy.   Skin:     General: Skin is warm.       Findings: No rash.   Neurological:      General: No focal deficit present.      Mental Status: She is alert and oriented to person, place, and time.   Psychiatric:         Mood and Affect: Mood normal.         Behavior: Behavior normal.         Thought Content: Thought content normal.         Judgment: Judgment normal.     ASSESSMENT:  1. Right upper quadrant pain    2. Nausea    3. Morbid obesity with BMI of 45.0-49.9, adult        PLAN:  Anthony was seen today for back pain, abdominal pain and nausea.    Diagnoses and all orders for this visit:    Right upper quadrant pain  -     US Abdomen Complete; Future  -     HYDROcodone-acetaminophen (NORCO) 5-325 mg per tablet; Take 1 tablet by mouth every 6 (six) hours as needed for Pain.  -     Comprehensive Metabolic Panel; Future  -     Amylase; Future  -     LIPASE; Future    Nausea  -     ondansetron (ZOFRAN) 4 MG tablet; Take 1 tablet (4 mg total) by mouth every 6 (six) hours as needed for Nausea.    Morbid obesity with BMI of 45.0-49.9, adult    Patient advised to call for results.  Continue current medications, follow low sodium, low cholesterol, low carb diet, daily walks.  Add Norco 5-325 mg every 6 hours prn pain; medication precautions discussed with patient.  Prescription refills as noted above.  Flu shot this fall.  Follow up if symptoms worsen or fail to improve.

## 2022-09-01 ENCOUNTER — OFFICE VISIT (OUTPATIENT)
Dept: OTOLARYNGOLOGY | Facility: CLINIC | Age: 32
End: 2022-09-01
Payer: COMMERCIAL

## 2022-09-01 VITALS
BODY MASS INDEX: 45.23 KG/M2 | HEART RATE: 76 BPM | DIASTOLIC BLOOD PRESSURE: 84 MMHG | TEMPERATURE: 97 F | WEIGHT: 276 LBS | SYSTOLIC BLOOD PRESSURE: 134 MMHG

## 2022-09-01 DIAGNOSIS — J03.90 ACUTE ADENOIDITIS: Primary | ICD-10-CM

## 2022-09-01 DIAGNOSIS — J35.2 ADENOID HYPERTROPHY: ICD-10-CM

## 2022-09-01 PROCEDURE — 3044F HG A1C LEVEL LT 7.0%: CPT | Mod: CPTII,S$GLB,, | Performed by: STUDENT IN AN ORGANIZED HEALTH CARE EDUCATION/TRAINING PROGRAM

## 2022-09-01 PROCEDURE — 3044F PR MOST RECENT HEMOGLOBIN A1C LEVEL <7.0%: ICD-10-PCS | Mod: CPTII,S$GLB,, | Performed by: STUDENT IN AN ORGANIZED HEALTH CARE EDUCATION/TRAINING PROGRAM

## 2022-09-01 PROCEDURE — 1159F PR MEDICATION LIST DOCUMENTED IN MEDICAL RECORD: ICD-10-PCS | Mod: CPTII,S$GLB,, | Performed by: STUDENT IN AN ORGANIZED HEALTH CARE EDUCATION/TRAINING PROGRAM

## 2022-09-01 PROCEDURE — 3079F PR MOST RECENT DIASTOLIC BLOOD PRESSURE 80-89 MM HG: ICD-10-PCS | Mod: CPTII,S$GLB,, | Performed by: STUDENT IN AN ORGANIZED HEALTH CARE EDUCATION/TRAINING PROGRAM

## 2022-09-01 PROCEDURE — 3008F BODY MASS INDEX DOCD: CPT | Mod: CPTII,S$GLB,, | Performed by: STUDENT IN AN ORGANIZED HEALTH CARE EDUCATION/TRAINING PROGRAM

## 2022-09-01 PROCEDURE — 31231 NASAL ENDOSCOPY DX: CPT | Mod: S$GLB,,, | Performed by: STUDENT IN AN ORGANIZED HEALTH CARE EDUCATION/TRAINING PROGRAM

## 2022-09-01 PROCEDURE — 99214 PR OFFICE/OUTPT VISIT, EST, LEVL IV, 30-39 MIN: ICD-10-PCS | Mod: 25,S$GLB,, | Performed by: STUDENT IN AN ORGANIZED HEALTH CARE EDUCATION/TRAINING PROGRAM

## 2022-09-01 PROCEDURE — 99999 PR PBB SHADOW E&M-EST. PATIENT-LVL III: ICD-10-PCS | Mod: PBBFAC,,, | Performed by: STUDENT IN AN ORGANIZED HEALTH CARE EDUCATION/TRAINING PROGRAM

## 2022-09-01 PROCEDURE — 31231 PR NASAL ENDOSCOPY, DX: ICD-10-PCS | Mod: S$GLB,,, | Performed by: STUDENT IN AN ORGANIZED HEALTH CARE EDUCATION/TRAINING PROGRAM

## 2022-09-01 PROCEDURE — 99214 OFFICE O/P EST MOD 30 MIN: CPT | Mod: 25,S$GLB,, | Performed by: STUDENT IN AN ORGANIZED HEALTH CARE EDUCATION/TRAINING PROGRAM

## 2022-09-01 PROCEDURE — 3075F SYST BP GE 130 - 139MM HG: CPT | Mod: CPTII,S$GLB,, | Performed by: STUDENT IN AN ORGANIZED HEALTH CARE EDUCATION/TRAINING PROGRAM

## 2022-09-01 PROCEDURE — 99999 PR PBB SHADOW E&M-EST. PATIENT-LVL III: CPT | Mod: PBBFAC,,, | Performed by: STUDENT IN AN ORGANIZED HEALTH CARE EDUCATION/TRAINING PROGRAM

## 2022-09-01 PROCEDURE — 3008F PR BODY MASS INDEX (BMI) DOCUMENTED: ICD-10-PCS | Mod: CPTII,S$GLB,, | Performed by: STUDENT IN AN ORGANIZED HEALTH CARE EDUCATION/TRAINING PROGRAM

## 2022-09-01 PROCEDURE — 3075F PR MOST RECENT SYSTOLIC BLOOD PRESS GE 130-139MM HG: ICD-10-PCS | Mod: CPTII,S$GLB,, | Performed by: STUDENT IN AN ORGANIZED HEALTH CARE EDUCATION/TRAINING PROGRAM

## 2022-09-01 PROCEDURE — 1159F MED LIST DOCD IN RCRD: CPT | Mod: CPTII,S$GLB,, | Performed by: STUDENT IN AN ORGANIZED HEALTH CARE EDUCATION/TRAINING PROGRAM

## 2022-09-01 PROCEDURE — 3079F DIAST BP 80-89 MM HG: CPT | Mod: CPTII,S$GLB,, | Performed by: STUDENT IN AN ORGANIZED HEALTH CARE EDUCATION/TRAINING PROGRAM

## 2022-09-01 RX ORDER — METHYLPREDNISOLONE 4 MG/1
TABLET ORAL
Qty: 21 TABLET | Refills: 0 | Status: SHIPPED | OUTPATIENT
Start: 2022-09-01 | End: 2022-09-30

## 2022-09-01 NOTE — PROGRESS NOTES
Referring Provider:    No referring provider defined for this encounter.  Subjective:   Patient: Anthony Sexton [id number], :1990   Visit date:2022 12:22 PM    Chief Complaint: see below       Follow-up (Pressure in sinus) and Otalgia (R>L)      HPI:    Prior notes reviewed  Clinical documentation obtained by nursing staff reviewed.     Previous adenoiditis that resolved with levaquin.    Today, endorses return of pressure in back of nose. Worsened after URI 3 weeks  go.    Noticed mostly at night, less so during day. Notices more while lying on her back. Associated with waking up feeling like she is stopping breathing. Has been snoring more than usual.     Some fullness of right ear as well.     Using Flonase sometimes several times per day.     PSG was done - 2 years ago. Negative for SAVANNA.    Objective:     Physical Exam:  Vitals:  /84   Pulse 76   Temp 97.2 °F (36.2 °C) (Temporal)   Wt 125.2 kg (276 lb 0.3 oz)   LMP 2022   BMI 45.23 kg/m²   General appearance:  Well developed, well nourished    Ears:  Otoscopy of external auditory canals and tympanic membranes was normal, clinical speech reception thresholds grossly intact, no mass/lesion of auricle.    Nose:  No masses/lesions of external nose, nasal mucosa, septum, and turbinates were within normal limits.    Mouth:  No mass/lesion of lips, teeth, gums, hard/soft palate, tongue, tonsils, or oropharynx.    Neck & Lymphatics:  No cervical lymphadenopathy, no neck mass/crepitus/ asymmetry, trachea is midline, no thyroid enlargement/tenderness/mass.        [x]  Data Reviewed:    Lab Results   Component Value Date    WBC 7.36 2022    HGB 12.1 2022    HCT 40.3 2022    MCV 80 (L) 2022    EOSINOPHIL 0.7 2022       PSG   Clinically significant sleep disordered breathing is not identified, and  pulse rate arousals occur very frequently. mild snoring recorded. Saturation caitlyn was 85%. Continued follow-up to  ensure resolution  of symptoms. Sleep hygiene. Weight loss. Repeat testing may be considered if symptoms persist .      Procedure Note - Rigid Nasal Endoscopy     Surgeon: Corey Lucio MD  Anesthesia: topical oxymetazoline and 4% lidocaine.    Technique: The nose was sprayed with oxymetazoline and 4% lidocaine. With the patient in the upright position, a 2.7mm 30-degree endscope was inserted into the patient's right and left nare.  Where visible, nasal secretions and mucosal crusting were removed with a suction. The overall appearance of the nasal cavity and paranasal sinuses were noted and the findings are described below.     Findings: The nasal septum was intact and was not deviated.  The inferior turbinates were enlarged. There was not any evidence of nasal polyps, masses, or lesions within the nasal cavity.  Mucopurulent drainage was not noted at the middle meatus, frontal recess, or sphenoethmoidal recess.  Hypertrophy of adenoid pad (R>L)            Assessment & Plan:     1. Acute adenoiditis    2. Adenoid hypertrophy        Recurrent adenoiditis after recent URI  Conservative management, will add medrol dosepack  Already doing well with AR and GERD therapy  F/u in 3-4 weeks for repeat exam after treatment and while not sick; if persistent asymmetry/enlargement will plan for bx

## 2022-09-15 ENCOUNTER — OFFICE VISIT (OUTPATIENT)
Dept: PSYCHIATRY | Facility: CLINIC | Age: 32
End: 2022-09-15
Payer: COMMERCIAL

## 2022-09-15 DIAGNOSIS — F90.0 ATTENTION DEFICIT HYPERACTIVITY DISORDER (ADHD), PREDOMINANTLY INATTENTIVE TYPE: ICD-10-CM

## 2022-09-15 DIAGNOSIS — F34.1 DYSTHYMIA: ICD-10-CM

## 2022-09-15 DIAGNOSIS — F41.1 GAD (GENERALIZED ANXIETY DISORDER): Primary | ICD-10-CM

## 2022-09-15 PROCEDURE — 3044F HG A1C LEVEL LT 7.0%: CPT | Mod: CPTII,95,, | Performed by: STUDENT IN AN ORGANIZED HEALTH CARE EDUCATION/TRAINING PROGRAM

## 2022-09-15 PROCEDURE — 3044F PR MOST RECENT HEMOGLOBIN A1C LEVEL <7.0%: ICD-10-PCS | Mod: CPTII,95,, | Performed by: STUDENT IN AN ORGANIZED HEALTH CARE EDUCATION/TRAINING PROGRAM

## 2022-09-15 PROCEDURE — 90834 PR PSYCHOTHERAPY W/PATIENT, 45 MIN: ICD-10-PCS | Mod: 95,,, | Performed by: STUDENT IN AN ORGANIZED HEALTH CARE EDUCATION/TRAINING PROGRAM

## 2022-09-15 PROCEDURE — 90834 PSYTX W PT 45 MINUTES: CPT | Mod: 95,,, | Performed by: STUDENT IN AN ORGANIZED HEALTH CARE EDUCATION/TRAINING PROGRAM

## 2022-09-15 PROCEDURE — 1159F PR MEDICATION LIST DOCUMENTED IN MEDICAL RECORD: ICD-10-PCS | Mod: CPTII,95,, | Performed by: STUDENT IN AN ORGANIZED HEALTH CARE EDUCATION/TRAINING PROGRAM

## 2022-09-15 PROCEDURE — 1159F MED LIST DOCD IN RCRD: CPT | Mod: CPTII,95,, | Performed by: STUDENT IN AN ORGANIZED HEALTH CARE EDUCATION/TRAINING PROGRAM

## 2022-09-16 NOTE — PROGRESS NOTES
[facility] [facility]  Psychology  Progress Note  Individual Psychotherapy (PhD/LCSW)    Patient Name: Anthony Sexton  MRN: [id number]    Patient Class: OP- [facility]  Primary Care Provider: Tyesha Leggett MD    Psychiatry Initial Visit (PhD/LCSW)  Individual Psychotherapy - CPT 51584    Date: 9/15/2022    Site: [place]    Referral source: Lenora Walters MD    Clinical status of patient: Outpatient    Anthony Sexton, a 32 y.o. female, for initial evaluation visit.  Met with patient.    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     Substance use:   Alcohol: none   Drugs: none   Tobacco: none   Caffeine: none    Current medications and drug reactions (include OTC, herbal): see medication list     Strengths and liabilities: Strength: Patient accepts guidance/feedback, Strength: Patient is expressive/articulate., Strength: Patient is intelligent., Strength: Patient is motivated for change., Strength: Patient has positive support network., Strength: Patient has reasonable judgment., Strength: Patient is stable., Liability: Patient lacks coping skills.    Current Evaluation:     Mental Status Exam:  General Appearance:  unremarkable, age appropriate   Speech: normal tone, normal rate,  normal pitch, normal volume      Level of Cooperation: cooperative      Thought Processes: normal and logical   Mood: euthymic      Thought Content: normal, no suicidality, no homicidality, delusions, or paranoia   Affect: congruent and appropriate   Orientation: Oriented x3   Memory: recent >  intact   Attention Span & Concentration: intact   Fund of General Knowledge: intact and appropriate to age and level of education   Abstract Reasoning: not assessed   Judgment & Insight: fair     Language  intact     Summary: The patient reported feeling well-rested after taking a mental health day yesterday. She spoke to her  about all that we discussed last week and they are both working to make changes so that the patient doesn't get too burned out. She would like to find ways to improve communication with her , which we will work on next session.     Diagnostic Impression - Plan:       ICD-10-CM ICD-9-CM   1. SENTHIL (generalized anxiety disorder)  F41.1 300.02   2. Attention deficit hyperactivity disorder (ADHD), predominantly inattentive type  F90.0 314.00   3. Dysthymia  F34.1 300.4         Plan:individual psychotherapy    Return to Clinic: as scheduled    Length of Service (minutes): 45          Barbra Bartlett, PhD  Psychologist  [facility]

## 2022-09-19 ENCOUNTER — OFFICE VISIT (OUTPATIENT)
Dept: OTOLARYNGOLOGY | Facility: CLINIC | Age: 32
End: 2022-09-19
Payer: COMMERCIAL

## 2022-09-19 VITALS — BODY MASS INDEX: 44.94 KG/M2 | TEMPERATURE: 99 F | WEIGHT: 274.25 LBS

## 2022-09-19 DIAGNOSIS — J35.2 ADENOID HYPERTROPHY: Primary | ICD-10-CM

## 2022-09-19 DIAGNOSIS — J35.02 CHRONIC ADENOIDITIS: ICD-10-CM

## 2022-09-19 PROCEDURE — 3008F BODY MASS INDEX DOCD: CPT | Mod: CPTII,S$GLB,, | Performed by: STUDENT IN AN ORGANIZED HEALTH CARE EDUCATION/TRAINING PROGRAM

## 2022-09-19 PROCEDURE — 1159F MED LIST DOCD IN RCRD: CPT | Mod: CPTII,S$GLB,, | Performed by: STUDENT IN AN ORGANIZED HEALTH CARE EDUCATION/TRAINING PROGRAM

## 2022-09-19 PROCEDURE — 99213 PR OFFICE/OUTPT VISIT, EST, LEVL III, 20-29 MIN: ICD-10-PCS | Mod: 25,S$GLB,, | Performed by: STUDENT IN AN ORGANIZED HEALTH CARE EDUCATION/TRAINING PROGRAM

## 2022-09-19 PROCEDURE — 31231 PR NASAL ENDOSCOPY, DX: ICD-10-PCS | Mod: S$GLB,,, | Performed by: STUDENT IN AN ORGANIZED HEALTH CARE EDUCATION/TRAINING PROGRAM

## 2022-09-19 PROCEDURE — 99999 PR PBB SHADOW E&M-EST. PATIENT-LVL III: ICD-10-PCS | Mod: PBBFAC,,, | Performed by: STUDENT IN AN ORGANIZED HEALTH CARE EDUCATION/TRAINING PROGRAM

## 2022-09-19 PROCEDURE — 31231 NASAL ENDOSCOPY DX: CPT | Mod: S$GLB,,, | Performed by: STUDENT IN AN ORGANIZED HEALTH CARE EDUCATION/TRAINING PROGRAM

## 2022-09-19 PROCEDURE — 3044F HG A1C LEVEL LT 7.0%: CPT | Mod: CPTII,S$GLB,, | Performed by: STUDENT IN AN ORGANIZED HEALTH CARE EDUCATION/TRAINING PROGRAM

## 2022-09-19 PROCEDURE — 99999 PR PBB SHADOW E&M-EST. PATIENT-LVL III: CPT | Mod: PBBFAC,,, | Performed by: STUDENT IN AN ORGANIZED HEALTH CARE EDUCATION/TRAINING PROGRAM

## 2022-09-19 PROCEDURE — 3008F PR BODY MASS INDEX (BMI) DOCUMENTED: ICD-10-PCS | Mod: CPTII,S$GLB,, | Performed by: STUDENT IN AN ORGANIZED HEALTH CARE EDUCATION/TRAINING PROGRAM

## 2022-09-19 PROCEDURE — 1159F PR MEDICATION LIST DOCUMENTED IN MEDICAL RECORD: ICD-10-PCS | Mod: CPTII,S$GLB,, | Performed by: STUDENT IN AN ORGANIZED HEALTH CARE EDUCATION/TRAINING PROGRAM

## 2022-09-19 PROCEDURE — 3044F PR MOST RECENT HEMOGLOBIN A1C LEVEL <7.0%: ICD-10-PCS | Mod: CPTII,S$GLB,, | Performed by: STUDENT IN AN ORGANIZED HEALTH CARE EDUCATION/TRAINING PROGRAM

## 2022-09-19 PROCEDURE — 99213 OFFICE O/P EST LOW 20 MIN: CPT | Mod: 25,S$GLB,, | Performed by: STUDENT IN AN ORGANIZED HEALTH CARE EDUCATION/TRAINING PROGRAM

## 2022-09-19 RX ORDER — AMOXICILLIN AND CLAVULANATE POTASSIUM 875; 125 MG/1; MG/1
1 TABLET, FILM COATED ORAL EVERY 12 HOURS
Qty: 14 TABLET | Refills: 0 | Status: SHIPPED | OUTPATIENT
Start: 2022-09-19 | End: 2022-09-27

## 2022-09-19 NOTE — PROGRESS NOTES
Referring Provider:    No referring provider defined for this encounter.  Subjective:   Patient: Anthony Sexton [id number], :1990   Visit date:2022 12:22 PM    Chief Complaint: see below       Follow-up (Patient returns to clinic for no improvement in current condition. Patient states that she couldn't wait to come in as she has not been getting any sleep. Patient states that when she begins to fall asleep, she feels as if something covers her windpipe and she wakes up gasping for air. )      HPI:    Prior notes reviewed  Clinical documentation obtained by nursing staff reviewed.     Previous adenoiditis that resolved with levaquin.    Today, endorses return of pressure in back of nose. Worsened after URI 3 weeks  go.    Noticed mostly at night, less so during day. Notices more while lying on her back. Associated with waking up feeling like she is stopping breathing. Has been snoring more than usual.     Some fullness of right ear as well.     Using Flonase sometimes several times per day.     PSG was done - 2 years ago. Negative for SAVANNA.    Update 22  No improvement with recent round of steroids.   Only improvement was after round of antibiotics  Continues to endorse symptoms as above - nasal obstruction (b/l)    Objective:     Physical Exam:  Vitals:  Temp 99 °F (37.2 °C) (Temporal)   Wt 124.4 kg (274 lb 4 oz)   BMI 44.94 kg/m²   General appearance:  Well developed, well nourished    Ears:  Otoscopy of external auditory canals and tympanic membranes was normal, clinical speech reception thresholds grossly intact, no mass/lesion of auricle.    Nose:  No masses/lesions of external nose, nasal mucosa, septum, and turbinates were within normal limits.    Mouth:  No mass/lesion of lips, teeth, gums, hard/soft palate, tongue, tonsils, or oropharynx.    Neck & Lymphatics:  No cervical lymphadenopathy, no neck mass/crepitus/ asymmetry, trachea is midline, no thyroid  enlargement/tenderness/mass.        [x]  Data Reviewed:    Lab Results   Component Value Date    WBC 7.36 05/24/2022    HGB 12.1 05/24/2022    HCT 40.3 05/24/2022    MCV 80 (L) 05/24/2022    EOSINOPHIL 0.7 05/24/2022       PSG 2020  Clinically significant sleep disordered breathing is not identified, and  pulse rate arousals occur very frequently. mild snoring recorded. Saturation caitlyn was 85%. Continued follow-up to ensure resolution  of symptoms. Sleep hygiene. Weight loss. Repeat testing may be considered if symptoms persist .      Procedure Note - Rigid Nasal Endoscopy     Surgeon: Corey Lucio MD  Anesthesia: topical oxymetazoline and 4% lidocaine.    Technique: The nose was sprayed with oxymetazoline and 4% lidocaine. With the patient in the upright position, a 2.7mm 30-degree endscope was inserted into the patient's right and left nare.  Where visible, nasal secretions and mucosal crusting were removed with a suction. The overall appearance of the nasal cavity and paranasal sinuses were noted and the findings are described below.     Findings: The nasal septum was intact and was not deviated.  The inferior turbinates were enlarged. There was not any evidence of nasal polyps, masses, or lesions within the nasal cavity.  Mucopurulent drainage was not noted at the middle meatus, frontal recess, or sphenoethmoidal recess.  Hypertrophy of adenoid pad (R>L), completely obstructing right choana             Assessment & Plan:     No diagnosis found.    Recurrent adenoiditis (has improved with round of abx previously)  Already doing well with AR and GERD therapy  Will treat with one more round of abx and f/u in 3 weeks at the [facility] with plans for bx if not resolved

## 2022-09-26 ENCOUNTER — PATIENT MESSAGE (OUTPATIENT)
Dept: OTOLARYNGOLOGY | Facility: CLINIC | Age: 32
End: 2022-09-26
Payer: COMMERCIAL

## 2022-09-26 ENCOUNTER — HOSPITAL ENCOUNTER (EMERGENCY)
Facility: HOSPITAL | Age: 32
Discharge: HOME | End: 2022-09-26
Attending: EMERGENCY MEDICINE
Payer: COMMERCIAL

## 2022-09-26 VITALS
RESPIRATION RATE: 18 BRPM | HEART RATE: 98 BPM | DIASTOLIC BLOOD PRESSURE: 69 MMHG | SYSTOLIC BLOOD PRESSURE: 164 MMHG | OXYGEN SATURATION: 100 % | TEMPERATURE: 99 F

## 2022-09-26 DIAGNOSIS — J35.02 ADENOIDITIS: Primary | ICD-10-CM

## 2022-09-26 PROCEDURE — 99283 EMERGENCY DEPT VISIT LOW MDM: CPT

## 2022-09-26 RX ORDER — PREDNISONE 20 MG/1
40 TABLET ORAL DAILY
Qty: 10 TABLET | Refills: 0 | Status: SHIPPED | OUTPATIENT
Start: 2022-09-26 | End: 2022-10-03

## 2022-09-26 NOTE — TELEPHONE ENCOUNTER
Can you see if we can get her in sooner at the [facility] for an appointment? Need 30 minutes.  Notified via [employer].

## 2022-09-26 NOTE — ED PROVIDER NOTES
HISTORY     Chief Complaint   Patient presents with    Headache     Headache, nasal congestion     Review of patient's allergies indicates:   Allergen Reactions    Procardia [nifedipine]         HPI   32-year-old female presents the emergency department for recurrent nasal congestion, headache.  Patient reports she has been diagnosed with adenoiditis.  Patient is currently seeing our ENT team and has been prescribed steroids and antibiotics without any relief.  Patient denies any fever, chills, chest pain, shortness of breath, back pain, abdominal pain, nausea, vomiting, and all other concerns at this time.    The history is provided by the patient. No  was used.      PCP: Tyesha Leggett MD     Past Medical History:  Past Medical History:   Diagnosis Date    Anemia     Anxiety     Chronic low back pain     Depression     History of abnormal cervical Pap smear     History of recurrent UTIs     History of ulcer disease     Irregular menses     Obesity     Postpartum depression     with last pregnancy.     Pre-eclampsia in postpartum period 4/23/2020    Vitamin D deficiency         Past Surgical History:  Past Surgical History:   Procedure Laterality Date    ESOPHAGOGASTRODUODENOSCOPY N/A 5/26/2021    Procedure: EGD (ESOPHAGOGASTRODUODENOSCOPY);  Surgeon: Alka Olmstead MD;  Location: [facility];  Service: Endoscopy;  Laterality: N/A;    ESOPHAGOGASTRODUODENOSCOPY N/A 10/4/2021    Procedure: EGD (ESOPHAGOGASTRODUODENOSCOPY);  Surgeon: Alka Olmstead MD;  Location: [facility];  Service: Endoscopy;  Laterality: N/A;    None      REDUCTION OF BOTH BREASTS Bilateral 5/27/2021    Procedure: MAMMOPLASTY, REDUCTION, BILATERAL;  Surgeon: Daniel Ba MD;  Location: [address];  Service: Plastics;  Laterality: Bilateral;  Left breast tissue: 1584  Right breast tissue: 1438        Family History:  Family History   Problem Relation Age of Onset    Asthma Mother     Hyperlipidemia  Mother     Hypertension Mother     Hypertension Father     Hyperlipidemia Father     Heart disease Father 55        MI/CAD    No Known Problems Sister     No Known Problems Brother     Diabetes Maternal Aunt     Diabetes Maternal Grandmother     Insulin resistance Sister     No Known Problems Brother     No Known Problems Brother     No Known Problems Brother     No Known Problems Brother     No Known Problems Daughter     Breast cancer Maternal Aunt     Cancer Neg Hx     Stroke Neg Hx     Brain cancer Neg Hx     Migraines Neg Hx     Colon cancer Neg Hx     Ovarian cancer Neg Hx     Anesthesia problems Neg Hx         Social History:  Social History     Tobacco Use    Smoking status: Never    Smokeless tobacco: Never   Substance and Sexual Activity    Alcohol use: Not Currently     Alcohol/week: 0.0 standard drinks    Drug use: No    Sexual activity: Yes     Partners: Male     Birth control/protection: None         ROS   Review of Systems   Constitutional:  Negative for fever.   HENT:  Negative for sore throat.         +nasal pain   +congestion   Respiratory:  Negative for shortness of breath.    Cardiovascular:  Negative for chest pain.   Gastrointestinal:  Negative for nausea.   Genitourinary:  Negative for dysuria.   Musculoskeletal:  Negative for back pain.   Skin:  Negative for rash.   Neurological:  Negative for weakness.   Hematological:  Does not bruise/bleed easily.     PHYSICAL EXAM     Initial Vitals [09/26/22 1216]   BP Pulse Resp Temp SpO2   (!) 164/69 98 18 98.8 °F (37.1 °C) 100 %      MAP       --           Physical Exam    Nursing note and vitals reviewed.  Constitutional: She appears well-developed and well-nourished. She is not diaphoretic. No distress.   HENT:   Head: Normocephalic and atraumatic.   Mouth/Throat: Oropharynx is clear and moist. No oropharyngeal exudate.   Eyes: Right eye exhibits no discharge. Left eye exhibits no discharge.   Neck: Neck supple.   Normal range of  motion.  Cardiovascular:  Normal rate.           Pulmonary/Chest: No respiratory distress.   Abdominal: Abdomen is soft. She exhibits no distension.   Musculoskeletal:         General: Normal range of motion.      Cervical back: Normal range of motion and neck supple.     Neurological: She is alert and oriented to person, place, and time. She has normal strength.   Skin: Skin is warm and dry.   Psychiatric: She has a normal mood and affect. Her behavior is normal. Thought content normal.        ED COURSE   Procedures  ED ONGOING VITALS:  Vitals:    09/26/22 1216   BP: (!) 164/69   Pulse: 98   Resp: 18   Temp: 98.8 °F (37.1 °C)   TempSrc: Oral   SpO2: 100%         ABNORMAL LAB VALUES:  Labs Reviewed - No data to display      ALL LAB VALUES:  none      RADIOLOGY STUDIES:  Imaging Results    None                   The above vital signs and test results have been reviewed by the emergency provider.     ED Medications:  Current Discharge Medication List        START taking these medications    Details   predniSONE (DELTASONE) 20 MG tablet Take 2 tablets (40 mg total) by mouth once daily. for 5 days  Qty: 10 tablet, Refills: 0           Discharge Medications:  New Prescriptions    PREDNISONE (DELTASONE) 20 MG TABLET    Take 2 tablets (40 mg total) by mouth once daily. for 5 days       Follow-up Information       Schedule an appointment as soon as possible for a visit  with Otolaryngology.    Specialty: Otolaryngology  Contact information:  [address] [place] [id number]  [phone]             [facility] - Emergency Dept..    Specialty: Emergency Medicine  Why: If symptoms worsen  Contact information:  [address] [place] [id number]  [phone]                        1:07 PM: I discussed the pt's case with Dr. cook (ent) who recommends having the patient follow-up with ENT in clinic for further treatment.  He states that the patient can have another round of  steroids if she chooses.    I discussed with patient and/or family/caretaker that evaluation in the ED does not suggest any emergent or life threatening medical conditions requiring immediate intervention beyond what was provided in the ED, and I believe patient is safe for discharge. Regardless, an unremarkable evaluation in the ED does not preclude the development or presence of a serious or life threatening condition. As such, patient was instructed to return immediately for any worsening or change in current symptoms.      MEDICAL DECISION MAKING                 CLINICAL IMPRESSION       ICD-10-CM ICD-9-CM   1. Adenoiditis  J35.02 474.01       Disposition:   Disposition: Discharged  Condition: Stable       Ricardo Rico NP  09/26/22 1308

## 2022-09-27 ENCOUNTER — TELEPHONE (OUTPATIENT)
Dept: PSYCHIATRY | Facility: CLINIC | Age: 32
End: 2022-09-27
Payer: COMMERCIAL

## 2022-09-27 ENCOUNTER — HOSPITAL ENCOUNTER (EMERGENCY)
Facility: HOSPITAL | Age: 32
Discharge: HOME | End: 2022-09-27
Attending: EMERGENCY MEDICINE
Payer: COMMERCIAL

## 2022-09-27 VITALS
HEART RATE: 93 BPM | WEIGHT: 275.56 LBS | BODY MASS INDEX: 45.16 KG/M2 | SYSTOLIC BLOOD PRESSURE: 115 MMHG | DIASTOLIC BLOOD PRESSURE: 74 MMHG | TEMPERATURE: 98 F | OXYGEN SATURATION: 98 % | RESPIRATION RATE: 21 BRPM

## 2022-09-27 DIAGNOSIS — R00.2 PALPITATIONS: Primary | ICD-10-CM

## 2022-09-27 DIAGNOSIS — R07.9 CHEST PAIN: ICD-10-CM

## 2022-09-27 LAB
ALBUMIN SERPL BCP-MCNC: 4 G/DL (ref 3.5–5.2)
ALP SERPL-CCNC: 113 U/L (ref 55–135)
ALT SERPL W/O P-5'-P-CCNC: 20 U/L (ref 10–44)
AMPHET+METHAMPHET UR QL: NEGATIVE
ANION GAP SERPL CALC-SCNC: 9 MMOL/L (ref 8–16)
AST SERPL-CCNC: 19 U/L (ref 10–40)
B-HCG UR QL: NEGATIVE
BARBITURATES UR QL SCN>200 NG/ML: NEGATIVE
BASOPHILS # BLD AUTO: 0.03 K/UL (ref 0–0.2)
BASOPHILS NFR BLD: 0.3 % (ref 0–1.9)
BENZODIAZ UR QL SCN>200 NG/ML: NEGATIVE
BILIRUB SERPL-MCNC: 0.3 MG/DL (ref 0.1–1)
BILIRUB UR QL STRIP: NEGATIVE
BUN SERPL-MCNC: 11 MG/DL (ref 6–20)
BZE UR QL SCN: NEGATIVE
CALCIUM SERPL-MCNC: 9.5 MG/DL (ref 8.7–10.5)
CANNABINOIDS UR QL SCN: NEGATIVE
CHLORIDE SERPL-SCNC: 105 MMOL/L (ref 95–110)
CK SERPL-CCNC: 183 U/L (ref 20–180)
CLARITY UR: ABNORMAL
CO2 SERPL-SCNC: 22 MMOL/L (ref 23–29)
COLOR UR: COLORLESS
CREAT SERPL-MCNC: 0.9 MG/DL (ref 0.5–1.4)
CREAT UR-MCNC: 30.8 MG/DL (ref 15–325)
D DIMER PPP IA.FEU-MCNC: 1.13 MG/L FEU
DIFFERENTIAL METHOD: ABNORMAL
EOSINOPHIL # BLD AUTO: 0 K/UL (ref 0–0.5)
EOSINOPHIL NFR BLD: 0 % (ref 0–8)
ERYTHROCYTE [DISTWIDTH] IN BLOOD BY AUTOMATED COUNT: 13.2 % (ref 11.5–14.5)
EST. GFR  (NO RACE VARIABLE): >60 ML/MIN/1.73 M^2
GLUCOSE SERPL-MCNC: 104 MG/DL (ref 70–110)
GLUCOSE UR QL STRIP: NEGATIVE
HCT VFR BLD AUTO: 43.2 % (ref 37–48.5)
HGB BLD-MCNC: 13.2 G/DL (ref 12–16)
HGB UR QL STRIP: NEGATIVE
IMM GRANULOCYTES # BLD AUTO: 0.09 K/UL (ref 0–0.04)
IMM GRANULOCYTES NFR BLD AUTO: 0.8 % (ref 0–0.5)
KETONES UR QL STRIP: NEGATIVE
LEUKOCYTE ESTERASE UR QL STRIP: ABNORMAL
LYMPHOCYTES # BLD AUTO: 1.3 K/UL (ref 1–4.8)
LYMPHOCYTES NFR BLD: 11.3 % (ref 18–48)
MAGNESIUM SERPL-MCNC: 1.7 MG/DL (ref 1.6–2.6)
MCH RBC QN AUTO: 24 PG (ref 27–31)
MCHC RBC AUTO-ENTMCNC: 30.6 G/DL (ref 32–36)
MCV RBC AUTO: 79 FL (ref 82–98)
METHADONE UR QL SCN>300 NG/ML: NEGATIVE
MICROSCOPIC COMMENT: ABNORMAL
MONOCYTES # BLD AUTO: 0.1 K/UL (ref 0.3–1)
MONOCYTES NFR BLD: 0.9 % (ref 4–15)
NEUTROPHILS # BLD AUTO: 10 K/UL (ref 1.8–7.7)
NEUTROPHILS NFR BLD: 86.7 % (ref 38–73)
NITRITE UR QL STRIP: NEGATIVE
NRBC BLD-RTO: 0 /100 WBC
OPIATES UR QL SCN: NEGATIVE
PCP UR QL SCN>25 NG/ML: NEGATIVE
PH UR STRIP: 8 [PH] (ref 5–8)
PLATELET # BLD AUTO: 320 K/UL (ref 150–450)
PMV BLD AUTO: 10.3 FL (ref 9.2–12.9)
POTASSIUM SERPL-SCNC: 3.8 MMOL/L (ref 3.5–5.1)
PROT SERPL-MCNC: 9.2 G/DL (ref 6–8.4)
PROT UR QL STRIP: NEGATIVE
RBC # BLD AUTO: 5.5 M/UL (ref 4–5.4)
RBC #/AREA URNS HPF: 2 /HPF (ref 0–4)
SODIUM SERPL-SCNC: 136 MMOL/L (ref 136–145)
SP GR UR STRIP: <1.005 (ref 1–1.03)
SQUAMOUS #/AREA URNS HPF: 32 /HPF
TOXICOLOGY INFORMATION: NORMAL
TROPONIN I SERPL DL<=0.01 NG/ML-MCNC: <0.006 NG/ML (ref 0–0.03)
TSH SERPL DL<=0.005 MIU/L-ACNC: 0.58 UIU/ML (ref 0.4–4)
URN SPEC COLLECT METH UR: ABNORMAL
UROBILINOGEN UR STRIP-ACNC: NEGATIVE EU/DL
WBC # BLD AUTO: 11.51 K/UL (ref 3.9–12.7)
WBC #/AREA URNS HPF: 30 /HPF (ref 0–5)
WBC CLUMPS URNS QL MICRO: ABNORMAL

## 2022-09-27 PROCEDURE — 80307 DRUG TEST PRSMV CHEM ANLYZR: CPT | Performed by: EMERGENCY MEDICINE

## 2022-09-27 PROCEDURE — 84443 ASSAY THYROID STIM HORMONE: CPT | Performed by: EMERGENCY MEDICINE

## 2022-09-27 PROCEDURE — 84484 ASSAY OF TROPONIN QUANT: CPT | Performed by: NURSE PRACTITIONER

## 2022-09-27 PROCEDURE — 85025 COMPLETE CBC W/AUTO DIFF WBC: CPT | Performed by: NURSE PRACTITIONER

## 2022-09-27 PROCEDURE — 99285 EMERGENCY DEPT VISIT HI MDM: CPT | Mod: 25

## 2022-09-27 PROCEDURE — 83735 ASSAY OF MAGNESIUM: CPT | Performed by: EMERGENCY MEDICINE

## 2022-09-27 PROCEDURE — 25500020 PHARM REV CODE 255: Performed by: EMERGENCY MEDICINE

## 2022-09-27 PROCEDURE — 87086 URINE CULTURE/COLONY COUNT: CPT | Performed by: EMERGENCY MEDICINE

## 2022-09-27 PROCEDURE — 80053 COMPREHEN METABOLIC PANEL: CPT | Performed by: NURSE PRACTITIONER

## 2022-09-27 PROCEDURE — 25000003 PHARM REV CODE 250: Performed by: EMERGENCY MEDICINE

## 2022-09-27 PROCEDURE — 87186 SC STD MICRODIL/AGAR DIL: CPT | Performed by: EMERGENCY MEDICINE

## 2022-09-27 PROCEDURE — 93010 EKG 12-LEAD: ICD-10-PCS | Mod: ,,, | Performed by: INTERNAL MEDICINE

## 2022-09-27 PROCEDURE — 93010 ELECTROCARDIOGRAM REPORT: CPT | Mod: ,,, | Performed by: INTERNAL MEDICINE

## 2022-09-27 PROCEDURE — 81025 URINE PREGNANCY TEST: CPT | Performed by: EMERGENCY MEDICINE

## 2022-09-27 PROCEDURE — 82550 ASSAY OF CK (CPK): CPT | Performed by: NURSE PRACTITIONER

## 2022-09-27 PROCEDURE — 85379 FIBRIN DEGRADATION QUANT: CPT | Performed by: EMERGENCY MEDICINE

## 2022-09-27 PROCEDURE — 93005 ELECTROCARDIOGRAM TRACING: CPT

## 2022-09-27 PROCEDURE — 87088 URINE BACTERIA CULTURE: CPT | Performed by: EMERGENCY MEDICINE

## 2022-09-27 PROCEDURE — 87077 CULTURE AEROBIC IDENTIFY: CPT | Performed by: EMERGENCY MEDICINE

## 2022-09-27 PROCEDURE — 63600175 PHARM REV CODE 636 W HCPCS: Performed by: EMERGENCY MEDICINE

## 2022-09-27 PROCEDURE — 81000 URINALYSIS NONAUTO W/SCOPE: CPT | Mod: 59 | Performed by: EMERGENCY MEDICINE

## 2022-09-27 RX ORDER — PROCHLORPERAZINE MALEATE 5 MG
10 TABLET ORAL
Status: COMPLETED | OUTPATIENT
Start: 2022-09-27 | End: 2022-09-27

## 2022-09-27 RX ORDER — BUTALBITAL, ACETAMINOPHEN AND CAFFEINE 50; 325; 40 MG/1; MG/1; MG/1
1 TABLET ORAL
Status: COMPLETED | OUTPATIENT
Start: 2022-09-27 | End: 2022-09-27

## 2022-09-27 RX ADMIN — IOHEXOL 100 ML: 350 INJECTION, SOLUTION INTRAVENOUS at 08:09

## 2022-09-27 RX ADMIN — BUTALBITAL, ACETAMINOPHEN AND CAFFEINE 1 TABLET: 50; 325; 40 TABLET ORAL at 05:09

## 2022-09-27 RX ADMIN — PROCHLORPERAZINE MALEATE 10 MG: 5 TABLET ORAL at 05:09

## 2022-09-27 NOTE — FIRST PROVIDER EVALUATION
Medical screening examination initiated.  I have conducted a focused provider triage encounter, findings are as follows:    Brief history of present illness: 32 year old female with complaint of chest pain and palpitations since yesterday.     Vitals:    09/27/22 1623   BP: (!) 184/94   BP Location: Right arm   Patient Position: Sitting   Pulse: 92   Resp: 18   Temp: 98.2 °F (36.8 °C)   TempSrc: Oral   SpO2: 100%       Pertinent physical exam:  AAOX3

## 2022-09-27 NOTE — ED PROVIDER NOTES
"SCRIBE #1 NOTE: I, Kaylie Selby, am scribing for, and in the presence of, Deya Zhou MD. I have scribed the HPI, ROS, and PEx.     SCRIBE #2 NOTE: I, Papi Menchaca, am scribing for, and in the presence of,  Thomas Esparza MD. I have scribed the remaining portions of the note not scribed by Scribe #1.   History      Chief Complaint   Patient presents with    Palpitations     Pt reports feeling a "flutter" while driving PTA. Also reports mid sternal chest pain radiating to L chest. Also reports slight SOB    Headache     Starting yesterday       Review of patient's allergies indicates:   Allergen Reactions    Procardia [nifedipine]         HPI   HPI    9/27/2022, 5:16 PM   History obtained from the patient      History of Present Illness: Anthony Sexton is a 32 y.o. female patient with a PMHx of obesity who presents to the Emergency Department for palpitations which onset suddenly while she was driving PTA. The pt reports that she came to this ER yesterday for an evaluation of CP and a headache, was told that her sxs could be due to adenoiditis, and was discharged with a prescription for prednisone. Today, the pt was driving and started to feel her heart flutter which made her step on her breaks. While having the palpitations, the pt reports that her headache worsened. Symptoms are constant and moderate in severity. No mitigating or exacerbating factors reported. Associated sxs include CP, a throbbing headache, and SOB. The pt also c/o R hand paresthesias that onset 1 week ago which were intermittent, but are now constant. Patient denies any fever, chills, N/V/D, diaphoresis, weakness, dizziness, and all other sxs at this time. Pt reports that she is followed by ENT for adenoiditis and has taken steroids before for this. She denies taking birth control, consuming caffeine, or having a history of blood clots. No further complaints or concerns at this time.         Arrival mode: Personal vehicle      PCP: Tyesha LOPEZ" MD Oleksandr       Past Medical History:  Past Medical History:   Diagnosis Date    Anemia     Anxiety     Chronic low back pain     Depression     History of abnormal cervical Pap smear     History of recurrent UTIs     History of ulcer disease     Irregular menses     Obesity     Postpartum depression     with last pregnancy.     Pre-eclampsia in postpartum period 4/23/2020    Vitamin D deficiency        Past Surgical History:  Past Surgical History:   Procedure Laterality Date    ESOPHAGOGASTRODUODENOSCOPY N/A 5/26/2021    Procedure: EGD (ESOPHAGOGASTRODUODENOSCOPY);  Surgeon: Alka Olmstead MD;  Location: [facility];  Service: Endoscopy;  Laterality: N/A;    ESOPHAGOGASTRODUODENOSCOPY N/A 10/4/2021    Procedure: EGD (ESOPHAGOGASTRODUODENOSCOPY);  Surgeon: Alka Olmstead MD;  Location: [facility];  Service: Endoscopy;  Laterality: N/A;    None      REDUCTION OF BOTH BREASTS Bilateral 5/27/2021    Procedure: MAMMOPLASTY, REDUCTION, BILATERAL;  Surgeon: Daniel Ba MD;  Location: [address];  Service: Plastics;  Laterality: Bilateral;  Left breast tissue: 1584  Right breast tissue: 1438         Family History:  Family History   Problem Relation Age of Onset    Asthma Mother     Hyperlipidemia Mother     Hypertension Mother     Hypertension Father     Hyperlipidemia Father     Heart disease Father 55        MI/CAD    No Known Problems Sister     No Known Problems Brother     Diabetes Maternal Aunt     Diabetes Maternal Grandmother     Insulin resistance Sister     No Known Problems Brother     No Known Problems Brother     No Known Problems Brother     No Known Problems Brother     No Known Problems Daughter     Breast cancer Maternal Aunt     Cancer Neg Hx     Stroke Neg Hx     Brain cancer Neg Hx     Migraines Neg Hx     Colon cancer Neg Hx     Ovarian cancer Neg Hx     Anesthesia problems Neg Hx        Social History:  Social History     Tobacco Use    Smoking status: Never    Smokeless  tobacco: Never   Substance and Sexual Activity    Alcohol use: Not Currently     Alcohol/week: 0.0 standard drinks    Drug use: No    Sexual activity: Yes     Partners: Male     Birth control/protection: None       ROS   Review of Systems   Constitutional:  Negative for chills, diaphoresis and fever.   HENT:  Negative for sore throat.    Respiratory:  Positive for shortness of breath.    Cardiovascular:  Positive for chest pain and palpitations.   Gastrointestinal:  Negative for diarrhea, nausea and vomiting.   Genitourinary:  Negative for dysuria.   Musculoskeletal:  Negative for back pain.   Skin:  Negative for rash.   Neurological:  Positive for headaches (throbbing). Negative for dizziness and weakness.        (+) R hand paresthesias   Hematological:  Does not bruise/bleed easily.   All other systems reviewed and are negative.    Physical Exam      Initial Vitals [09/27/22 1623]   BP Pulse Resp Temp SpO2   (!) 184/94 92 18 98.2 °F (36.8 °C) 100 %      MAP       --          Physical Exam  Nursing Notes and Vital Signs Reviewed.  Constitutional: Patient is in no acute distress. Morbidly obese.  Head: Atraumatic. Normocephalic.  Eyes: PERRL. EOM intact. Conjunctivae are not pale. No scleral icterus.  ENT: Mucous membranes are moist. Oropharynx is clear and symmetric.    Neck: Supple. Full ROM. No lymphadenopathy.  Cardiovascular: Tachycardic rate. Regular rhythm. No murmurs, rubs, or gallops. Distal pulses are 2+ and symmetric.  Pulmonary/Chest: No respiratory distress. Clear to auscultation bilaterally. No wheezing or rales.  Abdominal: Soft and non-distended.  There is no tenderness.  No rebound, guarding, or rigidity.   Musculoskeletal: Moves all extremities. No obvious deformities. No edema.  Skin: Warm and dry.  Neurological:  Alert, awake, and appropriate.  Normal speech.  No acute focal neurological deficits are appreciated.  Psychiatric: Normal affect. Good eye contact. Appropriate in content.    ED Course     Procedures  ED Vital Signs:  Vitals:    09/27/22 1623 09/27/22 1706 09/27/22 1930 09/27/22 1945   BP: (!) 184/94   (!) 145/73   Pulse: 92 100 102 (!) 111   Resp: 18  (!) 28 (!) 32   Temp: 98.2 °F (36.8 °C)   98.2 °F (36.8 °C)   TempSrc: Oral      SpO2: 100%  99% 100%   Weight:        09/27/22 1947 09/27/22 2015 09/27/22 2030 09/27/22 2100   BP:  136/65 (!) 105/58 115/74   Pulse:   90 93   Resp:   (!) 22 (!) 21   Temp:       TempSrc:       SpO2:   99% 98%   Weight: 125 kg (275 lb 9.2 oz)          Abnormal Lab Results:  Labs Reviewed   CBC W/ AUTO DIFFERENTIAL - Abnormal; Notable for the following components:       Result Value    RBC 5.50 (*)     MCV 79 (*)     MCH 24.0 (*)     MCHC 30.6 (*)     Immature Granulocytes 0.8 (*)     Gran # (ANC) 10.0 (*)     Immature Grans (Abs) 0.09 (*)     Mono # 0.1 (*)     Gran % 86.7 (*)     Lymph % 11.3 (*)     Mono % 0.9 (*)     All other components within normal limits   COMPREHENSIVE METABOLIC PANEL - Abnormal; Notable for the following components:    CO2 22 (*)     Total Protein 9.2 (*)     All other components within normal limits   CK - Abnormal; Notable for the following components:     (*)     All other components within normal limits   URINALYSIS, REFLEX TO URINE CULTURE - Abnormal; Notable for the following components:    Color, UA Colorless (*)     Appearance, UA Hazy (*)     Specific Gravity, UA <1.005 (*)     Leukocytes, UA 2+ (*)     All other components within normal limits    Narrative:     Specimen Source->Urine   URINALYSIS MICROSCOPIC - Abnormal; Notable for the following components:    WBC, UA 30 (*)     WBC Clumps, UA Few (*)     All other components within normal limits    Narrative:     Specimen Source->Urine   D DIMER, QUANTITATIVE - Abnormal; Notable for the following components:    D-Dimer 1.13 (*)     All other components within normal limits   CULTURE, URINE   TROPONIN I   PREGNANCY TEST, URINE RAPID    Narrative:     Specimen Source->Urine    DRUG SCREEN PANEL, URINE EMERGENCY    Narrative:     Specimen Source->Urine   TSH   MAGNESIUM        All Lab Results:  Results for orders placed or performed during the hospital encounter of 09/27/22   CBC auto differential   Result Value Ref Range    WBC 11.51 3.90 - 12.70 K/uL    RBC 5.50 (H) 4.00 - 5.40 M/uL    Hemoglobin 13.2 12.0 - 16.0 g/dL    Hematocrit 43.2 37.0 - 48.5 %    MCV 79 (L) 82 - 98 fL    MCH 24.0 (L) 27.0 - 31.0 pg    MCHC 30.6 (L) 32.0 - 36.0 g/dL    RDW 13.2 11.5 - 14.5 %    Platelets 320 150 - 450 K/uL    MPV 10.3 9.2 - 12.9 fL    Immature Granulocytes 0.8 (H) 0.0 - 0.5 %    Gran # (ANC) 10.0 (H) 1.8 - 7.7 K/uL    Immature Grans (Abs) 0.09 (H) 0.00 - 0.04 K/uL    Lymph # 1.3 1.0 - 4.8 K/uL    Mono # 0.1 (L) 0.3 - 1.0 K/uL    Eos # 0.0 0.0 - 0.5 K/uL    Baso # 0.03 0.00 - 0.20 K/uL    nRBC 0 0 /100 WBC    Gran % 86.7 (H) 38.0 - 73.0 %    Lymph % 11.3 (L) 18.0 - 48.0 %    Mono % 0.9 (L) 4.0 - 15.0 %    Eosinophil % 0.0 0.0 - 8.0 %    Basophil % 0.3 0.0 - 1.9 %    Differential Method Automated    Comprehensive metabolic panel   Result Value Ref Range    Sodium 136 136 - 145 mmol/L    Potassium 3.8 3.5 - 5.1 mmol/L    Chloride 105 95 - 110 mmol/L    CO2 22 (L) 23 - 29 mmol/L    Glucose 104 70 - 110 mg/dL    BUN 11 6 - 20 mg/dL    Creatinine 0.9 0.5 - 1.4 mg/dL    Calcium 9.5 8.7 - 10.5 mg/dL    Total Protein 9.2 (H) 6.0 - 8.4 g/dL    Albumin 4.0 3.5 - 5.2 g/dL    Total Bilirubin 0.3 0.1 - 1.0 mg/dL    Alkaline Phosphatase 113 55 - 135 U/L    AST 19 10 - 40 U/L    ALT 20 10 - 44 U/L    Anion Gap 9 8 - 16 mmol/L    eGFR >60 >60 mL/min/1.73 m^2   CPK   Result Value Ref Range     (H) 20 - 180 U/L   Troponin I   Result Value Ref Range    Troponin I <0.006 0.000 - 0.026 ng/mL   Urinalysis, Reflex to Urine Culture Urine, Clean Catch    Specimen: Urine   Result Value Ref Range    Specimen UA Urine, Clean Catch     Color, UA Colorless (A) Yellow, Straw, Talia    Appearance, UA Hazy (A) Clear    pH,  UA 8.0 5.0 - 8.0    Specific Gravity, UA <1.005 (A) 1.005 - 1.030    Protein, UA Negative Negative    Glucose, UA Negative Negative    Ketones, UA Negative Negative    Bilirubin (UA) Negative Negative    Occult Blood UA Negative Negative    Nitrite, UA Negative Negative    Urobilinogen, UA Negative <2.0 EU/dL    Leukocytes, UA 2+ (A) Negative   Pregnancy, urine rapid (UPT)   Result Value Ref Range    Preg Test, Ur Negative    Drug screen panel, emergency   Result Value Ref Range    Benzodiazepines Negative Negative    Methadone metabolites Negative Negative    Cocaine (Metab.) Negative Negative    Opiate Scrn, Ur Negative Negative    Barbiturate Screen, Ur Negative Negative    Amphetamine Screen, Ur Negative Negative    THC Negative Negative    Phencyclidine Negative Negative    Creatinine, Urine 30.8 15.0 - 325.0 mg/dL    Toxicology Information SEE COMMENT    TSH   Result Value Ref Range    TSH 0.582 0.400 - 4.000 uIU/mL   Magnesium   Result Value Ref Range    Magnesium 1.7 1.6 - 2.6 mg/dL   Urinalysis Microscopic   Result Value Ref Range    RBC, UA 2 0 - 4 /hpf    WBC, UA 30 (H) 0 - 5 /hpf    WBC Clumps, UA Few (A) None-Rare    Squam Epithel, UA 32 /hpf    Microscopic Comment SEE COMMENT    D dimer, quantitative   Result Value Ref Range    D-Dimer 1.13 (H) <0.50 mg/L FEU         Imaging Results:  Imaging Results              CTA Chest Non-Coronary (PE Studies) (Final result)  Result time 09/27/22 20:23:42      Final result by Tee Castillo MD (09/27/22 20:23:42)                   Impression:      1.  Negative for pulmonary embolism, aneurysm or dissection.    2.  Stable findings as noted above.    All CT scans at this facility are performed  using dose modulation techniques as appropriate to performed exam including the following:  automated exposure control; adjustment of mA and/or kV according to the patients size (this includes techniques or standardized protocols for targeted exams where dose is matched to  indication/reason for exam: i.e. extremities or head);  iterative reconstruction technique.      Electronically signed by: Tee Castillo MD  Date:    09/27/2022  Time:    20:23               Narrative:    EXAMINATION:  CTA CHEST NON CORONARY (PE STUDIES), multiplanar and 3D MIP reconstructions    CLINICAL HISTORY:  Pulmonary embolism (PE) suspected, positive D-dimer;    TECHNIQUE:  Axial images through the chest were obtained with the use of IV contrast. Sagittal, coronal and 3D MIP <reconstructions are provided for review and permanently archived.>    COMPARISON:  Chest CT scan from May 11, 2021    FINDINGS:  The lungs are clear, considering there is mild respiratory motion artifact on a number of images.  Subtle abnormalities could be overlooked.  There are no infiltrates, effusions, or parenchymal lung nodules.    There is adequate opacification of the pulmonary arteries with visualization of the 1st through 4th order pulmonary arteries without pulmonary emboli.  The aorta is intact without evidence for aneurysm or dissection.    There are no hilar or mediastinal masses or lymphadenopathy.    The airways are patent.  The thyroid gland is normal.  The esophagus is normal.    Fatty infiltration of the liver.  The upper abdominal organs are otherwise normal.    Negative for osseous lesions.                                       CT Head Without Contrast (Final result)  Result time 09/27/22 17:48:31      Final result by Tee Castillo MD (09/27/22 17:48:31)                   Impression:      1..  Motion artifact is present on a number of images.  Subtle abnormalities could be overlooked.  Negative for obvious acute intracranial process. Negative for hemorrhage, or skull fracture.    All CT scans at this facility are performed  using dose modulation techniques as appropriate to performed exam including the following:  automated exposure control; adjustment of mA and/or kV according to the patients size (this includes  techniques or standardized protocols for targeted exams where dose is matched to indication/reason for exam: i.e. extremities or head);  iterative reconstruction technique.      Electronically signed by: Tee Castillo MD  Date:    09/27/2022  Time:    17:48               Narrative:    EXAMINATION:  CT HEAD WITHOUT CONTRAST    CLINICAL HISTORY:  Headache, chronic, new features or increased frequency;    TECHNIQUE:  Axial images through the brain and posterior fossa were obtained without the use of IV contrast.  Sagittal and coronal reconstructions are provided for review.    COMPARISON:  Brain MRI from February 12, 2021    FINDINGS:  Motion artifact is present on a number of images.  Subtle abnormalities could be overlooked.  The ventricles are midline and the CSF spaces are normal. The gray-white matter junction is well preserved. Negative for intracranial vascular abnormalities. Negative for mass, mass effect, cerebral edema, hemorrhage or abnormal fluid collections.    The skull and scalp are  intact.    The   paranasal sinuses, mastoid air cells, middle ears and ear canals are clear. The globes are intact.                                       X-Ray Chest 1 View (Final result)  Result time 09/27/22 17:43:54      Final result by Tee Castillo MD (09/27/22 17:43:54)                   Impression:      1.  Negative for acute process involving the chest.    2.  Stable findings as noted above.      Electronically signed by: Tee Castillo MD  Date:    09/27/2022  Time:    17:43               Narrative:    EXAMINATION:  XR CHEST 1 VIEW    CLINICAL HISTORY:  Chest pain, unspecified    COMPARISON:  Studies dating back to April 28, 2019    FINDINGS:  EKG leads overlie the chest which is lordotic in position.  The lungs are clear. The cardiac silhouette size is normal. The trachea is midline and the mediastinal width is normal. Negative for focal infiltrate, effusion or pneumothorax. Pulmonary vasculature is normal.  Negative for osseous abnormalities. Mild convex-left curvature of the midthoracic spine.  Tortuous aorta.  Cardiophrenic fat pads.                                     The EKG was ordered, reviewed, and independently interpreted by the ED provider.  Interpretation time: 16:19  Rate: 96 BPM  Rhythm: normal sinus rhythm  Interpretation: No acute ST changes. No STEMI.           The Emergency Provider reviewed the vital signs and test results, which are outlined above.    ED Discussion     8:00 PM: Dr. Zhou transfers care of patient to Dr. Esparza pending lab and radiology results.    9:17 PM: Reassessed pt at this time.  Discussed with pt all pertinent ED information and results. Discussed pt dx and plan of tx. Gave pt all f/u and return to the ED instructions. All questions and concerns were addressed at this time. Pt expresses understanding of information and instructions, and is comfortable with plan to discharge. Pt is stable for discharge.    I discussed with patient and/or family/caretaker that evaluation in the ED does not suggest any emergent or life threatening medical conditions requiring immediate intervention beyond what was provided in the ED, and I believe patient is safe for discharge.  Regardless, an unremarkable evaluation in the ED does not preclude the development or presence of a serious of life threatening condition. As such, patient was instructed to return immediately for any worsening or change in current symptoms.           ED Medication(s):  Medications   butalbital-acetaminophen-caffeine -40 mg per tablet 1 tablet (1 tablet Oral Given 9/27/22 1755)   prochlorperazine tablet 10 mg (10 mg Oral Given 9/27/22 1755)   iohexoL (OMNIPAQUE 350) injection 100 mL (100 mLs Intravenous Given 9/27/22 2008)        Follow-up Information       [facility] In 2 days.    Specialty: Cardiology  Contact information:  [address] [address] [facility] [address] [phone]              [facility]    Specialty: Emergency Medicine  Why: As needed, If symptoms worsen  Contact information:  [address] [facility] [address] [phone]                           Discharge Medication List as of 9/27/2022  9:18 PM            Medical Decision Making    Medical Decision Making:   Clinical Tests:   Lab Tests: Ordered and Reviewed  Radiological Study: Ordered and Reviewed  Medical Tests: Ordered and Reviewed         Scribe Attestation:   Scribe #1: I performed the above scribed service and the documentation accurately describes the services I performed. I attest to the accuracy of the note.    Attending:   Physician Attestation Statement for Scribe #1: I, Deya Zhou MD, personally performed the services described in this documentation, as scribed by Kaylie Selby, in my presence, and it is both accurate and complete.       Scribe Attestation:   Scribe #2: I performed the above scribed service and the documentation accurately describes the services I performed. I attest to the accuracy of the note.    Attending Attestation:           Physician Attestation for Scribe:    Physician Attestation Statement for Scribe #2: I, Thomas Esparza MD, reviewed documentation, as scribed by Papi Menchaca in my presence, and it is both accurate and complete. I also acknowledge and confirm the content of the note done by Stephibe #1.        Clinical Impression       ICD-10-CM ICD-9-CM   1. Palpitations  R00.2 785.1   2. Chest pain  R07.9 786.50       Disposition:   Disposition: Discharged  Condition: Stable       Thomas Esparza MD  09/28/22 0511

## 2022-09-29 ENCOUNTER — OFFICE VISIT (OUTPATIENT)
Dept: PSYCHIATRY | Facility: CLINIC | Age: 32
End: 2022-09-29
Payer: COMMERCIAL

## 2022-09-29 ENCOUNTER — OFFICE VISIT (OUTPATIENT)
Dept: RHEUMATOLOGY | Facility: CLINIC | Age: 32
End: 2022-09-29
Payer: COMMERCIAL

## 2022-09-29 ENCOUNTER — PATIENT MESSAGE (OUTPATIENT)
Dept: OTOLARYNGOLOGY | Facility: CLINIC | Age: 32
End: 2022-09-29
Payer: COMMERCIAL

## 2022-09-29 VITALS
SYSTOLIC BLOOD PRESSURE: 148 MMHG | DIASTOLIC BLOOD PRESSURE: 90 MMHG | BODY MASS INDEX: 45.55 KG/M2 | WEIGHT: 273.38 LBS | HEIGHT: 65 IN | HEART RATE: 81 BPM

## 2022-09-29 DIAGNOSIS — F33.0 MDD (MAJOR DEPRESSIVE DISORDER), RECURRENT EPISODE, MILD: ICD-10-CM

## 2022-09-29 DIAGNOSIS — F41.1 GAD (GENERALIZED ANXIETY DISORDER): Primary | ICD-10-CM

## 2022-09-29 DIAGNOSIS — M79.18 MYOFASCIAL PAIN: ICD-10-CM

## 2022-09-29 DIAGNOSIS — G93.32 CHRONIC FATIGUE SYNDROME: Primary | ICD-10-CM

## 2022-09-29 DIAGNOSIS — F90.0 ATTENTION DEFICIT HYPERACTIVITY DISORDER (ADHD), PREDOMINANTLY INATTENTIVE TYPE: ICD-10-CM

## 2022-09-29 DIAGNOSIS — F54 STRESS-RELATED PHYSIOLOGICAL RESPONSE AFFECTING MEDICAL CONDITION: ICD-10-CM

## 2022-09-29 DIAGNOSIS — R76.8 ANA POSITIVE: ICD-10-CM

## 2022-09-29 PROCEDURE — 3077F SYST BP >= 140 MM HG: CPT | Mod: CPTII,S$GLB,, | Performed by: INTERNAL MEDICINE

## 2022-09-29 PROCEDURE — 3044F PR MOST RECENT HEMOGLOBIN A1C LEVEL <7.0%: ICD-10-PCS | Mod: CPTII,95,, | Performed by: STUDENT IN AN ORGANIZED HEALTH CARE EDUCATION/TRAINING PROGRAM

## 2022-09-29 PROCEDURE — 99999 PR PBB SHADOW E&M-EST. PATIENT-LVL III: ICD-10-PCS | Mod: PBBFAC,,, | Performed by: INTERNAL MEDICINE

## 2022-09-29 PROCEDURE — 1159F MED LIST DOCD IN RCRD: CPT | Mod: CPTII,S$GLB,, | Performed by: INTERNAL MEDICINE

## 2022-09-29 PROCEDURE — 99214 OFFICE O/P EST MOD 30 MIN: CPT | Mod: S$GLB,,, | Performed by: INTERNAL MEDICINE

## 2022-09-29 PROCEDURE — 1159F PR MEDICATION LIST DOCUMENTED IN MEDICAL RECORD: ICD-10-PCS | Mod: CPTII,S$GLB,, | Performed by: INTERNAL MEDICINE

## 2022-09-29 PROCEDURE — 99999 PR PBB SHADOW E&M-EST. PATIENT-LVL III: CPT | Mod: PBBFAC,,, | Performed by: INTERNAL MEDICINE

## 2022-09-29 PROCEDURE — 3044F PR MOST RECENT HEMOGLOBIN A1C LEVEL <7.0%: ICD-10-PCS | Mod: CPTII,S$GLB,, | Performed by: INTERNAL MEDICINE

## 2022-09-29 PROCEDURE — 90834 PR PSYCHOTHERAPY W/PATIENT, 45 MIN: ICD-10-PCS | Mod: 95,,, | Performed by: STUDENT IN AN ORGANIZED HEALTH CARE EDUCATION/TRAINING PROGRAM

## 2022-09-29 PROCEDURE — 99214 PR OFFICE/OUTPT VISIT, EST, LEVL IV, 30-39 MIN: ICD-10-PCS | Mod: S$GLB,,, | Performed by: INTERNAL MEDICINE

## 2022-09-29 PROCEDURE — 3008F BODY MASS INDEX DOCD: CPT | Mod: CPTII,S$GLB,, | Performed by: INTERNAL MEDICINE

## 2022-09-29 PROCEDURE — 1159F MED LIST DOCD IN RCRD: CPT | Mod: CPTII,95,, | Performed by: STUDENT IN AN ORGANIZED HEALTH CARE EDUCATION/TRAINING PROGRAM

## 2022-09-29 PROCEDURE — 3077F PR MOST RECENT SYSTOLIC BLOOD PRESSURE >= 140 MM HG: ICD-10-PCS | Mod: CPTII,S$GLB,, | Performed by: INTERNAL MEDICINE

## 2022-09-29 PROCEDURE — 3080F PR MOST RECENT DIASTOLIC BLOOD PRESSURE >= 90 MM HG: ICD-10-PCS | Mod: CPTII,S$GLB,, | Performed by: INTERNAL MEDICINE

## 2022-09-29 PROCEDURE — 1159F PR MEDICATION LIST DOCUMENTED IN MEDICAL RECORD: ICD-10-PCS | Mod: CPTII,95,, | Performed by: STUDENT IN AN ORGANIZED HEALTH CARE EDUCATION/TRAINING PROGRAM

## 2022-09-29 PROCEDURE — 3080F DIAST BP >= 90 MM HG: CPT | Mod: CPTII,S$GLB,, | Performed by: INTERNAL MEDICINE

## 2022-09-29 PROCEDURE — 3008F PR BODY MASS INDEX (BMI) DOCUMENTED: ICD-10-PCS | Mod: CPTII,S$GLB,, | Performed by: INTERNAL MEDICINE

## 2022-09-29 PROCEDURE — 90834 PSYTX W PT 45 MINUTES: CPT | Mod: 95,,, | Performed by: STUDENT IN AN ORGANIZED HEALTH CARE EDUCATION/TRAINING PROGRAM

## 2022-09-29 PROCEDURE — 3044F HG A1C LEVEL LT 7.0%: CPT | Mod: CPTII,S$GLB,, | Performed by: INTERNAL MEDICINE

## 2022-09-29 PROCEDURE — 3044F HG A1C LEVEL LT 7.0%: CPT | Mod: CPTII,95,, | Performed by: STUDENT IN AN ORGANIZED HEALTH CARE EDUCATION/TRAINING PROGRAM

## 2022-09-29 NOTE — PROGRESS NOTES
[facility] [place] [facility]  Psychology  Progress Note  Individual Psychotherapy (PhD/LCSW)    Patient Name: Anthony Sexton  MRN: [id number]    Patient Class: OP- [facility]  Primary Care Provider: Tyesha Leggett MD    Psychiatry Initial Visit (PhD/LCSW)  Individual Psychotherapy - CPT 67218    Date: 9/29/2022    Site: [place]    Referral source: Lenora Walters MD    Clinical status of patient: Outpatient    Anthony Sexton, a 32 y.o. female, for initial evaluation visit.  Met with patient.    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     Substance use:   Alcohol: none   Drugs: none   Tobacco: none   Caffeine: none    Current medications and drug reactions (include OTC, herbal): see medication list     Strengths and liabilities: Strength: Patient accepts guidance/feedback, Strength: Patient is expressive/articulate., Strength: Patient is intelligent., Strength: Patient is motivated for change., Strength: Patient has positive support network., Strength: Patient has reasonable judgment., Strength: Patient is stable., Liability: Patient lacks coping skills.    Current Evaluation:     Mental Status Exam:  General Appearance:  unremarkable, age appropriate   Speech: normal tone, normal rate,  normal pitch, normal volume      Level of Cooperation: cooperative      Thought Processes: normal and logical   Mood: anxious, sad      Thought Content: normal, no suicidality, no homicidality, delusions, or paranoia   Affect: congruent and appropriate   Orientation: Oriented x3   Memory: recent >  intact   Attention Span & Concentration: intact   Fund of General Knowledge: intact and appropriate to age and level of education   Abstract Reasoning: not assessed   Judgment & Insight: fair     Language  intact     Summary: The patient was diagnosed with fibromyalgia a couple of hours before this session. We used our time to process the patient's anxiety about her health and how it will impact her life and the lives of her family members. We discussed the etiology of the condition and the importance of prioritizing stress reduction as a means of reducing flare ups. Taking time off from work was discussed and the patient will speak with her managers to try to get some time off. The patient was also told that I can complete any paperwork she may need so that she can have time off.     Diagnostic Impression - Plan:       ICD-10-CM ICD-9-CM   1. SENTHIL (generalized anxiety disorder)  F41.1 300.02   2. Attention deficit hyperactivity disorder (ADHD), predominantly inattentive type  F90.0 314.00   3. MDD (major depressive disorder), recurrent episode, mild  F33.0 296.31   4. Stress-related physiological response affecting medical condition  F54 316           Plan:individual psychotherapy    Return to Clinic: as scheduled    Length of Service (minutes): 45          Barbra Bartlett, PhD  Psychologist  [facility]

## 2022-09-29 NOTE — PROGRESS NOTES
RHEUMATOLOGY OUTPATIENT CLINIC NOTE    9/29/2022    Attending Rheumatologist: David Cueto  Primary Care Provider/Physician Requesting Consultation: Tyesha Leggett MD   Chief Complaint/Reason For Consultation:  Joint Pain      Subjective:     Anthony Sexton is a 32 y.o. Black or  female with positive ESDRAS and chronic pain/fatigue syndrome for follow-up encounter.    Main concern of chronic widespread pain.  Inadequate response to SNRI therapy.  Worst pain on hands and back, describes mixed pattern with some inflammatory features.    Review of Systems   Constitutional:  Negative for fever.   HENT:          Mild sicca symptoms   Eyes:  Negative for photophobia and pain.   Respiratory:  Negative for shortness of breath (Dyspnea on exertion, chronic).    Gastrointestinal:  Negative for blood in stool and melena.   Genitourinary:  Negative for hematuria.   Musculoskeletal:  Positive for back pain, joint pain, myalgias and neck pain.   Skin:  Negative for rash.        Denies Raynaud's phenomena   Neurological:  Negative for focal weakness.     Chronic comorbid conditions affecting medical decision making today:  Past Medical History:   Diagnosis Date    Anemia     Anxiety     Chronic low back pain     Depression     History of abnormal cervical Pap smear     History of recurrent UTIs     History of ulcer disease     Irregular menses     Obesity     Postpartum depression     with last pregnancy.     Pre-eclampsia in postpartum period 4/23/2020    Vitamin D deficiency      Past Surgical History:   Procedure Laterality Date    ESOPHAGOGASTRODUODENOSCOPY N/A 5/26/2021    Procedure: EGD (ESOPHAGOGASTRODUODENOSCOPY);  Surgeon: Alka Olmstead MD;  Location: [facility];  Service: Endoscopy;  Laterality: N/A;    ESOPHAGOGASTRODUODENOSCOPY N/A 10/4/2021    Procedure: EGD (ESOPHAGOGASTRODUODENOSCOPY);  Surgeon: Alka Olmstead MD;  Location: [facility];  Service: Endoscopy;  Laterality: N/A;    None       REDUCTION OF BOTH BREASTS Bilateral 5/27/2021    Procedure: MAMMOPLASTY, REDUCTION, BILATERAL;  Surgeon: Daniel Ba MD;  Location: [facility] OR [address] [place] [address];  Service: Plastics;  Laterality: Bilateral;  Left breast tissue: 1584  Right breast tissue: 1438     Family History   Problem Relation Age of Onset    Asthma Mother     Hyperlipidemia Mother     Hypertension Mother     Hypertension Father     Hyperlipidemia Father     Heart disease Father 55        MI/CAD    No Known Problems Sister     No Known Problems Brother     Diabetes Maternal Aunt     Diabetes Maternal Grandmother     Insulin resistance Sister     No Known Problems Brother     No Known Problems Brother     No Known Problems Brother     No Known Problems Brother     No Known Problems Daughter     Breast cancer Maternal Aunt     Cancer Neg Hx     Stroke Neg Hx     Brain cancer Neg Hx     Migraines Neg Hx     Colon cancer Neg Hx     Ovarian cancer Neg Hx     Anesthesia problems Neg Hx      Social History     Tobacco Use   Smoking Status Never   Smokeless Tobacco Never       Current Outpatient Medications:     ergocalciferol (VITAMIN D2) 50,000 unit Cap, Take 1 capsule (50,000 Units total) by mouth twice a week. (Patient taking differently: Take 50,000 Units by mouth every 7 days.), Disp: 8 capsule, Rfl: 11    fluticasone propionate (FLONASE) 50 mcg/actuation nasal spray, 2 sprays (100 mcg total) by Each Nostril route once daily., Disp: 16 g, Rfl: 1    ibuprofen (ADVIL,MOTRIN) 800 MG tablet, Take 1 tablet by mouth every 4 to 6 hours as needed for pain. Do not exceed 4 tablets per day, Disp: 24 tablet, Rfl: 0    omeprazole (PRILOSEC) 40 MG capsule, Take 1 capsule (40 mg total) by mouth once daily., Disp: 30 capsule, Rfl: 11    ondansetron (ZOFRAN) 4 MG tablet, Take 1 tablet (4 mg total) by mouth every 6 (six) hours as needed for Nausea., Disp: 40 tablet, Rfl: 1    DULoxetine (CYMBALTA) 20 MG capsule, Take 1 capsule (20 mg total) by mouth once daily.,  Disp: 30 capsule, Rfl: 2    methylPREDNISolone (MEDROL DOSEPACK) 4 mg tablet, Day 1: 6 pills (2 before breakfast, 1 after lunch, 1 after dinner, 2 before bed) Day 2: 5 pills (1 before breakfast, 1 after lunch, 1 after dinner, 2 before bed) Day 3: 4 pills (1 before breakfast, 1 after lunch, 1 after dinner, 1 before bed) Day 4: 3 pills (1 before breakfast, 1 after lunch, 1 before bed) Day 5: 2 pills (1 before breakfast, 1 before bed) Day 6: 1 pill (1 before breakfast), Disp: 21 tablet, Rfl: 0    predniSONE (DELTASONE) 20 MG tablet, Take 2 tablets (40 mg total) by mouth once daily. for 5 days, Disp: 10 tablet, Rfl: 0    predniSONE (DELTASONE) 20 MG tablet, TAKE 2 TABLETS BY MOUTH ONCE DAILY, FOR 5 DAYS (Patient not taking: Reported on 9/29/2022), Disp: 10 tablet, Rfl: 0     Objective:     Vitals:    09/29/22 0705   BP: (!) 148/90   Pulse: 81     Physical Exam   Eyes: Conjunctivae are normal.   Pulmonary/Chest: Effort normal. No respiratory distress.   Musculoskeletal:         General: No swelling or tenderness. Normal range of motion.   Neurological: She displays no weakness.   Skin: No rash noted.     Reviewed available old and all outside pertinent medical records available.    All lab results personally reviewed and interpreted by me.       ASSESSMENT:     Chronic pain/fatigue syndrome    Abnormal serology    Positive ESDRAS    PLAN:     Follow-up encounter for serologic abnormality and chronic fatigue syndrome.  Did not undergo Plaquenil trial concern from side effects of therapy.  Inadequate response to low-dose Cymbalta, did not tolerate higher dose due to severe grogginess.  Main concern remains chronic widespread pain.  Mild sicca symptoms, denies photosensitive rashes, Raynaud's phenomena, or joint swelling.  Exam without reproducible muscle weakness, obvious synovitis, or significant squeeze tenderness.  Moderate allodynia present.  Positive ESDRAS with negative FRANCA profile.  Rheumatoid serologies negative.   Imaging without serositis or evidence of interstitial lung disease.  Hepatic steatosis, no evidence of kidney dysfunction or liver dysfunction present.  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  Suggest trial of Savella with dose increase as clinically tolerated to replace Cymbalta.  Trial of Plaquenil in 2 months if no response to Savella considering symptoms driven by systemic inflammation context of positive ESDRAS and mild sicca symptoms, suggestive of undifferentiated connective tissue disease with Sjogren's characteristics.  Expect onset of action after at least 3 months of therapy.      Disclaimer: This note is prepared using voice recognition software and as such is likely to have errors and has not been proof read. Please contact me for questions.         David Cueto M.D.

## 2022-09-30 ENCOUNTER — E-VISIT (OUTPATIENT)
Dept: FAMILY MEDICINE | Facility: CLINIC | Age: 32
End: 2022-09-30
Payer: COMMERCIAL

## 2022-09-30 ENCOUNTER — PATIENT MESSAGE (OUTPATIENT)
Dept: FAMILY MEDICINE | Facility: CLINIC | Age: 32
End: 2022-09-30

## 2022-09-30 ENCOUNTER — TELEPHONE (OUTPATIENT)
Dept: FAMILY MEDICINE | Facility: CLINIC | Age: 32
End: 2022-09-30

## 2022-09-30 ENCOUNTER — PROCEDURE VISIT (OUTPATIENT)
Dept: OTOLARYNGOLOGY | Facility: CLINIC | Age: 32
End: 2022-09-30
Payer: COMMERCIAL

## 2022-09-30 VITALS — TEMPERATURE: 98 F

## 2022-09-30 DIAGNOSIS — R51.9 FREQUENT HEADACHES: ICD-10-CM

## 2022-09-30 DIAGNOSIS — N39.0 URINARY TRACT INFECTION WITHOUT HEMATURIA, SITE UNSPECIFIED: Primary | ICD-10-CM

## 2022-09-30 DIAGNOSIS — J35.02 CHRONIC ADENOIDITIS: ICD-10-CM

## 2022-09-30 DIAGNOSIS — J35.2 ADENOID HYPERTROPHY: Primary | ICD-10-CM

## 2022-09-30 DIAGNOSIS — R51.9 FREQUENT HEADACHES: Primary | ICD-10-CM

## 2022-09-30 LAB — BACTERIA UR CULT: ABNORMAL

## 2022-09-30 PROCEDURE — 88342 IMHCHEM/IMCYTCHM 1ST ANTB: CPT | Performed by: PATHOLOGY

## 2022-09-30 PROCEDURE — 88364 CHG INSITU HYBRIDIZATION (FISH: ICD-10-PCS | Mod: 26,,, | Performed by: PATHOLOGY

## 2022-09-30 PROCEDURE — 88342 IMHCHEM/IMCYTCHM 1ST ANTB: CPT | Mod: 26,,, | Performed by: PATHOLOGY

## 2022-09-30 PROCEDURE — 88342 CHG IMMUNOCYTOCHEMISTRY: ICD-10-PCS | Mod: 26,,, | Performed by: PATHOLOGY

## 2022-09-30 PROCEDURE — 88365 PR  TISSUE HYBRIDIZATION: ICD-10-PCS | Mod: 26,,, | Performed by: PATHOLOGY

## 2022-09-30 PROCEDURE — 31237 NSL/SINS NDSC SURG BX POLYPC: CPT | Mod: 50,S$GLB,, | Performed by: STUDENT IN AN ORGANIZED HEALTH CARE EDUCATION/TRAINING PROGRAM

## 2022-09-30 PROCEDURE — 88364 INSITU HYBRIDIZATION (FISH): CPT | Mod: 59 | Performed by: PATHOLOGY

## 2022-09-30 PROCEDURE — 88341 IMHCHEM/IMCYTCHM EA ADD ANTB: CPT | Mod: 59 | Performed by: PATHOLOGY

## 2022-09-30 PROCEDURE — 88341 PR IHC OR ICC EACH ADD'L SINGLE ANTIBODY  STAINPR: ICD-10-PCS | Mod: 26,,, | Performed by: PATHOLOGY

## 2022-09-30 PROCEDURE — 31237 PR NASAL/SINUS ENDOSCOPY,BX/RMV POLYP/DEBRID: ICD-10-PCS | Mod: 50,S$GLB,, | Performed by: STUDENT IN AN ORGANIZED HEALTH CARE EDUCATION/TRAINING PROGRAM

## 2022-09-30 PROCEDURE — 99421 OL DIG E/M SVC 5-10 MIN: CPT | Mod: S$GLB,,, | Performed by: FAMILY MEDICINE

## 2022-09-30 PROCEDURE — 88341 IMHCHEM/IMCYTCHM EA ADD ANTB: CPT | Mod: 26,,, | Performed by: PATHOLOGY

## 2022-09-30 PROCEDURE — 88365 INSITU HYBRIDIZATION (FISH): CPT | Performed by: PATHOLOGY

## 2022-09-30 PROCEDURE — 88365 INSITU HYBRIDIZATION (FISH): CPT | Mod: 26,,, | Performed by: PATHOLOGY

## 2022-09-30 PROCEDURE — 88305 TISSUE EXAM BY PATHOLOGIST: ICD-10-PCS | Mod: 26,,, | Performed by: PATHOLOGY

## 2022-09-30 PROCEDURE — 88305 TISSUE EXAM BY PATHOLOGIST: CPT | Mod: 26,,, | Performed by: PATHOLOGY

## 2022-09-30 PROCEDURE — 88305 TISSUE EXAM BY PATHOLOGIST: CPT | Performed by: PATHOLOGY

## 2022-09-30 PROCEDURE — 99421 PR E&M, ONLINE DIGIT, EST, < 7 DAYS, 5-10 MINS: ICD-10-PCS | Mod: S$GLB,,, | Performed by: FAMILY MEDICINE

## 2022-09-30 PROCEDURE — 88364 INSITU HYBRIDIZATION (FISH): CPT | Mod: 26,,, | Performed by: PATHOLOGY

## 2022-09-30 RX ORDER — BUTALBITAL, ACETAMINOPHEN AND CAFFEINE 50; 325; 40 MG/1; MG/1; MG/1
1 TABLET ORAL EVERY 6 HOURS PRN
Qty: 30 TABLET | Refills: 0 | Status: SHIPPED | OUTPATIENT
Start: 2022-09-30 | End: 2022-09-30 | Stop reason: SDUPTHER

## 2022-09-30 RX ORDER — LEVOFLOXACIN 500 MG/1
500 TABLET, FILM COATED ORAL DAILY
Qty: 7 TABLET | Refills: 0 | Status: SHIPPED | OUTPATIENT
Start: 2022-09-30 | End: 2022-10-07

## 2022-09-30 RX ORDER — BUTALBITAL, ACETAMINOPHEN AND CAFFEINE 50; 325; 40 MG/1; MG/1; MG/1
1 TABLET ORAL EVERY 6 HOURS PRN
Qty: 30 TABLET | Refills: 0 | Status: SHIPPED | OUTPATIENT
Start: 2022-09-30 | End: 2022-10-30

## 2022-09-30 NOTE — PROCEDURES
DATE OF PROCEDURE: 9/30/2022      PRE-PROCEDURE DIAGNOSIS:   Chronic nasopharyngeal swelling  Adenoid hypertrophy     POST-PROCEDURE DIAGNOSIS:   Same       PROCEDURE:   Nasal endoscopy with nasopharyngeal biopsy      SURGEON:   Corey Lucio MD     ANESTHESIA:   Local      ESTIMATED BLOOD LOSS:   minimal     SPECIMENS:   adenoids     COMPLICATIONS:   None         OPERATIVE INDICATIONS:   Anthony Sexton is a 32 y.o. female with a history of nasopharyngeal mass vs adenoiditis resulting in significant obstruction warranting biopsy. Risks and benefits discussed and she wished to proceed. Informed consent obtained.      OPERATIVE DETAILS:   The patient was place in exam chair. A pre-procedural time-out was performed. A combination of oxymetazoline-lidocaine was sprayed into the nasal cavities. Pledgets soaked in the same anesthetic were placed in each nostril and left for several minutes.      The zero degree telescope was then used to examine the nasopharynx. The NP mass was injected with 1% lidocaine with 1:100,000 epinephrine. Mars cut forceps were used to take biopsies of the nasopharynx. Hemostasis was obtained with Afrin soaked pledgets. The nose was suctioned clear. Hemostasis was confirmed.      The patient tolerated the procedure well.

## 2022-09-30 NOTE — PROGRESS NOTES
Patient ID: Anthony Sexton is a 32 y.o. female.    Chief Complaint: Urinary Tract Infection    The patient initiated a request through [employer] on 9/30/2022 for evaluation and management with a chief complaint of Urinary Tract Infection     I evaluated the questionnaire submission on September 30, 2022.    [facility] Peq Evisit Uti Questionnaire    9/30/2022  8:51 AM CDT - Filed by Patient   Do you agree to participate in an E-Visit? Yes   If you have any of the following problems, please present to your local ER or call 911:  I acknowledge   What is the main issue that you would like for your doctor to address today? UTI   Are you able to take your vital signs? No   What symptoms do you currently have? Change in urine appearance or smell   When did your symptoms first appear? 9/28/2022   List what you have done or taken to help your symptoms. UA, urine cx   Please indicate whether you have had the following symptoms during the past 24 hours     Urgent urination (a sudden and uncontrollable urge to urinate) None   Frequent urination of small amounts of urine (going to the toilet very often) None   Burning pain when urinating None   Incomplete bladder emptying (still feel like you need to urinate again after urination) None   Pain not associated with urination in the lower abdomen below the belly button) Mild   What does your urine look like? Clear   Blood seen in the urine (without menstrual cycle) None   Flank pain (pain in one or both sides of the lower back) Mild   Abnormal Vaginal Discharge (abnormal amount, color and/or odor) Mild   Discharge from the urethra (urinary opening) without urination None   High body temperature/fever? None-<99.5   Please rate how much discomfort you have experience because of the symptoms in the past 24 hours: Moderate   Please indicate how the symptoms have interfered with your every day activities/work in the past 24 hours: Mild   Please indicate how these symptoms have interfered with  your social activities (visiting people, meeting with friends, etc.) in the past 24 hours? Mild   Are you a diabetic? No   If you are female, please indicate whether you have the following at the time of completion of this questionnaire: Menstruation (menses)   Is there a chance you could be pregnant? No   Provide any information you feel is important to your history not asked above    Please attach any relevant images or files (if you have performed a home test for UTI, please submit a photo of results)         Active Problem List with Overview Notes    Diagnosis Date Noted    Dysplasia of cervix, high grade CATHY 2 11/03/2021    Bacterial vaginosis 11/03/2021    Iron deficiency anemia due to chronic blood loss 11/01/2021    Atypical chest pain 10/04/2021    Elevated hemoglobin A1c 04/22/2021    Atypical squamous cell changes of undetermined significance (ASCUS) on cervical cytology with positive high risk human papilloma virus (HPV) 04/20/2021    Post partum depression 08/06/2020    Generalized anxiety disorder 08/06/2020    Essential hypertension 07/28/2020    Gastroesophageal reflux disease without esophagitis 04/23/2020    Vitamin D deficiency 06/27/2017    Morbid obesity with BMI of 45.0-49.9, adult 05/09/2017      Recent Labs Obtained:  Admission on 09/27/2022, Discharged on 09/27/2022   Component Date Value Ref Range Status    WBC 09/27/2022 11.51  3.90 - 12.70 K/uL Final    RBC 09/27/2022 5.50 (H)  4.00 - 5.40 M/uL Final    Hemoglobin 09/27/2022 13.2  12.0 - 16.0 g/dL Final    Hematocrit 09/27/2022 43.2  37.0 - 48.5 % Final    MCV 09/27/2022 79 (L)  82 - 98 fL Final    MCH 09/27/2022 24.0 (L)  27.0 - 31.0 pg Final    MCHC 09/27/2022 30.6 (L)  32.0 - 36.0 g/dL Final    RDW 09/27/2022 13.2  11.5 - 14.5 % Final    Platelets 09/27/2022 320  150 - 450 K/uL Final    MPV 09/27/2022 10.3  9.2 - 12.9 fL Final    Immature Granulocytes 09/27/2022 0.8 (H)  0.0 - 0.5 % Final    Gran # (ANC) 09/27/2022 10.0 (H)  1.8 - 7.7  K/uL Final    Immature Grans (Abs) 09/27/2022 0.09 (H)  0.00 - 0.04 K/uL Final    Comment: Mild elevation in immature granulocytes is non specific and   can be seen in a variety of conditions including stress response,   acute inflammation, trauma and pregnancy. Correlation with other   laboratory and clinical findings is essential.      Lymph # 09/27/2022 1.3  1.0 - 4.8 K/uL Final    Mono # 09/27/2022 0.1 (L)  0.3 - 1.0 K/uL Final    Eos # 09/27/2022 0.0  0.0 - 0.5 K/uL Final    Baso # 09/27/2022 0.03  0.00 - 0.20 K/uL Final    nRBC 09/27/2022 0  0 /100 WBC Final    Gran % 09/27/2022 86.7 (H)  38.0 - 73.0 % Final    Lymph % 09/27/2022 11.3 (L)  18.0 - 48.0 % Final    Mono % 09/27/2022 0.9 (L)  4.0 - 15.0 % Final    Eosinophil % 09/27/2022 0.0  0.0 - 8.0 % Final    Basophil % 09/27/2022 0.3  0.0 - 1.9 % Final    Differential Method 09/27/2022 Automated   Final    Sodium 09/27/2022 136  136 - 145 mmol/L Final    Potassium 09/27/2022 3.8  3.5 - 5.1 mmol/L Final    Chloride 09/27/2022 105  95 - 110 mmol/L Final    CO2 09/27/2022 22 (L)  23 - 29 mmol/L Final    Glucose 09/27/2022 104  70 - 110 mg/dL Final    BUN 09/27/2022 11  6 - 20 mg/dL Final    Creatinine 09/27/2022 0.9  0.5 - 1.4 mg/dL Final    Calcium 09/27/2022 9.5  8.7 - 10.5 mg/dL Final    Total Protein 09/27/2022 9.2 (H)  6.0 - 8.4 g/dL Final    Albumin 09/27/2022 4.0  3.5 - 5.2 g/dL Final    Total Bilirubin 09/27/2022 0.3  0.1 - 1.0 mg/dL Final    Comment: For infants and newborns, interpretation of results should be based  on gestational age, weight and in agreement with clinical  observations.    Premature Infant recommended reference ranges:  Up to 24 hours.............<8.0 mg/dL  Up to 48 hours............<12.0 mg/dL  3-5 days..................<15.0 mg/dL  6-29 days.................<15.0 mg/dL      Alkaline Phosphatase 09/27/2022 113  55 - 135 U/L Final    AST 09/27/2022 19  10 - 40 U/L Final    ALT 09/27/2022 20  10 - 44 U/L Final    Anion Gap 09/27/2022  9  8 - 16 mmol/L Final    eGFR 09/27/2022 >60  >60 mL/min/1.73 m^2 Final    CPK 09/27/2022 183 (H)  20 - 180 U/L Final    Troponin I 09/27/2022 <0.006  0.000 - 0.026 ng/mL Final    Comment: The reference interval for Troponin I represents the 99th percentile   cutoff   for our facility and is consistent with 3rd generation assay   performance.      Specimen UA 09/27/2022 Urine, Clean Catch   Final    Color, UA 09/27/2022 Colorless (A)  Yellow, Straw, Talia Final    Appearance, UA 09/27/2022 Hazy (A)  Clear Final    pH, UA 09/27/2022 8.0  5.0 - 8.0 Final    Specific Gravity, UA 09/27/2022 <1.005 (A)  1.005 - 1.030 Final    Protein, UA 09/27/2022 Negative  Negative Final    Comment: Recommend a 24 hour urine protein or a urine   protein/creatinine ratio if globulin induced proteinuria is  clinically suspected.      Glucose, UA 09/27/2022 Negative  Negative Final    Ketones, UA 09/27/2022 Negative  Negative Final    Bilirubin (UA) 09/27/2022 Negative  Negative Final    Occult Blood UA 09/27/2022 Negative  Negative Final    Nitrite, UA 09/27/2022 Negative  Negative Final    Urobilinogen, UA 09/27/2022 Negative  <2.0 EU/dL Final    Leukocytes, UA 09/27/2022 2+ (A)  Negative Final    Preg Test, Ur 09/27/2022 Negative   Final    Benzodiazepines 09/27/2022 Negative  Negative Final    Methadone metabolites 09/27/2022 Negative  Negative Final    Cocaine (Metab.) 09/27/2022 Negative  Negative Final    Opiate Scrn, Ur 09/27/2022 Negative  Negative Final    Barbiturate Screen, Ur 09/27/2022 Negative  Negative Final    Amphetamine Screen, Ur 09/27/2022 Negative  Negative Final    THC 09/27/2022 Negative  Negative Final    Phencyclidine 09/27/2022 Negative  Negative Final    Creatinine, Urine 09/27/2022 30.8  15.0 - 325.0 mg/dL Final    Toxicology Information 09/27/2022 SEE COMMENT   Final    Comment: This screen includes the following classes of drugs at the listed   cut-off:    Benzodiazepines 200 ng/ml  Methadone 300  ng/ml  Cocaine metabolite 300 ng/ml  Opiates 300 ng/ml  Barbiturates 200 ng/ml  Amphetamines 1000 ng/ml  Marijuana metabs (THC) 50 ng/ml  Phencyclidine (PCP) 25 ng/ml    This is a screening test. If results do not correlate with clinical   presentation, then a confirmatory send out test is advised.     This report is intended for use in clinical monitoring and management   of   patients. It is not intended for use in employment related drug   testing.      TSH 09/27/2022 0.582  0.400 - 4.000 uIU/mL Final    Magnesium 09/27/2022 1.7  1.6 - 2.6 mg/dL Final    RBC, UA 09/27/2022 2  0 - 4 /hpf Final    WBC, UA 09/27/2022 30 (H)  0 - 5 /hpf Final    WBC Clumps, UA 09/27/2022 Few (A)  None-Rare Final    Squam Epithel, UA 09/27/2022 32  /hpf Final    Microscopic Comment 09/27/2022 SEE COMMENT   Final    Comment: Other formed elements not mentioned in the report are not   present in the microscopic examination.       Urine Culture, Routine 09/27/2022  (A)   Final                    Value:KLEBSIELLA PNEUMONIAE  10,000 - 49,999 cfu/ml      D-Dimer 09/27/2022 1.13 (H)  <0.50 mg/L FEU Final    Comment: The quantitative D-dimer assay should be used as an aid in   the diagnosis of deep vein thrombosis and pulmonary embolism  in patients with the appropriate presentation and clinical  history. The upper limit of the reference interval and the clinical   cut off   point are identical. Causes of a positive (>0.50 mg/L FEU) D-Dimer   test  include, but are not limited to: DVT, PE, DIC, thrombolytic   therapy, anticoagulant therapy, recent surgery, trauma, or   pregnancy, disseminated malignancy, aortic aneurysm, cirrhosis,  and severe infection. False negative results may occur in   patients with distal DVT.         Encounter Diagnosis   Name Primary?    Urinary tract infection without hematuria, site unspecified Yes        No orders of the defined types were placed in this encounter.     Medications Ordered This Encounter    Medications    levoFLOXacin (LEVAQUIN) 500 MG tablet     Sig: Take 1 tablet (500 mg total) by mouth once daily. for 7 days     Dispense:  7 tablet     Refill:  0        E-Visit Time Trackin to 8 minutes

## 2022-09-30 NOTE — TELEPHONE ENCOUNTER
"Pt portal message    " Hi Dr. Leggett,     I have been having this horrid headache. They gave me fioricet in the ER which knocked the edge off two days ago. Would you be able to send in rx for this to the pharmacy ?      Thanks"    Please advise      "

## 2022-09-30 NOTE — PROGRESS NOTES
DATE OF PROCEDURE: 9/30/2022     PRE-PROCEDURE DIAGNOSIS:   Chronic nasopharyngeal swelling  Adenoid hypertrophy    POST-PROCEDURE DIAGNOSIS:   Same      PROCEDURE:   Nasal endoscopy with nasopharyngeal biopsy     SURGEON:   Corey Lucio MD    ANESTHESIA:   Local      ESTIMATED BLOOD LOSS:   *** mL      SPECIMENS:   Nasopharynx     COMPLICATIONS:   None        OPERATIVE INDICATIONS:   Anthony Sexton is a 32 y.o. female with a history of nasopharyngeal mass vs adenoiditis resulting in significant obstruction warranting biopsy. Risks and benefits discussed and she wished to proceed. Informed consent obtained.     OPERATIVE DETAILS:   The patient was place in exam chair. A pre-procedural time-out was performed. A combination of oxymetazoline-lidocaine was sprayed into the nasal cavities. Pledgets soaked in the same anesthetic were placed in each nostril and left for several minutes.     The zero degree telescope was then used to examine the nasopharynx. The NP mass was injected with 1% lidocaine with 1:100,000 epinephrine. Mars cut forceps were used to take biopsies of the nasopharynx. Hemostasis was obtained with Afrin soaked pledgets. Hemostasis was confirmed.     The patient tolerated the procedure well.

## 2022-10-03 ENCOUNTER — OFFICE VISIT (OUTPATIENT)
Dept: PSYCHIATRY | Facility: CLINIC | Age: 32
End: 2022-10-03
Payer: COMMERCIAL

## 2022-10-03 DIAGNOSIS — F41.1 GENERALIZED ANXIETY DISORDER: Primary | ICD-10-CM

## 2022-10-03 DIAGNOSIS — F90.0 ATTENTION DEFICIT HYPERACTIVITY DISORDER (ADHD), PREDOMINANTLY INATTENTIVE TYPE: ICD-10-CM

## 2022-10-03 PROCEDURE — 99214 PR OFFICE/OUTPT VISIT, EST, LEVL IV, 30-39 MIN: ICD-10-PCS | Mod: 95,,, | Performed by: PSYCHOLOGIST

## 2022-10-03 PROCEDURE — 99999 PR PBB SHADOW E&M-EST. PATIENT-LVL II: ICD-10-PCS | Mod: PBBFAC,,, | Performed by: PSYCHOLOGIST

## 2022-10-03 PROCEDURE — 1159F MED LIST DOCD IN RCRD: CPT | Mod: CPTII,95,, | Performed by: PSYCHOLOGIST

## 2022-10-03 PROCEDURE — 3044F HG A1C LEVEL LT 7.0%: CPT | Mod: CPTII,95,, | Performed by: PSYCHOLOGIST

## 2022-10-03 PROCEDURE — 99999 PR PBB SHADOW E&M-EST. PATIENT-LVL II: CPT | Mod: PBBFAC,,, | Performed by: PSYCHOLOGIST

## 2022-10-03 PROCEDURE — 3044F PR MOST RECENT HEMOGLOBIN A1C LEVEL <7.0%: ICD-10-PCS | Mod: CPTII,95,, | Performed by: PSYCHOLOGIST

## 2022-10-03 PROCEDURE — 99214 OFFICE O/P EST MOD 30 MIN: CPT | Mod: 95,,, | Performed by: PSYCHOLOGIST

## 2022-10-03 PROCEDURE — 1159F PR MEDICATION LIST DOCUMENTED IN MEDICAL RECORD: ICD-10-PCS | Mod: CPTII,95,, | Performed by: PSYCHOLOGIST

## 2022-10-03 NOTE — PROGRESS NOTES
"Outpatient Psychiatry Follow-Up Visit    10/3/2022    Timeframe: Corona Virus Outbreak     The patient location is: Patient's home/ Patient reported that his/her location at the time of this visit was in the [facility]     Visit type: Virtual visit with synchronous audio and video     Each patient to whom he or she provides medical services by telehealth is: (1) informed of the relationship between the medical psychologist and patient and the respective role of any other health care provider with respect to management of the patient; and (2) notified that he or she may decline to receive medical services by telehealth and may withdraw from such care at any time.    I also informed patient of the following:   Kenya Sanchez, PhD, MPAP:  [place] medical license number: [id number]    My contact info:  [facility] / 2nd Floor  [address] [place] [address]   Ph: [phone]    If technology issues, call office phone: Ph: [phone]  If crisis: Dial 911 or go to nearest Emergency Room (ER)  If questions related to privacy practices: contact [facility] Information Department: [phone]    Chief Complaint:  Anthony Sexton is a 32 y.o. female who presents today for follow-up of anxiety and inattention/distractibility .       Impressions/Plan from last visit: Anthony attended her virtual visit. She is better--nausea has "completely gone away." She ais that she is having one panic attack a week--better than in the past when it was several times a week. Her racing thoughts have gone away--no more "impending death thoughts." She will be seeing Dr. Bartlett later this month. At this time, we are continuing her Cymbalta 20 mg.    Interval History and Content of Current Session: Anthony attended her virtual visit. Since we last met, she was dx with fibromyalgia. She has been weaning off of Cymbalta and will start next week on Savella. She has been tired--but is relieved to " have some answers now. She has been considering taking some time off--working with Dr. Bartlett. She will continue with Dr. Urena--welcome to schedule with me in future if needed.      GAD7 10/2/2022 7/1/2022 5/18/2022   1. Feeling nervous, anxious, or on edge? 1 1 1   2. Not being able to stop or control worrying? 1 0 1   3. Worrying too much about different things? 1 1 1   4. Trouble relaxing? 1 0 1   5. Being so restless that it is hard to sit still? 2 0 1   6. Becoming easily annoyed or irritable? 1 1 1   7. Feeling afraid as if something awful might happen? 1 1 2   SENTHIL-7 Score 8 4 8      0-4 = Minimal anxiety  5-9 = Mild anxiety  10-14 = Moderate anxiety  15-21 = Severe anxiety       Review of Systems   PSYCHIATRIC: Pertinant items are noted in the narrative.    Past Medical, Family and Social History: The patient's past medical, family and social history have been reviewed and updated as appropriate within the electronic medical record - see encounter notes.      Current Outpatient Medications:     butalbital-acetaminophen-caffeine -40 mg (FIORICET, ESGIC) -40 mg per tablet, Take 1 tablet by mouth every 6 (six) hours as needed for Pain or Headaches., Disp: 30 tablet, Rfl: 0    ergocalciferol (VITAMIN D2) 50,000 unit Cap, Take 1 capsule (50,000 Units total) by mouth twice a week. (Patient taking differently: Take 50,000 Units by mouth every 7 days.), Disp: 8 capsule, Rfl: 11    fluticasone propionate (FLONASE) 50 mcg/actuation nasal spray, 2 sprays (100 mcg total) by Each Nostril route once daily., Disp: 16 g, Rfl: 1    ibuprofen (ADVIL,MOTRIN) 800 MG tablet, Take 1 tablet by mouth every 4 to 6 hours as needed for pain. Do not exceed 4 tablets per day, Disp: 24 tablet, Rfl: 0    levoFLOXacin (LEVAQUIN) 500 MG tablet, Take 1 tablet (500 mg total) by mouth once daily. for 7 days, Disp: 7 tablet, Rfl: 0    milnacipran (SAVELLA) 25 mg Tab tablet, Take 1 tablet (25 mg total) by mouth 2 (two) times daily.,  Disp: 60 tablet, Rfl: 3    omeprazole (PRILOSEC) 40 MG capsule, Take 1 capsule (40 mg total) by mouth once daily., Disp: 30 capsule, Rfl: 11    ondansetron (ZOFRAN) 4 MG tablet, Take 1 tablet (4 mg total) by mouth every 6 (six) hours as needed for Nausea., Disp: 40 tablet, Rfl: 1    predniSONE (DELTASONE) 20 MG tablet, TAKE 2 TABLETS BY MOUTH ONCE DAILY, FOR 5 DAYS (Patient not taking: Reported on 9/29/2022), Disp: 10 tablet, Rfl: 0    Compliance: yes    Side effects: None    Risk Parameters:  Patient reports no suicidal ideation  Patient reports no homicidal ideation  Patient reports no self-injurious behavior  Patient reports no violent behavior    Exam (detailed: at least 9 elements; comprehensive: all 15 elements)   Constitutional  Vitals:  Most recent vital signs were reviewed.   Last 3 sets of Vitals    Vitals - 1 value per visit 9/29/2022 9/30/2022 9/30/2022   SYSTOLIC 148 - (No Data)   DIASTOLIC 90 - (No Data)   Pulse 81 - -   Temp - - 97.7   Resp - - -   SPO2 - - -   Weight (lb) 273.37 - -   Weight (kg) 124 - -   Height 65 - -   BMI (Calculated) 45.5 - -   VISIT REPORT - - -   Waist Measurements - - -   Pain Score  - 7 -   Some recent data might be hidden          General:  age appropriate, neatly groomed, [facility] top     Musculoskeletal  Muscle Strength/Tone:  no tremor, no tic   Gait & Station:  video visit     Psychiatric  Speech:  no latency; no press   Behavior: wnl   Mood & Affect:  tired  congruent and appropriate   Thought Process:  normal and logical   Associations:  intact   Thought Content:  normal, no suicidality, no homicidality, delusions, or paranoia   Insight:  has awareness of illness   Judgement: behavior is adequate to circumstances   Orientation:  grossly intact   Memory: intact for content of interview   Language: grossly intact   Attention Span & Concentration:  Grossly intact   Fund of Knowledge:  intact and appropriate to age and level of education     Assessment and Diagnosis    Status/Progress: Based on the examination today, the patient's problem(s) is/are inadequately controlled.  New problems have been presented today.   Co-morbidities are complicating management of the primary condition.  There are no active rule-out diagnoses for this patient at this time.     General Impression:     Encounter Diagnoses   Name Primary?    Generalized anxiety disorder Yes    Attention deficit hyperactivity disorder (ADHD), predominantly inattentive type          Intervention/Counseling/Treatment Plan   Medication Management: Discussed risks, benefits, and alternatives to treatment plan documented above with patient. I answered all patient questions related to this plan, and patient expressed understanding and agreement.   No medicines at this time  Continue therapy as needed    Medication List with Changes/Refills   Current Medications    BUTALBITAL-ACETAMINOPHEN-CAFFEINE -40 MG (FIORICET, ESGIC) -40 MG PER TABLET    Take 1 tablet by mouth every 6 (six) hours as needed for Pain or Headaches.    ERGOCALCIFEROL (VITAMIN D2) 50,000 UNIT CAP    Take 1 capsule (50,000 Units total) by mouth twice a week.    FLUTICASONE PROPIONATE (FLONASE) 50 MCG/ACTUATION NASAL SPRAY    2 sprays (100 mcg total) by Each Nostril route once daily.    IBUPROFEN (ADVIL,MOTRIN) 800 MG TABLET    Take 1 tablet by mouth every 4 to 6 hours as needed for pain. Do not exceed 4 tablets per day    LEVOFLOXACIN (LEVAQUIN) 500 MG TABLET    Take 1 tablet (500 mg total) by mouth once daily. for 7 days    MILNACIPRAN (SAVELLA) 25 MG TAB TABLET    Take 1 tablet (25 mg total) by mouth 2 (two) times daily.    OMEPRAZOLE (PRILOSEC) 40 MG CAPSULE    Take 1 capsule (40 mg total) by mouth once daily.    ONDANSETRON (ZOFRAN) 4 MG TABLET    Take 1 tablet (4 mg total) by mouth every 6 (six) hours as needed for Nausea.    PREDNISONE (DELTASONE) 20 MG TABLET    TAKE 2 TABLETS BY MOUTH ONCE DAILY, FOR 5 DAYS        Return to Clinic: as  needed    Time spent with pt including note preparation: 19 minutes       Kenya Sanchez, PhD, MP  Advanced Practice Medical Psychologist  [facility]  [address]  [place] [id number] [phone]   [phone] fax

## 2022-10-03 NOTE — PATIENT INSTRUCTIONS
"[facility] DEPARTMENT OF PSYCHIATRY   PATIENT INFORMATION    We appreciate the opportunity to participate in your medical care and hope the following protocols will make it easier for you to receive quality treatment in our department.    PUNCTUALITY: Your appointment is scheduled for a fixed amount of time, reserved especially for you.  To get the benefit of your appointment, please arrive at least 15 minutes early to allow time for traffic, parking and registration.  Should you arrive more than 15 minutes late to your appointment, you will be rescheduled in order to assure your clinician has adequate time to assess you and provide helpful care.      APPOINTMENTS: Appointments are made by the nursing/front office staff or through the patient portal. Providers do not have access  to schedule appointments. Walk in appointments are not available. FOR EMERGENCIES, PLEASE GO THE CLOSEST EMERGENCY ROOM.    CANCELLATION/MISSED APPOINTMENTS:   In order to receive quality care, all appointments must be kept.  If you are unable to keep an appointment, please reschedule at least 3 days prior if possible. Late cancellations (within 24 hours of the appointment) and repeated no-show appointments may result in dismissal from the clinic. After two no show/late cancellation visits, you will receive a notice letter, alerting you to keep visits to prevent department dismissal. If another visit is missed after receipt of the notice, you will be discharged from the clinic. This policy is in effect to allow for other individuals on a long waiting list to be seen as soon as possible. Unlike other branches of medicine where several individuals can be scheduled in a 30 minute time slot, only one individual can be scheduled in any time slot in Psychiatry.     MESSAGES: For simple questions/concerns, you may contact your individual providers electronically through the "[facility]" portal or by calling [phone] " with messages relayed via office staff. If relevant, include pharmacy name and phone number, date of last visit and next scheduled visit, phone number where you can be reached throughout the day, and whether leaving a voicemail or message on an answering machine is acceptable. Messages will be returned by the Medical Assistant or Office Staff after your provider has reviewed the message.  Please allow 24 hours for a returned message before leaving another message. Messages will be checked each workday (Monday through Friday) during office hours (8:00 a.m. and 5:00 p.m.) and returned at most within one business day.  You may leave a non-urgent message after hours. Note that psychotherapy and medication management are not appropriate by telephone or the patient portal.    PRESCRIPTION REFILLS:  Please communicate with your prescriber about any refills you need during your appointment. You may also request refills through the [facility] portal (preferred) or by calling the clinic. Prescriptions will be filled during office hours.     Please do not wait until you are completely out of medication to request refills. Same day refills are not always possible. Patients may experience symptoms of withdrawal if they run out of medications. The patient assumes all responsibility when there is an issue with non-compliance with follow-up appointments and medications.  Some medications are controlled and regulated by the FDA and [employer]. Some of these medications can not be refilled before 30 days and require a face to face appointment.     PAPERWORK REQUESTS: If you have any forms or letters that need to be completed by your doctor, please present these at the beginning of the appointment to ensure that information needed to complete them is obtained during the office visit. Paperwork will be returned within 7-10 business days. Staff will call you to  the paperwork when completed.    SPECIAL EVALUATIONS: Please note that our  "department is treatment-focused. As such, we focus on treatment-oriented evaluations and do not perform specialty or "forensic" evaluations. Examples are listed below.    Disability: We do not do disability evaluations.  Please contact Social Security Administration for evaluations and determinations. You will then sign releases allowing for records from your treatment providers to be forwarded to Social Security Administration to use in their evaluation.  Gun Permit: We do not offer Sound Judgment Evaluations or assessments leading to gun ownership, nor do we fill out or file paperwork relevant to owning, concealing or purchasing a firearm.  Emotional Support     Animals (JOSE GUADALUPE): We do not provide documentation, including letters, to aid in the acclamation that an Emotional Support Animal is required. Note that ESAs are not trained to perform tasks or recognize particular signs or symptoms. Rather, they are distinguished by the close, emotional, and supportive bond between the animal and the owner.       SAMPLES: We do not provide samples of any medications. If you have financial difficulties and are on a limited income, you may qualify for Patient Assistance Programs from various pharmaceutical companies. This will require that you complete paperwork with your financial information, but this does not guarantee that the company will approve the application. Alternative medication options can be discussed.    REFERRALS/COORDINATION: You will be referred to other providers if we feel unable to adequately diagnose or treat your particular condition, or if collaboration with another provider would allow for better management of your condition.     Call In if problems  Call Report Side Effects   Encouraged to follow up with primary care / Gen Med MD for continued monitoring of general health and wellness  Call 911 Or go to ER if Acute Concerns (especially if any thoughts of harm to self or other)          Kenya Sanchez, " PhD, MP  Advanced Practice Medical Psychologist  [facility]  [address]  [place] [id number] [phone]   [phone] fax

## 2022-10-11 LAB
COMMENT: NORMAL
FINAL PATHOLOGIC DIAGNOSIS: NORMAL
GROSS: NORMAL
Lab: NORMAL
MICROSCOPIC EXAM: NORMAL

## 2022-10-12 ENCOUNTER — TELEPHONE (OUTPATIENT)
Dept: OBSTETRICS AND GYNECOLOGY | Facility: CLINIC | Age: 32
End: 2022-10-12
Payer: COMMERCIAL

## 2022-10-12 NOTE — TELEPHONE ENCOUNTER
Spoke with pt. Assisted pt with rescheduling appt due to Dr. Siu having surgery. Pt verbalized understanding.

## 2022-10-17 ENCOUNTER — OFFICE VISIT (OUTPATIENT)
Dept: PSYCHIATRY | Facility: CLINIC | Age: 32
End: 2022-10-17
Payer: COMMERCIAL

## 2022-10-17 ENCOUNTER — PATIENT MESSAGE (OUTPATIENT)
Dept: RHEUMATOLOGY | Facility: CLINIC | Age: 32
End: 2022-10-17
Payer: COMMERCIAL

## 2022-10-17 DIAGNOSIS — F41.1 GAD (GENERALIZED ANXIETY DISORDER): Primary | ICD-10-CM

## 2022-10-17 DIAGNOSIS — F90.0 ATTENTION DEFICIT HYPERACTIVITY DISORDER (ADHD), PREDOMINANTLY INATTENTIVE TYPE: ICD-10-CM

## 2022-10-17 DIAGNOSIS — F33.1 MODERATE EPISODE OF RECURRENT MAJOR DEPRESSIVE DISORDER: ICD-10-CM

## 2022-10-17 DIAGNOSIS — F54 STRESS-RELATED PHYSIOLOGICAL RESPONSE AFFECTING MEDICAL CONDITION: ICD-10-CM

## 2022-10-17 PROCEDURE — 3044F HG A1C LEVEL LT 7.0%: CPT | Mod: CPTII,95,, | Performed by: STUDENT IN AN ORGANIZED HEALTH CARE EDUCATION/TRAINING PROGRAM

## 2022-10-17 PROCEDURE — 1159F PR MEDICATION LIST DOCUMENTED IN MEDICAL RECORD: ICD-10-PCS | Mod: CPTII,95,, | Performed by: STUDENT IN AN ORGANIZED HEALTH CARE EDUCATION/TRAINING PROGRAM

## 2022-10-17 PROCEDURE — 90834 PSYTX W PT 45 MINUTES: CPT | Mod: 95,,, | Performed by: STUDENT IN AN ORGANIZED HEALTH CARE EDUCATION/TRAINING PROGRAM

## 2022-10-17 PROCEDURE — 99999 PR PBB SHADOW E&M-EST. PATIENT-LVL II: ICD-10-PCS | Mod: PBBFAC,,, | Performed by: STUDENT IN AN ORGANIZED HEALTH CARE EDUCATION/TRAINING PROGRAM

## 2022-10-17 PROCEDURE — 99999 PR PBB SHADOW E&M-EST. PATIENT-LVL II: CPT | Mod: PBBFAC,,, | Performed by: STUDENT IN AN ORGANIZED HEALTH CARE EDUCATION/TRAINING PROGRAM

## 2022-10-17 PROCEDURE — 3044F PR MOST RECENT HEMOGLOBIN A1C LEVEL <7.0%: ICD-10-PCS | Mod: CPTII,95,, | Performed by: STUDENT IN AN ORGANIZED HEALTH CARE EDUCATION/TRAINING PROGRAM

## 2022-10-17 PROCEDURE — 90834 PR PSYCHOTHERAPY W/PATIENT, 45 MIN: ICD-10-PCS | Mod: 95,,, | Performed by: STUDENT IN AN ORGANIZED HEALTH CARE EDUCATION/TRAINING PROGRAM

## 2022-10-17 PROCEDURE — 1159F MED LIST DOCD IN RCRD: CPT | Mod: CPTII,95,, | Performed by: STUDENT IN AN ORGANIZED HEALTH CARE EDUCATION/TRAINING PROGRAM

## 2022-10-17 NOTE — PROGRESS NOTES
[facility]  Psychology  Progress Note  Individual Psychotherapy (PhD/LCSW)    Patient Name: Anthony Sexton  MRN: [id number]    Patient Class: [facility]- [facility]  Primary Care Provider: Tyesha Leggett MD    Psychiatry Initial Visit (PhD/LCSW)  Individual Psychotherapy - CPT 57244    Date: 10/17/2022    Site: [place]    The patient location is: Patient's home/ Patient reported that his/her location at the time of this visit was in the [facility]     Visit type: Virtual visit with synchronous audio and video     Each patient to whom he or she provides medical services by telemedicine is: (1) informed of the relationship between the physician and patient and the respective role of any other health care provider with respect to management of the patient; and (2) notified that he or she may decline to receive medical services by telemedicine and may withdraw from such care at any time.     Referral source: Lenora Walters MD    Clinical status of patient: Outpatient    Anthony Sexton, a 32 y.o. female, for initial evaluation visit.  Met with patient.    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     Substance use:   Alcohol: none    "Drugs: none   Tobacco: none   Caffeine: none    Current medications and drug reactions (include OTC, herbal): see medication list     Strengths and liabilities: Strength: Patient accepts guidance/feedback, Strength: Patient is expressive/articulate., Strength: Patient is intelligent., Strength: Patient is motivated for change., Strength: Patient has positive support network., Strength: Patient has reasonable judgment., Strength: Patient is stable., Liability: Patient lacks coping skills.    Current Evaluation:     Mental Status Exam:  General Appearance:  unremarkable, age appropriate   Speech: normal tone, normal rate, normal pitch, normal volume      Level of Cooperation: cooperative      Thought Processes: normal and logical   Mood: anxious, sad      Thought Content: normal, no suicidality, no homicidality, delusions, or paranoia   Affect: congruent and appropriate   Orientation: Oriented x3   Memory: recent >  intact   Attention Span & Concentration: intact   Fund of General Knowledge: intact and appropriate to age and level of education   Abstract Reasoning: not assessed   Judgment & Insight: fair     Language  intact     Summary: The patient scheduled time off but was unable to use it due to a death in her family. The patient and I discussed planning a "staycation" where she can get away for a day and reset. We discussed the benefits of self-care with regard to her anxiety, stress, and fibromyalgia. She plans on taking a break next weekend and will recruit family to help support this mini trip. We also discussed ways to improve her sleep (journaling, weighted blanket, candles and other relaxing items). Next session we will discuss reducing sources of overstimulation in their home.     Diagnostic Impression - Plan:       ICD-10-CM ICD-9-CM   1. SENTHIL (generalized anxiety disorder)  F41.1 300.02   2. Attention deficit hyperactivity disorder (ADHD), predominantly inattentive type  F90.0 314.00   3. Moderate episode " of recurrent major depressive disorder  F33.1 296.32   4. Stress-related physiological response affecting medical condition  F54 316             Plan:individual psychotherapy    Return to Clinic: as scheduled    Length of Service (minutes): 45          Barbra Bartlett, PhD  Psychologist  [facility]

## 2022-10-18 ENCOUNTER — OFFICE VISIT (OUTPATIENT)
Dept: CARDIOLOGY | Facility: CLINIC | Age: 32
End: 2022-10-18
Payer: COMMERCIAL

## 2022-10-18 DIAGNOSIS — R00.2 PALPITATION: Primary | ICD-10-CM

## 2022-10-18 DIAGNOSIS — M79.18 MYOFASCIAL PAIN: Primary | ICD-10-CM

## 2022-10-18 DIAGNOSIS — G93.32 CHRONIC FATIGUE SYNDROME: ICD-10-CM

## 2022-10-18 PROCEDURE — 99214 PR OFFICE/OUTPT VISIT, EST, LEVL IV, 30-39 MIN: ICD-10-PCS | Mod: 95,,, | Performed by: INTERNAL MEDICINE

## 2022-10-18 PROCEDURE — 99214 OFFICE O/P EST MOD 30 MIN: CPT | Mod: 95,,, | Performed by: INTERNAL MEDICINE

## 2022-10-18 PROCEDURE — 3044F PR MOST RECENT HEMOGLOBIN A1C LEVEL <7.0%: ICD-10-PCS | Mod: CPTII,95,, | Performed by: INTERNAL MEDICINE

## 2022-10-18 PROCEDURE — 1160F RVW MEDS BY RX/DR IN RCRD: CPT | Mod: CPTII,95,, | Performed by: INTERNAL MEDICINE

## 2022-10-18 PROCEDURE — 1159F MED LIST DOCD IN RCRD: CPT | Mod: CPTII,95,, | Performed by: INTERNAL MEDICINE

## 2022-10-18 PROCEDURE — 1159F PR MEDICATION LIST DOCUMENTED IN MEDICAL RECORD: ICD-10-PCS | Mod: CPTII,95,, | Performed by: INTERNAL MEDICINE

## 2022-10-18 PROCEDURE — 3044F HG A1C LEVEL LT 7.0%: CPT | Mod: CPTII,95,, | Performed by: INTERNAL MEDICINE

## 2022-10-18 PROCEDURE — 1160F PR REVIEW ALL MEDS BY PRESCRIBER/CLIN PHARMACIST DOCUMENTED: ICD-10-PCS | Mod: CPTII,95,, | Performed by: INTERNAL MEDICINE

## 2022-10-18 RX ORDER — NORTRIPTYLINE HYDROCHLORIDE 10 MG/1
10 CAPSULE ORAL NIGHTLY
Qty: 30 CAPSULE | Refills: 3 | Status: SHIPPED | OUTPATIENT
Start: 2022-10-18 | End: 2023-01-15

## 2022-10-18 NOTE — PROGRESS NOTES
Subjective:   Patient ID:  Anthony Sexton is a 32 y.o. female who presents for follow up of No chief complaint on file.      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   Flu recovered. But has had chest pain on right chest and beneath the breast. And on the right upper back. The pain was dull constantly and 4/10 in sevverity, worse with long diatnce walking. Leaning forward made the pain better. No palpitation but some lightheadedness  Had similar pain one yr ago.   No regular exercise. Lives with husbad and 2 kids. No smoking and drinking  Took some Tylenol and ibuprofen 800 mg tid fro 5 days. No improvement  Some dizziness when standing up with tachy  H/o elevated d dimer and Chest CTA negative for OE in    Chronic elevation of ESr and crp and f/u at rheumatology service  Father had heart attack at 53   echo  · Concentric left ventricular hypertrophy.  · Normal left ventricular systolic function. The estimated ejection fraction is 60%.  · Normal LV diastolic function.  · Normal central venous pressure (3 mmHg).  · The estimated PA systolic pressure is 30 mmHg.    Interval history tele visit  Taking ibuprofen 800 mg daily for yrs for chronic pain. After visit in . Took colchicine for 3 days and the chest pain revolved, then stopped Colchicine.  Dx of fibromyalgia two weeks with diffuse pain. Started Nortriptyline today  Recently palpitation worse after added prednisone. Went to ER and elg NSR. Workup negative. Stopped prednisone  Now palpitation daily. Lasted for seconds. Tinghling of toes. Some dizziness no chest pain dyspnea faint. Occurred at rest and with exertion.  No drinking and coffee                                Past Medical History:   Diagnosis Date    Anemia     Anxiety     Chronic low back pain     Depression     History of abnormal cervical Pap smear      History of recurrent UTIs     History of ulcer disease     Irregular menses     Obesity     Postpartum depression     with last pregnancy.     Pre-eclampsia in postpartum period 4/23/2020    Vitamin D deficiency        Past Surgical History:   Procedure Laterality Date    ESOPHAGOGASTRODUODENOSCOPY N/A 5/26/2021    Procedure: EGD (ESOPHAGOGASTRODUODENOSCOPY);  Surgeon: Alka Olmstead MD;  Location: [facility];  Service: Endoscopy;  Laterality: N/A;    ESOPHAGOGASTRODUODENOSCOPY N/A 10/4/2021    Procedure: EGD (ESOPHAGOGASTRODUODENOSCOPY);  Surgeon: Alka Olmstead MD;  Location: [facility];  Service: Endoscopy;  Laterality: N/A;    None      REDUCTION OF BOTH BREASTS Bilateral 5/27/2021    Procedure: MAMMOPLASTY, REDUCTION, BILATERAL;  Surgeon: Daniel Ba MD;  Location: [facility] OR [address] [place] [address];  Service: Plastics;  Laterality: Bilateral;  Left breast tissue: 1584  Right breast tissue: 1438       Social History     Tobacco Use    Smoking status: Never    Smokeless tobacco: Never   Substance Use Topics    Alcohol use: Not Currently     Alcohol/week: 0.0 standard drinks    Drug use: No       Family History   Problem Relation Age of Onset    Asthma Mother     Hyperlipidemia Mother     Hypertension Mother     Hypertension Father     Hyperlipidemia Father     Heart disease Father 55        MI/CAD    No Known Problems Sister     No Known Problems Brother     Diabetes Maternal Aunt     Diabetes Maternal Grandmother     Insulin resistance Sister     No Known Problems Brother     No Known Problems Brother     No Known Problems Brother     No Known Problems Brother     No Known Problems Daughter     Breast cancer Maternal Aunt     Cancer Neg Hx     Stroke Neg Hx     Brain cancer Neg Hx     Migraines Neg Hx     Colon cancer Neg Hx     Ovarian cancer Neg Hx     Anesthesia problems Neg Hx          Review of Systems   Constitutional: Negative for decreased appetite, diaphoresis, fever, malaise/fatigue and night sweats.    HENT:  Negative for nosebleeds.    Eyes:  Negative for blurred vision and double vision.   Cardiovascular:  Positive for palpitations. Negative for chest pain, claudication, dyspnea on exertion, irregular heartbeat, leg swelling, near-syncope, orthopnea, paroxysmal nocturnal dyspnea and syncope.   Respiratory:  Negative for cough, shortness of breath, sleep disturbances due to breathing, snoring, sputum production and wheezing.    Endocrine: Negative for cold intolerance and polyuria.   Hematologic/Lymphatic: Does not bruise/bleed easily.   Skin:  Negative for rash.   Musculoskeletal:  Negative for back pain, falls, joint pain, joint swelling and neck pain.   Gastrointestinal:  Negative for abdominal pain, heartburn, nausea and vomiting.   Genitourinary:  Negative for dysuria, frequency and hematuria.   Neurological:  Negative for difficulty with concentration, dizziness, focal weakness, headaches, light-headedness, numbness, seizures and weakness.   Psychiatric/Behavioral:  Negative for depression, memory loss and substance abuse. The patient does not have insomnia.    Allergic/Immunologic: Negative for HIV exposure and hives.     Objective:   Physical Exam    Lab Results   Component Value Date    CHOL 191 05/24/2022    CHOL 170 04/20/2021    CHOL 178 07/28/2020     Lab Results   Component Value Date    HDL 44 05/24/2022    HDL 42 04/20/2021    HDL 51 07/28/2020     Lab Results   Component Value Date    LDLCALC 134.2 05/24/2022    LDLCALC 112.8 04/20/2021    LDLCALC 113.2 07/28/2020     Lab Results   Component Value Date    TRIG 64 05/24/2022    TRIG 76 04/20/2021    TRIG 69 07/28/2020     Lab Results   Component Value Date    CHOLHDL 23.0 05/24/2022    CHOLHDL 24.7 04/20/2021    CHOLHDL 28.7 07/28/2020       Chemistry        Component Value Date/Time     09/27/2022 1715    K 3.8 09/27/2022 1715     09/27/2022 1715    CO2 22 (L) 09/27/2022 1715    BUN 11 09/27/2022 1715    CREATININE 0.9 09/27/2022  1715     09/27/2022 1715        Component Value Date/Time    CALCIUM 9.5 09/27/2022 1715    ALKPHOS 113 09/27/2022 1715    AST 19 09/27/2022 1715    ALT 20 09/27/2022 1715    BILITOT 0.3 09/27/2022 1715    ESTGFRAFRICA >60.0 05/24/2022 0902    EGFRNONAA >60.0 05/24/2022 0902          Lab Results   Component Value Date    HGBA1C 5.4 05/24/2022     Lab Results   Component Value Date    TSH 0.582 09/27/2022     No results found for: INR, PROTIME  Lab Results   Component Value Date    WBC 11.51 09/27/2022    HGB 13.2 09/27/2022    HCT 43.2 09/27/2022    MCV 79 (L) 09/27/2022     09/27/2022     BMP  Sodium   Date Value Ref Range Status   09/27/2022 136 136 - 145 mmol/L Final     Potassium   Date Value Ref Range Status   09/27/2022 3.8 3.5 - 5.1 mmol/L Final     Chloride   Date Value Ref Range Status   09/27/2022 105 95 - 110 mmol/L Final     CO2   Date Value Ref Range Status   09/27/2022 22 (L) 23 - 29 mmol/L Final     BUN   Date Value Ref Range Status   09/27/2022 11 6 - 20 mg/dL Final     Creatinine   Date Value Ref Range Status   09/27/2022 0.9 0.5 - 1.4 mg/dL Final     Calcium   Date Value Ref Range Status   09/27/2022 9.5 8.7 - 10.5 mg/dL Final     Anion Gap   Date Value Ref Range Status   09/27/2022 9 8 - 16 mmol/L Final     eGFR if    Date Value Ref Range Status   05/24/2022 >60.0 >60 mL/min/1.73 m^2 Final     eGFR if non    Date Value Ref Range Status   05/24/2022 >60.0 >60 mL/min/1.73 m^2 Final     Comment:     Calculation used to obtain the estimated glomerular filtration  rate (eGFR) is the CKD-EPI equation.        BNP  @LABRCNTIP(BNP,BNPTRIAGEBLO)@  @LABRCNTIP(troponini)@  CrCl cannot be calculated (Patient's most recent lab result is older than the maximum 7 days allowed.).  No results found in the last 24 hours.  No results found in the last 24 hours.  No results found in the last 24 hours.    Assessment:      1. Palpitation        Plan:   Palpitation  -      Echo; Future  -     Holter monitor - 48 hour; Future        Echo with REID  Avoid caffeine and alcohol  RTC as needed    The patient location is: home  The chief complaint leading to consultation is: palpitation    Visit type: audiovisual    Face to Face time with patient: 18 minutes of total time spent on the encounter, which includes face to face time and non-face to face time preparing to see the patient (eg, review of tests), Obtaining and/or reviewing separately obtained history, Documenting clinical information in the electronic or other health record, Independently interpreting results (not separately reported) and communicating results to the patient/family/caregiver, or Care coordination (not separately reported).         Each patient to whom he or she provides medical services by telemedicine is:  (1) informed of the relationship between the physician and patient and the respective role of any other health care provider with respect to management of the patient; and (2) notified that he or she may decline to receive medical services by telemedicine and may withdraw from such care at any time.    Notes:

## 2022-10-19 ENCOUNTER — PATIENT MESSAGE (OUTPATIENT)
Dept: CARDIOLOGY | Facility: HOSPITAL | Age: 32
End: 2022-10-19
Payer: COMMERCIAL

## 2022-10-31 ENCOUNTER — LAB VISIT (OUTPATIENT)
Dept: LAB | Facility: HOSPITAL | Age: 32
End: 2022-10-31
Attending: OBSTETRICS & GYNECOLOGY
Payer: COMMERCIAL

## 2022-10-31 ENCOUNTER — OFFICE VISIT (OUTPATIENT)
Dept: OBSTETRICS AND GYNECOLOGY | Facility: CLINIC | Age: 32
End: 2022-10-31
Payer: COMMERCIAL

## 2022-10-31 VITALS
DIASTOLIC BLOOD PRESSURE: 80 MMHG | BODY MASS INDEX: 45.95 KG/M2 | WEIGHT: 275.81 LBS | SYSTOLIC BLOOD PRESSURE: 130 MMHG | HEIGHT: 65 IN

## 2022-10-31 DIAGNOSIS — N64.4 BREAST PAIN IN FEMALE: ICD-10-CM

## 2022-10-31 DIAGNOSIS — R10.2 CHRONIC PELVIC PAIN IN FEMALE: ICD-10-CM

## 2022-10-31 DIAGNOSIS — Z12.4 CERVICAL CANCER SCREENING: ICD-10-CM

## 2022-10-31 DIAGNOSIS — Z01.419 WELL WOMAN EXAM WITH ROUTINE GYNECOLOGICAL EXAM: Primary | ICD-10-CM

## 2022-10-31 DIAGNOSIS — G89.29 CHRONIC PELVIC PAIN IN FEMALE: ICD-10-CM

## 2022-10-31 DIAGNOSIS — Z11.3 SCREEN FOR STD (SEXUALLY TRANSMITTED DISEASE): ICD-10-CM

## 2022-10-31 LAB
BILIRUB UR QL STRIP: NEGATIVE
CLARITY UR REFRACT.AUTO: ABNORMAL
COLOR UR AUTO: ABNORMAL
GLUCOSE UR QL STRIP: NEGATIVE
HGB UR QL STRIP: NEGATIVE
KETONES UR QL STRIP: NEGATIVE
LEUKOCYTE ESTERASE UR QL STRIP: NEGATIVE
NITRITE UR QL STRIP: NEGATIVE
PH UR STRIP: 6 [PH] (ref 5–8)
PROT UR QL STRIP: NEGATIVE
SP GR UR STRIP: 1.03 (ref 1–1.03)
URN SPEC COLLECT METH UR: ABNORMAL

## 2022-10-31 PROCEDURE — 86803 HEPATITIS C AB TEST: CPT | Performed by: OBSTETRICS & GYNECOLOGY

## 2022-10-31 PROCEDURE — 99395 PR PREVENTIVE VISIT,EST,18-39: ICD-10-PCS | Mod: S$GLB,,, | Performed by: OBSTETRICS & GYNECOLOGY

## 2022-10-31 PROCEDURE — 87624 HPV HI-RISK TYP POOLED RSLT: CPT | Performed by: OBSTETRICS & GYNECOLOGY

## 2022-10-31 PROCEDURE — 3075F PR MOST RECENT SYSTOLIC BLOOD PRESS GE 130-139MM HG: ICD-10-PCS | Mod: CPTII,S$GLB,, | Performed by: OBSTETRICS & GYNECOLOGY

## 2022-10-31 PROCEDURE — 86592 SYPHILIS TEST NON-TREP QUAL: CPT | Performed by: OBSTETRICS & GYNECOLOGY

## 2022-10-31 PROCEDURE — 3079F DIAST BP 80-89 MM HG: CPT | Mod: CPTII,S$GLB,, | Performed by: OBSTETRICS & GYNECOLOGY

## 2022-10-31 PROCEDURE — 3075F SYST BP GE 130 - 139MM HG: CPT | Mod: CPTII,S$GLB,, | Performed by: OBSTETRICS & GYNECOLOGY

## 2022-10-31 PROCEDURE — 88175 CYTOPATH C/V AUTO FLUID REDO: CPT | Performed by: OBSTETRICS & GYNECOLOGY

## 2022-10-31 PROCEDURE — 87591 N.GONORRHOEAE DNA AMP PROB: CPT | Performed by: OBSTETRICS & GYNECOLOGY

## 2022-10-31 PROCEDURE — 87086 URINE CULTURE/COLONY COUNT: CPT | Performed by: OBSTETRICS & GYNECOLOGY

## 2022-10-31 PROCEDURE — 99999 PR PBB SHADOW E&M-EST. PATIENT-LVL III: CPT | Mod: PBBFAC,,, | Performed by: OBSTETRICS & GYNECOLOGY

## 2022-10-31 PROCEDURE — 87340 HEPATITIS B SURFACE AG IA: CPT | Performed by: OBSTETRICS & GYNECOLOGY

## 2022-10-31 PROCEDURE — 1159F PR MEDICATION LIST DOCUMENTED IN MEDICAL RECORD: ICD-10-PCS | Mod: CPTII,S$GLB,, | Performed by: OBSTETRICS & GYNECOLOGY

## 2022-10-31 PROCEDURE — 3044F PR MOST RECENT HEMOGLOBIN A1C LEVEL <7.0%: ICD-10-PCS | Mod: CPTII,S$GLB,, | Performed by: OBSTETRICS & GYNECOLOGY

## 2022-10-31 PROCEDURE — 3079F PR MOST RECENT DIASTOLIC BLOOD PRESSURE 80-89 MM HG: ICD-10-PCS | Mod: CPTII,S$GLB,, | Performed by: OBSTETRICS & GYNECOLOGY

## 2022-10-31 PROCEDURE — 87389 HIV-1 AG W/HIV-1&-2 AB AG IA: CPT | Performed by: OBSTETRICS & GYNECOLOGY

## 2022-10-31 PROCEDURE — 87491 CHLMYD TRACH DNA AMP PROBE: CPT | Performed by: OBSTETRICS & GYNECOLOGY

## 2022-10-31 PROCEDURE — 3044F HG A1C LEVEL LT 7.0%: CPT | Mod: CPTII,S$GLB,, | Performed by: OBSTETRICS & GYNECOLOGY

## 2022-10-31 PROCEDURE — 81003 URINALYSIS AUTO W/O SCOPE: CPT | Performed by: OBSTETRICS & GYNECOLOGY

## 2022-10-31 PROCEDURE — 99395 PREV VISIT EST AGE 18-39: CPT | Mod: S$GLB,,, | Performed by: OBSTETRICS & GYNECOLOGY

## 2022-10-31 PROCEDURE — 36415 COLL VENOUS BLD VENIPUNCTURE: CPT | Performed by: OBSTETRICS & GYNECOLOGY

## 2022-10-31 PROCEDURE — 99999 PR PBB SHADOW E&M-EST. PATIENT-LVL III: ICD-10-PCS | Mod: PBBFAC,,, | Performed by: OBSTETRICS & GYNECOLOGY

## 2022-10-31 PROCEDURE — 1159F MED LIST DOCD IN RCRD: CPT | Mod: CPTII,S$GLB,, | Performed by: OBSTETRICS & GYNECOLOGY

## 2022-10-31 NOTE — PROGRESS NOTES
Subjective:       Patient ID: Anthony Sexton is a 32 y.o. female.    Chief Complaint:  Well Woman and Abdominal Pain      History of Present Illness  HPI  Presents for well-woman exam.  Pt has been having on and off bilateral breast pain for the last year.  No lumps.  Had BL breast reduction surgery in .  Pt also notices on and off RLQ pain.  Had a UTI last month and completed 7 day course of Levaquin.  Her urgency resolved, but still has pain.  Pt is MM with a male partner.  Wants STD screening.  Last pap: 2021: ASCUS, + HR HPV  Colpo: 2021: CATHY 1-2, ECC neg  Colpo: 2021: CATHY 1, ECC benign    GYN & OB History  No LMP recorded.   Date of Last Pap: 10/31/2022    OB History    Para Term  AB Living   2 2 2 0 0 2   SAB IAB Ectopic Multiple Live Births   0 0 0 0 2      # Outcome Date GA Lbr Trever/2nd Weight Sex Delivery Anes PTL Lv   2 Term 20 38w2d 07:44 / 00:06 3.14 kg (6 lb 14.8 oz) F Vag-Spont EPI N DANIELLE   1 Term 16 37w0d  3.289 kg (7 lb 4 oz) F Vag-Spont   DANIELLE      Complications: Severe hyperemesis gravidarum       Review of Systems  Review of Systems   Constitutional:  Negative for activity change, fatigue, fever and unexpected weight change.   Gastrointestinal:  Negative for abdominal pain, bloating, constipation, diarrhea, nausea and vomiting.   Endocrine: Negative for hair loss and hot flashes.   Genitourinary:  Positive for pelvic pain. Negative for dysmenorrhea, dyspareunia, dysuria, frequency, genital sores, hematuria, menorrhagia, menstrual problem, urgency, vaginal bleeding, vaginal discharge, vaginal pain, postcoital bleeding and vaginal odor.   Integumentary:  Negative for rash, hair changes, breast mass, nipple discharge and breast skin changes.   Hematological:  Negative for adenopathy.   Breast: Positive for mastodynia.Negative for mass, nipple discharge and skin changes        Objective:    Physical Exam:   Constitutional: She is oriented to person, place, and time.  She appears well-developed and well-nourished. No distress.      Neck: No thyromegaly present.     Pulmonary/Chest: Right breast exhibits no inverted nipple, no mass, no nipple discharge, no skin change, no tenderness, no bleeding and no swelling. Left breast exhibits no inverted nipple, no mass, no nipple discharge, no skin change, no tenderness, no bleeding and no swelling. Breasts are symmetrical.            Abdominal: Soft. She exhibits no distension and no mass. There is no abdominal tenderness. There is no rebound and no guarding.     Genitourinary:    Pelvic exam was performed with patient supine.   There is no rash, tenderness, lesion or injury on the right labia. There is no rash, tenderness, lesion or injury on the left labia. Cervix is normal. Right adnexum displays no mass, no tenderness and no fullness. Left adnexum displays no mass, no tenderness and no fullness. No erythema,  no vaginal discharge, tenderness, bleeding, rectocele or cystocele in the vagina.    No foreign body in the vagina.      No signs of injury in the vagina.   Cervix exhibits no motion tenderness, no discharge and no friability. Uterus is not deviated, not enlarged, not fixed, not tender and not hosting fibroids.               Neurological: She is alert and oriented to person, place, and time.     Psychiatric: She has a normal mood and affect.        Assessment:        1. Well woman exam with routine gynecological exam    2. Cervical cancer screening    3. Chronic pelvic pain in female    4. Breast pain in female    5. Screen for STD (sexually transmitted disease)                Plan:      Anthony was seen today for well woman and abdominal pain.    Diagnoses and all orders for this visit:    Well woman exam with routine gynecological exam    Cervical cancer screening  -     Liquid-Based Pap Smear, Screening  -     HPV High Risk Genotypes, PCR    Chronic pelvic pain in female  -     Urinalysis  -     Urine culture  -     US Pelvis  Complete Non OB; Future    Breast pain in female    Screen for STD (sexually transmitted disease)  -     C. trachomatis/N. gonorrhoeae by AMP DNA  -     HIV 1/2 Ag/Ab (4th Gen); Future  -     RPR; Future  -     Hepatitis B Surface Antigen; Future  -     Hepatitis C Antibody; Future  Advised to limit caffeine and chocolate.  Can try evening primrose oil for breast pain.  RTC pending results of above.

## 2022-11-01 LAB
BACTERIA UR CULT: NO GROWTH
C TRACH DNA SPEC QL NAA+PROBE: NOT DETECTED
HBV SURFACE AG SERPL QL IA: NORMAL
HCV AB SERPL QL IA: NORMAL
HIV 1+2 AB+HIV1 P24 AG SERPL QL IA: NORMAL
N GONORRHOEA DNA SPEC QL NAA+PROBE: NOT DETECTED

## 2022-11-02 ENCOUNTER — HOSPITAL ENCOUNTER (OUTPATIENT)
Dept: CARDIOLOGY | Facility: HOSPITAL | Age: 32
Discharge: HOME | End: 2022-11-02
Attending: INTERNAL MEDICINE
Payer: COMMERCIAL

## 2022-11-02 VITALS — WEIGHT: 275 LBS | HEIGHT: 65 IN | BODY MASS INDEX: 45.82 KG/M2

## 2022-11-02 DIAGNOSIS — R00.2 PALPITATION: ICD-10-CM

## 2022-11-02 LAB
AORTIC ROOT ANNULUS: 2.82 CM
AV INDEX (PROSTH): 0.73
AV MEAN GRADIENT: 3 MMHG
AV PEAK GRADIENT: 5 MMHG
AV VALVE AREA: 2.35 CM2
AV VELOCITY RATIO: 0.71
BSA FOR ECHO PROCEDURE: 2.39 M2
CV ECHO LV RWT: 0.49 CM
DOP CALC AO PEAK VEL: 1.16 M/S
DOP CALC AO VTI: 23.6 CM
DOP CALC LVOT AREA: 3.2 CM2
DOP CALC LVOT DIAMETER: 2.02 CM
DOP CALC LVOT PEAK VEL: 0.82 M/S
DOP CALC LVOT STROKE VOLUME: 55.41 CM3
DOP CALCLVOT PEAK VEL VTI: 17.3 CM
E WAVE DECELERATION TIME: 174.55 MSEC
E/A RATIO: 1.11
E/E' RATIO: 5.91 M/S
ECHO LV POSTERIOR WALL: 1.1 CM (ref 0.6–1.1)
EJECTION FRACTION: 60 %
FRACTIONAL SHORTENING: 40 % (ref 28–44)
INTERVENTRICULAR SEPTUM: 0.99 CM (ref 0.6–1.1)
IVRT: 77.07 MSEC
LA MAJOR: 4.39 CM
LA MINOR: 4.32 CM
LA WIDTH: 3.3 CM
LEFT ATRIUM SIZE: 3.25 CM
LEFT ATRIUM VOLUME INDEX MOD: 16.9 ML/M2
LEFT ATRIUM VOLUME INDEX: 17.6 ML/M2
LEFT ATRIUM VOLUME MOD: 38.11 CM3
LEFT ATRIUM VOLUME: 39.7 CM3
LEFT INTERNAL DIMENSION IN SYSTOLE: 2.73 CM (ref 2.1–4)
LEFT VENTRICLE DIASTOLIC VOLUME INDEX: 41.55 ML/M2
LEFT VENTRICLE DIASTOLIC VOLUME: 93.9 ML
LEFT VENTRICLE MASS INDEX: 73 G/M2
LEFT VENTRICLE SYSTOLIC VOLUME INDEX: 12.3 ML/M2
LEFT VENTRICLE SYSTOLIC VOLUME: 27.84 ML
LEFT VENTRICULAR INTERNAL DIMENSION IN DIASTOLE: 4.53 CM (ref 3.5–6)
LEFT VENTRICULAR MASS: 164.64 G
LV LATERAL E/E' RATIO: 5.67 M/S
LV SEPTAL E/E' RATIO: 6.18 M/S
LVOT MG: 1.63 MMHG
LVOT MV: 0.61 CM/S
MV PEAK A VEL: 0.61 M/S
MV PEAK E VEL: 0.68 M/S
MV STENOSIS PRESSURE HALF TIME: 50.62 MS
MV VALVE AREA P 1/2 METHOD: 4.35 CM2
PISA TR MAX VEL: 2.69 M/S
PULM VEIN S/D RATIO: 1.56
PV MV: 0.7 M/S
PV PEAK D VEL: 0.41 M/S
PV PEAK S VEL: 0.64 M/S
PV PEAK VELOCITY: 1.02 CM/S
RA MAJOR: 4.13 CM
RA PRESSURE: 3 MMHG
RA WIDTH: 3.25 CM
RIGHT VENTRICULAR END-DIASTOLIC DIMENSION: 2.59 CM
RPR SER QL: NORMAL
SINUS: 2.77 CM
STJ: 2.39 CM
TDI LATERAL: 0.12 M/S
TDI SEPTAL: 0.11 M/S
TDI: 0.12 M/S
TR MAX PG: 29 MMHG
TRICUSPID ANNULAR PLANE SYSTOLIC EXCURSION: 2.17 CM
TV REST PULMONARY ARTERY PRESSURE: 32 MMHG

## 2022-11-02 PROCEDURE — 93306 TTE W/DOPPLER COMPLETE: CPT

## 2022-11-02 PROCEDURE — 93306 TTE W/DOPPLER COMPLETE: CPT | Mod: 26,,, | Performed by: INTERNAL MEDICINE

## 2022-11-02 PROCEDURE — 93306 ECHO (CUPID ONLY): ICD-10-PCS | Mod: 26,,, | Performed by: INTERNAL MEDICINE

## 2022-11-03 ENCOUNTER — PATIENT MESSAGE (OUTPATIENT)
Dept: CARDIOLOGY | Facility: CLINIC | Age: 32
End: 2022-11-03
Payer: COMMERCIAL

## 2022-11-03 ENCOUNTER — HOSPITAL ENCOUNTER (OUTPATIENT)
Dept: CARDIOLOGY | Facility: HOSPITAL | Age: 32
Discharge: HOME | End: 2022-11-03
Attending: INTERNAL MEDICINE
Payer: COMMERCIAL

## 2022-11-03 ENCOUNTER — TELEPHONE (OUTPATIENT)
Dept: CARDIOLOGY | Facility: CLINIC | Age: 32
End: 2022-11-03
Payer: COMMERCIAL

## 2022-11-03 DIAGNOSIS — R00.2 PALPITATION: ICD-10-CM

## 2022-11-03 PROCEDURE — 93225 XTRNL ECG REC<48 HRS REC: CPT

## 2022-11-03 PROCEDURE — 93227 XTRNL ECG REC<48 HR R&I: CPT | Mod: ,,, | Performed by: INTERNAL MEDICINE

## 2022-11-03 PROCEDURE — 93227 HOLTER MONITOR - 48 HOUR (CUPID ONLY): ICD-10-PCS | Mod: ,,, | Performed by: INTERNAL MEDICINE

## 2022-11-03 NOTE — TELEPHONE ENCOUNTER
LM for pt to call us back and sent via [employer]      ----- Message from Sajan Stevens MD sent at 11/2/2022  9:21 PM CDT -----  Echo showed normal function

## 2022-11-08 LAB
OHS CV EVENT MONITOR DAY: 2
OHS CV HOLTER LENGTH DECIMAL HOURS: 96
OHS CV HOLTER LENGTH HOURS: 48
OHS CV HOLTER LENGTH MINUTES: 0
OHS CV HOLTER SINUS AVERAGE HR: 82
OHS CV HOLTER SINUS MAX HR: 146
OHS CV HOLTER SINUS MIN HR: 49

## 2022-11-09 ENCOUNTER — TELEPHONE (OUTPATIENT)
Dept: CARDIOLOGY | Facility: CLINIC | Age: 32
End: 2022-11-09
Payer: COMMERCIAL

## 2022-11-09 NOTE — TELEPHONE ENCOUNTER
The patient has been notified of this information and all questions answered.    ----- Message from Sajan Stevens MD sent at 11/9/2022  4:32 PM CST -----  Holter showed rare arrhythmia  Normal study

## 2022-11-10 LAB
FINAL PATHOLOGIC DIAGNOSIS: NORMAL
Lab: NORMAL

## 2022-11-14 ENCOUNTER — OFFICE VISIT (OUTPATIENT)
Dept: PSYCHIATRY | Facility: CLINIC | Age: 32
End: 2022-11-14
Payer: COMMERCIAL

## 2022-11-14 ENCOUNTER — PATIENT MESSAGE (OUTPATIENT)
Dept: PSYCHIATRY | Facility: CLINIC | Age: 32
End: 2022-11-14

## 2022-11-14 DIAGNOSIS — F54 STRESS-RELATED PHYSIOLOGICAL RESPONSE AFFECTING MEDICAL CONDITION: ICD-10-CM

## 2022-11-14 DIAGNOSIS — F90.0 ATTENTION DEFICIT HYPERACTIVITY DISORDER (ADHD), PREDOMINANTLY INATTENTIVE TYPE: ICD-10-CM

## 2022-11-14 DIAGNOSIS — F33.1 MODERATE EPISODE OF RECURRENT MAJOR DEPRESSIVE DISORDER: ICD-10-CM

## 2022-11-14 DIAGNOSIS — F41.1 GAD (GENERALIZED ANXIETY DISORDER): Primary | ICD-10-CM

## 2022-11-14 PROCEDURE — 99999 PR PBB SHADOW E&M-EST. PATIENT-LVL II: ICD-10-PCS | Mod: PBBFAC,,, | Performed by: STUDENT IN AN ORGANIZED HEALTH CARE EDUCATION/TRAINING PROGRAM

## 2022-11-14 PROCEDURE — 1159F PR MEDICATION LIST DOCUMENTED IN MEDICAL RECORD: ICD-10-PCS | Mod: CPTII,95,S$GLB, | Performed by: STUDENT IN AN ORGANIZED HEALTH CARE EDUCATION/TRAINING PROGRAM

## 2022-11-14 PROCEDURE — 3044F HG A1C LEVEL LT 7.0%: CPT | Mod: CPTII,95,S$GLB, | Performed by: STUDENT IN AN ORGANIZED HEALTH CARE EDUCATION/TRAINING PROGRAM

## 2022-11-14 PROCEDURE — 3044F PR MOST RECENT HEMOGLOBIN A1C LEVEL <7.0%: ICD-10-PCS | Mod: CPTII,95,S$GLB, | Performed by: STUDENT IN AN ORGANIZED HEALTH CARE EDUCATION/TRAINING PROGRAM

## 2022-11-14 PROCEDURE — 1159F MED LIST DOCD IN RCRD: CPT | Mod: CPTII,95,S$GLB, | Performed by: STUDENT IN AN ORGANIZED HEALTH CARE EDUCATION/TRAINING PROGRAM

## 2022-11-14 PROCEDURE — 90834 PR PSYCHOTHERAPY W/PATIENT, 45 MIN: ICD-10-PCS | Mod: 95,S$GLB,, | Performed by: STUDENT IN AN ORGANIZED HEALTH CARE EDUCATION/TRAINING PROGRAM

## 2022-11-14 PROCEDURE — 90834 PSYTX W PT 45 MINUTES: CPT | Mod: 95,S$GLB,, | Performed by: STUDENT IN AN ORGANIZED HEALTH CARE EDUCATION/TRAINING PROGRAM

## 2022-11-14 PROCEDURE — 99999 PR PBB SHADOW E&M-EST. PATIENT-LVL II: CPT | Mod: PBBFAC,,, | Performed by: STUDENT IN AN ORGANIZED HEALTH CARE EDUCATION/TRAINING PROGRAM

## 2022-11-14 NOTE — PROGRESS NOTES
[facility]  Psychology  Progress Note  Individual Psychotherapy (PhD/LCSW)    Patient Name: Anthony Sexton  MRN: [id number]    Patient Class: OP- [facility]  Primary Care Provider: Tyesha Leggett MD    Psychiatry Initial Visit (PhD/LCSW)  Individual Psychotherapy - CPT 70637    Date: 11/14/2022    Site: [place]    The patient location is: Patient's home/ Patient reported that his/her location at the time of this visit was in the [facility]     Visit type: Virtual visit with synchronous audio and video     Each patient to whom he or she provides medical services by telemedicine is: (1) informed of the relationship between the physician and patient and the respective role of any other health care provider with respect to management of the patient; and (2) notified that he or she may decline to receive medical services by telemedicine and may withdraw from such care at any time.     Referral source: Lenora Walters MD    Clinical status of patient: Outpatient    Anthony Sexton, a 32 y.o. female, for initial evaluation visit.  Met with patient.    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     Substance use:   Alcohol: none    Drugs: none   Tobacco: none   Caffeine: none    Current medications and drug reactions (include OTC, herbal): see medication list     Strengths and liabilities: Strength: Patient accepts guidance/feedback, Strength: Patient is expressive/articulate., Strength: Patient is intelligent., Strength: Patient is motivated for change., Strength: Patient has positive support network., Strength: Patient has reasonable judgment., Strength: Patient is stable., Liability: Patient lacks coping skills.    Current Evaluation:     Mental Status Exam:  General Appearance:  unremarkable, age appropriate   Speech: normal tone, normal rate, normal pitch, normal volume      Level of Cooperation: cooperative      Thought Processes: normal and logical   Mood: anxious, sad      Thought Content: normal, no suicidality, no homicidality, delusions, or paranoia   Affect: congruent and appropriate   Orientation: Oriented x3   Memory: recent >  intact   Attention Span & Concentration: intact   Fund of General Knowledge: intact and appropriate to age and level of education   Abstract Reasoning: not assessed   Judgment & Insight: fair     Language  intact     Summary: The patient is prioritizing her health and wellbeing in an effort to help reduce her stress and subsequent fibromyalgia symptoms. She works out 3x/week with her supervisor and had a spa day with a friend a few weeks ago. The biggest change is that the patient decided that she and her  needed to separate for 6 months and that she and the kids are moving to her parent's home. The patient stated that she was tired of handling everything herself and decided that she deserved a partner who was more supportive. She stated that this was not an impulsive decision but one that has been brewing for over a year. The patient seemed to be happy with the decision and was in a great mood. I suggested that she and her  set ground rules with regard to their trial separation. I will send  her a document that will help them to set boundaries. She plans on speaking with him before our next session. The patient was encouraged to continue doing things to help reduce her stress.     Diagnostic Impression - Plan:       ICD-10-CM ICD-9-CM   1. SENTHIL (generalized anxiety disorder)  F41.1 300.02   2. Attention deficit hyperactivity disorder (ADHD), predominantly inattentive type  F90.0 314.00   3. Moderate episode of recurrent major depressive disorder  F33.1 296.32   4. Stress-related physiological response affecting medical condition  F54 316               Plan:individual psychotherapy    Return to Clinic: as scheduled    Length of Service (minutes): 45          Barbra Bartlett, PhD  Psychologist  [facility]

## 2022-11-23 ENCOUNTER — OFFICE VISIT (OUTPATIENT)
Dept: URGENT CARE | Facility: CLINIC | Age: 32
End: 2022-11-23
Payer: COMMERCIAL

## 2022-11-23 VITALS
HEIGHT: 65 IN | HEART RATE: 89 BPM | TEMPERATURE: 98 F | WEIGHT: 275 LBS | SYSTOLIC BLOOD PRESSURE: 106 MMHG | DIASTOLIC BLOOD PRESSURE: 77 MMHG | OXYGEN SATURATION: 100 % | RESPIRATION RATE: 18 BRPM | BODY MASS INDEX: 45.82 KG/M2

## 2022-11-23 DIAGNOSIS — R00.2 PALPITATIONS: Primary | ICD-10-CM

## 2022-11-23 LAB
CTP QC/QA: YES
HGB, POC: 10.7 G/DL (ref 12–15)

## 2022-11-23 PROCEDURE — 85018 HEMOGLOBIN: CPT | Mod: QW,S$GLB,,

## 2022-11-23 PROCEDURE — 3074F SYST BP LT 130 MM HG: CPT | Mod: CPTII,S$GLB,,

## 2022-11-23 PROCEDURE — 3044F HG A1C LEVEL LT 7.0%: CPT | Mod: CPTII,S$GLB,,

## 2022-11-23 PROCEDURE — 93005 ELECTROCARDIOGRAM TRACING: CPT | Mod: S$GLB,,,

## 2022-11-23 PROCEDURE — 93010 ELECTROCARDIOGRAM REPORT: CPT | Mod: S$GLB,,, | Performed by: INTERNAL MEDICINE

## 2022-11-23 PROCEDURE — 3074F PR MOST RECENT SYSTOLIC BLOOD PRESSURE < 130 MM HG: ICD-10-PCS | Mod: CPTII,S$GLB,,

## 2022-11-23 PROCEDURE — 99214 OFFICE O/P EST MOD 30 MIN: CPT | Mod: S$GLB,,,

## 2022-11-23 PROCEDURE — 1160F PR REVIEW ALL MEDS BY PRESCRIBER/CLIN PHARMACIST DOCUMENTED: ICD-10-PCS | Mod: CPTII,S$GLB,,

## 2022-11-23 PROCEDURE — 3008F PR BODY MASS INDEX (BMI) DOCUMENTED: ICD-10-PCS | Mod: CPTII,S$GLB,,

## 2022-11-23 PROCEDURE — 1159F MED LIST DOCD IN RCRD: CPT | Mod: CPTII,S$GLB,,

## 2022-11-23 PROCEDURE — 85018 POCT HEMOGLOBIN (QC): ICD-10-PCS | Mod: QW,S$GLB,,

## 2022-11-23 PROCEDURE — 99214 PR OFFICE/OUTPT VISIT, EST, LEVL IV, 30-39 MIN: ICD-10-PCS | Mod: S$GLB,,,

## 2022-11-23 PROCEDURE — 1160F RVW MEDS BY RX/DR IN RCRD: CPT | Mod: CPTII,S$GLB,,

## 2022-11-23 PROCEDURE — 3044F PR MOST RECENT HEMOGLOBIN A1C LEVEL <7.0%: ICD-10-PCS | Mod: CPTII,S$GLB,,

## 2022-11-23 PROCEDURE — 3078F DIAST BP <80 MM HG: CPT | Mod: CPTII,S$GLB,,

## 2022-11-23 PROCEDURE — 3078F PR MOST RECENT DIASTOLIC BLOOD PRESSURE < 80 MM HG: ICD-10-PCS | Mod: CPTII,S$GLB,,

## 2022-11-23 PROCEDURE — 1159F PR MEDICATION LIST DOCUMENTED IN MEDICAL RECORD: ICD-10-PCS | Mod: CPTII,S$GLB,,

## 2022-11-23 PROCEDURE — 93010 EKG 12-LEAD: ICD-10-PCS | Mod: S$GLB,,, | Performed by: INTERNAL MEDICINE

## 2022-11-23 PROCEDURE — 93005 EKG 12-LEAD: ICD-10-PCS | Mod: S$GLB,,,

## 2022-11-23 PROCEDURE — 3008F BODY MASS INDEX DOCD: CPT | Mod: CPTII,S$GLB,,

## 2022-11-23 NOTE — PROGRESS NOTES
"Subjective:       Patient ID: Anthony Sexton is a 32 y.o. female.    Vitals:  height is 5' 5" (1.651 m) and weight is 124.7 kg (275 lb). Her temperature is 98 °F (36.7 °C). Her blood pressure is 106/77 and her pulse is 89. Her respiration is 18 and oxygen saturation is 100%.     Chief Complaint: Palpitations    PT states that she is having palpations since this morning. She does have history of this but she states she is light headed and her nail beds are blue.    She denies associated chest pain, SOB with palpitations. States they come on randomly but that she noticed she had 7 instances in the last hour. Has been told she has PVCs. She has cut out caffeine, alcohol. States she may be a little dehydrated but is otherwise unsure of what may have brought her symptoms on. Was told by Cardiologist she may benefit from low dose Metoprolol if symptoms continue. Followed by Dr. Stevens. Had normal Echo one month ago. Regarding her nails, she states this has been ongoing for a few days now. Worsens when her hands get cold.   Also reports history of anemia. Has not had any problems with this recently.    Palpitations   This is a recurrent problem. The current episode started today. The problem occurs constantly. The problem has been unchanged. Nothing aggravates the symptoms. Pertinent negatives include no anxiety, chest fullness, chest pain, coughing, diaphoresis, dizziness, fever, irregular heartbeat, malaise/fatigue, nausea, near-syncope, numbness, shortness of breath, syncope, vomiting or weakness. She has tried nothing for the symptoms. The treatment provided no relief. There is no history of anemia, anxiety, drug use, heart disease, hyperthyroidism or a valve disorder.     Constitution: Negative for sweating and fever.   Cardiovascular:  Positive for palpitations. Negative for chest pain and passing out.   Respiratory:  Negative for cough and shortness of breath.    Gastrointestinal:  Negative for nausea and vomiting. "   Neurological:  Negative for dizziness and numbness.   Psychiatric/Behavioral:  Negative for nervous/anxious. The patient is not nervous/anxious.      Objective:      Physical Exam   Constitutional: She is oriented to person, place, and time. She appears well-developed. She is cooperative.  Non-toxic appearance. She does not appear ill. No distress.   HENT:   Head: Normocephalic and atraumatic.   Ears:   Right Ear: Hearing, tympanic membrane, external ear and ear canal normal.   Left Ear: Hearing, tympanic membrane, external ear and ear canal normal.   Nose: Nose normal. No mucosal edema, rhinorrhea or nasal deformity. No epistaxis. Right sinus exhibits no maxillary sinus tenderness and no frontal sinus tenderness. Left sinus exhibits no maxillary sinus tenderness and no frontal sinus tenderness.   Mouth/Throat: Uvula is midline, oropharynx is clear and moist and mucous membranes are normal. No trismus in the jaw. Normal dentition. No uvula swelling. No posterior oropharyngeal erythema.   Eyes: Conjunctivae and lids are normal. Right eye exhibits no discharge. Left eye exhibits no discharge. No scleral icterus.   Neck: Trachea normal and phonation normal. Neck supple.   Cardiovascular: Normal rate, regular rhythm, normal heart sounds and normal pulses.      Comments: No irregularities in rhythm while in clinic   Pulmonary/Chest: Effort normal and breath sounds normal. No respiratory distress.   Abdominal: Normal appearance and bowel sounds are normal. She exhibits no distension and no mass. Soft. There is no abdominal tenderness.   Musculoskeletal: Normal range of motion.         General: No deformity. Normal range of motion.   Neurological: She is alert and oriented to person, place, and time. She exhibits normal muscle tone. Coordination normal.   Skin: Skin is warm, dry, intact, not diaphoretic and not pale.   Psychiatric: Her speech is normal and behavior is normal. Judgment and thought content normal.  "  Nursing note and vitals reviewed.        EKG: NSR with sinus arrhythmia. No other ectopy noted. No SANDRO. No TWI    Vitals:    11/23/22 1201 11/23/22 1259 11/23/22 1302 11/23/22 1304   BP: (!) 142/82 99/60 107/74 106/77   BP Location:  Right arm Right arm Right arm   Patient Position:  Lying Standing Standing   Pulse: 82 85 86 89   Resp: 18      Temp: 98 °F (36.7 °C)      SpO2: 100%      Weight: 124.7 kg (275 lb)      Height: 5' 5" (1.651 m)            Assessment:       1. Palpitations          Plan:         BP low when laying down but orthostatics otherwise WNL. Hgb 10.7. EKG WNL  Patient instructed to go home and hydrate with oral fluids. She inquired about IVF but we discussed that if she can tolerate oral this is the best way to increase her fluid intake. We discussed drinking at least 64 oz a day plus electrolyte soln. Patient verbalized understanding and will do this today. She was instructed to RTC or ED if symptoms worsen. See PCP or Cardiologist for follow up as well.    Palpitations  -     IN OFFICE EKG 12-LEAD (to [facility])  -     POCT Hemoglobin (QC) - [facility]  -     Orthostatic vital signs                   "

## 2022-12-08 RX ORDER — IBUPROFEN 800 MG/1
800 TABLET ORAL 3 TIMES DAILY PRN
Qty: 60 TABLET | Refills: 0 | Status: SHIPPED | OUTPATIENT
Start: 2022-12-08 | End: 2023-01-11 | Stop reason: ALTCHOICE

## 2023-01-11 ENCOUNTER — LAB VISIT (OUTPATIENT)
Dept: LAB | Facility: HOSPITAL | Age: 33
End: 2023-01-11
Attending: FAMILY MEDICINE
Payer: COMMERCIAL

## 2023-01-11 ENCOUNTER — OFFICE VISIT (OUTPATIENT)
Dept: RHEUMATOLOGY | Facility: CLINIC | Age: 33
End: 2023-01-11
Payer: COMMERCIAL

## 2023-01-11 VITALS
DIASTOLIC BLOOD PRESSURE: 84 MMHG | SYSTOLIC BLOOD PRESSURE: 139 MMHG | HEIGHT: 65 IN | BODY MASS INDEX: 45.26 KG/M2 | HEART RATE: 97 BPM | WEIGHT: 271.63 LBS

## 2023-01-11 DIAGNOSIS — M08.80 ENTHESITIS RELATED ARTHRITIS: Primary | ICD-10-CM

## 2023-01-11 DIAGNOSIS — R76.8 ANA POSITIVE: ICD-10-CM

## 2023-01-11 DIAGNOSIS — E55.9 VITAMIN D DEFICIENCY: ICD-10-CM

## 2023-01-11 DIAGNOSIS — M08.80 ENTHESITIS RELATED ARTHRITIS: ICD-10-CM

## 2023-01-11 DIAGNOSIS — Z82.69 FAMILY HISTORY OF SJOGREN'S DISEASE: ICD-10-CM

## 2023-01-11 DIAGNOSIS — M79.7 FIBROMYALGIA: ICD-10-CM

## 2023-01-11 LAB
ALBUMIN SERPL BCP-MCNC: 3.7 G/DL (ref 3.5–5.2)
ALP SERPL-CCNC: 109 U/L (ref 55–135)
ALT SERPL W/O P-5'-P-CCNC: 20 U/L (ref 10–44)
ANION GAP SERPL CALC-SCNC: 8 MMOL/L (ref 8–16)
AST SERPL-CCNC: 17 U/L (ref 10–40)
BASOPHILS # BLD AUTO: 0.05 K/UL (ref 0–0.2)
BASOPHILS NFR BLD: 0.5 % (ref 0–1.9)
BILIRUB SERPL-MCNC: 0.2 MG/DL (ref 0.1–1)
BUN SERPL-MCNC: 11 MG/DL (ref 6–20)
CALCIUM SERPL-MCNC: 9 MG/DL (ref 8.7–10.5)
CHLORIDE SERPL-SCNC: 108 MMOL/L (ref 95–110)
CO2 SERPL-SCNC: 25 MMOL/L (ref 23–29)
CREAT SERPL-MCNC: 0.9 MG/DL (ref 0.5–1.4)
CRP SERPL-MCNC: 8.4 MG/L (ref 0–8.2)
DIFFERENTIAL METHOD: ABNORMAL
EOSINOPHIL # BLD AUTO: 0 K/UL (ref 0–0.5)
EOSINOPHIL NFR BLD: 0.4 % (ref 0–8)
ERYTHROCYTE [DISTWIDTH] IN BLOOD BY AUTOMATED COUNT: 13.6 % (ref 11.5–14.5)
ERYTHROCYTE [SEDIMENTATION RATE] IN BLOOD BY PHOTOMETRIC METHOD: 25 MM/HR (ref 0–36)
EST. GFR  (NO RACE VARIABLE): >60 ML/MIN/1.73 M^2
GLUCOSE SERPL-MCNC: 90 MG/DL (ref 70–110)
HCT VFR BLD AUTO: 40.4 % (ref 37–48.5)
HGB BLD-MCNC: 12.2 G/DL (ref 12–16)
IMM GRANULOCYTES # BLD AUTO: 0.02 K/UL (ref 0–0.04)
IMM GRANULOCYTES NFR BLD AUTO: 0.2 % (ref 0–0.5)
LYMPHOCYTES # BLD AUTO: 2.9 K/UL (ref 1–4.8)
LYMPHOCYTES NFR BLD: 29.7 % (ref 18–48)
MCH RBC QN AUTO: 24.4 PG (ref 27–31)
MCHC RBC AUTO-ENTMCNC: 30.2 G/DL (ref 32–36)
MCV RBC AUTO: 81 FL (ref 82–98)
MONOCYTES # BLD AUTO: 0.5 K/UL (ref 0.3–1)
MONOCYTES NFR BLD: 4.9 % (ref 4–15)
NEUTROPHILS # BLD AUTO: 6.2 K/UL (ref 1.8–7.7)
NEUTROPHILS NFR BLD: 64.3 % (ref 38–73)
NRBC BLD-RTO: 0 /100 WBC
PLATELET # BLD AUTO: 330 K/UL (ref 150–450)
PMV BLD AUTO: 10.3 FL (ref 9.2–12.9)
POTASSIUM SERPL-SCNC: 4.3 MMOL/L (ref 3.5–5.1)
PROT SERPL-MCNC: 8.1 G/DL (ref 6–8.4)
RBC # BLD AUTO: 5.01 M/UL (ref 4–5.4)
SODIUM SERPL-SCNC: 141 MMOL/L (ref 136–145)
WBC # BLD AUTO: 9.71 K/UL (ref 3.9–12.7)

## 2023-01-11 PROCEDURE — 3075F PR MOST RECENT SYSTOLIC BLOOD PRESS GE 130-139MM HG: ICD-10-PCS | Mod: CPTII,S$GLB,, | Performed by: INTERNAL MEDICINE

## 2023-01-11 PROCEDURE — 3079F DIAST BP 80-89 MM HG: CPT | Mod: CPTII,S$GLB,, | Performed by: INTERNAL MEDICINE

## 2023-01-11 PROCEDURE — 86235 NUCLEAR ANTIGEN ANTIBODY: CPT | Mod: 59 | Performed by: INTERNAL MEDICINE

## 2023-01-11 PROCEDURE — 86225 DNA ANTIBODY NATIVE: CPT | Performed by: INTERNAL MEDICINE

## 2023-01-11 PROCEDURE — 3008F BODY MASS INDEX DOCD: CPT | Mod: CPTII,S$GLB,, | Performed by: INTERNAL MEDICINE

## 2023-01-11 PROCEDURE — 3008F PR BODY MASS INDEX (BMI) DOCUMENTED: ICD-10-PCS | Mod: CPTII,S$GLB,, | Performed by: INTERNAL MEDICINE

## 2023-01-11 PROCEDURE — 81374 HLA I TYPING 1 ANTIGEN LR: CPT | Performed by: INTERNAL MEDICINE

## 2023-01-11 PROCEDURE — 1160F RVW MEDS BY RX/DR IN RCRD: CPT | Mod: CPTII,S$GLB,, | Performed by: INTERNAL MEDICINE

## 2023-01-11 PROCEDURE — 99999 PR PBB SHADOW E&M-EST. PATIENT-LVL III: ICD-10-PCS | Mod: PBBFAC,,, | Performed by: INTERNAL MEDICINE

## 2023-01-11 PROCEDURE — 1159F PR MEDICATION LIST DOCUMENTED IN MEDICAL RECORD: ICD-10-PCS | Mod: CPTII,S$GLB,, | Performed by: INTERNAL MEDICINE

## 2023-01-11 PROCEDURE — 82306 VITAMIN D 25 HYDROXY: CPT | Performed by: INTERNAL MEDICINE

## 2023-01-11 PROCEDURE — 3079F PR MOST RECENT DIASTOLIC BLOOD PRESSURE 80-89 MM HG: ICD-10-PCS | Mod: CPTII,S$GLB,, | Performed by: INTERNAL MEDICINE

## 2023-01-11 PROCEDURE — 85652 RBC SED RATE AUTOMATED: CPT | Performed by: INTERNAL MEDICINE

## 2023-01-11 PROCEDURE — 86038 ANTINUCLEAR ANTIBODIES: CPT | Performed by: INTERNAL MEDICINE

## 2023-01-11 PROCEDURE — 1160F PR REVIEW ALL MEDS BY PRESCRIBER/CLIN PHARMACIST DOCUMENTED: ICD-10-PCS | Mod: CPTII,S$GLB,, | Performed by: INTERNAL MEDICINE

## 2023-01-11 PROCEDURE — 3075F SYST BP GE 130 - 139MM HG: CPT | Mod: CPTII,S$GLB,, | Performed by: INTERNAL MEDICINE

## 2023-01-11 PROCEDURE — 99214 PR OFFICE/OUTPT VISIT, EST, LEVL IV, 30-39 MIN: ICD-10-PCS | Mod: S$GLB,,, | Performed by: INTERNAL MEDICINE

## 2023-01-11 PROCEDURE — 80053 COMPREHEN METABOLIC PANEL: CPT | Performed by: INTERNAL MEDICINE

## 2023-01-11 PROCEDURE — 85025 COMPLETE CBC W/AUTO DIFF WBC: CPT | Performed by: INTERNAL MEDICINE

## 2023-01-11 PROCEDURE — 99214 OFFICE O/P EST MOD 30 MIN: CPT | Mod: S$GLB,,, | Performed by: INTERNAL MEDICINE

## 2023-01-11 PROCEDURE — 86140 C-REACTIVE PROTEIN: CPT | Performed by: INTERNAL MEDICINE

## 2023-01-11 PROCEDURE — 86039 ANTINUCLEAR ANTIBODIES (ANA): CPT | Performed by: INTERNAL MEDICINE

## 2023-01-11 PROCEDURE — 36415 COLL VENOUS BLD VENIPUNCTURE: CPT | Performed by: INTERNAL MEDICINE

## 2023-01-11 PROCEDURE — 1159F MED LIST DOCD IN RCRD: CPT | Mod: CPTII,S$GLB,, | Performed by: INTERNAL MEDICINE

## 2023-01-11 PROCEDURE — 99999 PR PBB SHADOW E&M-EST. PATIENT-LVL III: CPT | Mod: PBBFAC,,, | Performed by: INTERNAL MEDICINE

## 2023-01-12 LAB
25(OH)D3+25(OH)D2 SERPL-MCNC: 17 NG/ML (ref 30–96)
ANA PATTERN 1: NORMAL
ANA SER QL IF: POSITIVE
ANA TITR SER IF: NORMAL {TITER}
DSDNA AB SER-ACNC: NORMAL [IU]/ML

## 2023-01-12 NOTE — PROGRESS NOTES
Mild abnormality of C-reactive protein-inflammatory marker present.  Anemia has improved.  No other significant abnormality so far.  Will keep you posted.

## 2023-01-12 NOTE — PROGRESS NOTES
RHEUMATOLOGY CLINIC FOLLOW UP VISIT-1st visit with me.  Chief complaints, HPI, ROS, EXAM, Assessment & Plans:-  Anthony Rogers a 32 y.o. pleasant female comes in for worsening pain.  Longstanding history of positive ESDRAS with family history of Sjogren's syndrome diagnosed with fibromyalgia last year for diffuse pain.  She comes in today with severe worsening pain all over the body associated with stiffness and swelling around her large joints and feet.  No history of dactylitis.  No personal history of psoriasis.  Severe pain in the lower extremity joints.  Pain associated with severe chronic fatigue.  No history of sleep apnea.  Chronic insomnia present.  Rheumatological review of system negative otherwise.  Physical examination shows multiple enthesitis but no synovitis or dactylitis.  Few tender points present..    1. Enthesitis related arthritis    2. ESDRAS positive    3. Family history of Sjogren's disease    4. Fibromyalgia      Problem List Items Addressed This Visit       Enthesitis related arthritis - Primary    Relevant Orders    HLA B27 Antigen    ESDRAS Screen w/Reflex    Anti-DNA Ab, Double-Stranded    Sedimentation rate    C-Reactive Protein    CBC Auto Differential    Comprehensive Metabolic Panel    Vitamin D    ESDRAS positive    Relevant Orders    HLA B27 Antigen    ESDRAS Screen w/Reflex    Anti-DNA Ab, Double-Stranded    Sedimentation rate    C-Reactive Protein    CBC Auto Differential    Comprehensive Metabolic Panel    Vitamin D    Family history of Sjogren's disease    Fibromyalgia    Relevant Medications    natrexone tablet 4.5 mg      Latest Reference Range & Units Most Recent   ESDRAS Screen Negative <1:80  Positive !  9/24/21 10:04   EDSRAS Titer 1  1:160  9/24/21 10:04   ESDRAS PATTERN 1  Homogeneous  9/24/21 10:04   ds DNA Ab Negative 1:10  Negative 1:10  9/24/21 10:04   Anti-SSA Antibody 0.00 - 0.99 Ratio 0.10  9/24/21 10:04   Anti-SSA Interpretation  Negative  Negative  9/24/21 10:04   Anti-SSB Antibody 0.00 - 0.99 Ratio 0.07  9/24/21 10:04   Anti-SSB Interpretation Negative  Negative  9/24/21 10:04   Anti Sm Antibody 0.00 - 0.99 Ratio 0.08  9/24/21 10:04   Anti-Sm Interpretation Negative  Negative  9/24/21 10:04   Anti Sm/RNP Antibody 0.00 - 0.99 Ratio 0.09  9/24/21 10:04   Anti-Sm/RNP Interpretation Negative  Negative  9/24/21 10:04   CCP Antibodies <5.0 U/mL <0.5  9/24/21 10:04   Rheumatoid Factor 0.0 - 15.0 IU/mL <13.0  9/24/21 10:04   !: Data is abnormal   Latest Reference Range & Units Most Recent   WBC 3.90 - 12.70 K/uL 9.71  1/11/23 15:09   RBC 4.00 - 5.40 M/uL 5.01  1/11/23 15:09   Hemoglobin 12.0 - 16.0 g/dL 12.2  1/11/23 15:09   Hematocrit 37.0 - 48.5 % 40.4  1/11/23 15:09   MCV 82 - 98 fL 81 (L)  1/11/23 15:09   MCH 27.0 - 31.0 pg 24.4 (L)  1/11/23 15:09   MCHC 32.0 - 36.0 g/dL 30.2 (L)  1/11/23 15:09   RDW 11.5 - 14.5 % 13.6  1/11/23 15:09   Platelets 150 - 450 K/uL 330  1/11/23 15:09   MPV 9.2 - 12.9 fL 10.3  1/11/23 15:09   Gran % 38.0 - 73.0 % 64.3  1/11/23 15:09   Lymph % 18.0 - 48.0 % 29.7  1/11/23 15:09   Mono % 4.0 - 15.0 % 4.9  1/11/23 15:09   Eosinophil % 0.0 - 8.0 % 0.4  1/11/23 15:09   Basophil % 0.0 - 1.9 % 0.5  1/11/23 15:09   Immature Granulocytes 0.0 - 0.5 % 0.2  1/11/23 15:09   Gran # (ANC) 1.8 - 7.7 K/uL 6.2  1/11/23 15:09   Lymph # 1.0 - 4.8 K/uL 2.9  1/11/23 15:09   Mono # 0.3 - 1.0 K/uL 0.5  1/11/23 15:09   Eos # 0.0 - 0.5 K/uL 0.0  1/11/23 15:09   Baso # 0.00 - 0.20 K/uL 0.05  1/11/23 15:09   Immature Grans (Abs) 0.00 - 0.04 K/uL 0.02  1/11/23 15:09   nRBC 0 /100 WBC 0  1/11/23 15:09   Differential Method  Automated  1/11/23 15:09   Iron 30 - 160 ug/dL 44  9/17/21 09:16   TIBC 250 - 450 ug/dL 420  9/17/21 09:16   Saturated Iron 20 - 50 % 10 (L)  9/17/21 09:16   UIBC 110 - 370 ug/dl 313  4/29/10 16:16   Transferrin 200 - 375 mg/dL 284  9/17/21 09:16   Ferritin 20.0 - 300.0 ng/mL 77  9/17/21 09:16   Folate 4.0 - 24.0 ng/mL  6.9  2/7/20 16:59   Vitamin B-12 210 - 950 pg/mL 414  12/14/21 16:00   Hgb A2 Quant 2.2 - 3.2 % 2.2  9/9/19 11:00   Hemoglobin Bands  Hb A and Hb A2  9/9/19 11:00   Haptoglobin 30 - 250 mg/dL 221  2/7/20 16:59   Sed Rate 0 - 36 mm/Hr 108 (H)  9/24/21 10:04   Retic 0.5 - 2.5 % 1.25  4/29/10 16:16   D-Dimer <0.50 mg/L FEU 1.13 (H)  9/27/22 19:20   (L): Data is abnormally low  (H): Data is abnormally high     Latest Reference Range & Units Most Recent   Sodium 136 - 145 mmol/L 141  1/11/23 15:09   Potassium 3.5 - 5.1 mmol/L 4.3  1/11/23 15:09   Chloride 95 - 110 mmol/L 108  1/11/23 15:09   CO2 23 - 29 mmol/L 25  1/11/23 15:09   Anion Gap 8 - 16 mmol/L 8  1/11/23 15:09   BUN 6 - 20 mg/dL 11  1/11/23 15:09   Creatinine 0.5 - 1.4 mg/dL 0.9  1/11/23 15:09   eGFR >60 mL/min/1.73 m^2 >60  1/11/23 15:09   eGFR if non African American >60 mL/min/1.73 m^2 >60.0  5/24/22 09:02   eGFR if African American >60 mL/min/1.73 m^2 >60.0  5/24/22 09:02   Glucose 70 - 110 mg/dL 90  1/11/23 15:09   Calcium 8.7 - 10.5 mg/dL 9.0  1/11/23 15:09   Magnesium 1.6 - 2.6 mg/dL 1.7  9/27/22 17:15   Alkaline Phosphatase 55 - 135 U/L 109  1/11/23 15:09   PROTEIN TOTAL 6.0 - 8.4 g/dL 8.1  1/11/23 15:09   Albumin 3.5 - 5.2 g/dL 3.7  1/11/23 15:09   Uric Acid 2.4 - 5.7 mg/dl 4.2  12/22/09 14:43   BILIRUBIN TOTAL 0.1 - 1.0 mg/dL 0.2  1/11/23 15:09   AST 10 - 40 U/L 17  1/11/23 15:09   ALT 10 - 44 U/L 20  1/11/23 15:09   Amylase 20 - 110 U/L 81  8/30/22 08:38   Lipase 4 - 60 U/L 16  8/30/22 08:38   CRP 0.0 - 8.2 mg/L 8.4 (H)  1/11/23 15:09   Cholesterol 120 - 199 mg/dL 191  5/24/22 09:02   HDL 40 - 75 mg/dL 44  5/24/22 09:02   HDL/Cholesterol Ratio 20.0 - 50.0 % 23.0  5/24/22 09:02   LDL Cholesterol External 63.0 - 159.0 mg/dL 134.2  5/24/22 09:02   Non-HDL Cholesterol mg/dL 147  5/24/22 09:02   Total Cholesterol/HDL Ratio 2.0 - 5.0  4.3  5/24/22 09:02   Triglycerides 30 - 150 mg/dL 64  5/24/22 09:02   BNP 0 - 99 pg/mL 12  5/11/21 00:31   CPK 20 - 180 U/L  183 (H)  9/27/22 17:15    - 260 U/L 172  5/27/20 19:21   Troponin I 0.000 - 0.026 ng/mL <0.006  9/27/22 17:15   Lactate, Jeff 0.5 - 2.2 mmol/L 0.8  4/20/20 22:21   Vit D, 25-Hydroxy 30 - 96 ng/mL 30  9/24/21 10:04   Vitamin B-12 210 - 950 pg/mL 414  12/14/21 16:00   OB Glucose Screen 70 - 140 mg/dL 90  1/24/20 10:38   Hemoglobin A1C External 4.0 - 5.6 % 5.4  5/24/22 09:02   Estimated Avg Glucose 68 - 131 mg/dL 108  5/24/22 09:02   Insulin <25.0 uU/mL 12.5  5/9/17 14:06   Insulin Collection Interval  blood  5/9/17 14:06   TSH 0.400 - 4.000 uIU/mL 0.582  9/27/22 17:15   Procalcitonin <0.25 ng/mL 0.02  4/20/20 22:21   Quad Screen Negative  Negative  11/13/19 13:40   Maternal Age at [place] (Yrs)  31  11/13/19 13:40   Gestational Age (Weeks)  16  11/13/19 13:40   Gestational Age (Days)  1  11/13/19 13:40   Maternal Weight (lbs)  258  11/13/19 13:40   Multiple Gestations  Single  11/13/19 13:40   Ethnic Origin  Black  11/13/19 13:40   Insulin Depend. Diabetes  None  11/13/19 13:40   Alpha Fetoprotein Maternal ng/mL 31.1  11/13/19 13:40   Unconjugated Estriol ng/mL 1.27  11/13/19 13:40   Human Chorionic Gonadotropin IU/mL 59.6  11/13/19 13:40   Inhibin A pg/mL 181.2  11/13/19 13:40   M.O.M. Alpha Fetoprotein  1.18  11/13/19 13:40   M.O.M. Unconj. Estriol  1.62  11/13/19 13:40   M.O.M. HCG  2.00  11/13/19 13:40   M.O.M. Inhibin A  1.54  11/13/19 13:40   Down Risk (<1:270)  1:34637  11/13/19 13:40   Maternal Age for Down  1:610  11/13/19 13:40   Trisomy 18 (<1:100)  1:85560  11/13/19 13:40   GA Method  US  11/13/19 13:40   Quad Screen Interpretation  SeeBelow  11/13/19 13:40   Smoker (Quad)  No  11/13/19 13:40   Preg Test, Ur  Negative  9/27/22 17:07   (H): Data is abnormally high  Severe worsening fibromyalgia.  Try low-dose naltrexone.  Check ESDRAS, ESR, CRP, vitamin-D and HLA B27 for enthesitis and history of positive ESDRAS.  Intolerance to NSAIDs due to gastric problems.  May have had intolerance to high-dose steroids with  severe swelling.    # Follow up in about 4 weeks (around 2/8/2023).      Disclaimer: This note was prepared using voice recognition system and is likely to have sound alike errors and is not proof read.  Please call me with any questions.    Addendum:-  Lab results shows severe vitamin-D deficiency.  Improve compliance with vitamin-D supplementation.

## 2023-01-13 LAB
ANTI SM ANTIBODY: 0.08 RATIO (ref 0–0.99)
ANTI SM/RNP ANTIBODY: 0.1 RATIO (ref 0–0.99)
ANTI-SM INTERPRETATION: NEGATIVE
ANTI-SM/RNP INTERPRETATION: NEGATIVE
ANTI-SSA ANTIBODY: 0.11 RATIO (ref 0–0.99)
ANTI-SSA INTERPRETATION: NEGATIVE
ANTI-SSB ANTIBODY: 0.07 RATIO (ref 0–0.99)
ANTI-SSB INTERPRETATION: NEGATIVE
DSDNA AB SER-ACNC: NORMAL [IU]/ML

## 2023-01-15 ENCOUNTER — OFFICE VISIT (OUTPATIENT)
Dept: URGENT CARE | Facility: CLINIC | Age: 33
End: 2023-01-15
Payer: COMMERCIAL

## 2023-01-15 VITALS
OXYGEN SATURATION: 100 % | TEMPERATURE: 98 F | DIASTOLIC BLOOD PRESSURE: 76 MMHG | SYSTOLIC BLOOD PRESSURE: 129 MMHG | BODY MASS INDEX: 45.15 KG/M2 | WEIGHT: 271 LBS | RESPIRATION RATE: 20 BRPM | HEART RATE: 87 BPM | HEIGHT: 65 IN

## 2023-01-15 DIAGNOSIS — J06.9 UPPER RESPIRATORY TRACT INFECTION, UNSPECIFIED TYPE: ICD-10-CM

## 2023-01-15 DIAGNOSIS — R09.81 SINUS CONGESTION: Primary | ICD-10-CM

## 2023-01-15 LAB
CTP QC/QA: YES
CTP QC/QA: YES
MOLECULAR STREP A: NEGATIVE
POC MOLECULAR INFLUENZA A AGN: NEGATIVE
POC MOLECULAR INFLUENZA B AGN: NEGATIVE

## 2023-01-15 PROCEDURE — 1160F PR REVIEW ALL MEDS BY PRESCRIBER/CLIN PHARMACIST DOCUMENTED: ICD-10-PCS | Mod: CPTII,S$GLB,, | Performed by: NURSE PRACTITIONER

## 2023-01-15 PROCEDURE — 1159F PR MEDICATION LIST DOCUMENTED IN MEDICAL RECORD: ICD-10-PCS | Mod: CPTII,S$GLB,, | Performed by: NURSE PRACTITIONER

## 2023-01-15 PROCEDURE — 87502 POCT INFLUENZA A/B MOLECULAR: ICD-10-PCS | Mod: QW,S$GLB,, | Performed by: NURSE PRACTITIONER

## 2023-01-15 PROCEDURE — 3078F PR MOST RECENT DIASTOLIC BLOOD PRESSURE < 80 MM HG: ICD-10-PCS | Mod: CPTII,S$GLB,, | Performed by: NURSE PRACTITIONER

## 2023-01-15 PROCEDURE — 3074F SYST BP LT 130 MM HG: CPT | Mod: CPTII,S$GLB,, | Performed by: NURSE PRACTITIONER

## 2023-01-15 PROCEDURE — 3074F PR MOST RECENT SYSTOLIC BLOOD PRESSURE < 130 MM HG: ICD-10-PCS | Mod: CPTII,S$GLB,, | Performed by: NURSE PRACTITIONER

## 2023-01-15 PROCEDURE — 1160F RVW MEDS BY RX/DR IN RCRD: CPT | Mod: CPTII,S$GLB,, | Performed by: NURSE PRACTITIONER

## 2023-01-15 PROCEDURE — 99214 PR OFFICE/OUTPT VISIT, EST, LEVL IV, 30-39 MIN: ICD-10-PCS | Mod: S$GLB,,, | Performed by: NURSE PRACTITIONER

## 2023-01-15 PROCEDURE — 87651 STREP A DNA AMP PROBE: CPT | Mod: QW,S$GLB,, | Performed by: NURSE PRACTITIONER

## 2023-01-15 PROCEDURE — 87651 POCT STREP A MOLECULAR: ICD-10-PCS | Mod: QW,S$GLB,, | Performed by: NURSE PRACTITIONER

## 2023-01-15 PROCEDURE — 87502 INFLUENZA DNA AMP PROBE: CPT | Mod: QW,S$GLB,, | Performed by: NURSE PRACTITIONER

## 2023-01-15 PROCEDURE — 3078F DIAST BP <80 MM HG: CPT | Mod: CPTII,S$GLB,, | Performed by: NURSE PRACTITIONER

## 2023-01-15 PROCEDURE — 1159F MED LIST DOCD IN RCRD: CPT | Mod: CPTII,S$GLB,, | Performed by: NURSE PRACTITIONER

## 2023-01-15 PROCEDURE — 3008F BODY MASS INDEX DOCD: CPT | Mod: CPTII,S$GLB,, | Performed by: NURSE PRACTITIONER

## 2023-01-15 PROCEDURE — 99214 OFFICE O/P EST MOD 30 MIN: CPT | Mod: S$GLB,,, | Performed by: NURSE PRACTITIONER

## 2023-01-15 PROCEDURE — 3008F PR BODY MASS INDEX (BMI) DOCUMENTED: ICD-10-PCS | Mod: CPTII,S$GLB,, | Performed by: NURSE PRACTITIONER

## 2023-01-15 RX ORDER — METHYLPREDNISOLONE 4 MG/1
TABLET ORAL
Qty: 21 EACH | Refills: 0 | Status: SHIPPED | OUTPATIENT
Start: 2023-01-15 | End: 2023-01-15 | Stop reason: SDUPTHER

## 2023-01-15 RX ORDER — METHYLPREDNISOLONE 4 MG/1
TABLET ORAL
Qty: 21 EACH | Refills: 0 | Status: SHIPPED | OUTPATIENT
Start: 2023-01-15 | End: 2023-02-05

## 2023-01-15 NOTE — PROGRESS NOTES
"Subjective:       Patient ID: Anthony Sexton is a 32 y.o. female.    Vitals:  height is 5' 5" (1.651 m) and weight is 122.9 kg (271 lb). Her temperature is 98 °F (36.7 °C). Her blood pressure is 129/76 and her pulse is 87. Her respiration is 20 and oxygen saturation is 100%.     Chief Complaint: adenoid problem    Patient presents to clinic with complaint of swollen adenoids. She reports she has had this several times before. She did have an at home negative covid test yesterday. Symptoms started 2 days ago  She did report her daughter was diagnosed with strep last weekend. Patient denies sore throat, fever or chills.     Sore Throat   This is a new problem. The current episode started in the past 7 days. The problem has been unchanged. Neither side of throat is experiencing more pain than the other. There has been no fever. The pain is at a severity of 5/10. The pain is moderate. Associated symptoms include ear pain, headaches and swollen glands. Pertinent negatives include no abdominal pain, congestion, coughing, diarrhea, drooling, ear discharge, hoarse voice, plugged ear sensation, neck pain, shortness of breath, stridor, trouble swallowing or vomiting. She has had exposure to strep. She has had no exposure to mono. She has tried nothing for the symptoms. The treatment provided no relief.     Constitution: Negative for chills and fever.   HENT:  Positive for ear pain and sinus pressure. Negative for ear discharge, drooling, congestion, sore throat and trouble swallowing.    Neck: Negative for neck pain.   Cardiovascular: Negative.    Eyes: Negative.    Respiratory: Negative.  Negative for cough, shortness of breath and stridor.    Gastrointestinal: Negative.  Negative for abdominal pain, vomiting and diarrhea.   Neurological:  Positive for headaches.     Objective:      Physical Exam   Constitutional: She is oriented to person, place, and time. She appears well-developed. She is cooperative.  Non-toxic " appearance. She does not appear ill. No distress.   HENT:   Head: Normocephalic and atraumatic.   Ears:   Right Ear: Hearing, tympanic membrane, external ear and ear canal normal. No middle ear effusion.   Left Ear: Hearing, tympanic membrane, external ear and ear canal normal.  No middle ear effusion.   Nose: No mucosal edema, rhinorrhea or nasal deformity. No epistaxis. Right sinus exhibits maxillary sinus tenderness. Right sinus exhibits no frontal sinus tenderness. Left sinus exhibits maxillary sinus tenderness. Left sinus exhibits no frontal sinus tenderness.   Mouth/Throat: Uvula is midline and mucous membranes are normal. No trismus in the jaw. Normal dentition. No uvula swelling. Posterior oropharyngeal edema present. No oropharyngeal exudate, posterior oropharyngeal erythema, tonsillar abscesses or cobblestoning. Tonsils are 3+ on the right. Tonsils are 3+ on the left. No tonsillar exudate.   Eyes: Conjunctivae and lids are normal. No scleral icterus.   Neck: Trachea normal and phonation normal. Neck supple. No edema present. No erythema present. No neck rigidity present.   Cardiovascular: Normal rate, regular rhythm, normal heart sounds and normal pulses.   Pulmonary/Chest: Effort normal and breath sounds normal. No respiratory distress. She has no decreased breath sounds. She has no rhonchi.   Abdominal: Normal appearance.   Musculoskeletal: Normal range of motion.         General: No deformity. Normal range of motion.   Neurological: She is alert and oriented to person, place, and time. She exhibits normal muscle tone. Coordination normal.   Skin: Skin is warm, dry, intact, not diaphoretic and not pale.   Psychiatric: Her speech is normal and behavior is normal. Judgment and thought content normal.   Nursing note and vitals reviewed.      Assessment:       1. Sinus congestion    2. Upper respiratory tract infection, unspecified type          Plan:         Sinus congestion  -     POCT Strep A,  Molecular  -     POCT Influenza A/B MOLECULAR    Upper respiratory tract infection, unspecified type        Results for orders placed or performed in visit on 01/15/23   POCT Strep A, Molecular   Result Value Ref Range    Molecular Strep A, POC Negative Negative     Acceptable Yes    POCT Influenza A/B MOLECULAR   Result Value Ref Range    POC Molecular Influenza A Ag Negative Negative, Not Reported    POC Molecular Influenza B Ag Negative Negative, Not Reported     Acceptable Yes      *Note: Due to a large number of results and/or encounters for the requested time period, some results have not been displayed. A complete set of results can be found in Results Review.         PLEASE READ YOUR DISCHARGE INSTRUCTIONS ENTIRELY AS IT CONTAINS IMPORTANT INFORMATION.      Please drink plenty of fluids.    Please get plenty of rest.    Please return here or go to the Emergency Department for any concerns or worsening of condition.    Please take an over the counter antihistamine medication (allegra/Claritin/Zyrtec) of your choice as directed.    Try an over the counter decongestant like Mucinex D or Sudafed. You buy this behind the pharmacy counter    If you do have Hypertension or palpitations, it is safe to take Coricidin HBP for relief of sinus symptoms.    If not allergic, please take over the counter Tylenol (Acetaminophen) and/or Motrin (Ibuprofen) as directed for control of pain and/or fever.  Please follow up with your primary care doctor or specialist as needed.    Sore throat recommendations: Warm fluids, warm salt water gargles, throat lozenges, tea, honey, soup, rest, hydration.    Use over the counter flonase: one spray each nostril twice daily OR two sprays each nostril once daily.     Sinus rinses DO NOT USE TAP WATER, if you must, water must be a rolling boil for 1 minute, let it cool, then use.  May use distilled water, or over the counter nasal saline rinses.  Vics vapor rub  in shower to help open nasal passages.  May use nasal gel to keep passages moisturized.  May use Nasal saline sprays during the day for added relief of congestion.   For those who go to the gym, please do not use the sauna or steam room now to clear sinuses.    If you  smoke, please stop smoking.      Please return or see your primary care doctor if you develop new or worsening symptoms.     Please arrange follow up with your primary medical clinic as soon as possible. You must understand that you've received an Urgent Care treatment only and that you may be released before all of your medical problems are known or treated. You, the patient, will arrange for follow up as instructed. If your symptoms worsen or fail to improve you should go to the Emergency Room.

## 2023-01-17 ENCOUNTER — TELEPHONE (OUTPATIENT)
Dept: PSYCHIATRY | Facility: CLINIC | Age: 33
End: 2023-01-17
Payer: COMMERCIAL

## 2023-01-17 ENCOUNTER — OFFICE VISIT (OUTPATIENT)
Dept: OTOLARYNGOLOGY | Facility: CLINIC | Age: 33
End: 2023-01-17
Payer: COMMERCIAL

## 2023-01-17 VITALS — WEIGHT: 272.25 LBS | BODY MASS INDEX: 45.31 KG/M2 | TEMPERATURE: 97 F

## 2023-01-17 DIAGNOSIS — J30.89 NON-SEASONAL ALLERGIC RHINITIS, UNSPECIFIED TRIGGER: ICD-10-CM

## 2023-01-17 DIAGNOSIS — R06.83 SNORING: Primary | ICD-10-CM

## 2023-01-17 DIAGNOSIS — J35.2 ADENOID HYPERTROPHY: ICD-10-CM

## 2023-01-17 DIAGNOSIS — J34.3 HYPERTROPHY OF INFERIOR NASAL TURBINATE: ICD-10-CM

## 2023-01-17 PROCEDURE — 3008F BODY MASS INDEX DOCD: CPT | Mod: CPTII,S$GLB,, | Performed by: OTOLARYNGOLOGY

## 2023-01-17 PROCEDURE — 31231 NASAL ENDOSCOPY DX: CPT | Mod: S$GLB,,, | Performed by: OTOLARYNGOLOGY

## 2023-01-17 PROCEDURE — 31231 PR NASAL ENDOSCOPY, DX: ICD-10-PCS | Mod: S$GLB,,, | Performed by: OTOLARYNGOLOGY

## 2023-01-17 PROCEDURE — 1159F PR MEDICATION LIST DOCUMENTED IN MEDICAL RECORD: ICD-10-PCS | Mod: CPTII,S$GLB,, | Performed by: OTOLARYNGOLOGY

## 2023-01-17 PROCEDURE — 3008F PR BODY MASS INDEX (BMI) DOCUMENTED: ICD-10-PCS | Mod: CPTII,S$GLB,, | Performed by: OTOLARYNGOLOGY

## 2023-01-17 PROCEDURE — 1159F MED LIST DOCD IN RCRD: CPT | Mod: CPTII,S$GLB,, | Performed by: OTOLARYNGOLOGY

## 2023-01-17 PROCEDURE — 99213 OFFICE O/P EST LOW 20 MIN: CPT | Mod: 25,S$GLB,, | Performed by: OTOLARYNGOLOGY

## 2023-01-17 PROCEDURE — 99999 PR PBB SHADOW E&M-EST. PATIENT-LVL III: CPT | Mod: PBBFAC,,, | Performed by: OTOLARYNGOLOGY

## 2023-01-17 PROCEDURE — 99999 PR PBB SHADOW E&M-EST. PATIENT-LVL III: ICD-10-PCS | Mod: PBBFAC,,, | Performed by: OTOLARYNGOLOGY

## 2023-01-17 PROCEDURE — 99213 PR OFFICE/OUTPT VISIT, EST, LEVL III, 20-29 MIN: ICD-10-PCS | Mod: 25,S$GLB,, | Performed by: OTOLARYNGOLOGY

## 2023-01-17 NOTE — PROGRESS NOTES
Referring Provider:    Aaareferral Self  No address on file  Subjective:   Patient: Anthony Sexton [id number], :1990   Visit date:2023 1:25 PM    Chief Complaint:  Other (Swollen adenoids )    HPI:    Prior notes reviewed by myself.  Clinical documentation obtained by nursing staff reviewed.     32-year-old female presents for evaluation of possible swollen adenoids.  She is seen Dr. Lucio multiple times for this issue and has been treated with antibiotics, steroids and has even had an adenoid biopsy.  Today she reports recent episodes of waking up in the middle of the night gasping for air and feeling as if she can not breathe well through her nose.  She does use Flonase on a regular basis.  She had a previous polysomnogram several years ago which was normal but she has had a 30 lb weight gain over the last 12-18 months.      Objective:     Physical Exam:  Vitals:  Temp 97 °F (36.1 °C) (Temporal)   Wt 123.5 kg (272 lb 4.3 oz)   LMP 2023   BMI 45.31 kg/m²   General appearance:  Well developed, well nourished    Ears:  Otoscopy of external auditory canals and tympanic membranes was normal, clinical speech reception thresholds grossly intact, no mass/lesion of auricle.    Nose:  No masses/lesions of external nose, congested nasal mucosa, septum, and turbinates were hypertrophied 3+.    Mouth:  No mass/lesion of lips, teeth, gums, hard/soft palate, tongue, tonsils, or oropharynx.    Neck & Lymphatics:  No cervical lymphadenopathy, no neck mass/crepitus/ asymmetry, trachea is midline, no thyroid enlargement/tenderness/mass.        [x]  Data Reviewed:    Lab Results   Component Value Date    WBC 9.71 2023    HGB 12.2 2023    HCT 40.4 2023    MCV 81 (L) 2023    EOSINOPHIL 0.4 2023     PROCEDURE NOTE:  Diagnostic nasal endoscopy  Preprocedure diagnosis:  nasal obstruction, adenoiditis  Postprocedure diangosis:  Same  Complications:  None  Blood Loss:  None    Procedure in  detail:  After verbal consent was obtained, the patient's nasal cavity was anesthesized using topical Tetracaine and Neosynepherine.  A rigid 30 degree endoscope was placed in first the right, then the left nasal cavity.  The inferior and middle turbinates were examined, and found to be normal bilaterally.  The middle meatus and maxillary antrum was also examined, and found to be normal bilaterally.  No purulent drainage or masses seen.  The patient tolerated the procedure well and there were no complications.        Assessment & Plan:   Snoring  -     Home Sleep Study; Future    Adenoid hypertrophy    Hypertrophy of inferior nasal turbinate    Non-seasonal allergic rhinitis, unspecified trigger        She has turbinate hypertrophy and some mild nasal congestion but her adenoids are not significantly enlarged today.  Her history and recent symptoms sound extremely suspicious for obstructive sleep apnea.  We agreed to move forward with a new home sleep study due to these symptoms and her recent 30 lb weight gain.  She understands and agrees with the plan

## 2023-01-18 ENCOUNTER — TELEPHONE (OUTPATIENT)
Dept: SLEEP MEDICINE | Facility: CLINIC | Age: 33
End: 2023-01-18
Payer: COMMERCIAL

## 2023-01-18 ENCOUNTER — CLINICAL SUPPORT (OUTPATIENT)
Dept: RHEUMATOLOGY | Facility: CLINIC | Age: 33
End: 2023-01-18
Payer: COMMERCIAL

## 2023-01-18 ENCOUNTER — TELEPHONE (OUTPATIENT)
Dept: URGENT CARE | Facility: CLINIC | Age: 33
End: 2023-01-18
Payer: COMMERCIAL

## 2023-01-18 DIAGNOSIS — M08.80 ENTHESITIS RELATED ARTHRITIS: Primary | ICD-10-CM

## 2023-01-18 DIAGNOSIS — F41.1 GENERALIZED ANXIETY DISORDER: ICD-10-CM

## 2023-01-18 DIAGNOSIS — M79.7 FIBROMYALGIA: ICD-10-CM

## 2023-01-18 PROCEDURE — 96372 THER/PROPH/DIAG INJ SC/IM: CPT | Mod: S$GLB,,, | Performed by: INTERNAL MEDICINE

## 2023-01-18 PROCEDURE — 99999 PR PBB SHADOW E&M-EST. PATIENT-LVL II: ICD-10-PCS | Mod: PBBFAC,,,

## 2023-01-18 PROCEDURE — 96372 PR INJECTION,THERAP/PROPH/DIAG2ST, IM OR SUBCUT: ICD-10-PCS | Mod: S$GLB,,, | Performed by: INTERNAL MEDICINE

## 2023-01-18 PROCEDURE — 99999 PR PBB SHADOW E&M-EST. PATIENT-LVL II: CPT | Mod: PBBFAC,,,

## 2023-01-18 RX ORDER — BETAMETHASONE SODIUM PHOSPHATE AND BETAMETHASONE ACETATE 3; 3 MG/ML; MG/ML
6 INJECTION, SUSPENSION INTRA-ARTICULAR; INTRALESIONAL; INTRAMUSCULAR; SOFT TISSUE
Status: COMPLETED | OUTPATIENT
Start: 2023-01-18 | End: 2023-01-18

## 2023-01-18 RX ORDER — LORAZEPAM 0.5 MG/1
0.5 TABLET ORAL EVERY 8 HOURS PRN
Qty: 30 TABLET | Refills: 0 | Status: SHIPPED | OUTPATIENT
Start: 2023-01-18 | End: 2023-08-04 | Stop reason: ALTCHOICE

## 2023-01-18 RX ADMIN — BETAMETHASONE SODIUM PHOSPHATE AND BETAMETHASONE ACETATE 6 MG: 3; 3 INJECTION, SUSPENSION INTRA-ARTICULAR; INTRALESIONAL; INTRAMUSCULAR; SOFT TISSUE at 02:01

## 2023-01-18 NOTE — NURSING
Administered 1 cc Betamethasone 6mg/cc  to right ventrogluteal. Pt tolerated well. No acute reaction noted to site. Pt instructed on S/S to report. Advised patient to wait in lobby 15 minutes after receiving injection to monitor for any reactions. Pt verbalized understanding.     Lot: [id number]  Exp: 10.5.23

## 2023-01-26 ENCOUNTER — OFFICE VISIT (OUTPATIENT)
Dept: PSYCHIATRY | Facility: CLINIC | Age: 33
End: 2023-01-26
Payer: COMMERCIAL

## 2023-01-26 DIAGNOSIS — F90.0 ATTENTION DEFICIT HYPERACTIVITY DISORDER (ADHD), PREDOMINANTLY INATTENTIVE TYPE: Primary | ICD-10-CM

## 2023-01-26 DIAGNOSIS — F32.0 CURRENT MILD EPISODE OF MAJOR DEPRESSIVE DISORDER, UNSPECIFIED WHETHER RECURRENT: ICD-10-CM

## 2023-01-26 DIAGNOSIS — F54 STRESS-RELATED PHYSIOLOGICAL RESPONSE AFFECTING MEDICAL CONDITION: ICD-10-CM

## 2023-01-26 PROCEDURE — 90832 PR PSYCHOTHERAPY W/PATIENT, 30 MIN: ICD-10-PCS | Mod: 95,,, | Performed by: STUDENT IN AN ORGANIZED HEALTH CARE EDUCATION/TRAINING PROGRAM

## 2023-01-26 PROCEDURE — 90832 PSYTX W PT 30 MINUTES: CPT | Mod: 95,,, | Performed by: STUDENT IN AN ORGANIZED HEALTH CARE EDUCATION/TRAINING PROGRAM

## 2023-01-26 PROCEDURE — 1159F PR MEDICATION LIST DOCUMENTED IN MEDICAL RECORD: ICD-10-PCS | Mod: CPTII,95,, | Performed by: STUDENT IN AN ORGANIZED HEALTH CARE EDUCATION/TRAINING PROGRAM

## 2023-01-26 PROCEDURE — 1159F MED LIST DOCD IN RCRD: CPT | Mod: CPTII,95,, | Performed by: STUDENT IN AN ORGANIZED HEALTH CARE EDUCATION/TRAINING PROGRAM

## 2023-01-26 NOTE — PROGRESS NOTES
[facility] [place] [facility]  Psychology  Progress Note  Individual Psychotherapy (PhD/LCSW)    Patient Name: Anthony Sexton  MRN: [id number]    Patient Class: OP- [facility]patient [facility]  Primary Care Provider: Tyesha Leggett MD    Psychiatry Initial Visit (PhD/LCSW)  Individual Psychotherapy - CPT 37867    Date: 1/26/2023    Site: [place]    The patient location is: Patient's home/ Patient reported that his/her location at the time of this visit was in the [facility]     Visit type: Virtual visit with synchronous audio and video     Each patient to whom he or she provides medical services by telemedicine is: (1) informed of the relationship between the physician and patient and the respective role of any other health care provider with respect to management of the patient; and (2) notified that he or she may decline to receive medical services by telemedicine and may withdraw from such care at any time.     Referral source: Lenora Walters MD    Clinical status of patient: Outpatient    Anthony Sexton, a 32 y.o. female, for initial evaluation visit.  Met with patient.    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     Substance use:   Alcohol: none    Drugs: none   Tobacco: none   Caffeine: none    Current medications and drug reactions (include OTC, herbal): see medication list     Strengths and liabilities: Strength: Patient accepts guidance/feedback, Strength: Patient is expressive/articulate., Strength: Patient is intelligent., Strength: Patient is motivated for change., Strength: Patient has positive support network., Strength: Patient has reasonable judgment., Strength: Patient is stable., Liability: Patient lacks coping skills.    Current Evaluation:     Mental Status Exam:  General Appearance:  unremarkable, age appropriate   Speech: normal tone, normal rate, normal pitch, normal volume      Level of Cooperation: cooperative      Thought Processes: normal and logical   Mood: anxious, sad      Thought Content: normal, no suicidality, no homicidality, delusions, or paranoia   Affect: congruent and appropriate   Orientation: Oriented x3   Memory: recent >  intact   Attention Span & Concentration: intact   Fund of General Knowledge: intact and appropriate to age and level of education   Abstract Reasoning: not assessed   Judgment & Insight: fair     Language  intact     Summary: The patient returned to therapy after missing 5 sessions and canceling 2. She reported that the sessions were missed due to work demands. She reported significant changes in her home life and has decided to stay in her relationship for the time being. The patient is experiencing more work stress than usual but is grateful for the support she receives with regard to her medical condition. She struggles to find time to reduce stress but agreed to fit in at least 30min/day of time to herself to decompress.   Diagnostic Impression - Plan:       ICD-10-CM ICD-9-CM   1. Attention deficit hyperactivity disorder (ADHD), predominantly inattentive type  F90.0 314.00   2. Current mild episode of major depressive disorder, unspecified whether recurrent  F32.0 296.21   3. Stress-related  physiological response affecting medical condition  F55 478                 Plan:individual psychotherapy    Return to Clinic: as scheduled    Length of Service (minutes): 30          Barbra Bartlett, PhD  Psychologist  [facility]

## 2023-01-30 LAB
HLA B27 INTERPRETATION: NORMAL
HLA-B27 RELATED AG QL: NEGATIVE
HLA-B27 RELATED AG QL: NORMAL

## 2023-02-02 ENCOUNTER — PATIENT MESSAGE (OUTPATIENT)
Dept: RHEUMATOLOGY | Facility: CLINIC | Age: 33
End: 2023-02-02
Payer: COMMERCIAL

## 2023-02-02 ENCOUNTER — OFFICE VISIT (OUTPATIENT)
Dept: PSYCHIATRY | Facility: CLINIC | Age: 33
End: 2023-02-02
Payer: COMMERCIAL

## 2023-02-02 DIAGNOSIS — M79.7 FIBROMYALGIA: Primary | ICD-10-CM

## 2023-02-02 DIAGNOSIS — F90.0 ATTENTION DEFICIT HYPERACTIVITY DISORDER (ADHD), PREDOMINANTLY INATTENTIVE TYPE: Primary | ICD-10-CM

## 2023-02-02 DIAGNOSIS — F32.0 CURRENT MILD EPISODE OF MAJOR DEPRESSIVE DISORDER, UNSPECIFIED WHETHER RECURRENT: ICD-10-CM

## 2023-02-02 DIAGNOSIS — F41.1 GAD (GENERALIZED ANXIETY DISORDER): ICD-10-CM

## 2023-02-02 DIAGNOSIS — F54 STRESS-RELATED PHYSIOLOGICAL RESPONSE AFFECTING MEDICAL CONDITION: ICD-10-CM

## 2023-02-02 PROCEDURE — 90834 PR PSYCHOTHERAPY W/PATIENT, 45 MIN: ICD-10-PCS | Mod: 95,,, | Performed by: STUDENT IN AN ORGANIZED HEALTH CARE EDUCATION/TRAINING PROGRAM

## 2023-02-02 PROCEDURE — 1159F PR MEDICATION LIST DOCUMENTED IN MEDICAL RECORD: ICD-10-PCS | Mod: CPTII,95,, | Performed by: STUDENT IN AN ORGANIZED HEALTH CARE EDUCATION/TRAINING PROGRAM

## 2023-02-02 PROCEDURE — 90834 PSYTX W PT 45 MINUTES: CPT | Mod: 95,,, | Performed by: STUDENT IN AN ORGANIZED HEALTH CARE EDUCATION/TRAINING PROGRAM

## 2023-02-02 PROCEDURE — 1159F MED LIST DOCD IN RCRD: CPT | Mod: CPTII,95,, | Performed by: STUDENT IN AN ORGANIZED HEALTH CARE EDUCATION/TRAINING PROGRAM

## 2023-02-02 RX ORDER — TRAMADOL HYDROCHLORIDE 50 MG/1
50 TABLET ORAL EVERY 12 HOURS PRN
Qty: 14 TABLET | Refills: 0 | Status: SHIPPED | OUTPATIENT
Start: 2023-02-02 | End: 2023-02-09

## 2023-02-03 NOTE — PROGRESS NOTES
[facility] [place] [facility]  Psychology  Progress Note  Individual Psychotherapy (PhD/LCSW)    Patient Name: Anthony Sexton  MRN: [id number]    Patient Class: OP- [facility]  Primary Care Provider: Tyesha Leggett MD    Psychiatry Initial Visit (PhD/LCSW)  Individual Psychotherapy - CPT 18379    Date: 2/2/2023    Site: [place]    The patient location is: Patient's home/ Patient reported that his/her location at the time of this visit was in the [facility]     Visit type: Virtual visit with synchronous audio and video     Each patient to whom he or she provides medical services by telemedicine is: (1) informed of the relationship between the physician and patient and the respective role of any other health care provider with respect to management of the patient; and (2) notified that he or she may decline to receive medical services by telemedicine and may withdraw from such care at any time.     Referral source: Lenora Walters MD    Clinical status of patient: Outpatient    Anthony Sexton, a 32 y.o. female, for initial evaluation visit.  Met with patient.    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     Substance use:   Alcohol: none    "Drugs: none   Tobacco: none   Caffeine: none    Current medications and drug reactions (include OTC, herbal): see medication list     Strengths and liabilities: Strength: Patient accepts guidance/feedback, Strength: Patient is expressive/articulate., Strength: Patient is intelligent., Strength: Patient is motivated for change., Strength: Patient has positive support network., Strength: Patient has reasonable judgment., Strength: Patient is stable., Liability: Patient lacks coping skills.    Current Evaluation:     Mental Status Exam:  General Appearance:  unremarkable, age appropriate   Speech: normal tone, normal rate, normal pitch, normal volume      Level of Cooperation: cooperative      Thought Processes: normal and logical   Mood: anxious, sad      Thought Content: normal, no suicidality, no homicidality, delusions, or paranoia   Affect: congruent and appropriate   Orientation: Oriented x3   Memory: recent >  intact   Attention Span & Concentration: intact   Fund of General Knowledge: intact and appropriate to age and level of education   Abstract Reasoning: not assessed   Judgment & Insight: fair     Language  intact     Summary: The patient is experiencing a flare-up and is in a lot of pain. She feels like her body has "betrayed" her because she doesn't know how to make it feel better. Her pain is negatively impacting her mental health because it makes her uncomfortable and impacts her sleep. She is currently waiting for a medication to arrive that is supposed to help to reduce symptoms. The patient is going to cope by utilizing her self-care days with her friend (this weekend will consist of massages), journaling, and utilizing the opportunities she has to work from home.   Diagnostic Impression - Plan:       ICD-10-CM ICD-9-CM   1. Attention deficit hyperactivity disorder (ADHD), predominantly inattentive type  F90.0 314.00   2. Current mild episode of major depressive disorder, unspecified whether " recurrent  F32.0 296.21   3. Stress-related physiological response affecting medical condition  F54 316   4. SENTHIL (generalized anxiety disorder)  F41.1 300.02                   Plan:individual psychotherapy    Return to Clinic: as scheduled    Length of Service (minutes): 45          Barbra Bartlett, PhD  Psychologist  [facility]

## 2023-02-07 ENCOUNTER — OFFICE VISIT (OUTPATIENT)
Dept: OPHTHALMOLOGY | Facility: CLINIC | Age: 33
End: 2023-02-07
Payer: COMMERCIAL

## 2023-02-07 DIAGNOSIS — H52.203 MYOPIA OF BOTH EYES WITH ASTIGMATISM: Primary | ICD-10-CM

## 2023-02-07 DIAGNOSIS — H52.13 MYOPIA OF BOTH EYES WITH ASTIGMATISM: Primary | ICD-10-CM

## 2023-02-07 PROCEDURE — 99999 PR PBB SHADOW E&M-EST. PATIENT-LVL II: ICD-10-PCS | Mod: PBBFAC,,, | Performed by: OPTOMETRIST

## 2023-02-07 PROCEDURE — 1160F PR REVIEW ALL MEDS BY PRESCRIBER/CLIN PHARMACIST DOCUMENTED: ICD-10-PCS | Mod: CPTII,S$GLB,, | Performed by: OPTOMETRIST

## 2023-02-07 PROCEDURE — 1160F RVW MEDS BY RX/DR IN RCRD: CPT | Mod: CPTII,S$GLB,, | Performed by: OPTOMETRIST

## 2023-02-07 PROCEDURE — 1159F MED LIST DOCD IN RCRD: CPT | Mod: CPTII,S$GLB,, | Performed by: OPTOMETRIST

## 2023-02-07 PROCEDURE — 1159F PR MEDICATION LIST DOCUMENTED IN MEDICAL RECORD: ICD-10-PCS | Mod: CPTII,S$GLB,, | Performed by: OPTOMETRIST

## 2023-02-07 PROCEDURE — 92014 COMPRE OPH EXAM EST PT 1/>: CPT | Mod: S$GLB,,, | Performed by: OPTOMETRIST

## 2023-02-07 PROCEDURE — 92014 PR EYE EXAM, EST PATIENT,COMPREHESV: ICD-10-PCS | Mod: S$GLB,,, | Performed by: OPTOMETRIST

## 2023-02-07 PROCEDURE — 92015 PR REFRACTION: ICD-10-PCS | Mod: S$GLB,,, | Performed by: OPTOMETRIST

## 2023-02-07 PROCEDURE — 92015 DETERMINE REFRACTIVE STATE: CPT | Mod: S$GLB,,, | Performed by: OPTOMETRIST

## 2023-02-07 PROCEDURE — 99999 PR PBB SHADOW E&M-EST. PATIENT-LVL II: CPT | Mod: PBBFAC,,, | Performed by: OPTOMETRIST

## 2023-02-07 NOTE — PROGRESS NOTES
HPI    Vision changes since last eye exam?: yes, lost current rx, wearing 5 year   old glasses today     Any eye pain today: no    Other ocular symptoms: no    Interested in contact lens fitting today? Yes, need to update contacts    Defer dilation today, has to go back to work             Last edited by Conor Stock MA on 2/7/2023 10:38 AM.            Assessment /Plan     For exam results, see Encounter Report.    Myopia of both eyes with astigmatism      Eyeglass Final Rx       Eyeglass Final Rx         Sphere Cylinder Axis    Right -5.00 +0.50 065    Left -6.25 +1.00 104      Type: SVL    Expiration Date: 2/7/2024                  Contact Lens Prescription (2/7/2023)          Brand Base Curve Diameter Sphere Cylinder Axis    Right Acuvue Oasys for Astigmatism 8.6 14.5 -4.00 -0.75 160    Left Acuvue Oasys for Astigmatism 8.6 14.5 -5.00 -1.25 010      Expiration Date: 2/7/2024    Replacement: Every 2 weeks    Wearing Schedule: Daily Wear          Trials of Biofinity Toric given     RTC 1 yr for dilated eye exam or PRN if any problems.   Discussed above and answered questions.

## 2023-02-08 ENCOUNTER — HOSPITAL ENCOUNTER (OUTPATIENT)
Dept: SLEEP MEDICINE | Facility: HOSPITAL | Age: 33
Discharge: HOME | End: 2023-02-08
Attending: OTOLARYNGOLOGY
Payer: COMMERCIAL

## 2023-02-08 DIAGNOSIS — G47.33 OSA (OBSTRUCTIVE SLEEP APNEA): Primary | ICD-10-CM

## 2023-02-08 DIAGNOSIS — R06.83 SNORING: ICD-10-CM

## 2023-02-08 PROCEDURE — 95806 SLEEP STUDY UNATT&RESP EFFT: CPT | Performed by: INTERNAL MEDICINE

## 2023-02-08 PROCEDURE — 95800 PR SLEEP STUDY, UNATTENDED, RECORD HEART RATE/O2 SAT/RESP ANAL/SLEEP TIME: ICD-10-PCS | Mod: 26,52,, | Performed by: INTERNAL MEDICINE

## 2023-02-08 PROCEDURE — 95800 SLP STDY UNATTENDED: CPT | Mod: 26,52,, | Performed by: INTERNAL MEDICINE

## 2023-02-08 NOTE — PROCEDURES
PHYSICIAN INTERPRETATION AND COMMENTS: Findings are consistent with mild, positional obstructive sleep apnea(SAVANNA).  Indications of cardiac dysrhythmia are recorded. Correlated with cardiology exam. Therapy with CPAP indicated, Please  refer to sleep disorders clinic  CLINICAL HISTORY: 32 year old female presented with: 15 inch neck, BMI of 43.9, an [place] sleepiness score of 10, history  of hypertension, depression and symptoms of nocturnal waking up choking. Based on the clinical history, the patient has  a high pre-test probability of having Mild SAVANNA.  SLEEP STUDY FINDINGS: Patient underwent a 1 night Home Sleep Test and by behavioral criteria, slept for approximately  5.33 hours , with a sleep efficiency of 95%. Mild sleep disordered breathing (AHI=7) is noted based on a 4% hypopnea  desaturation criteria, predominantly in the supine position (10 events/hour). The patient slept supine 59.7% of the night  based on valid recording time of 4.89 hours and is 3.3 times as likely to have apneas/hypopneas when supine. When  considering more subtle measures of sleep disordered breathing, the overall respiratory disturbance index is mild(RDI=15)  based on a 1% hypopnea desaturation criteria with confirmation by surrogate arousal indicators. The apneas/hypopneas  are accompanied by minimal oxygen desaturation (percent time below 90% SpO2: 1%, Min SpO2: 78.1%). The average  desaturation across all sleep disordered breathing events is 2.9%. Snoring occurs for 3.8% (30 dB) of the study. The mean  pulse rate is 86.3 BPM, with very frequent pulse rate variability (82 events with >= 6 BPM increase/decrease per hour).  TREATMENT CONSIDERATIONS: For symptomatic mild obstructive sleep apnea, patient preference and compliance  impacts efficacious outcomes. Consider treatment with nasal continuous positive airway pressure (CPAP/AutoPAP),  mandibular advancement splint (MAS), referral to an ENT surgeon for modification to the airway,  "and/or weight loss or  behavioral therapy. Based on the SAVANNA Supine data in the study, a Mandibular Advancement Splint (MAS) will likely provide  treatment benefit independent of SAVANNA severity. The patient should avoid sleeping supine given non-supine AHI is in the  normal range.    Dear Gustavo Rosales MD  [id number] [facility]  [place] [id number]/Tyesha Leggett MD         The sleep study that you ordered is complete.  You have ordered sleep LAB services to perform the sleep study for Anthony Sexton .      Please find Sleep Study result in  the "Media tab" of Chart Review menu.        You can look  for the report in the  Media by the document type "Sleep Study Documents". Alphabetizing  "Document type" column helps to find the SLEEP STUDY report  Faster.       As the ordering provider, you are responsible for reviewing the results and implementing a treatment plan with your patient.    If you need a Sleep Medicine provider to explain the sleep study findings and arrange treatment for the patient, please refer patient for consultation to our Sleep Clinic via [facility] with Ambulatory Consult Sleep.     To do that please place an order for an  "Ambulatory Consult Sleep" -  order , it will go to our clinic work queue for our staff  to contact the patient for an appointment.      For any questions, please contact our sleep lab  staff at [phone] to talk to clinical staff          Harley Mack MD   "

## 2023-02-08 NOTE — Clinical Note
PHYSICIAN INTERPRETATION AND COMMENTS: Findings are consistent with mild, positional obstructive sleep apnea(SAVANNA). Indications of cardiac dysrhythmia are recorded. Correlated with cardiology exam. Therapy with CPAP indicated, Please refer to sleep disorders clinic CLINICAL HISTORY: 32 year old female presented with: 15 inch neck, BMI of 43.9, an [place] sleepiness score of 10, history of hypertension, depression and symptoms of nocturnal waking up choking. Based on the clinical history, the patient has a high pre-test probability of having Mild SAVANNA.

## 2023-02-09 ENCOUNTER — OFFICE VISIT (OUTPATIENT)
Dept: RHEUMATOLOGY | Facility: CLINIC | Age: 33
End: 2023-02-09
Payer: COMMERCIAL

## 2023-02-09 ENCOUNTER — TELEPHONE (OUTPATIENT)
Dept: OTOLARYNGOLOGY | Facility: CLINIC | Age: 33
End: 2023-02-09
Payer: COMMERCIAL

## 2023-02-09 ENCOUNTER — PATIENT MESSAGE (OUTPATIENT)
Dept: OPTOMETRY | Facility: CLINIC | Age: 33
End: 2023-02-09
Payer: COMMERCIAL

## 2023-02-09 VITALS
DIASTOLIC BLOOD PRESSURE: 90 MMHG | SYSTOLIC BLOOD PRESSURE: 137 MMHG | WEIGHT: 272.25 LBS | HEIGHT: 65 IN | BODY MASS INDEX: 45.36 KG/M2 | HEART RATE: 104 BPM

## 2023-02-09 DIAGNOSIS — E66.01 OBESITY, MORBID, BMI 40.0-49.9: ICD-10-CM

## 2023-02-09 DIAGNOSIS — G93.32 CHRONIC FATIGUE SYNDROME: ICD-10-CM

## 2023-02-09 DIAGNOSIS — R76.8 ANA POSITIVE: ICD-10-CM

## 2023-02-09 DIAGNOSIS — M79.7 FIBROMYALGIA: Primary | ICD-10-CM

## 2023-02-09 PROCEDURE — 99214 OFFICE O/P EST MOD 30 MIN: CPT | Mod: S$GLB,,, | Performed by: INTERNAL MEDICINE

## 2023-02-09 PROCEDURE — 3075F PR MOST RECENT SYSTOLIC BLOOD PRESS GE 130-139MM HG: ICD-10-PCS | Mod: CPTII,S$GLB,, | Performed by: INTERNAL MEDICINE

## 2023-02-09 PROCEDURE — 3080F PR MOST RECENT DIASTOLIC BLOOD PRESSURE >= 90 MM HG: ICD-10-PCS | Mod: CPTII,S$GLB,, | Performed by: INTERNAL MEDICINE

## 2023-02-09 PROCEDURE — 3080F DIAST BP >= 90 MM HG: CPT | Mod: CPTII,S$GLB,, | Performed by: INTERNAL MEDICINE

## 2023-02-09 PROCEDURE — 1159F MED LIST DOCD IN RCRD: CPT | Mod: CPTII,S$GLB,, | Performed by: INTERNAL MEDICINE

## 2023-02-09 PROCEDURE — 3075F SYST BP GE 130 - 139MM HG: CPT | Mod: CPTII,S$GLB,, | Performed by: INTERNAL MEDICINE

## 2023-02-09 PROCEDURE — 3008F BODY MASS INDEX DOCD: CPT | Mod: CPTII,S$GLB,, | Performed by: INTERNAL MEDICINE

## 2023-02-09 PROCEDURE — 99214 PR OFFICE/OUTPT VISIT, EST, LEVL IV, 30-39 MIN: ICD-10-PCS | Mod: S$GLB,,, | Performed by: INTERNAL MEDICINE

## 2023-02-09 PROCEDURE — 3008F PR BODY MASS INDEX (BMI) DOCUMENTED: ICD-10-PCS | Mod: CPTII,S$GLB,, | Performed by: INTERNAL MEDICINE

## 2023-02-09 PROCEDURE — 1160F RVW MEDS BY RX/DR IN RCRD: CPT | Mod: CPTII,S$GLB,, | Performed by: INTERNAL MEDICINE

## 2023-02-09 PROCEDURE — 1160F PR REVIEW ALL MEDS BY PRESCRIBER/CLIN PHARMACIST DOCUMENTED: ICD-10-PCS | Mod: CPTII,S$GLB,, | Performed by: INTERNAL MEDICINE

## 2023-02-09 PROCEDURE — 1159F PR MEDICATION LIST DOCUMENTED IN MEDICAL RECORD: ICD-10-PCS | Mod: CPTII,S$GLB,, | Performed by: INTERNAL MEDICINE

## 2023-02-09 PROCEDURE — 99999 PR PBB SHADOW E&M-EST. PATIENT-LVL III: ICD-10-PCS | Mod: PBBFAC,,, | Performed by: INTERNAL MEDICINE

## 2023-02-09 PROCEDURE — 99999 PR PBB SHADOW E&M-EST. PATIENT-LVL III: CPT | Mod: PBBFAC,,, | Performed by: INTERNAL MEDICINE

## 2023-02-09 NOTE — PROGRESS NOTES
RHEUMATOLOGY CLINIC FOLLOW UP VISIT  Chief complaints, HPI, ROS, EXAM, Assessment & Plans:-  Anthony Rogers a 32 y.o. pleasant female comes in for follow-up visit.  Recently seen for positive ESDRAS associated with chronic fatigue fibromyalgia syndrome.  No evidence of lupus.  Mild improvement with IM Celestone injection last visit.  Received the low-dose naltrexone and is planning to start prednisone.  Sleep study showed mild-to-moderate sleep apnea and is working with a sleep specialist on that.  Rest of the rheumatological review of system negative.  No significant change compared to last visit.  No synovitis..    1. Fibromyalgia    2. Chronic fatigue syndrome    3. ESDRAS positive    4. Obesity, morbid, BMI 40.0-49.9      Problem List Items Addressed This Visit       Chronic fatigue syndrome    Obesity, morbid, BMI 40.0-49.9    ESDRAS positive    Fibromyalgia - Primary     Labs reviewed today.  Positive ESDRAS with negative FRANCA.  Negative dsDNA, negative SSA, negative SSB, negative Valdivia, negative RNP antibody.  Normal CBC and CMP other than mild anemia.  Normal ESR.  Mildly elevated C-reactive protein secondary to obesity.   Latest Reference Range & Units Most Recent   WBC 3.90 - 12.70 K/uL 9.71  1/11/23 15:09   RBC 4.00 - 5.40 M/uL 5.01  1/11/23 15:09   Hemoglobin 12.0 - 16.0 g/dL 12.2  1/11/23 15:09   Hematocrit 37.0 - 48.5 % 40.4  1/11/23 15:09   MCV 82 - 98 fL 81 (L)  1/11/23 15:09   MCH 27.0 - 31.0 pg 24.4 (L)  1/11/23 15:09   MCHC 32.0 - 36.0 g/dL 30.2 (L)  1/11/23 15:09   RDW 11.5 - 14.5 % 13.6  1/11/23 15:09   Platelets 150 - 450 K/uL 330  1/11/23 15:09   MPV 9.2 - 12.9 fL 10.3  1/11/23 15:09   Gran % 38.0 - 73.0 % 64.3  1/11/23 15:09   Lymph % 18.0 - 48.0 % 29.7  1/11/23 15:09   Mono % 4.0 - 15.0 % 4.9  1/11/23 15:09   Eosinophil % 0.0 - 8.0 % 0.4  1/11/23 15:09   Basophil % 0.0 - 1.9 % 0.5  1/11/23 15:09   Immature Granulocytes 0.0 - 0.5 % 0.2  1/11/23 15:09    Gran # (ANC) 1.8 - 7.7 K/uL 6.2  1/11/23 15:09   Lymph # 1.0 - 4.8 K/uL 2.9  1/11/23 15:09   Mono # 0.3 - 1.0 K/uL 0.5  1/11/23 15:09   Eos # 0.0 - 0.5 K/uL 0.0  1/11/23 15:09   Baso # 0.00 - 0.20 K/uL 0.05  1/11/23 15:09   Immature Grans (Abs) 0.00 - 0.04 K/uL 0.02  1/11/23 15:09   nRBC 0 /100 WBC 0  1/11/23 15:09   Differential Method  Automated  1/11/23 15:09   Iron 30 - 160 ug/dL 44  9/17/21 09:16   TIBC 250 - 450 ug/dL 420  9/17/21 09:16   Saturated Iron 20 - 50 % 10 (L)  9/17/21 09:16   UIBC 110 - 370 ug/dl 313  4/29/10 16:16   Transferrin 200 - 375 mg/dL 284  9/17/21 09:16   Ferritin 20.0 - 300.0 ng/mL 77  9/17/21 09:16   Folate 4.0 - 24.0 ng/mL 6.9  2/7/20 16:59   Vitamin B-12 210 - 950 pg/mL 414  12/14/21 16:00   Hgb A2 Quant 2.2 - 3.2 % 2.2  9/9/19 11:00   Hemoglobin Bands  Hb A and Hb A2  9/9/19 11:00   Haptoglobin 30 - 250 mg/dL 221  2/7/20 16:59   Sed Rate 0 - 36 mm/Hr 25  1/11/23 15:09   Retic 0.5 - 2.5 % 1.25  4/29/10 16:16   D-Dimer <0.50 mg/L FEU 1.13 (H)  9/27/22 19:20   Sodium 136 - 145 mmol/L 141  1/11/23 15:09   Potassium 3.5 - 5.1 mmol/L 4.3  1/11/23 15:09   Chloride 95 - 110 mmol/L 108  1/11/23 15:09   CO2 23 - 29 mmol/L 25  1/11/23 15:09   Anion Gap 8 - 16 mmol/L 8  1/11/23 15:09   BUN 6 - 20 mg/dL 11  1/11/23 15:09   Creatinine 0.5 - 1.4 mg/dL 0.9  1/11/23 15:09   eGFR >60 mL/min/1.73 m^2 >60  1/11/23 15:09   eGFR if non African American >60 mL/min/1.73 m^2 >60.0  5/24/22 09:02   eGFR if African American >60 mL/min/1.73 m^2 >60.0  5/24/22 09:02   Glucose 70 - 110 mg/dL 90  1/11/23 15:09   Calcium 8.7 - 10.5 mg/dL 9.0  1/11/23 15:09   Magnesium 1.6 - 2.6 mg/dL 1.7  9/27/22 17:15   Alkaline Phosphatase 55 - 135 U/L 109  1/11/23 15:09   PROTEIN TOTAL 6.0 - 8.4 g/dL 8.1  1/11/23 15:09   Albumin 3.5 - 5.2 g/dL 3.7  1/11/23 15:09   Uric Acid 2.4 - 5.7 mg/dl 4.2  12/22/09 14:43   BILIRUBIN TOTAL 0.1 - 1.0 mg/dL 0.2  1/11/23 15:09   AST 10 - 40 U/L 17  1/11/23 15:09   ALT 10 - 44 U/L 20  1/11/23  15:09   Amylase 20 - 110 U/L 81  8/30/22 08:38   Lipase 4 - 60 U/L 16  8/30/22 08:38   CRP 0.0 - 8.2 mg/L 8.4 (H)  1/11/23 15:09   Cholesterol 120 - 199 mg/dL 191  5/24/22 09:02   HDL 40 - 75 mg/dL 44  5/24/22 09:02   HDL/Cholesterol Ratio 20.0 - 50.0 % 23.0  5/24/22 09:02   LDL Cholesterol External 63.0 - 159.0 mg/dL 134.2  5/24/22 09:02   Non-HDL Cholesterol mg/dL 147  5/24/22 09:02   Total Cholesterol/HDL Ratio 2.0 - 5.0  4.3  5/24/22 09:02   Triglycerides 30 - 150 mg/dL 64  5/24/22 09:02   BNP 0 - 99 pg/mL 12  5/11/21 00:31   CPK 20 - 180 U/L 183 (H)  9/27/22 17:15    - 260 U/L 172  5/27/20 19:21   Troponin I 0.000 - 0.026 ng/mL <0.006  9/27/22 17:15   Lactate, Jeff 0.5 - 2.2 mmol/L 0.8  4/20/20 22:21   Vit D, 25-Hydroxy 30 - 96 ng/mL 17 (L)  1/11/23 15:09   OB Glucose Screen 70 - 140 mg/dL 90  1/24/20 10:38   Hemoglobin A1C External 4.0 - 5.6 % 5.4  5/24/22 09:02   Benzodiazepines Negative  Negative  9/27/22 17:07   Methadone metabolites Negative  Negative  9/27/22 17:07   Phencyclidine Negative  Negative  9/27/22 17:07   Toxicology Information  SEE COMMENT  9/27/22 17:07   Cocaine (Metab.) Negative  Negative  9/27/22 17:07   Opiate Scrn, Ur Negative  Negative  9/27/22 17:07   Barbiturate Screen, Ur Negative  Negative  9/27/22 17:07   Amphetamine Screen, Ur Negative  Negative  9/27/22 17:07   Marijuana (THC) Metabolite Negative  Negative  9/27/22 17:07   ESDRAS Screen Negative <1:80  Positive !  1/11/23 15:09   ESDRAS Titer 1  1:160  1/11/23 15:09   ESDRAS PATTERN 1  Homogeneous  1/11/23 15:09   ds DNA Ab Negative 1:10   Negative 1:10  Negative 1:10  1/11/23 15:09  Negative 1:10  1/11/23 15:09   Anti-SSA Antibody 0.00 - 0.99 Ratio 0.11  1/11/23 15:09   Anti-SSA Interpretation Negative  Negative  1/11/23 15:09   Anti-SSB Antibody 0.00 - 0.99 Ratio 0.07  1/11/23 15:09   Anti-SSB Interpretation Negative  Negative  1/11/23 15:09   Anti Sm Antibody 0.00 - 0.99 Ratio 0.08  1/11/23 15:09   Anti-Sm Interpretation Negative   Negative  1/11/23 15:09   Anti Sm/RNP Antibody 0.00 - 0.99 Ratio 0.10  1/11/23 15:09   Anti-Sm/RNP Interpretation Negative  Negative  1/11/23 15:09   CCP Antibodies <5.0 U/mL <0.5  9/24/21 10:04   Rheumatoid Factor 0.0 - 15.0 IU/mL <13.0  9/24/21 10:04   Group & Rh  B POS  4/16/20 16:00   INDIRECT SKY  NEG  4/16/20 16:00   B27 Testing Date  01/17/2023 02:47 PM  1/11/23 15:04   HLA B27 Interpretation  High Resolution Testing was performed at [facility], [employer].[place] [address] by the NGS method.  Final typing results were compiled and  interpreted by the [facility].  Null alleles resolved per [facility] report.    1/11/23 15:04   HLA B27 Result  Negative  1/11/23 15:04   RAPID STREP A SCREEN Negative  Negative  12/10/20 14:38   Hep B S Ab  Positive !  12/28/10 13:57   Hepatitis B Surface Ag Non-reactive  Non-reactive  10/31/22 13:32   Hepatitis C Ab Non-reactive  Non-reactive  10/31/22 13:32   HPV High Risk type 16, PCR Negative  Negative  10/31/22 13:26   HPV High Risk type 18, PCR Negative  Negative  10/31/22 13:26   HPV other High Risk types, PCR Negative  Positive !  10/31/22 13:26   SARS-CoV-2 RNA, Amplification, Qual Negative  Positive !  7/18/22 12:42   SARS-COV-2 ANTIBODY, IGG Negative  Negative  11/6/20 14:14   HIV 1/2 Ag/Ab Non-reactive  Non-reactive  10/31/22 13:32   HIV Rapid Testing Negative  Non-Reactive  4/16/20 15:18   Bacterial vaginosis DNA Negative  Positive !  5/27/22 08:31   Candida glabrata DNA Negative  Negative  5/27/22 08:31   Lynne krusei DNA Negative  Negative  5/27/22 08:31   Candida sp Negative  Negative  5/27/22 08:31   Chlamydia, Amplified DNA Not Detected  Not Detected  10/31/22 13:26   FTA-ABS Non-reactive  Non-reactive  4/16/20 17:08   HPV DNA  POSITIVE !  7/13/09 15:27   N gonorrhoeae, amplified DNA Not Detected  Not Detected  10/31/22 13:26   RPR Non-reactive  Non-reactive  10/31/22 13:32   RPR  Quantitative  1:2 !  4/16/20 15:18   Trichomonas vaginalis Negative  Negative  5/27/22 08:31   Mumps IgG Interpretation Negative  Positive !  12/28/10 13:57   Mumps IgG Screen 0.00 - 0.90 ISR 1.67 (H)  12/28/10 13:57   Rubella IgG Antibodies >=10.0 IU/mL 42.6  9/9/19 11:00   Rubella Immune Status  Reactive  9/9/19 11:00   Rubella Vaccine Screen 0.0 - 4.9 IU/ml 81.8 (H)  12/28/10 13:57   Rubeola IgG 0.0 - 0.9 ISR 2.22 (H)  12/28/10 13:57   Rubeola Interpretation Negative  Positive  12/28/10 13:57   Varicella IgG 0 - 0.90 ISR 3.19 (H)  12/28/10 13:57   Varicella Interpretation Negative  Positive !  12/28/10 13:57   SARS-CoV2 (COVID-19) Qualitative PCR Not Detected  Not Detected  12/28/21 16:29   Specimen UA  Urine, Clean Catch  10/31/22 13:26   Color, UA Yellow, Straw, Talia  Orange !  10/31/22 13:26   Appearance, UA Clear  Ex.Turbid  10/31/22 13:26   Specific Gravity, UA 1.005 - 1.030  1.030  10/31/22 13:26   Specific Gravity,UA 1.003 - 1.030  1.025  10/9/06 16:13   pH, UA 5.0 - 8.0  6.0  10/31/22 13:26   Protein, UA Negative  Negative  10/31/22 13:26   Glucose, UA Negative  Negative  10/31/22 13:26   Ketones, UA Negative  Negative  10/31/22 13:26   Occult Blood UA Negative  Negative  10/31/22 13:26   NITRITE UA Negative  Negative  10/31/22 13:26   UROBILINOGEN UA <2.0 EU/dL Negative  9/27/22 17:08   Bilirubin (UA) Negative  Negative  10/31/22 13:26   Leukocytes, UA Negative  Negative  10/31/22 13:26   RBC, UA 0 - 4 /hpf 2  9/27/22 17:08   WBC, UA 0 - 5 /hpf 30 (H)  9/27/22 17:08   Bacteria, UA None-Occ /hpf Few !  5/27/20 19:26   Squam Epithel, UA /hpf 32  9/27/22 17:08   WBC Clumps, UA None-Rare  Few !  9/27/22 17:08   Microscopic Comment  SEE COMMENT  9/27/22 17:08   Creatinine, Urine 15.0 - 325.0 mg/dL 30.8  9/27/22 17:07   Protein, Urine Random 0 - 15 mg/dL 7  4/23/20 04:48   Prot/Creat Ratio, Urine 0.00 - 0.20  0.29 (H)  4/23/20 04:48   CULTURE, URINE  Rpt  10/31/22 13:25   Strep B Culture  No Group B  Streptococcus isolated  4/3/20 08:48   Alpha-Globin ID  [id number]  5/17/10 14:04   Alpha-Globin Interpretation  See Comments  5/17/10 14:04   Alpha-Globin Method  See Comments  5/17/10 14:04   Alpha-Globin Order Date  20 May 2010 08:16  5/17/10 14:04   Alpha-Globin Release Date:  26 May 2010 17:01  5/17/10 14:04   Alpha-Globin Result  See Comments  5/17/10 14:04   Alpha-Globin Reviewed by:  See Comments  5/17/10 14:04   Alpha-Globin Specimen  Blood  5/17/10 14:04   POC Glucose 60 - 140 MG/  3/4/21 08:29   POC Glucose, Fasting 60 - 110 MG/DL 91  2/16/22 09:28   POC Cholesterol, Total <240 MG/  2/16/22 09:28   POC Cholesterol, HDL MG/DL 37 (L)  2/16/22 09:28   POC Cholesterol, LDL <160 MG/  2/16/22 09:28   POC Triglycerides <160 MG/DL 70  2/16/22 09:28   Rapid Influenza A Ag Negative  Negative  12/10/20 14:49   Rapid Influenza B Ag Negative  Negative  12/10/20 14:49   POC Molecular Influenza A Ag Negative, Not Reported  Negative  1/15/23 09:30   POC Molecular Influenza B Ag Negative, Not Reported  Negative  1/15/23 09:30   POCT URINE PREGNANCY  Rpt  8/26/22 18:14    Acceptable  Yes  1/15/23 09:33   Color, UA  YELLOW  5/9/17 14:03   pH, UA  7  5/9/17 14:03   Spec Grav UA  1.015  5/9/17 14:03   Blood, UA  -  5/9/17 14:03   WBC, UA  +  5/9/17 14:03   Nitrite, UA  -  5/9/17 14:03   Glucose, UA  NORMAL  5/9/17 14:03   Bilirubin, POC  -  5/9/17 14:03   Protein, POC  TRACE  5/9/17 14:03   Ketones, UA  -  5/9/17 14:03   Urobilinogen, UA  NORMAL  5/9/17 14:03   Hemoglobin Electrophoresis Interp  Normal  9/9/19 11:00   CT HEAD WITHOUT CONTRAST  Rpt  9/27/22 17:39   CTA CHEST NON CORONARY  Rpt  9/27/22 20:13   RADIOLOGY REPORT  Rpt  4/23/20 00:00   XR CHEST 1 VIEW  Rpt  9/27/22 17:39   XR CHEST AP PORTABLE  Rpt  5/10/21 22:40   XR CHEST PA AND LATERAL  Rpt  5/2/22 13:23   US ABDOMEN COMPLETE  Rpt  8/30/22 11:45   US LOWER EXTREMITY VEINS LEFT  Rpt  4/20/20 23:50   US PELVIS COMP WITH TRANSVAG  "NON-OB (XPD)  Rpt  2/19/21 14:37   EKG 12-LEAD  Rpt  11/23/22 12:31   2D ECHO WITH COLOR FLOW DOPPLER  Rpt  5/8/19 15:15   CARDIAC TREADMILL STRESS TEST  Rpt  5/29/19 08:30   ECHO (CUPID ONLY)  Rpt  11/2/22 09:44   HOLTER MONITOR - 3-14 DAY ADULT  Rpt  5/8/19 14:30   HOLTER MONITOR - 48 HOUR (CUPID ONLY)  Rpt  11/3/22 14:35   SPECIMEN TO PATHOLOGY, SURGERY  Rpt  5/27/21 11:41   SPECIMEN TO PATHOLOGY  Rpt  9/30/22 15:15   SPECIMEN TO PATHOLOGY, OB/GYN  Rpt  11/3/21 12:00   Final Pathologic Diagnosis  Specimen Adequacy  Satisfactory for interpretation. Endocervical component is present.  [place] Category  Negative for intraepithelial lesion or malignancy.  Inflammation present.    10/31/22 13:25   Gross  Pathology ID/Patient ID:  [id number]  The specimen is received in formalin labeled "nasopharynx tissue".  The  specimen consists of 2 tan-yellow fragments of soft tissue measuring 0.5-0.7  cm in greatest dimension.  The specimen is submitted entirely in cassette  ATT--1-A.  Alfreda Villalta MS  Grossing Technologist    9/30/22 15:15   Microscopic Exam  Fragments of respiratory mucosa with underlying reactive lymphoid hyperplasia  are noted.    9/30/22 15:15   Comment  Flow cytometry analysis of tissue was not submitted.  Immunohistochemical studies were performed on the paraffin embedded tissue  block 1 a with adequate positive and negative controls.  Mixed T-cells (CD3  positive, CD5 positive, BCL 2 positive) and B-cells (CD20 positive, cyclin D1  negative) are evident.  Reactive follicles are BCL6 positive, CD20 positive,  high Ki-67, BCL2 negative.  Follicular dendritic cells are CD23 positive.  Immunoblasts are CD30 positive.  In Situ hybridization for kappa and lambda  shows polytypic plasma cell population.  In Situ hybridization for EBV is  negative.  There is no evidence of lymphoma or carcinoma.  Correlate clinically.    9/30/22 15:15   LIQUID-BASED PAP SMEAR, SCREENING  Rpt  5/24/19 08:24   LIQUID-BASED PAP " SMEAR, SCREENING  Rpt  10/31/22 13:25   Systolic blood pressure mmHg 131.0  1/6/21 15:54   Disclaimer  The Pap smear is a screening test that aids in the detection of cervical  cancer and cancer precursors. Both false positive and false negative results  can occur. The test should be used at regular intervals, and positive results  should be confirmed before definitive therapy.  This liquid based specimen is processed using the  or T[employer] Thin PrepPAP  System. This specimen has been analyzed by the ThinPrep Imaging System  (Triplify), an automated imaging and review system which assists  the laboratory in evaluating cells on ThinPrep PAP tests. Following automated  imaging, selected fields from every slide are reviewed by a cytotechnologist  and/or pathologist.  Screening was performed at [facility] [address] [facility] [place] [address].    10/31/22 13:25   Diastolic blood pressure mmHg 82.0  1/6/21 15:54   Molecular Strep A, POC Negative  Negative  1/15/23 09:33   AV index (prosthetic)  0.73  11/2/22 09:44   CARDIAC MONITORING STRIPS  Rpt  5/27/21 00:00   Left Ventricle Relative Wall Thickness cm 0.49  11/2/22 09:44   Diastolic Dysfunction  No  5/29/19 08:30   E/E' ratio m/s 5.91  11/2/22 09:44   EF % 60  11/2/22 09:44   EXERCISE STRESS - EKG (CUPID ONLY)  Rpt  1/6/21 15:54   HUMAN PAPILLOMAVIRUS (HPV) HIGH RISK GENOTYPES BY PCR  Rpt !  10/31/22 13:26   LA WIDTH cm 3.30  11/2/22 09:44   Left Ventricular Outflow Tract Mean Gradient mmHg 1.63  11/2/22 09:44   Left Ventricular Outflow Tract Mean Velocity cm/s 0.61  11/2/22 09:44   MRI BRAIN DEMYELINATING W/ WO CONTRAST  Rpt  2/12/21 13:05   MRI CERVICAL SPINE DEMYELINATING W W/O CONTRAST  Rpt  2/12/21 13:03   RPP  15,589  1/6/21 15:54   Event Monitor Day  2  11/3/22 14:35   holter length dec hours  96.00  11/3/22 14:35   Holter length hours  48  11/3/22 14:35   holter length minutes  0  11/3/22 14:35    Sinus avg hr  82  11/3/22 14:35   Sinus max hr  146  11/3/22 14:35   Sinus min HR  49  11/3/22 14:35   Est. PA Systolic Pressure  28.4  5/8/19 15:15   POCT HEMOGLOBIN (QC)  Rpt !  11/23/22 12:47   POCT INFLUENZA A/B  Rpt  12/10/20 14:49   POCT INFLUENZA A/B MOLECULAR  Rpt  1/15/23 09:30   POCT RAPID STREP A  Rpt  12/10/20 14:38   POCT STREP A MOLECULAR  Rpt  1/15/23 09:33   POCT URINE DIPSTICK WITHOUT MICROSCOPE  Rpt  5/9/17 14:03   Pulmonary Valve Mean Velocity m/s 0.70  11/2/22 09:44   Right Atrial Pressure (from IVC) mmHg 3  11/2/22 09:44   EF + QEF 55 - 65  60  5/8/19 15:15   SARS-COV-2 RDRP GENE  Rpt !  7/18/22 12:42   (L): Data is abnormally low  (H): Data is abnormally high  !: Data is abnormal  Rpt: View report in Results Review for more information  Low titer positive ESDRAS associated with chronic fatigue fibromyalgia syndrome.  Start low-dose naltrexone as advised.  Start with half a tablet before bedtime for a week and then increase to 1 tablet daily thereafter if no adverse effects.  Work with sleep specialist on sleep apnea.  Weight loss might significantly improve the sleep apnea as well.  Try high fiber plant based diet    # Follow up in about 6 months (around 8/9/2023).      Disclaimer: This note was prepared using voice recognition system and is likely to have sound alike errors and is not proof read.  Please call me with any questions.

## 2023-02-09 NOTE — TELEPHONE ENCOUNTER
----- Message from Gustavo Rosales MD sent at 2/9/2023  8:28 AM CST -----  Your sleep study showed extremely mild sleep apnea that only occurs when you sleep on your back.  Let me know if you have any further questions and we can also discuss at your follow up appt.

## 2023-02-14 ENCOUNTER — TELEPHONE (OUTPATIENT)
Dept: PSYCHIATRY | Facility: CLINIC | Age: 33
End: 2023-02-14
Payer: COMMERCIAL

## 2023-02-15 ENCOUNTER — OFFICE VISIT (OUTPATIENT)
Dept: NEUROLOGY | Facility: CLINIC | Age: 33
End: 2023-02-15
Payer: COMMERCIAL

## 2023-02-15 ENCOUNTER — OFFICE VISIT (OUTPATIENT)
Dept: GASTROENTEROLOGY | Facility: CLINIC | Age: 33
End: 2023-02-15
Payer: COMMERCIAL

## 2023-02-15 VITALS
WEIGHT: 273.56 LBS | HEART RATE: 83 BPM | HEIGHT: 65 IN | SYSTOLIC BLOOD PRESSURE: 124 MMHG | DIASTOLIC BLOOD PRESSURE: 84 MMHG | OXYGEN SATURATION: 98 % | RESPIRATION RATE: 16 BRPM | BODY MASS INDEX: 45.58 KG/M2

## 2023-02-15 VITALS — HEIGHT: 65 IN | WEIGHT: 270 LBS | BODY MASS INDEX: 44.98 KG/M2

## 2023-02-15 DIAGNOSIS — R51.9 NONINTRACTABLE HEADACHE, UNSPECIFIED CHRONICITY PATTERN, UNSPECIFIED HEADACHE TYPE: ICD-10-CM

## 2023-02-15 DIAGNOSIS — G93.32 CHRONIC FATIGUE SYNDROME: ICD-10-CM

## 2023-02-15 DIAGNOSIS — K76.0 FATTY LIVER: ICD-10-CM

## 2023-02-15 DIAGNOSIS — G43.E09 CHRONIC MIGRAINE WITH AURA: Primary | ICD-10-CM

## 2023-02-15 DIAGNOSIS — K21.9 GASTROESOPHAGEAL REFLUX DISEASE WITHOUT ESOPHAGITIS: Primary | ICD-10-CM

## 2023-02-15 DIAGNOSIS — Z79.899 USE OF MEDICATION WITH TERATOGENIC POTENTIAL IN FEMALE OF REPRODUCTIVE AGE: ICD-10-CM

## 2023-02-15 DIAGNOSIS — M79.7 FIBROMYALGIA: ICD-10-CM

## 2023-02-15 DIAGNOSIS — E66.01 OBESITY, MORBID, BMI 40.0-49.9: ICD-10-CM

## 2023-02-15 DIAGNOSIS — F41.1 GENERALIZED ANXIETY DISORDER: ICD-10-CM

## 2023-02-15 PROCEDURE — 1159F MED LIST DOCD IN RCRD: CPT | Mod: CPTII,S$GLB,, | Performed by: NURSE PRACTITIONER

## 2023-02-15 PROCEDURE — 3008F PR BODY MASS INDEX (BMI) DOCUMENTED: ICD-10-PCS | Mod: CPTII,95,, | Performed by: NURSE PRACTITIONER

## 2023-02-15 PROCEDURE — 99215 PR OFFICE/OUTPT VISIT, EST, LEVL V, 40-54 MIN: ICD-10-PCS | Mod: S$GLB,,, | Performed by: NURSE PRACTITIONER

## 2023-02-15 PROCEDURE — 99204 PR OFFICE/OUTPT VISIT, NEW, LEVL IV, 45-59 MIN: ICD-10-PCS | Mod: 95,,, | Performed by: NURSE PRACTITIONER

## 2023-02-15 PROCEDURE — 99215 OFFICE O/P EST HI 40 MIN: CPT | Mod: S$GLB,,, | Performed by: NURSE PRACTITIONER

## 2023-02-15 PROCEDURE — 99204 OFFICE O/P NEW MOD 45 MIN: CPT | Mod: 95,,, | Performed by: NURSE PRACTITIONER

## 2023-02-15 PROCEDURE — 3079F DIAST BP 80-89 MM HG: CPT | Mod: CPTII,S$GLB,, | Performed by: NURSE PRACTITIONER

## 2023-02-15 PROCEDURE — 3074F PR MOST RECENT SYSTOLIC BLOOD PRESSURE < 130 MM HG: ICD-10-PCS | Mod: CPTII,S$GLB,, | Performed by: NURSE PRACTITIONER

## 2023-02-15 PROCEDURE — 1159F PR MEDICATION LIST DOCUMENTED IN MEDICAL RECORD: ICD-10-PCS | Mod: CPTII,95,, | Performed by: NURSE PRACTITIONER

## 2023-02-15 PROCEDURE — 3074F SYST BP LT 130 MM HG: CPT | Mod: CPTII,S$GLB,, | Performed by: NURSE PRACTITIONER

## 2023-02-15 PROCEDURE — 99999 PR PBB SHADOW E&M-EST. PATIENT-LVL IV: ICD-10-PCS | Mod: PBBFAC,,, | Performed by: NURSE PRACTITIONER

## 2023-02-15 PROCEDURE — 1160F PR REVIEW ALL MEDS BY PRESCRIBER/CLIN PHARMACIST DOCUMENTED: ICD-10-PCS | Mod: CPTII,S$GLB,, | Performed by: NURSE PRACTITIONER

## 2023-02-15 PROCEDURE — 1159F PR MEDICATION LIST DOCUMENTED IN MEDICAL RECORD: ICD-10-PCS | Mod: CPTII,S$GLB,, | Performed by: NURSE PRACTITIONER

## 2023-02-15 PROCEDURE — 1160F RVW MEDS BY RX/DR IN RCRD: CPT | Mod: CPTII,S$GLB,, | Performed by: NURSE PRACTITIONER

## 2023-02-15 PROCEDURE — 3079F PR MOST RECENT DIASTOLIC BLOOD PRESSURE 80-89 MM HG: ICD-10-PCS | Mod: CPTII,S$GLB,, | Performed by: NURSE PRACTITIONER

## 2023-02-15 PROCEDURE — 3008F BODY MASS INDEX DOCD: CPT | Mod: CPTII,S$GLB,, | Performed by: NURSE PRACTITIONER

## 2023-02-15 PROCEDURE — 3008F BODY MASS INDEX DOCD: CPT | Mod: CPTII,95,, | Performed by: NURSE PRACTITIONER

## 2023-02-15 PROCEDURE — 1159F MED LIST DOCD IN RCRD: CPT | Mod: CPTII,95,, | Performed by: NURSE PRACTITIONER

## 2023-02-15 PROCEDURE — 99999 PR PBB SHADOW E&M-EST. PATIENT-LVL IV: CPT | Mod: PBBFAC,,, | Performed by: NURSE PRACTITIONER

## 2023-02-15 PROCEDURE — 3008F PR BODY MASS INDEX (BMI) DOCUMENTED: ICD-10-PCS | Mod: CPTII,S$GLB,, | Performed by: NURSE PRACTITIONER

## 2023-02-15 RX ORDER — OMEPRAZOLE 40 MG/1
40 CAPSULE, DELAYED RELEASE ORAL
Qty: 60 CAPSULE | Refills: 2 | Status: SHIPPED | OUTPATIENT
Start: 2023-02-15 | End: 2024-02-29

## 2023-02-15 RX ORDER — SUMATRIPTAN SUCCINATE 25 MG/1
25 TABLET ORAL
Qty: 9 TABLET | Refills: 3 | Status: SHIPPED | OUTPATIENT
Start: 2023-02-15 | End: 2023-06-16

## 2023-02-15 RX ORDER — FOLIC ACID 1 MG/1
1 TABLET ORAL DAILY
Qty: 30 TABLET | Refills: 11 | Status: SHIPPED | OUTPATIENT
Start: 2023-02-15 | End: 2023-10-11

## 2023-02-15 NOTE — PROGRESS NOTES
Subjective:       Patient ID: Anthony Sexton is a 32 y.o. female.    Chief Complaint: HEADACHE           HPI The patient is new to me, here to establish care for headache management. The Patient began getting regular headaches in her 20's years of age, described as normal mild aching that was managed with Ibuprofen. The headaches changed in 2016 after delivery of first child. The headaches progress from different areas and involves different sides frontal temporal region and occipital region with throbbing, increased pressure. It can involve the right side, the left side or both sides. Report visual aura sees black spots during headaches. The headaches are getting more frequent occurring 4 days out the week. The headaches are also getting more severe 6-8/10 headaches. The headache is associated with nausea and light, sound  sensitivity. The patient denies vomiting.  Abortive meds like OTC NSAIDs no are longer helpful, Lianet reports Fioricet was  helpful but she ran out of treatment. The headaches last for several hours to today's. She reports she often wake up with a headache. The headaches is constant throughout The headache is associated with scalp sensitivity. Unknown trigger of the headache.  Chronic neck pain.  No TMJ problems. Hx of FMS.  The patient denies blurry vision and ear ringing. No history of head trauma. No history of seizures. No history of smoking.  Patient has caffeine overuses. No vertigo. No blacking out.  No fever, chills or rigors. No history of significant memory loss. No history of strokes.  The patient cannot think of food that aggravates the headache. Family history is remarkable for migraine. No family history of early dementia. Last Eye exam by Dr. Jerry Delgado, OD 02- No papilledema noted. Patient not on any birthcontrol, contraceptive use encouraged. Recommend folic acid 1 mg daily.           Review of Systems   Constitutional:  Negative for activity change.   HENT:  Negative.     Eyes:  Positive for photophobia and visual disturbance.   Respiratory: Negative.     Cardiovascular: Negative.    Gastrointestinal: Negative.    Endocrine: Negative.    Genitourinary: Negative.    Musculoskeletal: Negative.    Skin: Negative.    Allergic/Immunologic: Negative.    Neurological:  Positive for numbness and headaches.   Hematological: Negative.    Psychiatric/Behavioral:  The patient is nervous/anxious.                Current Outpatient Medications:     ergocalciferol (VITAMIN D2) 50,000 unit Cap, Take 1 capsule (50,000 Units total) by mouth twice a week., Disp: 8 capsule, Rfl: 11    fluticasone propionate (FLONASE) 50 mcg/actuation nasal spray, 2 sprays (100 mcg total) by Each Nostril route once daily., Disp: 16 g, Rfl: 1    LORazepam (ATIVAN) 0.5 MG tablet, Take 1 tablet (0.5 mg total) by mouth every 8 (eight) hours as needed (anxiety)., Disp: 30 tablet, Rfl: 0    natrexone tablet 4.5 mg, Take 1 tablet (4.5 mg total) by mouth every evening., Disp: 90 tablet, Rfl: 3    omeprazole (PRILOSEC) 40 MG capsule, Take 1 capsule (40 mg total) by mouth 2 (two) times daily before meals., Disp: 60 capsule, Rfl: 2    ondansetron (ZOFRAN) 4 MG tablet, Take 1 tablet (4 mg total) by mouth every 6 (six) hours as needed for Nausea., Disp: 40 tablet, Rfl: 1    folic acid (FOLVITE) 1 MG tablet, Take 1 tablet (1 mg total) by mouth once daily., Disp: 30 tablet, Rfl: 11    sumatriptan (IMITREX) 25 MG Tab, Take 1 tablet (25 mg total) by mouth every 72 hours as needed for Migraine (2 to 3 times per week as need)., Disp: 9 tablet, Rfl: 3  Past Medical History:   Diagnosis Date    Anemia     Anxiety     Chronic low back pain     Depression     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     Pre-eclampsia in postpartum  period 04/23/2020    Vitamin D deficiency      Past Surgical History:   Procedure Laterality Date    ESOPHAGOGASTRODUODENOSCOPY N/A 5/26/2021    Procedure: EGD (ESOPHAGOGASTRODUODENOSCOPY);  Surgeon: Alka Olmstead MD;  Location: [facility];  Service: Endoscopy;  Laterality: N/A;    ESOPHAGOGASTRODUODENOSCOPY N/A 10/4/2021    Procedure: EGD (ESOPHAGOGASTRODUODENOSCOPY);  Surgeon: Alka Olmstead MD;  Location: [facility];  Service: Endoscopy;  Laterality: N/A;    None      REDUCTION OF BOTH BREASTS Bilateral 5/27/2021    Procedure: MAMMOPLASTY, REDUCTION, BILATERAL;  Surgeon: Daniel Ba MD;  Location: [address];  Service: Plastics;  Laterality: Bilateral;  Left breast tissue: 1584  Right breast tissue: 1438     Social History     Socioeconomic History    Marital status:     Number of children: 0   Occupational History    Occupation: RN     Comment: [employer]   Tobacco Use    Smoking status: Never    Smokeless tobacco: Never   Substance and Sexual Activity    Alcohol use: Not Currently     Alcohol/week: 0.0 standard drinks    Drug use: No    Sexual activity: Yes     Partners: Male     Birth control/protection: None   Social History Narrative    She wears seatbelt.     Social Determinants of Health     Financial Resource Strain: Low Risk     Difficulty of Paying Living Expenses: Not hard at all   Food Insecurity: No Food Insecurity    Worried About Running Out of Food in the Last Year: Never true    Ran Out of Food in the Last Year: Never true   Transportation Needs: No Transportation Needs    Lack of Transportation (Medical): No    Lack of Transportation (Non-Medical): No   Physical Activity: Insufficiently Active    Days of Exercise per Week: 2 days    Minutes of Exercise per Session: 20 min   Stress: No Stress Concern Present    Feeling of Stress : Only a little   Social Connections: Unknown    Frequency of Communication with Friends and Family: More than three times a week     Frequency of Social Gatherings with Friends and Family: More than three times a week    Active Member of Clubs or Organizations: Yes    Attends Club or Organization Meetings: Never    Marital Status:    Housing Stability: Low Risk     Unable to Pay for Housing in the Last Year: No    Number of Places Lived in the Last Year: 1    Unstable Housing in the Last Year: No       Past/Current Medical/Surgical History, Past/Current Social History, Past/Current Family History and Past/Current Medications were reviewed in detail.        Objective:           VITAL SIGNS WERE REVIEWED      GENERAL APPEARANCE:     The patient looks comfortable.    Body habitus is normal BMI 45.53 KG     No signs of respiratory distress.    Normal breathing pattern.    No dysmorphic features    Normal eye contact.     GENERAL MEDICAL EXAM:    HEENT:  Head is atraumatic normocephalic.     No tender temporal arteries. Fundoscopic (Ophthalmoscopic) exam showed no disc edema.      Neck and Axillae: No JVD. No visible lesions.    No carotid bruits. No thyromegaly. No lymphadenopathy.    Cardiopulmonary: No cyanosis. No tachypnea. Normal respiratory effort.       Gastrointestinal/Urogenital:  No jaundice. No stomas or lesions. No visible hernias. No catheters.     Abdomen is soft non-tender. No masses or organomegaly.    Skin, Hair and Nails: No pathognonomic skin rash. No neurofibromatosis. No visible lesions.No stigmata of autoimmune disease. No clubbing.    Skin is warm and moist. No palpable masses.    Limbs: No varicose veins. No visible swelling.    No palpable edema. Pulses are symmetric. Pedal pulses are palpable.      Muskoskeletal: No visible deformities.No visible lesions.    No spine tenderness. No signs of longstanding neuropathy. No dislocations or fractures.            Neurologic Exam     Mental Status   Oriented to person, place, and time.   Registration: recalls 3 of 3 objects. Recall at 5 minutes: recalls 3 of 3 objects. Follows  3 step commands.   Attention: normal. Concentration: normal.   Speech: speech is normal and not slurred   Level of consciousness: alert  Knowledge: good and consistent with education. Able to perform simple calculations.   Able to name object. Able to read. Able to repeat. Able to write. Normal comprehension.     Cranial Nerves     CN II   Visual fields full to confrontation.   Visual acuity: normal with correction  Right visual field deficit: none  Left visual field deficit: none     CN III, IV, VI   Pupils are equal, round, and reactive to light.  Extraocular motions are normal.   Right pupil: Size: 2 mm. Shape: regular. Reactivity: brisk. Consensual response: intact. Accommodation: intact.   Left pupil: Size: 2 mm. Shape: regular. Reactivity: brisk. Consensual response: intact. Accommodation: intact.   CN III: no CN III palsy  CN VI: no CN VI palsy  Nystagmus: none   Diplopia: none  Ophthalmoparesis: none  Upgaze: normal  Downgaze: normal  Conjugate gaze: present  Vestibulo-ocular reflex: present    CN V   Facial sensation intact.   Right facial sensation deficit: none  Left facial sensation deficit: none    CN VII   Right facial weakness: none  Left facial weakness: none    CN VIII   CN VIII normal.   Hearing: intact    CN IX, X   CN IX normal.   CN X normal.   Palate: symmetric    CN XI   CN XI normal.   Right sternocleidomastoid strength: normal  Left sternocleidomastoid strength: normal  Right trapezius strength: normal  Left trapezius strength: normal    CN XII   CN XII normal.   Tongue: not atrophic  Fasciculations: absent  Tongue deviation: none    Motor Exam   Muscle bulk: normal  Overall muscle tone: normal  Right arm tone: normal  Left arm tone: normal  Right arm pronator drift: absent  Left arm pronator drift: absent  Right leg tone: normal  Left leg tone: normal    Strength   Strength 5/5 throughout.   Right neck flexion: 5/5  Left neck flexion: 5/5  Right neck extension: 5/5  Left neck extension:  5/5  Right deltoid: 5/5  Left deltoid: 5/5  Right biceps: 5/5  Left biceps: 5/5  Right triceps: 5/5  Left triceps: 5/5  Right wrist flexion: 5/5  Left wrist flexion: 5/5  Right wrist extension: 5/5  Left wrist extension: 5/5  Right interossei: 5/5  Left interossei: 5/5  Right iliopsoas: 5/5  Left iliopsoas: 5/  Right quadriceps: 5/5  Left quadriceps: 5/5  Right hamstrin/  Left hamstrin/  Right glutei: 5/  Left glutei: 5/  Right anterior tibial: 5/  Left anterior tibial: 5  Right posterior tibial:   Left posterior tibial:   Right peroneal:   Left peroneal:   Right gastroc: 5  Left gastroc:     Sensory Exam   Light touch normal.   Right arm light touch: normal  Left arm light touch: normal  Right leg light touch: normal  Left leg light touch: normal  Vibration normal.   Right arm vibration: normal  Left arm vibration: normal  Right leg vibration: normal  Left leg vibration: normal  Proprioception normal.   Right arm proprioception: normal  Left arm proprioception: normal  Right leg proprioception: normal  Left leg proprioception: normal  Pinprick normal.   Right arm pinprick: normal  Left arm pinprick: normal  Right leg pinprick: normal  Left leg pinprick: normal  Sensory deficit distribution on left: lateral cutaneous thigh  Graphesthesia: normal  Stereognosis: normal    Gait, Coordination, and Reflexes     Gait  Gait: normal    Coordination   Romberg: negative  Finger to nose coordination: normal  Heel to shin coordination: normal  Tandem walking coordination: normal    Tremor   Resting tremor: absent  Intention tremor: absent  Action tremor: absent    Reflexes   Right brachioradialis: 2+  Left brachioradialis: 2+  Right biceps: 2+  Left biceps: 2+  Right triceps: 2+  Left triceps: 2+  Right patellar: 2+  Left patellar: 2+  Right achilles: 2+  Left achilles: 2+  Right plantar: normal  Left plantar: normal  Right Ivey: absent  Left Ivey: absent  Right ankle clonus: absent  Left  ankle clonus: absent  Right pendular knee jerk: absent  Left pendular knee jerk: absent    Lab Results   Component Value Date    WBC 9.71 01/11/2023    HGB 12.2 01/11/2023    HCT 40.4 01/11/2023    MCV 81 (L) 01/11/2023     01/11/2023     Sodium   Date Value Ref Range Status   01/11/2023 141 136 - 145 mmol/L Final     Potassium   Date Value Ref Range Status   01/11/2023 4.3 3.5 - 5.1 mmol/L Final     Chloride   Date Value Ref Range Status   01/11/2023 108 95 - 110 mmol/L Final     CO2   Date Value Ref Range Status   01/11/2023 25 23 - 29 mmol/L Final     Glucose   Date Value Ref Range Status   01/11/2023 90 70 - 110 mg/dL Final     BUN   Date Value Ref Range Status   01/11/2023 11 6 - 20 mg/dL Final     Creatinine   Date Value Ref Range Status   01/11/2023 0.9 0.5 - 1.4 mg/dL Final     Calcium   Date Value Ref Range Status   01/11/2023 9.0 8.7 - 10.5 mg/dL Final     Total Protein   Date Value Ref Range Status   01/11/2023 8.1 6.0 - 8.4 g/dL Final     Albumin   Date Value Ref Range Status   01/11/2023 3.7 3.5 - 5.2 g/dL Final     Total Bilirubin   Date Value Ref Range Status   01/11/2023 0.2 0.1 - 1.0 mg/dL Final     Comment:     For infants and newborns, interpretation of results should be based  on gestational age, weight and in agreement with clinical  observations.    Premature Infant recommended reference ranges:  Up to 24 hours.............<8.0 mg/dL  Up to 48 hours............<12.0 mg/dL  3-5 days..................<15.0 mg/dL  6-29 days.................<15.0 mg/dL       Alkaline Phosphatase   Date Value Ref Range Status   01/11/2023 109 55 - 135 U/L Final     AST   Date Value Ref Range Status   01/11/2023 17 10 - 40 U/L Final     ALT   Date Value Ref Range Status   01/11/2023 20 10 - 44 U/L Final     Anion Gap   Date Value Ref Range Status   01/11/2023 8 8 - 16 mmol/L Final     eGFR if    Date Value Ref Range Status   05/24/2022 >60.0 >60 mL/min/1.73 m^2 Final     eGFR if non African  American   Date Value Ref Range Status   05/24/2022 >60.0 >60 mL/min/1.73 m^2 Final     Comment:     Calculation used to obtain the estimated glomerular filtration  rate (eGFR) is the CKD-EPI equation.        Lab Results   Component Value Date    [id number] 414 12/14/2021     Lab Results   Component Value Date    TSH 0.582 09/27/2022       MRI Brain Demyelinating [facility]    02-    Minimal nonspecific punctate foci T2 and FLAIR hyperintensity which are favored to be secondary to migraine headaches.  No convincing findings to suggest a demyelinating process.    MRI C- spine WWO Demyelinating [facility]    02-  1. Normal cord signal.  No abnormal enhancement.  No central or neural foraminal canal narrowing.    Reviewed the neuroimaging independently       Assessment:       1. Chronic migraine with aura    2. Use of medication with teratogenic potential in female of reproductive age    3. Nonintractable headache, unspecified chronicity pattern, unspecified headache type    4. Chronic fatigue syndrome    5. Generalized anxiety disorder    6. Fibromyalgia    7. Obesity, morbid, BMI 40.0-49.9        Plan:           CHRONIC MIXED HEADACHES CHRONIC MIGRAINE WITH AURA AND ON    HEADACHE DIARY     DISCUSSED THE THREE-FOLD MANAGEMENT OF MIGRAINE:      LIFESTYLE CHANGES:     Good sleep hygiene  Avoid general triggers like lack of sleep/too much sleep, prolonged sun exposure, excessive screen time and specific triggers based on you own diary   Minimize physical and emotional stress  Smoking avoidance and cessation  Limit caffeine drinks to 1-2 a day   Good hydration   Small frequent meals and avoid skipping meals   Moderate 30-minute-long aerobic exercises 3 times/week. Avoid strenuous exercise     Follow up with Opthalmology yearly for routine evaluation       ABORTIVE MEDICATIONS:     Will try Sumatriptan 25/50 mg PRN     Should only be taken 2-3 times/week to avoid rebound and overuse headaches.    Take at the ONSET of the  headache sumatriptan 50 mg  PO  (or other Triptan) in combination with naproxen 500 mg PO or Ibuprofen 800 mg PO for headache without nausea or vomiting.  This regimen can be repeated only once in 24 hours after 2 hours.    I-explained to the patient that pain meds especially triptans should NOT be taken daily to avoid Rebound Headache and Overuse Headache.      When the headache is associated with vomiting I recommended that the patient takes Sumatriptan 3/6 mg Sub-Q  injection.     Side effects of triptans were discussed and include rare cardiac and cerebral ischemia and cannot be used with migraine associate with focal neurological deficits (complicated migraine) in addition to drowsiness and potential impairment of driving ability. The patient verbalized understanding.    NSAIDs can cause peptic ulcers, renal insufficiency and may increase the risk of cardiovascular diseases.  SEs were discussed with the patient.The patient verbalized understanding.    If the headache is severe and  persists beyond 36 hours the patient needs to come to the hospital.      NEWER OPTIONS:    Ditans like Lasmiditan (Reyvow) 5HT-1F Agonist (No driving).    Gepants like :    Ubrogepant (Ubrelvy) CGRP- Antagonist (No driving).      Rimegepant (Nurtec), the first CGRP receptor antagonist available in a fast-acting orally disintegrating tablet.        OTHER OPTIONS:     OTC IN Lidocaine    Antiemetics like Reglan      Fioricet only 10 tabs a month     VNS PRN (Gammacore)     TNS PRN (Cefaly)    Octreotide 100 mg SQ         PREVENTATIVE MEDICATIONS:     Patient does not want to take a  preventative medications at this time.    Amitriptyline/Elavil which can cause sleepiness, dry eyes, dry mouth, urinary retention and rarely cardiac arrhythmias    Topiramate/Topamax which can cause mental slowing, transient tingling, kidney stones, weight loss , cleft lip and palate and rarely glaucoma and visual field defects . The patient was encouraged  to drink a lot of fluids.     Propranolol/Inderal which can cause low blood pressure, slow heart rate, erectile dysfunction, depression, airway obstruction and heart failure exacerbations.Cannot be used with migraine associate with focal neurological deficits     Valproic acid/Depakote which can cause weight gain, tremors, hair loss, balance problems, sleepiness, neural tube defects (babies), osteoporosis, and high cholesterol and interacts with many other medications      Anti-CGRP Agents:(Unknown long term cardiovascular and wound healing effects)    Erenumab (Aimovig) (Receptor Blocker): 140 mg SQ Pen monthly (Contains Latex)  Galcanezumab (Emgality) (Ligand Blocker): 120 mg SQ Pen monthly after a loading dose of 240 mg   Fremnezumab (Ajovy) (Ligand Blocker): 225 mg SQ Syringe monthly or 675 mg every 3 months   Eptinezumab (Vyepti) (Ligand Blocker): 100 mg IV every 3 months.      Botox Injections      OTHER OPTIONS:     Helpful supplements include  Co-Q 10, B2, Mg, feverfew  (Dolovent combination) and butterbur (Petadolex)    Non-pharmacological options: VNS (Gammacore), TNS (Cefaly)     Interventional: Occipital Nerve Block (Pharmacological vs. Cryo (Iovera)     CAM Options: Acupuncture, Massage Therapy      Hormonal therapy for menstrual migraines: Ultra small doses of E2 (20 mcg) with no placebo    Non-FDA Approved (Ketamine)          All migraine medications are not safe during pregnancy and the patient was made aware of this fact. Any pregnancy should be planned, and medications should be stopped PRIOR to pregnancy planning. Folic acid 1 mg daily was recommended. However, hormonal birth control complicates the management of migraine and can exacerbate migraine. If possible, mechanical contraception should be a better option.           MEDICAL/SURGICAL COMORBIDITIES     All relevant medical comorbidities noted and managed by primary care physician and medical care team.          MISCELLANEOUS MEDICAL PROBLEMS        HEALTHY LIFESTYLE AND PREVENTATIVE CARE    Encouraged the patient to adhere to the age-appropriate health maintenance guidelines including screening tests and vaccinations.     Discussed the overall importance of healthy lifestyle, optimal weight, exercise, healthy diet, good sleep hygiene and avoiding drugs including smoking, alcohol and recreational drugs. The patient verbalized full understanding.       Advised the patient to follow COVID-19 prevention measures.       I spent 120 minutes face to face with the patient    minutes of the time spent in counseling and coordination of care including discussions etiology of diagnosis, pathogenesis of diagnosis, prognosis of diagnosis,, diagnostic results, impression and recommendations, diagnostic studies, management, risks and benefits of treatment, instructions of disease self-management, treatment instructions, follow up requirements, patient and family counseling/involvement in care compliance with treatment regimen. All of the patient's questions were answered during this discussion.         Dony Croft, MSN NP      Collaborating Provider: Rachel Mao MD, FAAN Neurologist/Epileptologist

## 2023-02-15 NOTE — PROGRESS NOTES
Clinic Consult:  [facility] Consultation Note    Reason for Consult:  The primary encounter diagnosis was Gastroesophageal reflux disease without esophagitis. A diagnosis of Fatty liver was also pertinent to this visit.    PCP: Tyesha Leggett     The patient location is: [place]  The chief complaint leading to consultation is: above    Visit type: audiovisual    Face to Face time with patient: 15 minutes  25 minutes of total time spent on the encounter, which includes face to face time and non-face to face time preparing to see the patient (eg, review of tests), Obtaining and/or reviewing separately obtained history, Documenting clinical information in the electronic or other health record, Independently interpreting results (not separately reported) and communicating results to the patient/family/caregiver, or Care coordination (not separately reported).         Each patient to whom he or she provides medical services by telemedicine is:  (1) informed of the relationship between the physician and patient and the respective role of any other health care provider with respect to management of the patient; and (2) notified that he or she may decline to receive medical services by telemedicine and may withdraw from such care at any time.    Notes:       HPI:  This is a 32 y.o. female here for evaluation of the above  Pt reports a long history of reflux that has been managed with PPI daily.  She states that over the last few months, she has noticed that she is constantly having to clear her throat.  This is not always associated with PO intake.   Was seen by ENT and told that her exam appeared to be related to GERD.   Previous EGD in 2021 was unremarkable.   She has a new dx of SAVANNA so she does wake from her sleep, but has never associated that with reflux  She has a recent new onset of mild LUQ abdominal discomfort.  No nausea or vomiting.  No change in bowel pattern. No melena or hematochezia.   Of note,  she had an US completed last year with note of hepatic steatosis     Review of Systems   Constitutional:  Negative for chills, fever, malaise/fatigue and weight loss.   Respiratory:  Negative for cough.    Cardiovascular:  Negative for chest pain.   Gastrointestinal:         Per HPI   Musculoskeletal:  Negative for myalgias.   Skin:  Negative for itching and rash.   Neurological:  Negative for headaches.   Psychiatric/Behavioral:  The patient is not nervous/anxious.      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     Pre-eclampsia in postpartum period 04/23/2020    Vitamin D deficiency        Surgical History:  Past Surgical History:   Procedure Laterality Date    ESOPHAGOGASTRODUODENOSCOPY N/A 5/26/2021    Procedure: EGD (ESOPHAGOGASTRODUODENOSCOPY);  Surgeon: Alka Olmstead MD;  Location: [facility];  Service: Endoscopy;  Laterality: N/A;    ESOPHAGOGASTRODUODENOSCOPY N/A 10/4/2021    Procedure: EGD (ESOPHAGOGASTRODUODENOSCOPY);  Surgeon: Alka Olmstead MD;  Location: [facility];  Service: Endoscopy;  Laterality: N/A;    None      REDUCTION OF BOTH BREASTS Bilateral 5/27/2021    Procedure: MAMMOPLASTY, REDUCTION, BILATERAL;  Surgeon: Daniel Ba MD;  Location: [address];  Service: Plastics;  Laterality: Bilateral;  Left breast tissue: 1584  Right breast tissue: 1438       Family History:   Family History   Problem Relation Age of Onset    Asthma Mother     Hyperlipidemia Mother     Hypertension Mother     Hypertension Father     Hyperlipidemia Father     Heart disease Father 55        MI/CAD    No Known Problems Sister     No Known Problems Brother     Diabetes Maternal  "Aunt     Diabetes Maternal Grandmother     Insulin resistance Sister     No Known Problems Brother     No Known Problems Brother     No Known Problems Brother     No Known Problems Brother     No Known Problems Daughter     Breast cancer Maternal Aunt     Cancer Neg Hx     Stroke Neg Hx     Brain cancer Neg Hx     Migraines Neg Hx     Colon cancer Neg Hx     Ovarian cancer Neg Hx     Anesthesia problems Neg Hx        Social History:   Social History     Tobacco Use    Smoking status: Never    Smokeless tobacco: Never   Substance Use Topics    Alcohol use: Not Currently     Alcohol/week: 0.0 standard drinks    Drug use: No       Allergies: Reviewed    Home Medications:   Current Outpatient Medications on File Prior to Visit   Medication Sig Dispense Refill    ergocalciferol (VITAMIN D2) 50,000 unit Cap Take 1 capsule (50,000 Units total) by mouth twice a week. 8 capsule 11    fluticasone propionate (FLONASE) 50 mcg/actuation nasal spray 2 sprays (100 mcg total) by Each Nostril route once daily. 16 g 1    LORazepam (ATIVAN) 0.5 MG tablet Take 1 tablet (0.5 mg total) by mouth every 8 (eight) hours as needed (anxiety). 30 tablet 0    natrexone tablet 4.5 mg Take 1 tablet (4.5 mg total) by mouth every evening. 90 tablet 3    ondansetron (ZOFRAN) 4 MG tablet Take 1 tablet (4 mg total) by mouth every 6 (six) hours as needed for Nausea. 40 tablet 1    [DISCONTINUED] omeprazole (PRILOSEC) 40 MG capsule Take 1 capsule (40 mg total) by mouth once daily. 30 capsule 11     No current facility-administered medications on file prior to visit.       Physical Exam:  Vital Signs:  Ht 5' 5" (1.651 m)   Wt 122.5 kg (270 lb)   BMI 44.93 kg/m²   Body mass index is 44.93 kg/m².  Physical Exam  Constitutional:       Appearance: She is well-developed.   HENT:      Head: Normocephalic.   Eyes:      General: No scleral icterus.  Pulmonary:      Effort: Pulmonary effort is normal.   Musculoskeletal:         General: Normal range of motion. "      Cervical back: Normal range of motion.   Skin:     General: Skin is dry.   Neurological:      Mental Status: She is alert.       Labs: Pertinent labs reviewed.      Assessment:  1. Gastroesophageal reflux disease without esophagitis    2. Fatty liver         Recommendations:  Gastroesophageal reflux disease without esophagitis  - recent EGD WNL  - will increase PPI to BID  - continue with POC per pulmonology for the SAVANNA  - if the symptoms continue will plan for Bravo for further evaluation.   - GERD diet and lifestyle discussed     -     omeprazole (PRILOSEC) 40 MG capsule; Take 1 capsule (40 mg total) by mouth 2 (two) times daily before meals.  Dispense: 60 capsule; Refill: 2    Fatty liver  - Educated patient on spectrum of fatty liver disease and potential for cirrhosis if CHAUDHRY present  - Advised weight loss (10%), strict glycemic control and lipid control (statins are ok if needed, prescribing doctor will need to monitor LFTs per routine)  - Mediterranean diet discussed      F/U in 3 months to ensure symptom improvement       Thank you so much for allowing me to participate in the care of SANDOVAL Leigh

## 2023-02-23 ENCOUNTER — LAB VISIT (OUTPATIENT)
Dept: LAB | Facility: HOSPITAL | Age: 33
End: 2023-02-23
Attending: FAMILY MEDICINE
Payer: COMMERCIAL

## 2023-02-23 ENCOUNTER — OFFICE VISIT (OUTPATIENT)
Dept: FAMILY MEDICINE | Facility: CLINIC | Age: 33
End: 2023-02-23
Payer: COMMERCIAL

## 2023-02-23 VITALS
TEMPERATURE: 97 F | WEIGHT: 271.19 LBS | HEIGHT: 65 IN | SYSTOLIC BLOOD PRESSURE: 124 MMHG | OXYGEN SATURATION: 98 % | RESPIRATION RATE: 18 BRPM | DIASTOLIC BLOOD PRESSURE: 82 MMHG | BODY MASS INDEX: 45.18 KG/M2 | HEART RATE: 76 BPM

## 2023-02-23 DIAGNOSIS — Z00.00 ANNUAL PHYSICAL EXAM: Primary | ICD-10-CM

## 2023-02-23 DIAGNOSIS — G47.30 SLEEP APNEA, UNSPECIFIED TYPE: ICD-10-CM

## 2023-02-23 DIAGNOSIS — E66.01 MORBID OBESITY WITH BMI OF 45.0-49.9, ADULT: ICD-10-CM

## 2023-02-23 DIAGNOSIS — Z00.00 ANNUAL PHYSICAL EXAM: ICD-10-CM

## 2023-02-23 LAB
CHOLEST SERPL-MCNC: 182 MG/DL (ref 120–199)
CHOLEST/HDLC SERPL: 4.2 {RATIO} (ref 2–5)
ESTIMATED AVG GLUCOSE: 111 MG/DL (ref 68–131)
HBA1C MFR BLD: 5.5 % (ref 4–5.6)
HDLC SERPL-MCNC: 43 MG/DL (ref 40–75)
HDLC SERPL: 23.6 % (ref 20–50)
HIV 1+2 AB+HIV1 P24 AG SERPL QL IA: NORMAL
LDLC SERPL CALC-MCNC: 126.2 MG/DL (ref 63–159)
NONHDLC SERPL-MCNC: 139 MG/DL
TRIGL SERPL-MCNC: 64 MG/DL (ref 30–150)
TSH SERPL DL<=0.005 MIU/L-ACNC: 1.06 UIU/ML (ref 0.4–4)

## 2023-02-23 PROCEDURE — 99395 PR PREVENTIVE VISIT,EST,18-39: ICD-10-PCS | Mod: S$GLB,,, | Performed by: FAMILY MEDICINE

## 2023-02-23 PROCEDURE — 99999 PR PBB SHADOW E&M-EST. PATIENT-LVL V: CPT | Mod: PBBFAC,,, | Performed by: FAMILY MEDICINE

## 2023-02-23 PROCEDURE — 36415 COLL VENOUS BLD VENIPUNCTURE: CPT | Mod: PO | Performed by: FAMILY MEDICINE

## 2023-02-23 PROCEDURE — 87389 HIV-1 AG W/HIV-1&-2 AB AG IA: CPT | Performed by: FAMILY MEDICINE

## 2023-02-23 PROCEDURE — 3008F PR BODY MASS INDEX (BMI) DOCUMENTED: ICD-10-PCS | Mod: CPTII,S$GLB,, | Performed by: FAMILY MEDICINE

## 2023-02-23 PROCEDURE — 3008F BODY MASS INDEX DOCD: CPT | Mod: CPTII,S$GLB,, | Performed by: FAMILY MEDICINE

## 2023-02-23 PROCEDURE — 3044F HG A1C LEVEL LT 7.0%: CPT | Mod: CPTII,S$GLB,, | Performed by: FAMILY MEDICINE

## 2023-02-23 PROCEDURE — 99999 PR PBB SHADOW E&M-EST. PATIENT-LVL V: ICD-10-PCS | Mod: PBBFAC,,, | Performed by: FAMILY MEDICINE

## 2023-02-23 PROCEDURE — 99395 PREV VISIT EST AGE 18-39: CPT | Mod: S$GLB,,, | Performed by: FAMILY MEDICINE

## 2023-02-23 PROCEDURE — 84443 ASSAY THYROID STIM HORMONE: CPT | Performed by: FAMILY MEDICINE

## 2023-02-23 PROCEDURE — 83036 HEMOGLOBIN GLYCOSYLATED A1C: CPT | Performed by: FAMILY MEDICINE

## 2023-02-23 PROCEDURE — 3079F DIAST BP 80-89 MM HG: CPT | Mod: CPTII,S$GLB,, | Performed by: FAMILY MEDICINE

## 2023-02-23 PROCEDURE — 3074F PR MOST RECENT SYSTOLIC BLOOD PRESSURE < 130 MM HG: ICD-10-PCS | Mod: CPTII,S$GLB,, | Performed by: FAMILY MEDICINE

## 2023-02-23 PROCEDURE — 3074F SYST BP LT 130 MM HG: CPT | Mod: CPTII,S$GLB,, | Performed by: FAMILY MEDICINE

## 2023-02-23 PROCEDURE — 3044F PR MOST RECENT HEMOGLOBIN A1C LEVEL <7.0%: ICD-10-PCS | Mod: CPTII,S$GLB,, | Performed by: FAMILY MEDICINE

## 2023-02-23 PROCEDURE — 80061 LIPID PANEL: CPT | Performed by: FAMILY MEDICINE

## 2023-02-23 PROCEDURE — 3079F PR MOST RECENT DIASTOLIC BLOOD PRESSURE 80-89 MM HG: ICD-10-PCS | Mod: CPTII,S$GLB,, | Performed by: FAMILY MEDICINE

## 2023-02-23 NOTE — PROGRESS NOTES
HISTORY OF PRESENT ILLNESS: Ms. Barrientos comes in today fasting and without taking medication for annual wellness examination.     END OF LIFE DECISION: She does not have a living will and does not desire life support and states family is aware of her decision.     Current Outpatient Medications   Medication Sig    DULoxetine (CYMBALTA) 20 MG capsule Take 1 capsule (20 mg total) by mouth once daily.    ergocalciferol (VITAMIN D2) 50,000 unit Cap Take 1 capsule (50,000 Units total) by mouth twice a week. (Patient taking differently: Take 50,000 Units by mouth every 7 days.)    fluticasone propionate (FLONASE) 50 mcg/actuation nasal spray 2 sprays (100 mcg total) by Each Nostril route once daily.    ibuprofen (ADVIL,MOTRIN) 800 MG tablet Take 1 tablet by mouth every 4 to 6 hours as needed for pain. Do not exceed 4 tablets per day    omeprazole (PRILOSEC) 20 MG capsule Take 20 mg by mouth once daily.    ondansetron (ZOFRAN) 4 MG tablet Take 1 tablet (4 mg total) by mouth every 6 (six) hours as needed for Nausea.     SCREENINGS:       Cholesterol: May 24, 2022.     FFS/Colonoscopy: Never.     Mammogram:  Never.     GYN Exam: October 31 2022 with GYN Dr. JANELL Siu and every 6 months for abnormal pap smear, HPV.     Dexa Scan:  Never.     Eye Exam: February 7, 2023 with Dr. Delgado. She wears contacts/glasses.       PPD: Negative in the past.     Immunizations:  Td/Tdap - 2016 per patient.                              Gardasil - Never.                              Zostavax - N./A.                              Pneumovax - In the past.                              Seasonal Flu - September 26, 2022.                              Covid-19 (Pfizer) vaccine - January 20, 2021, February 10, 2021, December 2, 2021.                           ROS:        GENERAL: Denies fever, chills, or unusual weight change. Reports slight fatigue. Appetite normal. Does not exercises or   monitors diet. Weight 124.7 kg (274 lb 14.6 oz) at August  30, 2022 visit. Desires to see bariatric medicine.  SKIN: Denies itching, rash, changes in mole, color or texture of skin or easy bruising.   HEAD:  Denies headaches or recent head trauma. Saw HILARY Croft with neurology on February 15, 2023 for migraine.  EYES: Denies change in vision, pain, diplopia, redness or discharge. Wears seatbelt.  EARS: Denies ear pain, discharge, vertigo or decreased hearing.  NOSE: Denies loss of smell, epistaxis or rhinitis.  MOUTH & THROAT: Denies hoarseness or change in voice. Denies excessive gum bleeding or mouth sores.  Denies sore throat.  NODES: Denies swollen glands.  CHEST: Denies TELLEZ, wheezing, cough, or sputum production. Reports slight snoring. Saw ENT Dr. Rosales on January 17, 2023 for snoring with sleep study on back.          CARDIOVASCULAR: Denies chest pain, PND, orthopnea or reduced exercise tolerance.  Denies palpitations.  Saw Dr. Stevens, cardiologist, on October 18, 2022.          ABDOMEN: Denies diarrhea, constipation, nausea, vomiting, abdominal pain, or blood in stool.          URINARY: Denies flank pain, dysuria or hematuria.  GENITOURINARY: Denies flank pain, dysuria, frequency or hematuria. No change in menses. Performs monthly breast self examinations.  Desires HIV test.  ENDOCRINE: Denies thyroid, diabetes or cholesterol problems.  HEME/LYMPH: Denies bleeding problems.  PERIPHERAL VASCULAR:Denies claudication or cyanosis.  MUSCULOSKELETAL: Denies joint stiffness, pain or swelling. Denies edema. Reports follows with Dr. Sandoval, rheumatologist, with last visit on February 9, 2023 and dealing with fibromyalgia with pain all over x 4 months and takes Naltrexone with help.  NEUROLOGIC: Denies history of seizures, tremors, paralysis, alteration of gait or coordination.  PSYCHIATRIC: Denies mood swings, homicidal or suicidal thoughts. Denies acute steep problems. Reports follows with psychiatry for anxiety, depression and will see Dr. Sanchez on March 9,  "2023.      PE:   VS: Blood Pressure 124/82   Pulse 76   Temperature 97 °F (36.1 °C)   Respiration 18   Height 5' 5" (1.651 m)   Weight 123 kg (271 lb 2.7 oz)   Last Menstrual Period 02/09/2023 Comment: monthly  Oxygen Saturation 98%   Body Mass Index 45.12 kg/m²   APPEARANCE:  Well nourished, well developed female, pleasant and obese, alert and oriented in no acute distress.    HEAD: Nontender. Full range of motion.  EYES: PERRL, conjunctiva pink, lids no edema.   EARS: External canal patent, no swelling or redness. TM's shiny and clear.  NOSE: Mucosa and turbinates pink, not swollen. No discharge. Nontender sinuses.  THROAT: No pharyngeal erythema or exudate. No stridor.   NECK: Supple, no mass, thyroid not enlarged.  NODES: No cervical lymph node enlargement.  CHEST: Normal respiratory effort. Lungs clear to auscultation.  CARDIOVASCULAR: Normal S1, S2. No rubs, murmurs or gallops. PMI not displaced. No carotid bruit. Pedal pulses palpable bilaterally. No edema.  ABDOMEN: Bowel sounds present. Not distended. Soft. No tenderness, masses or organomegaly.  BREAST EXAM: Not examined.  PELVIC EXAM: Not examined.  RECTAL EXAM: Not examined.  PELVIC EXAM: Not examined.  MUSCULOSKELETAL: No joint deformities or stiffness. She is ambulatory without problems.  SKIN: No rashes or suspicious lesions, normal color and turgor.  NEUROLOGIC:   Cranial Nerves: II-XII grossly intact.   DTR's: Knees, Ankles 2+ and equal bilaterally. Gait & Posture: Normal gait and fine motion.  PSYCHIATRIC:Patient alert, oriented x 3. Mood/Affect normal without acute anxiety or depression noted. Judgment/insight good as she makes appropriate decisions during today's exam.    ASSESSMENT:    ICD-10-CM ICD-9-CM    1. Annual physical exam  Z00.00 V70.0 Lipid Panel      TSH      Hemoglobin A1C      HIV 1/2 Ag/Ab (4th Gen)      2. Sleep apnea, unspecified type  G47.30 780.57 Ambulatory referral/consult to Pulmonology      3. Morbid obesity with " BMI of 45.0-49.9, adult  E66.01 278.01 Ambulatory referral/consult to Bariatric Surgery    Z68.42 V85.42           PLAN:  1. Age-appropriate counseling-appropriate low-sodium, low-cholesterol, low carbohydrate diet and exercise daily, monthly breast self exam, annual wellness examination.   2. Patient advised to call for results.  3. Continue current medications.  4. Keep follow up with specialists.  5. Follow up in about 1 year (around 2/23/2024) for physical.     Answers submitted by the patient for this visit:  Review of Systems Questionnaire (Submitted on 2/23/2023)  activity change: No  unexpected weight change: No  neck pain: No  hearing loss: No  rhinorrhea: No  trouble swallowing: No  eye discharge: No  visual disturbance: No  chest tightness: No  wheezing: No  chest pain: No  palpitations: Yes  blood in stool: No  constipation: No  vomiting: No  diarrhea: No  polydipsia: No  polyuria: No  difficulty urinating: No  hematuria: No  menstrual problem: No  dysuria: No  joint swelling: No  arthralgias: No  headaches: Yes  weakness: No  confusion: No  dysphoric mood: Yes

## 2023-02-24 ENCOUNTER — OFFICE VISIT (OUTPATIENT)
Dept: PSYCHIATRY | Facility: CLINIC | Age: 33
End: 2023-02-24
Payer: COMMERCIAL

## 2023-02-24 DIAGNOSIS — F33.0 MILD EPISODE OF RECURRENT MAJOR DEPRESSIVE DISORDER: ICD-10-CM

## 2023-02-24 DIAGNOSIS — F90.0 ATTENTION DEFICIT HYPERACTIVITY DISORDER (ADHD), PREDOMINANTLY INATTENTIVE TYPE: ICD-10-CM

## 2023-02-24 DIAGNOSIS — F41.1 GENERALIZED ANXIETY DISORDER: Primary | ICD-10-CM

## 2023-02-24 PROCEDURE — 1159F MED LIST DOCD IN RCRD: CPT | Mod: CPTII,95,, | Performed by: PSYCHOLOGIST

## 2023-02-24 PROCEDURE — 99214 OFFICE O/P EST MOD 30 MIN: CPT | Mod: 95,,, | Performed by: PSYCHOLOGIST

## 2023-02-24 PROCEDURE — 99214 PR OFFICE/OUTPT VISIT, EST, LEVL IV, 30-39 MIN: ICD-10-PCS | Mod: 95,,, | Performed by: PSYCHOLOGIST

## 2023-02-24 PROCEDURE — 3044F PR MOST RECENT HEMOGLOBIN A1C LEVEL <7.0%: ICD-10-PCS | Mod: CPTII,95,, | Performed by: PSYCHOLOGIST

## 2023-02-24 PROCEDURE — 1159F PR MEDICATION LIST DOCUMENTED IN MEDICAL RECORD: ICD-10-PCS | Mod: CPTII,95,, | Performed by: PSYCHOLOGIST

## 2023-02-24 PROCEDURE — 3044F HG A1C LEVEL LT 7.0%: CPT | Mod: CPTII,95,, | Performed by: PSYCHOLOGIST

## 2023-02-24 RX ORDER — BUPROPION HYDROCHLORIDE 150 MG/1
150 TABLET ORAL EVERY MORNING
Qty: 14 TABLET | Refills: 0 | Status: SHIPPED | OUTPATIENT
Start: 2023-02-24 | End: 2023-03-10

## 2023-02-24 RX ORDER — BUPROPION HYDROCHLORIDE 150 MG/1
150 TABLET ORAL EVERY MORNING
Qty: 30 TABLET | Refills: 2 | Status: SHIPPED | OUTPATIENT
Start: 2023-03-10 | End: 2023-05-05 | Stop reason: SDUPTHER

## 2023-02-24 NOTE — PROGRESS NOTES
Outpatient Psychiatry Follow-Up Visit    2/24/2023    Timeframe: Corona Virus Outbreak     The patient location is: Patient's car/ Patient reported that his/her location at the time of this visit was in the [facility]     Visit type: Virtual visit with synchronous audio and video     Each patient to whom he or she provides medical services by telehealth is: (1) informed of the relationship between the medical psychologist and patient and the respective role of any other health care provider with respect to management of the patient; and (2) notified that he or she may decline to receive medical services by telehealth and may withdraw from such care at any time.    I also informed patient of the following:   Kenya Sanchez, PhD, MPAP:  [place] medical license number: [id number]    My contact info:  [facility] / 2nd Floor  [address] [address]   Ph: [phone]    If technology issues, call office phone: Ph: [phone]  If crisis: Dial 911 or go to nearest Emergency Room (ER)  If questions related to privacy practices: contact [facility] Information Department: [phone]    Chief Complaint:  Anthony Sexton is a 32 y.o. female who presents today for follow-up of anxiety and inattention/distractibility .       Impressions/Plan from last visit: Anthony attended her virtual visit. Since we last met, she was dx with fibromyalgia. She has been weaning off of Cymbalta and will start next week on Savella. She has been tired--but is relieved to have some answers now. She has been considering taking some time off--working with Dr. Bartlett. She will continue with Dr. Urena--welcome to schedule with me in future if needed.    Interval History and Content of Current Session: Anthony attended her virtual visit. We last met in October of last year. She had a flare a few weeks ago--is doing better now (a little). She is seeing Dr. MCALLISTER--recently started naltrexone. She  and Dr. Bartlett have been regularly meeting--would like something to help keep her level. Depression is worse in a flare (as is her anxiety). We discussed options and agreed on adding Wellbutrin. [The combination with naltrexone may also help with weight loss.] She has also reportedly been working out some as her health allows. Wellbutrin will hopefully also help with her executive functioning. We will continue to monitor.    Plan--start Wellbutrin  mg for 2 weeks, then 300 mg      Prior medicines: buspirone, Prozac, Klonopin, Lexapro, Celexa, Zoloft, hydroxyzine, Adderall XR; Effexor (she thinks); Ativan, Savella, Cymbalta     GAD7 2/23/2023 10/3/2022 10/2/2022   1. Feeling nervous, anxious, or on edge? 1 1 1   2. Not being able to stop or control worrying? 1 1 1   3. Worrying too much about different things? 1 1 1   4. Trouble relaxing? 2 1 1   5. Being so restless that it is hard to sit still? 1 2 2   6. Becoming easily annoyed or irritable? 1 1 1   7. Feeling afraid as if something awful might happen? 1 1 1   SENTHIL-7 Score 8 8 8      0-4 = Minimal anxiety  5-9 = Mild anxiety  10-14 = Moderate anxiety  15-21 = Severe anxiety       Review of Systems   PSYCHIATRIC: Pertinant items are noted in the narrative.    Past Medical, Family and Social History: The patient's past medical, family and social history have been reviewed and updated as appropriate within the electronic medical record - see encounter notes.      Current Outpatient Medications:     buPROPion (WELLBUTRIN XL) 150 MG TB24 tablet, Take 1 tablet (150 mg total) by mouth every morning. for 14 days, Disp: 14 tablet, Rfl: 0    [START ON 3/10/2023] buPROPion (WELLBUTRIN XL) 150 MG TB24 tablet, Take 1 tablet (150 mg total) by mouth every morning., Disp: 30 tablet, Rfl: 2    ergocalciferol (VITAMIN D2) 50,000 unit Cap, Take 1 capsule (50,000 Units total) by mouth twice a week., Disp: 8 capsule, Rfl: 11    fluticasone propionate (FLONASE) 50 mcg/actuation  nasal spray, 2 sprays (100 mcg total) by Each Nostril route once daily., Disp: 16 g, Rfl: 1    folic acid (FOLVITE) 1 MG tablet, Take 1 tablet (1 mg total) by mouth once daily., Disp: 30 tablet, Rfl: 11    LORazepam (ATIVAN) 0.5 MG tablet, Take 1 tablet (0.5 mg total) by mouth every 8 (eight) hours as needed (anxiety)., Disp: 30 tablet, Rfl: 0    natrexone tablet 4.5 mg, Take 1 tablet (4.5 mg total) by mouth every evening., Disp: 90 tablet, Rfl: 3    omeprazole (PRILOSEC) 40 MG capsule, Take 1 capsule (40 mg total) by mouth 2 (two) times daily before meals., Disp: 60 capsule, Rfl: 2    ondansetron (ZOFRAN) 4 MG tablet, Take 1 tablet (4 mg total) by mouth every 6 (six) hours as needed for Nausea., Disp: 40 tablet, Rfl: 1    sumatriptan (IMITREX) 25 MG Tab, Take 1 tablet (25 mg total) by mouth every 72 hours as needed for Migraine (2 to 3 times per week as need)., Disp: 9 tablet, Rfl: 3    Compliance: yes    Side effects: see above    Risk Parameters:  Patient reports no suicidal ideation  Patient reports no homicidal ideation  Patient reports no self-injurious behavior  Patient reports no violent behavior    Exam (detailed: at least 9 elements; comprehensive: all 15 elements)   Constitutional  Vitals:  Most recent vital signs were reviewed.   Last 3 sets of Vitals    Vitals - 1 value per visit 2/15/2023 2/23/2023 2/23/2023   SYSTOLIC 124 - 124   DIASTOLIC 84 - 82   Pulse 83 - 76   Temp - - 97   Resp 16 - 18   SPO2 98 - 98   Weight (lb) 273.59 - 271.17   Weight (kg) 124.1 - 123   Height 65 - 65   BMI (Calculated) 45.5 - 45.1   VISIT REPORT - - -   Pain Score  - 0 -   Some recent data might be hidden          General:  age appropriate, neatly groomed, [facility] top     Musculoskeletal  Muscle Strength/Tone:  no tremor, no tic   Gait & Station:  video visit     Psychiatric  Speech:  no latency; no press   Behavior: wnl   Mood & Affect:  Anxious/depressed at times; overall better  congruent and appropriate   Thought  Process:  normal and logical   Associations:  intact   Thought Content:  normal, no suicidality, no homicidality, delusions, or paranoia   Insight:  has awareness of illness   Judgement: behavior is adequate to circumstances   Orientation:  grossly intact   Memory: intact for content of interview   Language: grossly intact   Attention Span & Concentration:  Grossly intact   Fund of Knowledge:  intact and appropriate to age and level of education     Assessment and Diagnosis   Status/Progress: Based on the examination today, the patient's problem(s) is/are adequately but not ideally controlled.  New problems have been presented today.   Co-morbidities are complicating management of the primary condition.  There are no active rule-out diagnoses for this patient at this time.     General Impression:     Encounter Diagnoses   Name Primary?    Generalized anxiety disorder Yes    Attention deficit hyperactivity disorder (ADHD), predominantly inattentive type     Mild episode of recurrent major depressive disorder        Intervention/Counseling/Treatment Plan   Medication Management: Discussed risks, benefits, and alternatives to treatment plan documented above with patient. I answered all patient questions related to this plan, and patient expressed understanding and agreement.   start Wellbutrin  mg for 2 weeks, then 300 mg  Continue therapy as needed      Medication List with Changes/Refills   New Medications    BUPROPION (WELLBUTRIN XL) 150 MG TB24 TABLET    Take 1 tablet (150 mg total) by mouth every morning. for 14 days    BUPROPION (WELLBUTRIN XL) 150 MG TB24 TABLET    Take 1 tablet (150 mg total) by mouth every morning.   Current Medications    ERGOCALCIFEROL (VITAMIN D2) 50,000 UNIT CAP    Take 1 capsule (50,000 Units total) by mouth twice a week.    FLUTICASONE PROPIONATE (FLONASE) 50 MCG/ACTUATION NASAL SPRAY    2 sprays (100 mcg total) by Each Nostril route once daily.    FOLIC ACID (FOLVITE) 1 MG TABLET     Take 1 tablet (1 mg total) by mouth once daily.    LORAZEPAM (ATIVAN) 0.5 MG TABLET    Take 1 tablet (0.5 mg total) by mouth every 8 (eight) hours as needed (anxiety).    NATREXONE TABLET 4.5 MG    Take 1 tablet (4.5 mg total) by mouth every evening.    OMEPRAZOLE (PRILOSEC) 40 MG CAPSULE    Take 1 capsule (40 mg total) by mouth 2 (two) times daily before meals.    ONDANSETRON (ZOFRAN) 4 MG TABLET    Take 1 tablet (4 mg total) by mouth every 6 (six) hours as needed for Nausea.    SUMATRIPTAN (IMITREX) 25 MG TAB    Take 1 tablet (25 mg total) by mouth every 72 hours as needed for Migraine (2 to 3 times per week as need).        Return to Clinic: 6 weeks    Time spent with pt including note preparation: 22 minutes       Kenya Sanchez, PhD, MP  Advanced Practice Medical Psychologist  [facility]  [address] [facility].  [place] [id number]  [phone]   [phone] fax

## 2023-02-24 NOTE — PATIENT INSTRUCTIONS

## 2023-03-02 PROBLEM — G47.33 OSA (OBSTRUCTIVE SLEEP APNEA): Status: ACTIVE | Noted: 2023-03-02

## 2023-03-14 ENCOUNTER — TELEPHONE (OUTPATIENT)
Dept: PSYCHIATRY | Facility: CLINIC | Age: 33
End: 2023-03-14
Payer: COMMERCIAL

## 2023-03-16 ENCOUNTER — OFFICE VISIT (OUTPATIENT)
Dept: PSYCHIATRY | Facility: CLINIC | Age: 33
End: 2023-03-16
Payer: COMMERCIAL

## 2023-03-16 DIAGNOSIS — F33.0 MILD EPISODE OF RECURRENT MAJOR DEPRESSIVE DISORDER: ICD-10-CM

## 2023-03-16 DIAGNOSIS — F41.1 GENERALIZED ANXIETY DISORDER: Primary | ICD-10-CM

## 2023-03-16 DIAGNOSIS — F90.0 ATTENTION DEFICIT HYPERACTIVITY DISORDER (ADHD), PREDOMINANTLY INATTENTIVE TYPE: ICD-10-CM

## 2023-03-16 DIAGNOSIS — F54 STRESS-RELATED PHYSIOLOGICAL RESPONSE AFFECTING MEDICAL CONDITION: ICD-10-CM

## 2023-03-16 PROCEDURE — 1159F PR MEDICATION LIST DOCUMENTED IN MEDICAL RECORD: ICD-10-PCS | Mod: CPTII,95,, | Performed by: STUDENT IN AN ORGANIZED HEALTH CARE EDUCATION/TRAINING PROGRAM

## 2023-03-16 PROCEDURE — 90834 PSYTX W PT 45 MINUTES: CPT | Mod: 95,,, | Performed by: STUDENT IN AN ORGANIZED HEALTH CARE EDUCATION/TRAINING PROGRAM

## 2023-03-16 PROCEDURE — 90834 PR PSYCHOTHERAPY W/PATIENT, 45 MIN: ICD-10-PCS | Mod: 95,,, | Performed by: STUDENT IN AN ORGANIZED HEALTH CARE EDUCATION/TRAINING PROGRAM

## 2023-03-16 PROCEDURE — 3044F HG A1C LEVEL LT 7.0%: CPT | Mod: CPTII,95,, | Performed by: STUDENT IN AN ORGANIZED HEALTH CARE EDUCATION/TRAINING PROGRAM

## 2023-03-16 PROCEDURE — 1159F MED LIST DOCD IN RCRD: CPT | Mod: CPTII,95,, | Performed by: STUDENT IN AN ORGANIZED HEALTH CARE EDUCATION/TRAINING PROGRAM

## 2023-03-16 PROCEDURE — 3044F PR MOST RECENT HEMOGLOBIN A1C LEVEL <7.0%: ICD-10-PCS | Mod: CPTII,95,, | Performed by: STUDENT IN AN ORGANIZED HEALTH CARE EDUCATION/TRAINING PROGRAM

## 2023-03-17 NOTE — PROGRESS NOTES
[facility]  Psychology  Progress Note  Individual Psychotherapy (PhD/LCSW)    Patient Name: Anthony Sexton  MRN: [id number]    Patient Class: OP- [facility]  Primary Care Provider: Tyesha Leggett MD    Psychiatry Initial Visit (PhD/LCSW)  Individual Psychotherapy - CPT 02937    Date: 3/16/2023    Site: [place]    The patient location is: Patient's home/ Patient reported that his/her location at the time of this visit was in the [facility]     Visit type: Virtual visit with synchronous audio and video     Each patient to whom he or she provides medical services by telemedicine is: (1) informed of the relationship between the physician and patient and the respective role of any other health care provider with respect to management of the patient; and (2) notified that he or she may decline to receive medical services by telemedicine and may withdraw from such care at any time.     Referral source: Lenora Walters MD    Clinical status of patient: Outpatient    Anthony Sexton, a 32 y.o. female, for initial evaluation visit.  Met with patient.    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     Substance use:   Alcohol: none    Drugs: none   Tobacco: none   Caffeine: none    Current medications and drug reactions (include OTC, herbal): see medication list     Strengths and liabilities: Strength: Patient accepts guidance/feedback, Strength: Patient is expressive/articulate., Strength: Patient is intelligent., Strength: Patient is motivated for change., Strength: Patient has positive support network., Strength: Patient has reasonable judgment., Strength: Patient is stable., Liability: Patient lacks coping skills.    Current Evaluation:     Mental Status Exam:  General Appearance:  unremarkable, age appropriate   Speech: normal tone, normal rate, normal pitch, normal volume      Level of Cooperation: cooperative      Thought Processes: normal and logical   Mood: anxious, sad      Thought Content: normal, no suicidality, no homicidality, delusions, or paranoia   Affect: congruent and appropriate   Orientation: Oriented x3   Memory: recent >  intact   Attention Span & Concentration: intact   Fund of General Knowledge: intact and appropriate to age and level of education   Abstract Reasoning: not assessed   Judgment & Insight: fair     Language  intact     Summary: The patient reported that things are going well at home and at work. Her  is helping more with the kids, a new employee was hired in her department to lessen her load, and she has taken time off for self-care. She stated that she has no complaints and is grateful that things have changed for the better. I encouraged the patient to continue advocating for herself in all areas of her life and for her to check in periodically when she needs to discuss/process events that may occur along the way.   Diagnostic Impression - Plan:       ICD-10-CM ICD-9-CM   1. Generalized anxiety disorder  F41.1 300.02   2. Attention deficit hyperactivity disorder (ADHD), predominantly inattentive type  F90.0 314.00   3. Stress-related physiological response affecting medical condition  F54 316   4.  Mild episode of recurrent major depressive disorder  F33.0 296.31                     Plan:individual psychotherapy    Return to Clinic: as scheduled    Length of Service (minutes): 45          Barbra Bartlett, PhD  Psychologist  [facility]

## 2023-03-23 ENCOUNTER — PROCEDURE VISIT (OUTPATIENT)
Dept: OBSTETRICS AND GYNECOLOGY | Facility: CLINIC | Age: 33
End: 2023-03-23
Payer: COMMERCIAL

## 2023-03-23 VITALS
WEIGHT: 274.06 LBS | SYSTOLIC BLOOD PRESSURE: 138 MMHG | DIASTOLIC BLOOD PRESSURE: 70 MMHG | BODY MASS INDEX: 45.66 KG/M2 | HEIGHT: 65 IN

## 2023-03-23 DIAGNOSIS — B97.7 HIGH RISK HPV INFECTION: Primary | ICD-10-CM

## 2023-03-23 DIAGNOSIS — Z32.00 POSSIBLE PREGNANCY: ICD-10-CM

## 2023-03-23 DIAGNOSIS — Z30.015 ENCOUNTER FOR INITIAL PRESCRIPTION OF VAGINAL RING HORMONAL CONTRACEPTIVE: ICD-10-CM

## 2023-03-23 PROBLEM — R87.810 ATYPICAL SQUAMOUS CELL CHANGES OF UNDETERMINED SIGNIFICANCE (ASCUS) ON CERVICAL CYTOLOGY WITH POSITIVE HIGH RISK HUMAN PAPILLOMA VIRUS (HPV): Status: RESOLVED | Noted: 2021-04-20 | Resolved: 2023-03-23

## 2023-03-23 PROBLEM — R87.610 ATYPICAL SQUAMOUS CELL CHANGES OF UNDETERMINED SIGNIFICANCE (ASCUS) ON CERVICAL CYTOLOGY WITH POSITIVE HIGH RISK HUMAN PAPILLOMA VIRUS (HPV): Status: RESOLVED | Noted: 2021-04-20 | Resolved: 2023-03-23

## 2023-03-23 LAB
B-HCG UR QL: NEGATIVE
CTP QC/QA: YES

## 2023-03-23 PROCEDURE — 88305 TISSUE EXAM BY PATHOLOGIST: CPT | Mod: 59 | Performed by: PATHOLOGY

## 2023-03-23 PROCEDURE — 81025 URINE PREGNANCY TEST: CPT | Mod: S$GLB,,, | Performed by: OBSTETRICS & GYNECOLOGY

## 2023-03-23 PROCEDURE — 88305 TISSUE EXAM BY PATHOLOGIST: CPT | Mod: 26,,, | Performed by: PATHOLOGY

## 2023-03-23 PROCEDURE — 81025 POCT URINE PREGNANCY: ICD-10-PCS | Mod: S$GLB,,, | Performed by: OBSTETRICS & GYNECOLOGY

## 2023-03-23 PROCEDURE — 57454 COLPOSCOPY: ICD-10-PCS | Mod: S$GLB,,, | Performed by: OBSTETRICS & GYNECOLOGY

## 2023-03-23 PROCEDURE — 88305 TISSUE EXAM BY PATHOLOGIST: ICD-10-PCS | Mod: 26,,, | Performed by: PATHOLOGY

## 2023-03-23 PROCEDURE — 57454 BX/CURETT OF CERVIX W/SCOPE: CPT | Mod: S$GLB,,, | Performed by: OBSTETRICS & GYNECOLOGY

## 2023-03-23 RX ORDER — ETONOGESTREL AND ETHINYL ESTRADIOL VAGINAL RING .015; .12 MG/D; MG/D
1 RING VAGINAL
Qty: 1 EACH | Refills: 11 | Status: SHIPPED | OUTPATIENT
Start: 2023-03-23 | End: 2023-11-17

## 2023-03-23 NOTE — PROCEDURES
Colposcopy    Date/Time: 3/23/2023 1:00 PM  Performed by: Kirsten Siu MD  Authorized by: Kirsten Siu MD     Consent Done?:  Yes (Written)  Timeout:Immediately prior to procedure a time out was called to verify the correct patient, procedure, equipment, support staff and site/side marked as required  Assistants?: Yes    List of assistants:  Andie Ugarte MA    Colposcopy Site:  Cervix  Ac[place]hite Lesion? Yes (faint AW lesion from 10 to 12 o'clock)    Atypical Vessels: No    Transformation Zone Adequate?: Yes    Biopsy?: Yes         Location:  Cervix ((11 00))  ECC Performed?: Yes    LEEP Performed?: No    Estimated blood loss (cc):  2   Patient tolerated the procedure well with no immediate complications.   Post-operative instructions were provided for the patient.   Patient was discharged and will follow up if any complications occur     Pt mentioned she would like to start NuvaRing for contraception.  Has used it in the past and did well with it.  Rx sent to [facility].

## 2023-03-28 LAB
FINAL PATHOLOGIC DIAGNOSIS: NORMAL
GROSS: NORMAL
Lab: NORMAL

## 2023-04-03 ENCOUNTER — PATIENT MESSAGE (OUTPATIENT)
Dept: PSYCHIATRY | Facility: CLINIC | Age: 33
End: 2023-04-03

## 2023-04-04 ENCOUNTER — OFFICE VISIT (OUTPATIENT)
Dept: GASTROENTEROLOGY | Facility: CLINIC | Age: 33
End: 2023-04-04
Payer: COMMERCIAL

## 2023-04-04 VITALS
DIASTOLIC BLOOD PRESSURE: 84 MMHG | OXYGEN SATURATION: 100 % | WEIGHT: 271 LBS | BODY MASS INDEX: 43.55 KG/M2 | HEIGHT: 66 IN | SYSTOLIC BLOOD PRESSURE: 122 MMHG | HEART RATE: 85 BPM

## 2023-04-04 DIAGNOSIS — R11.0 NAUSEA: ICD-10-CM

## 2023-04-04 DIAGNOSIS — K21.9 GASTROESOPHAGEAL REFLUX DISEASE WITHOUT ESOPHAGITIS: Primary | ICD-10-CM

## 2023-04-04 DIAGNOSIS — R10.10 PAIN OF UPPER ABDOMEN: ICD-10-CM

## 2023-04-04 PROCEDURE — 3008F BODY MASS INDEX DOCD: CPT | Mod: CPTII,S$GLB,, | Performed by: INTERNAL MEDICINE

## 2023-04-04 PROCEDURE — 99214 PR OFFICE/OUTPT VISIT, EST, LEVL IV, 30-39 MIN: ICD-10-PCS | Mod: S$GLB,,, | Performed by: INTERNAL MEDICINE

## 2023-04-04 PROCEDURE — 3008F PR BODY MASS INDEX (BMI) DOCUMENTED: ICD-10-PCS | Mod: CPTII,S$GLB,, | Performed by: INTERNAL MEDICINE

## 2023-04-04 PROCEDURE — 3079F DIAST BP 80-89 MM HG: CPT | Mod: CPTII,S$GLB,, | Performed by: INTERNAL MEDICINE

## 2023-04-04 PROCEDURE — 99999 PR PBB SHADOW E&M-EST. PATIENT-LVL IV: CPT | Mod: PBBFAC,,, | Performed by: INTERNAL MEDICINE

## 2023-04-04 PROCEDURE — 3074F SYST BP LT 130 MM HG: CPT | Mod: CPTII,S$GLB,, | Performed by: INTERNAL MEDICINE

## 2023-04-04 PROCEDURE — 3074F PR MOST RECENT SYSTOLIC BLOOD PRESSURE < 130 MM HG: ICD-10-PCS | Mod: CPTII,S$GLB,, | Performed by: INTERNAL MEDICINE

## 2023-04-04 PROCEDURE — 99214 OFFICE O/P EST MOD 30 MIN: CPT | Mod: S$GLB,,, | Performed by: INTERNAL MEDICINE

## 2023-04-04 PROCEDURE — 99999 PR PBB SHADOW E&M-EST. PATIENT-LVL IV: ICD-10-PCS | Mod: PBBFAC,,, | Performed by: INTERNAL MEDICINE

## 2023-04-04 PROCEDURE — 3079F PR MOST RECENT DIASTOLIC BLOOD PRESSURE 80-89 MM HG: ICD-10-PCS | Mod: CPTII,S$GLB,, | Performed by: INTERNAL MEDICINE

## 2023-04-04 PROCEDURE — 3044F PR MOST RECENT HEMOGLOBIN A1C LEVEL <7.0%: ICD-10-PCS | Mod: CPTII,S$GLB,, | Performed by: INTERNAL MEDICINE

## 2023-04-04 PROCEDURE — 1159F MED LIST DOCD IN RCRD: CPT | Mod: CPTII,S$GLB,, | Performed by: INTERNAL MEDICINE

## 2023-04-04 PROCEDURE — 3044F HG A1C LEVEL LT 7.0%: CPT | Mod: CPTII,S$GLB,, | Performed by: INTERNAL MEDICINE

## 2023-04-04 PROCEDURE — 1159F PR MEDICATION LIST DOCUMENTED IN MEDICAL RECORD: ICD-10-PCS | Mod: CPTII,S$GLB,, | Performed by: INTERNAL MEDICINE

## 2023-04-04 NOTE — PROGRESS NOTES
[facility]  Gastroenterology    PCP: Tyesha Leggett MD    4/4/23    HPI       Nausea     Additional comments: Pt present today with c/o worsening upper rt quad pain, nausea, and GERD X2-3 months             Last edited by Toni Milligan, MA on 4/4/2023  7:57 AM.          Subjective:   Anthony Sexton is a 32 y.o. female here for worsening reflux and abdominal pain. PPI recently increased to BID which was working at first but now symptoms worsening again. Has to take Tums too. Prior EGD in 2021 unremarkable. US shows fatty liver, normal GB. Saw hepatology for the fatty liver. Following reflux precautions. Also feels food moving through colon- regular Bms but unsure if evacuating completely.       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     Pre-eclampsia in postpartum period 04/23/2020    Vitamin D deficiency        Past Surgical History:   Procedure Laterality Date    ESOPHAGOGASTRODUODENOSCOPY N/A 5/26/2021    Procedure: EGD (ESOPHAGOGASTRODUODENOSCOPY);  Surgeon: Alka Olmstead MD;  Location: [facility];  Service: Endoscopy;  Laterality: N/A;    ESOPHAGOGASTRODUODENOSCOPY N/A 10/4/2021    Procedure: EGD (ESOPHAGOGASTRODUODENOSCOPY);  Surgeon: Alka Olmstead MD;  Location: [facility];  Service: Endoscopy;  Laterality: N/A;    None      REDUCTION OF BOTH BREASTS Bilateral 5/27/2021    Procedure: MAMMOPLASTY, REDUCTION, BILATERAL;  Surgeon: Daniel Ba MD;  Location: [address];  Service: Plastics;  Laterality: Bilateral;  Left breast tissue: 1584  Right breast tissue: 1438       Current Outpatient Medications on File Prior to Visit    Medication Sig Dispense Refill    buPROPion (WELLBUTRIN XL) 150 MG TB24 tablet Take 1 tablet (150 mg total) by mouth every morning. 30 tablet 2    ergocalciferol (VITAMIN D2) 50,000 unit Cap Take 1 capsule (50,000 Units total) by mouth twice a week. 8 capsule 11    etonogestreL-ethinyl estradioL (NUVARING) 0.12-0.015 mg/24 hr vaginal ring Place 1 each vaginally every 21 days. Insert one (1) ring vaginally and leave in place for three (3) weeks, then remove for one (1) week. 1 each 11    fluticasone propionate (FLONASE) 50 mcg/actuation nasal spray 2 sprays (100 mcg total) by Each Nostril route once daily. 16 g 1    folic acid (FOLVITE) 1 MG tablet Take 1 tablet (1 mg total) by mouth once daily. 30 tablet 11    LORazepam (ATIVAN) 0.5 MG tablet Take 1 tablet (0.5 mg total) by mouth every 8 (eight) hours as needed (anxiety). 30 tablet 0    natrexone tablet 4.5 mg Take 1 tablet (4.5 mg total) by mouth every evening. 90 tablet 3    omeprazole (PRILOSEC) 40 MG capsule Take 1 capsule (40 mg total) by mouth 2 (two) times daily before meals. 60 capsule 2    ondansetron (ZOFRAN) 4 MG tablet Take 1 tablet (4 mg total) by mouth every 6 (six) hours as needed for Nausea. 40 tablet 1    sumatriptan (IMITREX) 25 MG Tab Take 1 tablet (25 mg total) by mouth every 72 hours as needed for Migraine (2 to 3 times per week as need). 9 tablet 3     No current facility-administered medications on file prior to visit.       Review of patient's allergies indicates:   Allergen Reactions    Procardia [nifedipine]        Social History     Socioeconomic History    Marital status:     Number of children: 0   Occupational History    Occupation: RN     Comment: [employer]   Tobacco Use    Smoking status: Never    Smokeless tobacco: Never   Substance and Sexual Activity    Alcohol use: Not Currently     Alcohol/week: 0.0 standard drinks    Drug use: No    Sexual activity: Yes     Partners: Male     Birth control/protection: None   Social  History Narrative    She wears seatbelt.     Social Determinants of Health     Financial Resource Strain: Low Risk     Difficulty of Paying Living Expenses: Not hard at all   Food Insecurity: No Food Insecurity    Worried About Running Out of Food in the Last Year: Never true    Ran Out of Food in the Last Year: Never true   Transportation Needs: No Transportation Needs    Lack of Transportation (Medical): No    Lack of Transportation (Non-Medical): No   Physical Activity: Inactive    Days of Exercise per Week: 0 days    Minutes of Exercise per Session: 0 min   Stress: No Stress Concern Present    Feeling of Stress : Only a little   Social Connections: Unknown    Frequency of Communication with Friends and Family: More than three times a week    Frequency of Social Gatherings with Friends and Family: Once a week    Active Member of Clubs or Organizations: Yes    Attends Club or Organization Meetings: Never    Marital Status:    Housing Stability: Low Risk     Unable to Pay for Housing in the Last Year: No    Number of Places Lived in the Last Year: 1    Unstable Housing in the Last Year: No       Family History   Problem Relation Age of Onset    Asthma Mother     Hyperlipidemia Mother     Hypertension Mother     Hypertension Father     Hyperlipidemia Father     Heart disease Father 55        MI/CAD    No Known Problems Sister     No Known Problems Brother     Diabetes Maternal Aunt     Diabetes Maternal Grandmother     Insulin resistance Sister     No Known Problems Brother     No Known Problems Brother     No Known Problems Brother     No Known Problems Brother     No Known Problems Daughter     Breast cancer Maternal Aunt     Cancer Neg Hx     Stroke Neg Hx     Brain cancer Neg Hx     Migraines Neg Hx     Colon cancer Neg Hx     Ovarian cancer Neg Hx     Anesthesia problems Neg Hx        Review of Systems   Constitutional:  Negative for appetite change, fever and unexpected weight change.   HENT:  Negative  for postnasal drip, rhinorrhea, sneezing, sore throat and trouble swallowing.    Eyes:  Negative for visual disturbance.   Respiratory:  Negative for cough, shortness of breath and wheezing.    Cardiovascular:  Negative for chest pain, palpitations and leg swelling.   Gastrointestinal:  Positive for abdominal pain and nausea. Negative for blood in stool, constipation, diarrhea and vomiting.   Genitourinary:  Negative for dysuria.   Musculoskeletal:  Negative for arthralgias, joint swelling and myalgias.   Skin:  Negative for color change, pallor and rash.   Neurological:  Negative for weakness, light-headedness, numbness and headaches.   Hematological:  Negative for adenopathy. Does not bruise/bleed easily.   Psychiatric/Behavioral:  Negative for agitation.          Objective:   Vitals:   Vitals:    04/04/23 0756   BP: 122/84   Pulse: 85       Physical Exam  Vitals reviewed.   Constitutional:       General: She is not in acute distress.     Appearance: She is not diaphoretic.   HENT:      Head: Normocephalic and atraumatic.      Mouth/Throat:      Pharynx: No oropharyngeal exudate.   Eyes:      General: No scleral icterus.        Right eye: No discharge.         Left eye: No discharge.      Conjunctiva/sclera: Conjunctivae normal.      Pupils: Pupils are equal, round, and reactive to light.   Cardiovascular:      Rate and Rhythm: Normal rate and regular rhythm.      Heart sounds: Normal heart sounds. No murmur heard.    No friction rub. No gallop.   Pulmonary:      Effort: Pulmonary effort is normal. No respiratory distress.      Breath sounds: Normal breath sounds. No stridor. No wheezing or rales.   Abdominal:      General: Bowel sounds are normal. There is no distension.      Palpations: Abdomen is soft. There is no mass.      Tenderness: There is no abdominal tenderness. There is no guarding.   Musculoskeletal:         General: Normal range of motion.      Cervical back: Normal range of motion.   Skin:      General: Skin is warm and dry.      Coloration: Skin is not pale.      Findings: No erythema or rash.   Neurological:      Mental Status: She is alert and oriented to person, place, and time.         IMPRESSION     Problem List Items Addressed This Visit          GI    Gastroesophageal reflux disease without esophagitis - Primary     Other Visit Diagnoses       Pain of upper abdomen        Nausea                PLANS:    - Continue with PPI BID  - Reflux precautions  - Proceed with EGD with bravo off meds  - Trial of Miralax for possible constipation  - If all of the above negative, can consider NM GE study  - Prior US and EGD results reviewed    Gastroesophageal reflux disease without esophagitis    Pain of upper abdomen    Nausea      Alka Olmstead MD  Gastroenterology

## 2023-04-10 ENCOUNTER — TELEPHONE (OUTPATIENT)
Dept: ENDOSCOPY | Facility: HOSPITAL | Age: 33
End: 2023-04-10
Payer: COMMERCIAL

## 2023-04-10 ENCOUNTER — PATIENT MESSAGE (OUTPATIENT)
Dept: ENDOSCOPY | Facility: HOSPITAL | Age: 33
End: 2023-04-10
Payer: COMMERCIAL

## 2023-04-10 NOTE — TELEPHONE ENCOUNTER
Contacted Ms. Sexton in order to schedule an EGD with Bravo monitoring. Voicemail left. Group[employer] message, now read, sent on 4/5.

## 2023-04-11 ENCOUNTER — TELEPHONE (OUTPATIENT)
Dept: PSYCHIATRY | Facility: CLINIC | Age: 33
End: 2023-04-11
Payer: COMMERCIAL

## 2023-04-20 ENCOUNTER — TELEPHONE (OUTPATIENT)
Dept: PSYCHIATRY | Facility: CLINIC | Age: 33
End: 2023-04-20
Payer: COMMERCIAL

## 2023-04-26 ENCOUNTER — ANESTHESIA EVENT (OUTPATIENT)
Dept: ENDOSCOPY | Facility: HOSPITAL | Age: 33
End: 2023-04-26
Payer: COMMERCIAL

## 2023-04-26 NOTE — ANESTHESIA PREPROCEDURE EVALUATION
04/26/2023  Anthony Sexton is a 32 y.o., female.  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     Pre-eclampsia in postpartum period 04/23/2020    Vitamin D deficiency        Past Surgical History:   Procedure Laterality Date    ESOPHAGOGASTRODUODENOSCOPY N/A 5/26/2021    Procedure: EGD (ESOPHAGOGASTRODUODENOSCOPY);  Surgeon: Alka Olmstead MD;  [facility];  Service: Endoscopy;  Laterality: N/A;    ESOPHAGOGASTRODUODENOSCOPY N/A 10/4/2021    Procedure: EGD (ESOPHAGOGASTRODUODENOSCOPY);  Surgeon: Alka Olmstead MD;  Location: [facility];  Service: Endoscopy;  Laterality: N/A;    None      REDUCTION OF BOTH BREASTS Bilateral 5/27/2021    Procedure: MAMMOPLASTY, REDUCTION, BILATERAL;  Surgeon: Daniel Ba MD;  Location: [facility] OR [address] [place] [address];  Service: Plastics;  Laterality: Bilateral;  Left breast tissue: 1584  Right breast tissue: 1438       Normal sinus rhythm   Minimal voltage criteria for LVH, may be normal variant ( R in aVL )   Borderline Abnormal ECG   When compared with ECG of 27-SEP-2022 16:19,   No significant change was found   Confirmed by CASSIE ANGELO, SASHA WATKINS ([id number]) on 11/23/2022 9:28:11 PM       Holter 10/22   Predominant Rhythm Sinus rhythm with heart rates varying between 49 and 146 BPM with an average of 82BPM.   Ventricular Arrhythmias There were very rare PVCs totalling 5 and averaging 0.05 per hour.   Supraventricular Arrhythmias There were very rare PACs totalling 37 and averaging 0.39 per hour.   The  circadian pattern of sinus rate variability followed a typical curve.     ECHO 10/22   The left ventricle is normal in size with concentric remodeling and normal systolic function.   The estimated ejection fraction is 60%.   Normal left ventricular diastolic function.   Normal right ventricular size with normal right ventricular systolic function.   Mild tricuspid regurgitation.   Normal central venous pressure (3 mmHg).   The estimated PA systolic pressure is 32 mmHg.        Pre-op Assessment    I have reviewed the Patient Summary Reports.     I have reviewed the Nursing Notes. I have reviewed the NPO Status.   I have reviewed the Medications.     Review of Systems  Anesthesia Hx:  No problems with previous Anesthesia Grade 1 with MAC 3. History of prior surgery of interest to airway management or planning: Previous anesthesia: General Airway issues documented on chart review include mask, easy, easy direct laryngoscopy , view on direct laryngoscopy Grade I    Social:  Non-Smoker    Cardiovascular:   Hypertension ECG has been reviewed. Seen by cards 1 yr ago for CP, w/u NEG.   Pulmonary:   Sleep Apnea    Hepatic/GI:   GERD    Musculoskeletal:  Muscle Disorders: Fibromyalgia    Neurological:   Neuromuscular Disease,    Endocrine:  Morbid Obesity / BMI > 40  Psych:   Psychiatric History anxiety depression          Physical Exam  General: Alert and Oriented    Airway:  Mallampati: I   Mouth Opening: Normal  TM Distance: Normal  Tongue: Normal  Neck ROM: Normal ROM    Dental:  Intact    Chest/Lungs:  Clear to auscultation, Normal Respiratory Rate    Heart:  Rate: Normal  Rhythm: Regular Rhythm        Anesthesia Plan  Type of Anesthesia, risks & benefits discussed:    Anesthesia Type: Gen Natural Airway  Intra-op Monitoring Plan: Standard ASA Monitors  Post Op Pain Control Plan: multimodal analgesia and IV/PO Opioids PRN  Induction:  IV  Informed Consent: Informed consent signed with the Patient and all parties  understand the risks and agree with anesthesia plan.  All questions answered.   ASA Score: 3  Day of Surgery Review of History & Physical: H&P Update referred to the surgeon/provider.    Ready For Surgery From Anesthesia Perspective.     .

## 2023-05-01 ENCOUNTER — ANESTHESIA (OUTPATIENT)
Dept: ENDOSCOPY | Facility: HOSPITAL | Age: 33
End: 2023-05-01
Payer: COMMERCIAL

## 2023-05-05 ENCOUNTER — OFFICE VISIT (OUTPATIENT)
Dept: PSYCHIATRY | Facility: CLINIC | Age: 33
End: 2023-05-05
Payer: COMMERCIAL

## 2023-05-05 DIAGNOSIS — F41.1 GENERALIZED ANXIETY DISORDER: ICD-10-CM

## 2023-05-05 DIAGNOSIS — F33.1 MAJOR DEPRESSIVE DISORDER, RECURRENT EPISODE, MODERATE: Primary | ICD-10-CM

## 2023-05-05 DIAGNOSIS — F90.0 ATTENTION DEFICIT HYPERACTIVITY DISORDER (ADHD), PREDOMINANTLY INATTENTIVE TYPE: ICD-10-CM

## 2023-05-05 PROCEDURE — 1159F PR MEDICATION LIST DOCUMENTED IN MEDICAL RECORD: ICD-10-PCS | Mod: CPTII,95,, | Performed by: PSYCHOLOGIST

## 2023-05-05 PROCEDURE — 99214 PR OFFICE/OUTPT VISIT, EST, LEVL IV, 30-39 MIN: ICD-10-PCS | Mod: 95,,, | Performed by: PSYCHOLOGIST

## 2023-05-05 PROCEDURE — 99214 OFFICE O/P EST MOD 30 MIN: CPT | Mod: 95,,, | Performed by: PSYCHOLOGIST

## 2023-05-05 PROCEDURE — 1159F MED LIST DOCD IN RCRD: CPT | Mod: CPTII,95,, | Performed by: PSYCHOLOGIST

## 2023-05-05 PROCEDURE — 3044F HG A1C LEVEL LT 7.0%: CPT | Mod: CPTII,95,, | Performed by: PSYCHOLOGIST

## 2023-05-05 PROCEDURE — 3044F PR MOST RECENT HEMOGLOBIN A1C LEVEL <7.0%: ICD-10-PCS | Mod: CPTII,95,, | Performed by: PSYCHOLOGIST

## 2023-05-05 RX ORDER — BUPROPION HYDROCHLORIDE 300 MG/1
300 TABLET ORAL EVERY MORNING
Qty: 30 TABLET | Refills: 2 | Status: SHIPPED | OUTPATIENT
Start: 2023-05-05 | End: 2023-06-21

## 2023-05-05 NOTE — PROGRESS NOTES
Outpatient Psychiatry Follow-Up Visit    5/5/2023    Timeframe: Corona Virus Outbreak     The patient location is: Patient's car/ Patient reported that his/her location at the time of this visit was in the [facility]     Visit type: Virtual visit with synchronous audio and video     Each patient to whom he or she provides medical services by telehealth is: (1) informed of the relationship between the medical psychologist and patient and the respective role of any other health care provider with respect to management of the patient; and (2) notified that he or she may decline to receive medical services by telehealth and may withdraw from such care at any time.    I also informed patient of the following:   Kenya Sanchez, PhD, MPAP:  [place] medical license number: [id number]    My contact info:  [facility] / 2nd Floor  [address] [place] [address]   Ph: [phone]    If technology issues, call office phone: Ph: [phone]  If crisis: Dial 911 or go to nearest Emergency Room (ER)  If questions related to privacy practices: contact [facility] Information Department: [phone]    Chief Complaint:  Anthony Sexton is a 32 y.o. female who presents today for follow-up of anxiety and inattention/distractibility .       Impressions/Plan from last visit: Anthony attended her virtual visit. We last met in October of last year. She had a flare a few weeks ago--is doing better now (a little). She is seeing Dr. MCALLISTER--recently started naltrexone. She and Dr. Bartlett have been regularly meeting--would like something to help keep her level. Depression is worse in a flare (as is her anxiety). We discussed options and agreed on adding Wellbutrin. [The combination with naltrexone may also help with weight loss.] She has also reportedly been working out some as her health allows. Wellbutrin will hopefully also help with her executive functioning. We will continue to  "monitor.    Plan--start Wellbutrin  mg for 2 weeks, then 300 mg    Interval History and Content of Current Session: Anthony attended her virtual visit. She did not start buproprion because she had things happen and did not start it. Things have gotten better--"I'm on the other side of it." She has been forcing herself to get up and do things. She has not been taking naltrexone. She feels more depressed than anxious lately. Her fibromyalgia has been active--she has been stretching, trying to move more, taking OTC. Her older daughter was dx with sensory processing disorder and receptive language problem--has a lot of appts (possibly Autism). She has worked on combining her daughter's appts to make things easier for missing school and work. Her  is supportive; her parents are supportive--"but they have a lot of stuff going on, too." She meets with Dr. Bartlett for therapy. She is working on balance. Her kids are 3 and 6. 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 She will start Wellbutrin.    Plan--start Wellbutrin  mg for 2 weeks, then 300 mg     since January 2023.        Prior medicines: buspirone, Prozac, Klonopin, Lexapro, Celexa, Zoloft, hydroxyzine, Adderall XR; Effexor (she thinks); Ativan, Savella, Cymbalta     GAD7 5/5/2023 2/23/2023 10/3/2022   1. Feeling nervous, anxious, or on edge? 1 1 1   2. Not being able to stop or control worrying? 1 1 1   3. Worrying too much about different things? 1 1 1   4. Trouble relaxing? 1 2 1   5. Being so restless that it is hard to sit still? 1 1 2   6. Becoming easily annoyed or irritable? 1 1 1   7. Feeling afraid as if something awful might happen? 1 1 1   SENTHIL-7 Score 7 8 8      0-4 = Minimal anxiety  5-9 = Mild anxiety  10-14 = Moderate anxiety  15-21 = Severe anxiety       Review of Systems   PSYCHIATRIC: Pertinant items are noted in the narrative.    Past Medical, Family and Social History: The patient's past medical, family and social history " have been reviewed and updated as appropriate within the electronic medical record - see encounter notes.      Current Outpatient Medications:     buPROPion (WELLBUTRIN XL) 300 MG 24 hr tablet, Take 1 tablet (300 mg total) by mouth every morning., Disp: 30 tablet, Rfl: 2    ergocalciferol (VITAMIN D2) 50,000 unit Cap, Take 1 capsule (50,000 Units total) by mouth twice a week., Disp: 8 capsule, Rfl: 11    etonogestreL-ethinyl estradioL (NUVARING) 0.12-0.015 mg/24 hr vaginal ring, Place 1 each vaginally every 21 days. Insert one (1) ring vaginally and leave in place for three (3) weeks, then remove for one (1) week., Disp: 1 each, Rfl: 11    fluticasone propionate (FLONASE) 50 mcg/actuation nasal spray, 2 sprays (100 mcg total) by Each Nostril route once daily., Disp: 16 g, Rfl: 1    folic acid (FOLVITE) 1 MG tablet, Take 1 tablet (1 mg total) by mouth once daily., Disp: 30 tablet, Rfl: 11    LORazepam (ATIVAN) 0.5 MG tablet, Take 1 tablet (0.5 mg total) by mouth every 8 (eight) hours as needed (anxiety)., Disp: 30 tablet, Rfl: 0    omeprazole (PRILOSEC) 40 MG capsule, Take 1 capsule (40 mg total) by mouth 2 (two) times daily before meals., Disp: 60 capsule, Rfl: 2    ondansetron (ZOFRAN) 4 MG tablet, Take 1 tablet (4 mg total) by mouth every 6 (six) hours as needed for Nausea., Disp: 40 tablet, Rfl: 1    sumatriptan (IMITREX) 25 MG Tab, Take 1 tablet (25 mg total) by mouth every 72 hours as needed for Migraine (2 to 3 times per week as need)., Disp: 9 tablet, Rfl: 3    Compliance: yes    Side effects: see above    Risk Parameters:  Patient reports no suicidal ideation  Patient reports no homicidal ideation  Patient reports no self-injurious behavior  Patient reports no violent behavior    Exam (detailed: at least 9 elements; comprehensive: all 15 elements)   Constitutional  Vitals:  Most recent vital signs were reviewed.   Last 3 sets of Vitals    Vitals - 1 value per visit 3/23/2023 3/23/2023 4/4/2023   SYSTOLIC -  138 122   DIASTOLIC - 70 84   Pulse - - 85   Temp - - -   Resp - - -   SPO2 - - 100   Weight (lb) - 274.03 271   Weight (kg) - 124.3 122.925   Height - 65 65.5   BMI (Calculated) - 45.6 44.4   VISIT REPORT - - -   Pain Score  0 - -   Some recent data might be hidden          General:  age appropriate, casually dressed, neatly groomed, wearing glasses     Musculoskeletal  Muscle Strength/Tone:  no tremor, no tic   Gait & Station:  video visit     Psychiatric  Speech:  no latency; no press   Behavior: wnl   Mood & Affect:  More depressed currently  congruent and appropriate   Thought Process:  normal and logical   Associations:  intact   Thought Content:  normal, no suicidality, no homicidality, delusions, or paranoia   Insight:  has awareness of illness   Judgement: behavior is adequate to circumstances   Orientation:  grossly intact   Memory: intact for content of interview   Language: grossly intact   Attention Span & Concentration:  Grossly intact   Fund of Knowledge:  intact and appropriate to age and level of education     Assessment and Diagnosis   Status/Progress: Based on the examination today, the patient's problem(s) is/are adequately but not ideally controlled.  New problems have been presented today.   Co-morbidities and psychosocial stressors  are complicating management of the primary condition.  There are no active rule-out diagnoses for this patient at this time.     General Impression:     Encounter Diagnoses   Name Primary?    Major depressive disorder, recurrent episode, moderate Yes    Generalized anxiety disorder     Attention deficit hyperactivity disorder (ADHD), predominantly inattentive type          Intervention/Counseling/Treatment Plan   Medication Management: Discussed risks, benefits, and alternatives to treatment plan documented above with patient. I answered all patient questions related to this plan, and patient expressed understanding and agreement.   start Wellbutrin  mg for 2  weeks, then 300 mg  Continue therapy as needed      Medication List with Changes/Refills   Current Medications    ERGOCALCIFEROL (VITAMIN D2) 50,000 UNIT CAP    Take 1 capsule (50,000 Units total) by mouth twice a week.    ETONOGESTREL-ETHINYL ESTRADIOL (NUVARING) 0.12-0.015 MG/24 HR VAGINAL RING    Place 1 each vaginally every 21 days. Insert one (1) ring vaginally and leave in place for three (3) weeks, then remove for one (1) week.    FLUTICASONE PROPIONATE (FLONASE) 50 MCG/ACTUATION NASAL SPRAY    2 sprays (100 mcg total) by Each Nostril route once daily.    FOLIC ACID (FOLVITE) 1 MG TABLET    Take 1 tablet (1 mg total) by mouth once daily.    LORAZEPAM (ATIVAN) 0.5 MG TABLET    Take 1 tablet (0.5 mg total) by mouth every 8 (eight) hours as needed (anxiety).    OMEPRAZOLE (PRILOSEC) 40 MG CAPSULE    Take 1 capsule (40 mg total) by mouth 2 (two) times daily before meals.    ONDANSETRON (ZOFRAN) 4 MG TABLET    Take 1 tablet (4 mg total) by mouth every 6 (six) hours as needed for Nausea.    SUMATRIPTAN (IMITREX) 25 MG TAB    Take 1 tablet (25 mg total) by mouth every 72 hours as needed for Migraine (2 to 3 times per week as need).   Changed and/or Refilled Medications    Modified Medication Previous Medication    BUPROPION (WELLBUTRIN XL) 300 MG 24 HR TABLET buPROPion (WELLBUTRIN XL) 150 MG TB24 tablet       Take 1 tablet (300 mg total) by mouth every morning.    Take 1 tablet (150 mg total) by mouth every morning.   Discontinued Medications    NATREXONE TABLET 4.5 MG    Take 1 tablet (4.5 mg total) by mouth every evening.        Return to Clinic: 3 months    Time spent with pt including note preparation: 21 minutes       Kenya Sanchez, PhD, MP  Advanced Practice Medical Psychologist  [facility]  [place] [id number]  [phone]   [phone] fax

## 2023-05-11 ENCOUNTER — OFFICE VISIT (OUTPATIENT)
Dept: FAMILY MEDICINE | Facility: CLINIC | Age: 33
End: 2023-05-11
Payer: COMMERCIAL

## 2023-05-11 VITALS
OXYGEN SATURATION: 98 % | RESPIRATION RATE: 18 BRPM | HEIGHT: 66 IN | SYSTOLIC BLOOD PRESSURE: 128 MMHG | BODY MASS INDEX: 44.11 KG/M2 | HEART RATE: 90 BPM | DIASTOLIC BLOOD PRESSURE: 80 MMHG | WEIGHT: 274.5 LBS | TEMPERATURE: 98 F

## 2023-05-11 DIAGNOSIS — K21.9 GASTROESOPHAGEAL REFLUX DISEASE WITHOUT ESOPHAGITIS: ICD-10-CM

## 2023-05-11 DIAGNOSIS — F41.1 GENERALIZED ANXIETY DISORDER: ICD-10-CM

## 2023-05-11 DIAGNOSIS — E66.01 MORBID OBESITY WITH BMI OF 40.0-44.9, ADULT: ICD-10-CM

## 2023-05-11 DIAGNOSIS — R42 LIGHTHEADED: ICD-10-CM

## 2023-05-11 DIAGNOSIS — R00.2 PALPITATIONS: Primary | ICD-10-CM

## 2023-05-11 PROCEDURE — 3044F PR MOST RECENT HEMOGLOBIN A1C LEVEL <7.0%: ICD-10-PCS | Mod: CPTII,S$GLB,, | Performed by: FAMILY MEDICINE

## 2023-05-11 PROCEDURE — 3008F BODY MASS INDEX DOCD: CPT | Mod: CPTII,S$GLB,, | Performed by: FAMILY MEDICINE

## 2023-05-11 PROCEDURE — 3074F PR MOST RECENT SYSTOLIC BLOOD PRESSURE < 130 MM HG: ICD-10-PCS | Mod: CPTII,S$GLB,, | Performed by: FAMILY MEDICINE

## 2023-05-11 PROCEDURE — 3079F DIAST BP 80-89 MM HG: CPT | Mod: CPTII,S$GLB,, | Performed by: FAMILY MEDICINE

## 2023-05-11 PROCEDURE — 3008F PR BODY MASS INDEX (BMI) DOCUMENTED: ICD-10-PCS | Mod: CPTII,S$GLB,, | Performed by: FAMILY MEDICINE

## 2023-05-11 PROCEDURE — 1160F RVW MEDS BY RX/DR IN RCRD: CPT | Mod: CPTII,S$GLB,, | Performed by: FAMILY MEDICINE

## 2023-05-11 PROCEDURE — 3044F HG A1C LEVEL LT 7.0%: CPT | Mod: CPTII,S$GLB,, | Performed by: FAMILY MEDICINE

## 2023-05-11 PROCEDURE — 99999 PR PBB SHADOW E&M-EST. PATIENT-LVL IV: CPT | Mod: PBBFAC,,, | Performed by: FAMILY MEDICINE

## 2023-05-11 PROCEDURE — 99214 PR OFFICE/OUTPT VISIT, EST, LEVL IV, 30-39 MIN: ICD-10-PCS | Mod: S$GLB,,, | Performed by: FAMILY MEDICINE

## 2023-05-11 PROCEDURE — 1159F PR MEDICATION LIST DOCUMENTED IN MEDICAL RECORD: ICD-10-PCS | Mod: CPTII,S$GLB,, | Performed by: FAMILY MEDICINE

## 2023-05-11 PROCEDURE — 1159F MED LIST DOCD IN RCRD: CPT | Mod: CPTII,S$GLB,, | Performed by: FAMILY MEDICINE

## 2023-05-11 PROCEDURE — 99999 PR PBB SHADOW E&M-EST. PATIENT-LVL IV: ICD-10-PCS | Mod: PBBFAC,,, | Performed by: FAMILY MEDICINE

## 2023-05-11 PROCEDURE — 1160F PR REVIEW ALL MEDS BY PRESCRIBER/CLIN PHARMACIST DOCUMENTED: ICD-10-PCS | Mod: CPTII,S$GLB,, | Performed by: FAMILY MEDICINE

## 2023-05-11 PROCEDURE — 3079F PR MOST RECENT DIASTOLIC BLOOD PRESSURE 80-89 MM HG: ICD-10-PCS | Mod: CPTII,S$GLB,, | Performed by: FAMILY MEDICINE

## 2023-05-11 PROCEDURE — 99214 OFFICE O/P EST MOD 30 MIN: CPT | Mod: S$GLB,,, | Performed by: FAMILY MEDICINE

## 2023-05-11 PROCEDURE — 3074F SYST BP LT 130 MM HG: CPT | Mod: CPTII,S$GLB,, | Performed by: FAMILY MEDICINE

## 2023-05-11 NOTE — PROGRESS NOTES
"Anthony Sexton    Chief Complaint   Patient presents with    Back Pain    Palpitations       History of Present Illness:   Ms. Sexton comes in today with complaints of having palpitations again.  She states she has been having palpitations since September 2022 following taking prednisone after having nasal scope for adenoid hypertrophy evaluation.  She saw Dr. Stevens, cardiologist, on October 18, 2022 for surveillance of palpitation with unremarkable workup performed.  She states she now feels weird prior to having palpitations.  She states she feels "off."  She states palpitations last approximately 10 seconds and goes away.  She also states she has been feeling new symptom of lightheadedness with walking from parking lot to work building for few weeks.  She denies having associated fever, chills, fatigue, appetite changes; shortness of breath, cough, wheezing; chest pain, leg swelling; abdominal pain, nausea, vomiting, diarrhea, constipation; unusual urinary symptoms; hot or cold intolerance, polydipsia, polyphagia, polyuria; headaches, syncope; anxiety, depression, homicidal or suicidal thoughts.    She states she has pain underneath both breasts with radiation to her mid back and thinks she has reflux.  She states she continues to take Prilosec 40 mg twice daily and was scheduled for EGD with [facility]; she states EGD with [facility] will be rescheduled.    She states she has not been exercising but monitors her diet.  She desires consultation with Bariatric Medicine.    She states she has not had menstrual flow and 53 days but takes home urine pregnancy tests which have been normal.    She states she does not feel like she is anxious.  Therefore, she states she does not take Ativan when she has palpitations.  She saw Dr. Sanchez, psychiatrist, on May 5, 2023 for surveillance of depression, anxiety, ADHD at which time she advised she increase Wellbutrin  mg to 300 mg daily if needed for her mood.  She states she " continues to take 150 mg dose.      Labs:                       WBC                      9.71                01/11/2023                 HGB                      12.2                01/11/2023                 HCT                      40.4                01/11/2023                 PLT                      330                 01/11/2023                 CHOL                     182                 02/23/2023                 TRIG                     64                  02/23/2023                 HDL                      43                  02/23/2023                 ALT                      20                  01/11/2023                 AST                      17                  01/11/2023                 NA                       141                 01/11/2023                 K                        4.3                 01/11/2023                 CL                       108                 01/11/2023                 CREATININE               0.9                 01/11/2023                 BUN                      11                  01/11/2023                 CO2                      25                  01/11/2023                 TSH                      1.064               02/23/2023                 HGBA1C                   5.5                 02/23/2023                LDLCALC                  126.2               02/23/2023              Current Outpatient Medications   Medication Sig    buPROPion (WELLBUTRIN XL) 300 MG 24 hr tablet Take 1 tablet (300 mg total) by mouth every morning. (Patient taking differently: Take 150 mg by mouth every morning.)    ergocalciferol (VITAMIN D2) 50,000 unit Cap Take 1 capsule (50,000 Units total) by mouth twice a week.    fluticasone propionate (FLONASE) 50 mcg/actuation nasal spray 2 sprays (100 mcg total) by Each Nostril route once daily.    LORazepam (ATIVAN) 0.5 MG tablet Take 1 tablet (0.5 mg total) by mouth every 8 (eight) hours as needed (anxiety).    omeprazole (PRILOSEC) 40 MG  capsule Take 1 capsule (40 mg total) by mouth 2 (two) times daily before meals.    ondansetron (ZOFRAN) 4 MG tablet Take 1 tablet (4 mg total) by mouth every 6 (six) hours as needed for Nausea.    etonogestreL-ethinyl estradioL (NUVARING) 0.12-0.015 mg/24 hr vaginal ring Place 1 each vaginally every 21 days. Insert one (1) ring vaginally and leave in place for three (3) weeks, then remove for one (1) week.    folic acid (FOLVITE) 1 MG tablet Take 1 tablet (1 mg total) by mouth once daily. (Patient not taking: Reported on 5/11/2023)    sumatriptan (IMITREX) 25 MG Tab Take 1 tablet (25 mg total) by mouth every 72 hours as needed for Migraine (2 to 3 times per week as need). (Patient not taking: Reported on 5/11/2023)         Review of Systems   Constitutional:  Negative for activity change, appetite change, chills, fatigue, fever and unexpected weight change.        Weight 123 kg (271 lb 2.7 oz) at February 23, 2023 visit.  See history of present illness.   Respiratory:  Negative for cough, shortness of breath and wheezing.    Cardiovascular:  Positive for palpitations. Negative for chest pain and leg swelling.        See history of present illness.   Gastrointestinal:  Negative for abdominal pain, constipation, diarrhea, nausea and vomiting.        See history of present illness.   Endocrine: Negative for cold intolerance, heat intolerance, polydipsia, polyphagia and polyuria.   Genitourinary:  Positive for menstrual problem. Negative for difficulty urinating.   Musculoskeletal:  Positive for back pain. Negative for myalgias.   Neurological:  Positive for light-headedness. Negative for syncope and headaches.        See history of present illness.   Psychiatric/Behavioral:  Negative for dysphoric mood and suicidal ideas. The patient is not nervous/anxious.         Negative for homicidal ideas. See history of present illness.     Objective:  Physical Exam  Vitals reviewed.   Constitutional:       General: She is not in  acute distress.     Appearance: Normal appearance. She is well-developed. She is obese. She is not ill-appearing, toxic-appearing or diaphoretic.      Comments: Pleasant.   Cardiovascular:      Rate and Rhythm: Normal rate and regular rhythm.      Pulses: Normal pulses.      Heart sounds: Normal heart sounds. No murmur heard.  Pulmonary:      Effort: Pulmonary effort is normal. No respiratory distress.      Breath sounds: Normal breath sounds. No wheezing.   Abdominal:      General: Bowel sounds are normal. There is no distension.      Palpations: Abdomen is soft. There is no mass.      Tenderness: There is no abdominal tenderness. There is no right CVA tenderness, left CVA tenderness, guarding or rebound.   Musculoskeletal:         General: No swelling or tenderness. Normal range of motion.      Cervical back: Normal range of motion and neck supple. No tenderness. No muscular tenderness.      Comments: She is ambulatory without problems.   Lymphadenopathy:      Cervical: No cervical adenopathy.   Skin:     General: Skin is warm.   Neurological:      General: No focal deficit present.      Mental Status: She is alert and oriented to person, place, and time.   Psychiatric:         Mood and Affect: Mood normal.         Behavior: Behavior normal.         Thought Content: Thought content normal.         Judgment: Judgment normal.       ASSESSMENT:  1. Palpitations    2. Lightheaded    3. Gastroesophageal reflux disease without esophagitis    4. Generalized anxiety disorder    5. Morbid obesity with BMI of 40.0-44.9, adult        PLAN:  Anthony was seen today for back pain and palpitations.    Diagnoses and all orders for this visit:    Palpitations  -     Holter monitor - 48 hour; Future    Lightheaded  -     Holter monitor - 48 hour; Future    Gastroesophageal reflux disease without esophagitis    Generalized anxiety disorder    Morbid obesity with BMI of 40.0-44.9, adult  -     Ambulatory referral/consult to Bariatric  Medicine; Future      Patient advised to call for results.  Continue current medications, follow low sodium, low cholesterol, low carb diet, daily walks.  Advised patient to take Ativan when experiences palpitations to see what happens as palpitations may be due to anxiety.  Keep follow up with specialists.  Flu shot this fall.  Follow up if symptoms worsen or fail to improve.

## 2023-05-19 ENCOUNTER — TELEPHONE (OUTPATIENT)
Dept: BARIATRICS | Facility: CLINIC | Age: 33
End: 2023-05-19

## 2023-05-25 ENCOUNTER — PATIENT MESSAGE (OUTPATIENT)
Dept: ENDOSCOPY | Facility: HOSPITAL | Age: 33
End: 2023-05-25
Payer: COMMERCIAL

## 2023-05-26 DIAGNOSIS — R00.2 PALPITATION: ICD-10-CM

## 2023-05-26 DIAGNOSIS — I10 ESSENTIAL HYPERTENSION: Primary | ICD-10-CM

## 2023-05-29 ENCOUNTER — HOSPITAL ENCOUNTER (OUTPATIENT)
Facility: HOSPITAL | Age: 33
Discharge: HOME | End: 2023-05-29
Attending: INTERNAL MEDICINE | Admitting: INTERNAL MEDICINE
Payer: COMMERCIAL

## 2023-05-29 VITALS
HEART RATE: 75 BPM | OXYGEN SATURATION: 99 % | SYSTOLIC BLOOD PRESSURE: 120 MMHG | WEIGHT: 273.81 LBS | BODY MASS INDEX: 44.01 KG/M2 | DIASTOLIC BLOOD PRESSURE: 67 MMHG | HEIGHT: 66 IN | TEMPERATURE: 98 F | RESPIRATION RATE: 16 BRPM

## 2023-05-29 DIAGNOSIS — K21.9 GERD (GASTROESOPHAGEAL REFLUX DISEASE): ICD-10-CM

## 2023-05-29 DIAGNOSIS — K21.9 GASTROESOPHAGEAL REFLUX DISEASE, UNSPECIFIED WHETHER ESOPHAGITIS PRESENT: Primary | ICD-10-CM

## 2023-05-29 DIAGNOSIS — R10.10 PAIN OF UPPER ABDOMEN: ICD-10-CM

## 2023-05-29 DIAGNOSIS — R11.0 NAUSEA: ICD-10-CM

## 2023-05-29 LAB
B-HCG UR QL: NEGATIVE
CTP QC/QA: YES

## 2023-05-29 PROCEDURE — 88305 TISSUE EXAM BY PATHOLOGIST: ICD-10-PCS | Mod: 26,,, | Performed by: STUDENT IN AN ORGANIZED HEALTH CARE EDUCATION/TRAINING PROGRAM

## 2023-05-29 PROCEDURE — 27201012 HC FORCEPS, HOT/COLD, DISP: Performed by: INTERNAL MEDICINE

## 2023-05-29 PROCEDURE — 88342 IMHCHEM/IMCYTCHM 1ST ANTB: CPT | Mod: 26,,, | Performed by: STUDENT IN AN ORGANIZED HEALTH CARE EDUCATION/TRAINING PROGRAM

## 2023-05-29 PROCEDURE — D9220A PRA ANESTHESIA: Mod: ,,, | Performed by: NURSE ANESTHETIST, CERTIFIED REGISTERED

## 2023-05-29 PROCEDURE — 91035 G-ESOPH REFLX TST W/ELECTROD: CPT | Mod: TC | Performed by: INTERNAL MEDICINE

## 2023-05-29 PROCEDURE — 63600175 PHARM REV CODE 636 W HCPCS: Performed by: NURSE ANESTHETIST, CERTIFIED REGISTERED

## 2023-05-29 PROCEDURE — 81025 URINE PREGNANCY TEST: CPT | Performed by: INTERNAL MEDICINE

## 2023-05-29 PROCEDURE — 43239 PR EGD, FLEX, W/BIOPSY, SGL/MULTI: ICD-10-PCS | Mod: ,,, | Performed by: INTERNAL MEDICINE

## 2023-05-29 PROCEDURE — 25000003 PHARM REV CODE 250: Performed by: NURSE ANESTHETIST, CERTIFIED REGISTERED

## 2023-05-29 PROCEDURE — 63600175 PHARM REV CODE 636 W HCPCS: Performed by: INTERNAL MEDICINE

## 2023-05-29 PROCEDURE — 43239 EGD BIOPSY SINGLE/MULTIPLE: CPT | Mod: ,,, | Performed by: INTERNAL MEDICINE

## 2023-05-29 PROCEDURE — 37000008 HC ANESTHESIA 1ST 15 MINUTES: Performed by: INTERNAL MEDICINE

## 2023-05-29 PROCEDURE — 43239 EGD BIOPSY SINGLE/MULTIPLE: CPT | Performed by: INTERNAL MEDICINE

## 2023-05-29 PROCEDURE — 27200942: Performed by: INTERNAL MEDICINE

## 2023-05-29 PROCEDURE — D9220A PRA ANESTHESIA: ICD-10-PCS | Mod: ,,, | Performed by: NURSE ANESTHETIST, CERTIFIED REGISTERED

## 2023-05-29 PROCEDURE — 88342 CHG IMMUNOCYTOCHEMISTRY: ICD-10-PCS | Mod: 26,,, | Performed by: STUDENT IN AN ORGANIZED HEALTH CARE EDUCATION/TRAINING PROGRAM

## 2023-05-29 PROCEDURE — 88305 TISSUE EXAM BY PATHOLOGIST: CPT | Performed by: STUDENT IN AN ORGANIZED HEALTH CARE EDUCATION/TRAINING PROGRAM

## 2023-05-29 PROCEDURE — 88305 TISSUE EXAM BY PATHOLOGIST: CPT | Mod: 26,,, | Performed by: STUDENT IN AN ORGANIZED HEALTH CARE EDUCATION/TRAINING PROGRAM

## 2023-05-29 RX ORDER — PROPOFOL 10 MG/ML
VIAL (ML) INTRAVENOUS
Status: DISCONTINUED | OUTPATIENT
Start: 2023-05-29 | End: 2023-05-29

## 2023-05-29 RX ORDER — SODIUM CHLORIDE, SODIUM LACTATE, POTASSIUM CHLORIDE, CALCIUM CHLORIDE 600; 310; 30; 20 MG/100ML; MG/100ML; MG/100ML; MG/100ML
INJECTION, SOLUTION INTRAVENOUS CONTINUOUS
Status: DISCONTINUED | OUTPATIENT
Start: 2023-05-29 | End: 2023-09-12

## 2023-05-29 RX ORDER — LIDOCAINE HYDROCHLORIDE 20 MG/ML
INJECTION, SOLUTION EPIDURAL; INFILTRATION; INTRACAUDAL; PERINEURAL
Status: DISCONTINUED | OUTPATIENT
Start: 2023-05-29 | End: 2023-05-29

## 2023-05-29 RX ADMIN — PROPOFOL 140 MG: 10 INJECTION, EMULSION INTRAVENOUS at 08:05

## 2023-05-29 RX ADMIN — SODIUM CHLORIDE, SODIUM LACTATE, POTASSIUM CHLORIDE, AND CALCIUM CHLORIDE: 600; 310; 30; 20 INJECTION, SOLUTION INTRAVENOUS at 08:05

## 2023-05-29 RX ADMIN — LIDOCAINE HYDROCHLORIDE 40 MG: 20 INJECTION, SOLUTION EPIDURAL; INFILTRATION; INTRACAUDAL; PERINEURAL at 08:05

## 2023-05-29 NOTE — DISCHARGE SUMMARY
See other message [facility]  Discharge Note  Short Stay    Procedure(s) (LRB):  EGD (ESOPHAGOGASTRODUODENOSCOPY) (N/A)  PH MONITORING, ESOPHAGUS, WIRELESS, (OFF REFLUX MEDS) (N/A)      OUTCOME: Patient tolerated treatment/procedure well without complication and is now ready for discharge.    DISPOSITION: Home or Self Care    FINAL DIAGNOSIS:  GERD (gastroesophageal reflux disease)    FOLLOWUP: With primary care provider    DISCHARGE INSTRUCTIONS:  No discharge procedures on file.

## 2023-05-29 NOTE — PLAN OF CARE
Discharge instructions reviewed with pt and family, handouts given, verbalized understanding with no further questions at this time. Doctor spoke to pt at bedside, reviewed procedure and answered questions, MD telephone number provided per AVS sheet. No pain or nausea noted, tolerating po fluids without difficulty, no other complaints noted. Fall precautions reviewed, consents in chart, PIV removed at discharge.

## 2023-05-29 NOTE — [employer] PATIENT INSTRUCTIONS
Discharge Summary/Instructions after an Endoscopic Procedure  Patient Name: Anthony Sexton  Patient MRN: [id number]  Patient YOB: 1990  Monday, May 29, 2023  Alka Olmstead MD  Dear patient,  As a result of recent federal legislation (The Federal Cures Act), you may   receive lab or pathology results from your procedure in your [facility]   account before your physician is able to contact you. Your physician or   their representative will relay the results to you with their   recommendations at their soonest availability.  Thank you,  RESTRICTIONS:  During your procedure today, you received medications for sedation.  These   medications may affect your judgment, balance and coordination.  Therefore,   for 24 hours, you have the following restrictions:   - DO NOT drive a car, operate machinery, make legal/financial decisions,   sign important papers or drink alcohol.    ACTIVITY:  Today: no heavy lifting, straining or running due to procedural   sedation/anesthesia.  The following day: return to full activity including work.  DIET:  Eat and drink normally unless instructed otherwise.     TREATMENT FOR COMMON SIDE EFFECTS:  - Mild abdominal pain, nausea, belching, bloating or excessive gas:  rest,   eat lightly and use a heating pad.  - Sore Throat: treat with throat lozenges and/or gargle with warm salt   water.  - Because air was used during the procedure, expelling large amounts of air   from your rectum or belching is normal.  - If a bowel prep was taken, you may not have a bowel movement for 1-3 days.    This is normal.  SYMPTOMS TO WATCH FOR AND REPORT TO YOUR PHYSICIAN:  1. Abdominal pain or bloating, other than gas cramps.  2. Chest pain.  3. Back pain.  4. Signs of infection such as: chills or fever occurring within 24 hours   after the procedure.  5. Rectal bleeding, which would show as bright red, maroon, or black stools.   (A tablespoon of blood from the rectum is not serious, especially if    hemorrhoids are present.)  6. Vomiting.  7. Weakness or dizziness.  GO DIRECTLY TO THE NEAREST EMERGENCY ROOM IF YOU HAVE ANY OF THE FOLLOWING:      Difficulty breathing              Chills and/or fever over 101 F   Persistent vomiting and/or vomiting blood   Severe abdominal pain   Severe chest pain   Black, tarry stools   Bleeding- more than one tablespoon   Any other symptom or condition that you feel may need urgent attention  Your doctor recommends these additional instructions:  If any biopsies were taken, your doctors clinic will contact you in 1 to 2   weeks with any results.  - Discharge patient to home (via wheelchair).   - Resume previous diet.   - Continue present medications.   - Await pathology results.   - Telephone GI clinic for pathology results in 2 weeks.   - Patient has a contact number available for emergencies.  The signs and   symptoms of potential delayed complications were discussed with the   patient.  Return to normal activities tomorrow.  Written discharge   instructions were provided to the patient.   - Resume anticoagulant at prior dose.  For questions, problems or results please call your physician Alka Olmstead MD at Work:  [phone]  If you have any questions about the above instructions, call the GI   department at [phone] or call the endoscopy unit at [phone]   from 7am until 3 pm.  [facility], EMERGENCY ROOM PHONE NUMBER:   [phone]  IF A COMPLICATION OR EMERGENCY SITUATION ARISES AND YOU ARE UNABLE TO REACH   YOUR PHYSICIAN - GO DIRECTLY TO THE EMERGENCY ROOM.  I have read or have had read to me these discharge instructions for my   procedure and have received a written copy.  I understand these   instructions and will follow-up with my physician if I have any questions.     __________________________________       _____________________________________  Nurse Signature                                           Patient/Designated   Responsible Party Signature  MD Alka Gonzalez MD  5/29/2023 8:17:21 AM  [employer]

## 2023-05-29 NOTE — TRANSFER OF CARE
"Anesthesia Transfer of Care Note    Patient: Anthony Sexton    Procedure(s) Performed: Procedure(s) (LRB):  EGD (ESOPHAGOGASTRODUODENOSCOPY) (N/A)  PH MONITORING, ESOPHAGUS, WIRELESS, (OFF REFLUX MEDS) (N/A)    Patient location: PACU    Anesthesia Type: general    Transport from OR: Transported from OR on room air with adequate spontaneous ventilation    Post pain: adequate analgesia    Post assessment: no apparent anesthetic complications and tolerated procedure well    Post vital signs: stable    Level of consciousness: awake    Nausea/Vomiting: no nausea/vomiting    Complications: none    Transfer of care protocol was followed      Last vitals:   Visit Vitals  /65 (BP Location: Right arm, Patient Position: Sitting)   Pulse 69   Temp 36.6 °C (97.8 °F) (Temporal)   Resp 18   Ht 5' 5.5" (1.664 m)   Wt 124.2 kg (273 lb 13 oz)   SpO2 100%   Breastfeeding No   BMI 44.87 kg/m²     "

## 2023-05-29 NOTE — ANESTHESIA POSTPROCEDURE EVALUATION
Anesthesia Post Evaluation    Patient: Anthony Sexton    Procedure(s) Performed: Procedure(s) (LRB):  EGD (ESOPHAGOGASTRODUODENOSCOPY) (N/A)  PH MONITORING, ESOPHAGUS, WIRELESS, (OFF REFLUX MEDS) (N/A)    Final Anesthesia Type: general      Patient location during evaluation: PACU  Patient participation: Yes- Able to Participate  Level of consciousness: awake  Post-procedure vital signs: reviewed and stable  Pain management: adequate  Airway patency: patent    PONV status at discharge: No PONV  Anesthetic complications: no      Cardiovascular status: stable  Respiratory status: unassisted  Hydration status: euvolemic  Follow-up not needed.          Vitals Value Taken Time   /74 05/29/23 0825   Temp 36.6 °C (97.8 °F) 05/29/23 0818   Pulse 87 05/29/23 0826   Resp 18 05/29/23 0818   SpO2 98 % 05/29/23 0826   Vitals shown include unvalidated device data.      No case tracking events are documented in the log.      Pain/Faizan Score: Faizan Score: 9 (5/29/2023  8:18 AM)

## 2023-05-29 NOTE — H&P
Short Stay Endoscopy History and Physical    PCP - Tyesha Leggett MD    Procedure - EGD with Bravo  ASA - 2  Mallampati - per anesthesia  History of Anesthesia problems - no  Family history Anesthesia problems -  no     HPI:  This is a 32 y.o. female here for evaluation of :   Active Hospital Problems    Diagnosis  POA    *GERD (gastroesophageal reflux disease) [K21.9]  No    Pain of upper abdomen [R10.10]  No    Nausea [R11.0]  No      Resolved Hospital Problems   No resolved problems to display.         ROS:  CONSTITUTIONAL: Denies weight change,  fatigue, fevers, chills, night sweats.  CARDIOVASCULAR: Denies chest pain, shortness of breath, orthopnea and edema.  RESPIRATORY: Denies cough, hemoptysis, dyspnea, and wheezing.  GI: See HPI.    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     Pre-eclampsia in postpartum period 04/23/2020    Vitamin D deficiency        Surgical History:   Past Surgical History:   Procedure Laterality Date    ESOPHAGOGASTRODUODENOSCOPY N/A 5/26/2021    Procedure: EGD (ESOPHAGOGASTRODUODENOSCOPY);  Surgeon: Alka Olmstead MD;  Location: [facility];  Service: Endoscopy;  Laterality: N/A;    ESOPHAGOGASTRODUODENOSCOPY N/A 10/4/2021    Procedure: EGD (ESOPHAGOGASTRODUODENOSCOPY);  Surgeon: Alka Olmstead MD;  Location: [facility];  Service: Endoscopy;  Laterality: N/A;    None      REDUCTION OF BOTH BREASTS Bilateral 5/27/2021    Procedure: MAMMOPLASTY, REDUCTION, BILATERAL;  Surgeon: Daniel Ba MD;  Location: [address];  Service: Plastics;  Laterality: Bilateral;  Left breast tissue: 1584  Right breast  tissue: 1438       Family History:  Family History   Problem Relation Age of Onset    Asthma Mother     Hyperlipidemia Mother     Hypertension Mother     Hypertension Father     Hyperlipidemia Father     Heart disease Father 55        MI/CAD    No Known Problems Sister     No Known Problems Brother     Diabetes Maternal Aunt     Diabetes Maternal Grandmother     Insulin resistance Sister     No Known Problems Brother     No Known Problems Brother     No Known Problems Brother     No Known Problems Brother     No Known Problems Daughter     Breast cancer Maternal Aunt     Cancer Neg Hx     Stroke Neg Hx     Brain cancer Neg Hx     Migraines Neg Hx     Colon cancer Neg Hx     Ovarian cancer Neg Hx     Anesthesia problems Neg Hx        Social History:   Social History     Tobacco Use    Smoking status: Never    Smokeless tobacco: Never   Substance Use Topics    Alcohol use: Not Currently     Alcohol/week: 0.0 standard drinks    Drug use: No       Allergy  Review of patient's allergies indicates:   Allergen Reactions    Procardia [nifedipine]        Medications:   No current facility-administered medications on file prior to encounter.     Current Outpatient Medications on File Prior to Encounter   Medication Sig Dispense Refill    ergocalciferol (VITAMIN D2) 50,000 unit Cap Take 1 capsule (50,000 Units total) by mouth twice a week. 8 capsule 11    ondansetron (ZOFRAN) 4 MG tablet Take 1 tablet (4 mg total) by mouth every 6 (six) hours as needed for Nausea. 40 tablet 1    etonogestreL-ethinyl estradioL (NUVARING) 0.12-0.015 mg/24 hr vaginal ring Place 1 each vaginally every 21 days. Insert one (1) ring vaginally and leave in place for three (3) weeks, then remove for one (1) week. 1 each 11    fluticasone propionate (FLONASE) 50 mcg/actuation nasal spray 2 sprays (100 mcg total) by Each Nostril route once daily. 16 g 1    folic acid (FOLVITE) 1 MG tablet Take 1 tablet (1 mg total) by mouth once daily. (Patient not  taking: Reported on 5/11/2023) 30 tablet 11    LORazepam (ATIVAN) 0.5 MG tablet Take 1 tablet (0.5 mg total) by mouth every 8 (eight) hours as needed (anxiety). 30 tablet 0    omeprazole (PRILOSEC) 40 MG capsule Take 1 capsule (40 mg total) by mouth 2 (two) times daily before meals. 60 capsule 2    sumatriptan (IMITREX) 25 MG Tab Take 1 tablet (25 mg total) by mouth every 72 hours as needed for Migraine (2 to 3 times per week as need). (Patient not taking: Reported on 5/11/2023) 9 tablet 3       Physical Exam:  Vital Signs:   Vitals:    05/29/23 0626   BP: 128/65   Pulse: 69   Resp: 18   Temp: 97.8 °F (36.6 °C)     General Appearance: Well appearing in no acute distress  ENT: OP clear  Chest: CTA B  CV: RRR, no m/r/g  Abd: s/nt/nd/nabs  Ext: no edema    Labs:  Reviewed    IMP:  Active Hospital Problems    Diagnosis  POA    *GERD (gastroesophageal reflux disease) [K21.9]  No    Pain of upper abdomen [R10.10]  No    Nausea [R11.0]  No      Resolved Hospital Problems   No resolved problems to display.         Plan:  I have explained the risks and benefits of endoscopy procedures to the patient including but not limited to bleeding, perforation, infection, and death. The patient wishes to proceed.

## 2023-05-30 ENCOUNTER — TELEPHONE (OUTPATIENT)
Dept: CARDIOLOGY | Facility: HOSPITAL | Age: 33
End: 2023-05-30
Payer: COMMERCIAL

## 2023-06-01 DIAGNOSIS — J30.9 ALLERGIC RHINITIS, UNSPECIFIED SEASONALITY, UNSPECIFIED TRIGGER: Primary | ICD-10-CM

## 2023-06-01 RX ORDER — FLUTICASONE PROPIONATE 50 MCG
2 SPRAY, SUSPENSION (ML) NASAL DAILY
Qty: 16 G | Refills: 2 | Status: SHIPPED | OUTPATIENT
Start: 2023-06-01 | End: 2023-10-26 | Stop reason: SDUPTHER

## 2023-06-05 LAB
FINAL PATHOLOGIC DIAGNOSIS: NORMAL
Lab: NORMAL

## 2023-06-06 NOTE — PR[employer]ATION PATIENT INSTRUCTIONS
Discharge Summary/Instructions after an Endoscopic Procedure  Patient Name: Anthony Sexton  Patient MRN: [id number]  Patient YOB: 1990  Monday, May 29, 2023  Kaci Joaquin MD  Dear patient,  As a result of recent federal legislation (The Federal Cures Act), you may   receive lab or pathology results from your procedure in your [facility]   account before your physician is able to contact you. Your physician or   their representative will relay the results to you with their   recommendations at their soonest availability.  Thank you,  RESTRICTIONS:  During your procedure today, you received medications for sedation.  These   medications may affect your judgment, balance and coordination.  Therefore,   for 24 hours, you have the following restrictions:   - DO NOT drive a car, operate machinery, make legal/financial decisions,   sign important papers or drink alcohol.    ACTIVITY:  Today: no heavy lifting, straining or running due to procedural   sedation/anesthesia.  The following day: return to full activity including work.  DIET:  Eat and drink normally unless instructed otherwise.     TREATMENT FOR COMMON SIDE EFFECTS:  - Mild abdominal pain, nausea, belching, bloating or excessive gas:  rest,   eat lightly and use a heating pad.  - Sore Throat: treat with throat lozenges and/or gargle with warm salt   water.  - Because air was used during the procedure, expelling large amounts of air   from your rectum or belching is normal.  - If a bowel prep was taken, you may not have a bowel movement for 1-3 days.    This is normal.  SYMPTOMS TO WATCH FOR AND REPORT TO YOUR PHYSICIAN:  1. Abdominal pain or bloating, other than gas cramps.  2. Chest pain.  3. Back pain.  4. Signs of infection such as: chills or fever occurring within 24 hours   after the procedure.  5. Rectal bleeding, which would show as bright red, maroon, or black stools.   (A tablespoon of blood from the rectum is not serious, especially if    hemorrhoids are present.)  6. Vomiting.  7. Weakness or dizziness.  GO DIRECTLY TO THE NEAREST EMERGENCY ROOM IF YOU HAVE ANY OF THE FOLLOWING:      Difficulty breathing              Chills and/or fever over 101 F   Persistent vomiting and/or vomiting blood   Severe abdominal pain   Severe chest pain   Black, tarry stools   Bleeding- more than one tablespoon   Any other symptom or condition that you feel may need urgent attention  Your doctor recommends these additional instructions:  If any biopsies were taken, your doctors clinic will contact you in 1 to 2   weeks with any results.  - Discharge patient to home.   - Return to referring physician.   - Optimize anti-reflux regimen.  For questions, problems or results please call your physician Kaci Joaquin MD at Work:  [phone]  If you have any questions about the above instructions, call the GI   department at [phone] or call the endoscopy unit at [phone]   from 7am until 3 pm.  [facility] [place], EMERGENCY ROOM PHONE NUMBER:   [phone]  IF A COMPLICATION OR EMERGENCY SITUATION ARISES AND YOU ARE UNABLE TO REACH   YOUR PHYSICIAN - GO DIRECTLY TO THE EMERGENCY ROOM.  I have read or have had read to me these discharge instructions for my   procedure and have received a written copy.  I understand these   instructions and will follow-up with my physician if I have any questions.     __________________________________       _____________________________________  Nurse Signature                                          Patient/Designated   Responsible Party Signature  Kaci Joaquin MD  6/6/2023 10:50:16 AM  This report has been verified and signed electronically.  Dear patient,  As a result of recent federal legislation (The Federal Cures Act), you may   receive lab or pathology results from your procedure in your [facility]   account before your physician is able to contact you. Your physician or   their  representative will relay the results to you with their   recommendations at their soonest availability.  Thank you,  PROVATION

## 2023-06-08 ENCOUNTER — OFFICE VISIT (OUTPATIENT)
Dept: PSYCHIATRY | Facility: CLINIC | Age: 33
End: 2023-06-08
Payer: COMMERCIAL

## 2023-06-08 DIAGNOSIS — F41.1 GAD (GENERALIZED ANXIETY DISORDER): ICD-10-CM

## 2023-06-08 DIAGNOSIS — F33.0 MILD EPISODE OF RECURRENT MAJOR DEPRESSIVE DISORDER: Primary | ICD-10-CM

## 2023-06-08 DIAGNOSIS — F54 STRESS-RELATED PHYSIOLOGICAL RESPONSE AFFECTING MEDICAL CONDITION: ICD-10-CM

## 2023-06-08 PROCEDURE — 90834 PSYTX W PT 45 MINUTES: CPT | Mod: 95,,, | Performed by: STUDENT IN AN ORGANIZED HEALTH CARE EDUCATION/TRAINING PROGRAM

## 2023-06-08 PROCEDURE — 1159F MED LIST DOCD IN RCRD: CPT | Mod: CPTII,95,, | Performed by: STUDENT IN AN ORGANIZED HEALTH CARE EDUCATION/TRAINING PROGRAM

## 2023-06-08 PROCEDURE — 1159F PR MEDICATION LIST DOCUMENTED IN MEDICAL RECORD: ICD-10-PCS | Mod: CPTII,95,, | Performed by: STUDENT IN AN ORGANIZED HEALTH CARE EDUCATION/TRAINING PROGRAM

## 2023-06-08 PROCEDURE — 3044F PR MOST RECENT HEMOGLOBIN A1C LEVEL <7.0%: ICD-10-PCS | Mod: CPTII,95,, | Performed by: STUDENT IN AN ORGANIZED HEALTH CARE EDUCATION/TRAINING PROGRAM

## 2023-06-08 PROCEDURE — 3044F HG A1C LEVEL LT 7.0%: CPT | Mod: CPTII,95,, | Performed by: STUDENT IN AN ORGANIZED HEALTH CARE EDUCATION/TRAINING PROGRAM

## 2023-06-08 PROCEDURE — 90834 PR PSYCHOTHERAPY W/PATIENT, 45 MIN: ICD-10-PCS | Mod: 95,,, | Performed by: STUDENT IN AN ORGANIZED HEALTH CARE EDUCATION/TRAINING PROGRAM

## 2023-06-14 NOTE — PROGRESS NOTES
[facility]  Psychology  Progress Note  Individual Psychotherapy (PhD/LCSW)    Patient Name: Anthony Sexton  MRN: [id number]    Patient Class: OP- Hospital Outpatient Clinic  Primary Care Provider: Tyesha Leggett MD    Psychiatry Initial Visit (PhD/LCSW)  Individual Psychotherapy - CPT 81236    Date: 6/8/2023    Site: [place]    The patient location is: Patient's home/ Patient reported that his/her location at the time of this visit was in the [facility]     Visit type: Virtual visit with synchronous audio and video     Each patient to whom he or she provides medical services by telemedicine is: (1) informed of the relationship between the physician and patient and the respective role of any other health care provider with respect to management of the patient; and (2) notified that he or she may decline to receive medical services by telemedicine and may withdraw from such care at any time.     Referral source: Lenora Walters MD    Clinical status of patient: Outpatient    Anthony Sexton, a 32 y.o. female, for initial evaluation visit.  Met with patient.    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     Substance use:   Alcohol: none    Drugs: none   Tobacco: none   Caffeine: none    Current medications and drug reactions (include OTC, herbal): see medication list     Strengths and liabilities: Strength: Patient accepts guidance/feedback, Strength: Patient is expressive/articulate., Strength: Patient is intelligent., Strength: Patient is motivated for change., Strength: Patient has positive support network., Strength: Patient has reasonable judgment., Strength: Patient is stable., Liability: Patient lacks coping skills.    Current Evaluation:     Mental Status Exam:  General Appearance:  unremarkable, age appropriate   Speech: normal tone, normal rate, normal pitch, normal volume      Level of Cooperation: cooperative      Thought Processes: normal and logical   Mood: anxious, sad      Thought Content: normal, no suicidality, no homicidality, delusions, or paranoia   Affect: congruent and appropriate   Orientation: Oriented x3   Memory: recent >  intact   Attention Span & Concentration: intact   Fund of General Knowledge: intact and appropriate to age and level of education   Abstract Reasoning: not assessed   Judgment & Insight: fair     Language  intact     Summary: The patient reported experiencing significant stress due to her daughter's behavior and her difficulties adjusting to medication. Supportive therapy and reflection was utilized throughout the session so that the patient would have the opportunity to express her frustrations and have an outlet for her emotional release. The patient and I discussed options that may be helpful to support her daughter, such as meeting with her psychiatrist, speaking with OT/SP, and considering school-related supports. The patient was encouraged to continue therapy sessions, engage in regular self-care practices, and put the appropriate supports in place for her daughter to help her to thrive (while reducing the patient's stress).    Diagnostic Impression - Plan:       ICD-10-CM ICD-9-CM   1. Mild episode  of recurrent major depressive disorder  F33.0 296.31   2. SENTHIL (generalized anxiety disorder)  F41.1 300.02   3. Stress-related physiological response affecting medical condition  F54 [id number]                       Plan:individual psychotherapy    Return to Clinic: as scheduled    Length of Service (minutes): 45          Barbra Bartlett, PhD  Psychologist  [facility]

## 2023-06-16 ENCOUNTER — OFFICE VISIT (OUTPATIENT)
Dept: NEUROLOGY | Facility: CLINIC | Age: 33
End: 2023-06-16
Payer: COMMERCIAL

## 2023-06-16 VITALS
SYSTOLIC BLOOD PRESSURE: 126 MMHG | RESPIRATION RATE: 16 BRPM | WEIGHT: 266.31 LBS | DIASTOLIC BLOOD PRESSURE: 84 MMHG | HEIGHT: 66 IN | HEART RATE: 76 BPM | BODY MASS INDEX: 42.8 KG/M2

## 2023-06-16 DIAGNOSIS — E66.01 OBESITY, MORBID, BMI 40.0-49.9: ICD-10-CM

## 2023-06-16 DIAGNOSIS — G43.E09 CHRONIC MIGRAINE WITH AURA: Primary | ICD-10-CM

## 2023-06-16 DIAGNOSIS — R11.0 NAUSEA: ICD-10-CM

## 2023-06-16 DIAGNOSIS — F41.1 GENERALIZED ANXIETY DISORDER: ICD-10-CM

## 2023-06-16 DIAGNOSIS — R51.9 NONINTRACTABLE HEADACHE, UNSPECIFIED CHRONICITY PATTERN, UNSPECIFIED HEADACHE TYPE: ICD-10-CM

## 2023-06-16 DIAGNOSIS — Z79.899 USE OF MEDICATION WITH TERATOGENIC POTENTIAL IN FEMALE OF REPRODUCTIVE AGE: ICD-10-CM

## 2023-06-16 PROCEDURE — 3008F PR BODY MASS INDEX (BMI) DOCUMENTED: ICD-10-PCS | Mod: CPTII,S$GLB,, | Performed by: NURSE PRACTITIONER

## 2023-06-16 PROCEDURE — 99999 PR PBB SHADOW E&M-EST. PATIENT-LVL IV: ICD-10-PCS | Mod: PBBFAC,,, | Performed by: NURSE PRACTITIONER

## 2023-06-16 PROCEDURE — 3074F PR MOST RECENT SYSTOLIC BLOOD PRESSURE < 130 MM HG: ICD-10-PCS | Mod: CPTII,S$GLB,, | Performed by: NURSE PRACTITIONER

## 2023-06-16 PROCEDURE — 1160F PR REVIEW ALL MEDS BY PRESCRIBER/CLIN PHARMACIST DOCUMENTED: ICD-10-PCS | Mod: CPTII,S$GLB,, | Performed by: NURSE PRACTITIONER

## 2023-06-16 PROCEDURE — 99215 PR OFFICE/OUTPT VISIT, EST, LEVL V, 40-54 MIN: ICD-10-PCS | Mod: S$GLB,,, | Performed by: NURSE PRACTITIONER

## 2023-06-16 PROCEDURE — 3008F BODY MASS INDEX DOCD: CPT | Mod: CPTII,S$GLB,, | Performed by: NURSE PRACTITIONER

## 2023-06-16 PROCEDURE — 3044F PR MOST RECENT HEMOGLOBIN A1C LEVEL <7.0%: ICD-10-PCS | Mod: CPTII,S$GLB,, | Performed by: NURSE PRACTITIONER

## 2023-06-16 PROCEDURE — 3079F PR MOST RECENT DIASTOLIC BLOOD PRESSURE 80-89 MM HG: ICD-10-PCS | Mod: CPTII,S$GLB,, | Performed by: NURSE PRACTITIONER

## 2023-06-16 PROCEDURE — 1160F RVW MEDS BY RX/DR IN RCRD: CPT | Mod: CPTII,S$GLB,, | Performed by: NURSE PRACTITIONER

## 2023-06-16 PROCEDURE — 1159F MED LIST DOCD IN RCRD: CPT | Mod: CPTII,S$GLB,, | Performed by: NURSE PRACTITIONER

## 2023-06-16 PROCEDURE — 1159F PR MEDICATION LIST DOCUMENTED IN MEDICAL RECORD: ICD-10-PCS | Mod: CPTII,S$GLB,, | Performed by: NURSE PRACTITIONER

## 2023-06-16 PROCEDURE — 3044F HG A1C LEVEL LT 7.0%: CPT | Mod: CPTII,S$GLB,, | Performed by: NURSE PRACTITIONER

## 2023-06-16 PROCEDURE — 3074F SYST BP LT 130 MM HG: CPT | Mod: CPTII,S$GLB,, | Performed by: NURSE PRACTITIONER

## 2023-06-16 PROCEDURE — 3079F DIAST BP 80-89 MM HG: CPT | Mod: CPTII,S$GLB,, | Performed by: NURSE PRACTITIONER

## 2023-06-16 PROCEDURE — 99999 PR PBB SHADOW E&M-EST. PATIENT-LVL IV: CPT | Mod: PBBFAC,,, | Performed by: NURSE PRACTITIONER

## 2023-06-16 PROCEDURE — 99215 OFFICE O/P EST HI 40 MIN: CPT | Mod: S$GLB,,, | Performed by: NURSE PRACTITIONER

## 2023-06-16 RX ORDER — TOPIRAMATE 50 MG/1
50 TABLET, FILM COATED ORAL 2 TIMES DAILY
Qty: 60 TABLET | Refills: 11 | Status: SHIPPED | OUTPATIENT
Start: 2023-06-16 | End: 2023-10-11

## 2023-06-16 RX ORDER — SUMATRIPTAN SUCCINATE 100 MG/1
100 TABLET ORAL
Qty: 9 TABLET | Refills: 3 | Status: SHIPPED | OUTPATIENT
Start: 2023-06-16 | End: 2023-10-11

## 2023-06-16 NOTE — PROGRESS NOTES
Subjective:       Patient ID: Anthony Sexton is a 32 y.o. female.    Chief Complaint: Chronic migraine with aura           HPI The patient is new to me, here to establish care for headache management. The Patient began getting regular headaches in her 20's years of age, described as normal mild aching that was managed with Ibuprofen. The headaches changed in 2016 after delivery of first child. The headaches progress from different areas and involves different sides frontal temporal region and occipital region with throbbing, increased pressure. It can involve the right side, the left side or both sides. Report visual aura sees black spots during headaches. The headaches are getting more frequent occurring 4 days out the week. The headaches are also getting more severe 6-8/10 headaches. The headache is associated with nausea and light, sound  sensitivity. The patient denies vomiting.  Abortive meds like OTC NSAIDs no are longer helpful, Lianet reports Fioricet was  helpful but she ran out of treatment. The headaches last for several hours to today's. She reports she often wake up with a headache. The headaches is constant throughout The headache is associated with scalp sensitivity. Unknown trigger of the headache.  Chronic neck pain.  No TMJ problems. Hx of FMS.  The patient denies blurry vision and ear ringing. No history of head trauma. No history of seizures. No history of smoking.  Patient has caffeine overuses. No vertigo. No blacking out.  No fever, chills or rigors. No history of significant memory loss. No history of strokes.  The patient cannot think of food that aggravates the headache. Family history is remarkable for migraine. No family history of early dementia. Last Eye exam by Dr. Jerry Delgado, OD 02- No papilledema noted. Patient not on any birthcontrol, contraceptive use encouraged. Recommend folic acid 1 mg daily.        INTERVAL HISTORY 06-: Patient is present for headache  management. Patient tolerating Sumatriptan 25/50 mg PRN no side effects. Patient reports only partially effective. The patient reports increased daily headaches the last 2 weeks. Patient is ready to try a preventative due to increased frequency and intensity of headaches. Will try TPM 50 mg po BID and will increase Sumatriptan 100 mg po prn. Patient agrees to plan of care.         Review of Systems   Constitutional:  Negative for activity change.   HENT: Negative.     Eyes:  Positive for photophobia and visual disturbance.   Respiratory: Negative.     Cardiovascular: Negative.    Gastrointestinal: Negative.    Endocrine: Negative.    Genitourinary: Negative.    Musculoskeletal: Negative.    Skin: Negative.    Allergic/Immunologic: Negative.    Neurological:  Positive for numbness and headaches.   Hematological: Negative.    Psychiatric/Behavioral:  The patient is nervous/anxious.                Current Outpatient Medications:     buPROPion (WELLBUTRIN XL) 300 MG 24 hr tablet, Take 1 tablet (300 mg total) by mouth every morning. (Patient taking differently: Take 150 mg by mouth every morning.), Disp: 30 tablet, Rfl: 2    ergocalciferol (VITAMIN D2) 50,000 unit Cap, Take 1 capsule (50,000 Units total) by mouth twice a week., Disp: 8 capsule, Rfl: 11    etonogestreL-ethinyl estradioL (NUVARING) 0.12-0.015 mg/24 hr vaginal ring, Place 1 each vaginally every 21 days. Insert one (1) ring vaginally and leave in place for three (3) weeks, then remove for one (1) week., Disp: 1 each, Rfl: 11    fluticasone propionate (FLONASE) 50 mcg/actuation nasal spray, Instill 2 sprays (100 mcg total) by Each Nostril route once daily., Disp: 16 g, Rfl: 2    folic acid (FOLVITE) 1 MG tablet, Take 1 tablet (1 mg total) by mouth once daily., Disp: 30 tablet, Rfl: 11    LORazepam (ATIVAN) 0.5 MG tablet, Take 1 tablet (0.5 mg total) by mouth every 8 (eight) hours as needed (anxiety)., Disp: 30 tablet, Rfl: 0    omeprazole (PRILOSEC) 40 MG  capsule, Take 1 capsule (40 mg total) by mouth 2 (two) times daily before meals., Disp: 60 capsule, Rfl: 2    ondansetron (ZOFRAN) 4 MG tablet, Take 1 tablet (4 mg total) by mouth every 6 (six) hours as needed for Nausea., Disp: 40 tablet, Rfl: 1    sumatriptan (IMITREX) 100 MG tablet, Take 1 tablet (100 mg total) by mouth every 72 hours as needed for Migraine (2 to 3 times per week)., Disp: 9 tablet, Rfl: 3    topiramate (TOPAMAX) 50 MG tablet, Take 1 tablet (50 mg total) by mouth 2 (two) times daily., Disp: 60 tablet, Rfl: 11  No current facility-administered medications for this visit.    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     Pre-eclampsia in postpartum period 04/23/2020    Vitamin D deficiency      Past Surgical History:   Procedure Laterality Date    ESOPHAGOGASTRODUODENOSCOPY N/A 5/26/2021    Procedure: EGD (ESOPHAGOGASTRODUODENOSCOPY);  Surgeon: Alka Olmstead MD;  Location: [facility];  Service: Endoscopy;  Laterality: N/A;    ESOPHAGOGASTRODUODENOSCOPY N/A 10/4/2021    Procedure: EGD (ESOPHAGOGASTRODUODENOSCOPY);  Surgeon: Alka Olmstead MD;  Location: [facility];  Service: Endoscopy;  Laterality: N/A;    ESOPHAGOGASTRODUODENOSCOPY N/A 5/29/2023    Procedure: EGD (ESOPHAGOGASTRODUODENOSCOPY);  Surgeon: Alka Olmstead MD;  Location: [facility];  Service: Endoscopy;  Laterality: N/A;    None      PH MONITORING, ESOPHAGUS, WIRELESS, (OFF REFLUX MEDS) N/A 5/29/2023    Procedure: PH  MONITORING, ESOPHAGUS, WIRELESS, (OFF REFLUX MEDS);  Surgeon: Alka Olmstead MD;  Location: [facility];  Service: Endoscopy;  Laterality: N/A;    REDUCTION OF BOTH BREASTS Bilateral 5/27/2021    Procedure: MAMMOPLASTY, REDUCTION, BILATERAL;  Surgeon: Daniel Ba MD;  Location: [facility] OR [address] [place] [address];  Service: Plastics;  Laterality: Bilateral;  Left breast tissue: 1584  Right breast tissue: 1438     Social History     Socioeconomic History    Marital status:     Number of children: 0   Occupational History    Occupation: RN     Comment: [employer]   Tobacco Use    Smoking status: Never    Smokeless tobacco: Never   Substance and Sexual Activity    Alcohol use: Not Currently     Alcohol/week: 0.0 standard drinks    Drug use: No    Sexual activity: Yes     Partners: Male     Birth control/protection: None   Social History Narrative    She wears seatbelt.     Social Determinants of Health     Financial Resource Strain: Low Risk     Difficulty of Paying Living Expenses: Not hard at all   Food Insecurity: No Food Insecurity    Worried About Running Out of Food in the Last Year: Never true    Ran Out of Food in the Last Year: Never true   Transportation Needs: No Transportation Needs    Lack of Transportation (Medical): No    Lack of Transportation (Non-Medical): No   Physical Activity: Inactive    Days of Exercise per Week: 0 days    Minutes of Exercise per Session: 0 min   Stress: No Stress Concern Present    Feeling of Stress : Only a little   Social Connections: Unknown    Frequency of Communication with Friends and Family: More than three times a week    Frequency of Social Gatherings with Friends and Family: Once a week    Active Member of Clubs or Organizations: Yes    Attends Club or Organization Meetings: Never    Marital Status:    Housing Stability: Low Risk     Unable to Pay for Housing in the Last Year: No    Number of Places Lived in the Last Year: 1    Unstable Housing in the Last  Year: No       Past/Current Medical/Surgical History, Past/Current Social History, Past/Current Family History and Past/Current Medications were reviewed in detail.        Objective:           VITAL SIGNS WERE REVIEWED      GENERAL APPEARANCE:     The patient looks comfortable.    Body habitus is normal BMI 45.53 KG     No signs of respiratory distress.    Normal breathing pattern.    No dysmorphic features    Normal eye contact.     GENERAL MEDICAL EXAM:    HEENT:  Head is atraumatic normocephalic.     No tender temporal arteries. Fundoscopic (Ophthalmoscopic) exam showed no disc edema.      Neck and Axillae: No JVD. No visible lesions.    No carotid bruits. No thyromegaly. No lymphadenopathy.    Cardiopulmonary: No cyanosis. No tachypnea. Normal respiratory effort.       Gastrointestinal/Urogenital:  No jaundice. No stomas or lesions. No visible hernias. No catheters.     Abdomen is soft non-tender. No masses or organomegaly.    Skin, Hair and Nails: No pathognonomic skin rash. No neurofibromatosis. No visible lesions.No stigmata of autoimmune disease. No clubbing.    Skin is warm and moist. No palpable masses.    Limbs: No varicose veins. No visible swelling.    No palpable edema. Pulses are symmetric. Pedal pulses are palpable.      Muskoskeletal: No visible deformities.No visible lesions.    No spine tenderness. No signs of longstanding neuropathy. No dislocations or fractures.            Neurologic Exam     Mental Status   Oriented to person, place, and time.   Registration: recalls 3 of 3 objects. Recall at 5 minutes: recalls 3 of 3 objects. Follows 3 step commands.   Attention: normal. Concentration: normal.   Speech: speech is normal and not slurred   Level of consciousness: alert  Knowledge: good and consistent with education. Able to perform simple calculations.   Able to name object. Able to read. Able to repeat. Able to write. Normal comprehension.     Cranial Nerves     CN II   Visual fields full to  confrontation.   Visual acuity: normal with correction  Right visual field deficit: none  Left visual field deficit: none     CN III, IV, VI   Pupils are equal, round, and reactive to light.  Extraocular motions are normal.   Right pupil: Size: 2 mm. Shape: regular. Reactivity: brisk. Consensual response: intact. Accommodation: intact.   Left pupil: Size: 2 mm. Shape: regular. Reactivity: brisk. Consensual response: intact. Accommodation: intact.   CN III: no CN III palsy  CN VI: no CN VI palsy  Nystagmus: none   Diplopia: none  Ophthalmoparesis: none  Upgaze: normal  Downgaze: normal  Conjugate gaze: present  Vestibulo-ocular reflex: present    CN V   Facial sensation intact.   Right facial sensation deficit: none  Left facial sensation deficit: none    CN VII   Right facial weakness: none  Left facial weakness: none    CN VIII   CN VIII normal.   Hearing: intact    CN IX, X   CN IX normal.   CN X normal.   Palate: symmetric    CN XI   CN XI normal.   Right sternocleidomastoid strength: normal  Left sternocleidomastoid strength: normal  Right trapezius strength: normal  Left trapezius strength: normal    CN XII   CN XII normal.   Tongue: not atrophic  Fasciculations: absent  Tongue deviation: none    Motor Exam   Muscle bulk: normal  Overall muscle tone: normal  Right arm tone: normal  Left arm tone: normal  Right arm pronator drift: absent  Left arm pronator drift: absent  Right leg tone: normal  Left leg tone: normal    Strength   Strength 5/5 throughout.   Right neck flexion: 5/5  Left neck flexion: 5/5  Right neck extension: 5/5  Left neck extension: 5/5  Right deltoid: 5/5  Left deltoid: 5/5  Right biceps: 5/5  Left biceps: 5/5  Right triceps: 5/5  Left triceps: 5/5  Right wrist flexion: 5/5  Left wrist flexion: 5/5  Right wrist extension: 5/5  Left wrist extension: 5/5  Right interossei: 5/5  Left interossei: 5/5  Right iliopsoas: 5/5  Left iliopsoas: 5/5  Right quadriceps: 5/5  Left quadriceps: 5/5  Right  hamstrin/5  Left hamstrin/5  Right glutei: 5/5  Left glutei: 5/5  Right anterior tibial: 5/5  Left anterior tibial: 5/5  Right posterior tibial: 5/5  Left posterior tibial: 5/5  Right peroneal: 5/5  Left peroneal: 5/5  Right gastroc: 5/5  Left gastroc: 5/5    Sensory Exam   Light touch normal.   Right arm light touch: normal  Left arm light touch: normal  Right leg light touch: normal  Left leg light touch: normal  Vibration normal.   Right arm vibration: normal  Left arm vibration: normal  Right leg vibration: normal  Left leg vibration: normal  Proprioception normal.   Right arm proprioception: normal  Left arm proprioception: normal  Right leg proprioception: normal  Left leg proprioception: normal  Pinprick normal.   Right arm pinprick: normal  Left arm pinprick: normal  Right leg pinprick: normal  Left leg pinprick: normal  Sensory deficit distribution on left: lateral cutaneous thigh  Graphesthesia: normal  Stereognosis: normal    Gait, Coordination, and Reflexes     Gait  Gait: normal    Coordination   Romberg: negative  Finger to nose coordination: normal  Heel to shin coordination: normal  Tandem walking coordination: normal    Tremor   Resting tremor: absent  Intention tremor: absent  Action tremor: absent    Reflexes   Right brachioradialis: 2+  Left brachioradialis: 2+  Right biceps: 2+  Left biceps: 2+  Right triceps: 2+  Left triceps: 2+  Right patellar: 2+  Left patellar: 2+  Right achilles: 2+  Left achilles: 2+  Right plantar: normal  Left plantar: normal  Right Ivey: absent  Left Ivey: absent  Right ankle clonus: absent  Left ankle clonus: absent  Right pendular knee jerk: absent  Left pendular knee jerk: absent    Lab Results   Component Value Date    WBC 9.71 2023    HGB 12.2 2023    HCT 40.4 2023    MCV 81 (L) 2023     2023     Sodium   Date Value Ref Range Status   2023 141 136 - 145 mmol/L Final     Potassium   Date Value Ref Range  Status   01/11/2023 4.3 3.5 - 5.1 mmol/L Final     Chloride   Date Value Ref Range Status   01/11/2023 108 95 - 110 mmol/L Final     CO2   Date Value Ref Range Status   01/11/2023 25 23 - 29 mmol/L Final     Glucose   Date Value Ref Range Status   01/11/2023 90 70 - 110 mg/dL Final     BUN   Date Value Ref Range Status   01/11/2023 11 6 - 20 mg/dL Final     Creatinine   Date Value Ref Range Status   01/11/2023 0.9 0.5 - 1.4 mg/dL Final     Calcium   Date Value Ref Range Status   01/11/2023 9.0 8.7 - 10.5 mg/dL Final     Total Protein   Date Value Ref Range Status   01/11/2023 8.1 6.0 - 8.4 g/dL Final     Albumin   Date Value Ref Range Status   01/11/2023 3.7 3.5 - 5.2 g/dL Final     Total Bilirubin   Date Value Ref Range Status   01/11/2023 0.2 0.1 - 1.0 mg/dL Final     Comment:     For infants and newborns, interpretation of results should be based  on gestational age, weight and in agreement with clinical  observations.    Premature Infant recommended reference ranges:  Up to 24 hours.............<8.0 mg/dL  Up to 48 hours............<12.0 mg/dL  3-5 days..................<15.0 mg/dL  6-29 days.................<15.0 mg/dL       Alkaline Phosphatase   Date Value Ref Range Status   01/11/2023 109 55 - 135 U/L Final     AST   Date Value Ref Range Status   01/11/2023 17 10 - 40 U/L Final     ALT   Date Value Ref Range Status   01/11/2023 20 10 - 44 U/L Final     Anion Gap   Date Value Ref Range Status   01/11/2023 8 8 - 16 mmol/L Final     eGFR if    Date Value Ref Range Status   05/24/2022 >60.0 >60 mL/min/1.73 m^2 Final     eGFR if non    Date Value Ref Range Status   05/24/2022 >60.0 >60 mL/min/1.73 m^2 Final     Comment:     Calculation used to obtain the estimated glomerular filtration  rate (eGFR) is the CKD-EPI equation.        Lab Results   Component Value Date    [id number] 414 12/14/2021     Lab Results   Component Value Date    TSH 1.064 02/23/2023       MRI Brain  Demyelinating [facility]    02-    Minimal nonspecific punctate foci T2 and FLAIR hyperintensity which are favored to be secondary to migraine headaches.  No convincing findings to suggest a demyelinating process.    MRI C- spine WW Demyelinating [facility]    02-  1. Normal cord signal.  No abnormal enhancement.  No central or neural foraminal canal narrowing.    Reviewed the neuroimaging independently       Assessment:       1. Chronic migraine with aura    2. Use of medication with teratogenic potential in female of reproductive age    3. Nonintractable headache, unspecified chronicity pattern, unspecified headache type    4. Generalized anxiety disorder    5. Obesity, morbid, BMI 40.0-49.9    6. Nausea          Plan:           CHRONIC MIXED HEADACHES CHRONIC MIGRAINE WITH AURA AND ON    HEADACHE DIARY     DISCUSSED THE THREE-FOLD MANAGEMENT OF MIGRAINE:      LIFESTYLE CHANGES:     Good sleep hygiene  Avoid general triggers like lack of sleep/too much sleep, prolonged sun exposure, excessive screen time and specific triggers based on you own diary   Minimize physical and emotional stress  Smoking avoidance and cessation  Limit caffeine drinks to 1-2 a day   Good hydration   Small frequent meals and avoid skipping meals   Moderate 30-minute-long aerobic exercises 3 times/week. Avoid strenuous exercise       ABORTIVE MEDICATIONS:     Increase Sumatriptan 100 mg po  PRN     Should only be taken 2-3 times/week to avoid rebound and overuse headaches.    Take at the ONSET of the headache sumatriptan 50 mg  PO  (or other Triptan) in combination with naproxen 500 mg PO or Ibuprofen 800 mg PO for headache without nausea or vomiting.  This regimen can be repeated only once in 24 hours after 2 hours.    I-explained to the patient that pain meds especially triptans should NOT be taken daily to avoid Rebound Headache and Overuse Headache.      When the headache is associated with vomiting I recommended that the patient takes  Sumatriptan 3/6 mg Sub-Q  injection.     Side effects of triptans were discussed and include rare cardiac and cerebral ischemia and cannot be used with migraine associate with focal neurological deficits (complicated migraine) in addition to drowsiness and potential impairment of driving ability. The patient verbalized understanding.    NSAIDs can cause peptic ulcers, renal insufficiency and may increase the risk of cardiovascular diseases.  SEs were discussed with the patient.The patient verbalized understanding.    If the headache is severe and  persists beyond 36 hours the patient needs to come to the hospital.      NEWER OPTIONS:    Ditans like Lasmiditan (Reyvow) 5HT-1F Agonist (No driving).    Gepants like :    Ubrogepant (Ubrelvy) CGRP- Antagonist (No driving).      Rimegepant (Nurtec), the first CGRP receptor antagonist available in a fast-acting orally disintegrating tablet.        OTHER OPTIONS:     OTC IN Lidocaine    Antiemetics like Reglan      Fioricet only 10 tabs a month     VNS PRN (Gammacore)     TNS PRN (Cefaly)    Octreotide 100 mg SQ         PREVENTATIVE MEDICATIONS:     Will try Topiramate/Topamax  50 mg po BID which can cause mental slowing, transient tingling, kidney stones, weight loss , cleft lip and palate and rarely glaucoma and visual field defects . The patient was encouraged to drink a lot of fluids.     Next Options:     Amitriptyline/Elavil which can cause sleepiness, dry eyes, dry mouth, urinary retention and rarely cardiac arrhythmias    Propranolol/Inderal which can cause low blood pressure, slow heart rate, erectile dysfunction, depression, airway obstruction and heart failure exacerbations.Cannot be used with migraine associate with focal neurological deficits     Valproic acid/Depakote which can cause weight gain, tremors, hair loss, balance problems, sleepiness, neural tube defects (babies), osteoporosis, and high cholesterol and interacts with many other  medications      Anti-CGRP Agents:(Unknown long term cardiovascular and wound healing effects)    Erenumab (Aimovig) (Receptor Blocker): 140 mg SQ Pen monthly (Contains Latex)  Galcanezumab (Emgality) (Ligand Blocker): 120 mg SQ Pen monthly after a loading dose of 240 mg   Fremnezumab (Ajovy) (Ligand Blocker): 225 mg SQ Syringe monthly or 675 mg every 3 months   Eptinezumab (Vyepti) (Ligand Blocker): 100 mg IV every 3 months.      Botox Injections      OTHER OPTIONS:     Helpful supplements include  Co-Q 10, B2, Mg, feverfew  (Dolovent combination) and butterbur (Petadolex)    Non-pharmacological options: VNS (Gammacore), TNS (Cefaly)     Interventional: Occipital Nerve Block (Pharmacological vs. Cryo (Iovera)     CAM Options: Acupuncture, Massage Therapy      Hormonal therapy for menstrual migraines: Ultra small doses of E2 (20 mcg) with no placebo    Non-FDA Approved (Ketamine)          All migraine medications are not safe during pregnancy and the patient was made aware of this fact. Any pregnancy should be planned, and medications should be stopped PRIOR to pregnancy planning. Folic acid 1 mg daily was recommended. However, hormonal birth control complicates the management of migraine and can exacerbate migraine. If possible, mechanical contraception should be a better option.       SENTHIL     Stable managed by Psychiatry     MEDICAL/SURGICAL COMORBIDITIES     All relevant medical comorbidities noted and managed by primary care physician and medical care team.          MISCELLANEOUS MEDICAL PROBLEMS       HEALTHY LIFESTYLE AND PREVENTATIVE CARE    Encouraged the patient to adhere to the age-appropriate health maintenance guidelines including screening tests and vaccinations.     Discussed the overall importance of healthy lifestyle, optimal weight, exercise, healthy diet, good sleep hygiene and avoiding drugs including smoking, alcohol and recreational drugs. The patient verbalized full understanding.       Advised  the patient to follow COVID-19 prevention measures.       I spent 48 minutes more than 50 % spent face to face with the patient    minutes of the time spent in counseling and coordination of care including discussions etiology of diagnosis, pathogenesis of diagnosis, prognosis of diagnosis,, diagnostic results, impression and recommendations, diagnostic studies, management, risks and benefits of treatment, instructions of disease self-management, treatment instructions, follow up requirements, patient and family counseling/involvement in care compliance with treatment regimen. All of the patient's questions were answered during this discussion.    RTC 3 months      Dony Croft, MSN NP      Collaborating Provider: Rachel Mao MD, FAAN Neurologist/Epileptologist

## 2023-06-21 ENCOUNTER — PATIENT MESSAGE (OUTPATIENT)
Dept: PSYCHIATRY | Facility: CLINIC | Age: 33
End: 2023-06-21
Payer: COMMERCIAL

## 2023-06-21 DIAGNOSIS — F90.0 ATTENTION DEFICIT HYPERACTIVITY DISORDER (ADHD), PREDOMINANTLY INATTENTIVE TYPE: ICD-10-CM

## 2023-06-21 DIAGNOSIS — F33.1 MAJOR DEPRESSIVE DISORDER, RECURRENT EPISODE, MODERATE: ICD-10-CM

## 2023-06-21 RX ORDER — BUPROPION HYDROCHLORIDE 150 MG/1
150 TABLET ORAL EVERY MORNING
Qty: 30 TABLET | Refills: 2 | Status: SHIPPED | OUTPATIENT
Start: 2023-06-21 | End: 2023-08-04 | Stop reason: SDUPTHER

## 2023-06-27 DIAGNOSIS — E55.9 VITAMIN D DEFICIENCY: ICD-10-CM

## 2023-06-27 NOTE — TELEPHONE ENCOUNTER
Refill Routing Note   Medication(s) are not appropriate for processing by [facility] for the following reason(s):      Medication outside of protocol    [facility] action(s):  Route Care Due:  None identified          Appointments  past 12m or future 3m with PCP    Date Provider   Last Visit   5/11/2023 Tyesha Leggett MD   Next Visit   Visit date not found Tyesha Leggett MD   ED visits in past 90 days: 0        Note composed:11:23 AM 06/27/2023

## 2023-06-28 RX ORDER — ERGOCALCIFEROL 1.25 MG/1
50000 CAPSULE ORAL
Qty: 8 CAPSULE | Refills: 11 | Status: SHIPPED | OUTPATIENT
Start: 2023-06-29 | End: 2024-03-13 | Stop reason: DRUGHIGH

## 2023-07-12 NOTE — TELEPHONE ENCOUNTER
Attempted to reach patient to follow up on referral for bariatrics.  No answer.  Left VM with clinic call back number

## 2023-07-19 ENCOUNTER — PATIENT MESSAGE (OUTPATIENT)
Dept: PSYCHIATRY | Facility: CLINIC | Age: 33
End: 2023-07-19
Payer: COMMERCIAL

## 2023-07-19 DIAGNOSIS — B96.89 BACTERIAL VAGINOSIS: Primary | ICD-10-CM

## 2023-07-19 DIAGNOSIS — N76.0 BACTERIAL VAGINOSIS: Primary | ICD-10-CM

## 2023-07-19 RX ORDER — METRONIDAZOLE 500 MG/1
500 TABLET ORAL EVERY 12 HOURS
Qty: 14 TABLET | Refills: 0 | Status: SHIPPED | OUTPATIENT
Start: 2023-07-19 | End: 2023-08-10

## 2023-07-20 ENCOUNTER — OFFICE VISIT (OUTPATIENT)
Dept: PSYCHIATRY | Facility: CLINIC | Age: 33
End: 2023-07-20
Payer: COMMERCIAL

## 2023-07-20 DIAGNOSIS — R69 UNDIAGNOSED DISEASE OR SYNDROME PRESENT: Primary | ICD-10-CM

## 2023-07-20 PROCEDURE — 1159F PR MEDICATION LIST DOCUMENTED IN MEDICAL RECORD: ICD-10-PCS | Mod: CPTII,95,, | Performed by: STUDENT IN AN ORGANIZED HEALTH CARE EDUCATION/TRAINING PROGRAM

## 2023-07-20 PROCEDURE — 1159F MED LIST DOCD IN RCRD: CPT | Mod: CPTII,95,, | Performed by: STUDENT IN AN ORGANIZED HEALTH CARE EDUCATION/TRAINING PROGRAM

## 2023-07-20 PROCEDURE — 3044F HG A1C LEVEL LT 7.0%: CPT | Mod: CPTII,95,, | Performed by: STUDENT IN AN ORGANIZED HEALTH CARE EDUCATION/TRAINING PROGRAM

## 2023-07-20 PROCEDURE — 90834 PR PSYCHOTHERAPY W/PATIENT, 45 MIN: ICD-10-PCS | Mod: 95,,, | Performed by: STUDENT IN AN ORGANIZED HEALTH CARE EDUCATION/TRAINING PROGRAM

## 2023-07-20 PROCEDURE — 3044F PR MOST RECENT HEMOGLOBIN A1C LEVEL <7.0%: ICD-10-PCS | Mod: CPTII,95,, | Performed by: STUDENT IN AN ORGANIZED HEALTH CARE EDUCATION/TRAINING PROGRAM

## 2023-07-20 PROCEDURE — 90834 PSYTX W PT 45 MINUTES: CPT | Mod: 95,,, | Performed by: STUDENT IN AN ORGANIZED HEALTH CARE EDUCATION/TRAINING PROGRAM

## 2023-07-20 NOTE — PROGRESS NOTES
[facility]  Psychology  Progress Note  Individual Psychotherapy (PhD/LCSW)    Patient Name: Anthony Sexton  MRN: [id number]    Patient Class: [facility]- [facility]  Primary Care Provider: Tyesha Leggett MD    Psychiatry Initial Visit (PhD/LCSW)  Individual Psychotherapy - CPT 97777    Date: 7/20/2023    Site: [place]    The patient location is: Patient's home/ Patient reported that his/her location at the time of this visit was in the [facility]     Visit type: Virtual visit with synchronous audio and video     Each patient to whom he or she provides medical services by telemedicine is: (1) informed of the relationship between the physician and patient and the respective role of any other health care provider with respect to management of the patient; and (2) notified that he or she may decline to receive medical services by telemedicine and may withdraw from such care at any time.     Referral source: Lenora Walters MD    Clinical status of patient: Outpatient    Anthony Sexton, a 32 y.o. female, for initial evaluation visit.  Met with patient.    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     Substance use:   Alcohol: none    "Drugs: none   Tobacco: none   Caffeine: none    Current medications and drug reactions (include OTC, herbal): see medication list     Strengths and liabilities: Strength: Patient accepts guidance/feedback, Strength: Patient is expressive/articulate., Strength: Patient is intelligent., Strength: Patient is motivated for change., Strength: Patient has positive support network., Strength: Patient has reasonable judgment., Strength: Patient is stable., Liability: Patient lacks coping skills.    Current Evaluation:     Mental Status Exam:  General Appearance:  unremarkable, age appropriate   Speech: normal tone, normal rate, normal pitch, normal volume      Level of Cooperation: cooperative      Thought Processes: normal and logical   Mood: anxious, sad      Thought Content: normal, no suicidality, no homicidality, delusions, or paranoia   Affect: congruent and appropriate   Orientation: Oriented x3   Memory: recent >  intact   Attention Span & Concentration: intact   Fund of General Knowledge: intact and appropriate to age and level of education   Abstract Reasoning: not assessed   Judgment & Insight: fair     Language  intact     Summary: The patient reported stress reduction with regard to her daughter's behavior but frustration due to not having support with her kids. Her 's work schedule prevents him from being able to help as much as the patient needs him to. Her mother refuses to help and her father is not confident in his ability to help with the girls (transportation and childcare before and/or after school). The patient is experiencing stressors in multiple areas of her life. She is coping by setting limits to her time and saying "no" when feasible. The patient and I discussed her frequent no shows. She was encouraged to either change the appointment time or be at her currently scheduled appointments. This will help her to cope with stressors. If she continues to no show, she will be removed from my " schedule. Patient acknowledged understanding.   Diagnostic Impression - Plan:       ICD-10-CM ICD-9-CM   1. Undiagnosed disease or syndrome present  R69 799.9                         Plan:individual psychotherapy    Return to Clinic: as scheduled    Length of Service (minutes): 45          Barbra Bartlett, PhD  Psychologist  [facility]

## 2023-07-25 ENCOUNTER — TELEPHONE (OUTPATIENT)
Dept: CARDIOLOGY | Facility: HOSPITAL | Age: 33
End: 2023-07-25
Payer: COMMERCIAL

## 2023-07-27 ENCOUNTER — TELEPHONE (OUTPATIENT)
Dept: CARDIOLOGY | Facility: HOSPITAL | Age: 33
End: 2023-07-27
Payer: COMMERCIAL

## 2023-08-04 ENCOUNTER — OFFICE VISIT (OUTPATIENT)
Dept: PSYCHIATRY | Facility: CLINIC | Age: 33
End: 2023-08-04
Payer: COMMERCIAL

## 2023-08-04 DIAGNOSIS — F90.0 ATTENTION DEFICIT HYPERACTIVITY DISORDER (ADHD), PREDOMINANTLY INATTENTIVE TYPE: ICD-10-CM

## 2023-08-04 DIAGNOSIS — F41.1 GENERALIZED ANXIETY DISORDER: Primary | ICD-10-CM

## 2023-08-04 DIAGNOSIS — F33.0 MAJOR DEPRESSIVE DISORDER, RECURRENT, MILD: ICD-10-CM

## 2023-08-04 PROCEDURE — 1159F PR MEDICATION LIST DOCUMENTED IN MEDICAL RECORD: ICD-10-PCS | Mod: CPTII,95,, | Performed by: PSYCHOLOGIST

## 2023-08-04 PROCEDURE — 3044F HG A1C LEVEL LT 7.0%: CPT | Mod: CPTII,95,, | Performed by: PSYCHOLOGIST

## 2023-08-04 PROCEDURE — 1159F MED LIST DOCD IN RCRD: CPT | Mod: CPTII,95,, | Performed by: PSYCHOLOGIST

## 2023-08-04 PROCEDURE — 99214 OFFICE O/P EST MOD 30 MIN: CPT | Mod: 95,,, | Performed by: PSYCHOLOGIST

## 2023-08-04 PROCEDURE — 3044F PR MOST RECENT HEMOGLOBIN A1C LEVEL <7.0%: ICD-10-PCS | Mod: CPTII,95,, | Performed by: PSYCHOLOGIST

## 2023-08-04 PROCEDURE — 99214 PR OFFICE/OUTPT VISIT, EST, LEVL IV, 30-39 MIN: ICD-10-PCS | Mod: 95,,, | Performed by: PSYCHOLOGIST

## 2023-08-04 RX ORDER — BUPROPION HYDROCHLORIDE 150 MG/1
150 TABLET ORAL EVERY MORNING
Qty: 90 TABLET | Refills: 1 | Status: SHIPPED | OUTPATIENT
Start: 2023-08-04 | End: 2024-02-16

## 2023-08-04 RX ORDER — ALPRAZOLAM 0.5 MG/1
TABLET ORAL
Qty: 30 TABLET | Refills: 1 | Status: SHIPPED | OUTPATIENT
Start: 2023-08-04 | End: 2024-02-29

## 2023-08-04 NOTE — PROGRESS NOTES
"Outpatient Psychiatry Follow-Up Visit    8/4/2023    Timeframe: Corona Virus Outbreak     The patient location is: Patient's car/ Patient reported that his/her location at the time of this visit was in the [facility]     Visit type: Virtual visit with synchronous audio and video     Each patient to whom he or she provides medical services by telehealth is: (1) informed of the relationship between the medical psychologist and patient and the respective role of any other health care provider with respect to management of the patient; and (2) notified that he or she may decline to receive medical services by telehealth and may withdraw from such care at any time.    I also informed patient of the following:   Kenya Sanchez, PhD, MPAP:  [place] medical license number: [id number]    My contact info:  [facility] / 2nd Floor  [address] [place] [address]   Ph: [phone]    If technology issues, call office phone: Ph: [phone]  If crisis: Dial 911 or go to nearest Emergency Room (ER)  If questions related to privacy practices: contact [facility] Information Department: [phone]    Chief Complaint:  Anthony Sexton is a 32 y.o. female who presents today for follow-up of anxiety and inattention/distractibility .       Impressions/Plan from last visit: Anthony attended her virtual visit. She did not start buproprion because she had things happen and did not start it. Things have gotten better--"I'm on the other side of it." She has been forcing herself to get up and do things. She has not been taking naltrexone. She feels more depressed than anxious lately. Her fibromyalgia has been active--she has been stretching, trying to move more, taking OTC. Her older daughter was dx with sensory processing disorder and receptive language problem--has a lot of appts (possibly Autism). She has worked on combining her daughter's appts to make things easier for missing " "school and work. Her  is supportive; her parents are supportive--"but they have a lot of stuff going on, too." She meets with Dr. Bartlett for therapy. She is working on balance. Her kids are 3 and 6. 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 She will start Wellbutrin.    Plan--start Wellbutrin  mg for 2 weeks, then 300 mg    Interval History and Content of Current Session: Anthony attended her virtual visit. Tomorrow is her birthday--special plans.She has been on Wellbutrin  mg--thinks that she has been doing fine, except around her cycle. She said that the first day or so on her cycle, she struggles with anxiety at bedtime. She gets to where she can't relax. It only lasts for the day before and the next day--we discussed options and agreed on using Xanax for those two nights. She denied having any side effects with Wellbutrin--she is seeing Dr. Bartlett for therapy. Work has been good--had been stressful but a little better now. Her daughter recently started school--she has ADHD and started on Methylin; then switched to Focalin. Her anxiety increased--last year was very stressful. She is currently on Strattera, which has helped some (but not as good as the Focalin). Her daughter attends [employer] in 1st grade. Anthony has been more stressed with concern on how her daughter is doing with school now with her symptoms. See plan below.    Plan--continue Wellbutrin  mg; add Xanax 0.5 mg #30 per 90 days     since January 2023.        Prior medicines: buspirone, Prozac, Klonopin, Lexapro, Celexa, Zoloft, hydroxyzine, Adderall XR; Effexor (she thinks); Ativan, Savella, Cymbalta, Ativan    GAD7 8/4/2023 5/5/2023 2/23/2023   1. Feeling nervous, anxious, or on edge? 1 1 1   2. Not being able to stop or control worrying? 1 1 1   3. Worrying too much about different things? 1 1 1   4. Trouble relaxing? 0 1 2   5. Being so restless that it is hard to sit still? 0 1 1   6. Becoming easily annoyed or irritable? " 0 1 1   7. Feeling afraid as if something awful might happen? 1 1 1   SENTHIL-7 Score 4 7 8      0-4 = Minimal anxiety  5-9 = Mild anxiety  10-14 = Moderate anxiety  15-21 = Severe anxiety       Review of Systems   PSYCHIATRIC: Pertinant items are noted in the narrative.    Past Medical, Family and Social History: The patient's past medical, family and social history have been reviewed and updated as appropriate within the electronic medical record - see encounter notes.      Current Outpatient Medications:     ALPRAZolam (XANAX) 0.5 MG tablet, Take 1 tab by mouth daily as needed not to exceed 30 tabs per 90 days, Disp: 30 tablet, Rfl: 1    buPROPion (WELLBUTRIN XL) 150 MG TB24 tablet, Take 1 tablet (150 mg total) by mouth every morning., Disp: 90 tablet, Rfl: 1    ergocalciferol (VITAMIN D2) 50,000 unit Cap, Take 1 capsule (50,000 Units total) by mouth twice a week., Disp: 8 capsule, Rfl: 11    etonogestreL-ethinyl estradioL (NUVARING) 0.12-0.015 mg/24 hr vaginal ring, Place 1 each vaginally every 21 days. Insert one (1) ring vaginally and leave in place for three (3) weeks, then remove for one (1) week., Disp: 1 each, Rfl: 11    fluticasone propionate (FLONASE) 50 mcg/actuation nasal spray, Instill 2 sprays (100 mcg total) by Each Nostril route once daily., Disp: 16 g, Rfl: 2    folic acid (FOLVITE) 1 MG tablet, Take 1 tablet (1 mg total) by mouth once daily., Disp: 30 tablet, Rfl: 11    metroNIDAZOLE (FLAGYL) 500 MG tablet, Take 1 tablet (500 mg total) by mouth every 12 (twelve) hours., Disp: 14 tablet, Rfl: 0    omeprazole (PRILOSEC) 40 MG capsule, Take 1 capsule (40 mg total) by mouth 2 (two) times daily before meals., Disp: 60 capsule, Rfl: 2    ondansetron (ZOFRAN) 4 MG tablet, Take 1 tablet (4 mg total) by mouth every 6 (six) hours as needed for Nausea., Disp: 40 tablet, Rfl: 1    sumatriptan (IMITREX) 100 MG tablet, Take 1 tablet (100 mg total) by mouth every 72 hours as needed for Migraine (2 to 3 times per  week)., Disp: 9 tablet, Rfl: 3    topiramate (TOPAMAX) 50 MG tablet, Take 1 tablet (50 mg total) by mouth 2 (two) times daily., Disp: 60 tablet, Rfl: 11  No current facility-administered medications for this visit.    Facility-Administered Medications Ordered in Other Visits:     lactated ringers infusion, , Intravenous, Continuous, Alka Olmstead MD, Stopped at 05/29/23 0816    Compliance: yes    Side effects: see above    Risk Parameters:  Patient reports no suicidal ideation  Patient reports no homicidal ideation  Patient reports no self-injurious behavior  Patient reports no violent behavior    Exam (detailed: at least 9 elements; comprehensive: all 15 elements)   Constitutional  Vitals:  Most recent vital signs were reviewed.   Last 3 sets of Vitals    Vitals - 1 value per visit 5/29/2023 6/16/2023 6/16/2023   SYSTOLIC 120 - 126   DIASTOLIC 67 - 84   Pulse 75 - 76   Temp - - -   Resp 16 - 16   SPO2 99 - -   Weight (lb) - - 266.32   Weight (kg) - - 120.8   Height - - 65.5   BMI (Calculated) - - 43.6   VISIT REPORT - 17NONCRENCREPNotFromCR  [id number]; 17NONCRENCREPNotFromCR  [id number];   Pain Score  - 8 -   Some recent data might be hidden          General:  age appropriate, neatly groomed, scrubs; hair pulled back     Musculoskeletal  Muscle Strength/Tone:  no tremor, no tic   Gait & Station:  video visit     Psychiatric  Speech:  no latency; no press   Behavior: wnl   Mood & Affect:  euthymic, anxious around cycle--and regarding daughter's return to school  congruent and appropriate   Thought Process:  normal and logical   Associations:  intact   Thought Content:  normal, no suicidality, no homicidality, delusions, or paranoia   Insight:  has awareness of illness   Judgement: behavior is adequate to circumstances   Orientation:  grossly intact   Memory: intact for content of interview   Language: grossly intact   Attention Span & Concentration:  Grossly intact   Fund of Knowledge:  intact and  appropriate to age and level of education     Assessment and Diagnosis   Status/Progress: Based on the examination today, the patient's problem(s) is/are adequately but not ideally controlled.  New problems have been presented today.   Co-morbidities and psychosocial stressors  are complicating management of the primary condition.  There are no active rule-out diagnoses for this patient at this time.     General Impression:     Encounter Diagnoses   Name Primary?    Generalized anxiety disorder Yes    Attention deficit hyperactivity disorder (ADHD), predominantly inattentive type     Major depressive disorder, recurrent, mild        Intervention/Counseling/Treatment Plan   Medication Management: Discussed risks, benefits, and alternatives to treatment plan documented above with patient. I answered all patient questions related to this plan, and patient expressed understanding and agreement.   continue Wellbutrin  mg; add Xanax 0.5 mg #30 per 90 days  Continue therapy as needed      Medication List with Changes/Refills   New Medications    ALPRAZOLAM (XANAX) 0.5 MG TABLET    Take 1 tab by mouth daily as needed not to exceed 30 tabs per 90 days   Current Medications    ERGOCALCIFEROL (VITAMIN D2) 50,000 UNIT CAP    Take 1 capsule (50,000 Units total) by mouth twice a week.    ETONOGESTREL-ETHINYL ESTRADIOL (NUVARING) 0.12-0.015 MG/24 HR VAGINAL RING    Place 1 each vaginally every 21 days. Insert one (1) ring vaginally and leave in place for three (3) weeks, then remove for one (1) week.    FLUTICASONE PROPIONATE (FLONASE) 50 MCG/ACTUATION NASAL SPRAY    Instill 2 sprays (100 mcg total) by Each Nostril route once daily.    FOLIC ACID (FOLVITE) 1 MG TABLET    Take 1 tablet (1 mg total) by mouth once daily.    METRONIDAZOLE (FLAGYL) 500 MG TABLET    Take 1 tablet (500 mg total) by mouth every 12 (twelve) hours.    OMEPRAZOLE (PRILOSEC) 40 MG CAPSULE    Take 1 capsule (40 mg total) by mouth 2 (two) times daily  before meals.    ONDANSETRON (ZOFRAN) 4 MG TABLET    Take 1 tablet (4 mg total) by mouth every 6 (six) hours as needed for Nausea.    SUMATRIPTAN (IMITREX) 100 MG TABLET    Take 1 tablet (100 mg total) by mouth every 72 hours as needed for Migraine (2 to 3 times per week).    TOPIRAMATE (TOPAMAX) 50 MG TABLET    Take 1 tablet (50 mg total) by mouth 2 (two) times daily.   Changed and/or Refilled Medications    Modified Medication Previous Medication    BUPROPION (WELLBUTRIN XL) 150 MG TB24 TABLET buPROPion (WELLBUTRIN XL) 150 MG TB24 tablet       Take 1 tablet (150 mg total) by mouth every morning.    Take 1 tablet (150 mg total) by mouth every morning.   Discontinued Medications    LORAZEPAM (ATIVAN) 0.5 MG TABLET    Take 1 tablet (0.5 mg total) by mouth every 8 (eight) hours as needed (anxiety).        Return to Clinic: 6 months    Time spent with pt including note preparation: 23 minutes       Kenya Sanchez, PhD, MP  Advanced Practice Medical Psychologist  [facility]  [address].  [place] [address]  [phone]   [phone] fax

## 2023-08-04 NOTE — PATIENT INSTRUCTIONS

## 2023-08-10 ENCOUNTER — OFFICE VISIT (OUTPATIENT)
Dept: FAMILY MEDICINE | Facility: CLINIC | Age: 33
End: 2023-08-10
Attending: FAMILY MEDICINE
Payer: COMMERCIAL

## 2023-08-10 VITALS
DIASTOLIC BLOOD PRESSURE: 84 MMHG | HEIGHT: 66 IN | HEART RATE: 92 BPM | OXYGEN SATURATION: 98 % | SYSTOLIC BLOOD PRESSURE: 136 MMHG | TEMPERATURE: 98 F | BODY MASS INDEX: 44.01 KG/M2 | WEIGHT: 273.81 LBS

## 2023-08-10 DIAGNOSIS — J40 SINOBRONCHITIS: ICD-10-CM

## 2023-08-10 DIAGNOSIS — J02.9 SORE THROAT: ICD-10-CM

## 2023-08-10 DIAGNOSIS — J32.9 SINOBRONCHITIS: ICD-10-CM

## 2023-08-10 DIAGNOSIS — R09.81 NASAL CONGESTION: ICD-10-CM

## 2023-08-10 LAB
CTP QC/QA: YES
CTP QC/QA: YES
S PYO RRNA THROAT QL PROBE: NEGATIVE
SARS-COV-2 RDRP RESP QL NAA+PROBE: NEGATIVE

## 2023-08-10 PROCEDURE — 3008F BODY MASS INDEX DOCD: CPT | Mod: CPTII,S$GLB,, | Performed by: FAMILY MEDICINE

## 2023-08-10 PROCEDURE — 87635: ICD-10-PCS | Mod: QW,S$GLB,, | Performed by: FAMILY MEDICINE

## 2023-08-10 PROCEDURE — 3075F PR MOST RECENT SYSTOLIC BLOOD PRESS GE 130-139MM HG: ICD-10-PCS | Mod: CPTII,S$GLB,, | Performed by: FAMILY MEDICINE

## 2023-08-10 PROCEDURE — 87880 STREP A ASSAY W/OPTIC: CPT | Mod: QW,S$GLB,, | Performed by: FAMILY MEDICINE

## 2023-08-10 PROCEDURE — 1159F PR MEDICATION LIST DOCUMENTED IN MEDICAL RECORD: ICD-10-PCS | Mod: CPTII,S$GLB,, | Performed by: FAMILY MEDICINE

## 2023-08-10 PROCEDURE — 3008F PR BODY MASS INDEX (BMI) DOCUMENTED: ICD-10-PCS | Mod: CPTII,S$GLB,, | Performed by: FAMILY MEDICINE

## 2023-08-10 PROCEDURE — 1159F MED LIST DOCD IN RCRD: CPT | Mod: CPTII,S$GLB,, | Performed by: FAMILY MEDICINE

## 2023-08-10 PROCEDURE — 3079F DIAST BP 80-89 MM HG: CPT | Mod: CPTII,S$GLB,, | Performed by: FAMILY MEDICINE

## 2023-08-10 PROCEDURE — 99213 OFFICE O/P EST LOW 20 MIN: CPT | Mod: 25,S$GLB,, | Performed by: FAMILY MEDICINE

## 2023-08-10 PROCEDURE — 99999 PR PBB SHADOW E&M-EST. PATIENT-LVL IV: CPT | Mod: PBBFAC,,, | Performed by: FAMILY MEDICINE

## 2023-08-10 PROCEDURE — 3079F PR MOST RECENT DIASTOLIC BLOOD PRESSURE 80-89 MM HG: ICD-10-PCS | Mod: CPTII,S$GLB,, | Performed by: FAMILY MEDICINE

## 2023-08-10 PROCEDURE — 99999 PR PBB SHADOW E&M-EST. PATIENT-LVL IV: ICD-10-PCS | Mod: PBBFAC,,, | Performed by: FAMILY MEDICINE

## 2023-08-10 PROCEDURE — 96372 THER/PROPH/DIAG INJ SC/IM: CPT | Mod: S$GLB,,, | Performed by: FAMILY MEDICINE

## 2023-08-10 PROCEDURE — 96372 PR INJECTION,THERAP/PROPH/DIAG2ST, IM OR SUBCUT: ICD-10-PCS | Mod: S$GLB,,, | Performed by: FAMILY MEDICINE

## 2023-08-10 PROCEDURE — 3044F HG A1C LEVEL LT 7.0%: CPT | Mod: CPTII,S$GLB,, | Performed by: FAMILY MEDICINE

## 2023-08-10 PROCEDURE — 87880 POCT RAPID STREP A: ICD-10-PCS | Mod: QW,S$GLB,, | Performed by: FAMILY MEDICINE

## 2023-08-10 PROCEDURE — 99213 PR OFFICE/OUTPT VISIT, EST, LEVL III, 20-29 MIN: ICD-10-PCS | Mod: 25,S$GLB,, | Performed by: FAMILY MEDICINE

## 2023-08-10 PROCEDURE — 3044F PR MOST RECENT HEMOGLOBIN A1C LEVEL <7.0%: ICD-10-PCS | Mod: CPTII,S$GLB,, | Performed by: FAMILY MEDICINE

## 2023-08-10 PROCEDURE — 3075F SYST BP GE 130 - 139MM HG: CPT | Mod: CPTII,S$GLB,, | Performed by: FAMILY MEDICINE

## 2023-08-10 PROCEDURE — 87635 SARS-COV-2 COVID-19 AMP PRB: CPT | Mod: QW,S$GLB,, | Performed by: FAMILY MEDICINE

## 2023-08-10 RX ORDER — AMOXICILLIN 875 MG/1
875 TABLET, FILM COATED ORAL 2 TIMES DAILY
Qty: 20 TABLET | Refills: 0 | Status: SHIPPED | OUTPATIENT
Start: 2023-08-10 | End: 2023-08-20

## 2023-08-10 RX ORDER — BETAMETHASONE SODIUM PHOSPHATE AND BETAMETHASONE ACETATE 3; 3 MG/ML; MG/ML
12 INJECTION, SUSPENSION INTRA-ARTICULAR; INTRALESIONAL; INTRAMUSCULAR; SOFT TISSUE
Status: COMPLETED | OUTPATIENT
Start: 2023-08-10 | End: 2023-08-10

## 2023-08-10 RX ORDER — PROMETHAZINE HYDROCHLORIDE AND DEXTROMETHORPHAN HYDROBROMIDE 6.25; 15 MG/5ML; MG/5ML
5 SYRUP ORAL EVERY 6 HOURS PRN
Qty: 240 ML | Refills: 0 | Status: SHIPPED | OUTPATIENT
Start: 2023-08-10 | End: 2023-09-12

## 2023-08-10 RX ADMIN — BETAMETHASONE SODIUM PHOSPHATE AND BETAMETHASONE ACETATE 12 MG: 3; 3 INJECTION, SUSPENSION INTRA-ARTICULAR; INTRALESIONAL; INTRAMUSCULAR; SOFT TISSUE at 02:08

## 2023-08-10 NOTE — PROGRESS NOTES
Anthony Sexton    Chief Complaint   Patient presents with    Sore Throat    Cough    URI    Generalized Body Aches       History of Present Illness:   Ms. Sexton, a nonsmoker, comes in today with complaint of having 5 days of sneezing, nasal congestion, stuffy head, facial pain, ear pressure and pain, postnasal drip, dry cough, headaches, body aches, nausea, and nighttime chills.  She also complains of having sore throat but better today.  She states she has been using Flonase and taking  Zyrtec for 2 days.  She states she did home COVID test 2 days ago with negative results.  She denies known exposure to ill contacts.    Otherwise, she denies having fever, fatigue, appetite changes; shortness of breath, wheezing; chest pain, palpitations, leg swelling; other sinus symptoms; abdominal pain, vomiting, diarrhea, constipation; unusual urinary symptoms; polydipsia, polyphagia, polyuria, hot or cold intolerance; back pain; anxiety, depression, homicidal or suicidal thoughts.         POCT COVID-19 Rapid Screening ordered today - (-).  POCT Rapid Strep A ordered today - (-)    Cough  This is a new problem. The current episode started in the past 7 days. The problem has been gradually worsening. The problem occurs hourly. The cough is Non-productive. Associated symptoms include chills, ear congestion, ear pain, myalgias, nasal congestion, rhinorrhea and a sore throat. Pertinent negatives include no chest pain, fever, headaches, heartburn, hemoptysis, postnasal drip, rash, shortness of breath, sweats, weight loss or wheezing. Nothing aggravates the symptoms. She has tried nothing for the symptoms. There is no history of asthma, bronchiectasis, bronchitis, COPD, emphysema, environmental allergies or pneumonia.       Current Outpatient Medications   Medication Sig    ALPRAZolam (XANAX) 0.5 MG tablet Take 1 tab by mouth daily as needed not to exceed 30 tabs per 90 days    buPROPion (WELLBUTRIN XL) 150 MG TB24 tablet Take  1 tablet (150 mg total) by mouth every morning.    ergocalciferol (VITAMIN D2) 50,000 unit Cap Take 1 capsule (50,000 Units total) by mouth twice a week.    etonogestreL-ethinyl estradioL (NUVARING) 0.12-0.015 mg/24 hr vaginal ring Place 1 each vaginally every 21 days. Insert one (1) ring vaginally and leave in place for three (3) weeks, then remove for one (1) week.    fluticasone propionate (FLONASE) 50 mcg/actuation nasal spray Instill 2 sprays (100 mcg total) by Each Nostril route once daily.    folic acid (FOLVITE) 1 MG tablet Take 1 tablet (1 mg total) by mouth once daily.    omeprazole (PRILOSEC) 40 MG capsule Take 1 capsule (40 mg total) by mouth 2 (two) times daily before meals.    ondansetron (ZOFRAN) 4 MG tablet Take 1 tablet (4 mg total) by mouth every 6 (six) hours as needed for Nausea.    sumatriptan (IMITREX) 100 MG tablet Take 1 tablet (100 mg total) by mouth every 72 hours as needed for Migraine (2 to 3 times per week).    topiramate (TOPAMAX) 50 MG tablet Take 1 tablet (50 mg total) by mouth 2 (two) times daily.             Review of Systems   Constitutional:  Positive for chills. Negative for fever and weight loss.   HENT:  Positive for ear pain, rhinorrhea and sore throat. Negative for postnasal drip.    Respiratory:  Positive for cough. Negative for hemoptysis, shortness of breath and wheezing.    Cardiovascular:  Negative for chest pain.   Gastrointestinal:  Negative for heartburn.   Musculoskeletal:  Positive for myalgias.   Skin:  Negative for rash.   Allergic/Immunologic: Negative for environmental allergies.   Neurological:  Negative for headaches.       Objective:  Physical Exam  Vitals reviewed.   Constitutional:       General: She is not in acute distress.     Appearance: Normal appearance. She is well-developed. She is obese. She is not ill-appearing, toxic-appearing or diaphoretic.      Comments: Pleasant.   HENT:      Head: Normocephalic and atraumatic.      Comments: Tender over  right maxillary sinus.     Right Ear: Tympanic membrane, ear canal and external ear normal. There is no impacted cerumen.      Left Ear: Tympanic membrane, ear canal and external ear normal. There is no impacted cerumen.      Nose: Congestion present. No rhinorrhea.      Mouth/Throat:      Mouth: Mucous membranes are moist.      Pharynx: Oropharyngeal exudate present. No posterior oropharyngeal erythema.      Comments: At right pharynx.  Eyes:      General:         Right eye: No discharge.         Left eye: No discharge.      Extraocular Movements: Extraocular movements intact.      Conjunctiva/sclera: Conjunctivae normal.      Pupils: Pupils are equal, round, and reactive to light.   Cardiovascular:      Rate and Rhythm: Normal rate and regular rhythm.      Pulses: Normal pulses.      Heart sounds: Normal heart sounds. No murmur heard.  Pulmonary:      Effort: Pulmonary effort is normal. No respiratory distress.      Breath sounds: Normal breath sounds. No wheezing.   Abdominal:      General: Bowel sounds are normal. There is no distension.      Palpations: Abdomen is soft. There is no mass.      Tenderness: There is no abdominal tenderness. There is no guarding or rebound.   Musculoskeletal:         General: No swelling or tenderness. Normal range of motion.      Cervical back: Normal range of motion and neck supple. No tenderness. No muscular tenderness.      Comments: She is ambulatory without problems.   Lymphadenopathy:      Cervical: No cervical adenopathy.   Skin:     General: Skin is warm.   Neurological:      General: No focal deficit present.      Mental Status: She is alert and oriented to person, place, and time.   Psychiatric:         Mood and Affect: Mood normal.         Behavior: Behavior normal.         Thought Content: Thought content normal.         Judgment: Judgment normal.         ASSESSMENT:  1. Sinobronchitis    2. Nasal congestion    3. Sore throat        PLAN:  Anthony was seen today for sore  throat, cough, uri and generalized body aches.    Diagnoses and all orders for this visit:    Sinobronchitis  -     amoxicillin (AMOXIL) 875 MG tablet; Take 1 tablet (875 mg total) by mouth 2 (two) times daily. for 10 days  -     betamethasone acetate-betamethasone sodium phosphate injection 12 mg  -     promethazine-dextromethorphan (PROMETHAZINE-DM) 6.25-15 mg/5 mL Syrp; Take 5 mLs by mouth every 6 (six) hours as needed (cough, nasal drainage).    Nasal congestion  -     POCT COVID-19 Rapid Screening    Sore throat  -     POCT Rapid Strep A      Continue current medications, follow low sodium, low cholesterol, low carb diet, daily walks.  Amoxicillin 875 mg twice daily for 10 days; medication precautions discussed with patient.  Promethazine-DM as directed for cough, nasal symptoms; medication precautions discussed with patient.  Flu shot this fall.  Follow up if symptoms worsen or fail to improve.

## 2023-08-21 ENCOUNTER — PATIENT MESSAGE (OUTPATIENT)
Dept: PHARMACY | Facility: CLINIC | Age: 33
End: 2023-08-21
Payer: COMMERCIAL

## 2023-08-24 ENCOUNTER — OFFICE VISIT (OUTPATIENT)
Dept: PSYCHIATRY | Facility: CLINIC | Age: 33
End: 2023-08-24
Payer: COMMERCIAL

## 2023-08-24 DIAGNOSIS — R69 UNDIAGNOSED DISEASE OR SYNDROME PRESENT: Primary | ICD-10-CM

## 2023-08-24 PROCEDURE — 1159F MED LIST DOCD IN RCRD: CPT | Mod: CPTII,95,, | Performed by: STUDENT IN AN ORGANIZED HEALTH CARE EDUCATION/TRAINING PROGRAM

## 2023-08-24 PROCEDURE — 3044F PR MOST RECENT HEMOGLOBIN A1C LEVEL <7.0%: ICD-10-PCS | Mod: CPTII,95,, | Performed by: STUDENT IN AN ORGANIZED HEALTH CARE EDUCATION/TRAINING PROGRAM

## 2023-08-24 PROCEDURE — 3044F HG A1C LEVEL LT 7.0%: CPT | Mod: CPTII,95,, | Performed by: STUDENT IN AN ORGANIZED HEALTH CARE EDUCATION/TRAINING PROGRAM

## 2023-08-24 PROCEDURE — 1159F PR MEDICATION LIST DOCUMENTED IN MEDICAL RECORD: ICD-10-PCS | Mod: CPTII,95,, | Performed by: STUDENT IN AN ORGANIZED HEALTH CARE EDUCATION/TRAINING PROGRAM

## 2023-08-24 PROCEDURE — 90834 PSYTX W PT 45 MINUTES: CPT | Mod: 95,,, | Performed by: STUDENT IN AN ORGANIZED HEALTH CARE EDUCATION/TRAINING PROGRAM

## 2023-08-24 PROCEDURE — 90834 PR PSYCHOTHERAPY W/PATIENT, 45 MIN: ICD-10-PCS | Mod: 95,,, | Performed by: STUDENT IN AN ORGANIZED HEALTH CARE EDUCATION/TRAINING PROGRAM

## 2023-08-29 ENCOUNTER — TELEPHONE (OUTPATIENT)
Dept: PSYCHIATRY | Facility: CLINIC | Age: 33
End: 2023-08-29
Payer: COMMERCIAL

## 2023-08-30 NOTE — PROGRESS NOTES
[facility] Psych  Psychology  Progress Note  Individual Psychotherapy (PhD/LCSW)    Patient Name: Anthony Sexton  MRN: [id number]    Patient Class: OP- Hospital Outpatient Clinic  Primary Care Provider: yTesha Leggett MD    Psychiatry Initial Visit (PhD/LCSW)  Individual Psychotherapy - CPT 92122    Date: 8/24/2023    Site: [place]    The patient location is: Patient's home/ Patient reported that his/her location at the time of this visit was in the [facility]     Visit type: Virtual visit with synchronous audio and video     Each patient to whom he or she provides medical services by telemedicine is: (1) informed of the relationship between the physician and patient and the respective role of any other health care provider with respect to management of the patient; and (2) notified that he or she may decline to receive medical services by telemedicine and may withdraw from such care at any time.     Referral source: Lenora Walters MD    Clinical status of patient: Outpatient    Anthony Sexton, a 33 y.o. female, for initial evaluation visit.  Met with patient.    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     Substance use:   Alcohol:  none   Drugs: none   Tobacco: none   Caffeine: none    Current medications and drug reactions (include OTC, herbal): see medication list     Strengths and liabilities: Strength: Patient accepts guidance/feedback, Strength: Patient is expressive/articulate., Strength: Patient is intelligent., Strength: Patient is motivated for change., Strength: Patient has positive support network., Strength: Patient has reasonable judgment., Strength: Patient is stable., Liability: Patient lacks coping skills.    Current Evaluation:     Mental Status Exam:  General Appearance:  unremarkable, age appropriate   Speech: normal tone, normal rate, normal pitch, normal volume      Level of Cooperation: cooperative      Thought Processes: normal and logical   Mood: anxious, sad      Thought Content: normal, no suicidality, no homicidality, delusions, or paranoia   Affect: congruent and appropriate   Orientation: Oriented x3   Memory: recent >  intact   Attention Span & Concentration: intact   Fund of General Knowledge: intact and appropriate to age and level of education   Abstract Reasoning: not assessed   Judgment & Insight: fair     Language  intact     Summary: The patient's source is the start of the school year, getting her daughter's medication and IEP set, and creating boundaries so that she can decompress. Otherwise, the patient reported doing well. We discussed ways for her to manage the school year emotionally while advocating for her child.     Diagnostic Impression - Plan:       ICD-10-CM ICD-9-CM   1. Undiagnosed disease or syndrome present  R69 799.9                           Plan:individual psychotherapy    Return to Clinic: as scheduled    Length of Service (minutes): 45          Barbra Bartlett, PhD  Psychologist  [facility] Psych

## 2023-08-31 ENCOUNTER — OFFICE VISIT (OUTPATIENT)
Dept: PSYCHIATRY | Facility: CLINIC | Age: 33
End: 2023-08-31
Payer: COMMERCIAL

## 2023-08-31 DIAGNOSIS — R69 UNDIAGNOSED DISEASE OR SYNDROME PRESENT: Primary | ICD-10-CM

## 2023-08-31 PROCEDURE — 3044F PR MOST RECENT HEMOGLOBIN A1C LEVEL <7.0%: ICD-10-PCS | Mod: CPTII,95,, | Performed by: STUDENT IN AN ORGANIZED HEALTH CARE EDUCATION/TRAINING PROGRAM

## 2023-08-31 PROCEDURE — 3044F HG A1C LEVEL LT 7.0%: CPT | Mod: CPTII,95,, | Performed by: STUDENT IN AN ORGANIZED HEALTH CARE EDUCATION/TRAINING PROGRAM

## 2023-08-31 PROCEDURE — 90834 PR PSYCHOTHERAPY W/PATIENT, 45 MIN: ICD-10-PCS | Mod: 95,,, | Performed by: STUDENT IN AN ORGANIZED HEALTH CARE EDUCATION/TRAINING PROGRAM

## 2023-08-31 PROCEDURE — 90834 PSYTX W PT 45 MINUTES: CPT | Mod: 95,,, | Performed by: STUDENT IN AN ORGANIZED HEALTH CARE EDUCATION/TRAINING PROGRAM

## 2023-08-31 PROCEDURE — 1159F MED LIST DOCD IN RCRD: CPT | Mod: CPTII,95,, | Performed by: STUDENT IN AN ORGANIZED HEALTH CARE EDUCATION/TRAINING PROGRAM

## 2023-08-31 PROCEDURE — 1159F PR MEDICATION LIST DOCUMENTED IN MEDICAL RECORD: ICD-10-PCS | Mod: CPTII,95,, | Performed by: STUDENT IN AN ORGANIZED HEALTH CARE EDUCATION/TRAINING PROGRAM

## 2023-09-01 NOTE — PROGRESS NOTES
[facility] Psych  Psychology  Progress Note  Individual Psychotherapy (PhD/LCSW)    Patient Name: Anthony Sexton  MRN: [id number]    Patient Class: OP- Hospital Outpatient Clinic  Primary Care Provider: Tyesha Leggett MD    Psychiatry Initial Visit (PhD/LCSW)  Individual Psychotherapy - CPT 11066    Date: 8/31/2023    Site: [place]    The patient location is: Patient's home/ Patient reported that his/her location at the time of this visit was in the [facility]     Visit type: Virtual visit with synchronous audio and video     Each patient to whom he or she provides medical services by telemedicine is: (1) informed of the relationship between the physician and patient and the respective role of any other health care provider with respect to management of the patient; and (2) notified that he or she may decline to receive medical services by telemedicine and may withdraw from such care at any time.     Referral source: Lenora Walters MD    Clinical status of patient: Outpatient    Anthony Sexton, a 33 y.o. female, for initial evaluation visit.  Met with patient.    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     Substance use:   Alcohol:  none   Drugs: none   Tobacco: none   Caffeine: none    Current medications and drug reactions (include OTC, herbal): see medication list     Strengths and liabilities: Strength: Patient accepts guidance/feedback, Strength: Patient is expressive/articulate., Strength: Patient is intelligent., Strength: Patient is motivated for change., Strength: Patient has positive support network., Strength: Patient has reasonable judgment., Strength: Patient is stable., Liability: Patient lacks coping skills.    Current Evaluation:     Mental Status Exam:  General Appearance:  unremarkable, age appropriate   Speech: normal tone, normal rate, normal pitch, normal volume      Level of Cooperation: cooperative      Thought Processes: normal and logical   Mood: anxious, sad      Thought Content: normal, no suicidality, no homicidality, delusions, or paranoia   Affect: congruent and appropriate   Orientation: Oriented x3   Memory: recent >  intact   Attention Span & Concentration: intact   Fund of General Knowledge: intact and appropriate to age and level of education   Abstract Reasoning: not assessed   Judgment & Insight: fair     Language  intact     Summary: The patient's stress has reduced to moderate now that her daughter is settled in school and an IEP meeting has been scheduled. She still has a lot of stress with regard to balancing her days. She reported a very long morning commute that involves dropping off children in two different cities and then going to work. Her father has been helping with  in the evening. Her 's work schedule is very demanding but he helps a lot whenever he can by giving her time to spend with friends and to rest. The patient fears that she is not devoting enough time to her youngest daughter or to her . We discussed scheduling time that is specific to each member of the family (including herself). This way she won't feel as overwhelmed and everyone will be attended to. The patient  and her  are house shopping so they can find a home closer to work and school.   Diagnostic Impression - Plan:       ICD-10-CM ICD-9-CM   1. Undiagnosed disease or syndrome present  R69 799.9                             Plan:individual psychotherapy    Return to Clinic: as scheduled    Length of Service (minutes): 45          Barbra Bartlett, PhD  Psychologist  [facility] Psych

## 2023-09-07 ENCOUNTER — PATIENT MESSAGE (OUTPATIENT)
Dept: PSYCHIATRY | Facility: CLINIC | Age: 33
End: 2023-09-07
Payer: COMMERCIAL

## 2023-09-12 ENCOUNTER — OFFICE VISIT (OUTPATIENT)
Dept: INTERNAL MEDICINE | Facility: CLINIC | Age: 33
End: 2023-09-12
Payer: COMMERCIAL

## 2023-09-12 ENCOUNTER — HOSPITAL ENCOUNTER (OUTPATIENT)
Dept: CARDIOLOGY | Facility: HOSPITAL | Age: 33
Discharge: HOME | End: 2023-09-12
Payer: COMMERCIAL

## 2023-09-12 ENCOUNTER — HOSPITAL ENCOUNTER (OUTPATIENT)
Dept: RADIOLOGY | Facility: HOSPITAL | Age: 33
Discharge: HOME | End: 2023-09-12
Payer: COMMERCIAL

## 2023-09-12 VITALS
BODY MASS INDEX: 44.43 KG/M2 | TEMPERATURE: 98 F | DIASTOLIC BLOOD PRESSURE: 84 MMHG | HEART RATE: 98 BPM | OXYGEN SATURATION: 98 % | WEIGHT: 276.44 LBS | HEIGHT: 66 IN | SYSTOLIC BLOOD PRESSURE: 118 MMHG

## 2023-09-12 DIAGNOSIS — R07.9 ACUTE CHEST PAIN: ICD-10-CM

## 2023-09-12 DIAGNOSIS — R07.1 CHEST PAIN VARYING WITH BREATHING: ICD-10-CM

## 2023-09-12 DIAGNOSIS — R06.09 DOE (DYSPNEA ON EXERTION): ICD-10-CM

## 2023-09-12 DIAGNOSIS — R07.89 ATYPICAL CHEST PAIN: ICD-10-CM

## 2023-09-12 DIAGNOSIS — R07.9 CHEST PAIN, UNSPECIFIED TYPE: ICD-10-CM

## 2023-09-12 DIAGNOSIS — R07.9 CHEST PAIN, UNSPECIFIED TYPE: Primary | ICD-10-CM

## 2023-09-12 DIAGNOSIS — E66.01 MORBID OBESITY WITH BMI OF 45.0-49.9, ADULT: ICD-10-CM

## 2023-09-12 PROCEDURE — 3074F PR MOST RECENT SYSTOLIC BLOOD PRESSURE < 130 MM HG: ICD-10-PCS | Mod: CPTII,S$GLB,,

## 2023-09-12 PROCEDURE — 3079F DIAST BP 80-89 MM HG: CPT | Mod: CPTII,S$GLB,,

## 2023-09-12 PROCEDURE — 3008F BODY MASS INDEX DOCD: CPT | Mod: CPTII,S$GLB,,

## 2023-09-12 PROCEDURE — 3044F HG A1C LEVEL LT 7.0%: CPT | Mod: CPTII,S$GLB,,

## 2023-09-12 PROCEDURE — 99215 OFFICE O/P EST HI 40 MIN: CPT | Mod: S$GLB,,,

## 2023-09-12 PROCEDURE — 93010 ELECTROCARDIOGRAM REPORT: CPT | Mod: S$GLB,,, | Performed by: INTERNAL MEDICINE

## 2023-09-12 PROCEDURE — 3079F PR MOST RECENT DIASTOLIC BLOOD PRESSURE 80-89 MM HG: ICD-10-PCS | Mod: CPTII,S$GLB,,

## 2023-09-12 PROCEDURE — 99215 PR OFFICE/OUTPT VISIT, EST, LEVL V, 40-54 MIN: ICD-10-PCS | Mod: S$GLB,,,

## 2023-09-12 PROCEDURE — 1159F PR MEDICATION LIST DOCUMENTED IN MEDICAL RECORD: ICD-10-PCS | Mod: CPTII,S$GLB,,

## 2023-09-12 PROCEDURE — 3008F PR BODY MASS INDEX (BMI) DOCUMENTED: ICD-10-PCS | Mod: CPTII,S$GLB,,

## 2023-09-12 PROCEDURE — 3074F SYST BP LT 130 MM HG: CPT | Mod: CPTII,S$GLB,,

## 2023-09-12 PROCEDURE — 93005 EKG 12-LEAD: ICD-10-PCS | Mod: S$GLB,,,

## 2023-09-12 PROCEDURE — 99999 PR PBB SHADOW E&M-EST. PATIENT-LVL IV: ICD-10-PCS | Mod: PBBFAC,,,

## 2023-09-12 PROCEDURE — 1159F MED LIST DOCD IN RCRD: CPT | Mod: CPTII,S$GLB,,

## 2023-09-12 PROCEDURE — 93005 ELECTROCARDIOGRAM TRACING: CPT | Mod: S$GLB,,,

## 2023-09-12 PROCEDURE — 99999 PR PBB SHADOW E&M-EST. PATIENT-LVL IV: CPT | Mod: PBBFAC,,,

## 2023-09-12 PROCEDURE — 3044F PR MOST RECENT HEMOGLOBIN A1C LEVEL <7.0%: ICD-10-PCS | Mod: CPTII,S$GLB,,

## 2023-09-12 PROCEDURE — 71046 X-RAY EXAM CHEST 2 VIEWS: CPT | Mod: TC

## 2023-09-12 PROCEDURE — 71046 X-RAY EXAM CHEST 2 VIEWS: CPT | Mod: 26,,, | Performed by: RADIOLOGY

## 2023-09-12 PROCEDURE — 71046 XR CHEST PA AND LATERAL: ICD-10-PCS | Mod: 26,,, | Performed by: RADIOLOGY

## 2023-09-12 PROCEDURE — 93010 EKG 12-LEAD: ICD-10-PCS | Mod: S$GLB,,, | Performed by: INTERNAL MEDICINE

## 2023-09-12 NOTE — PROGRESS NOTES
Anthony FABIEN Darshan  09/12/2023  [id number]    Tyesha Leggett MD  Patient Care Team:  Tyesha Leggett MD as PCP - General (Family Medicine)  Emily Lott LPN as Care Coordinator (Internal Medicine)  Rebecca Butler as Digital Medicine Health           Visit Type:an urgent visit for a new problem    Chief Complaint:  Chief Complaint   Patient presents with    Chest Pain        History of Present Illness:    Pt has been having intermittent CP and SOB for the last few days   TELLEZ on exertion   Has history of having elevated d dimer without having PE/DVT  Denies swelling in legs and able to tolerate laying flat     Pain in chest is worse when she takes deep breath  No recent long trips  She does use the [employer]       History:  Past Medical History:   Diagnosis Date    Anemia     Anxiety     Chronic low back pain     Depression     Enthesitis related arthritis 1/11/2023    Fibromyalgia     History of abnormal cervical Pap smear     History of recurrent UTIs     History of ulcer disease     Irregular menses     Obesity     SAVANNA (obstructive sleep apnea) 3/2/2023    Postpartum depression     with last pregnancy.     Pre-eclampsia in postpartum period 04/23/2020    Vitamin D deficiency      Past Surgical History:   Procedure Laterality Date    ESOPHAGOGASTRODUODENOSCOPY N/A 5/26/2021    Procedure: EGD (ESOPHAGOGASTRODUODENOSCOPY);  Surgeon: Alka Olmstead MD;  Location: [facility];  Service: Endoscopy;  Laterality: N/A;    ESOPHAGOGASTRODUODENOSCOPY N/A 10/4/2021    Procedure: EGD (ESOPHAGOGASTRODUODENOSCOPY);  Surgeon: Alka Olmstead MD;  Location: [facility];  Service: Endoscopy;  Laterality: N/A;    ESOPHAGOGASTRODUODENOSCOPY N/A 5/29/2023    Procedure: EGD (ESOPHAGOGASTRODUODENOSCOPY);  Surgeon: Alka Olmstead MD;  Location: [facility];  Service: Endoscopy;  Laterality: N/A;    None      PH MONITORING, ESOPHAGUS, WIRELESS, (OFF REFLUX MEDS) N/A 5/29/2023    Procedure: PH MONITORING, ESOPHAGUS,  WIRELESS, (OFF REFLUX MEDS);  Surgeon: Alka Olmstead MD;  Location: [facility];  Service: Endoscopy;  Laterality: N/A;    REDUCTION OF BOTH BREASTS Bilateral 5/27/2021    Procedure: MAMMOPLASTY, REDUCTION, BILATERAL;  Surgeon: Daniel Ba MD;  [facility];  Service: Plastics;  Laterality: Bilateral;  Left breast tissue: 1584  Right breast tissue: 1438     Family History   Problem Relation Age of Onset    Asthma Mother     Hyperlipidemia Mother     Hypertension Mother     Hypertension Father     Hyperlipidemia Father     Heart disease Father 55        MI/CAD    No Known Problems Sister     No Known Problems Brother     Diabetes Maternal Aunt     Diabetes Maternal Grandmother     Insulin resistance Sister     No Known Problems Brother     No Known Problems Brother     No Known Problems Brother     No Known Problems Brother     No Known Problems Daughter     Breast cancer Maternal Aunt     Cancer Neg Hx     Stroke Neg Hx     Brain cancer Neg Hx     Migraines Neg Hx     Colon cancer Neg Hx     Ovarian cancer Neg Hx     Anesthesia problems Neg Hx      Social History     Socioeconomic History    Marital status:     Number of children: 0   Occupational History    Occupation: RN     Comment: [employer]   Tobacco Use    Smoking status: Never    Smokeless tobacco: Never   Substance and Sexual Activity    Alcohol use: Not Currently     Alcohol/week: 0.0 standard drinks of alcohol    Drug use: No    Sexual activity: Yes     Partners: Male     Birth control/protection: None   Social History Narrative    She wears seatbelt.     Social Determinants of Health     Financial Resource Strain: Low Risk  (9/12/2023)    Overall Financial Resource Strain (CARDIA)     Difficulty of Paying Living Expenses: Not hard at all   Food Insecurity: No Food Insecurity (9/12/2023)    Hunger Vital Sign     Worried About Running Out of Food in the Last Year: Never true     Ran Out of Food in the Last Year: Never  true   Transportation Needs: No Transportation Needs (9/12/2023)    PRAPARE - Transportation     Lack of Transportation (Medical): No     Lack of Transportation (Non-Medical): No   Physical Activity: Insufficiently Active (9/12/2023)    Exercise Vital Sign     Days of Exercise per Week: 2 days     Minutes of Exercise per Session: 20 min   Stress: Stress Concern Present (9/12/2023)    [employer] - Occupational Stress Questionnaire     Feeling of Stress : To some extent   Social Connections: Unknown (9/12/2023)    Social Connection and Isolation Panel [NHANES]     Frequency of Communication with Friends and Family: More than three times a week     Frequency of Social Gatherings with Friends and Family: More than three times a week     Active Member of Clubs or Organizations: Yes     Attends Club or Organization Meetings: Never     Marital Status:    Housing Stability: Low Risk  (9/12/2023)    Housing Stability Vital Sign     Unable to Pay for Housing in the Last Year: No     Number of Places Lived in the Last Year: 1     Unstable Housing in the Last Year: No     Patient Active Problem List   Diagnosis    Chronic fatigue syndrome    Obesity, morbid, BMI 40.0-49.9    Vitamin D deficiency    GERD (gastroesophageal reflux disease)    Essential hypertension    Post partum depression    Generalized anxiety disorder    Elevated hemoglobin A1c    Atypical chest pain    Iron deficiency anemia due to chronic blood loss    Dysplasia of cervix, high grade CATHY 2    Bacterial vaginosis    Palpitation    Chronic pelvic pain in female    Enthesitis related arthritis    ESDRAS positive    Family history of Sjogren's disease    Fibromyalgia    Snoring    SAVANNA (obstructive sleep apnea)    High risk HPV infection    Pain of upper abdomen    Nausea     Review of patient's allergies indicates:   Allergen Reactions    Procardia [nifedipine]        The following were reviewed at this visit: active problem  list, medication list, allergies, family history, social history, and health maintenance.    Medications:  Current Outpatient Medications on File Prior to Visit   Medication Sig Dispense Refill    ALPRAZolam (XANAX) 0.5 MG tablet Take 1 tab by mouth daily as needed not to exceed 30 tabs per 90 days 30 tablet 1    buPROPion (WELLBUTRIN XL) 150 MG TB24 tablet Take 1 tablet (150 mg total) by mouth every morning. 90 tablet 1    ergocalciferol (VITAMIN D2) 50,000 unit Cap Take 1 capsule (50,000 Units total) by mouth twice a week. 8 capsule 11    etonogestreL-ethinyl estradioL (NUVARING) 0.12-0.015 mg/24 hr vaginal ring Place 1 each vaginally every 21 days. Insert one (1) ring vaginally and leave in place for three (3) weeks, then remove for one (1) week. 1 each 11    fluticasone propionate (FLONASE) 50 mcg/actuation nasal spray Instill 2 sprays (100 mcg total) by Each Nostril route once daily. 16 g 2    folic acid (FOLVITE) 1 MG tablet Take 1 tablet (1 mg total) by mouth once daily. 30 tablet 11    omeprazole (PRILOSEC) 40 MG capsule Take 1 capsule (40 mg total) by mouth 2 (two) times daily before meals. 60 capsule 2    ondansetron (ZOFRAN) 4 MG tablet Take 1 tablet (4 mg total) by mouth every 6 (six) hours as needed for Nausea. 40 tablet 1    promethazine-dextromethorphan (PROMETHAZINE-DM) 6.25-15 mg/5 mL Syrp Take 5 mLs by mouth every 6 (six) hours as needed (cough, nasal drainage). 240 mL 0    sumatriptan (IMITREX) 100 MG tablet Take 1 tablet (100 mg total) by mouth every 72 hours as needed for Migraine (2 to 3 times per week). 9 tablet 3    topiramate (TOPAMAX) 50 MG tablet Take 1 tablet (50 mg total) by mouth 2 (two) times daily. 60 tablet 11     Current Facility-Administered Medications on File Prior to Visit   Medication Dose Route Frequency Provider Last Rate Last Admin    lactated ringers infusion   Intravenous Continuous Alka Olmstead MD   Stopped at 05/29/23 0816       Medications have been reviewed and  reconciled with patient at this visit.  Barriers to medications reviewed with patient.    Adverse reactions to current medications reviewed with patient..    Over the counter medications reviewed and reconciled with patient.    Exam:  Wt Readings from Last 3 Encounters:   08/10/23 124.2 kg (273 lb 13 oz)   06/16/23 120.8 kg (266 lb 5.1 oz)   05/29/23 124.2 kg (273 lb 13 oz)     Temp Readings from Last 3 Encounters:   08/10/23 98 °F (36.7 °C) (Tympanic)   05/29/23 97.8 °F (36.6 °C) (Temporal)   05/11/23 98.1 °F (36.7 °C)     BP Readings from Last 3 Encounters:   08/10/23 136/84   06/16/23 126/84   05/29/23 120/67     Pulse Readings from Last 3 Encounters:   08/10/23 92   06/16/23 76   05/29/23 75     There is no height or weight on file to calculate BMI.      Review of Systems   Respiratory:  Positive for shortness of breath.    Cardiovascular:  Positive for chest pain.     Physical Exam  Vitals and nursing note reviewed.   Constitutional:       General: She is not in acute distress.     Appearance: She is well-developed. She is morbidly obese. She is not diaphoretic.   HENT:      Head: Normocephalic and atraumatic.      Right Ear: External ear normal.      Left Ear: External ear normal.   Eyes:      General:         Right eye: No discharge.         Left eye: No discharge.      Conjunctiva/sclera: Conjunctivae normal.      Pupils: Pupils are equal, round, and reactive to light.   Cardiovascular:      Rate and Rhythm: Normal rate and regular rhythm.      Heart sounds: Normal heart sounds. No murmur heard.  Pulmonary:      Effort: Pulmonary effort is normal. No respiratory distress.      Breath sounds: Normal breath sounds. No wheezing.   Neurological:      Mental Status: She is alert and oriented to person, place, and time.   Psychiatric:         Behavior: Behavior normal.         Thought Content: Thought content normal.         Judgment: Judgment normal.         Laboratory Reviewed ({Yes)  Lab Results   Component  Value Date    WBC 9.71 01/11/2023    HGB 12.2 01/11/2023    HCT 40.4 01/11/2023     01/11/2023    CHOL 182 02/23/2023    TRIG 64 02/23/2023    HDL 43 02/23/2023    ALT 20 01/11/2023    AST 17 01/11/2023     01/11/2023    K 4.3 01/11/2023     01/11/2023    CREATININE 0.9 01/11/2023    BUN 11 01/11/2023    CO2 25 01/11/2023    TSH 1.064 02/23/2023    HGBA1C 5.5 02/23/2023       Anthony was seen today for chest pain.    Diagnoses and all orders for this visit:    Chest pain, unspecified type  -     IN OFFICE EKG 12-LEAD (to Muse)  -     X-Ray Chest PA And Lateral; Future  -     TROPONIN I; Future    Atypical chest pain  -     IN OFFICE EKG 12-LEAD (to Muse)  -     X-Ray Chest PA And Lateral; Future  -     TROPONIN I; Future    Chest pain varying with breathing  -     IN OFFICE EKG 12-LEAD (to Muse)  -     X-Ray Chest PA And Lateral; Future  -     TROPONIN I; Future    Morbid obesity with BMI of 45.0-49.9, adult  -     TROPONIN I; Future    TELLEZ (dyspnea on exertion)  -     IN OFFICE EKG 12-LEAD (to Muse)  -     X-Ray Chest PA And Lateral; Future  -     TROPONIN I; Future      EKG did not show any changes from previous EKG  CXR and troponin was negative  Will order CTA chest       Care Plan/Goals: Reviewed    Goals         Blood Pressure < 140/90 (pt-stated)             Follow up: No follow-ups on file.    After visit summary was printed and given to patient upon discharge today.  Patient goals and care plan are included in After Visit Summary.

## 2023-09-19 ENCOUNTER — TELEPHONE (OUTPATIENT)
Dept: PSYCHIATRY | Facility: CLINIC | Age: 33
End: 2023-09-19
Payer: COMMERCIAL

## 2023-09-20 ENCOUNTER — PATIENT MESSAGE (OUTPATIENT)
Dept: INTERNAL MEDICINE | Facility: CLINIC | Age: 33
End: 2023-09-20
Payer: COMMERCIAL

## 2023-09-21 ENCOUNTER — OFFICE VISIT (OUTPATIENT)
Dept: PSYCHIATRY | Facility: CLINIC | Age: 33
End: 2023-09-21
Payer: COMMERCIAL

## 2023-09-21 DIAGNOSIS — R69 UNDIAGNOSED DISEASE OR SYNDROME PRESENT: Primary | ICD-10-CM

## 2023-09-21 PROCEDURE — 90834 PSYTX W PT 45 MINUTES: CPT | Mod: 95,,, | Performed by: STUDENT IN AN ORGANIZED HEALTH CARE EDUCATION/TRAINING PROGRAM

## 2023-09-21 PROCEDURE — 3044F PR MOST RECENT HEMOGLOBIN A1C LEVEL <7.0%: ICD-10-PCS | Mod: CPTII,95,, | Performed by: STUDENT IN AN ORGANIZED HEALTH CARE EDUCATION/TRAINING PROGRAM

## 2023-09-21 PROCEDURE — 90834 PR PSYCHOTHERAPY W/PATIENT, 45 MIN: ICD-10-PCS | Mod: 95,,, | Performed by: STUDENT IN AN ORGANIZED HEALTH CARE EDUCATION/TRAINING PROGRAM

## 2023-09-21 PROCEDURE — 1159F PR MEDICATION LIST DOCUMENTED IN MEDICAL RECORD: ICD-10-PCS | Mod: CPTII,95,, | Performed by: STUDENT IN AN ORGANIZED HEALTH CARE EDUCATION/TRAINING PROGRAM

## 2023-09-21 PROCEDURE — 3044F HG A1C LEVEL LT 7.0%: CPT | Mod: CPTII,95,, | Performed by: STUDENT IN AN ORGANIZED HEALTH CARE EDUCATION/TRAINING PROGRAM

## 2023-09-21 PROCEDURE — 1159F MED LIST DOCD IN RCRD: CPT | Mod: CPTII,95,, | Performed by: STUDENT IN AN ORGANIZED HEALTH CARE EDUCATION/TRAINING PROGRAM

## 2023-09-21 NOTE — PROGRESS NOTES
[facility] Psych  Psychology  Progress Note  Individual Psychotherapy (PhD/LCSW)    Patient Name: Anthony Sexton  MRN: [id number]    Patient Class: OP- Hospital Outpatient Clinic  Primary Care Provider: Tyesha Leggett MD    Psychiatry Initial Visit (PhD/LCSW)  Individual Psychotherapy - CPT 02185    Date: 9/21/2023    Site: [place]    The patient location is: Patient's home/ Patient reported that his/her location at the time of this visit was in the [facility]     Visit type: Virtual visit with synchronous audio and video     Each patient to whom he or she provides medical services by telemedicine is: (1) informed of the relationship between the physician and patient and the respective role of any other health care provider with respect to management of the patient; and (2) notified that he or she may decline to receive medical services by telemedicine and may withdraw from such care at any time.     Referral source: Lenora Walters MD    Clinical status of patient: Outpatient    Anthony Sexton, a 33 y.o. female, for initial evaluation visit.  Met with patient.    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     Substance use:   Alcohol:  "none   Drugs: none   Tobacco: none   Caffeine: none    Current medications and drug reactions (include OTC, herbal): see medication list     Strengths and liabilities: Strength: Patient accepts guidance/feedback, Strength: Patient is expressive/articulate., Strength: Patient is intelligent., Strength: Patient is motivated for change., Strength: Patient has positive support network., Strength: Patient has reasonable judgment., Strength: Patient is stable., Liability: Patient lacks coping skills.    Current Evaluation:     Mental Status Exam:  General Appearance:  unremarkable, age appropriate   Speech: normal tone, normal rate, normal pitch, normal volume      Level of Cooperation: cooperative      Thought Processes: normal and logical   Mood: anxious, sad      Thought Content: normal, no suicidality, no homicidality, delusions, or paranoia   Affect: congruent and appropriate   Orientation: Oriented x3   Memory: recent >  intact   Attention Span & Concentration: intact   Fund of General Knowledge: intact and appropriate to age and level of education   Abstract Reasoning: not assessed   Judgment & Insight: fair     Language  intact     Summary: The patient reported feeling like she is "losing" herself.She feels like her life is a "product of everyone else's" due to the demands of family and work. The last time she felt like herself was a little after college because she felt "free," was financially stable, and was in good physical health. Now, the patient stated that she feels like she is in a 60 year old body. She identified her physical health as the area that is most impacted by losing herself due to high blood pressure, sleep apnea, fatigue, and dehydration. These symptoms prevent the patient from being able to keep her home clean, work, and do homework with her children. We discussed her fibromyalgia diagnosis and how it may be impacting the way that she views her body. She stated that she has not had any " symptoms for a while. We brainstormed changes that she can make to help improve her body and sense of self. Some include getting adequate rest, stay hydrated, and utilizing stress management coping strategies.   Diagnostic Impression - Plan:       ICD-10-CM ICD-9-CM   1. Undiagnosed disease or syndrome present  R69 799.9                               Plan:individual psychotherapy    Return to Clinic: as scheduled    Length of Service (minutes): 45          Barbra Bartlett, PhD  Psychologist  [facility] Psych

## 2023-09-25 ENCOUNTER — TELEPHONE (OUTPATIENT)
Dept: BARIATRICS | Facility: CLINIC | Age: 33
End: 2023-09-25
Payer: COMMERCIAL

## 2023-09-25 NOTE — TELEPHONE ENCOUNTER
----- Message from Macrina Rudd sent at 9/25/2023  8:33 AM CDT -----  Regarding: Appt  Contact: Pt [phone]  Pt is calling to reschedule appt please call

## 2023-09-26 ENCOUNTER — HOSPITAL ENCOUNTER (OUTPATIENT)
Dept: RADIOLOGY | Facility: HOSPITAL | Age: 33
Discharge: HOME | End: 2023-09-26
Attending: FAMILY MEDICINE
Payer: COMMERCIAL

## 2023-09-26 ENCOUNTER — OFFICE VISIT (OUTPATIENT)
Dept: INTERNAL MEDICINE | Facility: CLINIC | Age: 33
End: 2023-09-26
Payer: COMMERCIAL

## 2023-09-26 VITALS
OXYGEN SATURATION: 99 % | BODY MASS INDEX: 44.57 KG/M2 | WEIGHT: 277.31 LBS | HEART RATE: 75 BPM | TEMPERATURE: 97 F | HEIGHT: 66 IN

## 2023-09-26 DIAGNOSIS — M54.9 UPPER BACK PAIN: ICD-10-CM

## 2023-09-26 DIAGNOSIS — M54.9 UPPER BACK PAIN: Primary | ICD-10-CM

## 2023-09-26 PROCEDURE — 72070 X-RAY EXAM THORAC SPINE 2VWS: CPT | Mod: TC

## 2023-09-26 PROCEDURE — 96372 THER/PROPH/DIAG INJ SC/IM: CPT | Mod: S$GLB,,, | Performed by: FAMILY MEDICINE

## 2023-09-26 PROCEDURE — 99999 PR PBB SHADOW E&M-EST. PATIENT-LVL IV: CPT | Mod: PBBFAC,,, | Performed by: FAMILY MEDICINE

## 2023-09-26 PROCEDURE — 72070 X-RAY EXAM THORAC SPINE 2VWS: CPT | Mod: 26,,, | Performed by: RADIOLOGY

## 2023-09-26 PROCEDURE — 3044F HG A1C LEVEL LT 7.0%: CPT | Mod: CPTII,S$GLB,, | Performed by: FAMILY MEDICINE

## 2023-09-26 PROCEDURE — 3008F BODY MASS INDEX DOCD: CPT | Mod: CPTII,S$GLB,, | Performed by: FAMILY MEDICINE

## 2023-09-26 PROCEDURE — 3008F PR BODY MASS INDEX (BMI) DOCUMENTED: ICD-10-PCS | Mod: CPTII,S$GLB,, | Performed by: FAMILY MEDICINE

## 2023-09-26 PROCEDURE — 99213 OFFICE O/P EST LOW 20 MIN: CPT | Mod: 25,S$GLB,, | Performed by: FAMILY MEDICINE

## 2023-09-26 PROCEDURE — 99213 PR OFFICE/OUTPT VISIT, EST, LEVL III, 20-29 MIN: ICD-10-PCS | Mod: 25,S$GLB,, | Performed by: FAMILY MEDICINE

## 2023-09-26 PROCEDURE — 72070 XR THORACIC SPINE AP LATERAL: ICD-10-PCS | Mod: 26,,, | Performed by: RADIOLOGY

## 2023-09-26 PROCEDURE — 96372 PR INJECTION,THERAP/PROPH/DIAG2ST, IM OR SUBCUT: ICD-10-PCS | Mod: S$GLB,,, | Performed by: FAMILY MEDICINE

## 2023-09-26 PROCEDURE — 99999 PR PBB SHADOW E&M-EST. PATIENT-LVL IV: ICD-10-PCS | Mod: PBBFAC,,, | Performed by: FAMILY MEDICINE

## 2023-09-26 PROCEDURE — 3044F PR MOST RECENT HEMOGLOBIN A1C LEVEL <7.0%: ICD-10-PCS | Mod: CPTII,S$GLB,, | Performed by: FAMILY MEDICINE

## 2023-09-26 RX ORDER — KETOROLAC TROMETHAMINE 30 MG/ML
60 INJECTION, SOLUTION INTRAMUSCULAR; INTRAVENOUS
Status: COMPLETED | OUTPATIENT
Start: 2023-09-26 | End: 2023-09-26

## 2023-09-26 RX ORDER — MELOXICAM 15 MG/1
15 TABLET ORAL DAILY
Qty: 30 TABLET | Refills: 0 | Status: SHIPPED | OUTPATIENT
Start: 2023-09-26 | End: 2023-10-11

## 2023-09-26 RX ADMIN — KETOROLAC TROMETHAMINE 60 MG: 30 INJECTION, SOLUTION INTRAMUSCULAR; INTRAVENOUS at 08:09

## 2023-09-26 NOTE — PROGRESS NOTES
Ketorolac 60mg injection given as ordered by . Pt instructed to wait 15 minutes following injection for possible reaction. Pt verbalized understanding and tolerated injection well.     Lot # [id number]  expires 12/2024

## 2023-09-26 NOTE — PROGRESS NOTES
Subjective:      Patient ID: Anthony Sexton is a 33 y.o. female.    Chief Complaint: Back Pain (2 days )      Patient reports pain in upper back-no recent injury or trauma to the area.  She was seen earlier this month for chest pain, had CTA ordered which she accidentally missed the appointment.  Reports for 2 days now having intermittent stabbing sensation along her spine, constant soreness around the spine     Back Pain  Associated symptoms include chest pain (Still present).     Review of Systems   Respiratory:  Negative for cough and shortness of breath.    Cardiovascular:  Positive for chest pain (Still present).   Musculoskeletal:  Positive for back pain.     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     Pre-eclampsia in postpartum period 04/23/2020    Vitamin D deficiency           Past Surgical History:   Procedure Laterality Date    ESOPHAGOGASTRODUODENOSCOPY N/A 5/26/2021    Procedure: EGD (ESOPHAGOGASTRODUODENOSCOPY);  Surgeon: Alka Olmstead MD;  Location: [facility];  Service: Endoscopy;  Laterality: N/A;    ESOPHAGOGASTRODUODENOSCOPY N/A 10/4/2021    Procedure: EGD (ESOPHAGOGASTRODUODENOSCOPY);  Surgeon: Alka Olmstead MD;  Location: [facility];  Service: Endoscopy;  Laterality: N/A;    ESOPHAGOGASTRODUODENOSCOPY N/A 5/29/2023    Procedure: EGD (ESOPHAGOGASTRODUODENOSCOPY);  Surgeon: Alka Olmstead MD;  Location: [facility];  Service: Endoscopy;  Laterality: N/A;    None      PH MONITORING, ESOPHAGUS, WIRELESS, (OFF REFLUX MEDS) N/A 5/29/2023    Procedure: PH MONITORING, ESOPHAGUS, WIRELESS, (OFF REFLUX MEDS);  Surgeon: Alka Olmstead MD;   Location: [facility];  Service: Endoscopy;  Laterality: N/A;    REDUCTION OF BOTH BREASTS Bilateral 5/27/2021    Procedure: MAMMOPLASTY, REDUCTION, BILATERAL;  Surgeon: Daniel Ba MD;  Location: [facility] OR [address] [place] [address];  Service: Plastics;  Laterality: Bilateral;  Left breast tissue: 1584  Right breast tissue: 1438     Family History   Problem Relation Age of Onset    Asthma Mother     Hyperlipidemia Mother     Hypertension Mother     Hypertension Father     Hyperlipidemia Father     Heart disease Father 55        MI/CAD    No Known Problems Sister     No Known Problems Brother     Diabetes Maternal Aunt     Diabetes Maternal Grandmother     Insulin resistance Sister     No Known Problems Brother     No Known Problems Brother     No Known Problems Brother     No Known Problems Brother     No Known Problems Daughter     Breast cancer Maternal Aunt     Cancer Neg Hx     Stroke Neg Hx     Brain cancer Neg Hx     Migraines Neg Hx     Colon cancer Neg Hx     Ovarian cancer Neg Hx     Anesthesia problems Neg Hx      Social History     Socioeconomic History    Marital status:     Number of children: 0   Occupational History    Occupation: RN     Comment: [employer]   Tobacco Use    Smoking status: Never    Smokeless tobacco: Never   Substance and Sexual Activity    Alcohol use: Not Currently     Alcohol/week: 0.0 standard drinks of alcohol    Drug use: No    Sexual activity: Yes     Partners: Male     Birth control/protection: None   Social History Narrative    She wears seatbelt.     Social Determinants of Health     Financial Resource Strain: Low Risk  (9/21/2023)    Overall Financial Resource Strain (CARDIA)     Difficulty of Paying Living Expenses: Not hard at all   Food Insecurity: No Food Insecurity (9/21/2023)    Hunger Vital Sign     Worried About Running Out of Food in the Last Year: Never true     Ran Out of Food in the Last Year: Never true   Transportation Needs: No Transportation Needs (9/21/2023)  "   PRAPARE - Transportation     Lack of Transportation (Medical): No     Lack of Transportation (Non-Medical): No   Physical Activity: Insufficiently Active (9/21/2023)    Exercise Vital Sign     Days of Exercise per Week: 2 days     Minutes of Exercise per Session: 10 min   Stress: Stress Concern Present (9/21/2023)    [employer] - Occupational Stress Questionnaire     Feeling of Stress : To some extent   Social Connections: Unknown (9/21/2023)    Social Connection and Isolation Panel [NHANES]     Frequency of Communication with Friends and Family: More than three times a week     Frequency of Social Gatherings with Friends and Family: More than three times a week     Active Member of Clubs or Organizations: Yes     Attends Club or Organization Meetings: Never     Marital Status:    Housing Stability: Low Risk  (9/21/2023)    Housing Stability Vital Sign     Unable to Pay for Housing in the Last Year: No     Number of Places Lived in the Last Year: 1     Unstable Housing in the Last Year: No     Review of patient's allergies indicates:   Allergen Reactions    Procardia [nifedipine]        Objective:       Pulse 75   Temp 97.3 °F (36.3 °C) (Tympanic)   Ht 5' 5.5" (1.664 m)   Wt 125.8 kg (277 lb 5.4 oz)   LMP 09/07/2023 (Exact Date)   SpO2 99%   BMI 45.45 kg/m²   Physical Exam  Constitutional:       General: She is not in acute distress.     Appearance: Normal appearance. She is well-developed. She is not ill-appearing or diaphoretic.   Cardiovascular:      Rate and Rhythm: Normal rate and regular rhythm.      Heart sounds: Normal heart sounds.   Pulmonary:      Effort: Pulmonary effort is normal.      Breath sounds: Normal breath sounds.   Musculoskeletal:         General: Tenderness (Midthoracic paraspinal muscles) present. No swelling. Normal range of motion.   Neurological:      Mental Status: She is alert and oriented to person, place, and time.   Psychiatric:         " Mood and Affect: Mood normal.         Behavior: Behavior normal.         Thought Content: Thought content normal.         Judgment: Judgment normal.       Assessment:     1. Upper back pain      Plan:   Upper back pain  -     X-Ray Thoracic Spine AP Lateral; Future; Expected date: 09/26/2023    Other orders  -     ketorolac injection 60 mg  -     meloxicam (MOBIC) 15 MG tablet; Take 1 tablet (15 mg total) by mouth once daily. Take with meal.  Dispense: 30 tablet; Refill: 0    Will reschedule CTA of chest  Consider PT if no relief  Medication List with Changes/Refills   New Medications    MELOXICAM (MOBIC) 15 MG TABLET    Take 1 tablet (15 mg total) by mouth once daily. Take with meal.   Current Medications    ALPRAZOLAM (XANAX) 0.5 MG TABLET    Take 1 tab by mouth daily as needed not to exceed 30 tabs per 90 days    BUPROPION (WELLBUTRIN XL) 150 MG TB24 TABLET    Take 1 tablet (150 mg total) by mouth every morning.    ERGOCALCIFEROL (VITAMIN D2) 50,000 UNIT CAP    Take 1 capsule (50,000 Units total) by mouth twice a week.    ETONOGESTREL-ETHINYL ESTRADIOL (NUVARING) 0.12-0.015 MG/24 HR VAGINAL RING    Place 1 each vaginally every 21 days. Insert one (1) ring vaginally and leave in place for three (3) weeks, then remove for one (1) week.    FLUTICASONE PROPIONATE (FLONASE) 50 MCG/ACTUATION NASAL SPRAY    Instill 2 sprays (100 mcg total) by Each Nostril route once daily.    FOLIC ACID (FOLVITE) 1 MG TABLET    Take 1 tablet (1 mg total) by mouth once daily.    OMEPRAZOLE (PRILOSEC) 40 MG CAPSULE    Take 1 capsule (40 mg total) by mouth 2 (two) times daily before meals.    ONDANSETRON (ZOFRAN) 4 MG TABLET    Take 1 tablet (4 mg total) by mouth every 6 (six) hours as needed for Nausea.    SUMATRIPTAN (IMITREX) 100 MG TABLET    Take 1 tablet (100 mg total) by mouth every 72 hours as needed for Migraine (2 to 3 times per week).    TOPIRAMATE (TOPAMAX) 50 MG TABLET    Take 1 tablet (50 mg total) by mouth 2 (two) times  daily.

## 2023-10-04 ENCOUNTER — E-VISIT (OUTPATIENT)
Dept: INTERNAL MEDICINE | Facility: CLINIC | Age: 33
End: 2023-10-04
Payer: COMMERCIAL

## 2023-10-04 ENCOUNTER — PATIENT MESSAGE (OUTPATIENT)
Dept: INTERNAL MEDICINE | Facility: CLINIC | Age: 33
End: 2023-10-04

## 2023-10-04 ENCOUNTER — LAB VISIT (OUTPATIENT)
Dept: LAB | Facility: HOSPITAL | Age: 33
End: 2023-10-04
Payer: COMMERCIAL

## 2023-10-04 DIAGNOSIS — N30.90 CYSTITIS: ICD-10-CM

## 2023-10-04 DIAGNOSIS — N30.90 CYSTITIS: Primary | ICD-10-CM

## 2023-10-04 LAB
BILIRUB UR QL STRIP: NEGATIVE
CLARITY UR: ABNORMAL
COLOR UR: YELLOW
GLUCOSE UR QL STRIP: NEGATIVE
HGB UR QL STRIP: NEGATIVE
KETONES UR QL STRIP: NEGATIVE
LEUKOCYTE ESTERASE UR QL STRIP: ABNORMAL
MICROSCOPIC COMMENT: ABNORMAL
NITRITE UR QL STRIP: NEGATIVE
PH UR STRIP: 7 [PH] (ref 5–8)
PROT UR QL STRIP: NEGATIVE
SP GR UR STRIP: 1.01 (ref 1–1.03)
SQUAMOUS #/AREA URNS HPF: 12 /HPF
URN SPEC COLLECT METH UR: ABNORMAL
WBC #/AREA URNS HPF: 6 /HPF (ref 0–5)

## 2023-10-04 PROCEDURE — 99421 OL DIG E/M SVC 5-10 MIN: CPT | Mod: ,,,

## 2023-10-04 PROCEDURE — 99421 PR E&M, ONLINE DIGIT, EST, < 7 DAYS, 5-10 MINS: ICD-10-PCS | Mod: ,,,

## 2023-10-04 PROCEDURE — 81000 URINALYSIS NONAUTO W/SCOPE: CPT

## 2023-10-04 RX ORDER — CIPROFLOXACIN 500 MG/1
500 TABLET ORAL EVERY 12 HOURS
Qty: 6 TABLET | Refills: 0 | Status: SHIPPED | OUTPATIENT
Start: 2023-10-04 | End: 2023-10-11

## 2023-10-04 NOTE — PROGRESS NOTES
Patient ID: Anthony Sexton is a 33 y.o. female.    Chief Complaint: Urinary Tract Infection (Entered automatically based on patient selection in Patient Portal.)    The patient initiated a request through [facility] on 10/4/2023 for evaluation and management with a chief complaint of Urinary Tract Infection (Entered automatically based on patient selection in Patient Portal.)     I evaluated the questionnaire submission on 10/04/2023.    Oh[facility] Peq Evisit Uti Questionnaire    10/4/2023 10:27 AM CDT - Filed by Patient   Do you agree to participate in an E-Visit? Yes   If you have any of the following problems, please present to your local ER or call 911:  I acknowledge   What is the main issue that you would like for your doctor to address today? Pelvic pain and cloudy urine   Are you able to take your vital signs? No   Are you currently pregnant, could you be pregnant, or are you breast feeding? None of the above   What symptoms do you currently have? Change in urine appearance or smell   When did your symptoms first appear? 9/30/2023   List what you have done or taken to help your symptoms. None   Please indicate whether you have had the following symptoms during the past 24 hours     Urgent urination (a sudden and uncontrollable urge to urinate) None   Frequent urination of small amounts of urine (going to the toilet very often) None   Burning pain when urinating None   Incomplete bladder emptying (still feel like you need to urinate again after urination) None   Pain not associated with urination in the lower abdomen below the belly button) Mild   What does your urine look like? Cloudy   Blood seen in the urine None   Flank pain (pain in one or both sides of the lower back) None   Abnormal Vaginal Discharge (abnormal amount, color and/or odor) Mild   Discharge from the urethra (urinary opening) without urination None   High body temperature/fever? None-<99.5   Please rate how much discomfort you have experience because  of the symptoms in the past 24 hours: Mild   Please indicate how the symptoms have interfered with your every day activities/work in the past 24 hours: None   Please indicate how these symptoms have interfered with your social activities (visiting people, meeting with friends, etc.) in the past 24 hours? None   Are you a diabetic? No   Please indicate whether you have the following at the time of completion of this questionnaire: None of the above   Provide any information you feel is important to your history not asked above    Please attach any relevant images or files (if you have performed a home test for UTI, please submit a photo of results)          Recent Labs Obtained:  No visits with results within 7 Day(s) from this visit.   Latest known visit with results is:   Lab Visit on 09/12/2023   Component Date Value Ref Range Status    Troponin I 09/12/2023 <0.006  0.000 - 0.026 ng/mL Final    Comment: The reference interval for Troponin I represents the 99th percentile   cutoff   for our facility and is consistent with 3rd generation assay   performance.         Anthony was seen today for urinary tract infection.    Diagnoses and all orders for this visit:    Cystitis  -     Urinalysis, Reflex to Urine Culture; Future  -     ciprofloxacin HCl (CIPRO) 500 MG tablet; Take 1 tablet (500 mg total) by mouth every 12 (twelve) hours.      Your urine shows sign of infection. It has been sent off to be cultured. Results should be returned in a few days. In the meantime I am sending antibiotics to your pharmacy.          No follow-ups on file.      E-Visit Time Tracking:

## 2023-10-06 ENCOUNTER — TELEPHONE (OUTPATIENT)
Dept: BARIATRICS | Facility: CLINIC | Age: 33
End: 2023-10-06
Payer: COMMERCIAL

## 2023-10-10 ENCOUNTER — TELEPHONE (OUTPATIENT)
Dept: BARIATRICS | Facility: CLINIC | Age: 33
End: 2023-10-10
Payer: COMMERCIAL

## 2023-10-10 ENCOUNTER — PATIENT MESSAGE (OUTPATIENT)
Dept: BARIATRICS | Facility: CLINIC | Age: 33
End: 2023-10-10
Payer: COMMERCIAL

## 2023-10-10 NOTE — TELEPHONE ENCOUNTER
Called pt to rescheduled appt visits. No answer left VM w/call name and number. Will send portal message.

## 2023-10-11 ENCOUNTER — CLINICAL SUPPORT (OUTPATIENT)
Dept: BARIATRICS | Facility: CLINIC | Age: 33
End: 2023-10-11
Payer: COMMERCIAL

## 2023-10-11 ENCOUNTER — PATIENT MESSAGE (OUTPATIENT)
Dept: BARIATRICS | Facility: CLINIC | Age: 33
End: 2023-10-11

## 2023-10-11 ENCOUNTER — OFFICE VISIT (OUTPATIENT)
Dept: BARIATRICS | Facility: CLINIC | Age: 33
End: 2023-10-11
Payer: COMMERCIAL

## 2023-10-11 VITALS — WEIGHT: 271 LBS | BODY MASS INDEX: 43.55 KG/M2 | HEIGHT: 66 IN

## 2023-10-11 DIAGNOSIS — F41.1 GENERALIZED ANXIETY DISORDER: ICD-10-CM

## 2023-10-11 DIAGNOSIS — E66.01 OBESITY, MORBID, BMI 40.0-49.9: ICD-10-CM

## 2023-10-11 DIAGNOSIS — G47.33 OSA (OBSTRUCTIVE SLEEP APNEA): ICD-10-CM

## 2023-10-11 DIAGNOSIS — I10 ESSENTIAL HYPERTENSION: Primary | ICD-10-CM

## 2023-10-11 DIAGNOSIS — Z71.3 DIETARY COUNSELING AND SURVEILLANCE: ICD-10-CM

## 2023-10-11 DIAGNOSIS — G47.33 OSA (OBSTRUCTIVE SLEEP APNEA): Primary | ICD-10-CM

## 2023-10-11 DIAGNOSIS — E66.01 MORBID OBESITY WITH BODY MASS INDEX (BMI) OF 40.0 TO 49.9: ICD-10-CM

## 2023-10-11 DIAGNOSIS — K21.9 GASTROESOPHAGEAL REFLUX DISEASE WITHOUT ESOPHAGITIS: ICD-10-CM

## 2023-10-11 DIAGNOSIS — I10 ESSENTIAL HYPERTENSION: ICD-10-CM

## 2023-10-11 PROCEDURE — 1160F PR REVIEW ALL MEDS BY PRESCRIBER/CLIN PHARMACIST DOCUMENTED: ICD-10-PCS | Mod: CPTII,95,, | Performed by: PHYSICIAN ASSISTANT

## 2023-10-11 PROCEDURE — 1160F RVW MEDS BY RX/DR IN RCRD: CPT | Mod: CPTII,95,, | Performed by: PHYSICIAN ASSISTANT

## 2023-10-11 PROCEDURE — 1159F PR MEDICATION LIST DOCUMENTED IN MEDICAL RECORD: ICD-10-PCS | Mod: CPTII,95,, | Performed by: PHYSICIAN ASSISTANT

## 2023-10-11 PROCEDURE — 99204 OFFICE O/P NEW MOD 45 MIN: CPT | Mod: 95,,, | Performed by: PHYSICIAN ASSISTANT

## 2023-10-11 PROCEDURE — 3044F PR MOST RECENT HEMOGLOBIN A1C LEVEL <7.0%: ICD-10-PCS | Mod: CPTII,95,, | Performed by: PHYSICIAN ASSISTANT

## 2023-10-11 PROCEDURE — 3008F PR BODY MASS INDEX (BMI) DOCUMENTED: ICD-10-PCS | Mod: CPTII,95,, | Performed by: PHYSICIAN ASSISTANT

## 2023-10-11 PROCEDURE — 3044F HG A1C LEVEL LT 7.0%: CPT | Mod: CPTII,95,, | Performed by: PHYSICIAN ASSISTANT

## 2023-10-11 PROCEDURE — 3008F BODY MASS INDEX DOCD: CPT | Mod: CPTII,95,, | Performed by: PHYSICIAN ASSISTANT

## 2023-10-11 PROCEDURE — 1159F MED LIST DOCD IN RCRD: CPT | Mod: CPTII,95,, | Performed by: PHYSICIAN ASSISTANT

## 2023-10-11 PROCEDURE — 99499 UNLISTED E&M SERVICE: CPT | Mod: 95,,, | Performed by: DIETITIAN, REGISTERED

## 2023-10-11 PROCEDURE — 99499 NO LOS: ICD-10-PCS | Mod: 95,,, | Performed by: DIETITIAN, REGISTERED

## 2023-10-11 PROCEDURE — 99204 PR OFFICE/OUTPT VISIT, NEW, LEVL IV, 45-59 MIN: ICD-10-PCS | Mod: 95,,, | Performed by: PHYSICIAN ASSISTANT

## 2023-10-11 NOTE — PROGRESS NOTES
"NUTRITIONAL CONSULT    Referring Physician: Ward Meraz M.D.   Reason for MNT Referral: Initial assessment for laparoscopic Joelle-en-Y work-up    PAST MEDICAL HISTORY:   33 y.o. female  Body mass index is 44.41 kg/m²..  Weight history includes 271 is current wt and highest wt for past 6 month.  Lowest wt as adult was 211 lbs before birth of first child due to hyperemesis during pregnancy.  Dieting attempts include Weight Watchers, Ideal protein, keto, adipex in early 20's..    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     Pre-eclampsia in postpartum period 04/23/2020    Vitamin D deficiency        CLINICAL DATA:  33 y.o.-year-old Black or  female.  Height: 5'5.5"  Weight: 271  lbs  IBW: 141 lbs  BMI: 45.45  The patient's goal weight (50% EBW): 206 lbs  Personal goal weight: Under 200 lbs Feel better ~200 lbs    Goal for Bariatric Surgery: to improve health, to improve quality of life, to lose weight, and to prevent future medical conditions     DAILY NUTRITIONAL NEEDS: pre-op nutritional bariatric guidelines to promote weight loss  7325-4359 Calories    Grams Protein    NUTRITION & HEALTH HISTORY:  Greatest challenge: dining out frequency, sweets, starchy CHO, portion control, and emotional eating and lack of education on how much to eat    Current diet recall: 2 meals per day    B: 2 eggs with raisin bran toast with cream cheese or cereal with 2% milk fruit coffee with nondairy creamer or stevia  S: Fig bar or chips or fruit at work does not take lunch  D:Homemade cooking protein chicken or beef with starch and veggies  S: candy or cookies or " dessert    Current Diet:  Meal pattern: regular meal plan 2 meals and 2 snacks per day  Protein supplements: tried premier in past   Snacking: 3-4 / day  Vegetables: Likes a variety. Eats daily.  Fruits: Likes a variety. Eats daily.  Beverages:  ice carbonated beverages olli pops, coffee daily  Dining out: Reduced lately. Once a week   Mostly fast food, restaurants, and take-out.  Cooking at home: Daily. Mostly baked, grilled, smothered, and fried meat, fish, starchy CHO, and vegetables.    Exercise:  Past exercise: Adequate    Current exercise: Adequate 30 min treadmill 3 times a week   Restrictions to exercise: none    Vitamins / Minerals / Herbs:   Multivitamin with Probiotic    Labs:   None available at time of visit    Food Allergies:   None reported    Social:  Works regular daytime shifts.  Lives with . 6 and 3 year old daughter  Grocery shopping and food prep self.  Patient believes the household will be supportive after surgery.  Alcohol:  once every 6 months .  Smoking: None.    ASSESSMENT:  Patient reports attempts at weight loss, only to regain lost weight.  Patient demonstrated knowledge of healthy eating behaviors and exercise patterns; admits to not eating healthy and not exercising at this point.  Patient states willingness to change lifestyle and make behavior modifications .        Barriers to Education: none    Stage of change: determination    NUTRITION DIAGNOSIS:    Morbid Obesity related to Excessive carbohydrate intake, Excessive calorie intake, and Inadequate protein intake as evidence by BMI.    BARIATRIC DIET DISCUSSION/PLAN:  Discussed diet after surgery and related to patient's food record.  Reviewed nutrition guidelines for before and after surgery.  Answered all questions.  Work on Bariatric Nutrition Checklist.  1200-calorie diet.  1500-calorie diet.  5-6 meals per day.  Start including protein supplements in the diet plan daily.  Reduce frequency of dining out.  More grocery  shopping and meal preparation at home.  Return to clinic.    RECOMMENDATIONS:  Pt is a potential candidate for bariatric surgery.    Follow up in one month.  Needs additional visits with RD.    Patient verbalized understanding.      Communicated nutrition plan with bariatric team.    SESSION TIME:  60 minutes

## 2023-10-11 NOTE — PATIENT INSTRUCTIONS
Prior to surgery you will need to complete:  - Dietitian consult and follow up appointments as needed  - PCP clearance  - Labs  - Chest X-ray  - EKG  - Psychological evaluation, Please call psychiatry [phone] to schedule  - EGD and pH study- COMPLETED.    In preparation for bariatric surgery, please complete the following:   Discuss your current medications with your primary care provider, remember your medications will need to be crushed, chewable, or in liquid form for the first 2-4 weeks after a gastric bypass or sleeve.  For a gastric band, your medications will need to be crushed indefinitely.    If you take a blood thinner such as: Coumadin (warfarin), Pradaxa (dabigatran), or Plavix (clopidogrel), you will need to speak with your prescribing provider on how or if this medication can be stopped before surgery.   If you take a medication for depression or anxiety, remember to discuss this with the psychologist or psychiatrist that you see.   If you take medication for arthritis on a daily basis that is considered a non-steroidal anti-inflammatory (NSAID), please discuss with your prescribing physician an alternative medication.  After having gastric bypass or gastric sleeve, this group of medications is not appropriate to take due to increased risk of bleeding stomach ulcers.      DEFINITIONS  Appointments: Pre-scheduled meetings or consultations with any physician, advanced practice provider, dietitian, or psychologist, and labs, imaging studies, sleep studies, etc.   Late cancellation: Cancelling an appointment 24-48 hours prior to scheduled time.  No-Show appointment:  is when   You do NOT arrive to your appointment at the time its scheduled.  You call to cancel or cancel via [facility] less than 24 hours in advance of your scheduled appointment  You show up 15 minutes AFTER your scheduled appointment time without any notification of being late.     POLICY  You are allowed up to 3 cancellations for  appointments.   After 3 cancellations your case will be placed on hold for 2 months and after that time you can resume the program.   You are allowed only 1 no-show for an appointment.   You will be re-scheduled one time and if there is a 2nd no-show at any point, your case will be placed on hold for 3 months.  After 3 months you can resume the program.     Upon resuming the program after being placed on hold for either above mentioned reasons, if you have a late cancel or no show for any appointment, the bariatric team will review if youre an appropriate candidate for surgery at the monthly meeting.

## 2023-10-11 NOTE — PROGRESS NOTES
The patient location is: home  The chief complaint leading to consultation is: bariatric surgery evaluation    Visit type: audiovisual    Face to Face time with patient: 45  60 minutes of total time spent on the encounter, which includes face to face time and non-face to face time preparing to see the patient (eg, review of tests), Obtaining and/or reviewing separately obtained history, Documenting clinical information in the electronic or other health record, Independently interpreting results (not separately reported) and communicating results to the patient/family/caregiver, or Care coordination (not separately reported).         Each patient to whom he or she provides medical services by telemedicine is:  (1) informed of the relationship between the physician and patient and the respective role of any other health care provider with respect to management of the patient; and (2) notified that he or she may decline to receive medical services by telemedicine and may withdraw from such care at any time.    Notes:      BARIATRIC NEW PATIENT EVALUATION    CHIEF COMPLAINT:   Morbid obesity, body mass index is 44.41 kg/m². and inability to lose weight.    HPI:  Anthony Sexton is a 33 y.o. morbidly obese female. Her current body mass index is 44.41 kg/m². She has multiple associated comorbidities including essential hypertension, SAVANNA, and GERD on daily medications.  She has struggled with excess weight since childhood.  She lost weight with her pregnancies (50 with her first and 30 with her second, due to hyperemesis)  Her highest adult weight is her current weight of 271 lbs, and her lowest adult weight was 211 lbs at age 16 and in the year 2016.  The patient has tried Weight Watchers and Beach Body, Ideal Protein, and Keto .  The patient was most successful with Weight Watchers with a weight loss of 20 lbs, then plateau'ed  and did not lose more.  Her current exercise includes  walking stairs and treadmill  3 times a  week for 30 minutes each. She denies any history of eating disorder such as anorexia, bulimia, or taking laxatives for weight loss, and denies any addiction including illicit substances, alcohol, or gambling.  Patient states she has a excellent  support system ( and kids).  She resides in her home with her  and children.  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  She  endorses a >10 year history of GERD.  The patient's goal is to lose enough to fell better.    ESS: Score of 9, reviewed 10/11/2023.  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     Pre-eclampsia in postpartum period 04/23/2020    Vitamin D deficiency        PAST SURGICAL HISTORY:  Past Surgical History:   Procedure Laterality Date    ESOPHAGOGASTRODUODENOSCOPY N/A 5/26/2021    Procedure: EGD (ESOPHAGOGASTRODUODENOSCOPY);  Surgeon: Alka Olmstead MD;  Location: [facility];  Service: Endoscopy;  Laterality: N/A;    ESOPHAGOGASTRODUODENOSCOPY N/A 10/4/2021    Procedure: EGD (ESOPHAGOGASTRODUODENOSCOPY);  Surgeon: Alka Olmstead MD;  Location: [facility];  Service: Endoscopy;  Laterality: N/A;    ESOPHAGOGASTRODUODENOSCOPY N/A 5/29/2023    Procedure: EGD (ESOPHAGOGASTRODUODENOSCOPY);  Surgeon: Alka Olmstead MD;  Location: [facility];  Service: Endoscopy;  Laterality: N/A;    None      PH MONITORING, ESOPHAGUS, WIRELESS, (OFF REFLUX MEDS) N/A 5/29/2023    Procedure: PH MONITORING, ESOPHAGUS, WIRELESS, (OFF REFLUX MEDS);  Surgeon: Alka Olmstead MD;  Location: [facility];  Service:  Endoscopy;  Laterality: N/A;    REDUCTION OF BOTH BREASTS Bilateral 5/27/2021    Procedure: MAMMOPLASTY, REDUCTION, BILATERAL;  Surgeon: Daniel Ba MD;  Location: [facility] OR [address] [place] [address];  Service: Plastics;  Laterality: Bilateral;  Left breast tissue: 1584  Right breast tissue: 1438       FAMILY HISTORY:  Family History   Problem Relation Age of Onset    Diabetes Mother     Asthma Mother     Hyperlipidemia Mother     Hypertension Mother     Hypertension Father     Hyperlipidemia Father     Heart disease Father 55        MI/CAD    No Known Problems Sister     Insulin resistance Sister     No Known Problems Brother     No Known Problems Brother     No Known Problems Brother     No Known Problems Brother     No Known Problems Brother     Diabetes Maternal Grandmother     No Known Problems Daughter     Diabetes Maternal Aunt     Breast cancer Maternal Aunt     Cancer Neg Hx     Stroke Neg Hx     Brain cancer Neg Hx     Migraines Neg Hx     Colon cancer Neg Hx     Ovarian cancer Neg Hx     Anesthesia problems Neg Hx         SOCIAL HISTORY:  Social History     Socioeconomic History    Marital status:     Number of children: 0   Occupational History    Occupation: RN     Comment: [employer]   Tobacco Use    Smoking status: Never    Smokeless tobacco: Never   Substance and Sexual Activity    Alcohol use: Not Currently     Alcohol/week: 0.0 standard drinks of alcohol    Drug use: No    Sexual activity: Yes     Partners: Male     Birth control/protection: Inserts     Comment: Not interested in having further kids   Social History Narrative    She wears seatbelt.      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    2 kids.      Social Determinants of Health     Financial Resource Strain: Low Risk  (10/10/2023)    Overall Financial Resource Strain (CARDIA)     Difficulty of Paying Living Expenses: Not hard at all   Food Insecurity: No Food Insecurity (10/10/2023)    Hunger Vital  Sign     Worried About Running Out of Food in the Last Year: Never true     Ran Out of Food in the Last Year: Never true   Transportation Needs: No Transportation Needs (10/10/2023)    PRAPARE - Transportation     Lack of Transportation (Medical): No     Lack of Transportation (Non-Medical): No   Physical Activity: Insufficiently Active (10/10/2023)    Exercise Vital Sign     Days of Exercise per Week: 3 days     Minutes of Exercise per Session: 30 min   Stress: No Stress Concern Present (10/10/2023)    [employer] [place] [employer] - Occupational Stress Questionnaire     Feeling of Stress : Not at all   Recent Concern: Stress - Stress Concern Present (9/21/2023)    [employer] [place] [employer] - Occupational Stress Questionnaire     Feeling of Stress : To some extent   Social Connections: Unknown (10/10/2023)    Social Connection and Isolation Panel [NHANES]     Frequency of Communication with Friends and Family: More than three times a week     Frequency of Social Gatherings with Friends and Family: More than three times a week     Active Member of Clubs or Organizations: Yes     Attends Club or Organization Meetings: Never     Marital Status:    Housing Stability: Low Risk  (10/10/2023)    Housing Stability Vital Sign     Unable to Pay for Housing in the Last Year: No     Number of Places Lived in the Last Year: 1     Unstable Housing in the Last Year: No       MEDICATIONS:    Current Outpatient Medications:     buPROPion (WELLBUTRIN XL) 150 MG TB24 tablet, Take 1 tablet (150 mg total) by mouth every morning., Disp: 90 tablet, Rfl: 1    ergocalciferol (VITAMIN D2) 50,000 unit Cap, Take 1 capsule (50,000 Units total) by mouth twice a week., Disp: 8 capsule, Rfl: 11    etonogestreL-ethinyl estradioL (NUVARING) 0.12-0.015 mg/24 hr vaginal ring, Place 1 each vaginally every 21 days. Insert one (1) ring vaginally and leave in place for three (3) weeks, then remove for one (1) week., Disp:  "1 each, Rfl: 11    fluticasone propionate (FLONASE) 50 mcg/actuation nasal spray, Instill 2 sprays (100 mcg total) by Each Nostril route once daily., Disp: 16 g, Rfl: 2    omeprazole (PRILOSEC) 40 MG capsule, Take 1 capsule (40 mg total) by mouth 2 (two) times daily before meals., Disp: 60 capsule, Rfl: 2    ALPRAZolam (XANAX) 0.5 MG tablet, Take 1 tab by mouth daily as needed not to exceed 30 tabs per 90 days (Patient not taking: Reported on 9/26/2023), Disp: 30 tablet, Rfl: 1    ondansetron (ZOFRAN) 4 MG tablet, Take 1 tablet (4 mg total) by mouth every 6 (six) hours as needed for Nausea. (Patient not taking: Reported on 9/26/2023), Disp: 40 tablet, Rfl: 1    ALLERGIES:  Review of patient's allergies indicates:   Allergen Reactions    Procardia [nifedipine]        Review of Systems   Constitutional:  Negative for chills, fever and weight loss.   Eyes:  Negative for blurred vision, double vision and pain.   Respiratory:  Negative for cough, shortness of breath and wheezing.    Cardiovascular:  Negative for chest pain, palpitations and leg swelling.   Gastrointestinal:  Negative for abdominal pain, blood in stool, constipation, diarrhea, heartburn (controlled with PPI twice daily), melena, nausea and vomiting.   Genitourinary:  Negative for dysuria, frequency and hematuria.   Skin:  Negative for itching and rash.   Neurological:  Negative for headaches.   Psychiatric/Behavioral:  Negative for depression and suicidal ideas.        Vitals:    10/11/23 1334   Weight: 122.9 kg (271 lb)   Height: 5' 5.5" (1.664 m)       Physical Exam  Vitals and nursing note reviewed.   Constitutional:       General: She is not in acute distress.     Appearance: Normal appearance. She is well-developed. She is obese. She is not ill-appearing, toxic-appearing or diaphoretic.   HENT:      Head: Normocephalic and atraumatic.   Eyes:      General: No scleral icterus.        Right eye: No discharge.         Left eye: No discharge.      " Extraocular Movements: Extraocular movements intact.      Conjunctiva/sclera: Conjunctivae normal.      Pupils: Pupils are equal, round, and reactive to light.   Skin:     General: Skin is warm and dry.   Neurological:      General: No focal deficit present.      Mental Status: She is alert and oriented to person, place, and time.   Psychiatric:         Mood and Affect: Mood normal.         Behavior: Behavior normal.         Thought Content: Thought content normal.         Judgment: Judgment normal.          DIAGNOSIS:  1. Morbid obesity, body mass index is 44.41 kg/m². and inability to lose weight.  2. Co-morbidities: essential hypertension, SAVANNA, and GERD on daily medications.    PLAN:  The patient is a good candidate for Bariatric Surgery. The patient is interested in the gastric sleeve or the laparoscopic van-en-y gastric bypass with Dr. Meraz. The surgery and post-op care was discussed in detail with the patient. All questions were answered.    The patient understands that bariatric surgery is a tool to aid in weight loss and that they need to be committed to the diet and exercise post-operatively for successful weight loss. Discussed with patient that bariatric surgery is not the easy way out and that it will take plenty of dedication on the patient's part to be successful. Also discussed the possibility of weight regain if the patient strays from the diet guidelines or exercise requirements. Patient verbalized understanding and wishes to proceed with the work-up.    Estimated Goal weight is 200-205 lbs.    ORDERS:  1. Chest X-Ray and EKG  2. Psychological Consult, Bariatric Dietician Consult, and PCP Clearance  3. Bariatric Labs: Per orders.  4. Discussed the possibility of hyper-fertility surrounding 2 week liquid diet before and after surgery: advised patient to use 2 forms of birth control during this time period to avoid conception.  Patient verbalized understanding and will discuss options with her  GYN/ PCP.    PCP: Tyesha Leggett MD  RTC: As scheduled.

## 2023-10-12 ENCOUNTER — TELEPHONE (OUTPATIENT)
Dept: PSYCHIATRY | Facility: CLINIC | Age: 33
End: 2023-10-12
Payer: COMMERCIAL

## 2023-10-16 ENCOUNTER — OFFICE VISIT (OUTPATIENT)
Dept: PSYCHIATRY | Facility: CLINIC | Age: 33
End: 2023-10-16
Payer: COMMERCIAL

## 2023-10-16 DIAGNOSIS — R69 UNDIAGNOSED DISEASE OR SYNDROME PRESENT: Primary | ICD-10-CM

## 2023-10-16 PROCEDURE — 1159F MED LIST DOCD IN RCRD: CPT | Mod: CPTII,95,, | Performed by: STUDENT IN AN ORGANIZED HEALTH CARE EDUCATION/TRAINING PROGRAM

## 2023-10-16 PROCEDURE — 1159F PR MEDICATION LIST DOCUMENTED IN MEDICAL RECORD: ICD-10-PCS | Mod: CPTII,95,, | Performed by: STUDENT IN AN ORGANIZED HEALTH CARE EDUCATION/TRAINING PROGRAM

## 2023-10-16 PROCEDURE — 90834 PR PSYCHOTHERAPY W/PATIENT, 45 MIN: ICD-10-PCS | Mod: 95,,, | Performed by: STUDENT IN AN ORGANIZED HEALTH CARE EDUCATION/TRAINING PROGRAM

## 2023-10-16 PROCEDURE — 3044F HG A1C LEVEL LT 7.0%: CPT | Mod: CPTII,95,, | Performed by: STUDENT IN AN ORGANIZED HEALTH CARE EDUCATION/TRAINING PROGRAM

## 2023-10-16 PROCEDURE — 90834 PSYTX W PT 45 MINUTES: CPT | Mod: 95,,, | Performed by: STUDENT IN AN ORGANIZED HEALTH CARE EDUCATION/TRAINING PROGRAM

## 2023-10-16 PROCEDURE — 3044F PR MOST RECENT HEMOGLOBIN A1C LEVEL <7.0%: ICD-10-PCS | Mod: CPTII,95,, | Performed by: STUDENT IN AN ORGANIZED HEALTH CARE EDUCATION/TRAINING PROGRAM

## 2023-10-17 VITALS — HEIGHT: 66 IN | BODY MASS INDEX: 43.55 KG/M2 | WEIGHT: 271 LBS

## 2023-10-17 NOTE — PROGRESS NOTES
[facility]  Psychology  Progress Note  Individual Psychotherapy (PhD/LCSW)    Patient Name: Anthony Sexton  MRN: [id number]    Patient Class: OP- Hospital Outpatient [facility]  Primary Care Provider: Tyesha Leggett MD    Psychiatry Initial Visit (PhD/LCSW)  Individual Psychotherapy - CPT 38665    Date: 10/16/2023    Site: [place]    The patient location is: Patient's home/ Patient reported that his/her location at the time of this visit was in the [facility]     Visit type: Virtual visit with synchronous audio and video     Each patient to whom he or she provides medical services by telemedicine is: (1) informed of the relationship between the physician and patient and the respective role of any other health care provider with respect to management of the patient; and (2) notified that he or she may decline to receive medical services by telemedicine and may withdraw from such care at any time.     Referral source: Lenora Walters MD    Clinical status of patient: Outpatient    Anthony Sexton, a 33 y.o. female, for initial evaluation visit.  Met with patient.    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     Substance use:   Alcohol:  "none   Drugs: none   Tobacco: none   Caffeine: none    Current medications and drug reactions (include OTC, herbal): see medication list     Strengths and liabilities: Strength: Patient accepts guidance/feedback, Strength: Patient is expressive/articulate., Strength: Patient is intelligent., Strength: Patient is motivated for change., Strength: Patient has positive support network., Strength: Patient has reasonable judgment., Strength: Patient is stable., Liability: Patient lacks coping skills.    Current Evaluation:     Mental Status Exam:  General Appearance:  unremarkable, age appropriate   Speech: normal tone, normal rate, normal pitch, normal volume      Level of Cooperation: cooperative      Thought Processes: normal and logical   Mood: anxious, sad      Thought Content: normal, no suicidality, no homicidality, delusions, or paranoia   Affect: congruent and appropriate   Orientation: Oriented x3   Memory: recent >  intact   Attention Span & Concentration: intact   Fund of General Knowledge: intact and appropriate to age and level of education   Abstract Reasoning: not assessed   Judgment & Insight: fair     Language  intact     Summary: The patient reported feeling good and refreshed. We discussed a personal goal that the patient would like to achieve but has been unsuccessful with most of her life. We explored the patient's "why" so that she can have something to keep her motivated during challenging times. We examined other difficult tasks that the patient has successfully completed, such as nursing school, and found the supports that helped the patient during hard times. She found that her parents and sorors were her biggest sources of support during that time. She can also lean on them if necessary during her path to her goal.   Diagnostic Impression - Plan:       ICD-10-CM ICD-9-CM   1. Undiagnosed disease or syndrome present  R69 799.9                                 Plan:individual " psychotherapy    Return to Clinic: as scheduled    Length of Service (minutes): 45          Barbra Bartlett, PhD  Psychologist  [facility]

## 2023-10-26 ENCOUNTER — OFFICE VISIT (OUTPATIENT)
Dept: INTERNAL MEDICINE | Facility: CLINIC | Age: 33
End: 2023-10-26
Payer: COMMERCIAL

## 2023-10-26 VITALS
HEART RATE: 97 BPM | DIASTOLIC BLOOD PRESSURE: 84 MMHG | RESPIRATION RATE: 19 BRPM | HEIGHT: 66 IN | BODY MASS INDEX: 44.41 KG/M2 | OXYGEN SATURATION: 98 % | SYSTOLIC BLOOD PRESSURE: 130 MMHG

## 2023-10-26 DIAGNOSIS — J30.9 ALLERGIC RHINITIS, UNSPECIFIED SEASONALITY, UNSPECIFIED TRIGGER: ICD-10-CM

## 2023-10-26 DIAGNOSIS — J01.90 ACUTE BACTERIAL SINUSITIS: Primary | ICD-10-CM

## 2023-10-26 DIAGNOSIS — B96.89 ACUTE BACTERIAL SINUSITIS: Primary | ICD-10-CM

## 2023-10-26 DIAGNOSIS — E66.01 MORBID OBESITY WITH BMI OF 40.0-44.9, ADULT: ICD-10-CM

## 2023-10-26 PROCEDURE — 1159F MED LIST DOCD IN RCRD: CPT | Mod: CPTII,S$GLB,,

## 2023-10-26 PROCEDURE — 3044F PR MOST RECENT HEMOGLOBIN A1C LEVEL <7.0%: ICD-10-PCS | Mod: CPTII,S$GLB,,

## 2023-10-26 PROCEDURE — 99213 OFFICE O/P EST LOW 20 MIN: CPT | Mod: S$GLB,,,

## 2023-10-26 PROCEDURE — 99213 PR OFFICE/OUTPT VISIT, EST, LEVL III, 20-29 MIN: ICD-10-PCS | Mod: S$GLB,,,

## 2023-10-26 PROCEDURE — 1160F PR REVIEW ALL MEDS BY PRESCRIBER/CLIN PHARMACIST DOCUMENTED: ICD-10-PCS | Mod: CPTII,S$GLB,,

## 2023-10-26 PROCEDURE — 3075F SYST BP GE 130 - 139MM HG: CPT | Mod: CPTII,S$GLB,,

## 2023-10-26 PROCEDURE — 3008F PR BODY MASS INDEX (BMI) DOCUMENTED: ICD-10-PCS | Mod: CPTII,S$GLB,,

## 2023-10-26 PROCEDURE — 99999 PR PBB SHADOW E&M-EST. PATIENT-LVL III: CPT | Mod: PBBFAC,,,

## 2023-10-26 PROCEDURE — 1159F PR MEDICATION LIST DOCUMENTED IN MEDICAL RECORD: ICD-10-PCS | Mod: CPTII,S$GLB,,

## 2023-10-26 PROCEDURE — 1160F RVW MEDS BY RX/DR IN RCRD: CPT | Mod: CPTII,S$GLB,,

## 2023-10-26 PROCEDURE — 3008F BODY MASS INDEX DOCD: CPT | Mod: CPTII,S$GLB,,

## 2023-10-26 PROCEDURE — 99999 PR PBB SHADOW E&M-EST. PATIENT-LVL III: ICD-10-PCS | Mod: PBBFAC,,,

## 2023-10-26 PROCEDURE — 3079F DIAST BP 80-89 MM HG: CPT | Mod: CPTII,S$GLB,,

## 2023-10-26 PROCEDURE — 3044F HG A1C LEVEL LT 7.0%: CPT | Mod: CPTII,S$GLB,,

## 2023-10-26 PROCEDURE — 3075F PR MOST RECENT SYSTOLIC BLOOD PRESS GE 130-139MM HG: ICD-10-PCS | Mod: CPTII,S$GLB,,

## 2023-10-26 PROCEDURE — 3079F PR MOST RECENT DIASTOLIC BLOOD PRESSURE 80-89 MM HG: ICD-10-PCS | Mod: CPTII,S$GLB,,

## 2023-10-26 RX ORDER — AMOXICILLIN AND CLAVULANATE POTASSIUM 875; 125 MG/1; MG/1
1 TABLET, FILM COATED ORAL EVERY 12 HOURS
Qty: 20 TABLET | Refills: 0 | Status: SHIPPED | OUTPATIENT
Start: 2023-10-26 | End: 2023-11-17

## 2023-10-26 RX ORDER — FLUTICASONE PROPIONATE 50 MCG
2 SPRAY, SUSPENSION (ML) NASAL DAILY
Qty: 16 G | Refills: 2 | Status: SHIPPED | OUTPATIENT
Start: 2023-10-26 | End: 2024-02-16

## 2023-10-26 RX ORDER — PROMETHAZINE HYDROCHLORIDE AND DEXTROMETHORPHAN HYDROBROMIDE 6.25; 15 MG/5ML; MG/5ML
5 SYRUP ORAL EVERY 6 HOURS PRN
Qty: 180 ML | Refills: 1 | Status: SHIPPED | OUTPATIENT
Start: 2023-10-26 | End: 2023-11-17

## 2023-10-26 NOTE — PROGRESS NOTES
Anthony FABIEN Darshan  10/26/2023  [id number]    Tyesha Leggett MD  Patient Care Team:  Tyesha Leggett MD as PCP - General (Family Medicine)  Emily Lott LPN as Care Coordinator (Internal Medicine)  Rebecca Butler as Digital Medicine Health           Visit Type:an urgent visit for a new problem    Chief Complaint:  Sinus infection     History of Present Illness:    Pt has had congestion, sinus pain/pressure  Productive cough with thick green mucus for the last 2 weeks  Blowing thick green mucus   Negative home COVID  Tried antihistamines and OTC sinus/allergy meds without relief  Negative for SOB, fever, body aches       History:  Past Medical History:   Diagnosis Date    Anemia     Anxiety     Chronic low back pain     Depression     Enthesitis related arthritis 1/11/2023    Fibromyalgia     History of abnormal cervical Pap smear     History of recurrent UTIs     History of ulcer disease     Irregular menses     Obesity     SAVANNA (obstructive sleep apnea) 3/2/2023    Postpartum depression     with last pregnancy.     Pre-eclampsia in postpartum period 04/23/2020    Vitamin D deficiency      Past Surgical History:   Procedure Laterality Date    ESOPHAGOGASTRODUODENOSCOPY N/A 5/26/2021    Procedure: EGD (ESOPHAGOGASTRODUODENOSCOPY);  Surgeon: Alka Olmstead MD;  Location: [facility];  Service: Endoscopy;  Laterality: N/A;    ESOPHAGOGASTRODUODENOSCOPY N/A 10/4/2021    Procedure: EGD (ESOPHAGOGASTRODUODENOSCOPY);  Surgeon: Alka Olmstead MD;  Location: [facility];  Service: Endoscopy;  Laterality: N/A;    ESOPHAGOGASTRODUODENOSCOPY N/A 5/29/2023    Procedure: EGD (ESOPHAGOGASTRODUODENOSCOPY);  Surgeon: Alka Olmstead MD;  Location: [facility];  Service: Endoscopy;  Laterality: N/A;    None      PH MONITORING, ESOPHAGUS, WIRELESS, (OFF REFLUX MEDS) N/A 5/29/2023    Procedure: PH MONITORING, ESOPHAGUS, WIRELESS, (OFF REFLUX MEDS);  Surgeon: Alka Olmstead MD;  Location: [facility];  Service:  Endoscopy;  Laterality: N/A;    REDUCTION OF BOTH BREASTS Bilateral 5/27/2021    Procedure: MAMMOPLASTY, REDUCTION, BILATERAL;  Surgeon: Daniel Ba MD;  Location: [facility] OR [address] [place] [address];  Service: Plastics;  Laterality: Bilateral;  Left breast tissue: 1584  Right breast tissue: 1438     Family History   Problem Relation Age of Onset    Diabetes Mother     Asthma Mother     Hyperlipidemia Mother     Hypertension Mother     Hypertension Father     Hyperlipidemia Father     Heart disease Father 55        MI/CAD    No Known Problems Sister     Insulin resistance Sister     No Known Problems Brother     No Known Problems Brother     No Known Problems Brother     No Known Problems Brother     No Known Problems Brother     Diabetes Maternal Grandmother     No Known Problems Daughter     Diabetes Maternal Aunt     Breast cancer Maternal Aunt     Cancer Neg Hx     Stroke Neg Hx     Brain cancer Neg Hx     Migraines Neg Hx     Colon cancer Neg Hx     Ovarian cancer Neg Hx     Anesthesia problems Neg Hx      Social History     Socioeconomic History    Marital status:     Number of children: 0   Occupational History    Occupation: RN     Comment: [employer]   Tobacco Use    Smoking status: Never    Smokeless tobacco: Never   Substance and Sexual Activity    Alcohol use: Not Currently     Alcohol/week: 0.0 standard drinks of alcohol    Drug use: No    Sexual activity: Yes     Partners: Male     Birth control/protection: Inserts     Comment: Not interested in having further kids   Social History Narrative    She wears seatbelt.      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    2 kids.      Social Determinants of Health     Financial Resource Strain: Low Risk  (10/10/2023)    Overall Financial Resource Strain (CARDIA)     Difficulty of Paying Living Expenses: Not hard at all   Food Insecurity: No Food Insecurity (10/10/2023)    Hunger Vital Sign     Worried About Running Out of  Food in the Last Year: Never true     Ran Out of Food in the Last Year: Never true   Transportation Needs: No Transportation Needs (10/10/2023)    PRAPARE - Transportation     Lack of Transportation (Medical): No     Lack of Transportation (Non-Medical): No   Physical Activity: Insufficiently Active (10/10/2023)    Exercise Vital Sign     Days of Exercise per Week: 3 days     Minutes of Exercise per Session: 30 min   Stress: No Stress Concern Present (10/10/2023)    [place] [employer] - Occupational Stress Questionnaire     Feeling of Stress : Not at all   Recent Concern: Stress - Stress Concern Present (9/21/2023)    [place] [employer] - Occupational Stress Questionnaire     Feeling of Stress : To some extent   Social Connections: Unknown (10/10/2023)    Social Connection and Isolation Panel [NHANES]     Frequency of Communication with Friends and Family: More than three times a week     Frequency of Social Gatherings with Friends and Family: More than three times a week     Active Member of Clubs or Organizations: Yes     Attends Club or Organization Meetings: Never     Marital Status:    Housing Stability: Low Risk  (10/10/2023)    Housing Stability Vital Sign     Unable to Pay for Housing in the Last Year: No     Number of Places Lived in the Last Year: 1     Unstable Housing in the Last Year: No     Patient Active Problem List   Diagnosis    Chronic fatigue syndrome    Obesity, morbid, BMI 40.0-49.9    Vitamin D deficiency    GERD (gastroesophageal reflux disease)    Essential hypertension    Post partum depression    Generalized anxiety disorder    Elevated hemoglobin A1c    Atypical chest pain    Iron deficiency anemia due to chronic blood loss    Dysplasia of cervix, high grade CATHY 2    Bacterial vaginosis    Palpitation    Chronic pelvic pain in female    Enthesitis related arthritis    ESDRAS positive    Family history of Sjogren's disease    Fibromyalgia     Snoring    SAVANNA (obstructive sleep apnea)    High risk HPV infection    Pain of upper abdomen    Nausea     Review of patient's allergies indicates:   Allergen Reactions    Procardia [nifedipine]        The following were reviewed at this visit: active problem list, medication list, allergies, family history, social history, and health maintenance.    Medications:  Current Outpatient Medications on File Prior to Visit   Medication Sig Dispense Refill    ALPRAZolam (XANAX) 0.5 MG tablet Take 1 tab by mouth daily as needed not to exceed 30 tabs per 90 days (Patient not taking: Reported on 9/26/2023) 30 tablet 1    buPROPion (WELLBUTRIN XL) 150 MG TB24 tablet Take 1 tablet (150 mg total) by mouth every morning. 90 tablet 1    ergocalciferol (VITAMIN D2) 50,000 unit Cap Take 1 capsule (50,000 Units total) by mouth twice a week. 8 capsule 11    etonogestreL-ethinyl estradioL (NUVARING) 0.12-0.015 mg/24 hr vaginal ring Place 1 each vaginally every 21 days. Insert one (1) ring vaginally and leave in place for three (3) weeks, then remove for one (1) week. 1 each 11    fluticasone propionate (FLONASE) 50 mcg/actuation nasal spray Instill 2 sprays (100 mcg total) by Each Nostril route once daily. 16 g 2    omeprazole (PRILOSEC) 40 MG capsule Take 1 capsule (40 mg total) by mouth 2 (two) times daily before meals. 60 capsule 2    ondansetron (ZOFRAN) 4 MG tablet Take 1 tablet (4 mg total) by mouth every 6 (six) hours as needed for Nausea. (Patient not taking: Reported on 9/26/2023) 40 tablet 1     No current facility-administered medications on file prior to visit.       Medications have been reviewed and reconciled with patient at this visit.  Barriers to medications reviewed with patient.    Adverse reactions to current medications reviewed with patient..    Over the counter medications reviewed and reconciled with patient.    Exam:  Wt Readings from Last 3 Encounters:   10/11/23 122.9 kg (271 lb)   10/11/23 122.9 kg (271 lb)    09/26/23 125.8 kg (277 lb 5.4 oz)     Temp Readings from Last 3 Encounters:   09/26/23 97.3 °F (36.3 °C) (Tympanic)   09/12/23 97.5 °F (36.4 °C)   08/10/23 98 °F (36.7 °C) (Tympanic)     BP Readings from Last 3 Encounters:   09/12/23 118/84   08/10/23 136/84   06/16/23 126/84     Pulse Readings from Last 3 Encounters:   09/26/23 75   09/12/23 98   08/10/23 92     There is no height or weight on file to calculate BMI.      Review of Systems   HENT:  Positive for congestion and sinus pain.    Respiratory:  Positive for cough and sputum production.      Physical Exam  Vitals and nursing note reviewed.   Constitutional:       Appearance: She is morbidly obese.   HENT:      Head: Normocephalic and atraumatic.      Salivary Glands: Right salivary gland is not diffusely enlarged or tender. Left salivary gland is not diffusely enlarged or tender.      Right Ear: Tympanic membrane normal.      Left Ear: Tympanic membrane normal.      Nose: Mucosal edema, congestion and rhinorrhea present.      Right Turbinates: Enlarged.      Left Turbinates: Enlarged.      Right Sinus: Maxillary sinus tenderness and frontal sinus tenderness present.      Left Sinus: Maxillary sinus tenderness and frontal sinus tenderness present.      Mouth/Throat:      Mouth: Mucous membranes are moist.      Pharynx: Uvula midline. Posterior oropharyngeal erythema present.   Cardiovascular:      Rate and Rhythm: Normal rate and regular rhythm.      Pulses: Normal pulses.      Heart sounds: Normal heart sounds.   Pulmonary:      Effort: Pulmonary effort is normal. No respiratory distress.      Breath sounds: Normal breath sounds. No wheezing or rales.   Abdominal:      General: Bowel sounds are normal.   Skin:     General: Skin is dry.   Neurological:      Mental Status: She is alert and oriented to person, place, and time.   Psychiatric:         Mood and Affect: Mood normal.         Behavior: Behavior normal.         Thought Content: Thought content  normal.         Judgment: Judgment normal.         Laboratory Reviewed ({Yes)  Lab Results   Component Value Date    WBC 9.71 01/11/2023    HGB 12.2 01/11/2023    HCT 40.4 01/11/2023     01/11/2023    CHOL 182 02/23/2023    TRIG 64 02/23/2023    HDL 43 02/23/2023    ALT 20 01/11/2023    AST 17 01/11/2023     01/11/2023    K 4.3 01/11/2023     01/11/2023    CREATININE 0.9 01/11/2023    BUN 11 01/11/2023    CO2 25 01/11/2023    TSH 1.064 02/23/2023    HGBA1C 5.5 02/23/2023       Diagnoses and all orders for this visit:    Acute bacterial sinusitis  -     amoxicillin-clavulanate 875-125mg (AUGMENTIN) 875-125 mg per tablet; Take 1 tablet by mouth every 12 (twelve) hours.  -     fluticasone propionate (FLONASE) 50 mcg/actuation nasal spray; Instill 2 sprays (100 mcg total) by Each Nostril route once daily.  -     promethazine-dextromethorphan (PROMETHAZINE-DM) 6.25-15 mg/5 mL Syrp; Take 5 mLs by mouth every 6 (six) hours as needed (cough).    Allergic rhinitis, unspecified seasonality, unspecified trigger  -     fluticasone propionate (FLONASE) 50 mcg/actuation nasal spray; Instill 2 sprays (100 mcg total) by Each Nostril route once daily.    Morbid obesity with BMI of 40.0-44.9, adult  Encouraged healthy diet and exercise as tolerated to help bring BMI into normal range.        Instructions  Drink plenty of fluids  Get plenty of Rest  Take OTC tylenol and or ibuprofen as directed on package for pain/fever. Do not take more than package suggests to take   Gargle with warm water and salt and use warm liquids to help with throat pain      If no improvement recommend follow up with ENT     Care Plan/Goals: Reviewed    Goals         Blood Pressure < 140/90 (pt-stated)             Follow up: No follow-ups on file.    After visit summary was printed and given to patient upon discharge today.  Patient goals and care plan are included in After Visit Summary.

## 2023-11-01 ENCOUNTER — OFFICE VISIT (OUTPATIENT)
Dept: URGENT CARE | Facility: CLINIC | Age: 33
End: 2023-11-01
Payer: COMMERCIAL

## 2023-11-01 VITALS
HEART RATE: 101 BPM | HEIGHT: 66 IN | TEMPERATURE: 99 F | RESPIRATION RATE: 20 BRPM | BODY MASS INDEX: 43.55 KG/M2 | SYSTOLIC BLOOD PRESSURE: 131 MMHG | OXYGEN SATURATION: 98 % | WEIGHT: 271 LBS | DIASTOLIC BLOOD PRESSURE: 75 MMHG

## 2023-11-01 DIAGNOSIS — B34.9 ACUTE VIRAL SYNDROME: ICD-10-CM

## 2023-11-01 DIAGNOSIS — H65.93 BILATERAL OTITIS MEDIA WITH EFFUSION: Primary | ICD-10-CM

## 2023-11-01 DIAGNOSIS — J34.9 SINUS PROBLEM: ICD-10-CM

## 2023-11-01 LAB
CTP QC/QA: YES
CTP QC/QA: YES
POC MOLECULAR INFLUENZA A AGN: NEGATIVE
POC MOLECULAR INFLUENZA B AGN: NEGATIVE
SARS-COV-2 AG RESP QL IA.RAPID: NEGATIVE

## 2023-11-01 PROCEDURE — 99214 PR OFFICE/OUTPT VISIT, EST, LEVL IV, 30-39 MIN: ICD-10-PCS | Mod: S$GLB,,, | Performed by: NURSE PRACTITIONER

## 2023-11-01 PROCEDURE — 99214 OFFICE O/P EST MOD 30 MIN: CPT | Mod: S$GLB,,, | Performed by: NURSE PRACTITIONER

## 2023-11-01 PROCEDURE — 87811 SARS CORONAVIRUS 2 ANTIGEN POCT, MANUAL READ: ICD-10-PCS | Mod: QW,S$GLB,, | Performed by: NURSE PRACTITIONER

## 2023-11-01 PROCEDURE — 87502 INFLUENZA DNA AMP PROBE: CPT | Mod: QW,S$GLB,, | Performed by: NURSE PRACTITIONER

## 2023-11-01 PROCEDURE — 87811 SARS-COV-2 COVID19 W/OPTIC: CPT | Mod: QW,S$GLB,, | Performed by: NURSE PRACTITIONER

## 2023-11-01 PROCEDURE — 87502 POCT INFLUENZA A/B MOLECULAR: ICD-10-PCS | Mod: QW,S$GLB,, | Performed by: NURSE PRACTITIONER

## 2023-11-01 RX ORDER — DEXAMETHASONE 4 MG/1
4 TABLET ORAL DAILY
Qty: 5 TABLET | Refills: 0 | Status: SHIPPED | OUTPATIENT
Start: 2023-11-01 | End: 2023-11-06

## 2023-11-01 RX ORDER — AZELASTINE 1 MG/ML
1 SPRAY, METERED NASAL 2 TIMES DAILY
Qty: 30 ML | Refills: 2 | Status: SHIPPED | OUTPATIENT
Start: 2023-11-01 | End: 2024-02-16

## 2023-11-01 NOTE — PATIENT INSTRUCTIONS
Increase fluids Get plenty of rest.   General infection control measures--cover mouth with sneezing coughing, wash hands frequently   Rest activity ad isela   Continue with daily allergy meds   Tylenol or advil per package directions for fever body aches   Short term decadron x 3-5 days as tolerated; take with food   Supportive care measures   Warm salt water gargles for throat comfort  Viral infections usually resolve in 7-10 days; If symptoms persist or worsen follow up UC or PCP   Repeat Covid testing as needed

## 2023-11-01 NOTE — PROGRESS NOTES
"Subjective:      Patient ID: Anthony Sexton is a 33 y.o. female.    Vitals:  height is 5' 5.5" (1.664 m) and weight is 122.9 kg (271 lb). Her temperature is 98.8 °F (37.1 °C). Her blood pressure is 131/75 and her pulse is 101. Her respiration is 20 and oxygen saturation is 98%.     Chief Complaint: No chief complaint on file.    Patient presents with bilateral ear pain headache /sinus pressure.  Body aches. No fever nasal congestion.  Slight cough.  Symptoms started 3 weeks ago but body aches started Monday headache today    Sinus Problem  This is a new problem. The current episode started 1 to 4 weeks ago. The problem is unchanged. There has been no fever. Her pain is at a severity of 8/10. The pain is mild. Associated symptoms include congestion, coughing, ear pain, headaches, sinus pressure and sneezing. Pertinent negatives include no diaphoresis, hoarse voice, neck pain, shortness of breath, sore throat or swollen glands. Treatments tried: ibuprophen  flonase. The treatment provided mild relief.       Constitution: Negative for sweating.   HENT:  Positive for ear pain, congestion and sinus pressure. Negative for sore throat.    Neck: Negative for neck pain.   Respiratory:  Positive for cough. Negative for shortness of breath.    Allergic/Immunologic: Positive for sneezing.   Neurological:  Positive for headaches.      Objective:     Vitals:    11/01/23 1549   BP: 131/75   BP Location: Right arm   Patient Position: Sitting   BP Method: Large (Automatic)   Pulse: 101   Resp: 20   Temp: 98.8 °F (37.1 °C)   SpO2: 98%   Weight: 122.9 kg (271 lb)   Height: 5' 5.5" (1.664 m)       Physical Exam   Constitutional: She is oriented to person, place, and time. She appears well-developed. She is cooperative.  Non-toxic appearance. She does not appear ill. No distress.   HENT:   Head: Normocephalic and atraumatic.   Ears:   Right Ear: Hearing, external ear and ear canal normal. Tympanic membrane is injected and retracted. "   Left Ear: Hearing, external ear and ear canal normal. Tympanic membrane is injected and retracted.   Nose: Congestion present. No mucosal edema, rhinorrhea or nasal deformity. No epistaxis. Right sinus exhibits frontal sinus tenderness. Right sinus exhibits no maxillary sinus tenderness. Left sinus exhibits frontal sinus tenderness. Left sinus exhibits no maxillary sinus tenderness.   Mouth/Throat: Uvula is midline and mucous membranes are normal. Mucous membranes are moist. No trismus in the jaw. Normal dentition. No uvula swelling. Posterior oropharyngeal erythema present. No oropharyngeal exudate or posterior oropharyngeal edema.   Eyes: Conjunctivae and lids are normal. Pupils are equal, round, and reactive to light. No scleral icterus. Extraocular movement intact   Neck: Trachea normal and phonation normal. Neck supple. No edema present. No erythema present. No neck rigidity present.   Cardiovascular: Normal rate, regular rhythm, normal heart sounds and normal pulses.   Pulmonary/Chest: Effort normal and breath sounds normal. No respiratory distress. She has no decreased breath sounds. She has no rhonchi.   Abdominal: Normal appearance.   Musculoskeletal: Normal range of motion.         General: No deformity. Normal range of motion.   Lymphadenopathy:     She has cervical adenopathy.   Neurological: She is alert and oriented to person, place, and time. She exhibits normal muscle tone. Coordination normal.   Skin: Skin is warm, dry, intact, not diaphoretic and not pale.   Psychiatric: Her speech is normal and behavior is normal. Judgment and thought content normal.   Nursing note and vitals reviewed.      Assessment:     1. Bilateral otitis media with effusion    2. Acute viral syndrome    3. Sinus problem      Results for orders placed or performed in visit on 11/01/23   POCT Influenza A/B MOLECULAR   Result Value Ref Range    POC Molecular Influenza A Ag Negative Negative, Not Reported    POC Molecular  Influenza B Ag Negative Negative, Not Reported     Acceptable Yes    SARS Coronavirus 2 Antigen, POCT Manual Read   Result Value Ref Range    SARS Coronavirus 2 Antigen Negative Negative     Acceptable Yes      *Note: Due to a large number of results and/or encounters for the requested time period, some results have not been displayed. A complete set of results can be found in Results Review.       Plan:   Patient stable for discharge and home management of condition      Bilateral otitis media with effusion  -     dexAMETHasone (DECADRON) 4 MG Tab; Take 1 tablet (4 mg total) by mouth once daily. Take with food for 5 days  Dispense: 5 tablet; Refill: 0  -     azelastine (ASTELIN) 137 mcg (0.1 %) nasal spray; 1 spray (137 mcg total) by Nasal route 2 (two) times daily.  Dispense: 30 mL; Refill: 2    Acute viral syndrome    Sinus problem  -     POCT Influenza A/B MOLECULAR  -     SARS Coronavirus 2 Antigen, POCT Manual Read      Patient Instructions   Increase fluids Get plenty of rest.   General infection control measures--cover mouth with sneezing coughing, wash hands frequently   Rest activity ad isela   Continue with daily allergy meds   Tylenol or advil per package directions for fever body aches   Short term decadron x 3-5 days as tolerated; take with food   Supportive care measures   Warm salt water gargles for throat comfort  Viral infections usually resolve in 7-10 days; If symptoms persist or worsen follow up UC or PCP   Repeat Covid testing as needed

## 2023-11-01 NOTE — LETTER
November 1, 2023      [facility]  [address], [address]  [facility] [facility] [address]  Phone: [phone]  Fax: [phone]       Patient: Anthony Sexton   YOB: 1990  Date of Visit: 11/01/2023    To Whom It May Concern:    Jorge Sexton  was at [facility] on 11/01/2023. The patient may return to work/school on 11/06/2023  with no restrictions. If you have any questions or concerns, or if I can be of further assistance, please do not hesitate to contact me.    Sincerely,            Cameron Edward NP

## 2023-11-02 ENCOUNTER — OFFICE VISIT (OUTPATIENT)
Dept: PSYCHIATRY | Facility: CLINIC | Age: 33
End: 2023-11-02
Payer: COMMERCIAL

## 2023-11-02 DIAGNOSIS — R69 UNDIAGNOSED DISEASE OR SYNDROME PRESENT: Primary | ICD-10-CM

## 2023-11-02 PROCEDURE — 3044F HG A1C LEVEL LT 7.0%: CPT | Mod: CPTII,95,, | Performed by: STUDENT IN AN ORGANIZED HEALTH CARE EDUCATION/TRAINING PROGRAM

## 2023-11-02 PROCEDURE — 1159F PR MEDICATION LIST DOCUMENTED IN MEDICAL RECORD: ICD-10-PCS | Mod: CPTII,95,, | Performed by: STUDENT IN AN ORGANIZED HEALTH CARE EDUCATION/TRAINING PROGRAM

## 2023-11-02 PROCEDURE — 3044F PR MOST RECENT HEMOGLOBIN A1C LEVEL <7.0%: ICD-10-PCS | Mod: CPTII,95,, | Performed by: STUDENT IN AN ORGANIZED HEALTH CARE EDUCATION/TRAINING PROGRAM

## 2023-11-02 PROCEDURE — 1159F MED LIST DOCD IN RCRD: CPT | Mod: CPTII,95,, | Performed by: STUDENT IN AN ORGANIZED HEALTH CARE EDUCATION/TRAINING PROGRAM

## 2023-11-02 PROCEDURE — 90834 PSYTX W PT 45 MINUTES: CPT | Mod: 95,,, | Performed by: STUDENT IN AN ORGANIZED HEALTH CARE EDUCATION/TRAINING PROGRAM

## 2023-11-02 PROCEDURE — 90834 PR PSYCHOTHERAPY W/PATIENT, 45 MIN: ICD-10-PCS | Mod: 95,,, | Performed by: STUDENT IN AN ORGANIZED HEALTH CARE EDUCATION/TRAINING PROGRAM

## 2023-11-04 ENCOUNTER — TELEPHONE (OUTPATIENT)
Dept: URGENT CARE | Facility: CLINIC | Age: 33
End: 2023-11-04
Payer: COMMERCIAL

## 2023-11-04 ENCOUNTER — OFFICE VISIT (OUTPATIENT)
Dept: INTERNAL MEDICINE | Facility: CLINIC | Age: 33
End: 2023-11-04
Payer: COMMERCIAL

## 2023-11-04 VITALS
BODY MASS INDEX: 46.43 KG/M2 | WEIGHT: 278.69 LBS | SYSTOLIC BLOOD PRESSURE: 150 MMHG | OXYGEN SATURATION: 97 % | DIASTOLIC BLOOD PRESSURE: 80 MMHG | HEART RATE: 81 BPM | HEIGHT: 65 IN

## 2023-11-04 DIAGNOSIS — H92.01 RIGHT EAR PAIN: ICD-10-CM

## 2023-11-04 DIAGNOSIS — D50.0 IRON DEFICIENCY ANEMIA DUE TO CHRONIC BLOOD LOSS: ICD-10-CM

## 2023-11-04 DIAGNOSIS — I10 ESSENTIAL HYPERTENSION: ICD-10-CM

## 2023-11-04 DIAGNOSIS — E66.01 MORBID OBESITY WITH BMI OF 40.0-44.9, ADULT: Primary | ICD-10-CM

## 2023-11-04 PROCEDURE — 3077F PR MOST RECENT SYSTOLIC BLOOD PRESSURE >= 140 MM HG: ICD-10-PCS | Mod: CPTII,S$GLB,, | Performed by: FAMILY MEDICINE

## 2023-11-04 PROCEDURE — 3044F HG A1C LEVEL LT 7.0%: CPT | Mod: CPTII,S$GLB,, | Performed by: FAMILY MEDICINE

## 2023-11-04 PROCEDURE — 3044F PR MOST RECENT HEMOGLOBIN A1C LEVEL <7.0%: ICD-10-PCS | Mod: CPTII,S$GLB,, | Performed by: FAMILY MEDICINE

## 2023-11-04 PROCEDURE — 3079F DIAST BP 80-89 MM HG: CPT | Mod: CPTII,S$GLB,, | Performed by: FAMILY MEDICINE

## 2023-11-04 PROCEDURE — 1159F PR MEDICATION LIST DOCUMENTED IN MEDICAL RECORD: ICD-10-PCS | Mod: CPTII,S$GLB,, | Performed by: FAMILY MEDICINE

## 2023-11-04 PROCEDURE — 3008F PR BODY MASS INDEX (BMI) DOCUMENTED: ICD-10-PCS | Mod: CPTII,S$GLB,, | Performed by: FAMILY MEDICINE

## 2023-11-04 PROCEDURE — 99999 PR PBB SHADOW E&M-EST. PATIENT-LVL IV: CPT | Mod: PBBFAC,,, | Performed by: FAMILY MEDICINE

## 2023-11-04 PROCEDURE — 3079F PR MOST RECENT DIASTOLIC BLOOD PRESSURE 80-89 MM HG: ICD-10-PCS | Mod: CPTII,S$GLB,, | Performed by: FAMILY MEDICINE

## 2023-11-04 PROCEDURE — 99999 PR PBB SHADOW E&M-EST. PATIENT-LVL IV: ICD-10-PCS | Mod: PBBFAC,,, | Performed by: FAMILY MEDICINE

## 2023-11-04 PROCEDURE — 99214 PR OFFICE/OUTPT VISIT, EST, LEVL IV, 30-39 MIN: ICD-10-PCS | Mod: S$GLB,,, | Performed by: FAMILY MEDICINE

## 2023-11-04 PROCEDURE — 3077F SYST BP >= 140 MM HG: CPT | Mod: CPTII,S$GLB,, | Performed by: FAMILY MEDICINE

## 2023-11-04 PROCEDURE — 3008F BODY MASS INDEX DOCD: CPT | Mod: CPTII,S$GLB,, | Performed by: FAMILY MEDICINE

## 2023-11-04 PROCEDURE — 99214 OFFICE O/P EST MOD 30 MIN: CPT | Mod: S$GLB,,, | Performed by: FAMILY MEDICINE

## 2023-11-04 PROCEDURE — 1159F MED LIST DOCD IN RCRD: CPT | Mod: CPTII,S$GLB,, | Performed by: FAMILY MEDICINE

## 2023-11-04 RX ORDER — AZITHROMYCIN 250 MG/1
TABLET, FILM COATED ORAL
Qty: 6 TABLET | Refills: 0 | Status: SHIPPED | OUTPATIENT
Start: 2023-11-04 | End: 2023-11-09

## 2023-11-04 NOTE — PROGRESS NOTES
Subjective:       Patient ID: Anthony Sexton is a 33 y.o. female.    Chief Complaint: Otalgia      HPI Comments:       Current Outpatient Medications:     azelastine (ASTELIN) 137 mcg (0.1 %) nasal spray, 1 spray (137 mcg total) by Nasal route 2 (two) times daily., Disp: 30 mL, Rfl: 2    buPROPion (WELLBUTRIN XL) 150 MG TB24 tablet, Take 1 tablet (150 mg total) by mouth every morning., Disp: 90 tablet, Rfl: 1    ergocalciferol (VITAMIN D2) 50,000 unit Cap, Take 1 capsule (50,000 Units total) by mouth twice a week., Disp: 8 capsule, Rfl: 11    fluticasone propionate (FLONASE) 50 mcg/actuation nasal spray, Instill 2 sprays (100 mcg total) by Each Nostril route once daily., Disp: 16 g, Rfl: 2    omeprazole (PRILOSEC) 40 MG capsule, Take 1 capsule (40 mg total) by mouth 2 (two) times daily before meals., Disp: 60 capsule, Rfl: 2    ondansetron (ZOFRAN) 4 MG tablet, Take 1 tablet (4 mg total) by mouth every 6 (six) hours as needed for Nausea., Disp: 40 tablet, Rfl: 1    promethazine-dextromethorphan (PROMETHAZINE-DM) 6.25-15 mg/5 mL Syrp, Take 5 mLs by mouth every 6 (six) hours as needed (cough)., Disp: 180 mL, Rfl: 1    ALPRAZolam (XANAX) 0.5 MG tablet, Take 1 tab by mouth daily as needed not to exceed 30 tabs per 90 days (Patient not taking: Reported on 9/26/2023), Disp: 30 tablet, Rfl: 1    amoxicillin-clavulanate 875-125mg (AUGMENTIN) 875-125 mg per tablet, Take 1 tablet by mouth every 12 (twelve) hours., Disp: 20 tablet, Rfl: 0    azithromycin (Z-CARMELITA) 250 MG tablet, Take 2 tablets by mouth on day 1; Take 1 tablet by mouth on days 2-5, Disp: 6 tablet, Rfl: 0    dexAMETHasone (DECADRON) 4 MG Tab, Take 1 tablet (4 mg total) by mouth once daily. Take with food for 5 days (Patient not taking: Reported on 11/4/2023), Disp: 5 tablet, Rfl: 0    etonogestreL-ethinyl estradioL (NUVARING) 0.12-0.015 mg/24 hr vaginal ring, Place 1 each vaginally every 21 days. Insert one (1) ring vaginally and leave in place for three (3)  "weeks, then remove for one (1) week. (Patient not taking: Reported on 11/1/2023), Disp: 1 each, Rfl: 11      This my 1st time seeing this patient.  She is been sick for the last 2 or 3 weeks.    Saw her primary care doctor and was put on Augmentin for sinusitis.  Finished antibiotics.  Sinus symptoms have gotten better but she is continued to have problems with right ear pain and headache.  Pain is in the mastoid area and radiates down to her shoulder and arm.  She is also having aches in her legs.  Still coughing up yellow phlegm but the cough has improved as well.  She denies any shortness a breath or wheezing.  Has no history of asthma or pneumonia.    Three days ago she went to urgent care was given a oral steroid.  This caused adverse effects which she is had before with oral steroids.  Palpitations and tachycardia.  Stop the medication.    Still taking Tylenol and ibuprofen    Lately she is been sitting up in recliner to sleep.  However she says the ear and shoulder pain started before she was doing that.      Review of Systems   Constitutional:  Negative for activity change, appetite change, chills and fever.   HENT:  Positive for ear pain. Negative for sore throat.    Respiratory:  Positive for cough. Negative for shortness of breath.    Cardiovascular:  Negative for chest pain.   Gastrointestinal:  Negative for abdominal pain, diarrhea and nausea.   Genitourinary:  Negative for difficulty urinating.   Musculoskeletal:  Positive for myalgias. Negative for arthralgias.   Neurological:  Negative for dizziness and headaches.       Objective:      Vitals:    11/04/23 0912   BP: (!) 150/80   Pulse: 81   SpO2: 97%   Weight: 126.4 kg (278 lb 10.6 oz)   Height: 5' 5" (1.651 m)   PainSc:   8   PainLoc: Ear     Physical Exam  Vitals and nursing note reviewed.   Constitutional:       General: She is not in acute distress.     Appearance: She is well-developed. She is ill-appearing. She is not diaphoretic.   HENT:      " Head: Normocephalic.      Right Ear: No tenderness. There is impacted cerumen.      Left Ear: No tenderness. There is impacted cerumen.      Ears:      Comments: Lots of soft wax in the ear canals.  TMs visualized but and look normal  No TMJ tenderness bilaterally     Nose: Mucosal edema present. No rhinorrhea.      Mouth/Throat:      Pharynx: Pharyngeal swelling present. No oropharyngeal exudate or posterior oropharyngeal erythema.   Neck:      Thyroid: No thyromegaly.        Comments: Diffuse tenderness  Cardiovascular:      Rate and Rhythm: Normal rate and regular rhythm.      Heart sounds: Normal heart sounds. No murmur heard.  Pulmonary:      Effort: Pulmonary effort is normal.      Breath sounds: Normal breath sounds. No wheezing or rales.   Abdominal:      General: There is no distension.      Palpations: Abdomen is soft.   Musculoskeletal:      Cervical back: Neck supple. No torticollis. Pain with movement and muscular tenderness present. Normal range of motion.   Lymphadenopathy:      Cervical: No cervical adenopathy.   Skin:     General: Skin is warm and dry.   Neurological:      Mental Status: She is alert and oriented to person, place, and time.   Psychiatric:         Behavior: Behavior normal.         Thought Content: Thought content normal.         Judgment: Judgment normal.         Assessment:       1. Morbid obesity with BMI of 40.0-44.9, adult    2. Essential hypertension    3. Iron deficiency anemia due to chronic blood loss    4. Right ear pain        Plan:   Morbid obesity with BMI of 40.0-44.9, adult  Comments:  Recent weight gain    Essential hypertension  Comments:  Elevated today.  Follow-up with PCP    Iron deficiency anemia due to chronic blood loss  Comments:  Last hemoglobin 12.2    Right ear pain  Comments:  Do not see otitis media or externa today.  Will send to ENT for further evaluation.  Z-Carmelita.  Continue ibuprofen and Tylenol  Orders:  -     azithromycin (Z-CARMELITA) 250 MG tablet; Take 2  tablets by mouth on day 1; Take 1 tablet by mouth on days 2-5  Dispense: 6 tablet; Refill: 0  -     Ambulatory referral/consult to ENT; Future; Expected date: 11/11/2023

## 2023-11-06 NOTE — PROGRESS NOTES
[facility] Psych  Psychology  Progress Note  Individual Psychotherapy (PhD/LCSW)    Patient Name: Anthony Sexton  MRN: [id number]    Patient Class: OP- [facility] Outpatient [facility]  Primary Care Provider: Tyesha Leggett MD    Psychiatry Initial Visit (PhD/LCSW)  Individual Psychotherapy - CPT 08812    Date: 11/2/2023    Site: [place]    The patient location is: Patient's home/ Patient reported that his/her location at the time of this visit was in the [facility]     Visit type: Virtual visit with synchronous audio and video     Each patient to whom he or she provides medical services by telemedicine is: (1) informed of the relationship between the physician and patient and the respective role of any other health care provider with respect to management of the patient; and (2) notified that he or she may decline to receive medical services by telemedicine and may withdraw from such care at any time.     Referral source: Lenora Walters MD    Clinical status of patient: Outpatient    Anthony Sexton, a 33 y.o. female, for initial evaluation visit.  Met with patient.    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     Substance use:   Alcohol:  "none   Drugs: none   Tobacco: none   Caffeine: none    Current medications and drug reactions (include OTC, herbal): see medication list     Strengths and liabilities: Strength: Patient accepts guidance/feedback, Strength: Patient is expressive/articulate., Strength: Patient is intelligent., Strength: Patient is motivated for change., Strength: Patient has positive support network., Strength: Patient has reasonable judgment., Strength: Patient is stable., Liability: Patient lacks coping skills.    Current Evaluation:     Mental Status Exam:  General Appearance:  unremarkable, age appropriate   Speech: normal tone, normal rate, normal pitch, normal volume      Level of Cooperation: cooperative      Thought Processes: normal and logical   Mood: anxious, sad      Thought Content: normal, no suicidality, no homicidality, delusions, or paranoia   Affect: congruent and appropriate   Orientation: Oriented x3   Memory: recent >  intact   Attention Span & Concentration: intact   Fund of General Knowledge: intact and appropriate to age and level of education   Abstract Reasoning: not assessed   Judgment & Insight: fair     Language  intact     Summary: The patient reported that she has been sick for the past 3 weeks and is feeling miserable. She shared that she want to feel "whole" again and to be "happier." She feels that her life revolves around her family, work, and her commute. Patient was given time to speak about her frustrations and supportive therapy was utilized. We worked on creating tangible goals that will aid the patient in finding herself and feeling whole. One was to utilize time away from home. When she is invited to [facility], go to [facility]. She stated that her  is very supportive and even encourages her to have outings away from home. The patient will try to incorporate more time for herself so that she can find herself again.   Diagnostic Impression - Plan:       ICD-10-CM ICD-9-CM   1. Undiagnosed " disease or syndrome present  R60 799.9                                   Plan:individual psychotherapy    Return to Clinic: as scheduled    Length of Service (minutes): 45          Barbra Bartlett, PhD  Psychologist  [facility]

## 2023-11-09 ENCOUNTER — OFFICE VISIT (OUTPATIENT)
Dept: OTOLARYNGOLOGY | Facility: CLINIC | Age: 33
End: 2023-11-09
Payer: COMMERCIAL

## 2023-11-09 VITALS — WEIGHT: 278.69 LBS | BODY MASS INDEX: 46.43 KG/M2 | HEIGHT: 65 IN

## 2023-11-09 DIAGNOSIS — H92.01 RIGHT EAR PAIN: ICD-10-CM

## 2023-11-09 DIAGNOSIS — J34.3 HYPERTROPHY OF INFERIOR NASAL TURBINATE: ICD-10-CM

## 2023-11-09 DIAGNOSIS — J32.9 CHRONIC SINUSITIS, UNSPECIFIED LOCATION: ICD-10-CM

## 2023-11-09 DIAGNOSIS — J30.89 NON-SEASONAL ALLERGIC RHINITIS, UNSPECIFIED TRIGGER: Primary | ICD-10-CM

## 2023-11-09 PROCEDURE — 3008F BODY MASS INDEX DOCD: CPT | Mod: CPTII,S$GLB,, | Performed by: STUDENT IN AN ORGANIZED HEALTH CARE EDUCATION/TRAINING PROGRAM

## 2023-11-09 PROCEDURE — 3044F HG A1C LEVEL LT 7.0%: CPT | Mod: CPTII,S$GLB,, | Performed by: STUDENT IN AN ORGANIZED HEALTH CARE EDUCATION/TRAINING PROGRAM

## 2023-11-09 PROCEDURE — 99999 PR PBB SHADOW E&M-EST. PATIENT-LVL III: ICD-10-PCS | Mod: PBBFAC,,, | Performed by: STUDENT IN AN ORGANIZED HEALTH CARE EDUCATION/TRAINING PROGRAM

## 2023-11-09 PROCEDURE — 99999 PR PBB SHADOW E&M-EST. PATIENT-LVL III: CPT | Mod: PBBFAC,,, | Performed by: STUDENT IN AN ORGANIZED HEALTH CARE EDUCATION/TRAINING PROGRAM

## 2023-11-09 PROCEDURE — 31231 NASAL ENDOSCOPY DX: CPT | Mod: S$GLB,,, | Performed by: STUDENT IN AN ORGANIZED HEALTH CARE EDUCATION/TRAINING PROGRAM

## 2023-11-09 PROCEDURE — 1159F PR MEDICATION LIST DOCUMENTED IN MEDICAL RECORD: ICD-10-PCS | Mod: CPTII,S$GLB,, | Performed by: STUDENT IN AN ORGANIZED HEALTH CARE EDUCATION/TRAINING PROGRAM

## 2023-11-09 PROCEDURE — 3008F PR BODY MASS INDEX (BMI) DOCUMENTED: ICD-10-PCS | Mod: CPTII,S$GLB,, | Performed by: STUDENT IN AN ORGANIZED HEALTH CARE EDUCATION/TRAINING PROGRAM

## 2023-11-09 PROCEDURE — 31231 PR NASAL ENDOSCOPY, DX: ICD-10-PCS | Mod: S$GLB,,, | Performed by: STUDENT IN AN ORGANIZED HEALTH CARE EDUCATION/TRAINING PROGRAM

## 2023-11-09 PROCEDURE — 99214 PR OFFICE/OUTPT VISIT, EST, LEVL IV, 30-39 MIN: ICD-10-PCS | Mod: 25,S$GLB,, | Performed by: STUDENT IN AN ORGANIZED HEALTH CARE EDUCATION/TRAINING PROGRAM

## 2023-11-09 PROCEDURE — 1159F MED LIST DOCD IN RCRD: CPT | Mod: CPTII,S$GLB,, | Performed by: STUDENT IN AN ORGANIZED HEALTH CARE EDUCATION/TRAINING PROGRAM

## 2023-11-09 PROCEDURE — 99214 OFFICE O/P EST MOD 30 MIN: CPT | Mod: 25,S$GLB,, | Performed by: STUDENT IN AN ORGANIZED HEALTH CARE EDUCATION/TRAINING PROGRAM

## 2023-11-09 PROCEDURE — 3044F PR MOST RECENT HEMOGLOBIN A1C LEVEL <7.0%: ICD-10-PCS | Mod: CPTII,S$GLB,, | Performed by: STUDENT IN AN ORGANIZED HEALTH CARE EDUCATION/TRAINING PROGRAM

## 2023-11-09 NOTE — PROGRESS NOTES
"Referring Provider:    No referring provider defined for this encounter.  Subjective:   Patient: Anthony Sexton [id number], :1990   Visit date:2023 1:25 PM    Chief Complaint:  Sinus Problem (X months )    HPI:    Prior notes reviewed by myself.  Clinical documentation obtained by nursing staff reviewed.     Symptoms worsened abouut 1 month ago. Started after a viral infection. Treated with anitbiotics, flonase, cough medication, decadron (stopped after one dose - heart fluttering), and astelin.    Since then she has continued to have ear fullness, nasal congestion, facial pressure, post nasal drip, hyposmia. She was doing better with sleep, but now having more trouble with breathing at night since this started. No purulent rhinorrhea.     Sinonasal symptoms now present for over a year, and do not resolve between episodes.     She has been on flonase daily.   Has not had allergy testing.         Objective:     Physical Exam:  Vitals:  Ht 5' 5" (1.651 m)   Wt 126.4 kg (278 lb 10.6 oz)   LMP 10/12/2023   BMI 46.37 kg/m²   General appearance:  Well developed, well nourished    Ears:  Otoscopy of external auditory canals and tympanic membranes was normal, clinical speech reception thresholds grossly intact, no mass/lesion of auricle.    Nose:  No masses/lesions of external nose, congested nasal mucosa, septum, and turbinates were hypertrophied 3+.    Mouth:  No mass/lesion of lips, teeth, gums, hard/soft palate, tongue, tonsils, or oropharynx.    Neck & Lymphatics:  No cervical lymphadenopathy, no neck mass/crepitus/ asymmetry, trachea is midline, no thyroid enlargement/tenderness/mass.        [x]  Data Reviewed:    PSG  PHYSICIAN INTERPRETATION AND COMMENTS: Findings are consistent with mild, positional obstructive sleep apnea(SAVANNA).  Indications of cardiac dysrhythmia are recorded. Correlated with cardiology exam. Therapy with CPAP indicated, Please  refer to sleep disorders clinic  CLINICAL HISTORY: 32 " year old female presented with: 15 inch neck, BMI of 43.9, an [place] sleepiness score of 10, history  of hypertension, depression and symptoms of nocturnal waking up choking. Based on the clinical history, the patient has  a high pre-test probability of having Mild SAVANNA.  SLEEP STUDY FINDINGS: Patient underwent a 1 night Home Sleep Test and by behavioral criteria, slept for approximately  5.33 hours , with a sleep efficiency of 95%. Mild sleep disordered breathing (AHI=7) is noted based on a 4% hypopnea  desaturation criteria, predominantly in the supine position (10 events/hour). The patient slept supine 59.7% of the night  based on valid recording time of 4.89 hours and is 3.3 times as likely to have apneas/hypopneas when supine. When  considering more subtle measures of sleep disordered breathing, the overall respiratory disturbance index is mild(RDI=15)  based on a 1% hypopnea desaturation criteria with confirmation by surrogate arousal indicators. The apneas/hypopneas  are accompanied by minimal oxygen desaturation (percent time below 90% SpO2: 1%, Min SpO2: 78.1%). The average  desaturation across all sleep disordered breathing events is 2.9%. Snoring occurs for 3.8% (30 dB) of the study. The mean  pulse rate is 86.3 BPM, with very frequent pulse rate variability (82 events with >= 6 BPM increase/decrease per hour).  TREATMENT CONSIDERATIONS: For symptomatic mild obstructive sleep apnea, patient preference and compliance  impacts efficacious outcomes. Consider treatment with nasal continuous positive airway pressure (CPAP/AutoPAP),  mandibular advancement splint (MAS), referral to an ENT surgeon for modification to the airway, and/or weight loss or  behavioral therapy. Based on the SAVANNA Supine data in the study, a Mandibular Advancement Splint (MAS) will likely provide  treatment benefit independent of SAVANNA severity. The patient should avoid sleeping supine given non-supine AHI is in the  normal range.    Lab  Results   Component Value Date    WBC 9.71 01/11/2023    HGB 12.2 01/11/2023    HCT 40.4 01/11/2023    MCV 81 (L) 01/11/2023    EOSINOPHIL 0.4 01/11/2023         NASAL ENDOSCOPY       Indication: Anthony Sexton is a 33 y.o. female  with sinonasal symptoms that were not able to be explained by anterior rhinoscopy alone, thus necessitating nasal endoscopy.     Procedure: Risks, benefits, and alternatives of the procedure were discussed with the patient, and the patient consented to the nasal endoscopy.  The nasal cavity was sprayed with a topical decongestant and anesthetic (if needed). The endoscope was passed into each nostril and each nasal cavity was visualized.  On each side the nasal cavity, sinuses (if open), turbinates, middle and superior meatus, sphenoethmoidal recess and septum were examined with the findings described below. At the end of the examination, the scope was removed. The patient tolerated the procedure well with no complications.       Endoscopic Sinonasal Exam Findings:  -     The right side has moderate edema  -     The left side has moderate edema  -     Nasal secretions:  clear secretions  bilaterally  -     Nasal septum: mild to moderate left mid septal deviation  -     Inferior turbinate: hypertrophy or edema (Moderate) and Normal mucosa without significant hypertrophy bilaterally  -     Middle turbinate: - normal mucosa without significant hypertrophy - and Normal mucosa without significant hypertrophy bilaterally  -     Other findings:  mild adenoid hypertrophy, no masses, no purulence, non obstructive           Assessment & Plan:   Non-seasonal allergic rhinitis, unspecified trigger  -     Ambulatory referral/consult to Allergy; Future; Expected date: 11/16/2023    Chronic sinusitis, unspecified location  -     CT I-DISPOtronic Sinuses without; Future; Expected date: 11/09/2023    Hypertrophy of inferior nasal turbinate        Persistent Chronic Rhinosinusitis symptoms for about a year that  do not completely resolve between acute episodes. We will proceed with CT sinus and If no sinus disease present I would suspect the headaches are primary neurologic.  I recommend she continue flonase, saline, and use the astelin as prescribed. Will also have her evaluated with Allergy. If she continues to have nasal obstruction after allergy treatment, then would consider inferior turbinate reduction +/- septoplasty.

## 2023-11-13 ENCOUNTER — OFFICE VISIT (OUTPATIENT)
Dept: PSYCHIATRY | Facility: CLINIC | Age: 33
End: 2023-11-13
Payer: COMMERCIAL

## 2023-11-13 DIAGNOSIS — R69 UNDIAGNOSED DISEASE OR SYNDROME PRESENT: Primary | ICD-10-CM

## 2023-11-13 PROCEDURE — 1159F PR MEDICATION LIST DOCUMENTED IN MEDICAL RECORD: ICD-10-PCS | Mod: CPTII,95,, | Performed by: STUDENT IN AN ORGANIZED HEALTH CARE EDUCATION/TRAINING PROGRAM

## 2023-11-13 PROCEDURE — 90834 PSYTX W PT 45 MINUTES: CPT | Mod: 95,,, | Performed by: STUDENT IN AN ORGANIZED HEALTH CARE EDUCATION/TRAINING PROGRAM

## 2023-11-13 PROCEDURE — 3044F HG A1C LEVEL LT 7.0%: CPT | Mod: CPTII,95,, | Performed by: STUDENT IN AN ORGANIZED HEALTH CARE EDUCATION/TRAINING PROGRAM

## 2023-11-13 PROCEDURE — 90834 PR PSYCHOTHERAPY W/PATIENT, 45 MIN: ICD-10-PCS | Mod: 95,,, | Performed by: STUDENT IN AN ORGANIZED HEALTH CARE EDUCATION/TRAINING PROGRAM

## 2023-11-13 PROCEDURE — 1159F MED LIST DOCD IN RCRD: CPT | Mod: CPTII,95,, | Performed by: STUDENT IN AN ORGANIZED HEALTH CARE EDUCATION/TRAINING PROGRAM

## 2023-11-13 PROCEDURE — 3044F PR MOST RECENT HEMOGLOBIN A1C LEVEL <7.0%: ICD-10-PCS | Mod: CPTII,95,, | Performed by: STUDENT IN AN ORGANIZED HEALTH CARE EDUCATION/TRAINING PROGRAM

## 2023-11-14 NOTE — PROGRESS NOTES
The patient location is:  Patient Home   The chief complaint leading to consultation is: morbid obesity in work up for bariatric surgery  Visit type: Virtual visit with synchronous audio and video  Total time spent with patient: 30 min  Each patient to whom he or she provides medical services by telemedicine is:  (1) informed of the relationship between the physician and patient and the respective role of any other health care provider with respect to management of the patient; and (2) notified that he or she may decline to receive medical services by telemedicine and may withdraw from such care at any time.  Nutrition Handout located in AVS section of pt's [facility] and/or sent as a message via [facility] portal.  Pt informed of handout and how to access this information in [employer] elian.  NUTRITION NOTE    Referring Physician: Ward Meraz M.D.   Reason for MNT Referral: 6 months Medically Supervised Diet pending Gastric Bypass    Patient presents for f/u visit for MSD with weight gain over the past month; total weight loss by making numerous dietary and lifestyle changes.  Has been sick with sinus then ear infection    CLINICAL DATA:  33 y.o. female.    Current Weight: 278 lbs from appt on 11/9/2023  Weight Change Since Initial Visit: Gain of 7 lbs  Ideal Body Weight: 141 lbs  Body mass index is 46.37 kg/m².  Initial Wt: 271 lbs    DAILY NUTRITIONAL NEEDS: pre-op nutritional bariatric guidelines to promote weight loss  1823-4395 Calories    Grams Protein    CURRENT DIET:  Reduced-calorie diet.  Diet Recall: Food records are not present.  B:pure protein shake 30 gm protein  L:Turkey meatball and mixed veggies 1.5 tablespoon of rice  D: skipped  S: P3 protein pack and pure protein bar or fruit  Diet Includes: bariatric   Meal Pattern: Improved.  Protein Supplements: 1 per day.  Snacking: Adequate. Snacks include healthy choices.2 per day  Vegetables: Likes a variety. Eats almost daily.  Fruits: Likes  a variety. Eats 2-3 times per week.  Beverages: water cut out coffee  Dining Out:  Weekly. 1-2 times per week Mostly fast food, restaurants, and take-out.  Cooking at home: Daily. Mostly  air frying   meat, fish, vegetables, and reduced starchy carbs .    CURRENT EXERCISE:  None Was sick for 3 weeks and unable to exercise    Vitamins / Minerals / Herbs:   Probiotic and multivitamin  Vitamin D    LABS:    No recent      Food Allergies:   None reported    Social:  Works regular daytime shifts.  Alcohol: None.  Smoking: None.    ASSESSMENT:  Patient demonstrates some willingness to change lifestyle habits as evidenced by dietary changes, including protein drinks, increased fruits, increased vegetables, reduced dining out, better choices when dining out, more food preparation at [employer], healthier cooking at home, bringing snacks to work, healthier snacking at work, and healthier snacking at home.    Doing well with working on greatest challenges (  dining out frequency, sweets, starchy CHO, portion control, and emotional eating and lack of education on how much to eat ).    Barriers to Education:  none  Stage of Change:  action    NUTRITION DIAGNOSIS:  Morbid Obesity related to Physical inactivity as evidence by BMI.  Status: Same    PLAN:  Pt is potential candidate for surgery.    Diet: Adjust diet plan.  Work on Bariatric Nutrition Checklist.  Work on gradually cutting back on starchy CHO in the diet.  5-6 meals per day.  Increase exercise.  Meal planning for week  3 meals and 2 snacks each day    Exercise: Increase.    Behavior Modification: Begin to document food & activity logs daily.      Return to clinic in one month.  Needs additional visit(s) with RD.    Communicated nutrition plan with bariatric team.    SESSION TIME:  30 minutes

## 2023-11-15 ENCOUNTER — CLINICAL SUPPORT (OUTPATIENT)
Dept: BARIATRICS | Facility: CLINIC | Age: 33
End: 2023-11-15
Payer: COMMERCIAL

## 2023-11-15 VITALS — WEIGHT: 278.69 LBS | BODY MASS INDEX: 46.37 KG/M2

## 2023-11-15 DIAGNOSIS — E66.01 MORBID OBESITY WITH BMI OF 45.0-49.9, ADULT: ICD-10-CM

## 2023-11-15 DIAGNOSIS — I10 ESSENTIAL HYPERTENSION: Primary | ICD-10-CM

## 2023-11-15 DIAGNOSIS — Z71.3 DIETARY COUNSELING AND SURVEILLANCE: ICD-10-CM

## 2023-11-15 PROCEDURE — 97803 MED NUTRITION INDIV SUBSEQ: CPT | Mod: 95,,, | Performed by: DIETITIAN, REGISTERED

## 2023-11-15 PROCEDURE — 99499 NO LOS: ICD-10-PCS | Mod: 95,,, | Performed by: DIETITIAN, REGISTERED

## 2023-11-15 PROCEDURE — 99499 UNLISTED E&M SERVICE: CPT | Mod: 95,,, | Performed by: DIETITIAN, REGISTERED

## 2023-11-15 PROCEDURE — 97803 PR MED NUTR THER, SUBSQ, INDIV, EA 15 MIN: ICD-10-PCS | Mod: 95,,, | Performed by: DIETITIAN, REGISTERED

## 2023-11-17 ENCOUNTER — LAB VISIT (OUTPATIENT)
Dept: LAB | Facility: HOSPITAL | Age: 33
End: 2023-11-17
Attending: STUDENT IN AN ORGANIZED HEALTH CARE EDUCATION/TRAINING PROGRAM
Payer: COMMERCIAL

## 2023-11-17 ENCOUNTER — OFFICE VISIT (OUTPATIENT)
Dept: ALLERGY | Facility: CLINIC | Age: 33
End: 2023-11-17
Payer: COMMERCIAL

## 2023-11-17 VITALS
TEMPERATURE: 98 F | BODY MASS INDEX: 44.64 KG/M2 | DIASTOLIC BLOOD PRESSURE: 90 MMHG | WEIGHT: 277.75 LBS | HEART RATE: 84 BPM | HEIGHT: 66 IN | SYSTOLIC BLOOD PRESSURE: 127 MMHG

## 2023-11-17 DIAGNOSIS — J31.0 CHRONIC RHINITIS: ICD-10-CM

## 2023-11-17 DIAGNOSIS — J32.9 RECURRENT SINUS INFECTIONS: ICD-10-CM

## 2023-11-17 DIAGNOSIS — J31.0 CHRONIC RHINITIS: Primary | ICD-10-CM

## 2023-11-17 LAB
BASOPHILS # BLD AUTO: 0.04 K/UL (ref 0–0.2)
BASOPHILS NFR BLD: 0.4 % (ref 0–1.9)
DIFFERENTIAL METHOD: ABNORMAL
EOSINOPHIL # BLD AUTO: 0.1 K/UL (ref 0–0.5)
EOSINOPHIL NFR BLD: 0.6 % (ref 0–8)
ERYTHROCYTE [DISTWIDTH] IN BLOOD BY AUTOMATED COUNT: 14.1 % (ref 11.5–14.5)
HCT VFR BLD AUTO: 41.8 % (ref 37–48.5)
HGB BLD-MCNC: 12.5 G/DL (ref 12–16)
HIV 1+2 AB+HIV1 P24 AG SERPL QL IA: NORMAL
IGA SERPL-MCNC: 169 MG/DL (ref 40–350)
IGE SERPL-ACNC: <35 IU/ML (ref 0–100)
IGG SERPL-MCNC: 1819 MG/DL (ref 650–1600)
IGM SERPL-MCNC: 72 MG/DL (ref 50–300)
IMM GRANULOCYTES # BLD AUTO: 0.03 K/UL (ref 0–0.04)
IMM GRANULOCYTES NFR BLD AUTO: 0.3 % (ref 0–0.5)
LYMPHOCYTES # BLD AUTO: 2 K/UL (ref 1–4.8)
LYMPHOCYTES NFR BLD: 21.2 % (ref 18–48)
MCH RBC QN AUTO: 24.4 PG (ref 27–31)
MCHC RBC AUTO-ENTMCNC: 29.9 G/DL (ref 32–36)
MCV RBC AUTO: 82 FL (ref 82–98)
MONOCYTES # BLD AUTO: 0.4 K/UL (ref 0.3–1)
MONOCYTES NFR BLD: 4 % (ref 4–15)
NEUTROPHILS # BLD AUTO: 6.9 K/UL (ref 1.8–7.7)
NEUTROPHILS NFR BLD: 73.5 % (ref 38–73)
NRBC BLD-RTO: 0 /100 WBC
PLATELET # BLD AUTO: 252 K/UL (ref 150–450)
PLATELET BLD QL SMEAR: ABNORMAL
PMV BLD AUTO: 12.4 FL (ref 9.2–12.9)
RBC # BLD AUTO: 5.12 M/UL (ref 4–5.4)
WBC # BLD AUTO: 9.46 K/UL (ref 3.9–12.7)

## 2023-11-17 PROCEDURE — 82784 ASSAY IGA/IGD/IGG/IGM EACH: CPT | Mod: 59 | Performed by: STUDENT IN AN ORGANIZED HEALTH CARE EDUCATION/TRAINING PROGRAM

## 2023-11-17 PROCEDURE — 1160F PR REVIEW ALL MEDS BY PRESCRIBER/CLIN PHARMACIST DOCUMENTED: ICD-10-PCS | Mod: CPTII,S$GLB,, | Performed by: STUDENT IN AN ORGANIZED HEALTH CARE EDUCATION/TRAINING PROGRAM

## 2023-11-17 PROCEDURE — 86003 ALLG SPEC IGE CRUDE XTRC EA: CPT | Performed by: STUDENT IN AN ORGANIZED HEALTH CARE EDUCATION/TRAINING PROGRAM

## 2023-11-17 PROCEDURE — 3044F HG A1C LEVEL LT 7.0%: CPT | Mod: CPTII,S$GLB,, | Performed by: STUDENT IN AN ORGANIZED HEALTH CARE EDUCATION/TRAINING PROGRAM

## 2023-11-17 PROCEDURE — 82784 ASSAY IGA/IGD/IGG/IGM EACH: CPT | Performed by: STUDENT IN AN ORGANIZED HEALTH CARE EDUCATION/TRAINING PROGRAM

## 2023-11-17 PROCEDURE — 1160F RVW MEDS BY RX/DR IN RCRD: CPT | Mod: CPTII,S$GLB,, | Performed by: STUDENT IN AN ORGANIZED HEALTH CARE EDUCATION/TRAINING PROGRAM

## 2023-11-17 PROCEDURE — 99999 PR PBB SHADOW E&M-EST. PATIENT-LVL IV: ICD-10-PCS | Mod: PBBFAC,,, | Performed by: STUDENT IN AN ORGANIZED HEALTH CARE EDUCATION/TRAINING PROGRAM

## 2023-11-17 PROCEDURE — 3080F DIAST BP >= 90 MM HG: CPT | Mod: CPTII,S$GLB,, | Performed by: STUDENT IN AN ORGANIZED HEALTH CARE EDUCATION/TRAINING PROGRAM

## 2023-11-17 PROCEDURE — 1159F MED LIST DOCD IN RCRD: CPT | Mod: CPTII,S$GLB,, | Performed by: STUDENT IN AN ORGANIZED HEALTH CARE EDUCATION/TRAINING PROGRAM

## 2023-11-17 PROCEDURE — 99204 PR OFFICE/OUTPT VISIT, NEW, LEVL IV, 45-59 MIN: ICD-10-PCS | Mod: S$GLB,,, | Performed by: STUDENT IN AN ORGANIZED HEALTH CARE EDUCATION/TRAINING PROGRAM

## 2023-11-17 PROCEDURE — 3008F BODY MASS INDEX DOCD: CPT | Mod: CPTII,S$GLB,, | Performed by: STUDENT IN AN ORGANIZED HEALTH CARE EDUCATION/TRAINING PROGRAM

## 2023-11-17 PROCEDURE — 86003 ALLG SPEC IGE CRUDE XTRC EA: CPT | Mod: 59 | Performed by: STUDENT IN AN ORGANIZED HEALTH CARE EDUCATION/TRAINING PROGRAM

## 2023-11-17 PROCEDURE — 3080F PR MOST RECENT DIASTOLIC BLOOD PRESSURE >= 90 MM HG: ICD-10-PCS | Mod: CPTII,S$GLB,, | Performed by: STUDENT IN AN ORGANIZED HEALTH CARE EDUCATION/TRAINING PROGRAM

## 2023-11-17 PROCEDURE — 3074F PR MOST RECENT SYSTOLIC BLOOD PRESSURE < 130 MM HG: ICD-10-PCS | Mod: CPTII,S$GLB,, | Performed by: STUDENT IN AN ORGANIZED HEALTH CARE EDUCATION/TRAINING PROGRAM

## 2023-11-17 PROCEDURE — 1159F PR MEDICATION LIST DOCUMENTED IN MEDICAL RECORD: ICD-10-PCS | Mod: CPTII,S$GLB,, | Performed by: STUDENT IN AN ORGANIZED HEALTH CARE EDUCATION/TRAINING PROGRAM

## 2023-11-17 PROCEDURE — 82785 ASSAY OF IGE: CPT | Performed by: STUDENT IN AN ORGANIZED HEALTH CARE EDUCATION/TRAINING PROGRAM

## 2023-11-17 PROCEDURE — 99204 OFFICE O/P NEW MOD 45 MIN: CPT | Mod: S$GLB,,, | Performed by: STUDENT IN AN ORGANIZED HEALTH CARE EDUCATION/TRAINING PROGRAM

## 2023-11-17 PROCEDURE — 3008F PR BODY MASS INDEX (BMI) DOCUMENTED: ICD-10-PCS | Mod: CPTII,S$GLB,, | Performed by: STUDENT IN AN ORGANIZED HEALTH CARE EDUCATION/TRAINING PROGRAM

## 2023-11-17 PROCEDURE — 36415 COLL VENOUS BLD VENIPUNCTURE: CPT | Performed by: STUDENT IN AN ORGANIZED HEALTH CARE EDUCATION/TRAINING PROGRAM

## 2023-11-17 PROCEDURE — 85025 COMPLETE CBC W/AUTO DIFF WBC: CPT | Performed by: STUDENT IN AN ORGANIZED HEALTH CARE EDUCATION/TRAINING PROGRAM

## 2023-11-17 PROCEDURE — 87389 HIV-1 AG W/HIV-1&-2 AB AG IA: CPT | Performed by: STUDENT IN AN ORGANIZED HEALTH CARE EDUCATION/TRAINING PROGRAM

## 2023-11-17 PROCEDURE — 86317 IMMUNOASSAY INFECTIOUS AGENT: CPT | Mod: 59 | Performed by: STUDENT IN AN ORGANIZED HEALTH CARE EDUCATION/TRAINING PROGRAM

## 2023-11-17 PROCEDURE — 3044F PR MOST RECENT HEMOGLOBIN A1C LEVEL <7.0%: ICD-10-PCS | Mod: CPTII,S$GLB,, | Performed by: STUDENT IN AN ORGANIZED HEALTH CARE EDUCATION/TRAINING PROGRAM

## 2023-11-17 PROCEDURE — 99999 PR PBB SHADOW E&M-EST. PATIENT-LVL IV: CPT | Mod: PBBFAC,,, | Performed by: STUDENT IN AN ORGANIZED HEALTH CARE EDUCATION/TRAINING PROGRAM

## 2023-11-17 PROCEDURE — 3074F SYST BP LT 130 MM HG: CPT | Mod: CPTII,S$GLB,, | Performed by: STUDENT IN AN ORGANIZED HEALTH CARE EDUCATION/TRAINING PROGRAM

## 2023-11-17 RX ORDER — IPRATROPIUM BROMIDE 21 UG/1
2 SPRAY, METERED NASAL 4 TIMES DAILY
Qty: 30 ML | Refills: 11 | Status: SHIPPED | OUTPATIENT
Start: 2023-11-17 | End: 2024-02-16

## 2023-11-17 NOTE — ASSESSMENT & PLAN NOTE
- Not controlled at this time   - Continue Flonase 1 SEN BID   - Continue Astelin 1 SEN BID   - Adding Ipratropium at this time   - If not improved switch to Nasalcrom   - F/U CT Sinus and labs   - Will continue to monitor and reassess

## 2023-11-17 NOTE — PROGRESS NOTES
Allergy and Immunology  New Patient Clinic Note    Date: 11/17/2023  Chief Complaint   Patient presents with    Nasal Congestion     Referred by: Corey Lucio MD  [id number] [place] [address]    History  Anthony Sexton is a 33 y.o. female being seen as a New Patient today.    Chronic Rhinitis v Chronic Rhinosinusitis   - Onset: Acute worsening over the past few years   - Symptoms: Congestion, rhinorrhea, sneezing, and PND   - Suspected triggers include: Sinus inflammation v environmental   - Medications: Flonase and Astelin with some improvement   - Reported 1-2 sinus infections per year  - Followed by ENT - pending CT Sinus and possible FESS +/- septoplasty     Chronic or Inducible Urticaria  - No hx of chronic urticaria     Asthma   - No hx of asthma     CRSwNP  - No hx of CRSwNP     Eczema   - No hx of eczema     Eosinophilic Esophagitis    Food Allergy  - No hx of food allergy     Drug Allergy  - No hx of drug allergy     Recurrent Infections  - No hx of recurrent infections     Venom Allergy  - No hx of venom allergy     Environmental History  Home:  Pets in the home: dogs (1).  Ted:  carpeting in the bedrooms  Climate Control: air filters and central or room air conditioning  Dust Mite Controls: Dust mite controls are not in place.  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     Pre-eclampsia in postpartum period 04/23/2020    Vitamin D deficiency      Past Surgical History:   Procedure Laterality Date    ESOPHAGOGASTRODUODENOSCOPY N/A 5/26/2021    Procedure: EGD (ESOPHAGOGASTRODUODENOSCOPY);  Surgeon: Alka Olmstead MD;  Location: [facility];  Service: Endoscopy;  Laterality: N/A;    ESOPHAGOGASTRODUODENOSCOPY N/A 10/4/2021    Procedure: EGD (ESOPHAGOGASTRODUODENOSCOPY);  Surgeon: Alka Olmstead MD;  Location: [facility];  Service: Endoscopy;  Laterality: N/A;    ESOPHAGOGASTRODUODENOSCOPY N/A 5/29/2023    Procedure: EGD (ESOPHAGOGASTRODUODENOSCOPY);  Surgeon: Alka Olmstead MD;  Location: [facility];  Service: Endoscopy;  Laterality: N/A;    None      PH MONITORING, ESOPHAGUS, WIRELESS, (OFF REFLUX MEDS) N/A 5/29/2023    Procedure: PH MONITORING, ESOPHAGUS, WIRELESS, (OFF REFLUX MEDS);  Surgeon: Alka Olmstead MD;  Location: [facility];  Service: Endoscopy;  Laterality: N/A;    REDUCTION OF BOTH BREASTS Bilateral 5/27/2021    Procedure: MAMMOPLASTY, REDUCTION, BILATERAL;  Surgeon: Daniel Ba MD;  Location: [address];  Service: Plastics;  Laterality: Bilateral;  Left breast tissue: 1584  Right breast tissue: 1438     Social History     Social History Narrative    She wears seatbelt.      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    2 kids.      S/he reports that she has never smoked. She has never used smokeless tobacco. She reports that she does not currently use alcohol. She reports that she does not use drugs.    Current Outpatient Medications on File Prior to Visit   Medication Sig Dispense Refill    ALPRAZolam (XANAX) 0.5 MG tablet Take 1 tab by mouth daily as needed not to exceed 30 tabs per 90 days 30 tablet 1    azelastine (ASTELIN) 137 mcg (0.1 %) nasal spray 1 spray (137 mcg total) by Nasal route 2 (two) times daily. 30 mL 2    buPROPion (WELLBUTRIN XL) 150 MG TB24 tablet Take 1 tablet (150 mg  total) by mouth every morning. 90 tablet 1    ergocalciferol (VITAMIN D2) 50,000 unit Cap Take 1 capsule (50,000 Units total) by mouth twice a week. 8 capsule 11    fluticasone propionate (FLONASE) 50 mcg/actuation nasal spray Instill 2 sprays (100 mcg total) by Each Nostril route once daily. 16 g 2    omeprazole (PRILOSEC) 40 MG capsule Take 1 capsule (40 mg total) by mouth 2 (two) times daily before meals. 60 capsule 2    ondansetron (ZOFRAN) 4 MG tablet Take 1 tablet (4 mg total) by mouth every 6 (six) hours as needed for Nausea. 40 tablet 1    amoxicillin-clavulanate 875-125mg (AUGMENTIN) 875-125 mg per tablet Take 1 tablet by mouth every 12 (twelve) hours. 20 tablet 0    etonogestreL-ethinyl estradioL (NUVARING) 0.12-0.015 mg/24 hr vaginal ring Place 1 each vaginally every 21 days. Insert one (1) ring vaginally and leave in place for three (3) weeks, then remove for one (1) week. (Patient not taking: Reported on 11/1/2023) 1 each 11    promethazine-dextromethorphan (PROMETHAZINE-DM) 6.25-15 mg/5 mL Syrp Take 5 mLs by mouth every 6 (six) hours as needed (cough). 180 mL 1     No current facility-administered medications on file prior to visit.       Physical Examination  Vitals:    11/17/23 0811   BP: (!) 127/90   Pulse: 84   Temp: 97.9 °F (36.6 °C)     GENERAL:  female in no apparent distress and well developed and well nourished  HEAD:  Normocephalic, without obvious abnormality, atraumatic  EYES: sclera anicteric, conjunctiva normochromic  EARS: normal TM's and external ear canals both ears  NOSE: pale and boggy, clear discharge, turbinates swollen    OROPHARYNX: moist mucous membranes without erythema, exudates or petechiae, clear post-nasal drainage present, Visualization of epiglottis  LYMPH NODES: normal, supple, no lymphadenopathy  LUNGS: clear to auscultation, no wheezes, rales or rhonchi, symmetric air entry.  HEART: normal rate, regular rhythm, normal S1, S2, no murmurs, rubs, clicks or  gallops.  ABDOMEN: soft, nontender, nondistended, no masses or organomegaly.  MUSCULOSKELETAL: no gross joint deformity or swelling.  NEURO: alert, oriented, normal speech, no focal findings or movement disorder noted.  SKIN: normal coloration and turgor, no rashes, no suspicious skin lesions noted.     Assessment/Plan:   Problem List Items Addressed This Visit          ENT    Chronic rhinitis - Primary    Current Assessment & Plan     - Not controlled at this time   - Continue Flonase 1 SEN BID   - Continue Astelin 1 SEN BID   - Adding Ipratropium at this time   - If not improved switch to Nasalcrom   - F/U CT Sinus and labs   - Will continue to monitor and reassess         Relevant Medications    ipratropium (ATROVENT) 21 mcg (0.03 %) nasal spray    Other Relevant Orders    HIV 1/2 Ag/Ab (4th Gen)    CBC Auto Differential    Streptococcus Pneumoniae IgG Antibody (23 Serotypes), MAID    IgE    IgG    IgM    IgA    Aspergillus fumagatus IgE    Bermuda grass IgE    Cat epithelium IgE    Cladosporium IgE    Cockroach, American IgE    [place], bald IgE    D. farinae IgE    D. pteronyssinus IgE    Dog dander IgE    Plantain, English IgE    Bal grass IgE    Marsh elder, rough IgE    Mugwort IgE    Nettle IgE    Oak, white IgE    Penicillium IgE    Ragweed, short, common IgE    Misael IgE    Allergen, Cocklebur    Allergen, Elm [place]    Allergen, Meadow Grass ([facility])    Allergen, Mucor Racemosus    Allergen, Pecan Tree IgE    Allergen, White Robbie    Allergen-Alternaria Alternata    Allergen-[place]lick[place]    Allergen-Common Pigweed    Allergen-Silver Birch    Feather Panel #2    RAST Allergen for Eastern [place]    RAST Allergen Maple ([place])    RAST Allergen [place]    RAST Allergen, Lamb's Quarters    RAST Allergen, Sheep Sorrel([place])    Recurrent sinus infections    Relevant Medications    ipratropium (ATROVENT) 21 mcg (0.03 %) nasal spray    Other Relevant Orders    HIV 1/2 Ag/Ab (4th Gen)    CBC Auto  Differential    Streptococcus Pneumoniae IgG Antibody (23 Serotypes), MAID    IgE    IgG    IgM    IgA    Aspergillus fumagatus IgE    Bermuda grass IgE    Cat epithelium IgE    Cladosporium IgE    Cockroach, American IgE    Patten, bald IgE    D. farinae IgE    D. pteronyssinus IgE    Dog dander IgE    Plantain, English IgE    Bal grass IgE    Marsh elder, rough IgE    Mugwort IgE    Nettle IgE    Oak, white IgE    Penicillium IgE    Ragweed, short, common IgE    Misael IgE    Allergen, Cocklebur    Allergen, Elm [place]    Allergen, Meadow Grass ([facility])    Allergen, Mucor Racemosus    Allergen, Pecan Tree IgE    Allergen, White Robbie    Allergen-Alternaria Alternata    Allergen-[place]    Allergen-Common Pigweed    Allergen-Silver Birch    Feather Panel #2    RAST Allergen for Eastern [place]    RAST Allergen Maple ([place])    RAST Allergen [place]    RAST Allergen, Lamb's Quarters    RAST Allergen, Sheep [place])     Follow up:  Follow up in about 6 weeks (around 12/29/2023).    Justina Melendrez MD   [facility]  Allergy and Immunology

## 2023-11-20 ENCOUNTER — HOSPITAL ENCOUNTER (OUTPATIENT)
Dept: RADIOLOGY | Facility: HOSPITAL | Age: 33
Discharge: HOME | End: 2023-11-20
Attending: STUDENT IN AN ORGANIZED HEALTH CARE EDUCATION/TRAINING PROGRAM
Payer: COMMERCIAL

## 2023-11-20 DIAGNOSIS — J32.9 CHRONIC SINUSITIS, UNSPECIFIED LOCATION: ICD-10-CM

## 2023-11-20 PROCEDURE — 70486 CT MEDTRONIC SINUSES WITHOUT: ICD-10-PCS | Mod: 26,,, | Performed by: RADIOLOGY

## 2023-11-20 PROCEDURE — 70486 CT MAXILLOFACIAL W/O DYE: CPT | Mod: TC

## 2023-11-20 PROCEDURE — 70486 CT MAXILLOFACIAL W/O DYE: CPT | Mod: 26,,, | Performed by: RADIOLOGY

## 2023-11-20 NOTE — PROGRESS NOTES
[facility] Psych  Psychology  Progress Note  Individual Psychotherapy (PhD/LCSW)    Patient Name: Anthony Sexton  MRN: [id number]    Patient Class: OP- Hospital Outpatient Clinic  Primary Care Provider: Tyesha Leggett MD    Psychiatry Initial Visit (PhD/LCSW)  Individual Psychotherapy - CPT 47125    Date: 11/13/2023    Site: [place]    The patient location is: Patient's home/ Patient reported that his/her location at the time of this visit was in the [facility]     Visit type: Virtual visit with synchronous audio and video     Each patient to whom he or she provides medical services by telemedicine is: (1) informed of the relationship between the physician and patient and the respective role of any other health care provider with respect to management of the patient; and (2) notified that he or she may decline to receive medical services by telemedicine and may withdraw from such care at any time.     Referral source: Lenora Walters MD    Clinical status of patient: Outpatient    Anthony Sexton, a 33 y.o. female, for initial evaluation visit.  Met with patient.    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     Substance use:   Alcohol:  "none   Drugs: none   Tobacco: none   Caffeine: none    Current medications and drug reactions (include OTC, herbal): see medication list     Strengths and liabilities: Strength: Patient accepts guidance/feedback, Strength: Patient is expressive/articulate., Strength: Patient is intelligent., Strength: Patient is motivated for change., Strength: Patient has positive support network., Strength: Patient has reasonable judgment., Strength: Patient is stable., Liability: Patient lacks coping skills.    Current Evaluation:     Mental Status Exam:  General Appearance:  unremarkable, age appropriate   Speech: normal tone, normal rate, normal pitch, normal volume      Level of Cooperation: cooperative      Thought Processes: normal and logical   Mood: anxious, sad      Thought Content: normal, no suicidality, no homicidality, delusions, or paranoia   Affect: congruent and appropriate   Orientation: Oriented x3   Memory: recent >  intact   Attention Span & Concentration: intact   Fund of General Knowledge: intact and appropriate to age and level of education   Abstract Reasoning: not assessed   Judgment & Insight: fair     Language  intact     Summary: The patient reported that she has been defensive towards her  lately, particularly when he asks about intimacy or asks for her to get something for him. She feels like it is yet another thing that she has to do. She wants to be able to "do nothing without demands." Additionally, her "person" to speak to is her older sister. She learned over the weekend that her sister wants to "try to focus more on her marriage," which will result in less time with the patient. The patient feels sad because she won't be able to speak with her sister as much but is also envious because her sister has the time to put into her marriage because she does not have children. We discussed how her triggers are linked to feelings of control (or lack thereof). We explored areas in her life where " she does have control, such as finances, groceries, and her work schedule. She struggles to maintain control when it comes to body autonomy. Her kids are more affectionate than the patient desires and her  wants intimacy (even with the patient is exhausted). We explored ways for the patient to manage these feelings and agreed to unpack more during next session.   Diagnostic Impression - Plan:       ICD-10-CM ICD-9-CM   1. Undiagnosed disease or syndrome present  R69 799.9                                     Plan:individual psychotherapy    Return to Clinic: as scheduled    Length of Service (minutes): 45          Barbra Bartlett, PhD  Psychologist  [facility] Psych

## 2023-11-21 LAB
A ALTERNATA IGE QN: <0.1 KU/L
A FUMIGATUS IGE QN: <0.1 KU/L
ALLERGEN BOXELDER MAPLE TREE IGE: <0.1 KU/L
ALLERGEN MAPLE (BOX ELDER) CLASS: NORMAL
ALLERGEN MULBERRY CLASS: NORMAL
ALLERGEN MULBERRY TREE IGE: <0.1 KU/L
ALLERGEN PIGWEED IGE: <0.1 KU/L
ALLERGEN WHITE ASH TREE IGE: <0.1 KU/L
AMER SYCAMORE IGE QN: <0.1 KU/L
BALD CYPRESS IGE QN: <0.1 KU/L
BERMUDA GRASS IGE QN: <0.1 KU/L
C HERBARUM IGE QN: <0.1 KU/L
CAT DANDER IGE QN: <0.1 KU/L
CEDAR IGE QN: <0.1 KU/L
COCKLEBUR IGE QN: <0.1 KU/L
COMMON PIGWEED CLASS: NORMAL
COMMON RAGWEED IGE QN: <0.1 KU/L
D FARINAE IGE QN: <0.1 KU/L
D PTERONYSS IGE QN: <0.1 KU/L
DEPRECATED A ALTERNATA IGE RAST QL: NORMAL
DEPRECATED A FUMIGATUS IGE RAST QL: NORMAL
DEPRECATED BALD CYPRESS IGE RAST QL: NORMAL
DEPRECATED BERMUDA GRASS IGE RAST QL: NORMAL
DEPRECATED C HERBARUM IGE RAST QL: NORMAL
DEPRECATED CAT DANDER IGE RAST QL: NORMAL
DEPRECATED CEDAR IGE RAST QL: NORMAL
DEPRECATED COCKLEBUR IGE RAST QL: NORMAL
DEPRECATED COMMON RAGWEED IGE RAST QL: NORMAL
DEPRECATED D FARINAE IGE RAST QL: NORMAL
DEPRECATED D PTERONYSS IGE RAST QL: NORMAL
DEPRECATED DOG DANDER IGE RAST QL: NORMAL
DEPRECATED ELDER IGE RAST QL: NORMAL
DEPRECATED ENGL PLANTAIN IGE RAST QL: NORMAL
DEPRECATED GOOSEFOOT IGE RAST QL: NORMAL
DEPRECATED JOHNSON GRASS IGE RAST QL: NORMAL
DEPRECATED KENT BLUE GRASS IGE RAST QL: NORMAL
DEPRECATED M RACEMOSUS IGE RAST QL: NORMAL
DEPRECATED MUGWORT IGE RAST QL: NORMAL
DEPRECATED NETTLE IGE RAST QL: NORMAL
DEPRECATED P NOTATUM IGE RAST QL: NORMAL
DEPRECATED PECAN/HICK TREE IGE RAST QL: NORMAL
DEPRECATED ROACH IGE RAST QL: NORMAL
DEPRECATED SHEEP SORREL IGE RAST QL: NORMAL
DEPRECATED SILVER BIRCH IGE RAST QL: NORMAL
DEPRECATED TIMOTHY IGE RAST QL: NORMAL
DEPRECATED WHITE OAK IGE RAST QL: NORMAL
DOG DANDER IGE QN: <0.1 KU/L
ELDER IGE QN: <0.1 KU/L
ELM CEDAR CLASS: NORMAL
ELM CEDAR, IGE: <0.1 KU/L
ENGL PLANTAIN IGE QN: <0.1 KU/L
FEATHER PANEL #2: <0.1 KU/L
GOOSEFOOT IGE QN: <0.1 KU/L
JOHNSON GRASS IGE QN: <0.1 KU/L
KENT BLUE GRASS IGE QN: <0.1 KU/L
M RACEMOSUS IGE QN: <0.1 KU/L
MUGWORT IGE QN: <0.1 KU/L
NETTLE IGE QN: <0.1 KU/L
P NOTATUM IGE QN: <0.1 KU/L
PECAN/HICK TREE IGE QN: <0.1 KU/L
ROACH IGE QN: <0.1 KU/L
SHEEP SORREL IGE QN: <0.1 KU/L
SILVER BIRCH IGE QN: <0.1 KU/L
TIMOTHY IGE QN: <0.1 KU/L
WHITE ASH CLASS: NORMAL
WHITE OAK IGE QN: <0.1 KU/L

## 2023-11-27 ENCOUNTER — OFFICE VISIT (OUTPATIENT)
Dept: PSYCHIATRY | Facility: CLINIC | Age: 33
End: 2023-11-27
Payer: COMMERCIAL

## 2023-11-27 DIAGNOSIS — R69 UNDIAGNOSED DISEASE OR SYNDROME PRESENT: Primary | ICD-10-CM

## 2023-11-27 PROCEDURE — 90834 PSYTX W PT 45 MINUTES: CPT | Mod: 95,,, | Performed by: STUDENT IN AN ORGANIZED HEALTH CARE EDUCATION/TRAINING PROGRAM

## 2023-11-27 PROCEDURE — 3044F HG A1C LEVEL LT 7.0%: CPT | Mod: CPTII,95,, | Performed by: STUDENT IN AN ORGANIZED HEALTH CARE EDUCATION/TRAINING PROGRAM

## 2023-11-27 PROCEDURE — 1159F MED LIST DOCD IN RCRD: CPT | Mod: CPTII,95,, | Performed by: STUDENT IN AN ORGANIZED HEALTH CARE EDUCATION/TRAINING PROGRAM

## 2023-11-27 PROCEDURE — 90834 PR PSYCHOTHERAPY W/PATIENT, 45 MIN: ICD-10-PCS | Mod: 95,,, | Performed by: STUDENT IN AN ORGANIZED HEALTH CARE EDUCATION/TRAINING PROGRAM

## 2023-11-27 PROCEDURE — 3044F PR MOST RECENT HEMOGLOBIN A1C LEVEL <7.0%: ICD-10-PCS | Mod: CPTII,95,, | Performed by: STUDENT IN AN ORGANIZED HEALTH CARE EDUCATION/TRAINING PROGRAM

## 2023-11-27 PROCEDURE — 1159F PR MEDICATION LIST DOCUMENTED IN MEDICAL RECORD: ICD-10-PCS | Mod: CPTII,95,, | Performed by: STUDENT IN AN ORGANIZED HEALTH CARE EDUCATION/TRAINING PROGRAM

## 2023-11-27 NOTE — PROGRESS NOTES
[facility] Psych  Psychology  Progress Note  Individual Psychotherapy (PhD/LCSW)    Patient Name: Anthony Sexton  MRN: [id number]    Patient Class: OP- Hospital Outpatient Clinic  Primary Care Provider: Tyesha Leggett MD    Psychiatry Initial Visit (PhD/LCSW)  Individual Psychotherapy - CPT 59484    Date: 11/27/2023    Site: [place]    The patient location is: Patient's home/ Patient reported that his/her location at the time of this visit was in the [facility]     Visit type: Virtual visit with synchronous audio and video     Each patient to whom he or she provides medical services by telemedicine is: (1) informed of the relationship between the physician and patient and the respective role of any other health care provider with respect to management of the patient; and (2) notified that he or she may decline to receive medical services by telemedicine and may withdraw from such care at any time.     Referral source: Lenora Walters MD    Clinical status of patient: Outpatient    Anthony Sexton, a 33 y.o. female, for initial evaluation visit.  Met with patient.    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     Substance use:   Alcohol:  none   Drugs: none   Tobacco: none   Caffeine: none    Current medications and drug reactions (include OTC, herbal): see medication list     Strengths and liabilities: Strength: Patient accepts guidance/feedback, Strength: Patient is expressive/articulate., Strength: Patient is intelligent., Strength: Patient is motivated for change., Strength: Patient has positive support network., Strength: Patient has reasonable judgment., Strength: Patient is stable., Liability: Patient lacks coping skills.    Current Evaluation:     Mental Status Exam:  General Appearance:  unremarkable, age appropriate   Speech: normal tone, normal rate, normal pitch, normal volume      Level of Cooperation: cooperative      Thought Processes: normal and logical   Mood: anxious, sad      Thought Content: normal, no suicidality, no homicidality, delusions, or paranoia   Affect: congruent and appropriate   Orientation: Oriented x3   Memory: recent >  intact   Attention Span & Concentration: intact   Fund of General Knowledge: intact and appropriate to age and level of education   Abstract Reasoning: not assessed   Judgment & Insight: fair     Language  intact     Summary: The patient experienced a panic attack when preparing to return to regular routine after a week of for the holiday. Examined the patient's morning routine and how to reduce stress. Will complete some tasks the night before, such as packing lunch for herself and daughter. Reported that daughter's pace in the morning can also be a trigger. Considered waking up earlier or waking daughter up earlier so she can have more time to adjust to her day. Patient is triggered by daughter's meltdowns. We discussed ways to calm herself while modeling these coping strategies for her daughter. Will share an example of a chore chart for daughter to use in the morning (thus reducing the stress of the patient's morning).   Diagnostic Impression - Plan:       ICD-10-CM ICD-9-CM   1. Undiagnosed  disease or syndrome present  R68 799.9                                       Plan:individual psychotherapy    Return to Clinic: as scheduled    Length of Service (minutes): 45          Barbra Bartlett, PhD  Psychologist  [facility]

## 2023-11-30 LAB
IMMUNOLOGIST REVIEW: NORMAL
S PN DA SERO 19F IGG SER-MCNC: 3.3 MCG/ML
S PNEUM DA 1 IGG SER-MCNC: 2.3 MCG/ML
S PNEUM DA 10A IGG SER-MCNC: 2.6 MCG/ML
S PNEUM DA 11A IGG SER-MCNC: 0.5 MCG/ML
S PNEUM DA 12F IGG SER-MCNC: 2.2 MCG/ML
S PNEUM DA 14 IGG SER-MCNC: 1.6 MCG/ML
S PNEUM DA 15B IGG SER-MCNC: 3.7 MCG/ML
S PNEUM DA 17F IGG SER-MCNC: 2.4 MCG/ML
S PNEUM DA 18C IGG SER-MCNC: 0.3 MCG/ML
S PNEUM DA 19A IGG SER-MCNC: 5.1 MCG/ML
S PNEUM DA 2 IGG SER-MCNC: 1.2 MCG/ML
S PNEUM DA 20A IGG SER-MCNC: 8.1 MCG/ML
S PNEUM DA 22F IGG SER-MCNC: 2.3 MCG/ML
S PNEUM DA 23F IGG SER-MCNC: 2 MCG/ML
S PNEUM DA 3 IGG SER-MCNC: 0.4 MCG/ML
S PNEUM DA 33F IGG SER-MCNC: 5.3 MCG/ML
S PNEUM DA 4 IGG SER-MCNC: 0.5 MCG/ML
S PNEUM DA 5 IGG SER-MCNC: 0.9 MCG/ML
S PNEUM DA 6B IGG SER-MCNC: 2.4 MCG/ML
S PNEUM DA 7F IGG SER-MCNC: 3.2 MCG/ML
S PNEUM DA 8 IGG SER-MCNC: 3 MCG/ML
S PNEUM DA 9N IGG SER-MCNC: 1.3 MCG/ML
S PNEUM DA 9V IGG SER-MCNC: 0.4 MCG/ML

## 2023-12-07 ENCOUNTER — PATIENT MESSAGE (OUTPATIENT)
Dept: BARIATRICS | Facility: CLINIC | Age: 33
End: 2023-12-07
Payer: COMMERCIAL

## 2023-12-07 ENCOUNTER — PATIENT MESSAGE (OUTPATIENT)
Dept: PSYCHIATRY | Facility: CLINIC | Age: 33
End: 2023-12-07

## 2023-12-08 ENCOUNTER — LAB VISIT (OUTPATIENT)
Dept: LAB | Facility: HOSPITAL | Age: 33
End: 2023-12-08
Attending: FAMILY MEDICINE
Payer: COMMERCIAL

## 2023-12-08 ENCOUNTER — OFFICE VISIT (OUTPATIENT)
Dept: FAMILY MEDICINE | Facility: CLINIC | Age: 33
End: 2023-12-08
Attending: FAMILY MEDICINE
Payer: COMMERCIAL

## 2023-12-08 VITALS
RESPIRATION RATE: 18 BRPM | OXYGEN SATURATION: 98 % | HEART RATE: 84 BPM | HEIGHT: 65 IN | WEIGHT: 272.94 LBS | DIASTOLIC BLOOD PRESSURE: 72 MMHG | SYSTOLIC BLOOD PRESSURE: 112 MMHG | TEMPERATURE: 97 F | BODY MASS INDEX: 45.47 KG/M2

## 2023-12-08 DIAGNOSIS — R25.2 CRAMPING OF FEET: Primary | ICD-10-CM

## 2023-12-08 DIAGNOSIS — R25.2 CRAMPING OF HANDS: ICD-10-CM

## 2023-12-08 DIAGNOSIS — R25.2 CRAMPING OF FEET: ICD-10-CM

## 2023-12-08 PROCEDURE — 3008F BODY MASS INDEX DOCD: CPT | Mod: CPTII,S$GLB,, | Performed by: FAMILY MEDICINE

## 2023-12-08 PROCEDURE — 3074F PR MOST RECENT SYSTOLIC BLOOD PRESSURE < 130 MM HG: ICD-10-PCS | Mod: CPTII,S$GLB,, | Performed by: FAMILY MEDICINE

## 2023-12-08 PROCEDURE — 3078F PR MOST RECENT DIASTOLIC BLOOD PRESSURE < 80 MM HG: ICD-10-PCS | Mod: CPTII,S$GLB,, | Performed by: FAMILY MEDICINE

## 2023-12-08 PROCEDURE — 83735 ASSAY OF MAGNESIUM: CPT | Performed by: FAMILY MEDICINE

## 2023-12-08 PROCEDURE — 36415 COLL VENOUS BLD VENIPUNCTURE: CPT | Mod: PO | Performed by: FAMILY MEDICINE

## 2023-12-08 PROCEDURE — 99213 OFFICE O/P EST LOW 20 MIN: CPT | Mod: S$GLB,,, | Performed by: FAMILY MEDICINE

## 2023-12-08 PROCEDURE — 99999 PR PBB SHADOW E&M-EST. PATIENT-LVL V: CPT | Mod: PBBFAC,,, | Performed by: FAMILY MEDICINE

## 2023-12-08 PROCEDURE — 99213 PR OFFICE/OUTPT VISIT, EST, LEVL III, 20-29 MIN: ICD-10-PCS | Mod: S$GLB,,, | Performed by: FAMILY MEDICINE

## 2023-12-08 PROCEDURE — 80048 BASIC METABOLIC PNL TOTAL CA: CPT | Performed by: FAMILY MEDICINE

## 2023-12-08 PROCEDURE — 99999 PR PBB SHADOW E&M-EST. PATIENT-LVL V: ICD-10-PCS | Mod: PBBFAC,,, | Performed by: FAMILY MEDICINE

## 2023-12-08 PROCEDURE — 3074F SYST BP LT 130 MM HG: CPT | Mod: CPTII,S$GLB,, | Performed by: FAMILY MEDICINE

## 2023-12-08 PROCEDURE — 3044F HG A1C LEVEL LT 7.0%: CPT | Mod: CPTII,S$GLB,, | Performed by: FAMILY MEDICINE

## 2023-12-08 PROCEDURE — 3008F PR BODY MASS INDEX (BMI) DOCUMENTED: ICD-10-PCS | Mod: CPTII,S$GLB,, | Performed by: FAMILY MEDICINE

## 2023-12-08 PROCEDURE — 3044F PR MOST RECENT HEMOGLOBIN A1C LEVEL <7.0%: ICD-10-PCS | Mod: CPTII,S$GLB,, | Performed by: FAMILY MEDICINE

## 2023-12-08 PROCEDURE — 3078F DIAST BP <80 MM HG: CPT | Mod: CPTII,S$GLB,, | Performed by: FAMILY MEDICINE

## 2023-12-08 NOTE — PROGRESS NOTES
Anthony Sexton    Chief Complaint   Patient presents with    cramping and feet and body ache       History of Present Illness:   Ms. Sexton comes in today for follow-up.  She states she is fasting.  She 2 weeks ago she started eating high-protein, low carb, low-fat diet and has noted intermittent cramping, tightness sensation in her hands and feet with aching in her buttock and thighs at approximately 6:30 a.m. a.m. after eating. She states since diet change, she started taking multivitamin, probiotic, zinc.  She states she drinks a Pure Protein shake daily.  She states she drinks 96 oz of water daily and does not consume artificial sweetener.  She states she has not taken vitamin-D in 1 month and has not taken Bupropion in 2 weeks. She states she has not been exercising.    She states she follows with bariatric medicine and has appointment with nutritionist next week.    Otherwise, she denies having fever, chills, fatigue, appetite changes; shortness of breath, cough, wheezing; chest pain, palpitations, leg swelling; abdominal pain, nausea, vomiting, diarrhea, constipation; unusual urinary symptoms; polydipsia, polyphagia, polyuria, hot or cold intolerance; back pain; acute visual changes, headache; anxiety, depression, homicidal or suicidal thoughts.                 Current Outpatient Medications   Medication Sig    ALPRAZolam (XANAX) 0.5 MG tablet Take 1 tab by mouth daily as needed not to exceed 30 tabs per 90 days    azelastine (ASTELIN) 137 mcg (0.1 %) nasal spray 1 spray (137 mcg total) by Nasal route 2 (two) times daily.    fluticasone propionate (FLONASE) 50 mcg/actuation nasal spray Instill 2 sprays (100 mcg total) by Each Nostril route once daily.    ipratropium (ATROVENT) 21 mcg (0.03 %) nasal spray 2 sprays by Each Nostril route 4 (four) times daily.    omeprazole (PRILOSEC) 40 MG capsule Take 1 capsule (40 mg total) by mouth 2 (two) times daily before meals.    ondansetron (ZOFRAN) 4 MG tablet Take 1  tablet (4 mg total) by mouth every 6 (six) hours as needed for Nausea.    buPROPion (WELLBUTRIN XL) 150 MG TB24 tablet Take 1 tablet (150 mg total) by mouth every morning. (Patient not taking: Reported on 12/8/2023)    ergocalciferol (VITAMIN D2) 50,000 unit Cap Take 1 capsule (50,000 Units total) by mouth twice a week. (Patient not taking: Reported on 12/8/2023)       Review of Systems   Constitutional:  Negative for activity change, appetite change, chills, fatigue, fever and unexpected weight change.   Eyes:  Negative for visual disturbance.   Respiratory:  Negative for cough, shortness of breath and wheezing.    Cardiovascular:  Negative for chest pain, palpitations and leg swelling.   Gastrointestinal:  Negative for abdominal pain, blood in stool, constipation, diarrhea and vomiting.   Endocrine: Negative for cold intolerance, heat intolerance, polydipsia, polyphagia and polyuria.   Genitourinary:  Negative for difficulty urinating, dysuria, hematuria and menstrual problem.   Musculoskeletal:  Positive for myalgias. Negative for arthralgias, back pain, joint swelling and neck pain.   Neurological:  Positive for numbness. Negative for weakness and headaches.   Psychiatric/Behavioral:  Negative for dysphoric mood and suicidal ideas. The patient is not nervous/anxious.         Negative for homicidal ideas.       Objective:  Physical Exam  Vitals reviewed.   Constitutional:       General: She is not in acute distress.     Appearance: Normal appearance. She is well-developed. She is obese. She is not ill-appearing, toxic-appearing or diaphoretic.      Comments: Pleasant.   Cardiovascular:      Rate and Rhythm: Normal rate and regular rhythm.      Pulses: Normal pulses.      Heart sounds: Normal heart sounds. No murmur heard.  Pulmonary:      Effort: Pulmonary effort is normal. No respiratory distress.      Breath sounds: Normal breath sounds. No wheezing.   Abdominal:      General: Bowel sounds are normal. There is  no distension.      Palpations: Abdomen is soft. There is no mass.      Tenderness: There is no abdominal tenderness. There is no right CVA tenderness, left CVA tenderness, guarding or rebound.   Musculoskeletal:         General: Tenderness present. No swelling. Normal range of motion.      Cervical back: Normal range of motion and neck supple. No tenderness. No muscular tenderness.      Comments: She is ambulatory without problems. Subtle tenderness at posterior thighs with full range of motion noted.   Lymphadenopathy:      Cervical: No cervical adenopathy.   Skin:     General: Skin is warm.   Neurological:      General: No focal deficit present.      Mental Status: She is alert and oriented to person, place, and time.   Psychiatric:         Mood and Affect: Mood normal.         Behavior: Behavior normal.         Thought Content: Thought content normal.         Judgment: Judgment normal.         ASSESSMENT:  1. Cramping of feet    2. Cramping of hands        PLAN:  Anthony was seen today for cramping and feet and body ache.    Diagnoses and all orders for this visit:    Cramping of feet  -     Magnesium; Future  -     Basic Metabolic Panel; Future    Cramping of hands  -     Magnesium; Future  -     Basic Metabolic Panel; Future      Patient advised to call for results.  Continue current medications, follow low sodium, low cholesterol, low carb diet, daily walks.  Add OTC Turmeric daily.  Keep follow up with specialists.  Follow up if symptoms worsen or fail to improve.

## 2023-12-09 LAB
ANION GAP SERPL CALC-SCNC: 8 MMOL/L (ref 8–16)
BUN SERPL-MCNC: 13 MG/DL (ref 6–20)
CALCIUM SERPL-MCNC: 9.5 MG/DL (ref 8.7–10.5)
CHLORIDE SERPL-SCNC: 104 MMOL/L (ref 95–110)
CO2 SERPL-SCNC: 26 MMOL/L (ref 23–29)
CREAT SERPL-MCNC: 0.8 MG/DL (ref 0.5–1.4)
EST. GFR  (NO RACE VARIABLE): >60 ML/MIN/1.73 M^2
GLUCOSE SERPL-MCNC: 74 MG/DL (ref 70–110)
MAGNESIUM SERPL-MCNC: 2 MG/DL (ref 1.6–2.6)
POTASSIUM SERPL-SCNC: 4.7 MMOL/L (ref 3.5–5.1)
SODIUM SERPL-SCNC: 138 MMOL/L (ref 136–145)

## 2023-12-11 ENCOUNTER — OFFICE VISIT (OUTPATIENT)
Dept: OTOLARYNGOLOGY | Facility: CLINIC | Age: 33
End: 2023-12-11
Payer: COMMERCIAL

## 2023-12-11 ENCOUNTER — TELEPHONE (OUTPATIENT)
Dept: PREADMISSION TESTING | Facility: HOSPITAL | Age: 33
End: 2023-12-11
Payer: COMMERCIAL

## 2023-12-11 VITALS — WEIGHT: 272.5 LBS | BODY MASS INDEX: 45.34 KG/M2

## 2023-12-11 DIAGNOSIS — J34.89 NASAL OBSTRUCTION: ICD-10-CM

## 2023-12-11 DIAGNOSIS — J34.2 NASAL SEPTAL DEVIATION: Primary | ICD-10-CM

## 2023-12-11 DIAGNOSIS — R51.9 FREQUENT HEADACHES: ICD-10-CM

## 2023-12-11 DIAGNOSIS — J34.3 HYPERTROPHY OF BOTH INFERIOR NASAL TURBINATES: ICD-10-CM

## 2023-12-11 DIAGNOSIS — G47.33 OSA (OBSTRUCTIVE SLEEP APNEA): ICD-10-CM

## 2023-12-11 PROCEDURE — 3044F HG A1C LEVEL LT 7.0%: CPT | Mod: CPTII,S$GLB,, | Performed by: STUDENT IN AN ORGANIZED HEALTH CARE EDUCATION/TRAINING PROGRAM

## 2023-12-11 PROCEDURE — 99214 OFFICE O/P EST MOD 30 MIN: CPT | Mod: S$GLB,,, | Performed by: STUDENT IN AN ORGANIZED HEALTH CARE EDUCATION/TRAINING PROGRAM

## 2023-12-11 PROCEDURE — 3008F PR BODY MASS INDEX (BMI) DOCUMENTED: ICD-10-PCS | Mod: CPTII,S$GLB,, | Performed by: STUDENT IN AN ORGANIZED HEALTH CARE EDUCATION/TRAINING PROGRAM

## 2023-12-11 PROCEDURE — 99214 PR OFFICE/OUTPT VISIT, EST, LEVL IV, 30-39 MIN: ICD-10-PCS | Mod: S$GLB,,, | Performed by: STUDENT IN AN ORGANIZED HEALTH CARE EDUCATION/TRAINING PROGRAM

## 2023-12-11 PROCEDURE — 1159F MED LIST DOCD IN RCRD: CPT | Mod: CPTII,S$GLB,, | Performed by: STUDENT IN AN ORGANIZED HEALTH CARE EDUCATION/TRAINING PROGRAM

## 2023-12-11 PROCEDURE — 3008F BODY MASS INDEX DOCD: CPT | Mod: CPTII,S$GLB,, | Performed by: STUDENT IN AN ORGANIZED HEALTH CARE EDUCATION/TRAINING PROGRAM

## 2023-12-11 PROCEDURE — 99999 PR PBB SHADOW E&M-EST. PATIENT-LVL III: ICD-10-PCS | Mod: PBBFAC,,, | Performed by: STUDENT IN AN ORGANIZED HEALTH CARE EDUCATION/TRAINING PROGRAM

## 2023-12-11 PROCEDURE — 1159F PR MEDICATION LIST DOCUMENTED IN MEDICAL RECORD: ICD-10-PCS | Mod: CPTII,S$GLB,, | Performed by: STUDENT IN AN ORGANIZED HEALTH CARE EDUCATION/TRAINING PROGRAM

## 2023-12-11 PROCEDURE — 99999 PR PBB SHADOW E&M-EST. PATIENT-LVL III: CPT | Mod: PBBFAC,,, | Performed by: STUDENT IN AN ORGANIZED HEALTH CARE EDUCATION/TRAINING PROGRAM

## 2023-12-11 PROCEDURE — 3044F PR MOST RECENT HEMOGLOBIN A1C LEVEL <7.0%: ICD-10-PCS | Mod: CPTII,S$GLB,, | Performed by: STUDENT IN AN ORGANIZED HEALTH CARE EDUCATION/TRAINING PROGRAM

## 2023-12-11 NOTE — TELEPHONE ENCOUNTER
----- Message from Rosalie Dumont MA sent at 12/11/2023  4:36 PM CST -----  Regarding: FW: Cardiac Clearance    ----- Message -----  From: Sajan Stevens MD  Sent: 12/11/2023   4:29 PM CST  To: Rosalie Dumont MA  Subject: RE: Cardiac Clearance                              Low periop risk of CV events for non-high risk procedure.  Ok to proceed the scheduled procedure without further cardiac study.        ----- Message -----  From: Rosalie Dumont MA  Sent: 12/11/2023   3:15 PM CST  To: Sajan Stevens MD  Subject: FW: Cardiac Clearance                            Please advise  ----- Message -----  From: Wendy Kelly RN  Sent: 12/11/2023   3:14 PM CST  To: Rodney Moeller Staff  Subject: Cardiac Clearance                                Please advise,    This patient is schedule for a septoplasty with turbinate reduction with Dr. Lucio on 1/17/24 and we are requesting cardiac clearance. The patient was last seen in 2022. Can you please assist us in getting clearance and scheduling the patient an appointment if one is needed?    Thanks,  Pre-Admit Testing  [phone]

## 2023-12-11 NOTE — PROGRESS NOTES
Referring Provider:    No referring provider defined for this encounter.  Subjective:   Patient: Anthony Sexton [id number], :1990   Visit date:2023 1:25 PM    Chief Complaint:  Sinusitis    HPI:    Prior notes reviewed by myself.  Clinical documentation obtained by nursing staff reviewed.     Symptoms worsened abouut 1 month ago. Started after a viral infection. Treated with anitbiotics, flonase, cough medication, decadron (stopped after one dose - heart fluttering), and astelin.    Since then she has continued to have ear fullness, nasal congestion, facial pressure, post nasal drip, hyposmia. She was doing better with sleep, but now having more trouble with breathing at night since this started. No purulent rhinorrhea.     Sinonasal symptoms now present for over a year, and do not resolve between episodes.     She has been on flonase daily.   Has not had allergy testing.     Return clinic visit, 23  Since last visit she continues to have significant issues with left nasal obstruction. Overall for last year or so, progressively worsening. All the time. Worse at night. Only temporary improvement flonase and BID astelin despite consistent use for ~6 months.  She denies facial pressure/pain, purulent rhinorrhea, or anosmia/hyposmia.      Objective:     Physical Exam:  Vitals:  Wt 123.6 kg (272 lb 7.8 oz)   LMP 2023 (Exact Date) Comment: Monthly  BMI 45.34 kg/m²   General appearance:  Well developed, well nourished    Ears:  Otoscopy of external auditory canals and tympanic membranes was normal, clinical speech reception thresholds grossly intact, no mass/lesion of auricle.    Nose:  No masses/lesions of external nose; septum with moderate deviation to the left obstructing approximately 50-75% of the nasal airway, and turbinates were hypertrophied 3+.    Mouth:  No mass/lesion of lips, teeth, gums, hard/soft palate, tongue, tonsils, or oropharynx.    Neck & Lymphatics:  No cervical  lymphadenopathy, no neck mass/crepitus/ asymmetry, trachea is midline, no thyroid enlargement/tenderness/mass.        [x]  Data Reviewed:    Images    I have independently reviewed the following imaging:    CT sinus 11/9/23    Sinuses clear  Left septal deviation  Inferior turbinate hypertrophy     EXAMINATION:  CT MEDTRONIC SINUSES WITHOUT     CLINICAL HISTORY:  Chronic sinusitis, unspecified     TECHNIQUE:  2.5 mm axial images of the paranasal sinuses without contrast.  Coronal and sagittal reformatted imaging from the axial acquisition     COMPARISON:  CT head 09/27/22     FINDINGS:  Frontal sinuses: Frontal sinuses are clear.     Minimal patchy right posterior ethmoid air cell opacification.     Sphenoid sinus:Clear.     Maxillary antra: Unremarkable.     Ostiomeatal complexes: The ostiomeatal complexes are patent bilaterally.     There is mild leftward deviation of the nasal septum.  No evidence of carotid dehiscence.     Roof of the ethmoids is symmetric.  The lamina papyracea grossly intact bilaterally.     Impression:     No significant abnormality.    PSG  PHYSICIAN INTERPRETATION AND COMMENTS: Findings are consistent with mild, positional obstructive sleep apnea(SAVANNA).  Indications of cardiac dysrhythmia are recorded. Correlated with cardiology exam. Therapy with CPAP indicated, Please  refer to sleep disorders clinic  CLINICAL HISTORY: 32 year old female presented with: 15 inch neck, BMI of 43.9, an [place] sleepiness score of 10, history  of hypertension, depression and symptoms of nocturnal waking up choking. Based on the clinical history, the patient has  a high pre-test probability of having Mild SAVANNA.  SLEEP STUDY FINDINGS: Patient underwent a 1 night Home Sleep Test and by behavioral criteria, slept for approximately  5.33 hours , with a sleep efficiency of 95%. Mild sleep disordered breathing (AHI=7) is noted based on a 4% hypopnea  desaturation criteria, predominantly in the supine position (10  events/hour). The patient slept supine 59.7% of the night  based on valid recording time of 4.89 hours and is 3.3 times as likely to have apneas/hypopneas when supine. When  considering more subtle measures of sleep disordered breathing, the overall respiratory disturbance index is mild(RDI=15)  based on a 1% hypopnea desaturation criteria with confirmation by surrogate arousal indicators. The apneas/hypopneas  are accompanied by minimal oxygen desaturation (percent time below 90% SpO2: 1%, Min SpO2: 78.1%). The average  desaturation across all sleep disordered breathing events is 2.9%. Snoring occurs for 3.8% (30 dB) of the study. The mean  pulse rate is 86.3 BPM, with very frequent pulse rate variability (82 events with >= 6 BPM increase/decrease per hour).  TREATMENT CONSIDERATIONS: For symptomatic mild obstructive sleep apnea, patient preference and compliance  impacts efficacious outcomes. Consider treatment with nasal continuous positive airway pressure (CPAP/AutoPAP),  mandibular advancement splint (MAS), referral to an ENT surgeon for modification to the airway, and/or weight loss or  behavioral therapy. Based on the SAVANNA Supine data in the study, a Mandibular Advancement Splint (MAS) will likely provide  treatment benefit independent of SAVANNA severity. The patient should avoid sleeping supine given non-supine AHI is in the  normal range.    Lab Results   Component Value Date    WBC 9.46 11/17/2023    HGB 12.5 11/17/2023    HCT 41.8 11/17/2023    MCV 82 11/17/2023    EOSINOPHIL 0.6 11/17/2023         NASAL ENDOSCOPY       Indication: Anthony Sexton is a 33 y.o. female  with sinonasal symptoms that were not able to be explained by anterior rhinoscopy alone, thus necessitating nasal endoscopy.     Procedure: Risks, benefits, and alternatives of the procedure were discussed with the patient, and the patient consented to the nasal endoscopy.  The nasal cavity was sprayed with a topical decongestant and anesthetic  (if needed). The endoscope was passed into each nostril and each nasal cavity was visualized.  On each side the nasal cavity, sinuses (if open), turbinates, middle and superior meatus, sphenoethmoidal recess and septum were examined with the findings described below. At the end of the examination, the scope was removed. The patient tolerated the procedure well with no complications.       Endoscopic Sinonasal Exam Findings:  -     The right side has moderate edema  -     The left side has moderate edema  -     Nasal secretions:  clear secretions  bilaterally  -     Nasal septum: mild to moderate left mid septal deviation  -     Inferior turbinate: hypertrophy or edema (Moderate) and Normal mucosa without significant hypertrophy bilaterally  -     Middle turbinate: - normal mucosa without significant hypertrophy - and Normal mucosa without significant hypertrophy bilaterally  -     Other findings:  mild adenoid hypertrophy, no masses, no purulence, non obstructive           Assessment & Plan:   Nasal septal deviation  -     Case Request Operating Room: SEPTOPLASTY, NOSE, WITH NASAL TURBINATE REDUCTION    Frequent headaches    SAVNANA (obstructive sleep apnea)    Hypertrophy of both inferior nasal turbinates  -     Case Request Operating Room: SEPTOPLASTY, NOSE, WITH NASAL TURBINATE REDUCTION    Nasal obstruction  -     Case Request Operating Room: SEPTOPLASTY, NOSE, WITH NASAL TURBINATE REDUCTION        Anthony  has left-sided nasal septal deviation and inferior turbinate hypertrophy resulting in persistent left-sided nasal obstruction. The patient has history of allergic rhinitis which has been managed with appropriate medical treatment, yet the nasal obstruction has persisted.  We discussed treatment options including septoplasty and inferior turbinate reduction, along with continued allergy management.  Risks, benefits, and need for continued medical management after surgery were discussed.  Specific risks including  bleeding, infection, CSF leak, septal perforation, saddle nose deformity, numbness of the upper teeth were discussed at length with the patient.  She wishes to proceed with surgery.  Informed consent was obtained.

## 2023-12-18 ENCOUNTER — OFFICE VISIT (OUTPATIENT)
Dept: PSYCHIATRY | Facility: CLINIC | Age: 33
End: 2023-12-18
Payer: COMMERCIAL

## 2023-12-18 DIAGNOSIS — R69 UNDIAGNOSED DISEASE OR SYNDROME PRESENT: Primary | ICD-10-CM

## 2023-12-18 PROCEDURE — 90832 PR PSYCHOTHERAPY W/PATIENT, 30 MIN: ICD-10-PCS | Mod: 95,,, | Performed by: STUDENT IN AN ORGANIZED HEALTH CARE EDUCATION/TRAINING PROGRAM

## 2023-12-18 PROCEDURE — 1159F PR MEDICATION LIST DOCUMENTED IN MEDICAL RECORD: ICD-10-PCS | Mod: CPTII,95,, | Performed by: STUDENT IN AN ORGANIZED HEALTH CARE EDUCATION/TRAINING PROGRAM

## 2023-12-18 PROCEDURE — 3044F HG A1C LEVEL LT 7.0%: CPT | Mod: CPTII,95,, | Performed by: STUDENT IN AN ORGANIZED HEALTH CARE EDUCATION/TRAINING PROGRAM

## 2023-12-18 PROCEDURE — 1159F MED LIST DOCD IN RCRD: CPT | Mod: CPTII,95,, | Performed by: STUDENT IN AN ORGANIZED HEALTH CARE EDUCATION/TRAINING PROGRAM

## 2023-12-18 PROCEDURE — 90832 PSYTX W PT 30 MINUTES: CPT | Mod: 95,,, | Performed by: STUDENT IN AN ORGANIZED HEALTH CARE EDUCATION/TRAINING PROGRAM

## 2023-12-18 PROCEDURE — 3044F PR MOST RECENT HEMOGLOBIN A1C LEVEL <7.0%: ICD-10-PCS | Mod: CPTII,95,, | Performed by: STUDENT IN AN ORGANIZED HEALTH CARE EDUCATION/TRAINING PROGRAM

## 2023-12-18 NOTE — PROGRESS NOTES
[facility]  Psychology  Progress Note  Individual Psychotherapy (PhD/LCSW)    Patient Name: Anthony Sexton  MRN: [id number]    Patient Class: OP- Hospital Outpatient Clinic  Primary Care Provider: Tyesha Leggett MD    Psychiatry Initial Visit (PhD/LCSW)  Individual Psychotherapy - CPT 89390    Date: 12/18/2023    Site: [place]    The patient location is: Patient's home/ Patient reported that his/her location at the time of this visit was in the [facility]     Visit type: Virtual visit with synchronous audio and video     Each patient to whom he or she provides medical services by telemedicine is: (1) informed of the relationship between the physician and patient and the respective role of any other health care provider with respect to management of the patient; and (2) notified that he or she may decline to receive medical services by telemedicine and may withdraw from such care at any time.     Referral source: Lenora Walters MD    Clinical status of patient: Outpatient    Anthony Sexton, a 33 y.o. female, for initial evaluation visit.  Met with patient.    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     Substance use:   Alcohol:  "none   Drugs: none   Tobacco: none   Caffeine: none    Current medications and drug reactions (include OTC, herbal): see medication list     Strengths and liabilities: Strength: Patient accepts guidance/feedback, Strength: Patient is expressive/articulate., Strength: Patient is intelligent., Strength: Patient is motivated for change., Strength: Patient has positive support network., Strength: Patient has reasonable judgment., Strength: Patient is stable., Liability: Patient lacks coping skills.    Current Evaluation:     Mental Status Exam:  General Appearance:  unremarkable, age appropriate   Speech: normal tone, normal rate, normal pitch, normal volume      Level of Cooperation: cooperative      Thought Processes: normal and logical   Mood: anxious, sad      Thought Content: normal, no suicidality, no homicidality, delusions, or paranoia   Affect: congruent and appropriate   Orientation: Oriented x3   Memory: recent >  intact   Attention Span & Concentration: intact   Fund of General Knowledge: intact and appropriate to age and level of education   Abstract Reasoning: not assessed   Judgment & Insight: fair     Language  intact     Summary: The patient and her  came to an understanding about the holidays and how it is a trigger for her 's sense of loss. The and her  were able to really "hear and see" each other through conversation and are working to create a safe space for vulnerability. Patient and  will continue creating safe spaces and giving themselves time to breathe and process before confrontation.   Diagnostic Impression - Plan:     No diagnosis found.                                      Plan:individual psychotherapy    Return to Clinic: as scheduled    Length of Service (minutes): 45          Barbra Bartlett, PhD  Psychologist  [facility]                      "

## 2024-01-02 ENCOUNTER — PATIENT MESSAGE (OUTPATIENT)
Dept: PREADMISSION TESTING | Facility: HOSPITAL | Age: 34
End: 2024-01-02
Payer: COMMERCIAL

## 2024-01-02 ENCOUNTER — TELEPHONE (OUTPATIENT)
Dept: PSYCHIATRY | Facility: CLINIC | Age: 34
End: 2024-01-02
Payer: COMMERCIAL

## 2024-01-04 ENCOUNTER — OFFICE VISIT (OUTPATIENT)
Dept: PSYCHIATRY | Facility: CLINIC | Age: 34
End: 2024-01-04
Payer: COMMERCIAL

## 2024-01-04 DIAGNOSIS — R69 UNDIAGNOSED DISEASE OR SYNDROME PRESENT: Primary | ICD-10-CM

## 2024-01-04 PROCEDURE — 90834 PSYTX W PT 45 MINUTES: CPT | Mod: 95,,, | Performed by: STUDENT IN AN ORGANIZED HEALTH CARE EDUCATION/TRAINING PROGRAM

## 2024-01-04 PROCEDURE — 1159F MED LIST DOCD IN RCRD: CPT | Mod: CPTII,95,, | Performed by: STUDENT IN AN ORGANIZED HEALTH CARE EDUCATION/TRAINING PROGRAM

## 2024-01-04 NOTE — PROGRESS NOTES
[facility]  Psychology  Progress Note  Individual Psychotherapy (PhD/LCSW)    Patient Name: Anthony Sexton  MRN: [id number]    Patient Class: OP- Hospital Outpatient Clinic  Primary Care Provider: Tyesha Leggett MD    Psychiatry Initial Visit (PhD/LCSW)  Individual Psychotherapy - CPT 65590    Date: 1/4/2024    Site: [place]    The patient location is: Patient's home/ Patient reported that his/her location at the time of this visit was in the [facility]     Visit type: Virtual visit with synchronous audio and video     Each patient to whom he or she provides medical services by telemedicine is: (1) informed of the relationship between the physician and patient and the respective role of any other health care provider with respect to management of the patient; and (2) notified that he or she may decline to receive medical services by telemedicine and may withdraw from such care at any time.     Referral source: Lenora Walters MD    Clinical status of patient: Outpatient    Anthony Sexton, a 33 y.o. female, for initial evaluation visit.  Met with patient.    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     Substance use:   Alcohol: none    Drugs: none   Tobacco: none   Caffeine: none    Current medications and drug reactions (include OTC, herbal): see medication list     Strengths and liabilities: Strength: Patient accepts guidance/feedback, Strength: Patient is expressive/articulate., Strength: Patient is intelligent., Strength: Patient is motivated for change., Strength: Patient has positive support network., Strength: Patient has reasonable judgment., Strength: Patient is stable., Liability: Patient lacks coping skills.    Current Evaluation:     Mental Status Exam:  General Appearance:  unremarkable, age appropriate   Speech: normal tone, normal rate, normal pitch, normal volume      Level of Cooperation: cooperative      Thought Processes: normal and logical   Mood: anxious, sad      Thought Content: normal, no suicidality, no homicidality, delusions, or paranoia   Affect: congruent and appropriate   Orientation: Oriented x3   Memory: recent >  intact   Attention Span & Concentration: intact   Fund of General Knowledge: intact and appropriate to age and level of education   Abstract Reasoning: not assessed   Judgment & Insight: fair     Language  intact     Summary: The patient was in a good mood and stated that holiday was great. Patient is worried that the progress made with  may not last, but does not have evidence to support fear. Themes of fear, trust, forgiveness, and love languages were discussed. Patient will journal about potential blockages and how (or if) she can move past them.   Diagnostic Impression - Plan:       ICD-10-CM ICD-9-CM   1. Undiagnosed disease or syndrome present  R69 799.9                                         Plan:individual psychotherapy    Return to Clinic: as scheduled    Length of Service (minutes): 45          Barbra Bartlett, PhD  Psychologist  [facility]

## 2024-01-09 ENCOUNTER — OFFICE VISIT (OUTPATIENT)
Dept: INTERNAL MEDICINE | Facility: CLINIC | Age: 34
End: 2024-01-09
Payer: COMMERCIAL

## 2024-01-09 ENCOUNTER — PATIENT MESSAGE (OUTPATIENT)
Dept: PREADMISSION TESTING | Facility: HOSPITAL | Age: 34
End: 2024-01-09
Payer: COMMERCIAL

## 2024-01-09 VITALS
DIASTOLIC BLOOD PRESSURE: 79 MMHG | TEMPERATURE: 98 F | HEIGHT: 66 IN | BODY MASS INDEX: 44.36 KG/M2 | HEART RATE: 69 BPM | SYSTOLIC BLOOD PRESSURE: 124 MMHG | OXYGEN SATURATION: 99 % | WEIGHT: 276 LBS | RESPIRATION RATE: 14 BRPM

## 2024-01-09 DIAGNOSIS — G47.33 OSA (OBSTRUCTIVE SLEEP APNEA): ICD-10-CM

## 2024-01-09 DIAGNOSIS — K21.9 GASTROESOPHAGEAL REFLUX DISEASE WITHOUT ESOPHAGITIS: ICD-10-CM

## 2024-01-09 DIAGNOSIS — J34.3 HYPERTROPHY OF BOTH INFERIOR NASAL TURBINATES: ICD-10-CM

## 2024-01-09 DIAGNOSIS — R00.2 PALPITATION: ICD-10-CM

## 2024-01-09 DIAGNOSIS — Z01.818 PRE-OP EXAM: Primary | ICD-10-CM

## 2024-01-09 DIAGNOSIS — D50.0 IRON DEFICIENCY ANEMIA DUE TO CHRONIC BLOOD LOSS: ICD-10-CM

## 2024-01-09 DIAGNOSIS — J34.2 NASAL SEPTAL DEVIATION: ICD-10-CM

## 2024-01-09 DIAGNOSIS — M79.7 FIBROMYALGIA: ICD-10-CM

## 2024-01-09 DIAGNOSIS — I10 ESSENTIAL HYPERTENSION: ICD-10-CM

## 2024-01-09 DIAGNOSIS — F41.1 GENERALIZED ANXIETY DISORDER: ICD-10-CM

## 2024-01-09 DIAGNOSIS — R73.09 ELEVATED HEMOGLOBIN A1C: ICD-10-CM

## 2024-01-09 PROCEDURE — 99499 UNLISTED E&M SERVICE: CPT | Mod: S$GLB,,, | Performed by: ANESTHESIOLOGY

## 2024-01-09 PROCEDURE — 99999 PR PBB SHADOW E&M-EST. PATIENT-LVL V: CPT | Mod: PBBFAC,,,

## 2024-01-09 NOTE — H&P (VIEW-ONLY)
Preoperative History and Physical  [facility]                                                                   Chief Complaint: Preoperative evaluation     History of Present Illness:      Anthony Sexton is a 33 y.o. female with fibromyalgia, history of iron-deficiency anemia, BMI of 45 who presents to the office today for a preoperative consultation at the request of Dr. Lucio  who plans on performing  septoplasty with turbinate reduction on January 17.     Functional Status:      The patient is able to climb a flight of stairs. The patient is able to ambulate  without difficulty. The patient's functional status is not affected by the surgical problem. The patient's functional status is not affected by shortness of breath, chest pain, dyspnea on exertion and fatigue.  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     Pre-eclampsia in postpartum period 04/23/2020    Vitamin D deficiency         Past Surgical History:      Past Surgical History:   Procedure Laterality Date    ESOPHAGOGASTRODUODENOSCOPY N/A 5/26/2021    Procedure:  EGD (ESOPHAGOGASTRODUODENOSCOPY);  Surgeon: Alka Olmstead MD;  Location: [facility] ENDO;  Service: Endoscopy;  Laterality: N/A;    ESOPHAGOGASTRODUODENOSCOPY N/A 10/4/2021    Procedure: EGD (ESOPHAGOGASTRODUODENOSCOPY);  Surgeon: Alka Olmstead MD;  Location: [facility];  Service: Endoscopy;  Laterality: N/A;    ESOPHAGOGASTRODUODENOSCOPY N/A 5/29/2023    Procedure: EGD (ESOPHAGOGASTRODUODENOSCOPY);  Surgeon: Alka Olmstead MD;  Location: [facility];  Service: Endoscopy;  Laterality: N/A;    None      PH MONITORING, ESOPHAGUS, WIRELESS, (OFF REFLUX MEDS) N/A 5/29/2023    Procedure: PH MONITORING, ESOPHAGUS, WIRELESS, (OFF REFLUX MEDS);  Surgeon: Alka Olmstead MD;  Location: [facility];  Service: Endoscopy;  Laterality: N/A;    REDUCTION OF BOTH BREASTS Bilateral 5/27/2021    Procedure: MAMMOPLASTY, REDUCTION, BILATERAL;  Surgeon: Daniel Ba MD;  Location: [address];  Service: Plastics;  Laterality: Bilateral;  Left breast tissue: 1584  Right breast tissue: 1438        Social History:      Social History     Socioeconomic History    Marital status:     Number of children: 0   Occupational History    Occupation: RN     Comment: [employer]   Tobacco Use    Smoking status: Never    Smokeless tobacco: Never   Substance and Sexual Activity    Alcohol use: Not Currently     Alcohol/week: 0.0 standard drinks of alcohol    Drug use: No    Sexual activity: Yes     Partners: Male     Birth control/protection: Inserts     Comment: Not interested in having further kids   Social History Narrative    She wears seatbelt.      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    2 kids.      Social Determinants of Health     Financial Resource Strain: Low Risk  (11/13/2023)    Overall Financial Resource Strain (CARDIA)     Difficulty of Paying Living Expenses: Not hard at all   Food Insecurity: No Food Insecurity (11/13/2023)    Hunger Vital Sign     Worried  About Running Out of Food in the Last Year: Never true     Ran Out of Food in the Last Year: Never true   Transportation Needs: No Transportation Needs (11/13/2023)    PRAPARE - Transportation     Lack of Transportation (Medical): No     Lack of Transportation (Non-Medical): No   Physical Activity: Inactive (12/8/2023)    Exercise Vital Sign     Days of Exercise per Week: 0 days     Minutes of Exercise per Session: 0 min   Stress: Stress Concern Present (11/13/2023)    [employer] - Occupational Stress Questionnaire     Feeling of Stress : To some extent   Social Connections: Unknown (11/13/2023)    Social Connection and Isolation Panel [NHANES]     Frequency of Communication with Friends and Family: More than three times a week     Frequency of Social Gatherings with Friends and Family: More than three times a week     Active Member of Clubs or Organizations: Yes     Attends Club or Organization Meetings: Never     Marital Status:    Housing Stability: Low Risk  (11/13/2023)    Housing Stability Vital Sign     Unable to Pay for Housing in the Last Year: No     Number of Places Lived in the Last Year: 1     Unstable Housing in the Last Year: No        Family History:      Family History   Problem Relation Age of Onset    Diabetes Mother     Asthma Mother     Hyperlipidemia Mother     Hypertension Mother     Hypertension Father     Hyperlipidemia Father     Heart disease Father 55        MI/CAD    No Known Problems Sister     Insulin resistance Sister     No Known Problems Brother     No Known Problems Daughter     Diabetes Maternal Aunt     Breast cancer Maternal Aunt     Diabetes Maternal Grandmother     No Known Problems Maternal Grandfather     Cancer Neg Hx     Stroke Neg Hx     Brain cancer Neg Hx     Migraines Neg Hx     Colon cancer Neg Hx     Ovarian cancer Neg Hx     Anesthesia problems Neg Hx        Allergies:      Review of patient's allergies indicates:   Allergen  Reactions    Procardia [nifedipine]        Medications:      Current Outpatient Medications   Medication Sig    ALPRAZolam (XANAX) 0.5 MG tablet Take 1 tab by mouth daily as needed not to exceed 30 tabs per 90 days    azelastine (ASTELIN) 137 mcg (0.1 %) nasal spray 1 spray (137 mcg total) by Nasal route 2 (two) times daily.    ergocalciferol (VITAMIN D2) 50,000 unit Cap Take 1 capsule (50,000 Units total) by mouth twice a week.    fluticasone propionate (FLONASE) 50 mcg/actuation nasal spray Instill 2 sprays (100 mcg total) by Each Nostril route once daily.    ipratropium (ATROVENT) 21 mcg (0.03 %) nasal spray 2 sprays by Each Nostril route 4 (four) times daily.    omeprazole (PRILOSEC) 40 MG capsule Take 1 capsule (40 mg total) by mouth 2 (two) times daily before meals.    ondansetron (ZOFRAN) 4 MG tablet Take 1 tablet (4 mg total) by mouth every 6 (six) hours as needed for Nausea.    buPROPion (WELLBUTRIN XL) 150 MG TB24 tablet Take 1 tablet (150 mg total) by mouth every morning. (Patient not taking: Reported on 12/8/2023)     No current facility-administered medications for this visit.       Vitals:      Vitals:    01/09/24 1204   BP: 124/79   Pulse:    Resp:    Temp:        Review of Systems:        Constitutional: Negative for fever, chills, weight loss, malaise/fatigue and diaphoresis.   HENT: Negative for hearing loss, ear pain, nosebleeds, congestion, sore throat, neck pain, tinnitus and ear discharge.    Eyes: Negative for blurred vision, double vision, photophobia, pain, discharge and redness.   Respiratory: Negative for cough, hemoptysis, sputum production, shortness of breath, wheezing and stridor.    Cardiovascular: Negative for chest pain, palpitations, orthopnea, claudication, leg swelling and PND. + heartburn  Gastrointestinal: Negative for , nausea, vomiting, abdominal pain, diarrhea, constipation, blood in stool and melena.   Genitourinary: Negative for dysuria, urgency, frequency, hematuria and  "flank pain.   Musculoskeletal: intermittent , back pain, joint pain   Skin: Negative for itching and rash.   Neurological: Negative for dizziness, tingling, tremors, sensory change, speech change, focal weakness, seizures, loss of consciousness, weakness and headaches.   Endo/Heme/Allergies: Negative for environmental allergies and polydipsia. Does not bruise/bleed easily.   Psychiatric/Behavioral: positive for anxiety   Physical Exam:      Constitutional: Appears well-developed, well-nourished and in no acute distress.  Patient is oriented to person, place, and time.   Head: Normocephalic and atraumatic. Mucous membranes moist.  Neck: Neck supple no mass.   Cardiovascular: Normal rate and regular rhythm.  S1 S2 appreciated by ascultation.  Pulmonary/Chest: Effort normal and clear to auscultation bilaterally. No respiratory distress.   Abdomen: Soft. Non-tender and non-distended. Bowel sounds are normal.   Neurological: Patient is alert and oriented to person, place and time. Moves all extremities.  Skin: Warm and dry. No lesions.  Extremities: No clubbing, cyanosis or edema.    Laboratory data:      Reviewed and noted in plan where applicable. Please see chart for full laboratory data.    @VZOIXDGOZ54(cpk,cpkmb,troponini,mb)@ @EWDFQBMJM32(poctglucose)@     No results found for: "INR", "PROTIME"    Lab Results   Component Value Date    WBC 9.46 2023    HGB 12.5 2023    HCT 41.8 2023    MCV 82 2023     2023       @MYARMKTCQ78(GLU,NA,K,Cl,CO2,BUN,Creatinine,Calcium,MG)@    Predictors of intubation difficulty:       Morbid obesity? BMI 45   Anatomically abnormal facies? no   Prominent incisors? no   Receding mandible? no   Short, thick neck? no   Neck range of motion: normal   Dentition:  intact  Based on the Modified Mallampati, patient is a mallampati score: II (hard and soft palate, upper portion of tonsils anduvula visible)    Cardiographics:      EC2023 NSR "   Echocardiogram:  2022  The left ventricle is normal in size with concentric remodeling and normal systolic function.  The estimated ejection fraction is 60%.  Normal left ventricular diastolic function.  Normal right ventricular size with normal right ventricular systolic function.  Mild tricuspid regurgitation.  Normal central venous pressure (3 mmHg).  The estimated PA systolic pressure is 32 mmHg.  Imaging:      Chest x-ray:  none      Assessment and Plan:      Pre-op exam  Patient presents at the request of Dr. Lucio who plans on performing septoplasty with turbinate reduction   Known risk factors for perioperative complications:  BMI 45     Difficulty with intubation is not anticipated.  History of grade 1 view MAC 37.5 ETT x1 attempt    Cardiac Risk Estimation:  Per Revised Cardiac Risk Index patient is a Class I  risk with a 3.9% risk of a major cardiac event.      1.) Preoperative workup as follows: ECG, hemoglobin, hematocrit, electrolytes, creatinine, glucose, liver function studies.  2.) Change in medication regimen before surgery:  Hold NSAIDs 7 days preop .  3.) Prophylaxis for cardiac events with perioperative beta-blockers: not indicated.  4.) Invasive hemodynamic monitoring perioperatively: not indicated.  5.) Deep vein thrombosis prophylaxis postoperatively: intermittent pneumatic compression boots and regimen to be chosen by surgical team.  6.) Surveillance for postoperative MI with ECG immediately postoperatively and on postoperati ve days 1 and 2 AND troponin levels 24 hours postoperatively and on day 4 or hospital discharge (whichever comes first): not indicated.  7.) Current medications which may produce withdrawal symptoms if withheld perioperatively: none  8.) Other measures:  Patient with history of chest pain in September of 2023.  This was post URI with residual coughing.  Patient had negative chest x-ray, negative EKG and negative troponin.  CTA was ordered, but not completed.   Patient received Toradol injection for radiation to thoracic spine which relieved her pain.  Patient does have a history of elevated D-dimer with negative CTA is.  I  will reach out to primary care to ensure there were no other clinical suspicions in play and need for the CTA.          Essential hypertension  Monitored by Dr. Leggett   Pt not on meds currently   Repeat blood pressure in clinic within normal limits at 124/79  Continue to monitor blood pressure at home      Palpitation  Work up with cards 2022 with holter unrevealing  - avoid caffeine  - no palpitations currently     Generalized anxiety disorder  - therapy with psych   - xanax as needed    - keep follow up with psych     Elevated hemoglobin A1c  Monitored by PCP   No meds currently   A1c 5.4 5/2022   Keep follow up with PCP     GERD (gastroesophageal reflux disease)  - sx controlled onprilosec  Take AM of surgery   Keep follow up with GI     Fibromyalgia  Followed by Rheum   No sx currently   Was previously on naltrexone , weaned over 6 months ago  Keep annual follow up     SAVANNA (obstructive sleep apnea)  No CPAP      Iron deficiency anemia due to chronic blood loss  - no supplementation currently   - last CBC with H/H within normal limits         Addendum 1/9/24 - communicated with pt's PCP no CTA needed pre op .

## 2024-01-09 NOTE — ASSESSMENT & PLAN NOTE
Monitored by Dr. Leggett   Pt not on meds currently   Repeat blood pressure in clinic within normal limits at 124/79  Continue to monitor blood pressure at home

## 2024-01-09 NOTE — ASSESSMENT & PLAN NOTE
Followed by Rheum   No sx currently   Was previously on naltrexone , weaned over 6 months ago  Keep annual follow up

## 2024-01-09 NOTE — PROGRESS NOTES
Preoperative History and Physical  [facility]                                                                   Chief Complaint: Preoperative evaluation     History of Present Illness:      Anthony Sexton is a 33 y.o. female with fibromyalgia, history of iron-deficiency anemia, BMI of 45 who presents to the office today for a preoperative consultation at the request of Dr. Lucio  who plans on performing  septoplasty with turbinate reduction on January 17.     Functional Status:      The patient is able to climb a flight of stairs. The patient is able to ambulate  without difficulty. The patient's functional status is not affected by the surgical problem. The patient's functional status is not affected by shortness of breath, chest pain, dyspnea on exertion and fatigue.  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     Pre-eclampsia in postpartum period 04/23/2020    Vitamin D deficiency         Past Surgical History:      Past Surgical History:   Procedure Laterality Date    ESOPHAGOGASTRODUODENOSCOPY N/A 5/26/2021    Procedure:  EGD (ESOPHAGOGASTRODUODENOSCOPY);  Surgeon: Alka Olmstead MD;  Location: [facility] ENDO;  Service: Endoscopy;  Laterality: N/A;    ESOPHAGOGASTRODUODENOSCOPY N/A 10/4/2021    Procedure: EGD (ESOPHAGOGASTRODUODENOSCOPY);  Surgeon: Alka Olmstead MD;  Location: [facility] [place], [place];  Service: Endoscopy;  Laterality: N/A;    ESOPHAGOGASTRODUODENOSCOPY N/A 5/29/2023    Procedure: EGD (ESOPHAGOGASTRODUODENOSCOPY);  Surgeon: Alka Olmstead MD;  Location: [facility] [place] [place];  Service: Endoscopy;  Laterality: N/A;    None      PH MONITORING, ESOPHAGUS, WIRELESS, (OFF REFLUX MEDS) N/A 5/29/2023    Procedure: PH MONITORING, ESOPHAGUS, WIRELESS, (OFF REFLUX MEDS);  Surgeon: Alka Olmstead MD;  Location: [facility] [place] [place];  Service: Endoscopy;  Laterality: N/A;    REDUCTION OF BOTH BREASTS Bilateral 5/27/2021    Procedure: MAMMOPLASTY, REDUCTION, BILATERAL;  Surgeon: Daniel Ba MD;  Location: [address];  Service: Plastics;  Laterality: Bilateral;  Left breast tissue: 1584  Right breast tissue: 1438        Social History:      Social History     Socioeconomic History    Marital status:     Number of children: 0   Occupational History    Occupation: RN     Comment: [employer]   Tobacco Use    Smoking status: Never    Smokeless tobacco: Never   Substance and Sexual Activity    Alcohol use: Not Currently     Alcohol/week: 0.0 standard drinks of alcohol    Drug use: No    Sexual activity: Yes     Partners: Male     Birth control/protection: Inserts     Comment: Not interested in having further kids   Social History Narrative    She wears seatbelt.      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    2 kids.      Social Determinants of Health     Financial Resource Strain: Low Risk  (11/13/2023)    Overall Financial Resource Strain (CARDIA)     Difficulty of Paying Living Expenses: Not hard at all   Food Insecurity: No Food Insecurity (11/13/2023)    Hunger Vital Sign     Worried  About Running Out of Food in the Last Year: Never true     Ran Out of Food in the Last Year: Never true   Transportation Needs: No Transportation Needs (11/13/2023)    PRAPARE - Transportation     Lack of Transportation (Medical): No     Lack of Transportation (Non-Medical): No   Physical Activity: Inactive (12/8/2023)    Exercise Vital Sign     Days of Exercise per Week: 0 days     Minutes of Exercise per Session: 0 min   Stress: Stress Concern Present (11/13/2023)    [employer] [place] [employer] - Occupational Stress Questionnaire     Feeling of Stress : To some extent   Social Connections: Unknown (11/13/2023)    Social Connection and Isolation Panel [NHANES]     Frequency of Communication with Friends and Family: More than three times a week     Frequency of Social Gatherings with Friends and Family: More than three times a week     Active Member of Clubs or Organizations: Yes     Attends Club or Organization Meetings: Never     Marital Status:    Housing Stability: Low Risk  (11/13/2023)    Housing Stability Vital Sign     Unable to Pay for Housing in the Last Year: No     Number of Places Lived in the Last Year: 1     Unstable Housing in the Last Year: No        Family History:      Family History   Problem Relation Age of Onset    Diabetes Mother     Asthma Mother     Hyperlipidemia Mother     Hypertension Mother     Hypertension Father     Hyperlipidemia Father     Heart disease Father 55        MI/CAD    No Known Problems Sister     Insulin resistance Sister     No Known Problems Brother     No Known Problems Daughter     Diabetes Maternal Aunt     Breast cancer Maternal Aunt     Diabetes Maternal Grandmother     No Known Problems Maternal Grandfather     Cancer Neg Hx     Stroke Neg Hx     Brain cancer Neg Hx     Migraines Neg Hx     Colon cancer Neg Hx     Ovarian cancer Neg Hx     Anesthesia problems Neg Hx        Allergies:      Review of patient's allergies indicates:   Allergen  Reactions    Procardia [nifedipine]        Medications:      Current Outpatient Medications   Medication Sig    ALPRAZolam (XANAX) 0.5 MG tablet Take 1 tab by mouth daily as needed not to exceed 30 tabs per 90 days    azelastine (ASTELIN) 137 mcg (0.1 %) nasal spray 1 spray (137 mcg total) by Nasal route 2 (two) times daily.    ergocalciferol (VITAMIN D2) 50,000 unit Cap Take 1 capsule (50,000 Units total) by mouth twice a week.    fluticasone propionate (FLONASE) 50 mcg/actuation nasal spray Instill 2 sprays (100 mcg total) by Each Nostril route once daily.    ipratropium (ATROVENT) 21 mcg (0.03 %) nasal spray 2 sprays by Each Nostril route 4 (four) times daily.    omeprazole (PRILOSEC) 40 MG capsule Take 1 capsule (40 mg total) by mouth 2 (two) times daily before meals.    ondansetron (ZOFRAN) 4 MG tablet Take 1 tablet (4 mg total) by mouth every 6 (six) hours as needed for Nausea.    buPROPion (WELLBUTRIN XL) 150 MG TB24 tablet Take 1 tablet (150 mg total) by mouth every morning. (Patient not taking: Reported on 12/8/2023)     No current facility-administered medications for this visit.       Vitals:      Vitals:    01/09/24 1204   BP: 124/79   Pulse:    Resp:    Temp:        Review of Systems:        Constitutional: Negative for fever, chills, weight loss, malaise/fatigue and diaphoresis.   HENT: Negative for hearing loss, ear pain, nosebleeds, congestion, sore throat, neck pain, tinnitus and ear discharge.    Eyes: Negative for blurred vision, double vision, photophobia, pain, discharge and redness.   Respiratory: Negative for cough, hemoptysis, sputum production, shortness of breath, wheezing and stridor.    Cardiovascular: Negative for chest pain, palpitations, orthopnea, claudication, leg swelling and PND. + heartburn  Gastrointestinal: Negative for , nausea, vomiting, abdominal pain, diarrhea, constipation, blood in stool and melena.   Genitourinary: Negative for dysuria, urgency, frequency, hematuria and  "flank pain.   Musculoskeletal: intermittent , back pain, joint pain   Skin: Negative for itching and rash.   Neurological: Negative for dizziness, tingling, tremors, sensory change, speech change, focal weakness, seizures, loss of consciousness, weakness and headaches.   Endo/Heme/Allergies: Negative for environmental allergies and polydipsia. Does not bruise/bleed easily.   Psychiatric/Behavioral: positive for anxiety   Physical Exam:      Constitutional: Appears well-developed, well-nourished and in no acute distress.  Patient is oriented to person, place, and time.   Head: Normocephalic and atraumatic. Mucous membranes moist.  Neck: Neck supple no mass.   Cardiovascular: Normal rate and regular rhythm.  S1 S2 appreciated by ascultation.  Pulmonary/Chest: Effort normal and clear to auscultation bilaterally. No respiratory distress.   Abdomen: Soft. Non-tender and non-distended. Bowel sounds are normal.   Neurological: Patient is alert and oriented to person, place and time. Moves all extremities.  Skin: Warm and dry. No lesions.  Extremities: No clubbing, cyanosis or edema.    Laboratory data:      Reviewed and noted in plan where applicable. Please see chart for full laboratory data.    @DHYGBAXCI99(cpk,cpkmb,troponini,mb)@ @RNAPCGDWH60(poctglucose)@     No results found for: "INR", "PROTIME"    Lab Results   Component Value Date    WBC 9.46 2023    HGB 12.5 2023    HCT 41.8 2023    MCV 82 2023     2023       @BGKPMLVZK83(GLU,NA,K,Cl,CO2,BUN,Creatinine,Calcium,MG)@    Predictors of intubation difficulty:       Morbid obesity? BMI 45   Anatomically abnormal facies? no   Prominent incisors? no   Receding mandible? no   Short, thick neck? no   Neck range of motion: normal   Dentition:  intact  Based on the Modified Mallampati, patient is a mallampati score: II (hard and soft palate, upper portion of tonsils anduvula visible)    Cardiographics:      EC2023 NSR "   Echocardiogram:  2022  The left ventricle is normal in size with concentric remodeling and normal systolic function.  The estimated ejection fraction is 60%.  Normal left ventricular diastolic function.  Normal right ventricular size with normal right ventricular systolic function.  Mild tricuspid regurgitation.  Normal central venous pressure (3 mmHg).  The estimated PA systolic pressure is 32 mmHg.  Imaging:      Chest x-ray:  none      Assessment and Plan:      Pre-op exam  Patient presents at the request of Dr. Lucio who plans on performing septoplasty with turbinate reduction   Known risk factors for perioperative complications:  BMI 45     Difficulty with intubation is not anticipated.  History of grade 1 view MAC 37.5 ETT x1 attempt    Cardiac Risk Estimation:  Per Revised Cardiac Risk Index patient is a Class I  risk with a 3.9% risk of a major cardiac event.      1.) Preoperative workup as follows: ECG, hemoglobin, hematocrit, electrolytes, creatinine, glucose, liver function studies.  2.) Change in medication regimen before surgery:  Hold NSAIDs 7 days preop .  3.) Prophylaxis for cardiac events with perioperative beta-blockers: not indicated.  4.) Invasive hemodynamic monitoring perioperatively: not indicated.  5.) Deep vein thrombosis prophylaxis postoperatively: intermittent pneumatic compression boots and regimen to be chosen by surgical team.  6.) Surveillance for postoperative MI with ECG immediately postoperatively and on postoperati ve days 1 and 2 AND troponin levels 24 hours postoperatively and on day 4 or hospital discharge (whichever comes first): not indicated.  7.) Current medications which may produce withdrawal symptoms if withheld perioperatively: none  8.) Other measures:  Patient with history of chest pain in September of 2023.  This was post URI with residual coughing.  Patient had negative chest x-ray, negative EKG and negative troponin.  CTA was ordered, but not completed.   Patient received Toradol injection for radiation to thoracic spine which relieved her pain.  Patient does have a history of elevated D-dimer with negative CTA is.  I  will reach out to primary care to ensure there were no other clinical suspicions in play and need for the CTA.          Essential hypertension  Monitored by Dr. Leggett   Pt not on meds currently   Repeat blood pressure in clinic within normal limits at 124/79  Continue to monitor blood pressure at home      Palpitation  Work up with cards 2022 with holter unrevealing  - avoid caffeine  - no palpitations currently     Generalized anxiety disorder  - therapy with psych   - xanax as needed    - keep follow up with psych     Elevated hemoglobin A1c  Monitored by PCP   No meds currently   A1c 5.4 5/2022   Keep follow up with PCP     GERD (gastroesophageal reflux disease)  - sx controlled onprilosec  Take AM of surgery   Keep follow up with GI     Fibromyalgia  Followed by Rheum   No sx currently   Was previously on naltrexone , weaned over 6 months ago  Keep annual follow up     SAVANNA (obstructive sleep apnea)  No CPAP      Iron deficiency anemia due to chronic blood loss  - no supplementation currently   - last CBC with H/H within normal limits         Addendum 1/9/24 - communicated with pt's PCP no CTA needed pre op .

## 2024-01-09 NOTE — DISCHARGE INSTRUCTIONS
To confirm, your doctor has instructed you: Surgery is scheduled for 1/17/2024.   Pre admit office will call the afternoon prior to surgery between 1PM and 3PM with arrival time.    Surgery will be at [facility],  The address is [address] [address]. [place] [address].      IMPORTANT INSTRUCTIONS!    Do not eat or drink after 12 midnight, including water. Do not smoke or use chewing tobacco after 12 midnight!  OK to brush teeth, but no gum, candy, or mints!      *Take only these medicines with a small swallow of water-morning of surgery*     Prilosec          ____ Stop taking all vitamins, herbal supplements, Aspirin, & NSAIDS (Ibuprofen, Advil, Aleve) 7 days prior to surgery, as these can thin your blood.    ____ Weight loss medication, such as Adipex and Phentermine, must be stopped 14 days prior to surgery, no exceptions!    *Diabetic Patients: If you take diabetic or weight loss medication, do NOT take morning of surgery unless instructed by Doctor. Metformin to be stopped 24 hrs prior to surgery. DO NOT take short-acting insulin the day of surgery. Only take HALF of your regular dose of long-acting insulin the night before surgery, unless instructed otherwise. Blood sugars will be checked in pre-op.   ~Ozempic/Mounjaro/Wegovy/Trulicity/Semaglutide injections must be stopped 7 days prior to surgery.     Please notify MD office if you develop an active infection, are prescribed antibiotics by someone other than the surgeon doing your surgery, or visit urgent care/ER.      Bathing Instructions:   The night before surgery and the morning prior to coming to the hospital:    - Shower & rinse your body as usual with anti-bacterial Soap (Dial or Lever 2000)   -Hibiclens (if indicated) use AFTER anti-bacterial soap; 1 packet PM/1 packet in AM on surgical site only   -Do not use hibiclens on your head, face, or genitals.    -Do not wash with anti-bacterial soap after you use the hibiclens.    -Do not shave  surgical site 5-7 days prior to surgery.    -Pubic hair 7 days prior to surgery (OBGYN/Urology only).       Additional Instructions:   __ No powder, lotions, creams, or body spray to skin   __ No deodorant if you are having: breast procedure, PORT, or upper shoulder surgery!   __ No nail polish or artificial nails       **SURGERY WILL BE CANCELLED IF ARTIFICIAL/NAIL POLISH IS PRESENT!!!**  __ Please remove all piercings and leave all jewelry at home.    **SURGERY WILL BE CANCELLED IF PIERCINGS ARE PRESENT!!!**    __ Dentures, Hearing Aids and Contact Lens need to be removed prior to the start of surgery.    __ Avoid Alcoholic beverages 3 days prior to surgery, as it can thin the blood, unless told otherwise by pre-admit department.  __ Females: may need to give a urine sample the morning of surgery;   **Please ask  for a specimen cup if you need to use the restroom prior to being called into pre-op.**  __ Males: Stop ED medications (Viagra, Cialis) 24 hrs prior to surgery.  __ Wear clean, loose-fitting clothing. Allow for dressings/bandages/surgical equipment   __ You must have transportation, and they MUST stay the entire time.   __  Bring photo ID and insurance information to \[facility] Visitor/Ride Policy:   Only 1 adult allowed (over the age of 18) to accompany you and MUST STAY through the entire length of admission.     -Must have a ride home from a responsible adult that you know and trust.    -Medical Transport, Uber or Lyft can only be used if patient has a responsible adult to accompany them during ride home.    ~Your ride MUST STAY the entire time until you are discharged~        Post-Op Instructions: You will receive surgery post-op instructions by your Discharge Nurse prior to going home.   Surgical Site Infection:   Prevention of surgical site infections:   -Keep incisions clean and dry.   -Do not soak/submerge incisions in water until completely healed.   -Do not apply lotions,  powders, creams, or deodorants to site.   -Always make sure hands are cleaned with antibacterial soap/ alcohol-based  prior to touching the surgical site.       Signs and symptoms:               -Redness and pain around the area where you had surgery               -Drainage of cloudy fluid from your surgical wound               -Fever over 100.4 or chills     >>>Call Surgeon office/on-call Surgeon if you experience any of these signs & symptoms post-surgery @ [phone].    *If you are running late or have questions the morning of surgery before 7AM, please call the Pre-OP Department @ [phone].    *Please Call [facility] Pre-Admit Department for surgery instruction questions:  [phone]    *Payment questions:  [phone]    *Billing questions:  [phone]

## 2024-01-09 NOTE — PRE-PROCEDURE INSTRUCTIONS
To confirm, your doctor has instructed you: Surgery is scheduled for 1/17/2024.   Pre admit office will call the afternoon prior to surgery between 1PM and 3PM with arrival time.    Surgery will be at [facility],  The address is [address] [address]. [place] [address].      IMPORTANT INSTRUCTIONS!    Do not eat or drink after 12 midnight, including water. Do not smoke or use chewing tobacco after 12 midnight!  OK to brush teeth, but no gum, candy, or mints!      *Take only these medicines with a small swallow of water-morning of surgery*     Prilosec          ____ Stop taking all vitamins, herbal supplements, Aspirin, & NSAIDS (Ibuprofen, Advil, Aleve) 7 days prior to surgery, as these can thin your blood.    ____ Weight loss medication, such as Adipex and Phentermine, must be stopped 14 days prior to surgery, no exceptions!    *Diabetic Patients: If you take diabetic or weight loss medication, do NOT take morning of surgery unless instructed by Doctor. Metformin to be stopped 24 hrs prior to surgery. DO NOT take short-acting insulin the day of surgery. Only take HALF of your regular dose of long-acting insulin the night before surgery, unless instructed otherwise. Blood sugars will be checked in pre-op.   ~Ozempic/Mounjaro/Wegovy/Trulicity/Semaglutide injections must be stopped 7 days prior to surgery.     Please notify MD office if you develop an active infection, are prescribed antibiotics by someone other than the surgeon doing your surgery, or visit urgent care/ER.      Bathing Instructions:   The night before surgery and the morning prior to coming to the hospital:    - Shower & rinse your body as usual with anti-bacterial Soap (Dial or Lever 2000)   -Hibiclens (if indicated) use AFTER anti-bacterial soap; 1 packet PM/1 packet in AM on surgical site only   -Do not use hibiclens on your head, face, or genitals.    -Do not wash with anti-bacterial soap after you use the hibiclens.    -Do not shave  surgical site 5-7 days prior to surgery.    -Pubic hair 7 days prior to surgery (OBGYN/Urology only).       Additional Instructions:   __ No powder, lotions, creams, or body spray to skin   __ No deodorant if you are having: breast procedure, PORT, or upper shoulder surgery!   __ No nail polish or artificial nails       **SURGERY WILL BE CANCELLED IF ARTIFICIAL/NAIL POLISH IS PRESENT!!!**  __ Please remove all piercings and leave all jewelry at home.    **SURGERY WILL BE CANCELLED IF PIERCINGS ARE PRESENT!!!**    __ Dentures, Hearing Aids and Contact Lens need to be removed prior to the start of surgery.    __ Avoid Alcoholic beverages 3 days prior to surgery, as it can thin the blood, unless told otherwise by pre-admit department.  __ Females: may need to give a urine sample the morning of surgery;   **Please ask  for a specimen cup if you need to use the restroom prior to being called into pre-op.**  __ Males: Stop ED medications (Viagra, Cialis) 24 hrs prior to surgery.  __ Wear clean, loose-fitting clothing. Allow for dressings/bandages/surgical equipment   __ You must have transportation, and they MUST stay the entire time.   __  Bring photo ID and insurance information to [facility]/Ride Policy:   Only 1 adult allowed (over the age of 18) to accompany you and MUST STAY through the entire length of admission.     -Must have a ride home from a responsible adult that you know and trust.    -Medical Transport, Uber or Lyft can only be used if patient has a responsible adult to accompany them during ride home.    ~Your ride MUST STAY the entire time until you are discharged~        Post-Op Instructions: You will receive surgery post-op instructions by your Discharge Nurse prior to going home.   Surgical Site Infection:   Prevention of surgical site infections:   -Keep incisions clean and dry.   -Do not soak/submerge incisions in water until completely healed.   -Do not apply lotions,  powders, creams, or deodorants to site.   -Always make sure hands are cleaned with antibacterial soap/ alcohol-based  prior to touching the surgical site.       Signs and symptoms:               -Redness and pain around the area where you had surgery               -Drainage of cloudy fluid from your surgical wound               -Fever over 100.4 or chills     >>>Call Surgeon office/on-call Surgeon if you experience any of these signs & symptoms post-surgery @ [phone].    *If you are running late or have questions the morning of surgery before 7AM, please call the Pre-OP Department @ [phone].    *Please Call [facility] Pre-Admit Department for surgery instruction questions:  [phone]    *Payment questions:  [phone]    *Billing questions:  [phone]

## 2024-01-09 NOTE — ASSESSMENT & PLAN NOTE
Patient presents at the request of Dr. Lucio who plans on performing septoplasty with turbinate reduction   Known risk factors for perioperative complications:  BMI 45     Difficulty with intubation is not anticipated.  History of grade 1 view MAC 37.5 ETT x1 attempt    Cardiac Risk Estimation:  Per Revised Cardiac Risk Index patient is a Class I  risk with a 3.9% risk of a major cardiac event.      1.) Preoperative workup as follows: ECG, hemoglobin, hematocrit, electrolytes, creatinine, glucose, liver function studies.  2.) Change in medication regimen before surgery:  Hold NSAIDs 7 days preop .  3.) Prophylaxis for cardiac events with perioperative beta-blockers: not indicated.  4.) Invasive hemodynamic monitoring perioperatively: not indicated.  5.) Deep vein thrombosis prophylaxis postoperatively: intermittent pneumatic compression boots and regimen to be chosen by surgical team.  6.) Surveillance for postoperative MI with ECG immediately postoperatively and on postoperati ve days 1 and 2 AND troponin levels 24 hours postoperatively and on day 4 or hospital discharge (whichever comes first): not indicated.  7.) Current medications which may produce withdrawal symptoms if withheld perioperatively: none  8.) Other measures:  Patient with history of chest pain in September of 2023.  This was post URI with residual coughing.  Patient had negative chest x-ray, negative EKG and negative troponin.  CTA was ordered, but not completed.  Patient received Toradol injection for radiation to thoracic spine which relieved her pain.  Patient does have a history of elevated D-dimer with negative CTA is.  I  will reach out to primary care to ensure there were no other clinical suspicions in play and need for the CTA.

## 2024-01-16 ENCOUNTER — TELEPHONE (OUTPATIENT)
Dept: PSYCHIATRY | Facility: CLINIC | Age: 34
End: 2024-01-16
Payer: COMMERCIAL

## 2024-01-17 ENCOUNTER — ANESTHESIA EVENT (OUTPATIENT)
Dept: SURGERY | Facility: HOSPITAL | Age: 34
End: 2024-01-17
Payer: COMMERCIAL

## 2024-01-17 ENCOUNTER — HOSPITAL ENCOUNTER (OUTPATIENT)
Facility: HOSPITAL | Age: 34
Discharge: HOME | End: 2024-01-17
Attending: STUDENT IN AN ORGANIZED HEALTH CARE EDUCATION/TRAINING PROGRAM | Admitting: STUDENT IN AN ORGANIZED HEALTH CARE EDUCATION/TRAINING PROGRAM
Payer: COMMERCIAL

## 2024-01-17 ENCOUNTER — ANESTHESIA (OUTPATIENT)
Dept: SURGERY | Facility: HOSPITAL | Age: 34
End: 2024-01-17
Payer: COMMERCIAL

## 2024-01-17 DIAGNOSIS — J34.2 NASAL SEPTAL DEVIATION: Primary | ICD-10-CM

## 2024-01-17 DIAGNOSIS — J34.3 HYPERTROPHY OF BOTH INFERIOR NASAL TURBINATES: ICD-10-CM

## 2024-01-17 DIAGNOSIS — J34.89 NASAL OBSTRUCTION: ICD-10-CM

## 2024-01-17 LAB
B-HCG UR QL: NEGATIVE
CTP QC/QA: YES

## 2024-01-17 PROCEDURE — 63600175 PHARM REV CODE 636 W HCPCS: Performed by: NURSE ANESTHETIST, CERTIFIED REGISTERED

## 2024-01-17 PROCEDURE — 27201423 OPTIME MED/SURG SUP & DEVICES STERILE SUPPLY: Performed by: STUDENT IN AN ORGANIZED HEALTH CARE EDUCATION/TRAINING PROGRAM

## 2024-01-17 PROCEDURE — 37000008 HC ANESTHESIA 1ST 15 MINUTES: Performed by: STUDENT IN AN ORGANIZED HEALTH CARE EDUCATION/TRAINING PROGRAM

## 2024-01-17 PROCEDURE — 71000033 HC RECOVERY, INTIAL HOUR: Performed by: STUDENT IN AN ORGANIZED HEALTH CARE EDUCATION/TRAINING PROGRAM

## 2024-01-17 PROCEDURE — 25000003 PHARM REV CODE 250: Performed by: NURSE ANESTHETIST, CERTIFIED REGISTERED

## 2024-01-17 PROCEDURE — 30520 REPAIR OF NASAL SEPTUM: CPT | Mod: ,,, | Performed by: STUDENT IN AN ORGANIZED HEALTH CARE EDUCATION/TRAINING PROGRAM

## 2024-01-17 PROCEDURE — D9220A PRA ANESTHESIA: Mod: ,,, | Performed by: NURSE ANESTHETIST, CERTIFIED REGISTERED

## 2024-01-17 PROCEDURE — 37000009 HC ANESTHESIA EA ADD 15 MINS: Performed by: STUDENT IN AN ORGANIZED HEALTH CARE EDUCATION/TRAINING PROGRAM

## 2024-01-17 PROCEDURE — 36000706: Performed by: STUDENT IN AN ORGANIZED HEALTH CARE EDUCATION/TRAINING PROGRAM

## 2024-01-17 PROCEDURE — 71000015 HC POSTOP RECOV 1ST HR: Performed by: STUDENT IN AN ORGANIZED HEALTH CARE EDUCATION/TRAINING PROGRAM

## 2024-01-17 PROCEDURE — 81025 URINE PREGNANCY TEST: CPT | Performed by: STUDENT IN AN ORGANIZED HEALTH CARE EDUCATION/TRAINING PROGRAM

## 2024-01-17 PROCEDURE — 63600175 PHARM REV CODE 636 W HCPCS: Performed by: ANESTHESIOLOGY

## 2024-01-17 PROCEDURE — 71000016 HC POSTOP RECOV ADDL HR: Performed by: STUDENT IN AN ORGANIZED HEALTH CARE EDUCATION/TRAINING PROGRAM

## 2024-01-17 PROCEDURE — 30140 RESECT INFERIOR TURBINATE: CPT | Mod: 50,51,, | Performed by: STUDENT IN AN ORGANIZED HEALTH CARE EDUCATION/TRAINING PROGRAM

## 2024-01-17 PROCEDURE — 25000003 PHARM REV CODE 250: Performed by: STUDENT IN AN ORGANIZED HEALTH CARE EDUCATION/TRAINING PROGRAM

## 2024-01-17 PROCEDURE — 36000707: Performed by: STUDENT IN AN ORGANIZED HEALTH CARE EDUCATION/TRAINING PROGRAM

## 2024-01-17 PROCEDURE — 25000242 PHARM REV CODE 250 ALT 637 W/ HCPCS: Performed by: NURSE ANESTHETIST, CERTIFIED REGISTERED

## 2024-01-17 RX ORDER — HYDROCODONE BITARTRATE AND ACETAMINOPHEN 5; 325 MG/1; MG/1
1 TABLET ORAL EVERY 6 HOURS PRN
Qty: 20 TABLET | Refills: 0 | Status: SHIPPED | OUTPATIENT
Start: 2024-01-17 | End: 2024-02-16

## 2024-01-17 RX ORDER — PROPOFOL 10 MG/ML
VIAL (ML) INTRAVENOUS
Status: DISCONTINUED | OUTPATIENT
Start: 2024-01-17 | End: 2024-01-17

## 2024-01-17 RX ORDER — MIDAZOLAM HYDROCHLORIDE 1 MG/ML
INJECTION INTRAMUSCULAR; INTRAVENOUS
Status: DISCONTINUED | OUTPATIENT
Start: 2024-01-17 | End: 2024-01-17

## 2024-01-17 RX ORDER — DEXAMETHASONE SODIUM PHOSPHATE 4 MG/ML
INJECTION, SOLUTION INTRA-ARTICULAR; INTRALESIONAL; INTRAMUSCULAR; INTRAVENOUS; SOFT TISSUE
Status: DISCONTINUED | OUTPATIENT
Start: 2024-01-17 | End: 2024-01-17

## 2024-01-17 RX ORDER — BACITRACIN ZINC 500 UNIT/G
OINTMENT (GRAM) TOPICAL
Status: DISCONTINUED
Start: 2024-01-17 | End: 2024-01-17 | Stop reason: HOSPADM

## 2024-01-17 RX ORDER — ROCURONIUM BROMIDE 10 MG/ML
INJECTION, SOLUTION INTRAVENOUS
Status: DISCONTINUED | OUTPATIENT
Start: 2024-01-17 | End: 2024-01-17

## 2024-01-17 RX ORDER — OXYMETAZOLINE HCL 0.05 %
SPRAY, NON-AEROSOL (ML) NASAL
Status: DISCONTINUED | OUTPATIENT
Start: 2024-01-17 | End: 2024-01-17 | Stop reason: HOSPADM

## 2024-01-17 RX ORDER — SUCCINYLCHOLINE CHLORIDE 20 MG/ML
INJECTION INTRAMUSCULAR; INTRAVENOUS
Status: DISCONTINUED | OUTPATIENT
Start: 2024-01-17 | End: 2024-01-17

## 2024-01-17 RX ORDER — ACETAMINOPHEN 10 MG/ML
INJECTION, SOLUTION INTRAVENOUS
Status: DISCONTINUED | OUTPATIENT
Start: 2024-01-17 | End: 2024-01-17

## 2024-01-17 RX ORDER — ONDANSETRON HYDROCHLORIDE 2 MG/ML
INJECTION, SOLUTION INTRAVENOUS
Status: DISCONTINUED | OUTPATIENT
Start: 2024-01-17 | End: 2024-01-17

## 2024-01-17 RX ORDER — ONDANSETRON HYDROCHLORIDE 2 MG/ML
4 INJECTION, SOLUTION INTRAVENOUS DAILY PRN
Status: DISCONTINUED | OUTPATIENT
Start: 2024-01-17 | End: 2024-01-17 | Stop reason: HOSPADM

## 2024-01-17 RX ORDER — OXYCODONE AND ACETAMINOPHEN 5; 325 MG/1; MG/1
1 TABLET ORAL
Status: DISCONTINUED | OUTPATIENT
Start: 2024-01-17 | End: 2024-01-17 | Stop reason: HOSPADM

## 2024-01-17 RX ORDER — LIDOCAINE HYDROCHLORIDE AND EPINEPHRINE 10; 10 MG/ML; UG/ML
INJECTION, SOLUTION INFILTRATION; PERINEURAL
Status: DISCONTINUED | OUTPATIENT
Start: 2024-01-17 | End: 2024-01-17 | Stop reason: HOSPADM

## 2024-01-17 RX ORDER — DEXMEDETOMIDINE HYDROCHLORIDE 100 UG/ML
INJECTION, SOLUTION INTRAVENOUS
Status: DISCONTINUED | OUTPATIENT
Start: 2024-01-17 | End: 2024-01-17

## 2024-01-17 RX ORDER — FENTANYL CITRATE 50 UG/ML
INJECTION, SOLUTION INTRAMUSCULAR; INTRAVENOUS
Status: DISCONTINUED | OUTPATIENT
Start: 2024-01-17 | End: 2024-01-17

## 2024-01-17 RX ORDER — LIDOCAINE HYDROCHLORIDE 20 MG/ML
INJECTION INTRAVENOUS
Status: DISCONTINUED | OUTPATIENT
Start: 2024-01-17 | End: 2024-01-17

## 2024-01-17 RX ORDER — ALBUTEROL SULFATE 90 UG/1
AEROSOL, METERED RESPIRATORY (INHALATION)
Status: DISCONTINUED | OUTPATIENT
Start: 2024-01-17 | End: 2024-01-17

## 2024-01-17 RX ORDER — OXYMETAZOLINE HCL 0.05 %
SPRAY, NON-AEROSOL (ML) NASAL
Status: DISCONTINUED
Start: 2024-01-17 | End: 2024-01-17 | Stop reason: HOSPADM

## 2024-01-17 RX ORDER — FENTANYL CITRATE 50 UG/ML
25 INJECTION, SOLUTION INTRAMUSCULAR; INTRAVENOUS EVERY 5 MIN PRN
Status: DISCONTINUED | OUTPATIENT
Start: 2024-01-17 | End: 2024-01-17 | Stop reason: HOSPADM

## 2024-01-17 RX ADMIN — PROPOFOL 50 MG: 10 INJECTION, EMULSION INTRAVENOUS at 11:01

## 2024-01-17 RX ADMIN — PROPOFOL 50 MG: 10 INJECTION, EMULSION INTRAVENOUS at 10:01

## 2024-01-17 RX ADMIN — ONDANSETRON 4 MG: 2 INJECTION INTRAMUSCULAR; INTRAVENOUS at 10:01

## 2024-01-17 RX ADMIN — ROCURONIUM BROMIDE 35 MG: 10 SOLUTION INTRAVENOUS at 10:01

## 2024-01-17 RX ADMIN — ACETAMINOPHEN 1000 MG: 10 INJECTION, SOLUTION INTRAVENOUS at 10:01

## 2024-01-17 RX ADMIN — DEXAMETHASONE SODIUM PHOSPHATE 12 MG: 4 INJECTION, SOLUTION INTRA-ARTICULAR; INTRALESIONAL; INTRAMUSCULAR; INTRAVENOUS; SOFT TISSUE at 10:01

## 2024-01-17 RX ADMIN — LIDOCAINE HYDROCHLORIDE 75 MG: 20 INJECTION INTRAVENOUS at 10:01

## 2024-01-17 RX ADMIN — FENTANYL CITRATE 25 MCG: 50 INJECTION, SOLUTION INTRAMUSCULAR; INTRAVENOUS at 11:01

## 2024-01-17 RX ADMIN — ROCURONIUM BROMIDE 5 MG: 10 SOLUTION INTRAVENOUS at 10:01

## 2024-01-17 RX ADMIN — FENTANYL CITRATE 50 MCG: 50 INJECTION, SOLUTION INTRAMUSCULAR; INTRAVENOUS at 10:01

## 2024-01-17 RX ADMIN — PROPOFOL 200 MG: 10 INJECTION, EMULSION INTRAVENOUS at 10:01

## 2024-01-17 RX ADMIN — SUGAMMADEX 400 MG: 100 INJECTION, SOLUTION INTRAVENOUS at 11:01

## 2024-01-17 RX ADMIN — FENTANYL CITRATE 100 MCG: 50 INJECTION, SOLUTION INTRAMUSCULAR; INTRAVENOUS at 10:01

## 2024-01-17 RX ADMIN — ALBUTEROL SULFATE 4 PUFF: 90 AEROSOL, METERED RESPIRATORY (INHALATION) at 11:01

## 2024-01-17 RX ADMIN — DEXMEDETOMIDINE HYDROCHLORIDE 4 MCG: 100 INJECTION, SOLUTION INTRAVENOUS at 10:01

## 2024-01-17 RX ADMIN — MIDAZOLAM HYDROCHLORIDE 2 MG: 1 INJECTION, SOLUTION INTRAMUSCULAR; INTRAVENOUS at 10:01

## 2024-01-17 RX ADMIN — DEXTROSE 3 G: 50 INJECTION, SOLUTION INTRAVENOUS at 10:01

## 2024-01-17 RX ADMIN — SUCCINYLCHOLINE CHLORIDE 120 MG: 20 INJECTION, SOLUTION INTRAMUSCULAR; INTRAVENOUS; PARENTERAL at 10:01

## 2024-01-17 NOTE — NURSING TRANSFER
Patient met criteria for discharge. Discharge instructions given by PACU, and medications delievered by pharmacy. Patient dressed and wheeled to car by RN with no complaints.

## 2024-01-17 NOTE — DISCHARGE INSTRUCTIONS
Septoplasty  Discharge Instructions:   - You may have some swelling of your nose, upper lip, cheeks, or around your eyes after nasal surgery. Your nose may be sore and will bleed. This may last for several days after surgery.   - The tip of your nose and your upper lip and gums may be numb. Feeling will return in a few weeks to a few months.   - Your sense of smell may not be as good after surgery, but this usually returns.   - You may a have a drip pad under your nose to collect mucus and blood. Change it when it bleeds through. You may stop using this once you fell it is not needed.   - You have nasal stents in your nose.  They have a channel to breath through, but this channel frequently clogs with blood and mucus.  Spray your nose with nasal saline (available over the counter) several times a day to try to keep the breathing channels in the nasal stents open.   - Do not blow your nose when the stents are in place.   - Do not try to remove the stents, your doctor will do that.   - The stents may cause you to breath through your mouth.  Humidification and staying well hydrated can help keep your mouth from drying out.   - Do not blow your nose for 2 weeks after surgery. If you do have to sneeze, do so with your mouth open.  - You will probably be able to return to work or school once the nasal stents are removed.   - You will have to visit your doctor to remove stents about a week after your surgery.     Activity/Restrictions:    - No heavy lifting, straining, or strenuous physical activity for two weeks. Sleep with the head of the bed elevated.     Diet:   - Resume your regular diet, as tolerated     Additional Instructions:   - Try using acetaminophen/Tylenol and ibuprofen/Motrin to control your pain. If this does not bring you relief or the pain remains severe, a stronger pain medication has been prescribed to you.   - DO NOT MIX NARCOTIC PAIN MEDICATIONS OR TAKE NARCOTIC PRESCRIPTIONS AT THE SAME TIME  (PERCODET, LORTAB, ROXICODONE, ETC.)   - DO NOT DRIVE OR OPERATE HEAVY MACHINERY WHILE ON NARCOTICS    - DO NOT TAKE MORE THAN 4 GRAMS (4000mg) OF TYLENOL (ACETAMINOPHEN) IN 24 HOURS     Follow Up:    ***- A follow up appointment will be scheduled for you in ~***. If you have not heard from our schedulers in 3 business days please call our clinic using the numbers provided below.   - A follow up appointment has been scheduled for you as outlined below:   Future Appointments   Date Time Provider Department Center   1/18/2024  8:00 AM Barbra Bartlett, PhD [facility] PSYCH [facility]   1/22/2024 11:00 AM Corey Lucio MD [facility] ENT  Medical    1/29/2024  8:00 AM Barbra Bartlett, PhD [facility]   1/31/2024  8:45 AM Rafael Mckeon NP [facility]   1/31/2024 10:30 AM [facility]   2/12/2024  8:00 AM Barbra Bartlett, PhD [facility]   2/23/2024  7:40 AM Tyesha Leggett MD [facility]   2/26/2024  8:00 AM Barbra Bartlett, PhD [facility] PSYCH [facility]   3/19/2024  8:00 AM Jerry Delgado, OD [facility] OPHTHAL [facility]   5/20/2024  8:45 AM Kirsten Siu MD [facility]         Call your doctor or go to the emergency room if you have:    - Fever of 101.5 degrees or higher   - Severe pain that has increased greatly since your surgery or is uncontrolled by your current pain medications   - Increased redness around your incision (cut); incision pulling apart; thick, white drainage (pus), bleeding, or a large amount of fluid from your incision.   - Nausea and vomiting that does not go away   - Chest pain/shortness of breath   - Any other acute events, problems, or concerns     For any questions, please call our clinic our leave us a My Chart message. [facility] Line: [phone], then ask for ENT Clinic.   For after hours questions and/or urgent concerns, call the same number above ([phone]) and ask for the on-call ENT physician.

## 2024-01-17 NOTE — OP NOTE
DATE OF PROCEDURE: 01/17/2024      PRE-OPERATIVE DIAGNOSIS:   Nasal obstruction  Nasal septal deviation  Inferior turbinate hypertrophy      POST-OPERATIVE DIAGNOSIS:   Same        PROCEDURE:   Septoplasty  Inferior turbinate reduction      SURGEON:   Corey Lucio MD      ANESTHESIA:   General      ESTIMATED BLOOD LOSS:   25 mL      SPECIMENS:   * No specimens in log *     COMPLICATIONS:   None apparent       INDICATIONS:       Anthony Sexton is a 33 y.o. female with nasal obstruction, septal deviation, and inferior turbinate hypertrophy who presents today for septoplasty and inferior turbinate reduction. Risks, benefits, and expectations were thoroughly discussed and the patient/patient's family wished to proceed. Informed consent was obtained. All questions were answered.          OPERATIVE DETAILS:   After being properly identified in the preoperative holding area, the patient was brought into the operating suite.  He was placed supine on the operating table.  A pre-procedural time-out was performed. Pre-operative antibiotics and steroids were administered. After induction of general anesthesia, the patient was successfully intubated with confirmation of tube placement via CO2 return.  The bed was then turned 180 degrees and the patient was prepped and draped in the usual fashion for this procedure.       1% lidocaine with epinephrine 1:100,000 was injected into the septum. A #15 blade was used to make a hemitransfixion incision on the left side. The perichondrium was scored with a scalpel and a caudal and then a freer were used to elevate a mucoperichondrial flap back to the bony cartilaginous junction.      A sharp freer was used to make cuts in the cartilaginous septum.  Care was taken to leave an L shaped caudal and dorsal strut >1 cm in place to support the nasal tip. The [place] was used to elevate on both sides of  the cartilage and the cartilage behind and under the strut was removed. Remnant  cartilage on the maxillary spine was removed.       The bony septum was deviated to the left. It was removed with a double-action Rongeur and Charisse forceps. The inferior bony septum and spur were removed using an Osteotome and Charisse forceps.       The mucoperichondrial flaps were laid back down into position. A small rent was created in the left mucoperichondrial flap. The nose was inspected and the airway was much improved. The left hemitransfixion incision was closed with interrupted 5-0 fast absorbing gut sutures.  A quilting suture was used to reapproxiamte the mucoperichondrial flaps with a 4-0 chromic gut.     Attention was turned to the inferior turbinate reduction. 1% lidocaine with epinephrine 1:100,000 was injected into the inferior turbinates and an 11cm x 2mm Inferior Tubinate blade ([employer]) was inserted just anterior to the head of the inferior turbinate. The inferior turbinate was reduced by advancing the blade submucosally and removing and disrupting tissue between the mucosa and the bone of the turbinate. Moving the blade back and forth while rotating it through four quadrants was performed until the mucosa could be seen to collapse onto the bone. The same procedure was performed on the opposite side. The turbinates were out-fractured using a Sorrento elevator.       Stanley stents coated in bacitracin were placed and secured to the septum with a 3-0 Prolene.       The oropharynx was suctioned clear of blood and secretions.      With the surgical portion of the procedure complete, all instrumentation was then removed from the operative field. The patient's skin was cleaned.  He was returned to the care of the anesthesia team.  He was then weaned from his anesthetic and transported to the PACU in stable condition.

## 2024-01-17 NOTE — ANESTHESIA POSTPROCEDURE EVALUATION
Anesthesia Post Evaluation    Patient: Anthony Sexton    Procedure(s) Performed: Procedure(s) (LRB):  SEPTOPLASTY, NOSE, WITH NASAL TURBINATE REDUCTION (Bilateral)    Final Anesthesia Type: general      Patient location during evaluation: PACU  Patient participation: Yes- Able to Participate  Level of consciousness: awake and alert and oriented  Post-procedure vital signs: reviewed and stable  Pain management: adequate  Airway patency: patent    PONV status at discharge: No PONV  Anesthetic complications: no      Cardiovascular status: blood pressure returned to baseline, stable and hemodynamically stable  Respiratory status: unassisted  Hydration status: euvolemic  Follow-up not needed.              Vitals Value Taken Time   /84 01/17/24 1216   Temp 36.5 °C (97.7 °F) 01/17/24 1122   Pulse 71 01/17/24 1222   Resp 13 01/17/24 1220   SpO2 99 % 01/17/24 1222   Vitals shown include unvalidated device data.      Event Time   Out of Recovery 12:22:00         Pain/Faizan Score: Pain Rating Prior to Med Admin: 6 (1/17/2024 11:47 AM)  Faizan Score: 9 (1/17/2024 12:15 PM)

## 2024-01-17 NOTE — BRIEF OP NOTE
[facility]  Brief Operative Note     SUMMARY     Surgery Date: 1/17/2024     Surgeon(s) and Role:     * Corey Lucio MD - Primary    Assisting Surgeon: None    Pre-op Diagnosis:  Nasal septal deviation [J34.2]  Hypertrophy of both inferior nasal turbinates [J34.3]  Nasal obstruction [J34.89]    Post-op Diagnosis:  Post-Op Diagnosis Codes:     * Nasal septal deviation [J34.2]     * Hypertrophy of both inferior nasal turbinates [J34.3]     * Nasal obstruction [J34.89]    Procedure(s) (LRB):  SEPTOPLASTY, NOSE, WITH NASAL TURBINATE REDUCTION (Bilateral)    Anesthesia: General    Findings/Key Components:  see op note    Estimated Blood Loss: 25 mL         Specimens:   Specimen (24h ago, onward)      None            Discharge Note    SUMMARY     Admit Date: 1/17/2024    Discharge Date and Time: No discharge date for patient encounter.    Attending Physician: Corey Lucio MD     Discharge Provider: Corey Lucio    Final Diagnosis: Post-Op Diagnosis Codes:     * Nasal septal deviation [J34.2]     * Hypertrophy of both inferior nasal turbinates [J34.3]     * Nasal obstruction [J34.89]    Disposition: Home or Self Care, discharged in good condition    Follow Up/Patient Instructions:       Medications:  Reconciled Home Medications:   Current Discharge Medication List        START taking these medications    Details   HYDROcodone-acetaminophen (NORCO) 5-325 mg per tablet Take 1 tablet by mouth every 6 (six) hours as needed.  Qty: 20 tablet, Refills: 0    Comments: Quantity prescribed more than 7 day supply? No           CONTINUE these medications which have NOT CHANGED    Details   ALPRAZolam (XANAX) 0.5 MG tablet Take 1 tab by mouth daily as needed not to exceed 30 tabs per 90 days  Qty: 30 tablet, Refills: 1    Associated Diagnoses: Generalized anxiety disorder      azelastine (ASTELIN) 137 mcg (0.1 %) nasal spray 1 spray (137 mcg total) by Nasal route 2 (two) times daily.  Qty: 30 mL, Refills: 2     Associated Diagnoses: Bilateral otitis media with effusion      buPROPion (WELLBUTRIN XL) 150 MG TB24 tablet Take 1 tablet (150 mg total) by mouth every morning.  Qty: 90 tablet, Refills: 1    Associated Diagnoses: Major depressive disorder, recurrent, mild; Attention deficit hyperactivity disorder (ADHD), predominantly inattentive type      ergocalciferol (VITAMIN D2) 50,000 unit Cap Take 1 capsule (50,000 Units total) by mouth twice a week.  Qty: 8 capsule, Refills: 11    Associated Diagnoses: Vitamin D deficiency      fluticasone propionate (FLONASE) 50 mcg/actuation nasal spray Instill 2 sprays (100 mcg total) by Each Nostril route once daily.  Qty: 16 g, Refills: 2    Associated Diagnoses: Acute bacterial sinusitis; Allergic rhinitis, unspecified seasonality, unspecified trigger      ipratropium (ATROVENT) 21 mcg (0.03 %) nasal spray 2 sprays by Each Nostril route 4 (four) times daily.  Qty: 30 mL, Refills: 11    Associated Diagnoses: Chronic rhinitis; Recurrent sinus infections      omeprazole (PRILOSEC) 40 MG capsule Take 1 capsule (40 mg total) by mouth 2 (two) times daily before meals.  Qty: 60 capsule, Refills: 2    Associated Diagnoses: Gastroesophageal reflux disease without esophagitis      ondansetron (ZOFRAN) 4 MG tablet Take 1 tablet (4 mg total) by mouth every 6 (six) hours as needed for Nausea.  Qty: 40 tablet, Refills: 1    Associated Diagnoses: Nausea           No discharge procedures on file.

## 2024-01-17 NOTE — ANESTHESIA PREPROCEDURE EVALUATION
01/17/2024  Anthony Sexton is a 33 y.o., female.  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     Pre-eclampsia in postpartum period 04/23/2020    Vitamin D deficiency        Past Surgical History:   Procedure Laterality Date    ESOPHAGOGASTRODUODENOSCOPY N/A 5/26/2021    Procedure: EGD (ESOPHAGOGASTRODUODENOSCOPY);  Surgeon: Alka Olmstead MD;  Location: [facility];  Service: Endoscopy;  Laterality: N/A;    ESOPHAGOGASTRODUODENOSCOPY N/A 10/4/2021    Procedure: EGD (ESOPHAGOGASTRODUODENOSCOPY);  Surgeon: Alka Olmstead MD;  Location: [facility];  Service: Endoscopy;  Laterality: N/A;    ESOPHAGOGASTRODUODENOSCOPY N/A 5/29/2023    Procedure: EGD (ESOPHAGOGASTRODUODENOSCOPY);  Surgeon: Alka Olmstead MD;  Location: [facility];  Service: Endoscopy;  Laterality: N/A;    None      PH MONITORING, ESOPHAGUS, WIRELESS, (OFF REFLUX MEDS) N/A 5/29/2023    Procedure: PH MONITORING, ESOPHAGUS, WIRELESS, (OFF REFLUX MEDS);  Surgeon: Alka Olmstead MD;  Location: [facility];  Service: Endoscopy;  Laterality: N/A;    REDUCTION OF BOTH BREASTS Bilateral 5/27/2021    Procedure: MAMMOPLASTY, REDUCTION, BILATERAL;  Surgeon: Daniel Ba MD;  Location: [address];  Service: Plastics;  Laterality: Bilateral;  Left breast tissue: 1584  Right breast tissue: 1438       Normal sinus rhythm   Minimal voltage criteria for LVH, may be normal variant ( R in aVL )   Borderline Abnormal ECG   When compared with ECG of  27-SEP-2022 16:19,   No significant change was found   Confirmed by CASSIE ANGELO, SASHA WATKINS ([id number]) on 11/23/2022 9:28:11 PM       Holter 10/22  Predominant Rhythm Sinus rhythm with heart rates varying between 49 and 146 BPM with an average of 82BPM.  Ventricular Arrhythmias There were very rare PVCs totalling 5 and averaging 0.05 per hour.  Supraventricular Arrhythmias There were very rare PACs totalling 37 and averaging 0.39 per hour.  The circadian pattern of sinus rate variability followed a typical curve.     ECHO 10/22  The left ventricle is normal in size with concentric remodeling and normal systolic function.  The estimated ejection fraction is 60%.  Normal left ventricular diastolic function.  Normal right ventricular size with normal right ventricular systolic function.  Mild tricuspid regurgitation.  Normal central venous pressure (3 mmHg).  The estimated PA systolic pressure is 32 mmHg.        Pre-op Assessment    I have reviewed the Patient Summary Reports.     I have reviewed the Nursing Notes. I have reviewed the NPO Status.   I have reviewed the Medications.     Review of Systems  Anesthesia Hx:  No problems with previous Anesthesia  Grade 1 with MAC 3. History of prior surgery of interest to airway management or planning:  Previous anesthesia: General       Airway issues documented on chart review include mask, easy, easy direct laryngoscopy , view on direct laryngoscopy Grade I       Social:  Non-Smoker       Cardiovascular:     Hypertension              ECG has been reviewed. Seen by cards 1 yr ago for CP, w/u NEG.                         Pulmonary:        Sleep Apnea                Hepatic/GI:     GERD             Musculoskeletal:    Muscle Disorders: Fibromyalgia           Neurological:    Neuromuscular Disease,                                   Endocrine:        Morbid Obesity / BMI > 40  Psych:  Psychiatric History anxiety depression                Physical Exam  General: Alert and  Oriented    Airway:  Mallampati: I   Mouth Opening: Normal  TM Distance: Normal  Tongue: Normal  Neck ROM: Normal ROM    Dental:  Intact    Chest/Lungs:  Clear to auscultation, Normal Respiratory Rate    Heart:  Rate: Normal  Rhythm: Regular Rhythm        Anesthesia Plan  Type of Anesthesia, risks & benefits discussed:    Anesthesia Type: Gen ETT  Intra-op Monitoring Plan: Standard ASA Monitors  Post Op Pain Control Plan: multimodal analgesia and IV/PO Opioids PRN  Induction:  IV  Informed Consent: Informed consent signed with the Patient and all parties understand the risks and agree with anesthesia plan.  All questions answered.   ASA Score: 3  Day of Surgery Review of History & Physical: H&P Update referred to the surgeon/provider.    Ready For Surgery From Anesthesia Perspective.     .

## 2024-01-17 NOTE — ANESTHESIA PROCEDURE NOTES
Intubation    Date/Time: 1/17/2024 10:09 AM    Performed by: Patricia Guthrie CRNA  Authorized by: Alize Silverman MD    Intubation:     Induction:  Intravenous    Intubated:  Postinduction    Mask Ventilation:  Easy mask    Attempts:  1    Attempted By:  CRNA    Method of Intubation:  Video laryngoscopy    Blade:  Carter 3    Laryngeal View Grade: Grade I - full view of cords      Difficult Airway Encountered?: No      Complications:  None    Airway Device:  Oral endotracheal tube    Airway Device Size:  7.0    Style/Cuff Inflation:  Cuffed (inflated to minimal occlusive pressure)    Tube secured:  21    Secured at:  The lips    Placement Verified By:  Capnometry    Complicating Factors:  None    Findings Post-Intubation:  BS equal bilateral and atraumatic/condition of teeth unchanged

## 2024-01-17 NOTE — PLAN OF CARE
Reviewed and completed all PACU orders. I encouraged questions, answered them thoroughly, and evaluated my instructions via teach-back method. I have disconnected patient from monitoring equipment and prepared them for the next phase of care. Patient has met all PACU discharge criteria at this point. Patient and family agree with the plan of care. Report given to SOPHY Moreno. Patient and sister taught discharge instructions. Pharmacy to deliver prescriptions. Patient moved to [place] [id number] for extended recovery.

## 2024-01-17 NOTE — NURSING TRANSFER
Assumed care of patient from SOPHY Marinelli. Patient vitals stable and resting comfortably in bed with no complaints. Plan of care reviewed with patient and family.

## 2024-01-17 NOTE — TRANSFER OF CARE
"Anesthesia Transfer of Care Note    Patient: Anthony Sexton    Procedure(s) Performed: Procedure(s) (LRB):  SEPTOPLASTY, NOSE, WITH NASAL TURBINATE REDUCTION (Bilateral)    Patient location: PACU    Anesthesia Type: general    Transport from OR: Transported from OR on room air with adequate spontaneous ventilation    Post pain: adequate analgesia    Post assessment: no apparent anesthetic complications and tolerated procedure well    Post vital signs: stable    Level of consciousness: awake    Nausea/Vomiting: no nausea/vomiting    Complications: none    Transfer of care protocol was followed      Last vitals: Visit Vitals  BP (!) 141/68 (BP Location: Right arm, Patient Position: Lying)   Pulse 84   Temp 36.5 °C (97.7 °F) (Temporal)   Resp 16   Ht 5' 5" (1.651 m)   Wt 124.1 kg (273 lb 9.5 oz)   SpO2 99%   Breastfeeding No   BMI 45.53 kg/m²     "

## 2024-01-18 ENCOUNTER — OFFICE VISIT (OUTPATIENT)
Dept: PSYCHIATRY | Facility: CLINIC | Age: 34
End: 2024-01-18
Payer: COMMERCIAL

## 2024-01-18 VITALS
WEIGHT: 273.56 LBS | DIASTOLIC BLOOD PRESSURE: 54 MMHG | TEMPERATURE: 98 F | RESPIRATION RATE: 14 BRPM | HEART RATE: 82 BPM | OXYGEN SATURATION: 97 % | HEIGHT: 65 IN | BODY MASS INDEX: 45.58 KG/M2 | SYSTOLIC BLOOD PRESSURE: 153 MMHG

## 2024-01-18 DIAGNOSIS — R69 UNDIAGNOSED DISEASE OR SYNDROME PRESENT: Primary | ICD-10-CM

## 2024-01-18 PROCEDURE — 90834 PSYTX W PT 45 MINUTES: CPT | Mod: 95,,, | Performed by: STUDENT IN AN ORGANIZED HEALTH CARE EDUCATION/TRAINING PROGRAM

## 2024-01-18 PROCEDURE — 1159F MED LIST DOCD IN RCRD: CPT | Mod: CPTII,95,, | Performed by: STUDENT IN AN ORGANIZED HEALTH CARE EDUCATION/TRAINING PROGRAM

## 2024-01-22 ENCOUNTER — OFFICE VISIT (OUTPATIENT)
Dept: OTOLARYNGOLOGY | Facility: CLINIC | Age: 34
End: 2024-01-22
Payer: COMMERCIAL

## 2024-01-22 DIAGNOSIS — J34.3 HYPERTROPHY OF BOTH INFERIOR NASAL TURBINATES: ICD-10-CM

## 2024-01-22 DIAGNOSIS — J34.2 NASAL SEPTAL DEVIATION: Primary | ICD-10-CM

## 2024-01-22 PROCEDURE — 99024 POSTOP FOLLOW-UP VISIT: CPT | Mod: S$GLB,,, | Performed by: STUDENT IN AN ORGANIZED HEALTH CARE EDUCATION/TRAINING PROGRAM

## 2024-01-22 PROCEDURE — 99999 PR PBB SHADOW E&M-EST. PATIENT-LVL II: CPT | Mod: PBBFAC,,, | Performed by: STUDENT IN AN ORGANIZED HEALTH CARE EDUCATION/TRAINING PROGRAM

## 2024-01-22 PROCEDURE — 1159F MED LIST DOCD IN RCRD: CPT | Mod: CPTII,S$GLB,, | Performed by: STUDENT IN AN ORGANIZED HEALTH CARE EDUCATION/TRAINING PROGRAM

## 2024-01-22 NOTE — PROGRESS NOTES
Otolaryngology Post Operative Note      SUBJECTIVE:    Doing well since surgery. Moderate bleeding POD1 self resolved. Pain mild.      OBJECTIVE:      There were no vitals filed for this visit.      General:  No acute distress.      HEENT: Nasal splints removed.  Septum midline and intact. Nasal cavities widely patent. Clot and secretions suctioned clear.     Respiratory: Non-labored breathing.          ASSESSMENT:     Anthony Sexton is a 33 y.o. female septal deformity and inferior turbinate hypertrophy s/p septoplasty and inferior turbinate reduction one week ago. Doing well.      PLAN:    Continue saline TID and PRN  Ok to resume use of nasal sprays in one week  Ok to resume regular activities in one week  F/u 3 months, sooner with concerns    Consider rechecking mild SAVANNA at that time

## 2024-01-24 NOTE — PROGRESS NOTES
[facility] [place] - [facility]  Psychology  Progress Note  Individual Psychotherapy (PhD/LCSW)    Patient Name: Anthony Sexton  MRN: [id number]    Patient Class: OP- [facility]  Primary Care Provider: Tyesha Leggett MD    Psychiatry Initial Visit (PhD/LCSW)  Individual Psychotherapy - CPT 67440  EAP #2 for 2024    Date: 1/18/2024    Site: [place]    The patient location is: Patient's home/ Patient reported that his/her location at the time of this visit was in the [facility]     Visit type: Virtual visit with synchronous audio and video     Each patient to whom he or she provides medical services by telemedicine is: (1) informed of the relationship between the physician and patient and the respective role of any other health care provider with respect to management of the patient; and (2) notified that he or she may decline to receive medical services by telemedicine and may withdraw from such care at any time.     Referral source: Lenora Walters MD    Clinical status of patient: Outpatient    Anthony Sexton, a 33 y.o. female, for initial evaluation visit.  Met with patient.    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     Substance  use:   Alcohol: none   Drugs: none   Tobacco: none   Caffeine: none    Current medications and drug reactions (include OTC, herbal): see medication list     Strengths and liabilities: Strength: Patient accepts guidance/feedback, Strength: Patient is expressive/articulate., Strength: Patient is intelligent., Strength: Patient is motivated for change., Strength: Patient has positive support network., Strength: Patient has reasonable judgment., Strength: Patient is stable., Liability: Patient lacks coping skills.    Current Evaluation:     Mental Status Exam:  General Appearance:  unremarkable, age appropriate   Speech: normal tone, normal rate, normal pitch, normal volume      Level of Cooperation: cooperative      Thought Processes: normal and logical   Mood: anxious, sad      Thought Content: normal, no suicidality, no homicidality, delusions, or paranoia   Affect: congruent and appropriate   Orientation: Oriented x3   Memory: recent >  intact   Attention Span & Concentration: intact   Fund of General Knowledge: intact and appropriate to age and level of education   Abstract Reasoning: not assessed   Judgment & Insight: fair     Language  intact     Summary: The patient was under the weather due to having surgery the previous day. Discussed forgiveness, fears, postpartum trauma, and the desire to not be a burden. Explored ways to express needs in a way where they can be heard.   Diagnostic Impression - Plan:       ICD-10-CM ICD-9-CM   1. Undiagnosed disease or syndrome present  R69 799.9                                           Plan:individual psychotherapy    Return to Clinic: as scheduled    Length of Service (minutes): 45          Barbra Bartlett, PhD  Psychologist  [facility]

## 2024-01-25 ENCOUNTER — TELEPHONE (OUTPATIENT)
Dept: PSYCHIATRY | Facility: CLINIC | Age: 34
End: 2024-01-25
Payer: COMMERCIAL

## 2024-01-29 ENCOUNTER — TELEPHONE (OUTPATIENT)
Dept: PSYCHIATRY | Facility: CLINIC | Age: 34
End: 2024-01-29
Payer: COMMERCIAL

## 2024-01-29 ENCOUNTER — OFFICE VISIT (OUTPATIENT)
Dept: PSYCHIATRY | Facility: CLINIC | Age: 34
End: 2024-01-29
Payer: COMMERCIAL

## 2024-01-29 DIAGNOSIS — R69 UNDIAGNOSED DISEASE OR SYNDROME PRESENT: Primary | ICD-10-CM

## 2024-01-29 PROCEDURE — 1159F MED LIST DOCD IN RCRD: CPT | Mod: CPTII,95,, | Performed by: STUDENT IN AN ORGANIZED HEALTH CARE EDUCATION/TRAINING PROGRAM

## 2024-01-29 PROCEDURE — 90834 PSYTX W PT 45 MINUTES: CPT | Mod: 95,,, | Performed by: STUDENT IN AN ORGANIZED HEALTH CARE EDUCATION/TRAINING PROGRAM

## 2024-01-30 ENCOUNTER — PATIENT MESSAGE (OUTPATIENT)
Dept: PSYCHIATRY | Facility: CLINIC | Age: 34
End: 2024-01-30
Payer: COMMERCIAL

## 2024-01-30 NOTE — PROGRESS NOTES
[facility] [place] - [facility]  Psychology  Progress Note  Individual Psychotherapy (PhD/LCSW)    Patient Name: Anthony Sexton  MRN: [id number]    Patient Class: OP- [facility]patient [facility]  Primary Care Provider: Tyesha Leggett MD    Psychiatry Initial Visit (PhD/LCSW)  Individual Psychotherapy - CPT 73281  EAP #3 for 2024    Date: 1/29/2024    Site: [place]    The patient location is: Patient's home/ Patient reported that his/her location at the time of this visit was in the [facility]     Visit type: Virtual visit with synchronous audio and video     Each patient to whom he or she provides medical services by telemedicine is: (1) informed of the relationship between the physician and patient and the respective role of any other health care provider with respect to management of the patient; and (2) notified that he or she may decline to receive medical services by telemedicine and may withdraw from such care at any time.     Referral source: Lenora Walters MD    Clinical status of patient: Outpatient    Anthony Sexton, a 33 y.o. female, for initial evaluation visit.  Met with patient.    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     Substance  "use:   Alcohol: none   Drugs: none   Tobacco: none   Caffeine: none    Current medications and drug reactions (include OTC, herbal): see medication list     Strengths and liabilities: Strength: Patient accepts guidance/feedback, Strength: Patient is expressive/articulate., Strength: Patient is intelligent., Strength: Patient is motivated for change., Strength: Patient has positive support network., Strength: Patient has reasonable judgment., Strength: Patient is stable., Liability: Patient lacks coping skills.    Current Evaluation:     Mental Status Exam:  General Appearance:  unremarkable, age appropriate   Speech: normal tone, normal rate, normal pitch, normal volume      Level of Cooperation: cooperative      Thought Processes: normal and logical   Mood: anxious, sad      Thought Content: normal, no suicidality, no homicidality, delusions, or paranoia   Affect: congruent and appropriate   Orientation: Oriented x3   Memory: recent >  intact   Attention Span & Concentration: intact   Fund of General Knowledge: intact and appropriate to age and level of education   Abstract Reasoning: not assessed   Judgment & Insight: fair     Language  intact     Summary: The patient is interested in working on self-fulfillment. She is uncertain about what activities make her happy. We completed a quiz and found that the patient desires control, nurturing, being creative, and gaining knowledge. Ways to build on these areas would include having leadership duties behind the scenes, volunteering, cooking and decorating the house, and picking up a hobby while setting boundaries.  For homework, the patient needs to pick one thing to "shake up" her routine. I will message her on Wednesday to find out what she picked.   Diagnostic Impression - Plan:       ICD-10-CM ICD-9-CM   1. Undiagnosed disease or syndrome present  R69 799.9                                             Plan:individual psychotherapy    Return to Clinic: as " scheduled    Length of Service (minutes): 45          Barbra Bartlett, PhD  Psychologist  [facility]

## 2024-02-08 ENCOUNTER — TELEPHONE (OUTPATIENT)
Dept: PSYCHIATRY | Facility: CLINIC | Age: 34
End: 2024-02-08
Payer: COMMERCIAL

## 2024-02-15 ENCOUNTER — TELEPHONE (OUTPATIENT)
Dept: PSYCHIATRY | Facility: CLINIC | Age: 34
End: 2024-02-15
Payer: COMMERCIAL

## 2024-02-16 ENCOUNTER — TELEPHONE (OUTPATIENT)
Dept: PSYCHIATRY | Facility: CLINIC | Age: 34
End: 2024-02-16
Payer: COMMERCIAL

## 2024-02-16 ENCOUNTER — OFFICE VISIT (OUTPATIENT)
Dept: FAMILY MEDICINE | Facility: CLINIC | Age: 34
End: 2024-02-16
Attending: FAMILY MEDICINE
Payer: COMMERCIAL

## 2024-02-16 ENCOUNTER — LAB VISIT (OUTPATIENT)
Dept: LAB | Facility: HOSPITAL | Age: 34
End: 2024-02-16
Attending: FAMILY MEDICINE
Payer: COMMERCIAL

## 2024-02-16 VITALS
HEIGHT: 65 IN | RESPIRATION RATE: 18 BRPM | OXYGEN SATURATION: 97 % | DIASTOLIC BLOOD PRESSURE: 70 MMHG | WEIGHT: 275.13 LBS | HEART RATE: 90 BPM | TEMPERATURE: 98 F | SYSTOLIC BLOOD PRESSURE: 104 MMHG | BODY MASS INDEX: 45.84 KG/M2

## 2024-02-16 DIAGNOSIS — N30.00 ACUTE CYSTITIS WITHOUT HEMATURIA: ICD-10-CM

## 2024-02-16 DIAGNOSIS — M08.80 ENTHESITIS RELATED ARTHRITIS: ICD-10-CM

## 2024-02-16 DIAGNOSIS — Z00.00 ANNUAL PHYSICAL EXAM: Primary | ICD-10-CM

## 2024-02-16 DIAGNOSIS — Z00.00 ANNUAL PHYSICAL EXAM: ICD-10-CM

## 2024-02-16 DIAGNOSIS — F33.0 MAJOR DEPRESSIVE DISORDER, RECURRENT, MILD: ICD-10-CM

## 2024-02-16 DIAGNOSIS — E66.01 MORBID OBESITY WITH BMI OF 45.0-49.9, ADULT: ICD-10-CM

## 2024-02-16 LAB
25(OH)D3+25(OH)D2 SERPL-MCNC: 16 NG/ML (ref 30–96)
ALBUMIN SERPL BCP-MCNC: 3.6 G/DL (ref 3.5–5.2)
ALP SERPL-CCNC: 96 U/L (ref 55–135)
ALT SERPL W/O P-5'-P-CCNC: 15 U/L (ref 10–44)
ANION GAP SERPL CALC-SCNC: 7 MMOL/L (ref 8–16)
AST SERPL-CCNC: 17 U/L (ref 10–40)
BASOPHILS # BLD AUTO: 0.04 K/UL (ref 0–0.2)
BASOPHILS NFR BLD: 0.4 % (ref 0–1.9)
BILIRUB SERPL-MCNC: 0.4 MG/DL (ref 0.1–1)
BILIRUB SERPL-MCNC: NORMAL MG/DL
BLOOD URINE, POC: NORMAL
BUN SERPL-MCNC: 9 MG/DL (ref 6–20)
CALCIUM SERPL-MCNC: 9.4 MG/DL (ref 8.7–10.5)
CHLORIDE SERPL-SCNC: 107 MMOL/L (ref 95–110)
CHOLEST SERPL-MCNC: 175 MG/DL (ref 120–199)
CHOLEST/HDLC SERPL: 4.7 {RATIO} (ref 2–5)
CLARITY, POC UA: CLEAR
CO2 SERPL-SCNC: 23 MMOL/L (ref 23–29)
COLOR, POC UA: YELLOW
CREAT SERPL-MCNC: 0.8 MG/DL (ref 0.5–1.4)
DIFFERENTIAL METHOD BLD: ABNORMAL
EOSINOPHIL # BLD AUTO: 0 K/UL (ref 0–0.5)
EOSINOPHIL NFR BLD: 0.4 % (ref 0–8)
ERYTHROCYTE [DISTWIDTH] IN BLOOD BY AUTOMATED COUNT: 14.6 % (ref 11.5–14.5)
EST. GFR  (NO RACE VARIABLE): >60 ML/MIN/1.73 M^2
ESTIMATED AVG GLUCOSE: 114 MG/DL (ref 68–131)
GLUCOSE SERPL-MCNC: 79 MG/DL (ref 70–110)
GLUCOSE UR QL STRIP: NORMAL
HBA1C MFR BLD: 5.6 % (ref 4–5.6)
HBV SURFACE AB SER-ACNC: 43.3 MIU/ML
HBV SURFACE AB SER-ACNC: REACTIVE M[IU]/ML
HBV SURFACE AG SERPL QL IA: NORMAL
HCG INTACT+B SERPL-ACNC: 40 MIU/ML
HCT VFR BLD AUTO: 40.8 % (ref 37–48.5)
HCV AB SERPL QL IA: NORMAL
HDLC SERPL-MCNC: 37 MG/DL (ref 40–75)
HDLC SERPL: 21.1 % (ref 20–50)
HGB BLD-MCNC: 12.1 G/DL (ref 12–16)
HIV 1+2 AB+HIV1 P24 AG SERPL QL IA: NORMAL
IMM GRANULOCYTES # BLD AUTO: 0.02 K/UL (ref 0–0.04)
IMM GRANULOCYTES NFR BLD AUTO: 0.2 % (ref 0–0.5)
KETONES UR QL STRIP: NORMAL
LDLC SERPL CALC-MCNC: 124.8 MG/DL (ref 63–159)
LEUKOCYTE ESTERASE URINE, POC: NORMAL
LYMPHOCYTES # BLD AUTO: 2.6 K/UL (ref 1–4.8)
LYMPHOCYTES NFR BLD: 26.7 % (ref 18–48)
MCH RBC QN AUTO: 24 PG (ref 27–31)
MCHC RBC AUTO-ENTMCNC: 29.7 G/DL (ref 32–36)
MCV RBC AUTO: 81 FL (ref 82–98)
MONOCYTES # BLD AUTO: 0.5 K/UL (ref 0.3–1)
MONOCYTES NFR BLD: 4.9 % (ref 4–15)
NEUTROPHILS # BLD AUTO: 6.5 K/UL (ref 1.8–7.7)
NEUTROPHILS NFR BLD: 67.4 % (ref 38–73)
NITRITE, POC UA: NORMAL
NONHDLC SERPL-MCNC: 138 MG/DL
NRBC BLD-RTO: 0 /100 WBC
PH, POC UA: 7
PLATELET # BLD AUTO: 324 K/UL (ref 150–450)
PMV BLD AUTO: 11.5 FL (ref 9.2–12.9)
POTASSIUM SERPL-SCNC: 3.9 MMOL/L (ref 3.5–5.1)
PROT SERPL-MCNC: 7.8 G/DL (ref 6–8.4)
PROTEIN, POC: 30
RBC # BLD AUTO: 5.05 M/UL (ref 4–5.4)
SODIUM SERPL-SCNC: 137 MMOL/L (ref 136–145)
SPECIFIC GRAVITY, POC UA: 1.01
TRIGL SERPL-MCNC: 66 MG/DL (ref 30–150)
TSH SERPL DL<=0.005 MIU/L-ACNC: 0.84 UIU/ML (ref 0.4–4)
UROBILINOGEN, POC UA: 0.2
WBC # BLD AUTO: 9.65 K/UL (ref 3.9–12.7)

## 2024-02-16 PROCEDURE — 84702 CHORIONIC GONADOTROPIN TEST: CPT | Performed by: FAMILY MEDICINE

## 2024-02-16 PROCEDURE — 86706 HEP B SURFACE ANTIBODY: CPT | Mod: 91 | Performed by: FAMILY MEDICINE

## 2024-02-16 PROCEDURE — 99999 PR PBB SHADOW E&M-EST. PATIENT-LVL IV: CPT | Mod: PBBFAC,,, | Performed by: FAMILY MEDICINE

## 2024-02-16 PROCEDURE — 36415 COLL VENOUS BLD VENIPUNCTURE: CPT | Mod: PO | Performed by: FAMILY MEDICINE

## 2024-02-16 PROCEDURE — 1159F MED LIST DOCD IN RCRD: CPT | Mod: CPTII,S$GLB,, | Performed by: FAMILY MEDICINE

## 2024-02-16 PROCEDURE — 82306 VITAMIN D 25 HYDROXY: CPT | Performed by: FAMILY MEDICINE

## 2024-02-16 PROCEDURE — 99395 PREV VISIT EST AGE 18-39: CPT | Mod: 25,S$GLB,, | Performed by: FAMILY MEDICINE

## 2024-02-16 PROCEDURE — 3074F SYST BP LT 130 MM HG: CPT | Mod: CPTII,S$GLB,, | Performed by: FAMILY MEDICINE

## 2024-02-16 PROCEDURE — 81002 URINALYSIS NONAUTO W/O SCOPE: CPT | Mod: S$GLB,,, | Performed by: FAMILY MEDICINE

## 2024-02-16 PROCEDURE — 86592 SYPHILIS TEST NON-TREP QUAL: CPT | Performed by: FAMILY MEDICINE

## 2024-02-16 PROCEDURE — 87340 HEPATITIS B SURFACE AG IA: CPT | Performed by: FAMILY MEDICINE

## 2024-02-16 PROCEDURE — 3008F BODY MASS INDEX DOCD: CPT | Mod: CPTII,S$GLB,, | Performed by: FAMILY MEDICINE

## 2024-02-16 PROCEDURE — 86696 HERPES SIMPLEX TYPE 2 TEST: CPT | Performed by: FAMILY MEDICINE

## 2024-02-16 PROCEDURE — 87389 HIV-1 AG W/HIV-1&-2 AB AG IA: CPT | Performed by: FAMILY MEDICINE

## 2024-02-16 PROCEDURE — 83036 HEMOGLOBIN GLYCOSYLATED A1C: CPT | Performed by: FAMILY MEDICINE

## 2024-02-16 PROCEDURE — 85025 COMPLETE CBC W/AUTO DIFF WBC: CPT | Performed by: FAMILY MEDICINE

## 2024-02-16 PROCEDURE — 3078F DIAST BP <80 MM HG: CPT | Mod: CPTII,S$GLB,, | Performed by: FAMILY MEDICINE

## 2024-02-16 PROCEDURE — 86803 HEPATITIS C AB TEST: CPT | Performed by: FAMILY MEDICINE

## 2024-02-16 PROCEDURE — 80061 LIPID PANEL: CPT | Performed by: FAMILY MEDICINE

## 2024-02-16 PROCEDURE — 84443 ASSAY THYROID STIM HORMONE: CPT | Performed by: FAMILY MEDICINE

## 2024-02-16 PROCEDURE — 1160F RVW MEDS BY RX/DR IN RCRD: CPT | Mod: CPTII,S$GLB,, | Performed by: FAMILY MEDICINE

## 2024-02-16 PROCEDURE — 80053 COMPREHEN METABOLIC PANEL: CPT | Performed by: FAMILY MEDICINE

## 2024-02-16 RX ORDER — CEPHALEXIN 500 MG/1
500 CAPSULE ORAL EVERY 12 HOURS
Qty: 14 CAPSULE | Refills: 0 | Status: SHIPPED | OUTPATIENT
Start: 2024-02-16 | End: 2024-02-23

## 2024-02-16 NOTE — PROGRESS NOTES
HISTORY OF PRESENT ILLNESS: Ms. Barrientos comes in today not fasting (drank tea) and without taking medication for annual wellness examination.     END OF LIFE DECISION: She does not have a living will and does not desire life support and states family is aware of her decision.    Current Outpatient Medications   Medication Sig    ergocalciferol (VITAMIN D2) 50,000 unit Cap Take 1 capsule (50,000 Units total) by mouth twice a week. (Patient taking differently: Take 50,000 Units by mouth once a week.)    ondansetron (ZOFRAN) 4 MG tablet Take 1 tablet (4 mg total) by mouth every 6 (six) hours as needed for Nausea.    ALPRAZolam (XANAX) 0.5 MG tablet Take 1 tab by mouth daily as needed not to exceed 30 tabs per 90 days (Patient not taking: Reported on 2/16/2024)    omeprazole (PRILOSEC) 40 MG capsule Take 1 capsule (40 mg total) by mouth 2 (two) times daily before meals. (Patient not taking: Reported on 2/16/2024)     SCREENINGS:       Cholesterol: February 23, 2023.     FFS/Colonoscopy: Never.     Mammogram:  Never.     GYN Exam: October 31, 2022 with GYN Dr. JANELL Siu with cervical biopsy performed on March 23, 2023 for positive HPV.     Dexa Scan:  Never.     Eye Exam: February 7, 2023 with Dr. Delgado. She wears contacts/glasses.  Scheduled for next week.     PPD: Negative in the past.     Immunizations:  Td/Tdap - 2016 per patient.                              Gardasil - Never.                              Zostavax - N./A.                              Pneumovax - In the past.                              Seasonal Flu - December 1, 2023.                              Covid-19 (Pfizer) vaccine - January 20, 2021, February 10, 2021, December 2, 2021.                           ROS:        GENERAL: Denies fever, chills, fatigue or unusual weight change. Appetite normal. Does not exercises or monitors diet. Weight 123.8 kg (272 lb 14.9 oz) at December 8, 2023 visit.         SKIN: Denies itching, rash, changes in mole,  color or texture of skin or easy bruising.   HEAD:  Denies headaches or recent head trauma. Saw HILARY Croft with neurology on June 16, 2023 for migraine.  EYES: Denies change in vision, pain, diplopia, redness or discharge. Wears seatbelt.  EARS: Denies ear pain, discharge, vertigo or decreased hearing.  NOSE: Denies loss of smell, epistaxis or rhinitis.  MOUTH & THROAT: Denies hoarseness or change in voice. Denies excessive gum bleeding or mouth sores.  Denies sore throat.  NODES: Denies swollen glands.  CHEST: Denies TELLEZ, wheezing, cough, or sputum production. Reports slight snoring. Saw ENT Dr. Lucio on January 22, 2024 s/p septoplasty and inferior turbinate reduction.          CARDIOVASCULAR: Denies chest pain, PND, orthopnea or reduced exercise tolerance.  Denies palpitations.  Reports no recent blood pressure checks but reports usual levels range 130/80.          ABDOMEN: Denies diarrhea, constipation, nausea, vomiting, abdominal pain, or blood in stool.          URINARY: Denies flank pain, dysuria or hematuria.  Reports dysuria last week with urine odor last week and continues.  Reports cloudy urine now.  GENITOURINARY: Denies flank pain, dysuria, frequency or hematuria.  Reports no menstrual flow since December 30, 2023 and took home UPT 2 days ago with positive result. Does not perform monthly breast self examinations.  Desires HIV test.  ENDOCRINE: Denies thyroid, diabetes or cholesterol problems.  HEME/LYMPH: Denies bleeding problems.  PERIPHERAL VASCULAR:Denies claudication or cyanosis.  MUSCULOSKELETAL: Denies joint stiffness, pain or swelling. Denies edema. Saw Dr. Sandoval, rheumatologist, on January 11, 2023 for enthesitis related arthritis, ESDRAS positive, family history of Sjogren's disease, fibromyalgia, vitamin D deficiency.  NEUROLOGIC: Denies history of seizures, tremors, paralysis, alteration of gait or coordination.  PSYCHIATRIC: Denies mood swings, homicidal or suicidal thoughts. Denies  "acute steep problems. Reports follows with psychiatry Dr. Sanchez for anxiety, depression with last visit on August 4, 2023 and follow up scheduled for February 19, 2024 but follows with therapy every 2 week with help and without need to take Wellbutrin.       PE:   VS: Blood Pressure 104/70   Pulse 90   Temperature 97.7 °F (36.5 °C) (Temporal)   Respiration 18   Height 5' 5" (1.651 m)   Weight 124.8 kg (275 lb 2.2 oz)   Last Menstrual Period 12/30/2023 (Exact Date)   Oxygen Saturation 97%   Body Mass Index 45.78 kg/m²   APPEARANCE:  Well nourished, well developed female, pleasant and obese, alert and oriented in no acute distress.    HEAD: Nontender. Full range of motion.  EYES: PERRL, conjunctiva pink, lids no edema.   EARS: External canal patent, no swelling or redness. TM's shiny and clear.  NOSE: Mucosa and turbinates pink, not swollen. No discharge. Nontender sinuses.  THROAT: No pharyngeal erythema or exudate. No stridor.   NECK: Supple, no mass, thyroid not enlarged.  NODES: No cervical lymph node enlargement.  CHEST: Normal respiratory effort. Lungs clear to auscultation.  CARDIOVASCULAR: Normal S1, S2. No rubs, murmurs or gallops. PMI not displaced. No carotid bruit. Pedal pulses palpable bilaterally. No edema.  ABDOMEN: Bowel sounds present. Not distended. Soft. No tenderness, masses or organomegaly.  BREAST EXAM: Not examined.  PELVIC EXAM: Not examined.  RECTAL EXAM: Not examined.  MUSCULOSKELETAL: No joint deformities or stiffness. She is ambulatory without problems.  SKIN: No rashes or suspicious lesions, normal color and turgor.  NEUROLOGIC:   Cranial Nerves: II-XII grossly intact.   DTR's: Knees, Ankles 2+ and equal bilaterally. Gait & Posture: Normal gait and fine motion.  PSYCHIATRIC:Patient alert, oriented x 3. Mood/Affect normal without acute anxiety or depression noted. Judgment/insight good as she makes appropriate decisions during today's exam.    Urine dipstick ordered by me today - " "specific gravity 1.015, pH 7, 30 + protein, otherwise unremarkable.    ASSESSMENT:    ICD-10-CM ICD-9-CM    1. Annual physical exam  Z00.00 V70.0 POCT URINE DIPSTICK WITHOUT MICROSCOPE      HCG, Quantitative      HIV 1/2 Ag/Ab (4th Gen)      CBC Auto Differential      TSH      Comprehensive Metabolic Panel      Lipid Panel      Hemoglobin A1C      HSV 1 & 2, IgG      RPR      Hepatitis C Antibody      Hepatitis B Surface Antigen      Hepatitis B Surface Ab, Qualitative      Vitamin D      2. Major depressive disorder, recurrent, mild  F33.0 296.31       3. Enthesitis related arthritis  M08.80 720.0       4. Morbid obesity with BMI of 45.0-49.9, adult  E66.01 278.01     Z68.42 V85.42       5. Acute cystitis without hematuria  N30.00 595.0 cephALEXin (KEFLEX) 500 MG capsule            PLAN:  1. Age-appropriate counseling-appropriate low-sodium, low-cholesterol, low carbohydrate diet and exercise daily, monthly breast self exam, annual wellness examination.   2. Patient advised to call for results.  3. Continue current medications.  4. Keep follow up with specialists.  5. Patient advised per Up-To-Date Prilosec, Zofran, Vitamin D are okay to use during pregnancy but should be individualized and Alprazolam is not recommended for use during pregnancy.  6. Follow up in about 1 year (around 2/16/2025) for physical.     Addendum:    I advised patient via portal "Your urine result does not show you likely have infection. However, please drink plenty of water and if symptoms persist for 2 to 3 more days, then let's treat empirically with Keflex as we discussed. I sent prescription to your local pharmacy."    Answers submitted by the patient for this visit:  Review of Systems Questionnaire (Submitted on 2/23/2023)  activity change: No  unexpected weight change: No  neck pain: No  hearing loss: No  rhinorrhea: No  trouble swallowing: No  eye discharge: No  visual disturbance: No  chest tightness: No  wheezing: No  chest pain: " No  palpitations: Yes  blood in stool: No  constipation: No  vomiting: No  diarrhea: No  polydipsia: No  polyuria: No  difficulty urinating: No  hematuria: No  menstrual problem: No  dysuria: No  joint swelling: No  arthralgias: No  headaches: Yes  weakness: No  confusion: No  dysphoric mood: Yes

## 2024-02-16 NOTE — TELEPHONE ENCOUNTER
----- Message from Kenya Sanchez, PhD, MPAP sent at 2/16/2024 12:10 PM CST -----  Regarding: call to move to in clinic  She is scheduled for Monday for virtual. I need to see her in the clinic--I have only done virtual visits with her since 2021. She will have to be moved to another day.  thanks

## 2024-02-17 LAB — RPR SER QL: NORMAL

## 2024-02-19 LAB
HSV1 IGG SERPL QL IA: NEGATIVE
HSV2 IGG SERPL QL IA: NEGATIVE

## 2024-02-20 ENCOUNTER — PATIENT MESSAGE (OUTPATIENT)
Dept: FAMILY MEDICINE | Facility: CLINIC | Age: 34
End: 2024-02-20
Payer: COMMERCIAL

## 2024-02-22 ENCOUNTER — TELEPHONE (OUTPATIENT)
Dept: PSYCHIATRY | Facility: CLINIC | Age: 34
End: 2024-02-22
Payer: COMMERCIAL

## 2024-02-26 ENCOUNTER — OFFICE VISIT (OUTPATIENT)
Dept: PSYCHIATRY | Facility: CLINIC | Age: 34
End: 2024-02-26
Payer: COMMERCIAL

## 2024-02-26 DIAGNOSIS — R69 UNDIAGNOSED DISEASE OR SYNDROME PRESENT: Primary | ICD-10-CM

## 2024-02-26 PROCEDURE — 1159F MED LIST DOCD IN RCRD: CPT | Mod: CPTII,95,, | Performed by: STUDENT IN AN ORGANIZED HEALTH CARE EDUCATION/TRAINING PROGRAM

## 2024-02-26 PROCEDURE — 90834 PSYTX W PT 45 MINUTES: CPT | Mod: 95,,, | Performed by: STUDENT IN AN ORGANIZED HEALTH CARE EDUCATION/TRAINING PROGRAM

## 2024-02-26 PROCEDURE — 3044F HG A1C LEVEL LT 7.0%: CPT | Mod: CPTII,95,, | Performed by: STUDENT IN AN ORGANIZED HEALTH CARE EDUCATION/TRAINING PROGRAM

## 2024-02-27 ENCOUNTER — TELEPHONE (OUTPATIENT)
Dept: PSYCHIATRY | Facility: CLINIC | Age: 34
End: 2024-02-27
Payer: COMMERCIAL

## 2024-02-29 ENCOUNTER — OFFICE VISIT (OUTPATIENT)
Dept: OBSTETRICS AND GYNECOLOGY | Facility: CLINIC | Age: 34
End: 2024-02-29
Payer: COMMERCIAL

## 2024-02-29 ENCOUNTER — LAB VISIT (OUTPATIENT)
Dept: LAB | Facility: HOSPITAL | Age: 34
End: 2024-02-29
Attending: ADVANCED PRACTICE MIDWIFE
Payer: COMMERCIAL

## 2024-02-29 ENCOUNTER — OFFICE VISIT (OUTPATIENT)
Dept: PSYCHIATRY | Facility: CLINIC | Age: 34
End: 2024-02-29
Payer: COMMERCIAL

## 2024-02-29 VITALS
HEIGHT: 66 IN | BODY MASS INDEX: 44.96 KG/M2 | SYSTOLIC BLOOD PRESSURE: 138 MMHG | WEIGHT: 279.75 LBS | DIASTOLIC BLOOD PRESSURE: 74 MMHG

## 2024-02-29 VITALS
BODY MASS INDEX: 45.85 KG/M2 | HEART RATE: 81 BPM | SYSTOLIC BLOOD PRESSURE: 120 MMHG | WEIGHT: 279.75 LBS | DIASTOLIC BLOOD PRESSURE: 81 MMHG

## 2024-02-29 DIAGNOSIS — Z86.59 HISTORY OF ADHD: ICD-10-CM

## 2024-02-29 DIAGNOSIS — I10 ESSENTIAL HYPERTENSION: ICD-10-CM

## 2024-02-29 DIAGNOSIS — Z32.01 POSITIVE PREGNANCY TEST: ICD-10-CM

## 2024-02-29 DIAGNOSIS — F41.9 ANXIETY: ICD-10-CM

## 2024-02-29 DIAGNOSIS — F41.1 GENERALIZED ANXIETY DISORDER: ICD-10-CM

## 2024-02-29 DIAGNOSIS — N91.2 AMENORRHEA: Primary | ICD-10-CM

## 2024-02-29 DIAGNOSIS — K21.9 GASTROESOPHAGEAL REFLUX DISEASE WITHOUT ESOPHAGITIS: ICD-10-CM

## 2024-02-29 DIAGNOSIS — Z86.59 HISTORY OF POSTPARTUM DEPRESSION: Primary | ICD-10-CM

## 2024-02-29 DIAGNOSIS — E66.01 MORBID OBESITY WITH BMI OF 45.0-49.9, ADULT: ICD-10-CM

## 2024-02-29 DIAGNOSIS — G47.33 OSA (OBSTRUCTIVE SLEEP APNEA): ICD-10-CM

## 2024-02-29 DIAGNOSIS — Z87.59 HISTORY OF POSTPARTUM DEPRESSION: Primary | ICD-10-CM

## 2024-02-29 DIAGNOSIS — R11.0 NAUSEA: ICD-10-CM

## 2024-02-29 DIAGNOSIS — E55.9 VITAMIN D DEFICIENCY: ICD-10-CM

## 2024-02-29 PROBLEM — J31.0 CHRONIC RHINITIS: Status: RESOLVED | Noted: 2023-11-17 | Resolved: 2024-02-29

## 2024-02-29 PROBLEM — R06.83 SNORING: Status: RESOLVED | Noted: 2023-02-08 | Resolved: 2024-02-29

## 2024-02-29 PROBLEM — J34.2 NASAL SEPTAL DEVIATION: Status: RESOLVED | Noted: 2024-01-17 | Resolved: 2024-02-29

## 2024-02-29 PROBLEM — Z87.42 HISTORY OF ABNORMAL CERVICAL PAP SMEAR: Status: RESOLVED | Noted: 2021-11-03 | Resolved: 2024-02-29

## 2024-02-29 PROBLEM — J34.89 NASAL OBSTRUCTION: Status: RESOLVED | Noted: 2024-01-17 | Resolved: 2024-02-29

## 2024-02-29 PROBLEM — F33.0 MAJOR DEPRESSIVE DISORDER, RECURRENT, MILD: Status: RESOLVED | Noted: 2024-02-16 | Resolved: 2024-02-29

## 2024-02-29 PROBLEM — J32.9 RECURRENT SINUS INFECTIONS: Status: RESOLVED | Noted: 2023-11-17 | Resolved: 2024-02-29

## 2024-02-29 PROBLEM — O99.891 HISTORY OF POSTPARTUM DEPRESSION, CURRENTLY PREGNANT: Status: ACTIVE | Noted: 2020-08-06

## 2024-02-29 PROBLEM — J34.3 HYPERTROPHY OF BOTH INFERIOR NASAL TURBINATES: Status: RESOLVED | Noted: 2024-01-17 | Resolved: 2024-02-29

## 2024-02-29 PROBLEM — Z87.42 HISTORY OF ABNORMAL CERVICAL PAP SMEAR: Status: ACTIVE | Noted: 2021-11-03

## 2024-02-29 PROBLEM — M08.80 ENTHESITIS RELATED ARTHRITIS: Status: RESOLVED | Noted: 2023-01-11 | Resolved: 2024-02-29

## 2024-02-29 LAB
ABO + RH BLD: NORMAL
B-HCG UR QL: POSITIVE
BACTERIA #/AREA URNS HPF: ABNORMAL /HPF
BASOPHILS # BLD AUTO: 0.04 K/UL (ref 0–0.2)
BASOPHILS NFR BLD: 0.3 % (ref 0–1.9)
BILIRUB UR QL STRIP: NEGATIVE
BLD GP AB SCN CELLS X3 SERPL QL: NORMAL
CLARITY UR: ABNORMAL
COLOR UR: YELLOW
CREAT UR-MCNC: 229 MG/DL (ref 15–325)
CTP QC/QA: YES
DIFFERENTIAL METHOD BLD: ABNORMAL
EOSINOPHIL # BLD AUTO: 0.1 K/UL (ref 0–0.5)
EOSINOPHIL NFR BLD: 0.4 % (ref 0–8)
ERYTHROCYTE [DISTWIDTH] IN BLOOD BY AUTOMATED COUNT: 15 % (ref 11.5–14.5)
GLUCOSE UR QL STRIP: NEGATIVE
HAV IGM SERPL QL IA: NORMAL
HBV CORE IGM SERPL QL IA: NORMAL
HBV SURFACE AG SERPL QL IA: NORMAL
HCG INTACT+B SERPL-ACNC: 6200 MIU/ML
HCT VFR BLD AUTO: 38.7 % (ref 37–48.5)
HCV AB SERPL QL IA: NORMAL
HGB BLD-MCNC: 12 G/DL (ref 12–16)
HGB S BLD QL SOLY: NEGATIVE
HGB UR QL STRIP: NEGATIVE
HIV 1+2 AB+HIV1 P24 AG SERPL QL IA: NORMAL
IMM GRANULOCYTES # BLD AUTO: 0.03 K/UL (ref 0–0.04)
IMM GRANULOCYTES NFR BLD AUTO: 0.3 % (ref 0–0.5)
KETONES UR QL STRIP: ABNORMAL
LEUKOCYTE ESTERASE UR QL STRIP: ABNORMAL
LYMPHOCYTES # BLD AUTO: 2.6 K/UL (ref 1–4.8)
LYMPHOCYTES NFR BLD: 22.7 % (ref 18–48)
MCH RBC QN AUTO: 24.6 PG (ref 27–31)
MCHC RBC AUTO-ENTMCNC: 31 G/DL (ref 32–36)
MCV RBC AUTO: 79 FL (ref 82–98)
MICROSCOPIC COMMENT: ABNORMAL
MONOCYTES # BLD AUTO: 0.5 K/UL (ref 0.3–1)
MONOCYTES NFR BLD: 4.7 % (ref 4–15)
NEUTROPHILS # BLD AUTO: 8.2 K/UL (ref 1.8–7.7)
NEUTROPHILS NFR BLD: 71.6 % (ref 38–73)
NITRITE UR QL STRIP: NEGATIVE
NRBC BLD-RTO: 0 /100 WBC
PH UR STRIP: 7 [PH] (ref 5–8)
PLATELET # BLD AUTO: 293 K/UL (ref 150–450)
PMV BLD AUTO: 10.9 FL (ref 9.2–12.9)
PROT UR QL STRIP: NEGATIVE
PROT UR-MCNC: 11 MG/DL (ref 0–15)
PROT/CREAT UR: 0.05 MG/G{CREAT} (ref 0–0.2)
RBC # BLD AUTO: 4.88 M/UL (ref 4–5.4)
RBC #/AREA URNS HPF: 2 /HPF (ref 0–4)
SP GR UR STRIP: 1.01 (ref 1–1.03)
SPECIMEN OUTDATE: NORMAL
SQUAMOUS #/AREA URNS HPF: 4 /HPF
URN SPEC COLLECT METH UR: ABNORMAL
WBC # BLD AUTO: 11.45 K/UL (ref 3.9–12.7)
WBC #/AREA URNS HPF: 8 /HPF (ref 0–5)

## 2024-02-29 PROCEDURE — 86592 SYPHILIS TEST NON-TREP QUAL: CPT | Performed by: ADVANCED PRACTICE MIDWIFE

## 2024-02-29 PROCEDURE — 3008F BODY MASS INDEX DOCD: CPT | Mod: CPTII,S$GLB,, | Performed by: PSYCHOLOGIST

## 2024-02-29 PROCEDURE — 86762 RUBELLA ANTIBODY: CPT | Performed by: ADVANCED PRACTICE MIDWIFE

## 2024-02-29 PROCEDURE — 1159F MED LIST DOCD IN RCRD: CPT | Mod: CPTII,S$GLB,, | Performed by: PSYCHOLOGIST

## 2024-02-29 PROCEDURE — 81025 URINE PREGNANCY TEST: CPT | Mod: S$GLB,,, | Performed by: ADVANCED PRACTICE MIDWIFE

## 2024-02-29 PROCEDURE — 99214 OFFICE O/P EST MOD 30 MIN: CPT | Mod: S$GLB,,, | Performed by: PSYCHOLOGIST

## 2024-02-29 PROCEDURE — 3079F DIAST BP 80-89 MM HG: CPT | Mod: CPTII,S$GLB,, | Performed by: PSYCHOLOGIST

## 2024-02-29 PROCEDURE — 87624 HPV HI-RISK TYP POOLED RSLT: CPT | Performed by: ADVANCED PRACTICE MIDWIFE

## 2024-02-29 PROCEDURE — 3078F DIAST BP <80 MM HG: CPT | Mod: CPTII,S$GLB,, | Performed by: ADVANCED PRACTICE MIDWIFE

## 2024-02-29 PROCEDURE — 99999 PR PBB SHADOW E&M-EST. PATIENT-LVL II: CPT | Mod: PBBFAC,,, | Performed by: PSYCHOLOGIST

## 2024-02-29 PROCEDURE — 85660 RBC SICKLE CELL TEST: CPT | Performed by: ADVANCED PRACTICE MIDWIFE

## 2024-02-29 PROCEDURE — 99214 OFFICE O/P EST MOD 30 MIN: CPT | Mod: S$GLB,,, | Performed by: ADVANCED PRACTICE MIDWIFE

## 2024-02-29 PROCEDURE — 99999 PR PBB SHADOW E&M-EST. PATIENT-LVL III: CPT | Mod: PBBFAC,,, | Performed by: ADVANCED PRACTICE MIDWIFE

## 2024-02-29 PROCEDURE — 84702 CHORIONIC GONADOTROPIN TEST: CPT | Performed by: ADVANCED PRACTICE MIDWIFE

## 2024-02-29 PROCEDURE — 86901 BLOOD TYPING SEROLOGIC RH(D): CPT | Performed by: ADVANCED PRACTICE MIDWIFE

## 2024-02-29 PROCEDURE — 87389 HIV-1 AG W/HIV-1&-2 AB AG IA: CPT | Performed by: ADVANCED PRACTICE MIDWIFE

## 2024-02-29 PROCEDURE — 3044F HG A1C LEVEL LT 7.0%: CPT | Mod: CPTII,S$GLB,, | Performed by: ADVANCED PRACTICE MIDWIFE

## 2024-02-29 PROCEDURE — 3008F BODY MASS INDEX DOCD: CPT | Mod: CPTII,S$GLB,, | Performed by: ADVANCED PRACTICE MIDWIFE

## 2024-02-29 PROCEDURE — 88175 CYTOPATH C/V AUTO FLUID REDO: CPT | Performed by: ADVANCED PRACTICE MIDWIFE

## 2024-02-29 PROCEDURE — 84156 ASSAY OF PROTEIN URINE: CPT | Performed by: ADVANCED PRACTICE MIDWIFE

## 2024-02-29 PROCEDURE — 80074 ACUTE HEPATITIS PANEL: CPT | Performed by: ADVANCED PRACTICE MIDWIFE

## 2024-02-29 PROCEDURE — 3074F SYST BP LT 130 MM HG: CPT | Mod: CPTII,S$GLB,, | Performed by: PSYCHOLOGIST

## 2024-02-29 PROCEDURE — 81000 URINALYSIS NONAUTO W/SCOPE: CPT | Performed by: ADVANCED PRACTICE MIDWIFE

## 2024-02-29 PROCEDURE — 3075F SYST BP GE 130 - 139MM HG: CPT | Mod: CPTII,S$GLB,, | Performed by: ADVANCED PRACTICE MIDWIFE

## 2024-02-29 PROCEDURE — 87491 CHLMYD TRACH DNA AMP PROBE: CPT | Performed by: ADVANCED PRACTICE MIDWIFE

## 2024-02-29 PROCEDURE — 85025 COMPLETE CBC W/AUTO DIFF WBC: CPT | Performed by: ADVANCED PRACTICE MIDWIFE

## 2024-02-29 PROCEDURE — 3044F HG A1C LEVEL LT 7.0%: CPT | Mod: CPTII,S$GLB,, | Performed by: PSYCHOLOGIST

## 2024-02-29 RX ORDER — FOLIC ACID 1 MG/1
1 TABLET ORAL DAILY
Qty: 100 TABLET | Refills: 3 | Status: SHIPPED | OUTPATIENT
Start: 2024-02-29 | End: 2024-03-13

## 2024-02-29 RX ORDER — PROMETHAZINE HYDROCHLORIDE 12.5 MG/1
12.5 TABLET ORAL 4 TIMES DAILY
Qty: 30 TABLET | Refills: 2 | Status: SHIPPED | OUTPATIENT
Start: 2024-02-29 | End: 2024-05-24

## 2024-02-29 RX ORDER — ONDANSETRON 4 MG/1
4 TABLET, FILM COATED ORAL DAILY PRN
Qty: 30 TABLET | Refills: 1 | Status: SHIPPED | OUTPATIENT
Start: 2024-02-29 | End: 2024-03-13 | Stop reason: SDUPTHER

## 2024-02-29 NOTE — PROGRESS NOTES
"Outpatient Psychiatry Follow-Up Visit    2/29/2024      Chief Complaint:  Anthony Sexton is a 33 y.o. female who presents today for follow-up of anxiety and inattention/distractibility .       Impressions/Plan from last visit: Anthony attended her virtual visit. Tomorrow is her birthday--special plans.She has been on Wellbutrin  mg--thinks that she has been doing fine, except around her cycle. She said that the first day or so on her cycle, she struggles with anxiety at bedtime. She gets to where she can't relax. It only lasts for the day before and the next day--we discussed options and agreed on using Xanax for those two nights. She denied having any side effects with Wellbutrin--she is seeing Dr. Bartlett for therapy. Work has been good--had been stressful but a little better now. Her daughter recently started school--she has ADHD and started on Methylin; then switched to Focalin. Her anxiety increased--last year was very stressful. She is currently on Strattera, which has helped some (but not as good as the Focalin). Her daughter attends [employer] in 1st grade. Anthony has been more stressed with concern on how her daughter is doing with school now with her symptoms. See plan below.    Plan--continue Wellbutrin  mg; add Xanax 0.5 mg #30 per 90 days    Interval History and Content of Current Session: Anthony attended her visit. She was last seen by me in August of 2023. She is about 8 weeks pregnant--not planned but a good thing. She has not been on her medicine for about 4 months. She does have a history of postpartum but has good support around her. She has been tired the last two months--we talked briefly about breathing through increased anxiety--and doing something physical to "re-set her system." She sees Dr. Bartlett regularly. See plan below.    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        Prior medicines: " buspirone, Prozac, Klonopin, Lexapro, Celexa, Zoloft, hydroxyzine, Adderall XR; Effexor (she thinks); Ativan, Savella, Cymbalta, Ativan    Therapist: Barbra Bartlett, PhD        8/4/2023     7:26 AM 5/5/2023     8:35 AM 2/23/2023     3:34 PM   GAD7   1. Feeling nervous, anxious, or on edge? 1 1 1   2. Not being able to stop or control worrying? 1 1 1   3. Worrying too much about different things? 1 1 1   4. Trouble relaxing? 0 1 2   5. Being so restless that it is hard to sit still? 0 1 1   6. Becoming easily annoyed or irritable? 0 1 1   7. Feeling afraid as if something awful might happen? 1 1 1   SENTHIL-7 Score 4 7 8      0-4 = Minimal anxiety  5-9 = Mild anxiety  10-14 = Moderate anxiety  15-21 = Severe anxiety       Review of Systems   PSYCHIATRIC: Pertinant items are noted in the narrative.    Past Medical, Family and Social History: The patient's past medical, family and social history have been reviewed and updated as appropriate within the electronic medical record - see encounter notes.      Current Outpatient Medications:     ergocalciferol (VITAMIN D2) 50,000 unit Cap, Take 1 capsule (50,000 Units total) by mouth twice a week. (Patient taking differently: Take 50,000 Units by mouth once a week.), Disp: 8 capsule, Rfl: 11    folic acid (FOLVITE) 1 MG tablet, Take 1 tablet (1 mg total) by mouth once daily., Disp: 100 tablet, Rfl: 3    ondansetron (ZOFRAN) 4 MG tablet, Take 1 tablet (4 mg total) by mouth daily as needed for Nausea., Disp: 30 tablet, Rfl: 1    PNV,calcium 72-iron-folic acid (PRENATAL VITAMIN PLUS LOW IRON) 27 mg iron- 1 mg Tab, Take 1 tablet (1 each total) by mouth once daily., Disp: 30 tablet, Rfl: 11    prenatal vit no.124/iron/folic (PRENATAL VITAMIN ORAL), Take by mouth., Disp: , Rfl:     promethazine (PHENERGAN) 12.5 MG Tab, Take 1 tablet (12.5 mg total) by mouth 4 (four) times daily., Disp: 30 tablet, Rfl: 2    Compliance: yes    Side effects: see above    Risk Parameters:  Patient reports  "no suicidal ideation  Patient reports no homicidal ideation  Patient reports no self-injurious behavior  Patient reports no violent behavior    Exam (detailed: at least 9 elements; comprehensive: all 15 elements)   Constitutional  Vitals:  Most recent vital signs were reviewed.   Last 3 sets of Vitals        2/16/2024     8:51 AM 2/29/2024     7:48 AM 2/29/2024    11:22 AM   Vitals - 1 value per visit   SYSTOLIC 104 138 120   DIASTOLIC 70 74 81   Pulse 90  81   Temp 97.7 °F (36.5 °C)     Resp 18     SPO2 97 %     Weight (lb) 275.13 279.76 279.76   Weight (kg) 124.8 126.9 126.9   Height 5' 5" (1.651 m) 5' 5.5" (1.664 m)    BMI (Calculated) 45.8 45.8 45.8   Pain Score Zero Four           General:  age appropriate, neatly groomed, wearing glasses, scrubs     Musculoskeletal  Muscle Strength/Tone:  no tremor, no tic   Gait & Station:  Non-ataxic     Psychiatric  Speech:  no latency; no press   Behavior: wnl   Mood & Affect:  euthymic, a little anxious with pregnancy  congruent and appropriate   Thought Process:  normal and logical   Associations:  intact   Thought Content:  normal, no suicidality, no homicidality, delusions, or paranoia   Insight:  has awareness of illness   Judgement: behavior is adequate to circumstances   Orientation:  grossly intact   Memory: intact for content of interview   Language: grossly intact   Attention Span & Concentration:  Grossly intact   Fund of Knowledge:  intact and appropriate to age and level of education     Assessment and Diagnosis   Status/Progress: Based on the examination today, the patient's problem(s) is/are adequately but not ideally controlled.  New problems have been presented today.   Co-morbidities and psychosocial stressors  are complicating management of the primary condition.  There are no active rule-out diagnoses for this patient at this time.     General Impression:     Encounter Diagnoses   Name Primary?    History of postpartum depression Yes    History of ADHD  "    Anxiety        Intervention/Counseling/Treatment Plan   Medication Management: Discussed risks, benefits, and alternatives to treatment plan documented above with patient. I answered all patient questions related to this plan, and patient expressed understanding and agreement.   No meds  Continue therapy as needed      Medication List with Changes/Refills   Current Medications    ERGOCALCIFEROL (VITAMIN D2) 50,000 UNIT CAP    Take 1 capsule (50,000 Units total) by mouth twice a week.    FOLIC ACID (FOLVITE) 1 MG TABLET    Take 1 tablet (1 mg total) by mouth once daily.    ONDANSETRON (ZOFRAN) 4 MG TABLET    Take 1 tablet (4 mg total) by mouth daily as needed for Nausea.    PNV,CALCIUM 72-IRON-FOLIC ACID (PRENATAL VITAMIN PLUS LOW IRON) 27 MG IRON- 1 MG TAB    Take 1 tablet (1 each total) by mouth once daily.    PRENATAL VIT NO.124/IRON/FOLIC (PRENATAL VITAMIN ORAL)    Take by mouth.    PROMETHAZINE (PHENERGAN) 12.5 MG TAB    Take 1 tablet (12.5 mg total) by mouth 4 (four) times daily.        Return to Clinic:  4 months    Time spent with pt including note preparation: 31 minutes       Kenya Sanchez, PhD, MP  Advanced Practice Medical Psychologist  [facility]  [address] [facility].  [place] [id number]  [phone]   [phone] fax

## 2024-02-29 NOTE — PATIENT INSTRUCTIONS

## 2024-02-29 NOTE — PATIENT INSTRUCTIONS
Patient Education       Pregnancy - The Second Month   About this topic   It is important for you to learn how to take care of yourself to help you have a healthy baby and safe delivery. It is good to have health care throughout your pregnancy.  The second month of your pregnancy starts around week 5 and lasts through week 9. By knowing how far along you are, you can learn what is normal for this stage of your pregnancy and plan for what is next.  General   Your Body   During the second month of your pregnancy, here are some things you can expect.  There are more hormones active in your body and because of them, you may:  Have morning sickness or have an upset stomach at other times throughout the day  Feel just a little tired or need long naps each day  Go to the bathroom more often, even at night  Have tender or swollen breasts  Feel more emotional  Have color changes in nipples and areola  Have a brownish color over your forehead, temples, cheeks, and/or lips. This is melasma.  Your babys growth and development:  Your baby starts to develop organs like the brain, heart, liver, and lungs. Arms, legs, eyes, and ears are all starting to grow.  The heart beats about 150 times each minute by 6 weeks. Your doctor may be able to hear the heartbeat with a special tool by the end of this month.  Your baby has facial features, moves, and wiggles, but you won't be able to feel anything yet.  Your baby is about 1 inch (2.5 cm) long and is about the size of a blueberry.  Things to Think About   Avoid alcohol, drugs, tobacco products, and second hand smoke  Check with your doctor before taking any kind of drugs.  Ask about taking a vitamin. Take at least 400 micrograms of folic acid each day to help prevent some birth defects.  Making appointments with the doctor who will take care of you and your baby while you are pregnant.  Learn about what kinds of screening tests are offered while you are pregnant.  Is there anything I  need to do more or less of while I am pregnant?  Learn about which over the counter products are safe to use and take while you are pregnant.  Be sure to eat fresh, healthy foods while you are pregnant. Learn what foods are more likely to make you sick or have things that could harm your baby.  When do I need to call the doctor?   Not able to stop throwing up  Belly pain or cramps that keeps you from eating or sleeping  Period-like bleeding  Where can I learn more?   American Academy of Family Physicians  https://familydoctor.org/changes-in-your-body-during-pregnancy-first-trimester/   American Pregnancy Association  https://americanpregnancy.org/planning/first-prenatal-visit/   Last Reviewed Date   2020-04-20  Consumer Information Use and Disclaimer   This information is not specific medical advice and does not replace information you receive from your health care provider. This is only a brief summary of general information. It does NOT include all information about conditions, illnesses, injuries, tests, procedures, treatments, therapies, discharge instructions or life-style choices that may apply to you. You must talk with your health care provider for complete information about your health and treatment options. This information should not be used to decide whether or not to accept your health care providers advice, instructions or recommendations. Only your health care provider has the knowledge and training to provide advice that is right for you.  Copyright   Copyright © 2021 [employer] and its affiliates and/or licensors. All rights reserved.

## 2024-02-29 NOTE — PROGRESS NOTES
CC: Absence of menses risk assessment Tyesha Torres 22    Anthony Sexton is a 33 y.o. female  presents with complaint of absence of menstruation.  She reports nausea/vomIting/  UPT is positive.    Menstrual History  Menarche 11, length 5, cycle 1-2 months  LMP 2023, EDC 10/05/2024, therefore 8+ 3    Past Medical History:   Diagnosis Date    Abnormal Pap smear of cervix     Anemia     Anxiety     Chronic low back pain     Depression     Enthesitis related arthritis 2023    Fibromyalgia     GERD (gastroesophageal reflux disease)     History of abnormal cervical Pap smear     History of recurrent UTIs     History of ulcer disease     Irregular menses     Obesity     SAVANNA (obstructive sleep apnea) 2023    Postpartum depression     with last pregnancy.     Pre-eclampsia in postpartum period 2020    Vitamin D deficiency      Past Surgical History:   Procedure Laterality Date    ESOPHAGOGASTRODUODENOSCOPY N/A 2021    Procedure: EGD (ESOPHAGOGASTRODUODENOSCOPY);  Surgeon: Alka Olmstead MD;  Location: [facility];  Service: Endoscopy;  Laterality: N/A;    ESOPHAGOGASTRODUODENOSCOPY N/A 10/04/2021    Procedure: EGD (ESOPHAGOGASTRODUODENOSCOPY);  Surgeon: Alka Olmstead MD;  Location: [facility];  Service: Endoscopy;  Laterality: N/A;    ESOPHAGOGASTRODUODENOSCOPY N/A 2023    Procedure: EGD (ESOPHAGOGASTRODUODENOSCOPY);  Surgeon: Alka Olmstead MD;  Location: [facility];  Service: Endoscopy;  Laterality: N/A;    PH MONITORING, ESOPHAGUS, WIRELESS, (OFF REFLUX MEDS) N/A 2023    Procedure: PH MONITORING, ESOPHAGUS, WIRELESS, (OFF REFLUX MEDS);  Surgeon: Alka Olmstead MD;  Location: [facility];  Service: Endoscopy;  Laterality: N/A;    REDUCTION OF BOTH BREASTS Bilateral 2021    Procedure: MAMMOPLASTY, REDUCTION, BILATERAL;  Surgeon: Daniel Ba MD;  Location: [facility] OR [address] [place] [address];  Service: Plastics;  Laterality: Bilateral;  Left breast tissue:  1584  Right breast tissue: 1438    SEPTOPLASTY, NOSE, WITH NASAL TURBINATE REDUCTION Bilateral 01/17/2024    Procedure: SEPTOPLASTY, NOSE, WITH NASAL TURBINATE REDUCTION;  Surgeon: Corey Lucio MD;  Location[facility];  Service: ENT;  Laterality: Bilateral;     Social History     Socioeconomic History    Marital status:     Number of children: 0   Occupational History    Occupation: RN     Comment: [employer]   Tobacco Use    Smoking status: Never    Smokeless tobacco: Never   Substance and Sexual Activity    Alcohol use: Not Currently     Alcohol/week: 0.0 standard drinks of alcohol    Drug use: No    Sexual activity: Yes     Partners: Male     Comment: Not interested in having further kids   Social History Narrative    She wears seatbelt.      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    2 kids.      Social Determinants of Health     Financial Resource Strain: Low Risk  (11/13/2023)    Overall Financial Resource Strain (CARDIA)     Difficulty of Paying Living Expenses: Not hard at all   Food Insecurity: No Food Insecurity (11/13/2023)    Hunger Vital Sign     Worried About Running Out of Food in the Last Year: Never true     Ran Out of Food in the Last Year: Never true   Transportation Needs: No Transportation Needs (11/13/2023)    PRAPARE - Transportation     Lack of Transportation (Medical): No     Lack of Transportation (Non-Medical): No   Physical Activity: Inactive (2/16/2024)    Exercise Vital Sign     Days of Exercise per Week: 0 days     Minutes of Exercise per Session: 0 min   Stress: Stress Concern Present (11/13/2023)    [employer] [place] [employer] - Occupational Stress Questionnaire     Feeling of Stress : To some extent   Social Connections: Unknown (11/13/2023)    Social Connection and Isolation Panel [NHANES]     Frequency of Communication with Friends and Family: More than three times a week     Frequency of Social Gatherings with  "Friends and Family: More than three times a week     Active Member of Clubs or Organizations: Yes     Attends Club or Organization Meetings: Never     Marital Status:    Housing Stability: Low Risk  (2023)    Housing Stability Vital Sign     Unable to Pay for Housing in the Last Year: No     Number of Places Lived in the Last Year: 1     Unstable Housing in the Last Year: No     Family History   Problem Relation Age of Onset    Diabetes Maternal Grandmother     No Known Problems Maternal Grandfather     Hypertension Father     Hyperlipidemia Father     Heart disease Father 55        MI/CAD    Diabetes Mother     Asthma Mother     Hyperlipidemia Mother     Hypertension Mother     No Known Problems Brother     No Known Problems Sister     Insulin resistance Sister     No Known Problems Daughter     Diabetes Maternal Aunt     Breast cancer Maternal Aunt     Stroke Neg Hx     Brain cancer Neg Hx     Migraines Neg Hx     Colon cancer Neg Hx     Ovarian cancer Neg Hx     Anesthesia problems Neg Hx     Thrombosis Neg Hx      OB History    Para Term  AB Living   3 2 2 0 0 2   SAB IAB Ectopic Multiple Live Births   0 0 0 0 2      # Outcome Date GA Lbr [place]/2nd Weight Sex Delivery Anes PTL Lv   3 Current            2 Term 20 38w2d 07:44 / 00:06 3.14 kg (6 lb 14.8 oz) F Vag-Spont EPI N DANIELLE   1 Term 16 37w0d  3.289 kg (7 lb 4 oz) F Vag-Spont EPI  DANIELLE      Complications: Severe hyperemesis gravidarum       /74   Ht 5' 5.5" (1.664 m)   Wt 126.9 kg (279 lb 12.2 oz)   LMP 2023 (Exact Date)   BMI 45.85 kg/m²         ROS:  GENERAL: Denies weight gain or weight loss. Feeling well overall.   SKIN: Denies rash or lesions.   HEAD: Denies head injury or headache.   NODES: Denies enlarged lymph nodes.   CHEST: Denies chest pain or shortness of breath.   CARDIOVASCULAR: Denies palpitations or left sided chest pain.   ABDOMEN: No abdominal pain, constipation, diarrhea, nausea, vomiting or " rectal bleeding.   URINARY: No frequency, dysuria, hematuria, or burning on urination.  REPRODUCTIVE: See HPI.   BREASTS: The patient performs breast self-examination and denies pain, lumps, or nipple discharge.   HEMATOLOGIC: No easy bruisability or excessive bleeding.   MUSCULOSKELETAL: Denies joint pain or swelling.   NEUROLOGIC: Denies syncope or weakness.   PSYCHIATRIC: Denies depression, anxiety or mood swings.    PE:   APPEARANCE: Well nourished, well developed, in no acute distress.  AFFECT: WNL, alert and oriented x 3.  SKIN: No acne or hirsutism.  NECK: Neck symmetric without masses or thyromegaly.  NODES: No inguinal, cervical, axillary or femoral lymph node enlargement.  CHEST: Good respiratory effort.   ABDOMEN: Soft. No tenderness or masses. No hepatosplenomegaly. No hernias.  BREASTS: Symmetrical, no skin changes or visible lesions. No palpable masses, nipple discharge bilaterally.  PELVIC: Normal external female genitalia without lesions. Normal hair distribution. Adequate perineal body, normal urethral meatus. Vagina moist and well rugated without lesions or discharge. Cervix pink, without lesions, discharge or tenderness. No significant cystocele or rectocele. Bimanual exam shows uterus is difficult to appreciate but probably first-trimester, regular, mobile and nontender. Adnexa without masses or tenderness.  EXTREMITIES: No edema.    ASSESSMENT and PLAN:  33-year-old  3 para 2 with secondary amenorrhea and positive UPT.    LMP 2023 yielding EDC 10/05/2024 therefore 8 weeks and 3 days.    Prenatal lab today.    GC chlamydia today.    Last Pap 10/31/2022 was negative but did have a recent colposcopy with biopsy obtained at 11:00 a.m. secondary to focal mild dysplasia and benign ECC-will repeat today.    Chronic hypertension-no medication with history of postpartum preeclampsia.  Will add PCR to blood work.    BMI is 45-recent A1c was 5.6-pre diabetic advise diabetic diet.     Complaining of nausea and vomiting-prescription for Zofran and Phenergan is taken with direction.    Prescription for prenatal vitamins and folic acid 1 mg is also sent.    History of vitamin-D deficiency taking 50,000 IU per week advised to discontinue and replaced with 1-2000 IU daily    Generalized anxiety-no medication followed by Dr. Sanchez and has appointment today  First-trimester expectations-information provided return to office 4 ultrasound new OB next available               Information for review  -      Based on A.C.S. Pap guidelines, recommendation yearly pelvic exams, mammograms and monthly self breast exams; to see her PCP for other health maintenance and pregnancy.   -      Patient's medications and medical history reviewed with patient and implications in pregnancy.   -      Pregnancy course, visits every 4 weeks until 28 weeks, every 2 weeks until 36 weeks and weekly until delivery. Ultra sound for dates in 1st trimester, anatomy ultrasound at 20-22 weeks and growth ulrasound at 36 weeks. This could be changed according to other pregnancy developments.  .Proper weight gain based on the [place] [employer]'s recommendations based on her pre-pregnancy weight.   Foods to avoid in pregnancy (i.e. sushi, fish that are high in mercury, deli meat, and unpasteurized cheeses).   Advise prenatal vitamin options (i.e. stool softener, DHA).   Discussed potential medical problems in pregnancy.  -     Oriented to practice including CNM collaboration.

## 2024-03-01 ENCOUNTER — OFFICE VISIT (OUTPATIENT)
Dept: CARDIOLOGY | Facility: CLINIC | Age: 34
End: 2024-03-01
Payer: COMMERCIAL

## 2024-03-01 ENCOUNTER — HOSPITAL ENCOUNTER (OUTPATIENT)
Dept: CARDIOLOGY | Facility: HOSPITAL | Age: 34
Discharge: HOME | End: 2024-03-01
Attending: INTERNAL MEDICINE
Payer: COMMERCIAL

## 2024-03-01 VITALS
OXYGEN SATURATION: 99 % | DIASTOLIC BLOOD PRESSURE: 70 MMHG | HEART RATE: 97 BPM | WEIGHT: 279.56 LBS | BODY MASS INDEX: 46.58 KG/M2 | HEIGHT: 65 IN | RESPIRATION RATE: 16 BRPM | SYSTOLIC BLOOD PRESSURE: 124 MMHG

## 2024-03-01 DIAGNOSIS — I10 ESSENTIAL HYPERTENSION: Primary | ICD-10-CM

## 2024-03-01 DIAGNOSIS — E66.01 MORBID OBESITY WITH BMI OF 45.0-49.9, ADULT: ICD-10-CM

## 2024-03-01 DIAGNOSIS — Z3A.08 8 WEEKS GESTATION OF PREGNANCY: ICD-10-CM

## 2024-03-01 DIAGNOSIS — I10 ESSENTIAL HYPERTENSION: ICD-10-CM

## 2024-03-01 DIAGNOSIS — R06.02 SOB (SHORTNESS OF BREATH): ICD-10-CM

## 2024-03-01 DIAGNOSIS — G47.33 OSA (OBSTRUCTIVE SLEEP APNEA): ICD-10-CM

## 2024-03-01 LAB
OHS QRS DURATION: 74 MS
OHS QTC CALCULATION: 409 MS
RPR SER QL: NORMAL
RUBV IGG SER-ACNC: 33.6 IU/ML
RUBV IGG SER-IMP: REACTIVE

## 2024-03-01 PROCEDURE — 3044F HG A1C LEVEL LT 7.0%: CPT | Mod: CPTII,S$GLB,, | Performed by: INTERNAL MEDICINE

## 2024-03-01 PROCEDURE — 99204 OFFICE O/P NEW MOD 45 MIN: CPT | Mod: S$GLB,,, | Performed by: INTERNAL MEDICINE

## 2024-03-01 PROCEDURE — 3078F DIAST BP <80 MM HG: CPT | Mod: CPTII,S$GLB,, | Performed by: INTERNAL MEDICINE

## 2024-03-01 PROCEDURE — 99999 PR PBB SHADOW E&M-EST. PATIENT-LVL III: CPT | Mod: PBBFAC,,, | Performed by: INTERNAL MEDICINE

## 2024-03-01 PROCEDURE — 93005 ELECTROCARDIOGRAM TRACING: CPT

## 2024-03-01 PROCEDURE — 1160F RVW MEDS BY RX/DR IN RCRD: CPT | Mod: CPTII,S$GLB,, | Performed by: INTERNAL MEDICINE

## 2024-03-01 PROCEDURE — 93010 ELECTROCARDIOGRAM REPORT: CPT | Mod: ,,, | Performed by: STUDENT IN AN ORGANIZED HEALTH CARE EDUCATION/TRAINING PROGRAM

## 2024-03-01 PROCEDURE — 3074F SYST BP LT 130 MM HG: CPT | Mod: CPTII,S$GLB,, | Performed by: INTERNAL MEDICINE

## 2024-03-01 PROCEDURE — 3008F BODY MASS INDEX DOCD: CPT | Mod: CPTII,S$GLB,, | Performed by: INTERNAL MEDICINE

## 2024-03-01 PROCEDURE — 1159F MED LIST DOCD IN RCRD: CPT | Mod: CPTII,S$GLB,, | Performed by: INTERNAL MEDICINE

## 2024-03-01 NOTE — PROGRESS NOTES
[facility] Psych  Psychology  Progress Note  Individual Psychotherapy (PhD/LCSW)    Patient Name: Anthony Sexton  MRN: [id number]    Patient Class: OP- Hospital Outpatient Clinic  Primary Care Provider: Tyesha Leggett MD    Psychiatry Initial Visit (PhD/LCSW)  Individual Psychotherapy - CPT 92156  EAP #4 for 2024    Date: 2/26/2024    Site: [place]    The patient location is: Patient's home/ Patient reported that his/her location at the time of this visit was in the [facility]     Visit type: Virtual visit with synchronous audio and video     Each patient to whom he or she provides medical services by telemedicine is: (1) informed of the relationship between the physician and patient and the respective role of any other health care provider with respect to management of the patient; and (2) notified that he or she may decline to receive medical services by telemedicine and may withdraw from such care at any time.     Referral source: Lenora Walters MD    Clinical status of patient: Outpatient    Anthony Sexton, a 33 y.o. female, for initial evaluation visit.  Met with patient.    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     Substance  "use:   Alcohol: none   Drugs: none   Tobacco: none   Caffeine: none    Current medications and drug reactions (include OTC, herbal): see medication list     Strengths and liabilities: Strength: Patient accepts guidance/feedback, Strength: Patient is expressive/articulate., Strength: Patient is intelligent., Strength: Patient is motivated for change., Strength: Patient has positive support network., Strength: Patient has reasonable judgment., Strength: Patient is stable., Liability: Patient lacks coping skills.    Current Evaluation:     Mental Status Exam:  General Appearance:  unremarkable, age appropriate   Speech: normal tone, normal rate, normal pitch, normal volume      Level of Cooperation: cooperative      Thought Processes: normal and logical   Mood: anxious, sad      Thought Content: normal, no suicidality, no homicidality, delusions, or paranoia   Affect: congruent and appropriate   Orientation: Oriented x3   Memory: recent >  intact   Attention Span & Concentration: intact   Fund of General Knowledge: intact and appropriate to age and level of education   Abstract Reasoning: not assessed   Judgment & Insight: fair     Language  intact     Summary: The patient received surprising news that she is having a hard time accepting. This news triggered trauma and ignited fears of "messing up" her current state of "finally feeling normal." We compiled a list of supports to remind the patient that she is not alone.    Diagnostic Impression - Plan:       ICD-10-CM ICD-9-CM   1. Undiagnosed disease or syndrome present  R69 799.9                                               Plan:individual psychotherapy    Return to Clinic: as scheduled    Length of Service (minutes): 45          Barbra Bartlett, PhD  Psychologist  [facility]                      "

## 2024-03-01 NOTE — PROGRESS NOTES
Subjective:   Patient ID:  Anthony Sexton is a 33 y.o. female who presents for follow-up of No chief complaint on file.  Pt with SOB/TELLEZ x 4 weeks. Pt with R sided CP, dull aching, lasting  a few minutes. Pt also with occasional palpitations.  Sx everyday. EKG wnl limits.Pt with fatigue. Pt is IUP 8 weeks. Pts 3rd child no previous sx.    Shortness of Breath  This is a new problem. The current episode started 1 to 4 weeks ago. The problem occurs intermittently. The problem has been waxing and waning. Associated symptoms include chest pain. Pertinent negatives include no leg swelling. Nothing aggravates the symptoms. The patient has no known risk factors for DVT/PE. She has tried nothing for the symptoms. The treatment provided mild relief.   Chest Pain   This is a new problem. The current episode started 1 to 4 weeks ago. The problem occurs intermittently. The problem has been waxing and waning. The pain is present in the lateral region. The pain is mild. The quality of the pain is described as dull. The pain does not radiate. Associated symptoms include palpitations and shortness of breath. Pertinent negatives include no dizziness.   Pertinent negatives for past medical history include no muscle weakness.   Palpitations   This is a new problem. The current episode started 1 to 4 weeks ago. The problem occurs intermittently. The problem has been waxing and waning. Nothing aggravates the symptoms. Associated symptoms include chest pain and shortness of breath. Pertinent negatives include no dizziness. She has tried bed rest for the symptoms. The treatment provided mild relief. There are no known risk factors.       Review of Systems   Constitutional: Negative. Negative for weight gain.   HENT: Negative.     Eyes: Negative.    Cardiovascular:  Positive for chest pain and palpitations. Negative for leg swelling.   Respiratory:  Positive for shortness of breath.    Endocrine: Negative.    Hematologic/Lymphatic:  Negative.    Skin: Negative.    Musculoskeletal:  Negative for muscle weakness.   Gastrointestinal: Negative.    Genitourinary: Negative.    Neurological: Negative.  Negative for dizziness.   Psychiatric/Behavioral: Negative.     Allergic/Immunologic: Negative.    All other systems reviewed and are negative.    Family History   Problem Relation Age of Onset    Diabetes Maternal Grandmother     No Known Problems Maternal Grandfather     Hypertension Father     Hyperlipidemia Father     Heart disease Father 55        MI/CAD    Diabetes Mother     Asthma Mother     Hyperlipidemia Mother     Hypertension Mother     No Known Problems Brother     No Known Problems Sister     Insulin resistance Sister     No Known Problems Daughter     Diabetes Maternal Aunt     Breast cancer Maternal Aunt     Stroke Neg Hx     Brain cancer Neg Hx     Migraines Neg Hx     Colon cancer Neg Hx     Ovarian cancer Neg Hx     Anesthesia problems Neg Hx     Thrombosis Neg Hx      Past Medical History:   Diagnosis Date    Abnormal Pap smear of cervix     Anemia     Anxiety     Chronic low back pain     Depression     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     Pre-eclampsia in postpartum period 04/23/2020    Vitamin D deficiency      Social History     Socioeconomic History    Marital status:     Number of children: 0   Occupational History    Occupation: RN     Comment: [employer]   Tobacco Use    Smoking status: Never    Smokeless tobacco: Never   Substance and Sexual Activity    Alcohol use: Not Currently     Alcohol/week: 0.0 standard drinks of alcohol    Drug use: No    Sexual activity: Yes     Partners: Male     Comment: Not interested in having  further kids   Social History Narrative    She wears seatbelt.      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    2 kids.      Social Determinants of Health     Financial Resource Strain: Low Risk  (11/13/2023)    Overall Financial Resource Strain (CARDIA)     Difficulty of Paying Living Expenses: Not hard at all   Food Insecurity: No Food Insecurity (11/13/2023)    Hunger Vital Sign     Worried About Running Out of Food in the Last Year: Never true     Ran Out of Food in the Last Year: Never true   Transportation Needs: No Transportation Needs (11/13/2023)    PRAPARE - Transportation     Lack of Transportation (Medical): No     Lack of Transportation (Non-Medical): No   Physical Activity: Inactive (2/16/2024)    Exercise Vital Sign     Days of Exercise per Week: 0 days     Minutes of Exercise per Session: 0 min   Stress: Stress Concern Present (11/13/2023)    [employer] [place] [employer] - Occupational Stress Questionnaire     Feeling of Stress : To some extent   Social Connections: Unknown (11/13/2023)    Social Connection and Isolation Panel [NHANES]     Frequency of Communication with Friends and Family: More than three times a week     Frequency of Social Gatherings with Friends and Family: More than three times a week     Active Member of Clubs or Organizations: Yes     Attends Club or Organization Meetings: Never     Marital Status:    Housing Stability: Low Risk  (11/13/2023)    Housing Stability Vital Sign     Unable to Pay for Housing in the Last Year: No     Number of Places Lived in the Last Year: 1     Unstable Housing in the Last Year: No     Current Outpatient Medications on File Prior to Visit   Medication Sig Dispense Refill    ergocalciferol (VITAMIN D2) 50,000 unit Cap Take 1 capsule (50,000 Units total) by mouth twice a week. (Patient taking differently: Take 50,000 Units by mouth once a week.) 8 capsule 11    folic acid (FOLVITE) 1 MG  tablet Take 1 tablet (1 mg total) by mouth once daily. 100 tablet 3    ondansetron (ZOFRAN) 4 MG tablet Take 1 tablet (4 mg total) by mouth daily as needed for Nausea. 30 tablet 1    PNV,calcium 72-iron-folic acid (PRENATAL VITAMIN PLUS LOW IRON) 27 mg iron- 1 mg Tab Take 1 tablet (1 each total) by mouth once daily. 30 tablet 11    prenatal vit no.124/iron/folic (PRENATAL VITAMIN ORAL) Take by mouth.      promethazine (PHENERGAN) 12.5 MG Tab Take 1 tablet (12.5 mg total) by mouth 4 (four) times daily. 30 tablet 2     No current facility-administered medications on file prior to visit.     Review of patient's allergies indicates:   Allergen Reactions    Procardia [nifedipine]        Objective:     Physical Exam  Vitals and nursing note reviewed.   Constitutional:       Appearance: She is well-developed.   HENT:      Head: Normocephalic and atraumatic.   Eyes:      Conjunctiva/sclera: Conjunctivae normal.      Pupils: Pupils are equal, round, and reactive to light.   Cardiovascular:      Rate and Rhythm: Normal rate and regular rhythm.      Pulses: Intact distal pulses.      Heart sounds: Normal heart sounds.   Pulmonary:      Effort: Pulmonary effort is normal.      Breath sounds: Normal breath sounds.   Abdominal:      General: Bowel sounds are normal.      Palpations: Abdomen is soft.   Musculoskeletal:         General: Normal range of motion.      Cervical back: Normal range of motion and neck supple.   Skin:     General: Skin is warm and dry.   Neurological:      Mental Status: She is alert and oriented to person, place, and time.         Assessment:     1. Essential hypertension    2. Morbid obesity with BMI of 45.0-49.9, adult    3. SAVANNA (obstructive sleep apnea)    4. IUP 8 weeks  5. SOB  Plan:     Essential hypertension    Morbid obesity with BMI of 45.0-49.9, adult    SAVANNA (obstructive sleep apnea)      Echo, holter

## 2024-03-03 LAB
C TRACH DNA SPEC QL NAA+PROBE: NOT DETECTED
N GONORRHOEA DNA SPEC QL NAA+PROBE: NOT DETECTED

## 2024-03-05 ENCOUNTER — TELEPHONE (OUTPATIENT)
Dept: DIABETES | Facility: CLINIC | Age: 34
End: 2024-03-05

## 2024-03-05 ENCOUNTER — PROCEDURE VISIT (OUTPATIENT)
Dept: OBSTETRICS AND GYNECOLOGY | Facility: CLINIC | Age: 34
End: 2024-03-05
Payer: COMMERCIAL

## 2024-03-05 ENCOUNTER — INITIAL PRENATAL (OUTPATIENT)
Dept: OBSTETRICS AND GYNECOLOGY | Facility: CLINIC | Age: 34
End: 2024-03-05
Payer: COMMERCIAL

## 2024-03-05 VITALS
WEIGHT: 276.69 LBS | SYSTOLIC BLOOD PRESSURE: 118 MMHG | BODY MASS INDEX: 46.04 KG/M2 | DIASTOLIC BLOOD PRESSURE: 74 MMHG

## 2024-03-05 DIAGNOSIS — O36.80X0 ENCOUNTER TO DETERMINE FETAL VIABILITY OF PREGNANCY, SINGLE OR UNSPECIFIED FETUS: Primary | ICD-10-CM

## 2024-03-05 DIAGNOSIS — O09.529 ANTEPARTUM MULTIGRAVIDA OF ADVANCED MATERNAL AGE: ICD-10-CM

## 2024-03-05 DIAGNOSIS — Z32.01 POSITIVE PREGNANCY TEST: ICD-10-CM

## 2024-03-05 DIAGNOSIS — R73.03 PREDIABETES: ICD-10-CM

## 2024-03-05 DIAGNOSIS — Z86.59 HISTORY OF POSTPARTUM DEPRESSION, CURRENTLY PREGNANT: ICD-10-CM

## 2024-03-05 DIAGNOSIS — O99.891 HISTORY OF POSTPARTUM DEPRESSION, CURRENTLY PREGNANT: ICD-10-CM

## 2024-03-05 DIAGNOSIS — E66.01 MORBID OBESITY WITH BMI OF 45.0-49.9, ADULT: ICD-10-CM

## 2024-03-05 DIAGNOSIS — I10 ESSENTIAL HYPERTENSION: ICD-10-CM

## 2024-03-05 DIAGNOSIS — R76.8 ANA POSITIVE: ICD-10-CM

## 2024-03-05 PROCEDURE — 99999 PR PBB SHADOW E&M-EST. PATIENT-LVL III: CPT | Mod: PBBFAC,,, | Performed by: ADVANCED PRACTICE MIDWIFE

## 2024-03-05 PROCEDURE — 0500F INITIAL PRENATAL CARE VISIT: CPT | Mod: CPTII,S$GLB,, | Performed by: ADVANCED PRACTICE MIDWIFE

## 2024-03-05 PROCEDURE — 76801 OB US < 14 WKS SINGLE FETUS: CPT | Mod: S$GLB,,, | Performed by: OBSTETRICS & GYNECOLOGY

## 2024-03-06 ENCOUNTER — PATIENT MESSAGE (OUTPATIENT)
Dept: OBSTETRICS AND GYNECOLOGY | Facility: CLINIC | Age: 34
End: 2024-03-06
Payer: COMMERCIAL

## 2024-03-06 PROBLEM — O09.529 ANTEPARTUM MULTIGRAVIDA OF ADVANCED MATERNAL AGE: Status: ACTIVE | Noted: 2024-03-06

## 2024-03-06 LAB
FINAL PATHOLOGIC DIAGNOSIS: NORMAL
Lab: NORMAL

## 2024-03-06 NOTE — PROGRESS NOTES
33 y.o. female  at 6w3d here for initial OB visit  denies VB or cramping  Doing well but is experiencing some nausea    Reviewed prenatal labs  Genetic testing when appropriate  A-Z book given and discussed      Dating US: single viable IUP, embryo grossly WNL with normal cardiac activity. Uterus, cervix and adnexae appear normal. No fluid seen in cul-de-sac. EGA 6w2d with SALVATORE 10/27/24      FHT's 99, will schedule F/U US in 2 weeks      Encounter to determine fetal viability of pregnancy, single or unspecified fetus  -     US OB/GYN Procedure ([facility])-Future; Future; Expected date: 2024    Prediabetes  -     Ambulatory referral/consult to Diabetes Education; Future; Expected date: 2024    History of postpartum depression, currently pregnant  No meds at present    Essential hypertension  ASA daily @ 12-16 weeks  Pre-e labs with T3 labs  Saw Cardio and has echo and Holter   scheduled for 3/18  Morbid obesity with BMI of 45.0-49.9, adult  ASA daily   Growth scans Q 4 weeks  Wkly testing 32 weeks  Lovenox PP    ESDRAS positive    Antepartum multigravida of advanced maternal age  Mat 21 when appropriate     Reviewed warning signs, pregnancy precautions and how/when to call.  RTC x 2 wks, call or present sooner prn.     CC: Absence of menses risk assessment Tyesha Torres 24    Antohny CONN Darshan is a 33 y.o. female  presents with complaint of absence of menstruation.  She reports nausea/vomIting/  UPT is positive.    Menstrual History  Menarche 11, length 5, cycle 1-2 months  LMP 2023, EDC 10/05/2024, therefore 8+ 3    Past Medical History:   Diagnosis Date    Abnormal Pap smear of cervix     Anemia     Anxiety     Chronic low back pain     Depression     Enthesitis related arthritis 2023    Fibromyalgia     GERD (gastroesophageal reflux disease)     History of abnormal cervical Pap smear     History of recurrent UTIs     History of ulcer disease     Irregular menses     Obesity      SAVANNA (obstructive sleep apnea) 03/02/2023    Postpartum depression     with last pregnancy.     Pre-eclampsia in postpartum period 04/23/2020    Vitamin D deficiency      Past Surgical History:   Procedure Laterality Date    ESOPHAGOGASTRODUODENOSCOPY N/A 05/26/2021    Procedure: EGD (ESOPHAGOGASTRODUODENOSCOPY);  Surgeon: Alka Olmstead MD;  Location: [facility];  Service: Endoscopy;  Laterality: N/A;    ESOPHAGOGASTRODUODENOSCOPY N/A 10/04/2021    Procedure: EGD (ESOPHAGOGASTRODUODENOSCOPY);  Surgeon: Alka Olmstead MD;  Location: [facility];  Service: Endoscopy;  Laterality: N/A;    ESOPHAGOGASTRODUODENOSCOPY N/A 05/29/2023    Procedure: EGD (ESOPHAGOGASTRODUODENOSCOPY);  Surgeon: Alka Olmstead MD;  Location: [facility];  Service: Endoscopy;  Laterality: N/A;    PH MONITORING, ESOPHAGUS, WIRELESS, (OFF REFLUX MEDS) N/A 05/29/2023    Procedure: PH MONITORING, ESOPHAGUS, WIRELESS, (OFF REFLUX MEDS);  Surgeon: Alka Olmstead MD;  Location: [facility];  Service: Endoscopy;  Laterality: N/A;    REDUCTION OF BOTH BREASTS Bilateral 05/27/2021    Procedure: MAMMOPLASTY, REDUCTION, BILATERAL;  Surgeon: Daniel Ba MD;  Location: [address];  Service: Plastics;  Laterality: Bilateral;  Left breast tissue: 1584  Right breast tissue: 1438    SEPTOPLASTY, NOSE, WITH NASAL TURBINATE REDUCTION Bilateral 01/17/2024    Procedure: SEPTOPLASTY, NOSE, WITH NASAL TURBINATE REDUCTION;  Surgeon: Corey Lucio MD;  Location: [facility];  Service: ENT;  Laterality: Bilateral;     Social History     Socioeconomic History    Marital status:     Number of children: 0   Occupational History    Occupation: RN     Comment: [employer]   Tobacco Use    Smoking status: Never     Passive exposure: Never    Smokeless tobacco: Never   Substance and Sexual Activity    Alcohol use: Not Currently     Alcohol/week: 0.0 standard drinks of alcohol    Drug use: No    Sexual activity: Yes     Partners: Male      Comment: Not interested in having further kids   Social History Narrative    She wears seatbelt.      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      Social Determinants of Health     Financial Resource Strain: Low Risk  (11/13/2023)    Overall Financial Resource Strain (CARDIA)     Difficulty of Paying Living Expenses: Not hard at all   Food Insecurity: No Food Insecurity (11/13/2023)    Hunger Vital Sign     Worried About Running Out of Food in the Last Year: Never true     Ran Out of Food in the Last Year: Never true   Transportation Needs: No Transportation Needs (11/13/2023)    PRAPARE - Transportation     Lack of Transportation (Medical): No     Lack of Transportation (Non-Medical): No   Physical Activity: Inactive (2/16/2024)    Exercise Vital Sign     Days of Exercise per Week: 0 days     Minutes of Exercise per Session: 0 min   Stress: Stress Concern Present (11/13/2023)    [employer] [place] [employer] - Occupational Stress Questionnaire     Feeling of Stress : To some extent   Social Connections: Unknown (11/13/2023)    Social Connection and Isolation Panel [NHANES]     Frequency of Communication with Friends and Family: More than three times a week     Frequency of Social Gatherings with Friends and Family: More than three times a week     Active Member of Clubs or Organizations: Yes     Attends Club or Organization Meetings: Never     Marital Status:    Housing Stability: Low Risk  (11/13/2023)    Housing Stability Vital Sign     Unable to Pay for Housing in the Last Year: No     Number of Places Lived in the Last Year: 1     Unstable Housing in the Last Year: No     Family History   Problem Relation Age of Onset    Diabetes Maternal Grandmother     No Known Problems Maternal Grandfather     Hypertension Father     Hyperlipidemia Father     Heart disease Father 55        MI/CAD    Diabetes Mother     Asthma Mother     Hyperlipidemia  Mother     Hypertension Mother     No Known Problems Brother     No Known Problems Sister     Insulin resistance Sister     No Known Problems Daughter     Diabetes Maternal Aunt     Breast cancer Maternal Aunt     Stroke Neg Hx     Brain cancer Neg Hx     Migraines Neg Hx     Colon cancer Neg Hx     Ovarian cancer Neg Hx     Anesthesia problems Neg Hx     Thrombosis Neg Hx      OB History    Para Term  AB Living   3 2 2 0 0 2   SAB IAB Ectopic Multiple Live Births   0 0 0 0 2      # Outcome Date GA Lbr Trever/2nd Weight Sex Delivery Anes PTL Lv   3 Current            2 Term 20 38w2d 07:44 / 00:06 3.14 kg (6 lb 14.8 oz) F Vag-Spont EPI N DANIELLE   1 Term 16 37w0d  3.289 kg (7 lb 4 oz) F Vag-Spont EPI  DANIELLE      Complications: Severe hyperemesis gravidarum       /74   Wt 125.5 kg (276 lb 10.8 oz)   LMP 2023 (Exact Date)   BMI 46.04 kg/m²         ROS:  GENERAL: Denies weight gain or weight loss. Feeling well overall.   SKIN: Denies rash or lesions.   HEAD: Denies head injury or headache.   NODES: Denies enlarged lymph nodes.   CHEST: Denies chest pain or shortness of breath.   CARDIOVASCULAR: Denies palpitations or left sided chest pain.   ABDOMEN: No abdominal pain, constipation, diarrhea, nausea, vomiting or rectal bleeding.   URINARY: No frequency, dysuria, hematuria, or burning on urination.  REPRODUCTIVE: See HPI.   BREASTS: The patient performs breast self-examination and denies pain, lumps, or nipple discharge.   HEMATOLOGIC: No easy bruisability or excessive bleeding.   MUSCULOSKELETAL: Denies joint pain or swelling.   NEUROLOGIC: Denies syncope or weakness.   PSYCHIATRIC: Denies depression, anxiety or mood swings.    PE:   APPEARANCE: Well nourished, well developed, in no acute distress.  AFFECT: WNL, alert and oriented x 3.  SKIN: No acne or hirsutism.  NECK: Neck symmetric without masses or thyromegaly.  NODES: No inguinal, cervical, axillary or femoral lymph node  enlargement.  CHEST: Good respiratory effort.   ABDOMEN: Soft. No tenderness or masses. No hepatosplenomegaly. No hernias.  BREASTS: Symmetrical, no skin changes or visible lesions. No palpable masses, nipple discharge bilaterally.  PELVIC: Normal external female genitalia without lesions. Normal hair distribution. Adequate perineal body, normal urethral meatus. Vagina moist and well rugated without lesions or discharge. Cervix pink, without lesions, discharge or tenderness. No significant cystocele or rectocele. Bimanual exam shows uterus is difficult to appreciate but probably first-trimester, regular, mobile and nontender. Adnexa without masses or tenderness.  EXTREMITIES: No edema.    ASSESSMENT and PLAN:  33-year-old  3 para 2 with secondary amenorrhea and positive UPT.    LMP 2023 yielding EDC 10/05/2024 therefore 8 weeks and 3 days.    Prenatal lab today.    GC chlamydia today.    Last Pap 10/31/2022 was negative but did have a recent colposcopy with biopsy obtained at 11:00 a.m. secondary to focal mild dysplasia and benign ECC-will repeat today.    Chronic hypertension-no medication with history of postpartum preeclampsia.  Will add PCR to blood work.    BMI is 45-recent A1c was 5.6-pre diabetic advise diabetic diet.    Complaining of nausea and vomiting-prescription for Zofran and Phenergan is taken with direction.    Prescription for prenatal vitamins and folic acid 1 mg is also sent.    History of vitamin-D deficiency taking 50,000 IU per week advised to discontinue and replaced with 1-2000 IU daily    Generalized anxiety-no medication followed by Dr. Sanchez and has appointment today  First-trimester expectations-information provided return to office 4 ultrasound new OB next available               Information for review  -      Based on A.C.S. Pap guidelines, recommendation yearly pelvic exams, mammograms and monthly self breast exams; to see her PCP for other health maintenance and pregnancy.    -      Patient's medications and medical history reviewed with patient and implications in pregnancy.   -      Pregnancy course, visits every 4 weeks until 28 weeks, every 2 weeks until 36 weeks and weekly until delivery. Ultra sound for dates in 1st trimester, anatomy ultrasound at 20-22 weeks and growth ulrasound at 36 weeks. This could be changed according to other pregnancy developments.  .Proper weight gain based on the [place] [employer]'s recommendations based on her pre-pregnancy weight.   Foods to avoid in pregnancy (i.e. sushi, fish that are high in mercury, deli meat, and unpasteurized cheeses).   Advise prenatal vitamin options (i.e. stool softener, DHA).   Discussed potential medical problems in pregnancy.  -     Oriented to practice including CNM collaboration.

## 2024-03-07 ENCOUNTER — TELEPHONE (OUTPATIENT)
Dept: PSYCHIATRY | Facility: CLINIC | Age: 34
End: 2024-03-07
Payer: COMMERCIAL

## 2024-03-11 ENCOUNTER — HOSPITAL ENCOUNTER (EMERGENCY)
Facility: HOSPITAL | Age: 34
Discharge: HOME | End: 2024-03-11
Attending: EMERGENCY MEDICINE
Payer: COMMERCIAL

## 2024-03-11 ENCOUNTER — PATIENT MESSAGE (OUTPATIENT)
Dept: OBSTETRICS AND GYNECOLOGY | Facility: CLINIC | Age: 34
End: 2024-03-11
Payer: COMMERCIAL

## 2024-03-11 VITALS
WEIGHT: 279 LBS | BODY MASS INDEX: 46.43 KG/M2 | SYSTOLIC BLOOD PRESSURE: 120 MMHG | TEMPERATURE: 98 F | RESPIRATION RATE: 18 BRPM | DIASTOLIC BLOOD PRESSURE: 69 MMHG | OXYGEN SATURATION: 97 % | HEART RATE: 70 BPM

## 2024-03-11 DIAGNOSIS — R00.2 PALPITATIONS: ICD-10-CM

## 2024-03-11 DIAGNOSIS — O21.0 HYPEREMESIS GRAVIDARUM: Primary | ICD-10-CM

## 2024-03-11 LAB
ALBUMIN SERPL BCP-MCNC: 3.5 G/DL (ref 3.5–5.2)
ALP SERPL-CCNC: 78 U/L (ref 55–135)
ALT SERPL W/O P-5'-P-CCNC: 22 U/L (ref 10–44)
ANION GAP SERPL CALC-SCNC: 9 MMOL/L (ref 8–16)
AST SERPL-CCNC: 18 U/L (ref 10–40)
BACTERIA #/AREA URNS HPF: ABNORMAL /HPF
BASOPHILS # BLD AUTO: 0.06 K/UL (ref 0–0.2)
BASOPHILS NFR BLD: 0.5 % (ref 0–1.9)
BILIRUB SERPL-MCNC: 0.4 MG/DL (ref 0.1–1)
BILIRUB UR QL STRIP: NEGATIVE
BUN SERPL-MCNC: 9 MG/DL (ref 6–20)
CALCIUM SERPL-MCNC: 9.2 MG/DL (ref 8.7–10.5)
CHLORIDE SERPL-SCNC: 106 MMOL/L (ref 95–110)
CLARITY UR: ABNORMAL
CO2 SERPL-SCNC: 22 MMOL/L (ref 23–29)
COLOR UR: YELLOW
CREAT SERPL-MCNC: 0.8 MG/DL (ref 0.5–1.4)
DIFFERENTIAL METHOD BLD: ABNORMAL
EOSINOPHIL # BLD AUTO: 0 K/UL (ref 0–0.5)
EOSINOPHIL NFR BLD: 0.2 % (ref 0–8)
ERYTHROCYTE [DISTWIDTH] IN BLOOD BY AUTOMATED COUNT: 14.6 % (ref 11.5–14.5)
EST. GFR  (NO RACE VARIABLE): >60 ML/MIN/1.73 M^2
GLUCOSE SERPL-MCNC: 83 MG/DL (ref 70–110)
GLUCOSE UR QL STRIP: NEGATIVE
HCT VFR BLD AUTO: 40.4 % (ref 37–48.5)
HGB BLD-MCNC: 12.4 G/DL (ref 12–16)
HGB UR QL STRIP: NEGATIVE
HYALINE CASTS #/AREA URNS LPF: 0 /LPF
IMM GRANULOCYTES # BLD AUTO: 0.04 K/UL (ref 0–0.04)
IMM GRANULOCYTES NFR BLD AUTO: 0.3 % (ref 0–0.5)
KETONES UR QL STRIP: ABNORMAL
LEUKOCYTE ESTERASE UR QL STRIP: ABNORMAL
LIPASE SERPL-CCNC: 35 U/L (ref 4–60)
LYMPHOCYTES # BLD AUTO: 2.1 K/UL (ref 1–4.8)
LYMPHOCYTES NFR BLD: 17.3 % (ref 18–48)
MCH RBC QN AUTO: 24.4 PG (ref 27–31)
MCHC RBC AUTO-ENTMCNC: 30.7 G/DL (ref 32–36)
MCV RBC AUTO: 80 FL (ref 82–98)
MICROSCOPIC COMMENT: ABNORMAL
MONOCYTES # BLD AUTO: 0.5 K/UL (ref 0.3–1)
MONOCYTES NFR BLD: 4.5 % (ref 4–15)
NEUTROPHILS # BLD AUTO: 9.2 K/UL (ref 1.8–7.7)
NEUTROPHILS NFR BLD: 77.2 % (ref 38–73)
NITRITE UR QL STRIP: NEGATIVE
NRBC BLD-RTO: 0 /100 WBC
OHS QRS DURATION: 72 MS
OHS QTC CALCULATION: 410 MS
PH UR STRIP: 7 [PH] (ref 5–8)
PLATELET # BLD AUTO: 300 K/UL (ref 150–450)
PMV BLD AUTO: 10.2 FL (ref 9.2–12.9)
POTASSIUM SERPL-SCNC: 3.7 MMOL/L (ref 3.5–5.1)
PROT SERPL-MCNC: 7.9 G/DL (ref 6–8.4)
PROT UR QL STRIP: ABNORMAL
RBC # BLD AUTO: 5.08 M/UL (ref 4–5.4)
RBC #/AREA URNS HPF: 3 /HPF (ref 0–4)
SODIUM SERPL-SCNC: 137 MMOL/L (ref 136–145)
SP GR UR STRIP: >1.03 (ref 1–1.03)
SQUAMOUS #/AREA URNS HPF: 34 /HPF
URN SPEC COLLECT METH UR: ABNORMAL
UROBILINOGEN UR STRIP-ACNC: ABNORMAL EU/DL
WBC # BLD AUTO: 11.95 K/UL (ref 3.9–12.7)
WBC #/AREA URNS HPF: 6 /HPF (ref 0–5)

## 2024-03-11 PROCEDURE — 93010 ELECTROCARDIOGRAM REPORT: CPT | Mod: ,,, | Performed by: INTERNAL MEDICINE

## 2024-03-11 PROCEDURE — 25000003 PHARM REV CODE 250: Performed by: REGISTERED NURSE

## 2024-03-11 PROCEDURE — 96375 TX/PRO/DX INJ NEW DRUG ADDON: CPT

## 2024-03-11 PROCEDURE — 83690 ASSAY OF LIPASE: CPT | Performed by: REGISTERED NURSE

## 2024-03-11 PROCEDURE — 96361 HYDRATE IV INFUSION ADD-ON: CPT

## 2024-03-11 PROCEDURE — 96374 THER/PROPH/DIAG INJ IV PUSH: CPT

## 2024-03-11 PROCEDURE — 93005 ELECTROCARDIOGRAM TRACING: CPT

## 2024-03-11 PROCEDURE — 80053 COMPREHEN METABOLIC PANEL: CPT | Performed by: REGISTERED NURSE

## 2024-03-11 PROCEDURE — 85025 COMPLETE CBC W/AUTO DIFF WBC: CPT | Performed by: REGISTERED NURSE

## 2024-03-11 PROCEDURE — 99284 EMERGENCY DEPT VISIT MOD MDM: CPT | Mod: 25

## 2024-03-11 PROCEDURE — 63600175 PHARM REV CODE 636 W HCPCS: Performed by: REGISTERED NURSE

## 2024-03-11 PROCEDURE — 81000 URINALYSIS NONAUTO W/SCOPE: CPT | Performed by: REGISTERED NURSE

## 2024-03-11 RX ORDER — ONDANSETRON 4 MG/1
4 TABLET, ORALLY DISINTEGRATING ORAL EVERY 6 HOURS PRN
Qty: 20 TABLET | Refills: 0 | Status: SHIPPED | OUTPATIENT
Start: 2024-03-11

## 2024-03-11 RX ORDER — METOCLOPRAMIDE HYDROCHLORIDE 5 MG/ML
10 INJECTION INTRAMUSCULAR; INTRAVENOUS
Status: COMPLETED | OUTPATIENT
Start: 2024-03-11 | End: 2024-03-11

## 2024-03-11 RX ORDER — PROMETHAZINE HYDROCHLORIDE 25 MG/1
25 TABLET ORAL EVERY 6 HOURS PRN
Qty: 20 TABLET | Refills: 0 | Status: SHIPPED | OUTPATIENT
Start: 2024-03-11 | End: 2024-03-13 | Stop reason: SDUPTHER

## 2024-03-11 RX ORDER — ONDANSETRON HYDROCHLORIDE 2 MG/ML
4 INJECTION, SOLUTION INTRAVENOUS
Status: DISCONTINUED | OUTPATIENT
Start: 2024-03-11 | End: 2024-03-11

## 2024-03-11 RX ORDER — DIPHENHYDRAMINE HYDROCHLORIDE 50 MG/ML
12.5 INJECTION INTRAMUSCULAR; INTRAVENOUS
Status: COMPLETED | OUTPATIENT
Start: 2024-03-11 | End: 2024-03-11

## 2024-03-11 RX ADMIN — DIPHENHYDRAMINE HYDROCHLORIDE 12.5 MG: 50 INJECTION INTRAMUSCULAR; INTRAVENOUS at 12:03

## 2024-03-11 RX ADMIN — METOCLOPRAMIDE 10 MG: 5 INJECTION, SOLUTION INTRAMUSCULAR; INTRAVENOUS at 12:03

## 2024-03-11 RX ADMIN — SODIUM CHLORIDE 1000 ML: 9 INJECTION, SOLUTION INTRAVENOUS at 12:03

## 2024-03-11 NOTE — ED PROVIDER NOTES
Encounter Date: 3/11/2024       History     Chief Complaint   Patient presents with    Emesis During Pregnancy     7 weeks pregnant, hyperemesis,     33-year-old female presents emergency department with complaints nausea and vomiting.  Patient is 7 weeks pregnant.  Associated symptoms include palpitations.  Patient denies any abdominal pain, vaginal bleeding, vaginal discharge, pelvic pain, flank pain or any other symptoms at this time.    The history is provided by the patient.     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     Pre-eclampsia in postpartum period 04/23/2020    Vitamin D deficiency      Past Surgical History:   Procedure Laterality Date    ESOPHAGOGASTRODUODENOSCOPY N/A 05/26/2021    Procedure: EGD (ESOPHAGOGASTRODUODENOSCOPY);  Surgeon: Alka Olmstead MD;  Location: [facility];  Service: Endoscopy;  Laterality: N/A;    ESOPHAGOGASTRODUODENOSCOPY N/A 10/04/2021    Procedure: EGD (ESOPHAGOGASTRODUODENOSCOPY);  Surgeon: Alak Olmstead MD;  Location: [facility];  Service: Endoscopy;  Laterality: N/A;    ESOPHAGOGASTRODUODENOSCOPY N/A 05/29/2023    Procedure: EGD (ESOPHAGOGASTRODUODENOSCOPY);  Surgeon: Alka Olmstead MD;  Location: [facility];  Service: Endoscopy;  Laterality: N/A;    PH MONITORING, ESOPHAGUS, WIRELESS, (OFF REFLUX MEDS) N/A 05/29/2023    Procedure: PH MONITORING, ESOPHAGUS, WIRELESS, (OFF REFLUX MEDS);  Surgeon: Alka Olmstead MD;  Location: [facility];   Service: Endoscopy;  Laterality: N/A;    REDUCTION OF BOTH BREASTS Bilateral 05/27/2021    Procedure: MAMMOPLASTY, REDUCTION, BILATERAL;  Surgeon: Daniel Ba MD;  Location: [address];  Service: Plastics;  Laterality: Bilateral;  Left breast tissue: 1584  Right breast tissue: 1438    SEPTOPLASTY, NOSE, WITH NASAL TURBINATE REDUCTION Bilateral 01/17/2024    Procedure: SEPTOPLASTY, NOSE, WITH NASAL TURBINATE REDUCTION;  Surgeon: Corey Lucio MD;  Location: [facility];  Service: ENT;  Laterality: Bilateral;     Family History   Problem Relation Age of Onset    Diabetes Maternal Grandmother     No Known Problems Maternal Grandfather     Hypertension Father     Hyperlipidemia Father     Heart disease Father 55        MI/CAD    Diabetes Mother     Asthma Mother     Hyperlipidemia Mother     Hypertension Mother     No Known Problems Brother     No Known Problems Sister     Insulin resistance Sister     No Known Problems Daughter     Diabetes Maternal Aunt     Breast cancer Maternal Aunt     Stroke Neg Hx     Brain cancer Neg Hx     Migraines Neg Hx     Colon cancer Neg Hx     Ovarian cancer Neg Hx     Anesthesia problems Neg Hx     Thrombosis Neg Hx      Social History     Tobacco Use    Smoking status: Never     Passive exposure: Never    Smokeless tobacco: Never   Substance Use Topics    Alcohol use: Not Currently     Alcohol/week: 0.0 standard drinks of alcohol    Drug use: No     Review of Systems   Constitutional:  Negative for fever.   HENT:  Negative for sore throat.    Respiratory:  Negative for shortness of breath.    Cardiovascular:  Positive for palpitations. Negative for chest pain.   Gastrointestinal:  Positive for nausea and vomiting.   Genitourinary:  Negative for dysuria.   Musculoskeletal:  Negative for back pain.   Skin:  Negative for rash.   Neurological:  Negative for weakness.   Hematological:  Does not bruise/bleed easily.   All other systems reviewed and are  negative.      Physical Exam     Initial Vitals [03/11/24 1150]   BP Pulse Resp Temp SpO2   137/72 105 18 98.3 °F (36.8 °C) 100 %      MAP       --         Physical Exam    Constitutional: She appears well-developed and well-nourished. She is not diaphoretic. No distress.   HENT:   Head: Normocephalic and atraumatic.   Eyes: Conjunctivae and EOM are normal. Pupils are equal, round, and reactive to light.   Neck: Neck supple.   Normal range of motion.  Cardiovascular:  Normal rate, regular rhythm and normal heart sounds.           No murmur heard.  Pulmonary/Chest: Breath sounds normal. No respiratory distress. She has no wheezes. She has no rales.   Abdominal: Abdomen is soft. Bowel sounds are normal. There is no abdominal tenderness. There is no rebound and no guarding.   Musculoskeletal:         General: No tenderness or edema. Normal range of motion.      Cervical back: Normal range of motion and neck supple.     Neurological: She is alert and oriented to person, place, and time. No cranial nerve deficit. GCS score is 15. GCS eye subscore is 4. GCS verbal subscore is 5. GCS motor subscore is 6.   Skin: Skin is warm and dry. Capillary refill takes less than 2 seconds.   Psychiatric: She has a normal mood and affect. Thought content normal.         ED Course   Procedures  Labs Reviewed   CBC W/ AUTO DIFFERENTIAL - Abnormal; Notable for the following components:       Result Value    MCV 80 (*)     MCH 24.4 (*)     MCHC 30.7 (*)     RDW 14.6 (*)     Gran # (ANC) 9.2 (*)     Gran % 77.2 (*)     Lymph % 17.3 (*)     All other components within normal limits   COMPREHENSIVE METABOLIC PANEL - Abnormal; Notable for the following components:    CO2 22 (*)     All other components within normal limits   URINALYSIS, REFLEX TO URINE CULTURE - Abnormal; Notable for the following components:    Appearance, UA Hazy (*)     Specific Gravity, UA >1.030 (*)     Protein, UA 1+ (*)     Ketones, UA 3+ (*)     Urobilinogen, UA 2.0-3.0  (*)     Leukocytes, UA 2+ (*)     All other components within normal limits    Narrative:     Specimen Source->Urine   URINALYSIS MICROSCOPIC - Abnormal; Notable for the following components:    WBC, UA 6 (*)     All other components within normal limits    Narrative:     Specimen Source->Urine   LIPASE     EKG Readings: (Independently Interpreted)   Initial Reading: No STEMI. Rhythm: Normal Sinus Rhythm. Heart Rate: 68.   Normal sinus rhythm  Possible left atrial enlargement       Imaging Results    None          Medications   sodium chloride 0.9% bolus 1,000 mL 1,000 mL (0 mLs Intravenous Stopped 3/11/24 1426)   metoclopramide injection 10 mg (10 mg Intravenous Given 3/11/24 1247)   diphenhydrAMINE injection 12.5 mg (12.5 mg Intravenous Given 3/11/24 1247)     Medical Decision Making  Amount and/or Complexity of Data Reviewed  Labs: ordered.     Details: No change from baseline    Risk  Prescription drug management.  Risk Details: Patient's symptoms resolved after medications and fluids in the emergency department.  Gave prescription of Phenergan and Zofran as needed for nausea and vomiting.  Advised patient to follow up with primary care.  Patient should return emergency department for any worsening symptoms.                                      Clinical Impression:  Final diagnoses:  [R00.2] Palpitations  [O21.0] Hyperemesis gravidarum (Primary)          ED Disposition Condition    Discharge Stable          ED Prescriptions       Medication Sig Dispense Start Date End Date Auth. Provider    ondansetron (ZOFRAN-ODT) 4 MG TbDL Take 1 tablet (4 mg total) by mouth every 6 (six) hours as needed (vomiting). 20 tablet 3/11/2024 -- Fish Nguyen Jr., FNP    promethazine (PHENERGAN) 25 MG tablet Take 1 tablet (25 mg total) by mouth every 6 (six) hours as needed for Nausea. 20 tablet 3/11/2024 -- Fish Nguyen Jr., FNP          Follow-up Information       Follow up With Specialties Details Why Contact Info     Tyesha Leggett MD Family Medicine In 1 week  [address]  [facility] [address]  [phone]               Fish Nguyen Jr., P  03/11/24 7217

## 2024-03-12 ENCOUNTER — PATIENT MESSAGE (OUTPATIENT)
Dept: PSYCHIATRY | Facility: CLINIC | Age: 34
End: 2024-03-12
Payer: COMMERCIAL

## 2024-03-13 ENCOUNTER — ROUTINE PRENATAL (OUTPATIENT)
Dept: OBSTETRICS AND GYNECOLOGY | Facility: CLINIC | Age: 34
End: 2024-03-13
Payer: COMMERCIAL

## 2024-03-13 ENCOUNTER — PROCEDURE VISIT (OUTPATIENT)
Dept: OBSTETRICS AND GYNECOLOGY | Facility: CLINIC | Age: 34
End: 2024-03-13
Payer: COMMERCIAL

## 2024-03-13 VITALS
DIASTOLIC BLOOD PRESSURE: 76 MMHG | WEIGHT: 273.81 LBS | SYSTOLIC BLOOD PRESSURE: 134 MMHG | BODY MASS INDEX: 45.56 KG/M2

## 2024-03-13 DIAGNOSIS — E66.01 MORBID OBESITY WITH BMI OF 45.0-49.9, ADULT: ICD-10-CM

## 2024-03-13 DIAGNOSIS — O09.521 AMA (ADVANCED MATERNAL AGE) MULTIGRAVIDA 35+, FIRST TRIMESTER: ICD-10-CM

## 2024-03-13 DIAGNOSIS — Z34.91 NORMAL PREGNANCY IN FIRST TRIMESTER: Primary | ICD-10-CM

## 2024-03-13 DIAGNOSIS — I10 ESSENTIAL HYPERTENSION: ICD-10-CM

## 2024-03-13 DIAGNOSIS — O36.80X0 ENCOUNTER TO DETERMINE FETAL VIABILITY OF PREGNANCY, SINGLE OR UNSPECIFIED FETUS: ICD-10-CM

## 2024-03-13 DIAGNOSIS — G47.33 OSA (OBSTRUCTIVE SLEEP APNEA): ICD-10-CM

## 2024-03-13 DIAGNOSIS — E55.9 VITAMIN D DEFICIENCY: ICD-10-CM

## 2024-03-13 DIAGNOSIS — K21.9 GASTROESOPHAGEAL REFLUX DISEASE WITHOUT ESOPHAGITIS: ICD-10-CM

## 2024-03-13 DIAGNOSIS — F41.1 GENERALIZED ANXIETY DISORDER: ICD-10-CM

## 2024-03-13 DIAGNOSIS — R11.0 NAUSEA: ICD-10-CM

## 2024-03-13 PROCEDURE — 76815 OB US LIMITED FETUS(S): CPT | Mod: S$GLB,,, | Performed by: OBSTETRICS & GYNECOLOGY

## 2024-03-13 PROCEDURE — 0502F SUBSEQUENT PRENATAL CARE: CPT | Mod: CPTII,S$GLB,, | Performed by: ADVANCED PRACTICE MIDWIFE

## 2024-03-13 PROCEDURE — 99999 PR PBB SHADOW E&M-EST. PATIENT-LVL III: CPT | Mod: PBBFAC,,, | Performed by: ADVANCED PRACTICE MIDWIFE

## 2024-03-13 RX ORDER — CHOLECALCIFEROL (VITAMIN D3) 50 MCG
2000 TABLET ORAL DAILY
COMMUNITY

## 2024-03-13 NOTE — PATIENT INSTRUCTIONS
Patient Education       Pregnancy - The Second Month   About this topic   It is important for you to learn how to take care of yourself to help you have a healthy baby and safe delivery. It is good to have health care throughout your pregnancy.  The second month of your pregnancy starts around week 5 and lasts through week 9. By knowing how far along you are, you can learn what is normal for this stage of your pregnancy and plan for what is next.  General   Your Body   During the second month of your pregnancy, here are some things you can expect.  There are more hormones active in your body and because of them, you may:  Have morning sickness or have an upset stomach at other times throughout the day  Feel just a little tired or need long naps each day  Go to the bathroom more often, even at night  Have tender or swollen breasts  Feel more emotional  Have color changes in nipples and areola  Have a brownish color over your forehead, temples, cheeks, and/or lips. This is melasma.  Your babys growth and development:  Your baby starts to develop organs like the brain, heart, liver, and lungs. Arms, legs, eyes, and ears are all starting to grow.  The heart beats about 150 times each minute by 6 weeks. Your doctor may be able to hear the heartbeat with a special tool by the end of this month.  Your baby has facial features, moves, and wiggles, but you won't be able to feel anything yet.  Your baby is about 1 inch (2.5 cm) long and is about the size of a blueberry.  Things to Think About   Avoid alcohol, drugs, tobacco products, and second hand smoke  Check with your doctor before taking any kind of drugs.  Ask about taking a vitamin. Take at least 400 micrograms of folic acid each day to help prevent some birth defects.  Making appointments with the doctor who will take care of you and your baby while you are pregnant.  Learn about what kinds of screening tests are offered while you are pregnant.  Is there anything I  need to do more or less of while I am pregnant?  Learn about which over the counter products are safe to use and take while you are pregnant.  Be sure to eat fresh, healthy foods while you are pregnant. Learn what foods are more likely to make you sick or have things that could harm your baby.  When do I need to call the doctor?   Not able to stop throwing up  Belly pain or cramps that keeps you from eating or sleeping  Period-like bleeding  Where can I learn more?   American Academy of Family Physicians  https://familydoctor.org/changes-in-your-body-during-pregnancy-first-trimester/   American Pregnancy Association  https://americanpregnancy.org/planning/first-prenatal-visit/   Last Reviewed Date   2020-04-20  Consumer Information Use and Disclaimer   This information is not specific medical advice and does not replace information you receive from your health care provider. This is only a brief summary of general information. It does NOT include all information about conditions, illnesses, injuries, tests, procedures, treatments, therapies, discharge instructions or life-style choices that may apply to you. You must talk with your health care provider for complete information about your health and treatment options. This information should not be used to decide whether or not to accept your health care providers advice, instructions or recommendations. Only your health care provider has the knowledge and training to provide advice that is right for you.  Copyright   Copyright © 2021 [employer] and its affiliates and/or licensors. All rights reserved.

## 2024-03-13 NOTE — PROGRESS NOTES
33 y.o. female  at 7w3d   denies VB or cramping  New OB with consent signed  Ultrasound reveals single IUP at 7 weeks 3 days yielding EDC 10/27/2024 with fetal heart rate 135 beats per minute-reassuring  Doing well without concerns   First trimester s/s improving: No has prescriptions for Zofran and Phenergan but developed per ptyalism over the weekend which is very challenging.  Explored comfort measures and possible remedies      TWG: -3 lbs   Reviewed prenatal labs:  Unremarkable with PCR 0.05  Genetic testing desires maternity 21 will return for a lab only visit in 3 weeks, order is placed paperwork is completed to be scheduled    Normal pregnancy in first trimester  Routine prenatal care    Essential hypertension  -     Prenatal Miscellaneous Test, Blood; Future; Expected date: 2024  Baseline PCR 0.05.  Blood pressure is satisfactory today.  Will be using daily baby aspirin    Generalized anxiety disorder  No medications seeing Dr. Sanchez and feels that she is doing well    Gastroesophageal reflux disease without esophagitis  Managing with diet only    Morbid obesity with BMI of 45.0-49.9, adult  -     Prenatal Miscellaneous Test, Blood; Future; Expected date: 2024  Daily baby aspirin and total weight gain no more than 10 lb    Nausea/per ptyalism  Antiemetics and dietary remedies particularly using laminae    SAVANNA (obstructive sleep apnea)  No CPAP required    Vitamin D deficiency  Vitamin D3 1000 IU daily    AMA (advanced maternal age) multigravida 35+, first trimester  -     Prenatal Miscellaneous Test, Blood; Future; Expected date: 2024  Maternity 21 scheduled      Reviewed warning signs, pregnancy precautions and how/when to call. Patient states understanding.  RTC x 4-5 wks, call or present sooner prn.

## 2024-03-14 ENCOUNTER — OFFICE VISIT (OUTPATIENT)
Dept: PSYCHIATRY | Facility: CLINIC | Age: 34
End: 2024-03-14
Payer: COMMERCIAL

## 2024-03-14 DIAGNOSIS — R69 UNDIAGNOSED DISEASE OR SYNDROME PRESENT: Primary | ICD-10-CM

## 2024-03-14 PROCEDURE — 90834 PSYTX W PT 45 MINUTES: CPT | Mod: 95,,, | Performed by: STUDENT IN AN ORGANIZED HEALTH CARE EDUCATION/TRAINING PROGRAM

## 2024-03-14 PROCEDURE — 1159F MED LIST DOCD IN RCRD: CPT | Mod: CPTII,95,, | Performed by: STUDENT IN AN ORGANIZED HEALTH CARE EDUCATION/TRAINING PROGRAM

## 2024-03-14 PROCEDURE — 3044F HG A1C LEVEL LT 7.0%: CPT | Mod: CPTII,95,, | Performed by: STUDENT IN AN ORGANIZED HEALTH CARE EDUCATION/TRAINING PROGRAM

## 2024-03-14 NOTE — PROGRESS NOTES
[facility] [place] [facility]  Psychology  Progress Note  Individual Psychotherapy (PhD/LCSW)    Patient Name: Anthony Sexton  MRN: [id number]    Patient Class: OP- [facility]  Primary Care Provider: Tyesha Leggett MD    Psychiatry Initial Visit (PhD/LCSW)  Individual Psychotherapy - CPT 42743  EAP #4 for 2024    Date: 3/14/2024    Site: [place]    The patient location is: Patient's home/ Patient reported that his/her location at the time of this visit was in the [facility]     Visit type: Virtual visit with synchronous audio and video     Each patient to whom he or she provides medical services by telemedicine is: (1) informed of the relationship between the physician and patient and the respective role of any other health care provider with respect to management of the patient; and (2) notified that he or she may decline to receive medical services by telemedicine and may withdraw from such care at any time.     Referral source: Lenora Walters MD    Clinical status of patient: Outpatient    Anthony Sexton, a 33 y.o. female, for initial evaluation visit.  Met with patient.    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     Substance  use:   Alcohol: none   Drugs: none   Tobacco: none   Caffeine: none    Current medications and drug reactions (include OTC, herbal): see medication list     Strengths and liabilities: Strength: Patient accepts guidance/feedback, Strength: Patient is expressive/articulate., Strength: Patient is intelligent., Strength: Patient is motivated for change., Strength: Patient has positive support network., Strength: Patient has reasonable judgment., Strength: Patient is stable., Liability: Patient lacks coping skills.    Current Evaluation:     Mental Status Exam:  General Appearance:  unremarkable, age appropriate   Speech: normal tone, normal rate, normal pitch, normal volume      Level of Cooperation: cooperative      Thought Processes: normal and logical   Mood: anxious, sad      Thought Content: normal, no suicidality, no homicidality, delusions, or paranoia   Affect: congruent and appropriate   Orientation: Oriented x3   Memory: recent >  intact   Attention Span & Concentration: intact   Fund of General Knowledge: intact and appropriate to age and level of education   Abstract Reasoning: not assessed   Judgment & Insight: fair     Language  intact     Summary: The patient has had some recent medical challenges that are impacting her physical and emotional wellbeing. Coping strategies to promote relaxation and secure support were discussed. Patient was reminded that this time is temporary and to treat herself with the same compassion that she gives her patients.   Diagnostic Impression - Plan:       ICD-10-CM ICD-9-CM   1. Undiagnosed disease or syndrome present  R69 799.9                                                 Plan:individual psychotherapy    Return to Clinic: as scheduled    Length of Service (minutes): 45          Barbra Bartlett, PhD  Psychologist  [facility]

## 2024-03-18 ENCOUNTER — TELEPHONE (OUTPATIENT)
Dept: CARDIOLOGY | Facility: CLINIC | Age: 34
End: 2024-03-18
Payer: COMMERCIAL

## 2024-03-18 NOTE — TELEPHONE ENCOUNTER
Attempted without success to contact pt to discuss appointment options. Left voicemail for pt to call back to discuss.     ----- Message from Merlin Burgos sent at 3/18/2024  8:00 AM CDT -----  Patient is requesting a call to reschedule both testing appointments. Please call her back at [phone].      Thanks  DD

## 2024-03-21 ENCOUNTER — TELEPHONE (OUTPATIENT)
Dept: PSYCHIATRY | Facility: CLINIC | Age: 34
End: 2024-03-21
Payer: COMMERCIAL

## 2024-03-22 ENCOUNTER — PATIENT MESSAGE (OUTPATIENT)
Dept: CARDIOLOGY | Facility: HOSPITAL | Age: 34
End: 2024-03-22
Payer: COMMERCIAL

## 2024-03-28 ENCOUNTER — ANESTHESIA (OUTPATIENT)
Dept: SURGERY | Facility: HOSPITAL | Age: 34
End: 2024-03-28
Payer: COMMERCIAL

## 2024-03-28 ENCOUNTER — PATIENT MESSAGE (OUTPATIENT)
Dept: OBSTETRICS AND GYNECOLOGY | Facility: CLINIC | Age: 34
End: 2024-03-28

## 2024-03-28 ENCOUNTER — ANESTHESIA EVENT (OUTPATIENT)
Dept: SURGERY | Facility: HOSPITAL | Age: 34
End: 2024-03-28
Payer: COMMERCIAL

## 2024-03-28 ENCOUNTER — HOSPITAL ENCOUNTER (OUTPATIENT)
Facility: HOSPITAL | Age: 34
Discharge: HOME | End: 2024-03-28
Attending: EMERGENCY MEDICINE | Admitting: OBSTETRICS & GYNECOLOGY
Payer: COMMERCIAL

## 2024-03-28 VITALS
SYSTOLIC BLOOD PRESSURE: 125 MMHG | DIASTOLIC BLOOD PRESSURE: 77 MMHG | OXYGEN SATURATION: 98 % | RESPIRATION RATE: 21 BRPM | HEART RATE: 92 BPM | TEMPERATURE: 98 F

## 2024-03-28 DIAGNOSIS — O03.4 INCOMPLETE ABORTION: Primary | ICD-10-CM

## 2024-03-28 DIAGNOSIS — O20.0 THREATENED MISCARRIAGE: ICD-10-CM

## 2024-03-28 DIAGNOSIS — N39.0 ACUTE LOWER UTI: ICD-10-CM

## 2024-03-28 LAB
ABO + RH BLD: NORMAL
ALBUMIN SERPL BCP-MCNC: 3.6 G/DL (ref 3.5–5.2)
ALP SERPL-CCNC: 83 U/L (ref 55–135)
ALT SERPL W/O P-5'-P-CCNC: 35 U/L (ref 10–44)
ANION GAP SERPL CALC-SCNC: 11 MMOL/L (ref 8–16)
AST SERPL-CCNC: 24 U/L (ref 10–40)
BACTERIA #/AREA URNS HPF: ABNORMAL /HPF
BASOPHILS # BLD AUTO: 0.04 K/UL (ref 0–0.2)
BASOPHILS NFR BLD: 0.2 % (ref 0–1.9)
BILIRUB SERPL-MCNC: 0.5 MG/DL (ref 0.1–1)
BILIRUB UR QL STRIP: NEGATIVE
BLD GP AB SCN CELLS X3 SERPL QL: NORMAL
BUN SERPL-MCNC: 8 MG/DL (ref 6–20)
CALCIUM SERPL-MCNC: 9.4 MG/DL (ref 8.7–10.5)
CHLORIDE SERPL-SCNC: 106 MMOL/L (ref 95–110)
CLARITY UR: ABNORMAL
CO2 SERPL-SCNC: 18 MMOL/L (ref 23–29)
COLOR UR: ABNORMAL
CREAT SERPL-MCNC: 0.8 MG/DL (ref 0.5–1.4)
DIFFERENTIAL METHOD BLD: ABNORMAL
EOSINOPHIL # BLD AUTO: 0 K/UL (ref 0–0.5)
EOSINOPHIL NFR BLD: 0.2 % (ref 0–8)
ERYTHROCYTE [DISTWIDTH] IN BLOOD BY AUTOMATED COUNT: 14.6 % (ref 11.5–14.5)
EST. GFR  (NO RACE VARIABLE): >60 ML/MIN/1.73 M^2
FINAL PATHOLOGIC DIAGNOSIS: NORMAL
GLUCOSE SERPL-MCNC: 83 MG/DL (ref 70–110)
GLUCOSE UR QL STRIP: NEGATIVE
GROSS: NORMAL
HCG INTACT+B SERPL-ACNC: NORMAL MIU/ML
HCT VFR BLD AUTO: 34 % (ref 37–48.5)
HCT VFR BLD AUTO: 40.5 % (ref 37–48.5)
HGB BLD-MCNC: 10.6 G/DL (ref 12–16)
HGB BLD-MCNC: 12.7 G/DL (ref 12–16)
HGB UR QL STRIP: ABNORMAL
HYALINE CASTS #/AREA URNS LPF: 0 /LPF
IMM GRANULOCYTES # BLD AUTO: 0.05 K/UL (ref 0–0.04)
IMM GRANULOCYTES NFR BLD AUTO: 0.3 % (ref 0–0.5)
KETONES UR QL STRIP: ABNORMAL
LEUKOCYTE ESTERASE UR QL STRIP: ABNORMAL
LYMPHOCYTES # BLD AUTO: 1.2 K/UL (ref 1–4.8)
LYMPHOCYTES NFR BLD: 7.5 % (ref 18–48)
Lab: NORMAL
MCH RBC QN AUTO: 24.8 PG (ref 27–31)
MCHC RBC AUTO-ENTMCNC: 31.4 G/DL (ref 32–36)
MCV RBC AUTO: 79 FL (ref 82–98)
MICROSCOPIC COMMENT: ABNORMAL
MONOCYTES # BLD AUTO: 0.5 K/UL (ref 0.3–1)
MONOCYTES NFR BLD: 3.2 % (ref 4–15)
NEUTROPHILS # BLD AUTO: 14.6 K/UL (ref 1.8–7.7)
NEUTROPHILS NFR BLD: 88.6 % (ref 38–73)
NITRITE UR QL STRIP: NEGATIVE
NRBC BLD-RTO: 0 /100 WBC
PH UR STRIP: 6 [PH] (ref 5–8)
PLATELET # BLD AUTO: 172 K/UL (ref 150–450)
PLATELET BLD QL SMEAR: ABNORMAL
PMV BLD AUTO: 11.2 FL (ref 9.2–12.9)
POTASSIUM SERPL-SCNC: 4.1 MMOL/L (ref 3.5–5.1)
PROT SERPL-MCNC: 8 G/DL (ref 6–8.4)
PROT UR QL STRIP: ABNORMAL
RBC # BLD AUTO: 5.13 M/UL (ref 4–5.4)
RBC #/AREA URNS HPF: 7 /HPF (ref 0–4)
SODIUM SERPL-SCNC: 135 MMOL/L (ref 136–145)
SP GR UR STRIP: >1.03 (ref 1–1.03)
SQUAMOUS #/AREA URNS HPF: 19 /HPF
UNIDENT CRYS URNS QL MICRO: ABNORMAL
URN SPEC COLLECT METH UR: ABNORMAL
UROBILINOGEN UR STRIP-ACNC: NEGATIVE EU/DL
WBC # BLD AUTO: 16.45 K/UL (ref 3.9–12.7)
WBC #/AREA URNS HPF: 57 /HPF (ref 0–5)
WBC CLUMPS URNS QL MICRO: ABNORMAL

## 2024-03-28 PROCEDURE — 71000033 HC RECOVERY, INTIAL HOUR: Performed by: OBSTETRICS & GYNECOLOGY

## 2024-03-28 PROCEDURE — 36415 COLL VENOUS BLD VENIPUNCTURE: CPT | Performed by: EMERGENCY MEDICINE

## 2024-03-28 PROCEDURE — 80053 COMPREHEN METABOLIC PANEL: CPT | Performed by: EMERGENCY MEDICINE

## 2024-03-28 PROCEDURE — 87086 URINE CULTURE/COLONY COUNT: CPT | Performed by: EMERGENCY MEDICINE

## 2024-03-28 PROCEDURE — 88305 TISSUE EXAM BY PATHOLOGIST: CPT | Mod: 59 | Performed by: PATHOLOGY

## 2024-03-28 PROCEDURE — 63600175 PHARM REV CODE 636 W HCPCS: Performed by: EMERGENCY MEDICINE

## 2024-03-28 PROCEDURE — 96365 THER/PROPH/DIAG IV INF INIT: CPT

## 2024-03-28 PROCEDURE — 99284 EMERGENCY DEPT VISIT MOD MDM: CPT | Mod: 25

## 2024-03-28 PROCEDURE — 87088 URINE BACTERIA CULTURE: CPT | Performed by: EMERGENCY MEDICINE

## 2024-03-28 PROCEDURE — 87186 SC STD MICRODIL/AGAR DIL: CPT | Performed by: EMERGENCY MEDICINE

## 2024-03-28 PROCEDURE — 81000 URINALYSIS NONAUTO W/SCOPE: CPT | Performed by: EMERGENCY MEDICINE

## 2024-03-28 PROCEDURE — 85014 HEMATOCRIT: CPT | Performed by: OBSTETRICS & GYNECOLOGY

## 2024-03-28 PROCEDURE — 59812 TREATMENT OF MISCARRIAGE: CPT | Mod: ,,, | Performed by: OBSTETRICS & GYNECOLOGY

## 2024-03-28 PROCEDURE — 36000705 HC OR TIME LEV I EA ADD 15 MIN: Performed by: OBSTETRICS & GYNECOLOGY

## 2024-03-28 PROCEDURE — 63600175 PHARM REV CODE 636 W HCPCS: Performed by: STUDENT IN AN ORGANIZED HEALTH CARE EDUCATION/TRAINING PROGRAM

## 2024-03-28 PROCEDURE — 36000704 HC OR TIME LEV I 1ST 15 MIN: Performed by: OBSTETRICS & GYNECOLOGY

## 2024-03-28 PROCEDURE — 85025 COMPLETE CBC W/AUTO DIFF WBC: CPT | Performed by: EMERGENCY MEDICINE

## 2024-03-28 PROCEDURE — 71000015 HC POSTOP RECOV 1ST HR: Performed by: OBSTETRICS & GYNECOLOGY

## 2024-03-28 PROCEDURE — 99223 1ST HOSP IP/OBS HIGH 75: CPT | Mod: 57,,, | Performed by: OBSTETRICS & GYNECOLOGY

## 2024-03-28 PROCEDURE — 86850 RBC ANTIBODY SCREEN: CPT | Performed by: OBSTETRICS & GYNECOLOGY

## 2024-03-28 PROCEDURE — 88305 TISSUE EXAM BY PATHOLOGIST: CPT | Mod: 26,,, | Performed by: PATHOLOGY

## 2024-03-28 PROCEDURE — 88305 TISSUE EXAM BY PATHOLOGIST: CPT | Performed by: PATHOLOGY

## 2024-03-28 PROCEDURE — 87077 CULTURE AEROBIC IDENTIFY: CPT | Performed by: EMERGENCY MEDICINE

## 2024-03-28 PROCEDURE — 85018 HEMOGLOBIN: CPT | Performed by: OBSTETRICS & GYNECOLOGY

## 2024-03-28 PROCEDURE — 37000009 HC ANESTHESIA EA ADD 15 MINS: Performed by: OBSTETRICS & GYNECOLOGY

## 2024-03-28 PROCEDURE — 71000039 HC RECOVERY, EACH ADD'L HOUR: Performed by: OBSTETRICS & GYNECOLOGY

## 2024-03-28 PROCEDURE — 63600175 PHARM REV CODE 636 W HCPCS: Performed by: NURSE ANESTHETIST, CERTIFIED REGISTERED

## 2024-03-28 PROCEDURE — 84702 CHORIONIC GONADOTROPIN TEST: CPT | Performed by: EMERGENCY MEDICINE

## 2024-03-28 PROCEDURE — 25000003 PHARM REV CODE 250: Performed by: EMERGENCY MEDICINE

## 2024-03-28 PROCEDURE — 37000008 HC ANESTHESIA 1ST 15 MINUTES: Performed by: OBSTETRICS & GYNECOLOGY

## 2024-03-28 RX ORDER — KETOROLAC TROMETHAMINE 30 MG/ML
30 INJECTION, SOLUTION INTRAMUSCULAR; INTRAVENOUS ONCE
Status: COMPLETED | OUTPATIENT
Start: 2024-03-28 | End: 2024-03-28

## 2024-03-28 RX ORDER — HYDROCODONE BITARTRATE AND ACETAMINOPHEN 5; 325 MG/1; MG/1
1 TABLET ORAL EVERY 6 HOURS PRN
Qty: 15 TABLET | Refills: 0 | Status: SHIPPED | OUTPATIENT
Start: 2024-03-28 | End: 2024-04-04 | Stop reason: ALTCHOICE

## 2024-03-28 RX ORDER — ACETAMINOPHEN 325 MG/1
650 TABLET ORAL
Status: COMPLETED | OUTPATIENT
Start: 2024-03-28 | End: 2024-03-28

## 2024-03-28 RX ORDER — CEPHALEXIN 500 MG/1
500 CAPSULE ORAL 4 TIMES DAILY
Qty: 28 CAPSULE | Refills: 0 | Status: SHIPPED | OUTPATIENT
Start: 2024-03-28 | End: 2024-03-28 | Stop reason: ALTCHOICE

## 2024-03-28 RX ORDER — FENTANYL CITRATE 50 UG/ML
INJECTION, SOLUTION INTRAMUSCULAR; INTRAVENOUS
Status: DISCONTINUED | OUTPATIENT
Start: 2024-03-28 | End: 2024-03-28

## 2024-03-28 RX ORDER — SODIUM CHLORIDE, SODIUM LACTATE, POTASSIUM CHLORIDE, CALCIUM CHLORIDE 600; 310; 30; 20 MG/100ML; MG/100ML; MG/100ML; MG/100ML
INJECTION, SOLUTION INTRAVENOUS CONTINUOUS
Status: DISCONTINUED | OUTPATIENT
Start: 2024-03-28 | End: 2024-03-28 | Stop reason: HOSPADM

## 2024-03-28 RX ORDER — DIPHENHYDRAMINE HYDROCHLORIDE 50 MG/ML
12.5 INJECTION INTRAMUSCULAR; INTRAVENOUS
Status: DISCONTINUED | OUTPATIENT
Start: 2024-03-28 | End: 2024-03-28 | Stop reason: HOSPADM

## 2024-03-28 RX ORDER — KETOROLAC TROMETHAMINE 30 MG/ML
3 INJECTION, SOLUTION INTRAMUSCULAR; INTRAVENOUS ONCE
Status: DISCONTINUED | OUTPATIENT
Start: 2024-03-28 | End: 2024-03-28

## 2024-03-28 RX ORDER — FAMOTIDINE 20 MG/1
20 TABLET, FILM COATED ORAL
Status: DISCONTINUED | OUTPATIENT
Start: 2024-03-28 | End: 2024-03-28 | Stop reason: HOSPADM

## 2024-03-28 RX ORDER — PROPOFOL 10 MG/ML
VIAL (ML) INTRAVENOUS CONTINUOUS PRN
Status: DISCONTINUED | OUTPATIENT
Start: 2024-03-28 | End: 2024-03-28

## 2024-03-28 RX ORDER — ONDANSETRON HYDROCHLORIDE 2 MG/ML
4 INJECTION, SOLUTION INTRAVENOUS DAILY PRN
Status: DISCONTINUED | OUTPATIENT
Start: 2024-03-28 | End: 2024-03-28 | Stop reason: HOSPADM

## 2024-03-28 RX ORDER — MIDAZOLAM HYDROCHLORIDE 1 MG/ML
INJECTION INTRAMUSCULAR; INTRAVENOUS
Status: DISCONTINUED | OUTPATIENT
Start: 2024-03-28 | End: 2024-03-28

## 2024-03-28 RX ORDER — PROPOFOL 10 MG/ML
VIAL (ML) INTRAVENOUS
Status: DISCONTINUED | OUTPATIENT
Start: 2024-03-28 | End: 2024-03-28

## 2024-03-28 RX ORDER — HYDROMORPHONE HYDROCHLORIDE 2 MG/ML
1 INJECTION, SOLUTION INTRAMUSCULAR; INTRAVENOUS; SUBCUTANEOUS EVERY 6 HOURS PRN
Status: DISCONTINUED | OUTPATIENT
Start: 2024-03-28 | End: 2024-03-28 | Stop reason: HOSPADM

## 2024-03-28 RX ORDER — OXYCODONE AND ACETAMINOPHEN 5; 325 MG/1; MG/1
1 TABLET ORAL
Status: DISCONTINUED | OUTPATIENT
Start: 2024-03-28 | End: 2024-03-28 | Stop reason: HOSPADM

## 2024-03-28 RX ORDER — CEPHALEXIN 500 MG/1
500 CAPSULE ORAL EVERY 8 HOURS
Qty: 9 CAPSULE | Refills: 0 | Status: SHIPPED | OUTPATIENT
Start: 2024-03-28 | End: 2024-03-31

## 2024-03-28 RX ORDER — HYDROMORPHONE HYDROCHLORIDE 2 MG/ML
0.2 INJECTION, SOLUTION INTRAMUSCULAR; INTRAVENOUS; SUBCUTANEOUS EVERY 5 MIN PRN
Status: DISCONTINUED | OUTPATIENT
Start: 2024-03-28 | End: 2024-03-28 | Stop reason: HOSPADM

## 2024-03-28 RX ORDER — KETOROLAC TROMETHAMINE 30 MG/ML
15 INJECTION, SOLUTION INTRAMUSCULAR; INTRAVENOUS EVERY 8 HOURS PRN
Status: DISCONTINUED | OUTPATIENT
Start: 2024-03-28 | End: 2024-03-28 | Stop reason: HOSPADM

## 2024-03-28 RX ORDER — IBUPROFEN 600 MG/1
600 TABLET ORAL EVERY 8 HOURS PRN
Qty: 30 TABLET | Refills: 0 | Status: SHIPPED | OUTPATIENT
Start: 2024-03-28

## 2024-03-28 RX ADMIN — MIDAZOLAM HYDROCHLORIDE 2 MG: 1 INJECTION, SOLUTION INTRAMUSCULAR; INTRAVENOUS at 02:03

## 2024-03-28 RX ADMIN — KETOROLAC TROMETHAMINE 30 MG: 30 INJECTION, SOLUTION INTRAMUSCULAR; INTRAVENOUS at 01:03

## 2024-03-28 RX ADMIN — HYDROMORPHONE HYDROCHLORIDE 0.2 MG: 2 INJECTION INTRAMUSCULAR; INTRAVENOUS; SUBCUTANEOUS at 03:03

## 2024-03-28 RX ADMIN — PROPOFOL 150 MCG/KG/MIN: 10 INJECTION, EMULSION INTRAVENOUS at 02:03

## 2024-03-28 RX ADMIN — ACETAMINOPHEN 650 MG: 325 TABLET ORAL at 10:03

## 2024-03-28 RX ADMIN — SODIUM CHLORIDE, POTASSIUM CHLORIDE, SODIUM LACTATE AND CALCIUM CHLORIDE: 600; 310; 30; 20 INJECTION, SOLUTION INTRAVENOUS at 02:03

## 2024-03-28 RX ADMIN — FENTANYL CITRATE 50 MCG: 50 INJECTION, SOLUTION INTRAMUSCULAR; INTRAVENOUS at 02:03

## 2024-03-28 RX ADMIN — PROPOFOL 50 MG: 10 INJECTION, EMULSION INTRAVENOUS at 02:03

## 2024-03-28 RX ADMIN — HYDROMORPHONE HYDROCHLORIDE 0.2 MG: 2 INJECTION INTRAMUSCULAR; INTRAVENOUS; SUBCUTANEOUS at 02:03

## 2024-03-28 RX ADMIN — CEFTRIAXONE 1 G: 1 INJECTION, POWDER, FOR SOLUTION INTRAMUSCULAR; INTRAVENOUS at 08:03

## 2024-03-28 NOTE — PHARMACY MED REC
"Admission Medication History     The home medication history was taken by Lance Goncalves.    You may go to "Admission" then "Reconcile Home Medications" tabs to review and/or act upon these items.     The home medication list has been updated by the Pharmacy department.   Please read ALL comments highlighted in yellow.   Please address this information as you see fit.    Feel free to contact us if you have any questions or require assistance.      Medications listed below were obtained from: Patient/family and Analytic software- [employer]  (Not in a hospital admission)        Lance Goncalves  [id number]      Current Outpatient Medications on File Prior to Encounter   Medication Sig Dispense Refill Last Dose    cholecalciferol, vitamin D3, (VITAMIN D3) 50 mcg (2,000 unit) Tab Take 2,000 Units by mouth once daily.   3/27/2024    ondansetron (ZOFRAN-ODT) 4 MG TbDL Take 1 tablet (4 mg total) by mouth every 6 (six) hours as needed (vomiting). 20 tablet 0 3/27/2024    PNV,calcium 72-iron-folic acid (PRENATAL VITAMIN PLUS LOW IRON) 27 mg iron- 1 mg Tab Take 1 tablet (1 each total) by mouth once daily. 30 tablet 11 3/27/2024    promethazine (PHENERGAN) 12.5 MG Tab Take 1 tablet (12.5 mg total) by mouth 4 (four) times daily. 30 tablet 2 3/27/2024                         .          "

## 2024-03-28 NOTE — ANESTHESIA POSTPROCEDURE EVALUATION
Anesthesia Post Evaluation    Patient: Anthony Sexton    Procedure(s) Performed: Procedure(s) (LRB):  DILATION AND CURETTAGE, UTERUS, USING SUCTION (N/A)    Final Anesthesia Type: MAC      Patient location during evaluation: PACU  Patient participation: Yes- Able to Participate  Level of consciousness: awake and alert and oriented  Post-procedure vital signs: reviewed and stable  Pain management: adequate  Airway patency: patent  SAVANNA mitigation strategies: Multimodal analgesia  PONV status at discharge: No PONV  Anesthetic complications: no      Cardiovascular status: blood pressure returned to baseline and hemodynamically stable  Respiratory status: unassisted  Hydration status: euvolemic  Follow-up not needed.              Vitals Value Taken Time   /73 03/28/24 1601   Temp 36.7 °C (98 °F) 03/28/24 1600   Pulse 90 03/28/24 1602   Resp 18 03/28/24 1602   SpO2 99 % 03/28/24 1602   Vitals shown include unvalidated device data.      Event Time   Out of Recovery 16:04:57         Pain/Faizan Score: Pain Rating Prior to Med Admin: 6 (3/28/2024  3:55 PM)  Faizan Score: 10 (3/28/2024  4:05 PM)

## 2024-03-28 NOTE — ED PROVIDER NOTES
"Encounter Date: 3/28/2024       History     Chief Complaint   Patient presents with    Vaginal Bleeding     Vaginal bleeding started at 1am and abdominal cramping; pt 9 weeks thinks she is having a miscarriage     HPI  33-year-old  female  at 9 weeks' gestation presenting complaining of vaginal bleeding beginning overnight with crampy lower abdominal pain.  Patient has had an ultrasound previously showing intrauterine pregnancy and her blood type is B positive.  Ultrasound results in previous chart "Ultrasound reveals single IUP at 7 weeks 3 days yielding EDC 10/27/2024 with fetal heart rate 135 beats per minute".  Patient notes she awakened with blood in her pants overnight.  She was this is picked up and she was also passed some clots while urinating.  She denies any dysuria frequency.  No nausea vomiting or fevers but she did have some chills.    Review of patient's allergies indicates:   Allergen Reactions    Procardia [nifedipine]      Past Medical History:   Diagnosis Date    Abnormal Pap smear of cervix     Anemia     Anxiety     Chronic low back pain     Depression     Enthesitis related arthritis 2023    Fibromyalgia     GERD (gastroesophageal reflux disease)     History of abnormal cervical Pap smear     History of recurrent UTIs     History of ulcer disease     Irregular menses     Obesity     SAVANNA (obstructive sleep apnea) 2023    Postpartum depression     with last pregnancy.     Pre-eclampsia in postpartum period 2020    Vitamin D deficiency      Past Surgical History:   Procedure Laterality Date    ESOPHAGOGASTRODUODENOSCOPY N/A 2021    Procedure: EGD (ESOPHAGOGASTRODUODENOSCOPY);  Surgeon: Alka Olmstead MD;  Location: [facility];  Service: Endoscopy;  Laterality: N/A;    ESOPHAGOGASTRODUODENOSCOPY N/A 10/04/2021    Procedure: EGD (ESOPHAGOGASTRODUODENOSCOPY);  Surgeon: Alka Olmstead MD;  Location: [facility];  Service: Endoscopy;  Laterality: N/A; "    ESOPHAGOGASTRODUODENOSCOPY N/A 05/29/2023    Procedure: EGD (ESOPHAGOGASTRODUODENOSCOPY);  Surgeon: Alka Olmstead MD;  Location: [facility];  Service: Endoscopy;  Laterality: N/A;    PH MONITORING, ESOPHAGUS, WIRELESS, (OFF REFLUX MEDS) N/A 05/29/2023    Procedure: PH MONITORING, ESOPHAGUS, WIRELESS, (OFF REFLUX MEDS);  Surgeon: Alka Olmstead MD;  Location: [facility] ENDO;  Service: Endoscopy;  Laterality: N/A;    REDUCTION OF BOTH BREASTS Bilateral 05/27/2021    Procedure: MAMMOPLASTY, REDUCTION, BILATERAL;  Surgeon: Daniel Ba MD;  Location: [facility] OR [address] [place] [address];  Service: Plastics;  Laterality: Bilateral;  Left breast tissue: 1584  Right breast tissue: 1438    SEPTOPLASTY, NOSE, WITH NASAL TURBINATE REDUCTION Bilateral 01/17/2024    Procedure: SEPTOPLASTY, NOSE, WITH NASAL TURBINATE REDUCTION;  Surgeon: Corey Lucio MD;  Location: [facility] OR;  Service: ENT;  Laterality: Bilateral;     Family History   Problem Relation Age of Onset    Diabetes Maternal Grandmother     No Known Problems Maternal Grandfather     Hypertension Father     Hyperlipidemia Father     Heart disease Father 55        MI/CAD    Diabetes Mother     Asthma Mother     Hyperlipidemia Mother     Hypertension Mother     No Known Problems Brother     No Known Problems Sister     Insulin resistance Sister     No Known Problems Daughter     Diabetes Maternal Aunt     Breast cancer Maternal Aunt     Stroke Neg Hx     Brain cancer Neg Hx     Migraines Neg Hx     Colon cancer Neg Hx     Ovarian cancer Neg Hx     Anesthesia problems Neg Hx     Thrombosis Neg Hx      Social History     Tobacco Use    Smoking status: Never     Passive exposure: Never    Smokeless tobacco: Never   Substance Use Topics    Alcohol use: Not Currently     Alcohol/week: 0.0 standard drinks of alcohol    Drug use: No     Review of Systems   Constitutional:  Positive for chills. Negative for fever.   Gastrointestinal:  Positive for abdominal pain. Negative for  diarrhea, nausea and vomiting.   Genitourinary:  Positive for pelvic pain and vaginal bleeding. Negative for frequency, hematuria and urgency.   Musculoskeletal:  Negative for back pain and myalgias.   All other systems reviewed and are negative.      Physical Exam     Initial Vitals [03/28/24 0610]   BP Pulse Resp Temp SpO2   136/68 97 20 99.9 °F (37.7 °C) 100 %      MAP       --         Physical Exam  Nursing Notes and Vital Signs Reviewed.  Constitutional: Patient is in no acute distress. Well-developed and well-nourished.  Head: Atraumatic. Normocephalic.  Eyes:  EOM intact.  No scleral icterus.  ENT: Mucous membranes are moist.  Nares clear   Neck:  Full ROM. No JVD.  Cardiovascular: Regular rate. Regular rhythm No murmurs, rubs, or gallops. Distal pulses are 2+ and symmetric  Pulmonary/Chest: No respiratory distress. Clear to auscultation bilaterally. No wheezing or rales.  Equal chest wall rise bilaterally  Abdominal: Soft and non-distended.  There is no tenderness.  No rebound, guarding, or rigidity. Good bowel sounds.  Genitourinary: No CVA tenderness.  No suprapubic tenderness.  Pelvic exam performed with female standby.  There is blood around the vaginal introitus.  There is blood in the vault with a closed cervix.  Musculoskeletal: Moves all extremities. No obvious deformities.  5 x 5 strength in all extremities   Skin: Warm and dry.  Neurological:  Alert, awake, and appropriate.  Normal speech.  No acute focal neurological deficits are appreciated.  Two through 12 intact bilaterally.  Psychiatric: Normal affect. Good eye contact. Appropriate in content.    ED Course   Procedures  Labs Reviewed   URINALYSIS, REFLEX TO URINE CULTURE - Abnormal; Notable for the following components:       Result Value    Color, UA Orange (*)     Appearance, UA Hazy (*)     Specific Gravity, UA >1.030 (*)     Protein, UA 1+ (*)     Ketones, UA 2+ (*)     Occult Blood UA 3+ (*)     Leukocytes, UA 3+ (*)     All other  components within normal limits    Narrative:     Specimen Source->Urine   CBC W/ AUTO DIFFERENTIAL - Abnormal; Notable for the following components:    WBC 16.45 (*)     MCV 79 (*)     MCH 24.8 (*)     MCHC 31.4 (*)     RDW 14.6 (*)     Gran # (ANC) 14.6 (*)     Immature Grans (Abs) 0.05 (*)     Gran % 88.6 (*)     Lymph % 7.5 (*)     Mono % 3.2 (*)     Platelet Estimate Clumped (*)     All other components within normal limits    Narrative:     Release to patient->Immediate   COMPREHENSIVE METABOLIC PANEL - Abnormal; Notable for the following components:    Sodium 135 (*)     CO2 18 (*)     All other components within normal limits    Narrative:     Release to patient->Immediate   URINALYSIS MICROSCOPIC - Abnormal; Notable for the following components:    RBC, UA 7 (*)     WBC, UA 57 (*)     WBC Clumps, UA Few (*)     All other components within normal limits    Narrative:     Specimen Source->Urine   CULTURE, URINE   HCG, QUANTITATIVE    Narrative:     Release to patient->Immediate          Imaging Results              US OB <14 Wks, TransAbd, Single Gestation (Final result)  Result time 03/28/24 08:17:09   Procedure changed from US Pelvis Complete Non OB     Final result by Eugene Nguyen MD (03/28/24 08:17:09)                   Impression:      Single live intrauterine pregnancy with estimated gestational age 9 weeks 3 days.  Continued clinical, laboratory, and imaging follow-up is recommended as clinically indicated.      Electronically signed by: Eugene Nguyen  Date:    03/28/2024  Time:    08:17               Narrative:    EXAMINATION:  US OB <14 WEEKS TRANSABDOM, SINGLE GESTATION    CLINICAL HISTORY:  vaginal bleeding;    TECHNIQUE:  Transabdominal sonography of the pelvis was performed, followed by transvaginal sonography to better evaluate the uterus and ovaries.    COMPARISON:  None.    FINDINGS:  Intrauterine gestation(s): Single    Gestational sac: Present    Yolk sac: Not demonstrated    Crown-rump  length (CRL): 26 mm    Cardiac activity: 180 bpm    Subchorionic hemorrhage: None.    Right ovary: Normal.    Left ovary: Normal.    Miscellaneous: No Free Fluid.                                       Medications   cefTRIAXone (Rocephin) 1 g in dextrose 5 % in water (D5W) 100 mL IVPB (MB+) (0 g Intravenous Stopped 3/28/24 1004)   acetaminophen tablet 650 mg (650 mg Oral Given 3/28/24 1030)       ED Discussion:    8:47 AM: Reassessed pt at this time.  Discussed with pt all pertinent ED information and results. Discussed pt dx and plan of tx. Gave pt all f/u and return to the ED instructions. All questions and concerns were addressed at this time. Pt expresses understanding of information and instructions, and is comfortable with plan to discharge. Pt is stable for discharge.    I discussed with patient and/or family/caretaker that evaluation in the ED does not suggest any emergent or life threatening medical conditions requiring immediate intervention beyond what was provided in the ED, and I believe patient is safe for discharge.  Regardless, an unremarkable evaluation in the ED does not preclude the development or presence of a serious of life threatening condition. As such, patient was instructed to return immediately for any worsening or change in current symptoms.     Medical Decision Making  Differential diagnosis:  UTI, threatened miscarriage, vaginal bleeding, completed miscarriage    Patient was evaluated history and physical examination.  Pelvic exam reveals there is blood in the vault.  [place] [address] hemoglobin is 12.7.  Patient has an HCG of 91,000 with a CMP that is unremarkable.  Ultrasound reveals an intrauterine pregnancy at 9 weeks 3 days.  Fetal heart tones are at 180.  Patient was been treated with Rocephin in the ED as well as Tylenol for cramps.  I have advised the patient of the risk of threatened miscarriage.  Initial intention was to discharge however the patient  subsequently had increase in bleeding and bled through 3 pads less than 1 hour.  Ob consulted to evaluate.  Patient was Rh positive      Amount and/or Complexity of Data Reviewed  External Data Reviewed: labs and notes.     Details: Positive Rh.  Labs: ordered. Decision-making details documented in ED Course.     Details: Consistent with a UTI.  Mild elevation in white count with normal hemoglobin.  CMP is benign  Radiology: ordered. Decision-making details documented in ED Course.     Details: IUP  Discussion of management or test interpretation with external provider(s): I discussed the case with Dr. Kirsten zamora.  She is aware of the patient's increased bleeding with come down and evaluate for possible admission patient was aware    Risk  OTC drugs.  Prescription drug management.  Decision regarding hospitalization.                       Disposition:   Disposition: Placed in Observation  Condition: Stable                       Clinical Impression:  Final diagnoses:  [O20.0] Threatened miscarriage (Primary)  [N39.0] Acute lower UTI          ED Disposition Condition    Observation Stable                  Zeferino Weller Jr., MD  03/28/24 3270       Zeferino Weller Jr., MD  03/28/24 8835

## 2024-03-28 NOTE — ED NOTES
Rn to bedside to assess patient for discharge. Pt vaginal bleeding increased. Clots noted. Pt soaked three pads in 20 minutes while RN at bedside. Pt symptomatic with dizziness, diaphoresis, nausea. C/o of continued abd cramping.

## 2024-03-28 NOTE — SUBJECTIVE & OBJECTIVE
OB History    Para Term  AB Living   3 2 2 0 0 2   SAB IAB Ectopic Multiple Live Births   0 0 0 0 2      # Outcome Date GA Lbr Trever/2nd Weight Sex Delivery Anes PTL Lv   3 Current            2 Term 20 38w2d 07:44 / 00:06 3.14 kg (6 lb 14.8 oz) F Vag-Spont EPI N DANIELLE      Name: KERVIN GUEVARA      Apgar1: 9  Apgar5: 9   1 Term 16 37w0d  3.289 kg (7 lb 4 oz) F Vag-Spont EPI  DANIELLE      Complications: Severe hyperemesis gravidarum     Past Medical History:   Diagnosis Date    Abnormal Pap smear of cervix     Anemia     Anxiety     Chronic low back pain     Depression     Enthesitis related arthritis 2023    Fibromyalgia     GERD (gastroesophageal reflux disease)     History of abnormal cervical Pap smear     History of recurrent UTIs     History of ulcer disease     Irregular menses     Obesity     SAVANNA (obstructive sleep apnea) 2023    Postpartum depression     with last pregnancy.     Pre-eclampsia in postpartum period 2020    Vitamin D deficiency      Past Surgical History:   Procedure Laterality Date    ESOPHAGOGASTRODUODENOSCOPY N/A 2021    Procedure: EGD (ESOPHAGOGASTRODUODENOSCOPY);  Surgeon: Alka Olmstead MD;  Location: [facility];  Service: Endoscopy;  Laterality: N/A;    ESOPHAGOGASTRODUODENOSCOPY N/A 10/04/2021    Procedure: EGD (ESOPHAGOGASTRODUODENOSCOPY);  Surgeon: Alka Olmstead MD;  Location: [facility];  Service: Endoscopy;  Laterality: N/A;    ESOPHAGOGASTRODUODENOSCOPY N/A 2023    Procedure: EGD (ESOPHAGOGASTRODUODENOSCOPY);  Surgeon: Alka Olmstead MD;  Location: [facility];  Service: Endoscopy;  Laterality: N/A;    PH MONITORING, ESOPHAGUS, WIRELESS, (OFF REFLUX MEDS) N/A 2023    Procedure: PH MONITORING, ESOPHAGUS, WIRELESS, (OFF REFLUX MEDS);  Surgeon: Alka Olmstead MD;  Location: [facility];  Service: Endoscopy;  Laterality: N/A;    REDUCTION OF BOTH BREASTS Bilateral 2021    Procedure: MAMMOPLASTY,  REDUCTION, BILATERAL;  Surgeon: Daniel Ba MD;  Location: [facility] OR [address] [place] [address];  Service: Plastics;  Laterality: Bilateral;  Left breast tissue: 1584  Right breast tissue: 1438    SEPTOPLASTY, NOSE, WITH NASAL TURBINATE REDUCTION Bilateral 01/17/2024    Procedure: SEPTOPLASTY, NOSE, WITH NASAL TURBINATE REDUCTION;  Surgeon: Corey Lucio MD;  Location: [facility] OR;  Service: ENT;  Laterality: Bilateral;       (Not in a hospital admission)      Review of patient's allergies indicates:   Allergen Reactions    Procardia [nifedipine]         Family History       Problem Relation (Age of Onset)    Asthma Mother    Breast cancer Maternal Aunt    Diabetes Maternal Grandmother, Mother, Maternal Aunt    Heart disease Father (55)    Hyperlipidemia Father, Mother    Hypertension Father, Mother    Insulin resistance Sister    No Known Problems Maternal Grandfather, Brother, Sister, Daughter          Tobacco Use    Smoking status: Never     Passive exposure: Never    Smokeless tobacco: Never   Substance and Sexual Activity    Alcohol use: Not Currently     Alcohol/week: 0.0 standard drinks of alcohol    Drug use: No    Sexual activity: Yes     Partners: Male     Comment: Not interested in having further kids     Review of Systems   Constitutional:  Negative for fatigue, fever and unexpected weight change.   Gastrointestinal:  Positive for abdominal pain. Negative for constipation, diarrhea, nausea and vomiting.   Genitourinary:  Positive for vaginal bleeding. Negative for dysuria, frequency, urgency, vaginal discharge, vaginal pain, postcoital bleeding and vaginal odor.      Objective:     Vital Signs (Most Recent):  Temp: 99.9 °F (37.7 °C) (03/28/24 0610)  Pulse: 98 (03/28/24 1100)  Resp: 20 (03/28/24 0610)  BP: (!) 141/85 (03/28/24 1100)  SpO2: 100 % (03/28/24 1100) Vital Signs (24h Range):  Temp:  [99.9 °F (37.7 °C)] 99.9 °F (37.7 °C)  Pulse:  [97-98] 98  Resp:  [20] 20  SpO2:  [100 %] 100 %  BP: (136-141)/(68-85)  141/85        There is no height or weight on file to calculate BMI.    es     Physical Exam:   Constitutional: She is oriented to person, place, and time. She appears well-developed and well-nourished. She appears distressed (appears uncomfortable).             Abdominal: Soft. She exhibits no distension and no mass. There is no abdominal tenderness. There is no rebound and no guarding. Hernia confirmed negative in the right inguinal area and confirmed negative in the left inguinal area.     Genitourinary:    Pelvic exam was performed with patient supine.   There is no rash, tenderness, lesion or injury on the right labia. There is no rash, tenderness, lesion or injury on the left labia. Right adnexum displays no mass, no tenderness and no fullness. Left adnexum displays no mass, no tenderness and no fullness. There is bleeding (large amount of clot present in the vagina) in the vagina. No erythema, vaginal discharge, tenderness, rectocele, cystocele or prolapse of vaginal walls in the vagina.    No foreign body in the vagina.      No signs of injury in the vagina.   Cervix exhibits no motion tenderness, no discharge and no friability. Uterus is not deviated, not enlarged, not fixed and not tender. Uterus size: 10 cm.   Genitourinary Comments: Before speculum exam was performed, there was some pink tissue at the introitus; upon further inspection, this appears to be the fetus.  This was placed in a specimen cup and sent to pathology.  Bimanual and speculum exam was then performed    Cervix is about 3cm dilated, heavy bleeding coming from the cervix, and feels like there is still some pregnancy tissue within the uterus                 Neurological: She is alert and oriented to person, place, and time.     Psychiatric: She has a normal mood and affect.             Significant Labs:  Lab Results   Component Value Date    GROUPTRH B POS 02/29/2024    HEPBSAG Non-reactive 02/29/2024    STREPBCULT No Group B  Streptococcus isolated 04/03/2020       BMP:   Recent Labs   Lab 03/28/24  0642   GLU 83   *   K 4.1      CO2 18*   BUN 8   CREATININE 0.8   CALCIUM 9.4     CBC:   Recent Labs   Lab 03/28/24  0642   WBC 16.45*   RBC 5.13   HGB 12.7   HCT 40.5      MCV 79*   MCH 24.8*   MCHC 31.4*     Recent Labs   Lab 03/28/24  0643   COLORU Orange*   SPECGRAV >1.030*   PHUR 6.0   PROTEINUA 1+*   BACTERIA Rare   NITRITE Negative   LEUKOCYTESUR 3+*   UROBILINOGEN Negative   HYALINECASTS 0     Ultrasound reviewed

## 2024-03-28 NOTE — HPI
34 y/o  with IUP at 9w4d presents to the ED with bleeding and cramping.  Noticed bleeding early this morning, then started cramping.  Cramping became more severe, c/w contractions and bleeding persisted, so pt came to the ED.  Ultrasound in the ED this morning confirmed viable IUP, CRL measured 9w3d, FHR 180bpm.  While awaiting dispo, pt's bleeding significantly increased, soaking 1 pad q 20 minutes and pain worsened.  I was called for further evaluation and recommendations.

## 2024-03-28 NOTE — ANESTHESIA PREPROCEDURE EVALUATION
2024  Anthony Sexton is a 33 y.o., female    Patient Active Problem List   Diagnosis    Morbid obesity with BMI of 45.0-49.9, adult    Vitamin D deficiency    SOB (shortness of breath)    GERD (gastroesophageal reflux disease)    Essential hypertension    History of postpartum depression, currently pregnant    Generalized anxiety disorder    Iron deficiency anemia due to chronic blood loss    ESDRAS positive    Family history of Sjogren's disease    Fibromyalgia    SAVANNA (obstructive sleep apnea)    High risk HPV infection    Nausea    8 weeks gestation of pregnancy    Antepartum multigravida of advanced maternal age    Normal pregnancy in first trimester    Incomplete      Past Medical History:   Diagnosis Date    Abnormal Pap smear of cervix     Anemia     Anxiety     Chronic low back pain     Depression     Enthesitis related arthritis 2023    Fibromyalgia     GERD (gastroesophageal reflux disease)     History of abnormal cervical Pap smear     History of recurrent UTIs     History of ulcer disease     Irregular menses     Obesity     SAVANNA (obstructive sleep apnea) 2023    Postpartum depression     with last pregnancy.     Pre-eclampsia in postpartum period 2020    Vitamin D deficiency      Past Surgical History:   Procedure Laterality Date    ESOPHAGOGASTRODUODENOSCOPY N/A 2021    Procedure: EGD (ESOPHAGOGASTRODUODENOSCOPY);  Surgeon: Alka Olmstead MD;  Location: [facility];  Service: Endoscopy;  Laterality: N/A;    ESOPHAGOGASTRODUODENOSCOPY N/A 10/04/2021    Procedure: EGD (ESOPHAGOGASTRODUODENOSCOPY);  Surgeon: Alka Olmstead MD;  Location: [facility];  Service: Endoscopy;  Laterality: N/A;    ESOPHAGOGASTRODUODENOSCOPY N/A 2023    Procedure: EGD (ESOPHAGOGASTRODUODENOSCOPY);  Surgeon: Alka Olmstead MD;  Location: [facility];  Service: Endoscopy;   Laterality: N/A;    PH MONITORING, ESOPHAGUS, WIRELESS, (OFF REFLUX MEDS) N/A 05/29/2023    Procedure: PH MONITORING, ESOPHAGUS, WIRELESS, (OFF REFLUX MEDS);  Surgeon: Alka Olmstead MD;  Location: [facility];  Service: Endoscopy;  Laterality: N/A;    REDUCTION OF BOTH BREASTS Bilateral 05/27/2021    Procedure: MAMMOPLASTY, REDUCTION, BILATERAL;  Surgeon: Daniel Ba MD;  Location: [address];  Service: Plastics;  Laterality: Bilateral;  Left breast tissue: 1584  Right breast tissue: 1438    SEPTOPLASTY, NOSE, WITH NASAL TURBINATE REDUCTION Bilateral 01/17/2024    Procedure: SEPTOPLASTY, NOSE, WITH NASAL TURBINATE REDUCTION;  Surgeon: Corey Lucio MD;  Location: [facility];  Service: ENT;  Laterality: Bilateral;       Chemistry        Component Value Date/Time     (L) 03/28/2024 0642    K 4.1 03/28/2024 0642     03/28/2024 0642    CO2 18 (L) 03/28/2024 0642    BUN 8 03/28/2024 0642    CREATININE 0.8 03/28/2024 0642    GLU 83 03/28/2024 0642        Component Value Date/Time    CALCIUM 9.4 03/28/2024 0642    ALKPHOS 83 03/28/2024 0642    AST 24 03/28/2024 0642    ALT 35 03/28/2024 0642    BILITOT 0.5 03/28/2024 0642    ESTGFRAFRICA >60.0 05/24/2022 0902    EGFRNONAA >60.0 05/24/2022 0902        Lab Results   Component Value Date    WBC 16.45 (H) 03/28/2024    HGB 12.7 03/28/2024    HCT 40.5 03/28/2024    MCV 79 (L) 03/28/2024     03/28/2024         Normal sinus rhythm   Minimal voltage criteria for LVH, may be normal variant ( R in aVL )   Borderline Abnormal ECG   When compared with ECG of 27-SEP-2022 16:19,   No significant change was found   Confirmed by CASSIE ANGELO, SASHA WATKINS ([id number]) on 11/23/2022 9:28:11 PM     Holter 10/22  Predominant Rhythm Sinus rhythm with heart rates varying between 49 and 146 BPM with an average of 82BPM.  Ventricular Arrhythmias There were very rare PVCs totalling 5 and averaging 0.05 per hour.  Supraventricular Arrhythmias There were very rare PACs  totalling 37 and averaging 0.39 per hour.  The circadian pattern of sinus rate variability followed a typical curve.     ECHO 10/22  The left ventricle is normal in size with concentric remodeling and normal systolic function.  The estimated ejection fraction is 60%.  Normal left ventricular diastolic function.  Normal right ventricular size with normal right ventricular systolic function.  Mild tricuspid regurgitation.  Normal central venous pressure (3 mmHg).  The estimated PA systolic pressure is 32 mmHg.        Pre-op Assessment    I have reviewed the Patient Summary Reports.     I have reviewed the Nursing Notes. I have reviewed the NPO Status.   I have reviewed the Medications.     Review of Systems  Anesthesia Hx:  No problems with previous Anesthesia  Grade 1 with MAC 3  History of prior surgery of interest to airway management or planning:  Previous anesthesia: General       Airway issues documented on chart review include mask, easy, easy direct laryngoscopy , view on direct laryngoscopy Grade I      Denies Personal Hx of Anesthesia complications.                    Social:  Non-Smoker       Cardiovascular:     Hypertension              ECG has been reviewed. Seen by cards 1 yr ago for CP, w/u NEG.                         Pulmonary:        Sleep Apnea                Hepatic/GI:     GERD   Recent nausea w/ increased saliva           Musculoskeletal:    Muscle Disorders: Fibromyalgia           Neurological:    Neuromuscular Disease,                                   Endocrine:     BMI 46      Morbid Obesity / BMI > 40  Psych:  Psychiatric History anxiety depression                Physical Exam  General: Alert, Oriented and Cooperative  Recent nausea w/ increased saliva - recommend RSI    Airway:  Mallampati: I   Mouth Opening: Normal  TM Distance: Normal  Tongue: Normal  Neck ROM: Normal ROM    Dental:  Intact  Patient denies any currently loose or chipped teeth; Patient denies any removable dental  appliances    Chest/Lungs:  Clear to auscultation, Normal Respiratory Rate    Heart:  Rate: Normal  Rhythm: Regular Rhythm        Anesthesia Plan  Type of Anesthesia, risks & benefits discussed:    Anesthesia Type: Gen ETT  Intra-op Monitoring Plan: Standard ASA Monitors  Post Op Pain Control Plan: multimodal analgesia and IV/PO Opioids PRN  Induction:  IV  Airway Plan: Direct, Post-Induction  Informed Consent: Informed consent signed with the Patient and all parties understand the risks and agree with anesthesia plan.  All questions answered.   ASA Score: 3  Day of Surgery Review of History & Physical: H&P Update referred to the surgeon/provider.  Anesthesia Plan Notes: Intubation     Date/Time: 1/17/2024 10:09 AM     Performed by: Patricia Guthrie CRNA  Authorized by: Alize Silverman MD    Intubation:     Induction:  Intravenous    Intubated:  Postinduction    Mask Ventilation:  Easy mask    Attempts:  1    Attempted By:  CRNA    Method of Intubation:  Video laryngoscopy    Blade:  Carter 3    Laryngeal View Grade: Grade I - full view of cords      Difficult Airway Encountered?: No      Complications:  None    Airway Device:  Oral endotracheal tube    Airway Device Size:  7.0    Style/Cuff Inflation:  Cuffed (inflated to minimal occlusive pressure)    Tube secured:  21    Secured at:  The lips    Placement Verified By:  Capnometry    Complicating Factors:  None    Findings Post-Intubation:  BS equal bilateral and atraumatic/condition of teeth unchanged      Ready For Surgery From Anesthesia Perspective.     .

## 2024-03-28 NOTE — ASSESSMENT & PLAN NOTE
Pt with incomplete .  Has already expelled the fetus, but still with significant bleeding and tissue present in the uterus on exam.  Discussed inpatient cytotec with pad counts and serial Hb/hct vs proceeding with suction D&C.  Pt prefers to proceed with suction D&C.  Surgical consent reviewed in detail and signed.  OR notified.

## 2024-03-28 NOTE — BRIEF OP NOTE
[facility] - Surgery (Hospital)  Brief Operative Note    Surgery Date: 3/28/2024     Surgeon(s) and Role:     * Kristen Siu MD - Primary    Assisting Surgeon: None    Pre-op Diagnosis:  Incomplete  [O03.4]    Post-op Diagnosis:  Post-Op Diagnosis Codes:     * Incomplete  [O03.4]    Procedure(s) (LRB):  DILATION AND CURETTAGE, UTERUS, USING SUCTION (N/A)    Anesthesia: General    Operative Findings: 200 cc clot present in the vagina.  Cervix 1.5cm dilated with large amount of products of conception removed.  Endometrial cavity with gritty texture throughout at the end of the case.    Estimated Blood Loss: 200 mL         Specimens:   Specimen (24h ago, onward)       Start     Ordered    24 1426  Specimen to Pathology, Surgery Gynecology and Obstetrics  Once        Comments: Pre-op Diagnosis: Incomplete  [O03.4]Procedure(s):DILATION AND CURETTAGE, UTERUS, USING SUCTION Number of specimens: 1Name of specimens: 1) products of conception PERM     References:    Click here for ordering Quick Tip   Question Answer Comment   Procedure Type: Gynecology and Obstetrics    Specimen Class: Routine/Screening    Which provider would you like to cc? DAUTERIVE, KRISTEN    Release to patient Immediate        24 1428    24 1216  Specimen to Pathology, Surgery Gynecology and Obstetrics  Once        Comments: Pre-op Diagnosis: Incomplete  [O03.4]Procedure(s):DILATION AND CURETTAGE, UTERUS, USING SUCTION Number of specimens: 1Name of specimens: products of conception     References:    Click here for ordering Quick Tip   Question Answer Comment   Procedure Type: Gynecology and Obstetrics    Specimen Class: Routine/Screening    Which provider would you like to cc? DAUTERIVE, KRISTEN    Release to patient Immediate        24 1215                      Discharge Note    OUTCOME: Patient tolerated treatment/procedure well without complication and is now ready for discharge.    DISPOSITION:  Home or Self Care    FINAL DIAGNOSIS:  Incomplete     FOLLOWUP: In clinic    DISCHARGE INSTRUCTIONS:    Discharge Procedure Orders   Diet Adult Regular     No dressing needed     Pelvic Rest     Notify your health care provider if you experience any of the following:  temperature >100.4     Notify your health care provider if you experience any of the following:  persistent nausea and vomiting or diarrhea     Notify your health care provider if you experience any of the following:  severe uncontrolled pain     Notify your health care provider if you experience any of the following:  redness, tenderness, or signs of infection (pain, swelling, redness, odor or green/yellow discharge around incision site)     Notify your health care provider if you experience any of the following:  difficulty breathing or increased cough     Notify your health care provider if you experience any of the following:  severe persistent headache     Notify your health care provider if you experience any of the following:  worsening rash     Notify your health care provider if you experience any of the following:  persistent dizziness, light-headedness, or visual disturbances     Notify your health care provider if you experience any of the following:  increased confusion or weakness        Clinical Reference Documents Added to Patient Instructions         Document    DILATION AND CURETTAGE DISCHARGE INSTRUCTIONS (ENGLISH)    THREATENED MISCARRIAGE DISCHARGE INSTRUCTIONS (ENGLISH)    URINARY TRACT INFECTION DISCHARGE INSTRUCTIONS, ADULT (ENGLISH)

## 2024-03-28 NOTE — TRANSFER OF CARE
Anesthesia Transfer of Care Note    Patient: Anthony Sexton    Procedure(s) Performed: Procedure(s) (LRB):  DILATION AND CURETTAGE, UTERUS, USING SUCTION (N/A)    Patient location: PACU    Anesthesia Type: MAC    Transport from OR: Transported from OR on room air with adequate spontaneous ventilation    Post pain: adequate analgesia    Post assessment: no apparent anesthetic complications    Post vital signs: stable    Level of consciousness: responds to stimulation    Nausea/Vomiting: no nausea/vomiting    Complications: none    Transfer of care protocol was followed      Last vitals: Visit Vitals  BP (!) 141/85   Pulse 98   Temp 37.7 °C (99.9 °F) (Oral)   Resp 20   LMP 12/30/2023 (Exact Date)   SpO2 100%   Breastfeeding Unknown

## 2024-03-28 NOTE — H&P
[facility] - Emergency Dept.  Obstetrics  History & Physical    Patient Name: Anthony Sexton  MRN: [id number]  Admission Date: 3/28/2024  Primary Care Provider: Tyesha Leggett MD    Subjective:     Principal Problem:Incomplete     History of Present Illness:  34 y/o  with IUP at 9w4d presents to the ED with bleeding and cramping.  Noticed bleeding early this morning, then started cramping.  Cramping became more severe, c/w contractions and bleeding persisted, so pt came to the ED.  Ultrasound in the ED this morning confirmed viable IUP, CRL measured 9w3d, FHR 180bpm.  While awaiting dispo, pt's bleeding significantly increased, soaking 1 pad q 20 minutes and pain worsened.  I was called for further evaluation and recommendations.        OB History    Para Term  AB Living   3 2 2 0 0 2   SAB IAB Ectopic Multiple Live Births   0 0 0 0 2      # Outcome Date GA Lbr Trever/2nd Weight Sex Delivery Anes PTL Lv   3 Current            2 Term 20 38w2d 07:44 / 00:06 3.14 kg (6 lb 14.8 oz) F Vag-Spont EPI N DANIELLE      Name: KERVIN SEXTON      Apgar1: 9  Apgar5: 9   1 Term 16 37w0d  3.289 kg (7 lb 4 oz) F Vag-Spont EPI  DANIELLE      Complications: Severe hyperemesis gravidarum     Past Medical History:   Diagnosis Date    Abnormal Pap smear of cervix     Anemia     Anxiety     Chronic low back pain     Depression     Enthesitis related arthritis 2023    Fibromyalgia     GERD (gastroesophageal reflux disease)     History of abnormal cervical Pap smear     History of recurrent UTIs     History of ulcer disease     Irregular menses     Obesity     SAVANNA (obstructive sleep apnea) 2023    Postpartum depression     with last pregnancy.     Pre-eclampsia in postpartum period 2020    Vitamin D deficiency      Past Surgical History:   Procedure Laterality Date    ESOPHAGOGASTRODUODENOSCOPY N/A 2021    Procedure: EGD (ESOPHAGOGASTRODUODENOSCOPY);  Surgeon: Alka Olmstead MD;   Location: [facility];  Service: Endoscopy;  Laterality: N/A;    ESOPHAGOGASTRODUODENOSCOPY N/A 10/04/2021    Procedure: EGD (ESOPHAGOGASTRODUODENOSCOPY);  Surgeon: Alka Olmstead MD;  Location: [facility];  Service: Endoscopy;  Laterality: N/A;    ESOPHAGOGASTRODUODENOSCOPY N/A 05/29/2023    Procedure: EGD (ESOPHAGOGASTRODUODENOSCOPY);  Surgeon: Alka Olmstead MD;  Location: [facility];  Service: Endoscopy;  Laterality: N/A;    PH MONITORING, ESOPHAGUS, WIRELESS, (OFF REFLUX MEDS) N/A 05/29/2023    Procedure: PH MONITORING, ESOPHAGUS, WIRELESS, (OFF REFLUX MEDS);  Surgeon: Alka Olmstead MD;  Location: [facility];  Service: Endoscopy;  Laterality: N/A;    REDUCTION OF BOTH BREASTS Bilateral 05/27/2021    Procedure: MAMMOPLASTY, REDUCTION, BILATERAL;  Surgeon: Daniel Ba MD;  Location: [address];  Service: Plastics;  Laterality: Bilateral;  Left breast tissue: 1584  Right breast tissue: 1438    SEPTOPLASTY, NOSE, WITH NASAL TURBINATE REDUCTION Bilateral 01/17/2024    Procedure: SEPTOPLASTY, NOSE, WITH NASAL TURBINATE REDUCTION;  Surgeon: Corey Lucio MD;  Location: [facility];  Service: ENT;  Laterality: Bilateral;       (Not in a hospital admission)      Review of patient's allergies indicates:   Allergen Reactions    Procardia [nifedipine]         Family History       Problem Relation (Age of Onset)    Asthma Mother    Breast cancer Maternal Aunt    Diabetes Maternal Grandmother, Mother, Maternal Aunt    Heart disease Father (55)    Hyperlipidemia Father, Mother    Hypertension Father, Mother    Insulin resistance Sister    No Known Problems Maternal Grandfather, Brother, Sister, Daughter          Tobacco Use    Smoking status: Never     Passive exposure: Never    Smokeless tobacco: Never   Substance and Sexual Activity    Alcohol use: Not Currently     Alcohol/week: 0.0 standard drinks of alcohol    Drug use: No    Sexual activity: Yes     Partners: Male     Comment: Not interested  in having further kids     Review of Systems   Constitutional:  Negative for fatigue, fever and unexpected weight change.   Gastrointestinal:  Positive for abdominal pain. Negative for constipation, diarrhea, nausea and vomiting.   Genitourinary:  Positive for vaginal bleeding. Negative for dysuria, frequency, urgency, vaginal discharge, vaginal pain, postcoital bleeding and vaginal odor.      Objective:     Vital Signs (Most Recent):  Temp: 99.9 °F (37.7 °C) (03/28/24 0610)  Pulse: 98 (03/28/24 1100)  Resp: 20 (03/28/24 0610)  BP: (!) 141/85 (03/28/24 1100)  SpO2: 100 % (03/28/24 1100) Vital Signs (24h Range):  Temp:  [99.9 °F (37.7 °C)] 99.9 °F (37.7 °C)  Pulse:  [97-98] 98  Resp:  [20] 20  SpO2:  [100 %] 100 %  BP: (136-141)/(68-85) 141/85        There is no height or weight on file to calculate BMI.    es     Physical Exam:   Constitutional: She is oriented to person, place, and time. She appears well-developed and well-nourished. She appears distressed (appears uncomfortable).             Abdominal: Soft. She exhibits no distension and no mass. There is no abdominal tenderness. There is no rebound and no guarding. Hernia confirmed negative in the right inguinal area and confirmed negative in the left inguinal area.     Genitourinary:    Pelvic exam was performed with patient supine.   There is no rash, tenderness, lesion or injury on the right labia. There is no rash, tenderness, lesion or injury on the left labia. Right adnexum displays no mass, no tenderness and no fullness. Left adnexum displays no mass, no tenderness and no fullness. There is bleeding (large amount of clot present in the vagina) in the vagina. No erythema, vaginal discharge, tenderness, rectocele, cystocele or prolapse of vaginal walls in the vagina.    No foreign body in the vagina.      No signs of injury in the vagina.   Cervix exhibits no motion tenderness, no discharge and no friability. Uterus is not deviated, not enlarged, not fixed  and not tender. Uterus size: 10 cm.   Genitourinary Comments: Before speculum exam was performed, there was some pink tissue at the introitus; upon further inspection, this appears to be the fetus.  This was placed in a specimen cup and sent to pathology.  Bimanual and speculum exam was then performed    Cervix is about 3cm dilated, heavy bleeding coming from the cervix, and feels like there is still some pregnancy tissue within the uterus                 Neurological: She is alert and oriented to person, place, and time.     Psychiatric: She has a normal mood and affect.             Significant Labs:  Lab Results   Component Value Date    GROUPTRH B POS 2024    HEPBSAG Non-reactive 2024    STREPBCULT No Group B Streptococcus isolated 2020       BMP:   Recent Labs   Lab 24  0642   GLU 83   *   K 4.1      CO2 18*   BUN 8   CREATININE 0.8   CALCIUM 9.4     CBC:   Recent Labs   Lab 24  0642   WBC 16.45*   RBC 5.13   HGB 12.7   HCT 40.5      MCV 79*   MCH 24.8*   MCHC 31.4*     Recent Labs   Lab 24  0643   COLORU Orange*   SPECGRAV >1.030*   PHUR 6.0   PROTEINUA 1+*   BACTERIA Rare   NITRITE Negative   LEUKOCYTESUR 3+*   UROBILINOGEN Negative   HYALINECASTS 0     Ultrasound reviewed  Assessment/Plan:     33 y.o. female  at 9w4d for:    * Incomplete   Pt with incomplete .  Has already expelled the fetus, but still with significant bleeding and tissue present in the uterus on exam.  Discussed inpatient cytotec with pad counts and serial Hb/hct vs proceeding with suction D&C.  Pt prefers to proceed with suction D&C.  Surgical consent reviewed in detail and signed.  OR notified.        Kirsten Siu MD  Obstetrics  [facility] - Emergency Dept.

## 2024-03-28 NOTE — OP NOTE
Operative Note       SURGERY DATE:  3/28/2024     PRE-OP DIAGNOSIS:  Incomplete  [O03.4]    POST-OP DIAGNOSIS:  Incomplete  [O03.4]    Procedure(s) (LRB):  DILATION AND CURETTAGE, UTERUS, USING SUCTION (N/A)    Surgeon(s) and Role:     * Kirsten Siu MD - Primary    ASSISTANT:  None    TASKS PERFORMED BY ASSISTANT:  No assistant    ANESTHESIA: General    FINDINGS: 200 cc clot present in the vagina.  Cervix 1.5cm dilated with large amount of products of conception removed.  Endometrial cavity with gritty texture throughout at the end of the case.    GRAFTS/IMPLANTS:  None    ESTIMATED BLOOD LOSS:  200  mL              COMPLICATIONS:  None    SPECIMEN:   products of conception    DESCRIPTION OF PROCEDURE:    The patient was taken to the Operating Room where general        MAC anesthesia was induced and found to be adequate.    Her perineum was then prepped and draped in normal sterile fashion, and      bladder was drained in an in and out fashion with the red rubber             catheter.  Time out was performed.                                                                    About 200cc blood clot was evacuated from the vagina. A weighted sterile speculum was   then placed in the patient's vagina, and the anterior lip of the cervix      was grasped with single-tooth tenaculum.  The uterine cervix was already about 1.5cm dilated.  A 10mm suction curette was     then advanced into the uterus and suction was applied.  Several passes       were  made and products of conception were removed.  Sharp      curettage was then performed.    Repeated suction curettage was then performed until it felt        that  all products of conception were removed.  Sharp curettage was then   performed again, and a gritty texture was noted on all 4 walls of the uterus.       The patient had no bleeding at the end of the case.    The single-tooth tenaculum was removed from the anterior lip of the cervix and excellent  hemostasis was noted.  Repeat bimanual exam revealed the uterus to be about 8 week size and firm.   Sponge, lap and needle counts were correct  X 2.  She will go to Recovery in stable condition.               CONDITION: Good    DISPOSITION: PACU - hemodynamically stable.

## 2024-04-01 LAB — BACTERIA UR CULT: ABNORMAL

## 2024-04-02 LAB
FINAL PATHOLOGIC DIAGNOSIS: NORMAL
GROSS: NORMAL
Lab: NORMAL

## 2024-04-03 ENCOUNTER — OFFICE VISIT (OUTPATIENT)
Dept: PSYCHIATRY | Facility: CLINIC | Age: 34
End: 2024-04-03
Payer: COMMERCIAL

## 2024-04-03 DIAGNOSIS — R69 UNDIAGNOSED DISEASE OR SYNDROME PRESENT: Primary | ICD-10-CM

## 2024-04-03 PROCEDURE — 90834 PSYTX W PT 45 MINUTES: CPT | Mod: 95,,, | Performed by: STUDENT IN AN ORGANIZED HEALTH CARE EDUCATION/TRAINING PROGRAM

## 2024-04-03 PROCEDURE — 3044F HG A1C LEVEL LT 7.0%: CPT | Mod: CPTII,95,, | Performed by: STUDENT IN AN ORGANIZED HEALTH CARE EDUCATION/TRAINING PROGRAM

## 2024-04-03 PROCEDURE — 1159F MED LIST DOCD IN RCRD: CPT | Mod: CPTII,95,, | Performed by: STUDENT IN AN ORGANIZED HEALTH CARE EDUCATION/TRAINING PROGRAM

## 2024-04-04 ENCOUNTER — PATIENT MESSAGE (OUTPATIENT)
Dept: FAMILY MEDICINE | Facility: CLINIC | Age: 34
End: 2024-04-04

## 2024-04-04 ENCOUNTER — TELEPHONE (OUTPATIENT)
Dept: CARDIOLOGY | Facility: HOSPITAL | Age: 34
End: 2024-04-04
Payer: COMMERCIAL

## 2024-04-04 ENCOUNTER — OFFICE VISIT (OUTPATIENT)
Dept: FAMILY MEDICINE | Facility: CLINIC | Age: 34
End: 2024-04-04
Attending: FAMILY MEDICINE
Payer: COMMERCIAL

## 2024-04-04 DIAGNOSIS — R51.9 ACUTE NONINTRACTABLE HEADACHE, UNSPECIFIED HEADACHE TYPE: Primary | ICD-10-CM

## 2024-04-04 DIAGNOSIS — R11.0 NAUSEA: ICD-10-CM

## 2024-04-04 PROCEDURE — 3044F HG A1C LEVEL LT 7.0%: CPT | Mod: CPTII,95,, | Performed by: FAMILY MEDICINE

## 2024-04-04 PROCEDURE — 99213 OFFICE O/P EST LOW 20 MIN: CPT | Mod: 95,,, | Performed by: FAMILY MEDICINE

## 2024-04-04 RX ORDER — TRAMADOL HYDROCHLORIDE 50 MG/1
50 TABLET ORAL EVERY 8 HOURS PRN
Qty: 15 TABLET | Refills: 0 | Status: SHIPPED | OUTPATIENT
Start: 2024-04-04 | End: 2024-04-09

## 2024-04-04 NOTE — PROGRESS NOTES
[facility] [place] - [facility]  Psychology  Progress Note  Individual Psychotherapy (PhD/LCSW)    Patient Name: Anthony Sexton  MRN: [id number]    Patient Class: OP- [facility] Out[facility]  Primary Care Provider: Tyesha Leggett MD    Psychiatry Initial Visit (PhD/LCSW)  Individual Psychotherapy - CPT 11502  EAP #4 for 2024    Date: 4/3/2024    Site: [place]    The patient location is: Patient's home/ Patient reported that his/her location at the time of this visit was in the [facility]     Visit type: Virtual visit with synchronous audio and video     Each patient to whom he or she provides medical services by telemedicine is: (1) informed of the relationship between the physician and patient and the respective role of any other health care provider with respect to management of the patient; and (2) notified that he or she may decline to receive medical services by telemedicine and may withdraw from such care at any time.     Referral source: Lenora Walters MD    Clinical status of patient: Outpatient    Anthony Sexton, a 33 y.o. female, for initial evaluation visit.  Met with patient.    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     Substance  "use:   Alcohol: none   Drugs: none   Tobacco: none   Caffeine: none    Current medications and drug reactions (include OTC, herbal): see medication list     Strengths and liabilities: Strength: Patient accepts guidance/feedback, Strength: Patient is expressive/articulate., Strength: Patient is intelligent., Strength: Patient is motivated for change., Strength: Patient has positive support network., Strength: Patient has reasonable judgment., Strength: Patient is stable., Liability: Patient lacks coping skills.    Current Evaluation:     Mental Status Exam:  General Appearance:  unremarkable, age appropriate   Speech: normal tone, normal rate, normal pitch, normal volume      Level of Cooperation: cooperative      Thought Processes: normal and logical   Mood: anxious, sad      Thought Content: normal, no suicidality, no homicidality, delusions, or paranoia   Affect: congruent and appropriate   Orientation: Oriented x3   Memory: recent >  intact   Attention Span & Concentration: intact   Fund of General Knowledge: intact and appropriate to age and level of education   Abstract Reasoning: not assessed   Judgment & Insight: fair     Language  intact     Summary: The patient experienced a health-related trauma over the weekend that she identified as "traumatic." She reported that her feelings are "all over the place" and oscillate between sadness, anger, guilt, and relief. Her coworkers, family, and sorors have been very supportive. Coping strategies, such as behavioral activation and countering negative self-talk with cognitive restructuring was discussed. Patient will schedule additional sessions with staff.   Diagnostic Impression - Plan:       ICD-10-CM ICD-9-CM   1. Undiagnosed disease or syndrome present  R69 799.9                                                   Plan:individual psychotherapy    Return to Clinic: as scheduled    Length of Service (minutes): 45          Barbra Bartlett, PhD  Psychologist  [facility]

## 2024-04-04 NOTE — PROGRESS NOTES
Anthony Sexton    Chief Complaint   Patient presents with    Headache       History of Present Illness:   The patient location is: Home (In [place])  The chief complaint leading to consultation is: Headache    Visit type: audiovisual    Face to Face time with patient: 25 minutes with 25 minutes of total time spent on the encounter, which includes face to face time and non-face to face time preparing to see the patient (eg, review of tests), Obtaining and/or reviewing separately obtained history, Documenting clinical information in the electronic or other health record, Independently interpreting results (not separately reported) and communicating results to the patient/family/caregiver, or Care coordination (not separately reported).         Each patient to whom he or she provides medical services by telemedicine is:  (1) informed of the relationship between the physician and patient and the respective role of any other health care provider with respect to management of the patient; and (2) notified that he or she may decline to receive medical services by telemedicine and may withdraw from such care at any time.    Notes:     Ms. Sexton visits with me with the following complaint:    Reports miscarriage at 9 weeks EGA (3/28/2024) on last Thursday and required D&C.  Reports last few days intermittent migrating HA (top or posterior) (takes Tylenol, Ibuprofen w/subtle dulling of HA) (6/10 now, 8/10 at worst) with persistent nausea (w/wo food) and takes Zofran w/o help. Has Phenergan but makes her sleepy and hesitant to take as has smaller kids at home. Occasionally feels dizzy without syncope. Denies sinus symptoms. BP okay. HA not feels like Migraine to her.      Current Outpatient Medications   Medication Sig    cholecalciferol, vitamin D3, (VITAMIN D3) 50 mcg (2,000 unit) Tab Take 2,000 Units by mouth once daily.    ibuprofen (ADVIL,MOTRIN) 600 MG tablet Take 1 tablet (600 mg total) by mouth every 8 (eight) hours as  needed for Pain.    ondansetron (ZOFRAN-ODT) 4 MG TbDL Take 1 tablet (4 mg total) by mouth every 6 (six) hours as needed (vomiting).    HYDROcodone-acetaminophen (NORCO) 5-325 mg per tablet Take 1 tablet by mouth every 6 (six) hours as needed for Pain. (Patient not taking: Reported on 4/4/2024)    promethazine (PHENERGAN) 12.5 MG Tab Take 1 tablet (12.5 mg total) by mouth 4 (four) times daily. (Patient not taking: Reported on 4/4/2024)       Review of Systems   Constitutional:  Negative for activity change, appetite change, chills, fatigue and fever.   Eyes:  Negative for discharge and visual disturbance.   Respiratory:  Negative for chest tightness and wheezing.    Cardiovascular:  Negative for chest pain and palpitations.   Gastrointestinal:  Positive for nausea. Negative for abdominal pain, blood in stool, constipation, diarrhea and vomiting.   Endocrine: Negative for polydipsia and polyuria.   Genitourinary:  Negative for difficulty urinating, dysuria, hematuria and menstrual problem.   Musculoskeletal:  Negative for arthralgias, joint swelling and neck pain.   Neurological:  Positive for headaches. Negative for weakness.   Psychiatric/Behavioral:  Positive for dysphoric mood. Negative for confusion and suicidal ideas. The patient is not nervous/anxious.        Objective:  Physical Exam  Constitutional:       Appearance: Normal appearance.      Comments: Pleasant.   Pulmonary:      Effort: Pulmonary effort is normal. No respiratory distress.   Musculoskeletal:         General: Normal range of motion.      Comments: She moves around her home without problems during virtual visit.   Neurological:      General: No focal deficit present.      Mental Status: She is alert and oriented to person, place, and time.   Psychiatric:         Behavior: Behavior normal.         Thought Content: Thought content normal.         Judgment: Judgment normal.         ASSESSMENT:  1. Acute nonintractable headache, unspecified headache  type    2. Nausea        PLAN:  Anthony was seen today for headache.    Diagnoses and all orders for this visit:    Acute nonintractable headache, unspecified headache type  -     traMADoL (ULTRAM) 50 mg tablet; Take 1 tablet (50 mg total) by mouth every 8 (eight) hours as needed (headaches).    Nausea        Continue current medications, follow low sodium, low cholesterol, low carb diet, daily walks.  Try Tramadol 50 mg every 8 hours prn headache; medication precautions discussed with patient.  Patient stated she will take Phenergan for nausea as her  will be home.  Keep follow up with specialists.  Follow up if symptoms worsen or fail to improve.

## 2024-04-09 ENCOUNTER — OFFICE VISIT (OUTPATIENT)
Dept: URGENT CARE | Facility: CLINIC | Age: 34
End: 2024-04-09
Payer: COMMERCIAL

## 2024-04-09 ENCOUNTER — PATIENT MESSAGE (OUTPATIENT)
Dept: PSYCHIATRY | Facility: CLINIC | Age: 34
End: 2024-04-09
Payer: COMMERCIAL

## 2024-04-09 ENCOUNTER — TELEPHONE (OUTPATIENT)
Dept: PSYCHIATRY | Facility: CLINIC | Age: 34
End: 2024-04-09
Payer: COMMERCIAL

## 2024-04-09 VITALS
HEART RATE: 84 BPM | TEMPERATURE: 98 F | BODY MASS INDEX: 42.54 KG/M2 | RESPIRATION RATE: 18 BRPM | OXYGEN SATURATION: 100 % | SYSTOLIC BLOOD PRESSURE: 144 MMHG | HEIGHT: 67 IN | WEIGHT: 271.06 LBS | DIASTOLIC BLOOD PRESSURE: 70 MMHG

## 2024-04-09 DIAGNOSIS — I10 ELEVATED BLOOD PRESSURE READING IN OFFICE WITH DIAGNOSIS OF HYPERTENSION: ICD-10-CM

## 2024-04-09 DIAGNOSIS — R10.9 ABDOMINAL PAIN, UNSPECIFIED ABDOMINAL LOCATION: ICD-10-CM

## 2024-04-09 DIAGNOSIS — N30.91 CYSTITIS WITH HEMATURIA: Primary | ICD-10-CM

## 2024-04-09 DIAGNOSIS — Z87.59 HISTORY OF MISCARRIAGE: ICD-10-CM

## 2024-04-09 LAB
BILIRUB UR QL STRIP: NEGATIVE
GLUCOSE UR QL STRIP: NEGATIVE
KETONES UR QL STRIP: NEGATIVE
LEUKOCYTE ESTERASE UR QL STRIP: NEGATIVE
PH, POC UA: 5
POC BLOOD, URINE: POSITIVE
POC NITRATES, URINE: NEGATIVE
PROT UR QL STRIP: POSITIVE
SP GR UR STRIP: 1.02 (ref 1–1.03)
UROBILINOGEN UR STRIP-ACNC: NORMAL (ref 0.1–1.1)

## 2024-04-09 PROCEDURE — 99214 OFFICE O/P EST MOD 30 MIN: CPT | Mod: S$GLB,,, | Performed by: NURSE PRACTITIONER

## 2024-04-09 PROCEDURE — 81003 URINALYSIS AUTO W/O SCOPE: CPT | Mod: QW,S$GLB,, | Performed by: NURSE PRACTITIONER

## 2024-04-09 PROCEDURE — 87086 URINE CULTURE/COLONY COUNT: CPT | Performed by: NURSE PRACTITIONER

## 2024-04-09 RX ORDER — NITROFURANTOIN 25; 75 MG/1; MG/1
100 CAPSULE ORAL 2 TIMES DAILY
Qty: 14 CAPSULE | Refills: 0 | Status: SHIPPED | OUTPATIENT
Start: 2024-04-09 | End: 2024-04-16

## 2024-04-09 NOTE — PATIENT INSTRUCTIONS
Take the antibiotics to completion.     Drink plenty of fluids, wipe front to back, take showers not baths, no scented soaps, wear breathable cotton underwear, urinate after sexual intercourse.     A urine culture was sent. You will be contacted once it results and appropriate action will be taken if needed.     Please go to the ER for worsening symptoms including fever, worsening flank pain, vomiting, etc.     Please return or see your primary care doctor if you develop new or worsening symptoms.     Please arrange follow up with your primary medical clinic as soon as possible. You must understand that you've received an Urgent Care treatment only and that you may be released before all of your medical problems are known or treated. You, the patient, will arrange for follow up as instructed. If your symptoms worsen or fail to improve you should go to the Emergency Room.

## 2024-04-09 NOTE — PROGRESS NOTES
"Subjective:      Patient ID: Anthony Sexton is a 33 y.o. female.    Vitals:  height is 5' 6.54" (1.69 m) and weight is 123 kg (271 lb 0.9 oz). Her tympanic temperature is 98.1 °F (36.7 °C). Her blood pressure is 144/70 (abnormal) and her pulse is 84. Her respiration is 18 and oxygen saturation is 100%.     Chief Complaint: Abdominal Pain    Patient is a 33-year-old female who presents for evaluation of dysuria and pelvic pressure.  Onset 4 days ago.  She states she recently had a miscarriage followed by a D&C.  At the time of the miscarried she had a UTI and states symptoms feel the same.  She denies any fever, chills, nausea, vomiting, flank pain or hematuria.  No other concerns voiced.    Abdominal Pain  This is a new problem. The current episode started in the past 7 days. The problem has been gradually worsening. The pain is located in the RLQ. The pain is at a severity of 7/10. The quality of the pain is aching and dull. The abdominal pain radiates to the RUQ. Associated symptoms include dysuria, frequency and headaches. Pertinent negatives include no constipation, diarrhea, fever, hematuria, nausea or vomiting. She has tried acetaminophen (tramadol) for the symptoms. The treatment provided mild relief.       Constitution: Negative for fever.   Respiratory:  Negative for cough, shortness of breath, stridor and wheezing.    Gastrointestinal:  Negative for abdominal pain, nausea, vomiting, constipation and diarrhea.   Genitourinary:  Positive for dysuria, frequency, urgency and pelvic pain. Negative for flank pain, hematuria and history of kidney stones.   Skin:  Negative for rash.   Neurological:  Positive for headaches.      Objective:     Physical Exam   Constitutional: She is oriented to person, place, and time. She appears well-developed. She is cooperative.  Non-toxic appearance. She does not appear ill. No distress.   HENT:   Head: Normocephalic and atraumatic.   Ears:   Right Ear: Hearing and external ear " normal.   Left Ear: Hearing and external ear normal.   Nose: Nose normal. No mucosal edema, rhinorrhea or nasal deformity. No epistaxis. Right sinus exhibits no maxillary sinus tenderness and no frontal sinus tenderness. Left sinus exhibits no maxillary sinus tenderness and no frontal sinus tenderness.   Mouth/Throat: Uvula is midline, oropharynx is clear and moist and mucous membranes are normal. No trismus in the jaw. Normal dentition. No uvula swelling. No oropharyngeal exudate, posterior oropharyngeal edema or posterior oropharyngeal erythema.   Eyes: Conjunctivae and lids are normal. No scleral icterus.   Neck: Trachea normal and phonation normal. Neck supple. No edema present. No erythema present. No neck rigidity present.   Cardiovascular: Normal rate, regular rhythm, normal heart sounds and normal pulses.   Pulmonary/Chest: Effort normal and breath sounds normal. No respiratory distress. She has no decreased breath sounds. She has no wheezes. She has no rhonchi. She has no rales.   Abdominal: Normal appearance and bowel sounds are normal. She exhibits no distension and no mass. Soft. flat abdomen There is no abdominal tenderness. There is no rebound, no guarding, no left CVA tenderness and no right CVA tenderness.   Musculoskeletal: Normal range of motion.         General: No deformity. Normal range of motion.   Neurological: She is alert and oriented to person, place, and time. She exhibits normal muscle tone. Coordination normal.   Skin: Skin is warm, dry, intact, not diaphoretic and not pale.   Psychiatric: Her speech is normal and behavior is normal. Judgment and thought content normal.   Nursing note and vitals reviewed.      Assessment:     1. Cystitis with hematuria    2. Abdominal pain, unspecified abdominal location    3. Elevated blood pressure reading in office with diagnosis of hypertension    4. History of miscarriage        Plan:       Cystitis with hematuria  -     CULTURE, URINE    Abdominal  pain, unspecified abdominal location  -     POCT Urinalysis, Dipstick, Automated, W/O Scope  -     CULTURE, URINE    Elevated blood pressure reading in office with diagnosis of hypertension    History of miscarriage    Other orders  -     nitrofurantoin, macrocrystal-monohydrate, (MACROBID) 100 MG capsule; Take 1 capsule (100 mg total) by mouth 2 (two) times daily. for 7 days  Dispense: 14 capsule; Refill: 0          Medical Decision Making:   Initial Assessment:   Nontoxic appearing 32 yo female c/o dysuria.  After complete evaluation, including thorough history and physical exam, presentation is most consistent with uncomplicated UTI. The patient has no severe flank pain or systemic symptoms to suggest pyelonephritis or sepsis. Physical exam is inconsistent with nephrolithiasis or acute intra-abdominal infection. Patient has no signs of acute distress, vital signs are stable. Patient is tolerating PO  is stable for D/C with PO antibiotics. Patient instructed to drink plenty of water. The patient was informed of findings, and recommended to follow-up with PCP for further management and ER precautions were given.       Clinical Tests:   Lab Tests: Reviewed  The following lab test(s) were unremarkable: Urinalysis  Patient is seen in clinic with known history of essential hypertension noted to be elevated above goal today in clinic.  Patient was counseled that blood pressure was elevated. I advised adherence to current medication regime, low-salt heart smart diet, exercise and self-monitoring.  Patient follow up with primary physician for long-term management adjustments as needed.           Results for orders placed or performed in visit on 04/09/24   POCT Urinalysis, Dipstick, Automated, W/O Scope   Result Value Ref Range    POC Blood, Urine Positive (A) Negative    POC Bilirubin, Urine Negative Negative    POC Urobilinogen, Urine Normal 0.1 - 1.1    POC Ketones, Urine Negative Negative    POC Protein, Urine  Positive (A) Negative    POC Nitrates, Urine Negative Negative    POC Glucose, Urine Negative Negative    pH, UA 5.0     POC Specific Gravity, Urine 1.025 1.003 - 1.029    POC Leukocytes, Urine Negative Negative     *Note: Due to a large number of results and/or encounters for the requested time period, some results have not been displayed. A complete set of results can be found in Results Review.     Patient Instructions   Take the antibiotics to completion.     Drink plenty of fluids, wipe front to back, take showers not baths, no scented soaps, wear breathable cotton underwear, urinate after sexual intercourse.     A urine culture was sent. You will be contacted once it results and appropriate action will be taken if needed.     Please go to the ER for worsening symptoms including fever, worsening flank pain, vomiting, etc.     Please return or see your primary care doctor if you develop new or worsening symptoms.     Please arrange follow up with your primary medical clinic as soon as possible. You must understand that you've received an Urgent Care treatment only and that you may be released before all of your medical problems are known or treated. You, the patient, will arrange for follow up as instructed. If your symptoms worsen or fail to improve you should go to the Emergency Room.

## 2024-04-10 ENCOUNTER — OFFICE VISIT (OUTPATIENT)
Dept: PSYCHIATRY | Facility: CLINIC | Age: 34
End: 2024-04-10
Payer: COMMERCIAL

## 2024-04-10 DIAGNOSIS — R69 UNDIAGNOSED DISEASE OR SYNDROME PRESENT: Primary | ICD-10-CM

## 2024-04-10 PROCEDURE — 3044F HG A1C LEVEL LT 7.0%: CPT | Mod: CPTII,95,, | Performed by: STUDENT IN AN ORGANIZED HEALTH CARE EDUCATION/TRAINING PROGRAM

## 2024-04-10 PROCEDURE — 1159F MED LIST DOCD IN RCRD: CPT | Mod: CPTII,95,, | Performed by: STUDENT IN AN ORGANIZED HEALTH CARE EDUCATION/TRAINING PROGRAM

## 2024-04-10 PROCEDURE — 90834 PSYTX W PT 45 MINUTES: CPT | Mod: 95,,, | Performed by: STUDENT IN AN ORGANIZED HEALTH CARE EDUCATION/TRAINING PROGRAM

## 2024-04-11 LAB
BACTERIA UR CULT: NORMAL
BACTERIA UR CULT: NORMAL

## 2024-04-15 ENCOUNTER — TELEPHONE (OUTPATIENT)
Dept: PSYCHIATRY | Facility: CLINIC | Age: 34
End: 2024-04-15
Payer: COMMERCIAL

## 2024-04-17 ENCOUNTER — OFFICE VISIT (OUTPATIENT)
Dept: PSYCHIATRY | Facility: CLINIC | Age: 34
End: 2024-04-17
Payer: COMMERCIAL

## 2024-04-17 DIAGNOSIS — R69 UNDIAGNOSED DISEASE OR SYNDROME PRESENT: Primary | ICD-10-CM

## 2024-04-17 PROCEDURE — 1159F MED LIST DOCD IN RCRD: CPT | Mod: CPTII,95,, | Performed by: STUDENT IN AN ORGANIZED HEALTH CARE EDUCATION/TRAINING PROGRAM

## 2024-04-17 PROCEDURE — 90834 PSYTX W PT 45 MINUTES: CPT | Mod: 95,,, | Performed by: STUDENT IN AN ORGANIZED HEALTH CARE EDUCATION/TRAINING PROGRAM

## 2024-04-17 PROCEDURE — 3044F HG A1C LEVEL LT 7.0%: CPT | Mod: CPTII,95,, | Performed by: STUDENT IN AN ORGANIZED HEALTH CARE EDUCATION/TRAINING PROGRAM

## 2024-04-18 ENCOUNTER — TELEPHONE (OUTPATIENT)
Dept: PSYCHIATRY | Facility: CLINIC | Age: 34
End: 2024-04-18
Payer: COMMERCIAL

## 2024-04-18 NOTE — PROGRESS NOTES
[facility] [place] - [facility] Psych  Psychology  Progress Note  Individual Psychotherapy (PhD/LCSW)    Patient Name: Anthony Sexton  MRN: [id number]    Patient Class: OP- Hospital Outpatient Clinic  Primary Care Provider: Tyesha Leggett MD    Psychiatry Initial Visit (PhD/LCSW)  Individual Psychotherapy - CPT 42450  EAP #5 for 2024    Date: 4/10/2024    Site: [place]    The patient location is: Patient's home/ Patient reported that his/her location at the time of this visit was in the [facility]     Visit type: Virtual visit with synchronous audio and video     Each patient to whom he or she provides medical services by telemedicine is: (1) informed of the relationship between the physician and patient and the respective role of any other health care provider with respect to management of the patient; and (2) notified that he or she may decline to receive medical services by telemedicine and may withdraw from such care at any time.     Referral source: Lenora Walters MD    Clinical status of patient: Outpatient    Anthony Sexton, a 33 y.o. female, for initial evaluation visit.  Met with patient.    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     Substance  "use:   Alcohol: none   Drugs: none   Tobacco: none   Caffeine: none    Current medications and drug reactions (include OTC, herbal): see medication list     Strengths and liabilities: Strength: Patient accepts guidance/feedback, Strength: Patient is expressive/articulate., Strength: Patient is intelligent., Strength: Patient is motivated for change., Strength: Patient has positive support network., Strength: Patient has reasonable judgment., Strength: Patient is stable., Liability: Patient lacks coping skills.    Current Evaluation:     Mental Status Exam:  General Appearance:  unremarkable, age appropriate   Speech: normal tone, normal rate, normal pitch, normal volume      Level of Cooperation: cooperative      Thought Processes: normal and logical   Mood: anxious, sad      Thought Content: normal, no suicidality, no homicidality, delusions, or paranoia   Affect: congruent and appropriate   Orientation: Oriented x3   Memory: recent >  intact   Attention Span & Concentration: intact   Fund of General Knowledge: intact and appropriate to age and level of education   Abstract Reasoning: not assessed   Judgment & Insight: fair     Language  intact     Summary: The patient continues to struggle with the aftermath of a recent medical emergency. Her coping strategy is to keep busy, which resulted in her doing "too much" over the weekend and feeling ill. Patient encouraged to prioritize rest, when possible. Will meet weekly for the next couple of months.   Diagnostic Impression - Plan:       ICD-10-CM ICD-9-CM   1. Undiagnosed disease or syndrome present  R69 799.9                                                   Plan:individual psychotherapy    Return to Clinic: as scheduled    Length of Service (minutes): 45          Barbra Bartlett, PhD  Psychologist  [facility]                        "

## 2024-04-25 ENCOUNTER — OFFICE VISIT (OUTPATIENT)
Dept: OTOLARYNGOLOGY | Facility: CLINIC | Age: 34
End: 2024-04-25
Payer: COMMERCIAL

## 2024-04-25 ENCOUNTER — OFFICE VISIT (OUTPATIENT)
Dept: PSYCHIATRY | Facility: CLINIC | Age: 34
End: 2024-04-25
Payer: COMMERCIAL

## 2024-04-25 ENCOUNTER — TELEPHONE (OUTPATIENT)
Dept: PSYCHIATRY | Facility: CLINIC | Age: 34
End: 2024-04-25
Payer: COMMERCIAL

## 2024-04-25 DIAGNOSIS — Z98.890 HISTORY OF NASAL SEPTOPLASTY: ICD-10-CM

## 2024-04-25 DIAGNOSIS — R69 UNDIAGNOSED DISEASE OR SYNDROME PRESENT: Primary | ICD-10-CM

## 2024-04-25 DIAGNOSIS — J34.2 NASAL SEPTAL DEVIATION: ICD-10-CM

## 2024-04-25 DIAGNOSIS — J30.9 ALLERGIC RHINITIS, UNSPECIFIED SEASONALITY, UNSPECIFIED TRIGGER: ICD-10-CM

## 2024-04-25 DIAGNOSIS — G47.33 OSA (OBSTRUCTIVE SLEEP APNEA): Primary | ICD-10-CM

## 2024-04-25 PROCEDURE — 3044F HG A1C LEVEL LT 7.0%: CPT | Mod: CPTII,95,, | Performed by: STUDENT IN AN ORGANIZED HEALTH CARE EDUCATION/TRAINING PROGRAM

## 2024-04-25 PROCEDURE — 99213 OFFICE O/P EST LOW 20 MIN: CPT | Mod: S$GLB,,, | Performed by: STUDENT IN AN ORGANIZED HEALTH CARE EDUCATION/TRAINING PROGRAM

## 2024-04-25 PROCEDURE — 99999 PR PBB SHADOW E&M-EST. PATIENT-LVL II: CPT | Mod: PBBFAC,,, | Performed by: STUDENT IN AN ORGANIZED HEALTH CARE EDUCATION/TRAINING PROGRAM

## 2024-04-25 PROCEDURE — 3044F HG A1C LEVEL LT 7.0%: CPT | Mod: CPTII,S$GLB,, | Performed by: STUDENT IN AN ORGANIZED HEALTH CARE EDUCATION/TRAINING PROGRAM

## 2024-04-25 PROCEDURE — 90834 PSYTX W PT 45 MINUTES: CPT | Mod: 95,,, | Performed by: STUDENT IN AN ORGANIZED HEALTH CARE EDUCATION/TRAINING PROGRAM

## 2024-04-25 PROCEDURE — 1159F MED LIST DOCD IN RCRD: CPT | Mod: CPTII,S$GLB,, | Performed by: STUDENT IN AN ORGANIZED HEALTH CARE EDUCATION/TRAINING PROGRAM

## 2024-04-25 NOTE — PROGRESS NOTES
Referring Provider:    No referring provider defined for this encounter.  Subjective:   Patient: Anthony Sexton [id number], :1990   Visit date:2024 1:25 PM    Chief Complaint:  Follow-up (Sinus, reports improved breathing through nostrils )    HPI:    Prior notes reviewed by myself.  Clinical documentation obtained by nursing staff reviewed.     Symptoms worsened abouut 1 month ago. Started after a viral infection. Treated with anitbiotics, flonase, cough medication, decadron (stopped after one dose - heart fluttering), and astelin.    Since then she has continued to have ear fullness, nasal congestion, facial pressure, post nasal drip, hyposmia. She was doing better with sleep, but now having more trouble with breathing at night since this started. No purulent rhinorrhea.     Sinonasal symptoms now present for over a year, and do not resolve between episodes.     She has been on flonase daily.   Has not had allergy testing.     Return clinic visit, 23  Since last visit she continues to have significant issues with left nasal obstruction. Overall for last year or so, progressively worsening. All the time. Worse at night. Only temporary improvement flonase and BID astelin despite consistent use for ~6 months.  She denies facial pressure/pain, purulent rhinorrhea, or anosmia/hyposmia.    Return clinic visit, 24  Doing well  Nasal breathing has much improved  Still some mild congestion when allergies flare, but overall much better  No significant nose bleeding  Some tenderness at times when she blows her nose  No ear issues  Snoring also much improved and morning headaches have resolved      Objective:     Physical Exam:  Vitals:  LMP 2023 (Exact Date) Comment: suction d&c  General appearance:  Well developed, well nourished    Ears:  Otoscopy of external auditory canals and tympanic membranes was normal, clinical speech reception thresholds grossly intact, no mass/lesion of auricle.    Nose:   No masses/lesions of external nose; septum midline, patent nasal cavities    Mouth:  No mass/lesion of lips, teeth, gums, hard/soft palate, tongue, tonsils, or oropharynx.    Neck & Lymphatics:  No cervical lymphadenopathy, no neck mass/crepitus/ asymmetry, trachea is midline, no thyroid enlargement/tenderness/mass.        [x]  Data Reviewed:    Images    I have independently reviewed the following imaging:    CT sinus 11/9/23    Sinuses clear  Left septal deviation  Inferior turbinate hypertrophy     EXAMINATION:  CT MEDTRONIC SINUSES WITHOUT     CLINICAL HISTORY:  Chronic sinusitis, unspecified     TECHNIQUE:  2.5 mm axial images of the paranasal sinuses without contrast.  Coronal and sagittal reformatted imaging from the axial acquisition     COMPARISON:  CT head 09/27/22     FINDINGS:  Frontal sinuses: Frontal sinuses are clear.     Minimal patchy right posterior ethmoid air cell opacification.     Sphenoid sinus:Clear.     Maxillary antra: Unremarkable.     Ostiomeatal complexes: The ostiomeatal complexes are patent bilaterally.     There is mild leftward deviation of the nasal septum.  No evidence of carotid dehiscence.     Roof of the ethmoids is symmetric.  The lamina papyracea grossly intact bilaterally.     Impression:     No significant abnormality.    PSG  PHYSICIAN INTERPRETATION AND COMMENTS: Findings are consistent with mild, positional obstructive sleep apnea(SAVANNA).  Indications of cardiac dysrhythmia are recorded. Correlated with cardiology exam. Therapy with CPAP indicated, Please  refer to sleep disorders clinic  CLINICAL HISTORY: 32 year old female presented with: 15 inch neck, BMI of 43.9, an [place] sleepiness score of 10, history  of hypertension, depression and symptoms of nocturnal waking up choking. Based on the clinical history, the patient has  a high pre-test probability of having Mild SAVANNA.  SLEEP STUDY FINDINGS: Patient underwent a 1 night Home Sleep Test and by behavioral criteria,  slept for approximately  5.33 hours , with a sleep efficiency of 95%. Mild sleep disordered breathing (AHI=7) is noted based on a 4% hypopnea  desaturation criteria, predominantly in the supine position (10 events/hour). The patient slept supine 59.7% of the night  based on valid recording time of 4.89 hours and is 3.3 times as likely to have apneas/hypopneas when supine. When  considering more subtle measures of sleep disordered breathing, the overall respiratory disturbance index is mild(RDI=15)  based on a 1% hypopnea desaturation criteria with confirmation by surrogate arousal indicators. The apneas/hypopneas  are accompanied by minimal oxygen desaturation (percent time below 90% SpO2: 1%, Min SpO2: 78.1%). The average  desaturation across all sleep disordered breathing events is 2.9%. Snoring occurs for 3.8% (30 dB) of the study. The mean  pulse rate is 86.3 BPM, with very frequent pulse rate variability (82 events with >= 6 BPM increase/decrease per hour).  TREATMENT CONSIDERATIONS: For symptomatic mild obstructive sleep apnea, patient preference and compliance  impacts efficacious outcomes. Consider treatment with nasal continuous positive airway pressure (CPAP/AutoPAP),  mandibular advancement splint (MAS), referral to an ENT surgeon for modification to the airway, and/or weight loss or  behavioral therapy. Based on the SAVANNA Supine data in the study, a Mandibular Advancement Splint (MAS) will likely provide  treatment benefit independent of SAVANNA severity. The patient should avoid sleeping supine given non-supine AHI is in the  normal range.    Lab Results   Component Value Date    WBC 16.45 (H) 03/28/2024    HGB 10.6 (L) 03/28/2024    HCT 34.0 (L) 03/28/2024    MCV 79 (L) 03/28/2024    EOSINOPHIL 0.2 03/28/2024           Assessment & Plan:   SAVANNA (obstructive sleep apnea)    Nasal septal deviation    History of nasal septoplasty    Allergic rhinitis, unspecified seasonality, unspecified trigger          She is  doing very well. Her nasal breathing is improved and snoring has also improved. She is planning to start a program for weight loss as well to further help with improving her mild SAVANNA. She will continue regular management for allergic rhinitis and Return to clinic as needed

## 2024-04-26 NOTE — PROGRESS NOTES
Cancer [place] - [facility] Psych  Psychology  Progress Note  Individual Psychotherapy (PhD/LCSW)    Patient Name: Anthony Sexton  MRN: [id number]    Patient Class: OP- Hospital Outpatient [facility]  Primary Care Provider: Tyesha Leggett MD    Psychiatry Initial Visit (PhD/LCSW)  Individual Psychotherapy - CPT 36536  EAP #5 for 2024    Date: 4/17/2024    Site: [place]    The patient location is: Patient's home/ Patient reported that his/her location at the time of this visit was in the [facility]     Visit type: Virtual visit with synchronous audio and video     Each patient to whom he or she provides medical services by telemedicine is: (1) informed of the relationship between the physician and patient and the respective role of any other health care provider with respect to management of the patient; and (2) notified that he or she may decline to receive medical services by telemedicine and may withdraw from such care at any time.     Referral source: Lenora Walters MD    Clinical status of patient: Outpatient    Anthony Sexton, a 33 y.o. female, for initial evaluation visit.  Met with patient.    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     Substance  use:   Alcohol: none   Drugs: none   Tobacco: none   Caffeine: none    Current medications and drug reactions (include OTC, herbal): see medication list     Strengths and liabilities: Strength: Patient accepts guidance/feedback, Strength: Patient is expressive/articulate., Strength: Patient is intelligent., Strength: Patient is motivated for change., Strength: Patient has positive support network., Strength: Patient has reasonable judgment., Strength: Patient is stable., Liability: Patient lacks coping skills.    Current Evaluation:     Mental Status Exam:  General Appearance:  unremarkable, age appropriate   Speech: normal tone, normal rate, normal pitch, normal volume      Level of Cooperation: cooperative      Thought Processes: normal and logical   Mood: anxious, sad      Thought Content: normal, no suicidality, no homicidality, delusions, or paranoia   Affect: congruent and appropriate   Orientation: Oriented x3   Memory: recent >  intact   Attention Span & Concentration: intact   Fund of General Knowledge: intact and appropriate to age and level of education   Abstract Reasoning: not assessed   Judgment & Insight: fair     Language  intact     Summary: The patient is using distraction as a way to cope with current stressors. She is becoming more aware of her triggers and detachment in certain environments. Discussed ways to work through triggering moments.   Diagnostic Impression - Plan:       ICD-10-CM ICD-9-CM   1. Undiagnosed disease or syndrome present  R69 799.9                                                     Plan:individual psychotherapy    Return to Clinic: as scheduled    Length of Service (minutes): 45          Barbra Bartlett, PhD  Psychologist  [facility] Psych

## 2024-04-29 ENCOUNTER — OFFICE VISIT (OUTPATIENT)
Dept: PSYCHIATRY | Facility: CLINIC | Age: 34
End: 2024-04-29
Payer: COMMERCIAL

## 2024-04-29 DIAGNOSIS — R69 UNDIAGNOSED DISEASE OR SYNDROME PRESENT: Primary | ICD-10-CM

## 2024-04-29 PROCEDURE — 1159F MED LIST DOCD IN RCRD: CPT | Mod: CPTII,95,, | Performed by: STUDENT IN AN ORGANIZED HEALTH CARE EDUCATION/TRAINING PROGRAM

## 2024-04-29 PROCEDURE — 90834 PSYTX W PT 45 MINUTES: CPT | Mod: 95,,, | Performed by: STUDENT IN AN ORGANIZED HEALTH CARE EDUCATION/TRAINING PROGRAM

## 2024-04-29 PROCEDURE — 3044F HG A1C LEVEL LT 7.0%: CPT | Mod: CPTII,95,, | Performed by: STUDENT IN AN ORGANIZED HEALTH CARE EDUCATION/TRAINING PROGRAM

## 2024-04-29 NOTE — PROGRESS NOTES
Cancer [place] - [facility] Psych  Psychology  Progress Note  Individual Psychotherapy (PhD/LCSW)    Patient Name: Anthony Sexton  MRN: [id number]    Patient Class: OP- Hospital Outpatient Clinic  Primary Care Provider: Tyesha Leggett MD    Psychiatry Initial Visit (PhD/LCSW)  Individual Psychotherapy - CPT 21271  EAP #5 for 2024    Date: 4/25/2024    Site: [place]    The patient location is: Patient's home/ Patient reported that his/her location at the time of this visit was in the [facility]     Visit type: Virtual visit with synchronous audio and video     Each patient to whom he or she provides medical services by telemedicine is: (1) informed of the relationship between the physician and patient and the respective role of any other health care provider with respect to management of the patient; and (2) notified that he or she may decline to receive medical services by telemedicine and may withdraw from such care at any time.     Referral source: Lenora Walters MD    Clinical status of patient: Outpatient    Anthony Sexton, a 33 y.o. female, for initial evaluation visit.  Met with patient.    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     Substance  use:   Alcohol: none   Drugs: none   Tobacco: none   Caffeine: none    Current medications and drug reactions (include OTC, herbal): see medication list     Strengths and liabilities: Strength: Patient accepts guidance/feedback, Strength: Patient is expressive/articulate., Strength: Patient is intelligent., Strength: Patient is motivated for change., Strength: Patient has positive support network., Strength: Patient has reasonable judgment., Strength: Patient is stable., Liability: Patient lacks coping skills.    Current Evaluation:     Mental Status Exam:  General Appearance:  unremarkable, age appropriate   Speech: normal tone, normal rate, normal pitch, normal volume      Level of Cooperation: cooperative      Thought Processes: normal and logical   Mood: anxious, sad      Thought Content: normal, no suicidality, no homicidality, delusions, or paranoia   Affect: congruent and appropriate   Orientation: Oriented x3   Memory: recent >  intact   Attention Span & Concentration: intact   Fund of General Knowledge: intact and appropriate to age and level of education   Abstract Reasoning: not assessed   Judgment & Insight: fair     Language  intact     Summary: The patient is still having a hard time but is doing better than a few weeks ago. She is angry, on edge, and debating if she should share her experience with others. Pros and cons were evaluated. Patient will do whatever aligns with who she is as a person and is not through the lens of anger.   Diagnostic Impression - Plan:       ICD-10-CM ICD-9-CM   1. Undiagnosed disease or syndrome present  R69 799.9                                                       Plan:individual psychotherapy    Return to Clinic: as scheduled    Length of Service (minutes): 45          Barbra Bartlett, PhD  Psychologist  [facility] Psych

## 2024-04-30 NOTE — PROGRESS NOTES
Cancer [place] - [facility] Psych  Psychology  Progress Note  Individual Psychotherapy (PhD/LCSW)    Patient Name: Anthony Sexton  MRN: [id number]    Patient Class: OP- Hospital Outpatient Clinic  Primary Care Provider: Tyesha Leggett MD    Psychiatry Initial Visit (PhD/LCSW)  Individual Psychotherapy - CPT 34135  EAP #5 for 2024    Date: 4/29/2024    Site: [place]    The patient location is: Patient's home/ Patient reported that his/her location at the time of this visit was in the [facility]     Visit type: Virtual visit with synchronous audio and video     Each patient to whom he or she provides medical services by telemedicine is: (1) informed of the relationship between the physician and patient and the respective role of any other health care provider with respect to management of the patient; and (2) notified that he or she may decline to receive medical services by telemedicine and may withdraw from such care at any time.     Referral source: Lenora Walters MD    Clinical status of patient: Outpatient    Anthony Sexton, a 33 y.o. female, for initial evaluation visit.  Met with patient.    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     Substance  "use:   Alcohol: none   Drugs: none   Tobacco: none   Caffeine: none    Current medications and drug reactions (include OTC, herbal): see medication list     Strengths and liabilities: Strength: Patient accepts guidance/feedback, Strength: Patient is expressive/articulate., Strength: Patient is intelligent., Strength: Patient is motivated for change., Strength: Patient has positive support network., Strength: Patient has reasonable judgment., Strength: Patient is stable., Liability: Patient lacks coping skills.    Current Evaluation:     Mental Status Exam:  General Appearance:  unremarkable, age appropriate   Speech: normal tone, normal rate, normal pitch, normal volume      Level of Cooperation: cooperative      Thought Processes: normal and logical   Mood: anxious, sad      Thought Content: normal, no suicidality, no homicidality, delusions, or paranoia   Affect: congruent and appropriate   Orientation: Oriented x3   Memory: recent >  intact   Attention Span & Concentration: intact   Fund of General Knowledge: intact and appropriate to age and level of education   Abstract Reasoning: not assessed   Judgment & Insight: fair     Language  intact     Summary: The patient has noticed an increase in anxiety, a preoccupation with "what if's," and feeling like she will be replaced. Here and now focus was discussed and supportive therapy was utilized.   Diagnostic Impression - Plan:       ICD-10-CM ICD-9-CM   1. Undiagnosed disease or syndrome present  R69 799.9                                                         Plan:individual psychotherapy    Return to Clinic: as scheduled    Length of Service (minutes): 45          Barbra Bartlett, PhD  Psychologist  [facility]                              "

## 2024-05-02 ENCOUNTER — TELEPHONE (OUTPATIENT)
Dept: PSYCHIATRY | Facility: CLINIC | Age: 34
End: 2024-05-02
Payer: COMMERCIAL

## 2024-05-06 ENCOUNTER — PATIENT MESSAGE (OUTPATIENT)
Dept: OBSTETRICS AND GYNECOLOGY | Facility: CLINIC | Age: 34
End: 2024-05-06
Payer: COMMERCIAL

## 2024-05-06 ENCOUNTER — TELEPHONE (OUTPATIENT)
Dept: PSYCHIATRY | Facility: CLINIC | Age: 34
End: 2024-05-06
Payer: COMMERCIAL

## 2024-05-08 ENCOUNTER — OFFICE VISIT (OUTPATIENT)
Dept: PSYCHIATRY | Facility: CLINIC | Age: 34
End: 2024-05-08
Payer: COMMERCIAL

## 2024-05-08 DIAGNOSIS — R69 UNDIAGNOSED DISEASE OR SYNDROME PRESENT: Primary | ICD-10-CM

## 2024-05-08 PROCEDURE — 3044F HG A1C LEVEL LT 7.0%: CPT | Mod: CPTII,95,, | Performed by: STUDENT IN AN ORGANIZED HEALTH CARE EDUCATION/TRAINING PROGRAM

## 2024-05-08 PROCEDURE — 1159F MED LIST DOCD IN RCRD: CPT | Mod: CPTII,95,, | Performed by: STUDENT IN AN ORGANIZED HEALTH CARE EDUCATION/TRAINING PROGRAM

## 2024-05-08 PROCEDURE — 90834 PSYTX W PT 45 MINUTES: CPT | Mod: 95,,, | Performed by: STUDENT IN AN ORGANIZED HEALTH CARE EDUCATION/TRAINING PROGRAM

## 2024-05-08 NOTE — PROGRESS NOTES
Cancer [place] - [facility] Psych  Psychology  Progress Note  Individual Psychotherapy (PhD/LCSW)    Patient Name: Anthony Sexton  MRN: [id number]    Patient Class: OP- Hospital Outpatient Clinic  Primary Care Provider: Tyesha Leggett MD    Psychiatry Initial Visit (PhD/LCSW)  Individual Psychotherapy - CPT 67763  EAP #5 for 2024    Date: 5/8/2024    Site: [place]    The patient location is: Patient's home/ Patient reported that his/her location at the time of this visit was in the [facility]     Visit type: Virtual visit with synchronous audio and video     Each patient to whom he or she provides medical services by telemedicine is: (1) informed of the relationship between the physician and patient and the respective role of any other health care provider with respect to management of the patient; and (2) notified that he or she may decline to receive medical services by telemedicine and may withdraw from such care at any time.     Referral source: Lenora Walters MD    Clinical status of patient: Outpatient    Anthony Sexton, a 33 y.o. female, for initial evaluation visit.  Met with patient.    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     Substance  use:   Alcohol: none   Drugs: none   Tobacco: none   Caffeine: none    Current medications and drug reactions (include OTC, herbal): see medication list     Strengths and liabilities: Strength: Patient accepts guidance/feedback, Strength: Patient is expressive/articulate., Strength: Patient is intelligent., Strength: Patient is motivated for change., Strength: Patient has positive support network., Strength: Patient has reasonable judgment., Strength: Patient is stable., Liability: Patient lacks coping skills.    Current Evaluation:     Mental Status Exam:  General Appearance:  unremarkable, age appropriate   Speech: normal tone, normal rate, normal pitch, normal volume      Level of Cooperation: cooperative      Thought Processes: normal and logical   Mood: anxious, sad      Thought Content: normal, no suicidality, no homicidality, delusions, or paranoia   Affect: congruent and appropriate   Orientation: Oriented x3   Memory: recent >  intact   Attention Span & Concentration: intact   Fund of General Knowledge: intact and appropriate to age and level of education   Abstract Reasoning: not assessed   Judgment & Insight: fair     Language  intact     Summary: The patient is actively avoiding triggering situations due to significant anxiety about a recent trauma. Patient was uncertain of why she was exhibiting this behavior. Patient and I explored the behavior and associated triggers. Patient will seek support from family/friends during times where she is noticeably more avoidant.   Diagnostic Impression - Plan:       ICD-10-CM ICD-9-CM   1. Undiagnosed disease or syndrome present  R69 799.9                                                           Plan:individual psychotherapy    Return to Clinic: as scheduled    Length of Service (minutes): 45          Barbra Bartlett, PhD  Psychologist  [facility] Psych

## 2024-05-15 ENCOUNTER — OFFICE VISIT (OUTPATIENT)
Dept: PSYCHIATRY | Facility: CLINIC | Age: 34
End: 2024-05-15
Payer: COMMERCIAL

## 2024-05-15 DIAGNOSIS — R69 UNDIAGNOSED DISEASE OR SYNDROME PRESENT: Primary | ICD-10-CM

## 2024-05-15 PROCEDURE — 1159F MED LIST DOCD IN RCRD: CPT | Mod: CPTII,95,, | Performed by: STUDENT IN AN ORGANIZED HEALTH CARE EDUCATION/TRAINING PROGRAM

## 2024-05-15 PROCEDURE — 90832 PSYTX W PT 30 MINUTES: CPT | Mod: 95,,, | Performed by: STUDENT IN AN ORGANIZED HEALTH CARE EDUCATION/TRAINING PROGRAM

## 2024-05-15 PROCEDURE — 3044F HG A1C LEVEL LT 7.0%: CPT | Mod: CPTII,95,, | Performed by: STUDENT IN AN ORGANIZED HEALTH CARE EDUCATION/TRAINING PROGRAM

## 2024-05-23 ENCOUNTER — OFFICE VISIT (OUTPATIENT)
Dept: URGENT CARE | Facility: CLINIC | Age: 34
End: 2024-05-23
Payer: COMMERCIAL

## 2024-05-23 VITALS
HEART RATE: 74 BPM | RESPIRATION RATE: 18 BRPM | DIASTOLIC BLOOD PRESSURE: 76 MMHG | OXYGEN SATURATION: 99 % | WEIGHT: 271 LBS | HEIGHT: 67 IN | TEMPERATURE: 98 F | BODY MASS INDEX: 42.53 KG/M2 | SYSTOLIC BLOOD PRESSURE: 128 MMHG

## 2024-05-23 DIAGNOSIS — R05.9 COUGH, UNSPECIFIED TYPE: Primary | ICD-10-CM

## 2024-05-23 DIAGNOSIS — R09.81 SINUS CONGESTION: ICD-10-CM

## 2024-05-23 DIAGNOSIS — J06.9 UPPER RESPIRATORY TRACT INFECTION, UNSPECIFIED TYPE: ICD-10-CM

## 2024-05-23 DIAGNOSIS — R51.9 SINUS HEADACHE: ICD-10-CM

## 2024-05-23 LAB
CTP QC/QA: YES
SARS-COV-2 AG RESP QL IA.RAPID: NEGATIVE

## 2024-05-23 PROCEDURE — 87811 SARS-COV-2 COVID19 W/OPTIC: CPT | Mod: QW,S$GLB,, | Performed by: NURSE PRACTITIONER

## 2024-05-23 PROCEDURE — 99213 OFFICE O/P EST LOW 20 MIN: CPT | Mod: S$GLB,,, | Performed by: NURSE PRACTITIONER

## 2024-05-23 RX ORDER — BENZONATATE 200 MG/1
200 CAPSULE ORAL 3 TIMES DAILY PRN
Qty: 25 CAPSULE | Refills: 0 | Status: SHIPPED | OUTPATIENT
Start: 2024-05-23 | End: 2024-06-02

## 2024-05-23 RX ORDER — GUAIFENESIN 1200 MG/1
1200 TABLET, EXTENDED RELEASE ORAL 2 TIMES DAILY
Qty: 20 TABLET | Refills: 0 | Status: SHIPPED | OUTPATIENT
Start: 2024-05-23 | End: 2024-06-02

## 2024-05-23 NOTE — PROGRESS NOTES
"Subjective:      Patient ID: Anthony Sexton is a 33 y.o. female.    Vitals:  height is 5' 6.54" (1.69 m) and weight is 122.9 kg (271 lb). Her tympanic temperature is 97.8 °F (36.6 °C). Her blood pressure is 128/76 and her pulse is 74. Her respiration is 18 and oxygen saturation is 99%.     Chief Complaint: Sinus Problem    Patient is a 33-year-old female who presents for evaluation of nasal congestion with associated headache, postnasal drip and cough.  Onset 5 days ago.  States she has significant sinus pressure and headache.  Treating with Zyrtec and ibuprofen.  Denies fever, chills, dyspnea, wheezing, vomiting, diarrhea or rash.  No recent travel reported.  No other concerns voiced.    Sinus Problem  This is a new problem. The current episode started in the past 7 days. The problem is unchanged. There has been no fever. Her pain is at a severity of 6/10. The pain is mild. Associated symptoms include congestion, coughing, headaches, sinus pressure, sneezing and a sore throat. Pertinent negatives include no chills, diaphoresis, ear pain, hoarse voice, neck pain, shortness of breath or swollen glands. Past treatments include nasal decongestants. The treatment provided no relief.       Constitution: Negative for chills, sweating and fever.   HENT:  Positive for congestion, sinus pressure and sore throat. Negative for ear pain and trouble swallowing.    Neck: Negative for neck pain.   Respiratory:  Positive for cough. Negative for sputum production, shortness of breath, stridor and wheezing.    Gastrointestinal:  Negative for vomiting and diarrhea.   Skin:  Negative for rash.   Allergic/Immunologic: Positive for sneezing.   Neurological:  Positive for headaches.      Objective:     Physical Exam   Constitutional: She is oriented to person, place, and time. She appears well-developed. She is cooperative.  Non-toxic appearance. She does not appear ill. No distress.   HENT:   Head: Normocephalic and atraumatic.   Ears: "   Right Ear: Hearing, tympanic membrane, external ear and ear canal normal.   Left Ear: Hearing, tympanic membrane, external ear and ear canal normal.   Nose: Nose normal. No mucosal edema, rhinorrhea, nasal deformity or congestion. No epistaxis. Right sinus exhibits no maxillary sinus tenderness and no frontal sinus tenderness. Left sinus exhibits no maxillary sinus tenderness and no frontal sinus tenderness.   Mouth/Throat: Uvula is midline and mucous membranes are normal. No trismus in the jaw. Normal dentition. No uvula swelling. Posterior oropharyngeal erythema present. No oropharyngeal exudate or posterior oropharyngeal edema.   Eyes: Conjunctivae and lids are normal. No scleral icterus.   Neck: Trachea normal and phonation normal. Neck supple. No edema present. No erythema present. No neck rigidity present.   Cardiovascular: Normal rate, regular rhythm, normal heart sounds and normal pulses.   Pulmonary/Chest: Effort normal and breath sounds normal. No respiratory distress. She has no decreased breath sounds. She has no wheezes. She has no rhonchi. She has no rales.   Abdominal: Normal appearance.   Musculoskeletal: Normal range of motion.         General: No deformity. Normal range of motion.   Neurological: She is alert and oriented to person, place, and time. She exhibits normal muscle tone. Coordination normal.   Skin: Skin is warm, dry, intact, not diaphoretic and not pale.   Psychiatric: Her speech is normal and behavior is normal. Judgment and thought content normal.   Nursing note and vitals reviewed.      Assessment:     1. Cough, unspecified type    2. Sinus congestion    3. Sinus headache    4. Upper respiratory tract infection, unspecified type        Plan:       Cough, unspecified type  -     SARS Coronavirus 2 Antigen, POCT Manual Read    Sinus congestion    Sinus headache    Upper respiratory tract infection, unspecified type    Other orders  -     benzonatate (TESSALON) 200 MG capsule; Take 1  capsule (200 mg total) by mouth 3 (three) times daily as needed for Cough.  Dispense: 25 capsule; Refill: 0  -     guaiFENesin (MUCINEX) 1,200 mg Ta12; Take 1,200 mg by mouth 2 (two) times a day. for 10 days  Dispense: 20 tablet; Refill: 0          Medical Decision Making:   Initial Assessment:   Nontoxic appearing 34 yo female c/o cough.  After complete evaluation, including thorough history and physical exam, the patient's symptoms are most likely due to viral upper respiratory infection. There are no concerning features on physical exam to suggest bacterial otitis media/externa, sinusitis, strep pharyngitis, or peritonsillar abscess. Vital signs do not suggest sepsis. Lung sounds are clear and not consistent with pneumonia. There is no neck pain or limited ROM to suggest retropharyngeal abscess or meningitis. In clinic testing for covid is negative.The patient will be treated with supportive care. Will provide RX for tessalon and mucinex upon D/C. Follow up instructions and ED precautions provided.     Clinical Tests:   Lab Tests: Reviewed       <> Summary of Lab: Covid negative          Results for orders placed or performed in visit on 05/23/24   SARS Coronavirus 2 Antigen, POCT Manual Read   Result Value Ref Range    SARS Coronavirus 2 Antigen Negative Negative     Acceptable Yes      *Note: Due to a large number of results and/or encounters for the requested time period, some results have not been displayed. A complete set of results can be found in Results Review.     Patient Instructions   A cold is caused by a virus that can settle in your nose, throat or lungs. This causesa runny or stuffy nose and sneezing. You may also have a sore throat, cough, headache, fever and muscle aches. Different cold viruses last different lengthsof time, but the average time is 2 to 14 days.    Seek immediate medical care if you develop fever, chest pain, or shortness of breath.     Treatment: There is no cure  for the common cold, there is only symptomatic care.     Antibiotics may be used to treat signs of a secondary infection, but they do not treat  the cold virus. Try these tips to keep yourself comfortable:                   -Get plenty of rest.                   -Drink plenty of fluids, at least 8 large glasses of fluid a day. Good fluid choices are water, fruit juices high in Vitamin C, tea, gelatin, or broths and soups. These help to keep mucus thin and ease congestion.                  -Use salt water gargle, cough drops or throat sprays to relieve throat pain. Mi ¼ to ½ teaspoon of salt in 1 cup of warm water for a salt water gargle  solution.                  -Use petroleum jelly or lip balm around lips and nose to prevent chapping.                  -Use saline nose drops or spray to help ease congestion.    Over the Counter (OTC) Medicines:  Take over the counter medicines as needed to ease your signs.  Read labels carefully.  Use a product that treats only the signs that you have. Ask your pharmacist  for recommendations. Be sure to ask about possible interactions with other  medicines you are taking.  Common medicines used to treat signs of a cold include:     -Flonase daily.  -Claritin or Zyrtec daily.  -Mucinex every 12 hours -- Drink plenty of water while taking this medication.    - Cough suppressant, also called antitussive, such as dextromethorphan.  This medicine decreases your reflex and sensitivity to cough. This  medicine may be kept behind the pharmacy counter for purchase.    Cold and cough medicines often contain more than one type of medicine.  Ask the pharmacist for help to confirm that you are not using more than one  product with the same or similar ingredient. For example, some cold and  cough medicines have acetaminophen or ibuprofen in them to help lower a  fever or ease muscle aches. Do not take extra acetaminophen (Tylenol) or  ibuprofen (Advil, Motrin) if the cold or cough medicine has  it as an  ingredient. Too much medicine could be harmful.    Take the correct dose as listed on the package. Do not take more than  recommended.    Use a Humidifier:  A cool mist humidifier can make breathing easier by thinning mucus. Do not use  a steam humidifier as hot water can cause burns if spilled.  Place the humidifier a few feet from the bed. Drain and clean each day with  soap and water to prevent bacteria and mold from growing.  Indoor humidity should not be above 50%. Stop using the humidifier if you  notice moisture on windows, walls or pictures.  You do not need to add any medicine to the humidifier.  If you cannot get a humidifier, place a pan of water next to heating vents and  refill the water level daily. The water will evaporate and add moisture to the  Room.    How to prevent the spread of colds  -Wash your hands with soap and water or use alcohol based hand   often. Dry hands wet from washing with soap on a paper towel instead of cloth towel.  -Cough or sneeze into your elbow to avoid spreading germs.  -Wipe down common surfaces, such as door knobs and faucet handles, with a disinfectant spray.  -Do not share cups or utensils.        Please follow up with your Primary care provider within 2-5 days if your signs and symptoms have not resolved or worsen.      If your condition worsens or fails to improve we recommend that you receive another evaluation at the emergency room immediately or contact your primary medical clinic to discuss your concerns.   You must understand that you have received an Urgent Care treatment only and that you may be released before all of your medical problems are known or treated. You, the patient, will arrange for follow up care as instructed.

## 2024-05-29 ENCOUNTER — PATIENT MESSAGE (OUTPATIENT)
Dept: OTOLARYNGOLOGY | Facility: CLINIC | Age: 34
End: 2024-05-29
Payer: COMMERCIAL

## 2024-05-29 NOTE — TELEPHONE ENCOUNTER
I would not recommend adding claritin, since it works the same way as zrytec. I would recommend Afrin 2-3 times a day for the next 3 days, tylenol/ibuprofen for the headache/pressure. If she develops fever or symptoms worsen could go to UC or ED as needed. Can follow up with me in a 1-2 weeks if not improving.

## 2024-05-31 ENCOUNTER — OFFICE VISIT (OUTPATIENT)
Dept: PSYCHIATRY | Facility: CLINIC | Age: 34
End: 2024-05-31
Payer: COMMERCIAL

## 2024-05-31 DIAGNOSIS — R69 UNDIAGNOSED DISEASE OR SYNDROME PRESENT: Primary | ICD-10-CM

## 2024-05-31 PROCEDURE — 90832 PSYTX W PT 30 MINUTES: CPT | Mod: 95,,, | Performed by: STUDENT IN AN ORGANIZED HEALTH CARE EDUCATION/TRAINING PROGRAM

## 2024-05-31 PROCEDURE — 1159F MED LIST DOCD IN RCRD: CPT | Mod: CPTII,95,, | Performed by: STUDENT IN AN ORGANIZED HEALTH CARE EDUCATION/TRAINING PROGRAM

## 2024-05-31 PROCEDURE — 3044F HG A1C LEVEL LT 7.0%: CPT | Mod: CPTII,95,, | Performed by: STUDENT IN AN ORGANIZED HEALTH CARE EDUCATION/TRAINING PROGRAM

## 2024-06-04 ENCOUNTER — TELEPHONE (OUTPATIENT)
Dept: PSYCHIATRY | Facility: CLINIC | Age: 34
End: 2024-06-04
Payer: COMMERCIAL

## 2024-06-04 NOTE — PROGRESS NOTES
Cancer [place] - [facility]  Psychology  Progress Note  Individual Psychotherapy (PhD/LCSW)    Patient Name: Anthony Sexton  MRN: [id number]    Patient Class: [facility]- [facility]  Primary Care Provider: Tyesha Leggett MD    Psychiatry Initial Visit (PhD/LCSW)  Individual Psychotherapy - CPT 66310  EAP #5 for 2024    Date: 5/31/2024    Site: [place]    The patient location is: Patient's home/ Patient reported that his/her location at the time of this visit was in the [facility]     Visit type: Virtual visit with synchronous audio and video     Each patient to whom he or she provides medical services by telemedicine is: (1) informed of the relationship between the physician and patient and the respective role of any other health care provider with respect to management of the patient; and (2) notified that he or she may decline to receive medical services by telemedicine and may withdraw from such care at any time.     Referral source: Lenora Walters MD    Clinical status of patient: Outpatient    Anthony Sexton, a 33 y.o. female, for initial evaluation visit.  Met with patient.    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     Substance  use:   Alcohol: none   Drugs: none   Tobacco: none   Caffeine: none    Current medications and drug reactions (include OTC, herbal): see medication list     Strengths and liabilities: Strength: Patient accepts guidance/feedback, Strength: Patient is expressive/articulate., Strength: Patient is intelligent., Strength: Patient is motivated for change., Strength: Patient has positive support network., Strength: Patient has reasonable judgment., Strength: Patient is stable., Liability: Patient lacks coping skills.    Current Evaluation:     Mental Status Exam:  General Appearance:  unremarkable, age appropriate   Speech: normal tone, normal rate, normal pitch, normal volume      Level of Cooperation: cooperative      Thought Processes: normal and logical   Mood: anxious, sad      Thought Content: normal, no suicidality, no homicidality, delusions, or paranoia   Affect: congruent and appropriate   Orientation: Oriented x3   Memory: recent >  intact   Attention Span & Concentration: intact   Fund of General Knowledge: intact and appropriate to age and level of education   Abstract Reasoning: not assessed   Judgment & Insight: fair     Language  intact     Summary: The patient had to start session 30min later due to family obligations. Patient has made some decisions about her future that are impacting multiple areas of her life. She feels at peace with her decision. Patient will continue with her plans and requested to speak about her child next session.   Diagnostic Impression - Plan:       ICD-10-CM ICD-9-CM   1. Undiagnosed disease or syndrome present  R69 799.9                                                               Plan:individual psychotherapy    Return to Clinic: as scheduled    Length of Service (minutes): 30          Barbra Bartlett, PhD  Psychologist  [facility]

## 2024-06-05 ENCOUNTER — PATIENT MESSAGE (OUTPATIENT)
Dept: OPHTHALMOLOGY | Facility: CLINIC | Age: 34
End: 2024-06-05

## 2024-06-05 ENCOUNTER — OFFICE VISIT (OUTPATIENT)
Dept: OPHTHALMOLOGY | Facility: CLINIC | Age: 34
End: 2024-06-05
Payer: COMMERCIAL

## 2024-06-05 DIAGNOSIS — H52.13 MYOPIA OF BOTH EYES WITH ASTIGMATISM: Primary | ICD-10-CM

## 2024-06-05 DIAGNOSIS — H52.203 MYOPIA OF BOTH EYES WITH ASTIGMATISM: Primary | ICD-10-CM

## 2024-06-05 PROCEDURE — 99999 PR PBB SHADOW E&M-EST. PATIENT-LVL II: CPT | Mod: PBBFAC,,, | Performed by: OPTOMETRIST

## 2024-06-05 PROCEDURE — 92014 COMPRE OPH EXAM EST PT 1/>: CPT | Mod: S$GLB,,, | Performed by: OPTOMETRIST

## 2024-06-05 PROCEDURE — 92015 DETERMINE REFRACTIVE STATE: CPT | Mod: S$GLB,,, | Performed by: OPTOMETRIST

## 2024-06-05 NOTE — PROGRESS NOTES
HPI     Annual Exam            Comments: RTC 1 yr for dilated eye exam  Vision changes since last eye exam?: no     Any eye pain today: no    Other ocular symptoms: no    Interested in contact lens fitting today? Yes pt is happy with current   contacts                     Last edited by Nelly Hancock on 6/5/2024  8:18 AM.            Assessment /Plan     For exam results, see Encounter Report.    Myopia of both eyes with astigmatism      Eyeglass Final Rx       Eyeglass Final Rx         Sphere Cylinder Axis    Right -5.00 +0.75 075    Left -6.25 +1.00 105      Expiration Date: 6/5/2025                  Contact Lens Prescription (6/5/2024)          Brand Base Curve Diameter Sphere Cylinder Axis    Right Acuvue Oasys for Astigmatism 8.6 14.5 -4.00 -0.75 160    Left Acuvue Oasys for Astigmatism 8.6 14.5 -5.00 -1.25 010      Expiration Date: 6/5/2025    Replacement: Every 2 weeks    Wearing Schedule: Daily Wear          RTC 1 yr for dilated eye exam and gOCT or PRN if any problems.   Discussed above and answered questions.

## 2024-06-06 ENCOUNTER — OFFICE VISIT (OUTPATIENT)
Dept: PSYCHIATRY | Facility: CLINIC | Age: 34
End: 2024-06-06
Payer: COMMERCIAL

## 2024-06-06 DIAGNOSIS — R69 UNDIAGNOSED DISEASE OR SYNDROME PRESENT: Primary | ICD-10-CM

## 2024-06-06 PROCEDURE — 1159F MED LIST DOCD IN RCRD: CPT | Mod: CPTII,95,, | Performed by: STUDENT IN AN ORGANIZED HEALTH CARE EDUCATION/TRAINING PROGRAM

## 2024-06-06 PROCEDURE — 90834 PSYTX W PT 45 MINUTES: CPT | Mod: 95,,, | Performed by: STUDENT IN AN ORGANIZED HEALTH CARE EDUCATION/TRAINING PROGRAM

## 2024-06-06 PROCEDURE — 3044F HG A1C LEVEL LT 7.0%: CPT | Mod: CPTII,95,, | Performed by: STUDENT IN AN ORGANIZED HEALTH CARE EDUCATION/TRAINING PROGRAM

## 2024-06-18 NOTE — PROGRESS NOTES
Cancer [place] - [facility] Psych  Psychology  Progress Note  Individual Psychotherapy (PhD/LCSW)    Patient Name: Anthony Sexton  MRN: [id number]    Patient Class: OP- [facility] Outpatient [facility]  Primary Care Provider: Tyesha Leggett MD    Psychiatry Initial Visit (PhD/LCSW)  Individual Psychotherapy - CPT 86229  EAP #5 for 2024    Date: 6/6/2024    Site: [place]    The patient location is: Patient's home/ Patient reported that his/her location at the time of this visit was in the [facility]     Visit type: Virtual visit with synchronous audio and video     Each patient to whom he or she provides medical services by telemedicine is: (1) informed of the relationship between the physician and patient and the respective role of any other health care provider with respect to management of the patient; and (2) notified that he or she may decline to receive medical services by telemedicine and may withdraw from such care at any time.     Referral source: Lenora Walters MD    Clinical status of patient: Outpatient    Anthony Sexton, a 33 y.o. female, for initial evaluation visit.  Met with patient.    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     Substance  "use:   Alcohol: none   Drugs: none   Tobacco: none   Caffeine: none    Current medications and drug reactions (include OTC, herbal): see medication list     Strengths and liabilities: Strength: Patient accepts guidance/feedback, Strength: Patient is expressive/articulate., Strength: Patient is intelligent., Strength: Patient is motivated for change., Strength: Patient has positive support network., Strength: Patient has reasonable judgment., Strength: Patient is stable., Liability: Patient lacks coping skills.    Current Evaluation:     Mental Status Exam:  General Appearance:  unremarkable, age appropriate   Speech: normal tone, normal rate, normal pitch, normal volume      Level of Cooperation: cooperative      Thought Processes: normal and logical   Mood: anxious, sad      Thought Content: normal, no suicidality, no homicidality, delusions, or paranoia   Affect: congruent and appropriate   Orientation: Oriented x3   Memory: recent >  intact   Attention Span & Concentration: intact   Fund of General Knowledge: intact and appropriate to age and level of education   Abstract Reasoning: not assessed   Judgment & Insight: fair     Language  intact     Summary: The patient stated that everything is "decent." She has noticed some anxiety but attributes it to the PMDD. We worked on challenging irrational thoughts that may arise and replacing them with rational thoughts.  Diagnostic Impression - Plan:       ICD-10-CM ICD-9-CM   1. Undiagnosed disease or syndrome present  R69 799.9                                                                 Plan:individual psychotherapy    Return to Clinic: as scheduled    Length of Service (minutes): 30          Barbra Bartlett, PhD  Psychologist  [facility]                                      "

## 2024-06-20 ENCOUNTER — TELEPHONE (OUTPATIENT)
Dept: PSYCHIATRY | Facility: CLINIC | Age: 34
End: 2024-06-20
Payer: COMMERCIAL

## 2024-06-23 ENCOUNTER — PATIENT MESSAGE (OUTPATIENT)
Dept: OPTOMETRY | Facility: CLINIC | Age: 34
End: 2024-06-23
Payer: COMMERCIAL

## 2024-06-24 ENCOUNTER — PATIENT MESSAGE (OUTPATIENT)
Dept: PSYCHIATRY | Facility: CLINIC | Age: 34
End: 2024-06-24
Payer: COMMERCIAL

## 2024-06-24 ENCOUNTER — OFFICE VISIT (OUTPATIENT)
Dept: PSYCHIATRY | Facility: CLINIC | Age: 34
End: 2024-06-24
Payer: COMMERCIAL

## 2024-06-24 DIAGNOSIS — R69 UNDIAGNOSED DISEASE OR SYNDROME PRESENT: Primary | ICD-10-CM

## 2024-06-24 PROCEDURE — 90834 PSYTX W PT 45 MINUTES: CPT | Mod: 95,,, | Performed by: STUDENT IN AN ORGANIZED HEALTH CARE EDUCATION/TRAINING PROGRAM

## 2024-06-24 PROCEDURE — 1159F MED LIST DOCD IN RCRD: CPT | Mod: CPTII,95,, | Performed by: STUDENT IN AN ORGANIZED HEALTH CARE EDUCATION/TRAINING PROGRAM

## 2024-06-24 PROCEDURE — 3044F HG A1C LEVEL LT 7.0%: CPT | Mod: CPTII,95,, | Performed by: STUDENT IN AN ORGANIZED HEALTH CARE EDUCATION/TRAINING PROGRAM

## 2024-07-01 PROBLEM — N39.0 ACUTE LOWER UTI: Status: RESOLVED | Noted: 2024-03-28 | Resolved: 2024-07-01

## 2024-07-02 NOTE — PROGRESS NOTES
Cancer [place] - [facility] Psych  Psychology  Progress Note  Individual Psychotherapy (PhD/LCSW)    Patient Name: Anthony Sexton  MRN: [id number]    Patient Class: OP- Hospital Outpatient [facility]  Primary Care Provider: Tyesha Leggett MD    Psychiatry Initial Visit (PhD/LCSW)  Individual Psychotherapy - CPT 32801  EAP #5 for 2024    Date: 6/24/2024    Site: [place]    The patient location is: Patient's home/ Patient reported that his/her location at the time of this visit was in the [facility]     Visit type: Virtual visit with synchronous audio and video     Each patient to whom he or she provides medical services by telemedicine is: (1) informed of the relationship between the physician and patient and the respective role of any other health care provider with respect to management of the patient; and (2) notified that he or she may decline to receive medical services by telemedicine and may withdraw from such care at any time.     Referral source: Lenora Walters MD    Clinical status of patient: Outpatient    Anthony Sexton, a 33 y.o. female, for initial evaluation visit.  Met with patient.    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     Substance  use:   Alcohol: none   Drugs: none   Tobacco: none   Caffeine: none    Current medications and drug reactions (include OTC, herbal): see medication list     Strengths and liabilities: Strength: Patient accepts guidance/feedback, Strength: Patient is expressive/articulate., Strength: Patient is intelligent., Strength: Patient is motivated for change., Strength: Patient has positive support network., Strength: Patient has reasonable judgment., Strength: Patient is stable., Liability: Patient lacks coping skills.    Current Evaluation:     Mental Status Exam:  General Appearance:  unremarkable, age appropriate   Speech: normal tone, normal rate, normal pitch, normal volume      Level of Cooperation: cooperative      Thought Processes: normal and logical   Mood: anxious, sad      Thought Content: normal, no suicidality, no homicidality, delusions, or paranoia   Affect: congruent and appropriate   Orientation: Oriented x3   Memory: recent >  intact   Attention Span & Concentration: intact   Fund of General Knowledge: intact and appropriate to age and level of education   Abstract Reasoning: not assessed   Judgment & Insight: fair     Language  intact     Summary: The patient is mourning the loss of her grandfather. Lots of change in her life is making her feel uneasy.   Diagnostic Impression - Plan:       ICD-10-CM ICD-9-CM   1. Undiagnosed disease or syndrome present  R69 799.9                                                                   Plan:individual psychotherapy    Return to Clinic: as scheduled    Length of Service (minutes): 30          Barbra Bartlett, PhD  Psychologist  [facility]

## 2024-07-03 ENCOUNTER — TELEPHONE (OUTPATIENT)
Dept: PSYCHIATRY | Facility: CLINIC | Age: 34
End: 2024-07-03
Payer: COMMERCIAL

## 2024-07-05 ENCOUNTER — OFFICE VISIT (OUTPATIENT)
Dept: PSYCHIATRY | Facility: CLINIC | Age: 34
End: 2024-07-05
Payer: COMMERCIAL

## 2024-07-05 DIAGNOSIS — R69 UNDIAGNOSED DISEASE OR SYNDROME PRESENT: Primary | ICD-10-CM

## 2024-07-05 PROCEDURE — 3044F HG A1C LEVEL LT 7.0%: CPT | Mod: CPTII,95,, | Performed by: STUDENT IN AN ORGANIZED HEALTH CARE EDUCATION/TRAINING PROGRAM

## 2024-07-05 PROCEDURE — 90834 PSYTX W PT 45 MINUTES: CPT | Mod: 95,,, | Performed by: STUDENT IN AN ORGANIZED HEALTH CARE EDUCATION/TRAINING PROGRAM

## 2024-07-05 PROCEDURE — 1159F MED LIST DOCD IN RCRD: CPT | Mod: CPTII,95,, | Performed by: STUDENT IN AN ORGANIZED HEALTH CARE EDUCATION/TRAINING PROGRAM

## 2024-07-10 ENCOUNTER — TELEPHONE (OUTPATIENT)
Dept: PSYCHIATRY | Facility: CLINIC | Age: 34
End: 2024-07-10
Payer: COMMERCIAL

## 2024-07-10 NOTE — PROGRESS NOTES
[facility] [place] [facility]  Psychology  Progress Note  Individual Psychotherapy (PhD/LCSW)    Patient Name: Anthony Sexton  MRN: [id number]    Patient Class: OP- [facility]  Primary Care Provider: Tyesha Leggett MD    Psychiatry Initial Visit (PhD/LCSW)  Individual Psychotherapy - CPT 17602  EAP #5 for 2024    Date: 7/5/2024    Site: [place]    The patient location is: Patient's home/ Patient reported that his/her location at the time of this visit was in the [facility]     Visit type: Virtual visit with synchronous audio and video     Each patient to whom he or she provides medical services by telemedicine is: (1) informed of the relationship between the physician and patient and the respective role of any other health care provider with respect to management of the patient; and (2) notified that he or she may decline to receive medical services by telemedicine and may withdraw from such care at any time.     Referral source: Lenora Walters MD    Clinical status of patient: Outpatient    Anthony Sexton, a 33 y.o. female, for initial evaluation visit.  Met with patient.    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     Substance  use:   Alcohol: none   Drugs: none   Tobacco: none   Caffeine: none    Current medications and drug reactions (include OTC, herbal): see medication list     Strengths and liabilities: Strength: Patient accepts guidance/feedback, Strength: Patient is expressive/articulate., Strength: Patient is intelligent., Strength: Patient is motivated for change., Strength: Patient has positive support network., Strength: Patient has reasonable judgment., Strength: Patient is stable., Liability: Patient lacks coping skills.    Current Evaluation:     Mental Status Exam:  General Appearance:  unremarkable, age appropriate   Speech: normal tone, normal rate, normal pitch, normal volume      Level of Cooperation: cooperative      Thought Processes: normal and logical   Mood: anxious, sad      Thought Content: normal, no suicidality, no homicidality, delusions, or paranoia   Affect: congruent and appropriate   Orientation: Oriented x3   Memory: recent >  intact   Attention Span & Concentration: intact   Fund of General Knowledge: intact and appropriate to age and level of education   Abstract Reasoning: not assessed   Judgment & Insight: fair     Language  intact     Summary: The patient stated that things are settling down after the death of her grandfather.    Diagnostic Impression - Plan:       ICD-10-CM ICD-9-CM   1. Undiagnosed disease or syndrome present  R69 799.9                                                                     Plan:individual psychotherapy    Return to Clinic: as scheduled    Length of Service (minutes): 30          Barbra Bartlett, PhD  Psychologist  [facility]

## 2024-07-24 ENCOUNTER — TELEPHONE (OUTPATIENT)
Dept: PSYCHIATRY | Facility: CLINIC | Age: 34
End: 2024-07-24
Payer: COMMERCIAL

## 2024-07-26 ENCOUNTER — OFFICE VISIT (OUTPATIENT)
Dept: PSYCHIATRY | Facility: CLINIC | Age: 34
End: 2024-07-26
Payer: COMMERCIAL

## 2024-07-26 DIAGNOSIS — R69 UNDIAGNOSED DISEASE OR SYNDROME PRESENT: Primary | ICD-10-CM

## 2024-07-26 PROCEDURE — 90834 PSYTX W PT 45 MINUTES: CPT | Mod: 95,,, | Performed by: STUDENT IN AN ORGANIZED HEALTH CARE EDUCATION/TRAINING PROGRAM

## 2024-07-26 PROCEDURE — 1159F MED LIST DOCD IN RCRD: CPT | Mod: CPTII,95,, | Performed by: STUDENT IN AN ORGANIZED HEALTH CARE EDUCATION/TRAINING PROGRAM

## 2024-07-26 PROCEDURE — 3044F HG A1C LEVEL LT 7.0%: CPT | Mod: CPTII,95,, | Performed by: STUDENT IN AN ORGANIZED HEALTH CARE EDUCATION/TRAINING PROGRAM

## 2024-07-31 ENCOUNTER — TELEPHONE (OUTPATIENT)
Dept: PSYCHIATRY | Facility: CLINIC | Age: 34
End: 2024-07-31
Payer: COMMERCIAL

## 2024-08-13 ENCOUNTER — TELEPHONE (OUTPATIENT)
Dept: PSYCHIATRY | Facility: CLINIC | Age: 34
End: 2024-08-13
Payer: COMMERCIAL

## 2024-08-14 ENCOUNTER — PATIENT MESSAGE (OUTPATIENT)
Dept: INTERNAL MEDICINE | Facility: CLINIC | Age: 34
End: 2024-08-14

## 2024-08-14 ENCOUNTER — OFFICE VISIT (OUTPATIENT)
Dept: INTERNAL MEDICINE | Facility: CLINIC | Age: 34
End: 2024-08-14
Payer: COMMERCIAL

## 2024-08-14 ENCOUNTER — LAB VISIT (OUTPATIENT)
Dept: LAB | Facility: HOSPITAL | Age: 34
End: 2024-08-14
Payer: COMMERCIAL

## 2024-08-14 DIAGNOSIS — R10.11 RIGHT UPPER QUADRANT PAIN: ICD-10-CM

## 2024-08-14 DIAGNOSIS — R10.11 RIGHT UPPER QUADRANT PAIN: Primary | ICD-10-CM

## 2024-08-14 LAB
B-HCG UR QL: NEGATIVE
BILIRUB UR QL STRIP: NEGATIVE
CLARITY UR: CLEAR
COLOR UR: YELLOW
GLUCOSE UR QL STRIP: NEGATIVE
HGB UR QL STRIP: NEGATIVE
KETONES UR QL STRIP: NEGATIVE
LEUKOCYTE ESTERASE UR QL STRIP: NEGATIVE
NITRITE UR QL STRIP: NEGATIVE
PH UR STRIP: 6 [PH] (ref 5–8)
PROT UR QL STRIP: NEGATIVE
SP GR UR STRIP: >=1.03 (ref 1–1.03)
URN SPEC COLLECT METH UR: ABNORMAL

## 2024-08-14 PROCEDURE — 1159F MED LIST DOCD IN RCRD: CPT | Mod: CPTII,95,,

## 2024-08-14 PROCEDURE — 99214 OFFICE O/P EST MOD 30 MIN: CPT | Mod: 95,,,

## 2024-08-14 PROCEDURE — 1160F RVW MEDS BY RX/DR IN RCRD: CPT | Mod: CPTII,95,,

## 2024-08-14 PROCEDURE — 3044F HG A1C LEVEL LT 7.0%: CPT | Mod: CPTII,95,,

## 2024-08-14 PROCEDURE — 81003 URINALYSIS AUTO W/O SCOPE: CPT

## 2024-08-14 PROCEDURE — 81025 URINE PREGNANCY TEST: CPT

## 2024-08-14 NOTE — PROGRESS NOTES
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     Pre-eclampsia in postpartum period 04/23/2020    Vitamin D deficiency      Past Surgical History:   Procedure Laterality Date    DILATION AND CURETTAGE OF UTERUS USING SUCTION N/A 3/28/2024    Procedure: DILATION AND CURETTAGE, UTERUS, USING SUCTION;  Surgeon: Kirsten Siu MD;  Location: [facility];  Service: OB/GYN;  Laterality: N/A;    ESOPHAGOGASTRODUODENOSCOPY N/A 05/26/2021    Procedure: EGD (ESOPHAGOGASTRODUODENOSCOPY);  Surgeon: Alka Olmstead MD;  Location: [facility];  Service: Endoscopy;  Laterality: N/A;    ESOPHAGOGASTRODUODENOSCOPY N/A 10/04/2021    Procedure: EGD (ESOPHAGOGASTRODUODENOSCOPY);  Surgeon: Alka Olmstead MD;  Location: [facility];  Service: Endoscopy;  Laterality: N/A;    ESOPHAGOGASTRODUODENOSCOPY N/A 05/29/2023    Procedure: EGD (ESOPHAGOGASTRODUODENOSCOPY);  Surgeon: Alka Olmstead MD;  Location: [facility];  Service: Endoscopy;  Laterality: N/A;    PH MONITORING, ESOPHAGUS, WIRELESS, (OFF REFLUX MEDS) N/A 05/29/2023    Procedure: PH MONITORING, ESOPHAGUS,  WIRELESS, (OFF REFLUX MEDS);  Surgeon: Alka Olmstead MD;  Location: [facility] ENDO;  Service: Endoscopy;  Laterality: N/A;    REDUCTION OF BOTH BREASTS Bilateral 05/27/2021    Procedure: MAMMOPLASTY, REDUCTION, BILATERAL;  Surgeon: Daniel Ba MD;  Location: [facility];  Service: Plastics;  Laterality: Bilateral;  Left breast tissue: 1584  Right breast tissue: 1438    SEPTOPLASTY, NOSE, WITH NASAL TURBINATE REDUCTION Bilateral 01/17/2024    Procedure: SEPTOPLASTY, NOSE, WITH NASAL TURBINATE REDUCTION;  Surgeon: Corey Lucio MD;  Location: [facility] OR;  Service: ENT;  Laterality: Bilateral;     Family History   Problem Relation Name Age of Onset    Hypertension Maternal Grandmother      Diabetes Maternal Grandmother      No Known Problems Maternal Grandfather      Hypertension Father      Hyperlipidemia Father      Heart disease Father  55        MI/CAD    Diabetes Mother      Asthma Mother      Hyperlipidemia Mother      Hypertension Mother      Sjogren's syndrome Mother      Fibromyalgia Mother      No Known Problems Brother      No Known Problems Sister      Insulin resistance Sister      No Known Problems Daughter      Diabetes Maternal Aunt      Breast cancer Maternal Aunt      Stroke Neg Hx      Brain cancer Neg Hx      Migraines Neg Hx      Colon cancer Neg Hx      Ovarian cancer Neg Hx      Anesthesia problems Neg Hx      Thrombosis Neg Hx       Social History     Socioeconomic History    Marital status:     Number of children: 0   Occupational History    Occupation: RN     Comment: [employer]   Tobacco Use    Smoking status: Never     Passive exposure: Never    Smokeless tobacco: Never   Substance and Sexual Activity    Alcohol use: Not Currently     Alcohol/week: 0.0 standard drinks of alcohol    Drug use: No    Sexual activity: Yes     Partners: Male     Comment: Not interested in having further kids   Social History Narrative    She wears seatbelt.      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    2 kids.      Social Determinants of Health     Financial Resource Strain: Low Risk  (2023)    Overall Financial Resource Strain (CARDIA)     Difficulty of Paying Living Expenses: Not hard at all   Food Insecurity: No Food Insecurity (2023)    Hunger Vital Sign     Worried About Running Out of Food in the Last Year: Never true     Ran Out of Food in the Last Year: Never true   Transportation Needs: No Transportation Needs (2023)    PRAPARE - Transportation     Lack of Transportation (Medical): No     Lack of Transportation (Non-Medical): No   Physical Activity: Inactive (2024)    Exercise Vital Sign     Days of Exercise per Week: 0 days     Minutes of Exercise per Session: 0 min   Stress: Stress Concern Present (2023)    [employer] [place] [employer] - Occupational Stress Questionnaire     Feeling of Stress : To some extent   Housing Stability: Low Risk  (2023)    Housing Stability Vital Sign     Unable to Pay for Housing in the Last Year: No     Number of Places Lived in the Last Year: 1     Unstable Housing in the Last Year: No     Patient Active Problem List   Diagnosis    Morbid obesity with BMI of 45.0-49.9, adult    Vitamin D deficiency    SOB (shortness of breath)    GERD (gastroesophageal reflux disease)    Essential hypertension    History of postpartum depression, currently pregnant    Generalized anxiety disorder    Iron deficiency anemia due to chronic blood loss    ESDRAS positive    Family history of Sjogren's disease    Fibromyalgia    SAVANNA (obstructive sleep apnea)    High risk HPV infection    Nausea    8 weeks gestation of pregnancy    Antepartum multigravida of advanced maternal age    Normal pregnancy in first trimester    Incomplete      Review of patient's allergies indicates:   Allergen Reactions    Procardia [nifedipine]        The following were reviewed at this visit: active problem  list, medication list, allergies, family history, social history, and health maintenance.    Medications:  Current Outpatient Medications on File Prior to Visit   Medication Sig Dispense Refill    cholecalciferol, vitamin D3, (VITAMIN D3) 50 mcg (2,000 unit) Tab Take 2,000 Units by mouth once daily.      drospirenone-estetrol (NEXTSTELLIS) 3 mg- 14.2 mg (28) Tab Take 1 tablet by mouth daily. 28 tablet 3    EScitalopram oxalate (LEXAPRO) 10 MG tablet Take 1 tablet (10 mg total) by mouth once daily. 90 tablet 3    ibuprofen (ADVIL,MOTRIN) 600 MG tablet Take 1 tablet (600 mg total) by mouth every 8 (eight) hours as needed for Pain. 30 tablet 0    ondansetron (ZOFRAN-ODT) 4 MG TbDL Take 1 tablet (4 mg total) by mouth every 6 (six) hours as needed (vomiting). 20 tablet 0     No current facility-administered medications on file prior to visit.       Medications have been reviewed and reconciled with patient at this visit.  Barriers to medications reviewed with patient.    Adverse reactions to current medications reviewed with patient..    Over the counter medications reviewed and reconciled with patient.    Exam:  Wt Readings from Last 3 Encounters:   05/24/24 122.7 kg (270 lb 9.6 oz)   05/23/24 122.9 kg (271 lb)   04/09/24 123 kg (271 lb 0.9 oz)     Temp Readings from Last 3 Encounters:   05/23/24 97.8 °F (36.6 °C) (Tympanic)   04/09/24 98.1 °F (36.7 °C) (Tympanic)   03/28/24 98 °F (36.7 °C) (Temporal)     BP Readings from Last 3 Encounters:   05/24/24 (!) 140/92   05/23/24 128/76   04/09/24 (!) 144/70     Pulse Readings from Last 3 Encounters:   05/23/24 74   04/09/24 84   03/28/24 92     There is no height or weight on file to calculate BMI.      Review of Systems   Gastrointestinal:  Positive for abdominal pain.     Physical Exam  Nursing note reviewed.   Constitutional:       Appearance: She is morbidly obese.   Pulmonary:      Effort: Pulmonary effort is normal. No respiratory distress.   Abdominal:      General:  Bowel sounds are normal.      Tenderness: There is abdominal tenderness.   Neurological:      Mental Status: She is alert and oriented to person, place, and time.   Psychiatric:         Mood and Affect: Mood normal.         Behavior: Behavior normal.         Thought Content: Thought content normal.         Judgment: Judgment normal.         Laboratory Reviewed ({Yes)  Lab Results   Component Value Date    WBC 16.45 (H) 03/28/2024    HGB 10.6 (L) 03/28/2024    HCT 34.0 (L) 03/28/2024     03/28/2024    CHOL 175 02/16/2024    TRIG 66 02/16/2024    HDL 37 (L) 02/16/2024    ALT 35 03/28/2024    AST 24 03/28/2024     (L) 03/28/2024    K 4.1 03/28/2024     03/28/2024    CREATININE 0.8 03/28/2024    BUN 8 03/28/2024    CO2 18 (L) 03/28/2024    TSH 0.837 02/16/2024    HGBA1C 5.6 02/16/2024             Care Plan/Goals: Reviewed    Goals         Blood Pressure < 140/90 (pt-stated)             Follow up: No follow-ups on file.    After visit summary was printed and given to patient upon discharge today.  Patient goals and care plan are included in After Visit Summary.

## 2024-08-14 NOTE — PROGRESS NOTES
"Anthony Sexton  08/14/2024  [id number]    Tyesha Leggett MD  Patient Care Team:  Tyesha Leggett MD as PCP - General (Family Medicine)  Emily Lott LPN as Care Coordinator (Internal Medicine)  Rebecca Butler as [facility]   Joselito Franco MD (Obstetrics and Gynecology)          Visit Type:an urgent visit for a new problem      History of Present Illness    CHIEF COMPLAINT:  Ms. Sexton presents today with right upper quadrant abdominal pain and bloating.    GASTROINTESTINAL SYMPTOMS:  She reports right upper quadrant abdominal pain that started two weeks ago, initially as minor aches but now more severe. The pain is accompanied by significant bloating, with the patient feeling as though her abdomen could "explode." She experiences nausea and indigestion but denies vomiting. In the weeks prior to the onset of severe pain, she was having large bowel movements. Recently, she had a large bowel movement yesterday followed by smaller ones last night and this morning.    URINARY/GYNECOLOGICAL CONCERNS:  She reports experiencing a foul odor, unsure whether it's originating from her urine or vaginal area. She denies any other associated urinary or GYN symptoms.    OBSTETRIC HISTORY:  She reports a recent miscarriage, describing the experience as "terrible." She denies desire for children at this time.    SOCIAL HISTORY:  She lives in [place].    ROS:  General: -fever, -chills, -fatigue, -weight gain, -weight loss  Eyes: -vision changes, -redness, -discharge  ENT: -ear pain, -nasal congestion, -sore throat  Cardiovascular: -chest pain, -palpitations, -lower extremity edema  Respiratory: -cough, -shortness of breath  Gastrointestinal: +abdominal pain, +nausea, -vomiting, -diarrhea, -constipation, -blood in stool, +change in bowel habits  Genitourinary: -dysuria, -hematuria, -frequency  Musculoskeletal: -joint pain, -muscle pain  Skin: -rash, -lesion  Neurological: -headache, -dizziness, " -numbness, -tingling  Psychiatric: -anxiety, -depression, -sleep difficulty      The patient location is: [place]   The chief complaint leading to consultation is:   Chief Complaint   Patient presents with    Abdominal Pain        Visit type: audiovisual    Face to Face time with patient: 12   minutes of total time spent on the encounter, which includes face to face time and non-face to face time preparing to see the patient (eg, review of tests), Obtaining and/or reviewing separately obtained history, Documenting clinical information in the electronic or other health record, Independently interpreting results (not separately reported) and communicating results to the patient/family/caregiver, or Care coordination (not separately reported).         Each patient to whom he or she provides medical services by telemedicine is:  (1) informed of the relationship between the physician and patient and the respective role of any other health care provider with respect to management of the patient; and (2) notified that he or she may decline to receive medical services by telemedicine and may withdraw from such care at any time.    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     Pre-eclampsia in postpartum period 04/23/2020    Vitamin D deficiency      Past Surgical History:   Procedure Laterality Date    DILATION AND CURETTAGE OF UTERUS USING SUCTION N/A 3/28/2024    Procedure: DILATION AND CURETTAGE, UTERUS, USING SUCTION;  Surgeon: Kirsten Siu MD;  Location: [facility] OR;  Service:  OB/GYN;  Laterality: N/A;    ESOPHAGOGASTRODUODENOSCOPY N/A 05/26/2021    Procedure: EGD (ESOPHAGOGASTRODUODENOSCOPY);  Surgeon: Alka Olmstead MD;  Location: [facility];  Service: Endoscopy;  Laterality: N/A;    ESOPHAGOGASTRODUODENOSCOPY N/A 10/04/2021    Procedure: EGD (ESOPHAGOGASTRODUODENOSCOPY);  Surgeon: Alka Olmstead MD;  Location: [facility];  Service: Endoscopy;  Laterality: N/A;    ESOPHAGOGASTRODUODENOSCOPY N/A 05/29/2023    Procedure: EGD (ESOPHAGOGASTRODUODENOSCOPY);  Surgeon: Alka Olmstead MD;  Location: [facility];  Service: Endoscopy;  Laterality: N/A;    PH MONITORING, ESOPHAGUS, WIRELESS, (OFF REFLUX MEDS) N/A 05/29/2023    Procedure: PH MONITORING, ESOPHAGUS, WIRELESS, (OFF REFLUX MEDS);  Surgeon: Alka Olmstead MD;  Location: [facility];  Service: Endoscopy;  Laterality: N/A;    REDUCTION OF BOTH BREASTS Bilateral 05/27/2021    Procedure: MAMMOPLASTY, REDUCTION, BILATERAL;  Surgeon: Daniel Ba MD;  Location: [address];  Service: Plastics;  Laterality: Bilateral;  Left breast tissue: 1584  Right breast tissue: 1438    SEPTOPLASTY, NOSE, WITH NASAL TURBINATE REDUCTION Bilateral 01/17/2024    Procedure: SEPTOPLASTY, NOSE, WITH NASAL TURBINATE REDUCTION;  Surgeon: Corey Lucio MD;  Location: [facility];  Service: ENT;  Laterality: Bilateral;     Family History   Problem Relation Name Age of Onset    Hypertension Maternal Grandmother      Diabetes Maternal Grandmother      No Known Problems Maternal Grandfather      Hypertension Father      Hyperlipidemia Father      Heart disease Father  55        MI/CAD    Diabetes Mother      Asthma Mother      Hyperlipidemia Mother      Hypertension Mother      Sjogren's syndrome Mother      Fibromyalgia Mother      No Known Problems Brother      No Known Problems Sister      Insulin resistance Sister      No Known Problems Daughter      Diabetes Maternal Aunt      Breast cancer Maternal Aunt      Stroke Neg Hx      Brain  cancer Neg Hx      Migraines Neg Hx      Colon cancer Neg Hx      Ovarian cancer Neg Hx      Anesthesia problems Neg Hx      Thrombosis Neg Hx       Social History     Socioeconomic History    Marital status:     Number of children: 0   Occupational History    Occupation: RN     Comment: [place] [employer]   Tobacco Use    Smoking status: Never     Passive exposure: Never    Smokeless tobacco: Never   Substance and Sexual Activity    Alcohol use: Not Currently     Alcohol/week: 0.0 standard drinks of alcohol    Drug use: No    Sexual activity: Yes     Partners: Male     Comment: Not interested in having further kids   Social History Narrative    She wears seatbelt.      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    2 kids.      Social Determinants of Health     Financial Resource Strain: Low Risk  (11/13/2023)    Overall Financial Resource Strain (CARDIA)     Difficulty of Paying Living Expenses: Not hard at all   Food Insecurity: No Food Insecurity (11/13/2023)    Hunger Vital Sign     Worried About Running Out of Food in the Last Year: Never true     Ran Out of Food in the Last Year: Never true   Transportation Needs: No Transportation Needs (11/13/2023)    PRAPARE - Transportation     Lack of Transportation (Medical): No     Lack of Transportation (Non-Medical): No   Physical Activity: Inactive (2/16/2024)    Exercise Vital Sign     Days of Exercise per Week: 0 days     Minutes of Exercise per Session: 0 min   Stress: Stress Concern Present (11/13/2023)    [employer] [place] [employer] - Occupational Stress Questionnaire     Feeling of Stress : To some extent   Housing Stability: Low Risk  (11/13/2023)    Housing Stability Vital Sign     Unable to Pay for Housing in the Last Year: No     Number of Places Lived in the Last Year: 1     Unstable Housing in the Last Year: No     Patient Active Problem List   Diagnosis    Morbid obesity with BMI of 45.0-49.9, adult     Vitamin D deficiency    SOB (shortness of breath)    GERD (gastroesophageal reflux disease)    Essential hypertension    History of postpartum depression, currently pregnant    Generalized anxiety disorder    Iron deficiency anemia due to chronic blood loss    ESDRAS positive    Family history of Sjogren's disease    Fibromyalgia    SAVANNA (obstructive sleep apnea)    High risk HPV infection    Nausea    8 weeks gestation of pregnancy    Antepartum multigravida of advanced maternal age    Normal pregnancy in first trimester    Incomplete      Review of patient's allergies indicates:   Allergen Reactions    Procardia [nifedipine]        The following were reviewed at this visit: active problem list, medication list, allergies, family history, social history, and health maintenance.    Medications:  Current Outpatient Medications on File Prior to Visit   Medication Sig Dispense Refill    cholecalciferol, vitamin D3, (VITAMIN D3) 50 mcg (2,000 unit) Tab Take 2,000 Units by mouth once daily.      drospirenone-estetrol (NEXTSTELLIS) 3 mg- 14.2 mg (28) Tab Take 1 tablet by mouth daily. 28 tablet 3    ondansetron (ZOFRAN-ODT) 4 MG TbDL Take 1 tablet (4 mg total) by mouth every 6 (six) hours as needed (vomiting). 20 tablet 0    [DISCONTINUED] EScitalopram oxalate (LEXAPRO) 10 MG tablet Take 1 tablet (10 mg total) by mouth once daily. (Patient not taking: Reported on 2024) 90 tablet 3    [DISCONTINUED] ibuprofen (ADVIL,MOTRIN) 600 MG tablet Take 1 tablet (600 mg total) by mouth every 8 (eight) hours as needed for Pain. (Patient not taking: Reported on 2024) 30 tablet 0     No current facility-administered medications on file prior to visit.       Medications have been reviewed and reconciled with patient at this visit.  Barriers to medications reviewed with patient.    Adverse reactions to current medications reviewed with patient..    Over the counter medications reviewed and reconciled with  patient.    Exam:  Wt Readings from Last 3 Encounters:   05/24/24 122.7 kg (270 lb 9.6 oz)   05/23/24 122.9 kg (271 lb)   04/09/24 123 kg (271 lb 0.9 oz)     Temp Readings from Last 3 Encounters:   05/23/24 97.8 °F (36.6 °C) (Tympanic)   04/09/24 98.1 °F (36.7 °C) (Tympanic)   03/28/24 98 °F (36.7 °C) (Temporal)     BP Readings from Last 3 Encounters:   05/24/24 (!) 140/92   05/23/24 128/76   04/09/24 (!) 144/70     Pulse Readings from Last 3 Encounters:   05/23/24 74   04/09/24 84   03/28/24 92     There is no height or weight on file to calculate BMI.    Physical Exam  Nursing note reviewed.   Pulmonary:      Effort: Pulmonary effort is normal. No respiratory distress.   Neurological:      Mental Status: She is alert and oriented to person, place, and time.   Psychiatric:         Mood and Affect: Mood normal.         Behavior: Behavior normal.         Thought Content: Thought content normal.         Judgment: Judgment normal.           Laboratory Reviewed ({Yes)  Lab Results   Component Value Date    WBC 16.45 (H) 03/28/2024    HGB 10.6 (L) 03/28/2024    HCT 34.0 (L) 03/28/2024     03/28/2024    CHOL 175 02/16/2024    TRIG 66 02/16/2024    HDL 37 (L) 02/16/2024    ALT 35 03/28/2024    AST 24 03/28/2024     (L) 03/28/2024    K 4.1 03/28/2024     03/28/2024    CREATININE 0.8 03/28/2024    BUN 8 03/28/2024    CO2 18 (L) 03/28/2024    TSH 0.837 02/16/2024    HGBA1C 5.6 02/16/2024     Anthony was seen today for abdominal pain.    Diagnoses and all orders for this visit:        Right upper quadrant pain  -     CT Abdomen With Without Contrast; Future  -     Urinalysis, Reflex to Urine Culture Urine, Clean Catch; Future  -     Pregnancy, urine rapid; Future        Assessment & Plan    RIGHT UPPER QUADRANT ABDOMINAL PAIN:  - Considered right upper quadrant abdominal pain, potentially related to constipation or gallbladder issues.  - Will order CT to evaluate for both constipation and potential gallbladder  problems, as it would provide more comprehensive information than an ultrasound.  - CT of abdomen ordered.  - CT to be scheduled at the [facility] if possible, otherwise at [facility].  - Ms. Sexton can go for CT tomorrow if not available today.  URINARY/VAGINAL SYMPTOMS:  - Added urinalysis to workup due to patient's report of foul odor, possibly from urine or vaginal area.  - Urinalysis with pregnancy test ordered.  RECENT MISCARRIAGE:  - Included pregnancy test in urinalysis due to recent history of miscarriage.  FOLLOW UP:  - Follow up after CT and urinalysis results are available.  - Ms. Sexton to schedule urine test and CT through [facility].  - Appointments will be automatically added to [facility] when scheduled.            Care Plan/Goals: Reviewed    Goals         Blood Pressure < 140/90 (pt-stated)             Follow up: No follow-ups on file.    After visit summary was printed and given to patient upon discharge today.  Patient goals and care plan are included in After Visit Summary.      Answers submitted by the patient for this visit:  Abdominal Pain Questionnaire (Submitted on 8/14/2024)  Chief Complaint: Abdominal pain  Chronicity: new  Onset: more than 1 month ago  Onset quality: gradual  Frequency: 2 to 4 times per day  Episode duration: 5 Days  Progression since onset: gradually worsening  Pain location: RUQ  Pain - numeric: 7/10  Pain quality: cramping  anorexia: No  arthralgias: Yes  belching: Yes  constipation: Yes  diarrhea: No  dysuria: No  fever: No  flatus: No  frequency: No  headaches: Yes  hematochezia: No  hematuria: No  melena: No  myalgias: Yes  nausea: Yes  weight loss: No  vomiting: No  Aggravated by: nothing  Relieved by: being still  Pain severity: moderate  Treatments tried: acetaminophen, antacids  Improvement on treatment: mild  abdominal surgery: No  colon cancer: No  Crohn's disease: No  gallstones: No  GERD: Yes  irritable bowel syndrome: No  kidney stones: No  pancreatitis:  No  PUD: No  ulcerative colitis: No  UTI: No

## 2024-08-15 ENCOUNTER — OFFICE VISIT (OUTPATIENT)
Dept: PSYCHIATRY | Facility: CLINIC | Age: 34
End: 2024-08-15
Payer: COMMERCIAL

## 2024-08-15 ENCOUNTER — HOSPITAL ENCOUNTER (OUTPATIENT)
Dept: RADIOLOGY | Facility: HOSPITAL | Age: 34
Discharge: HOME | End: 2024-08-15
Payer: COMMERCIAL

## 2024-08-15 DIAGNOSIS — R10.11 RIGHT UPPER QUADRANT PAIN: ICD-10-CM

## 2024-08-15 DIAGNOSIS — R69 UNDIAGNOSED DISEASE OR SYNDROME PRESENT: Primary | ICD-10-CM

## 2024-08-15 PROCEDURE — 1159F MED LIST DOCD IN RCRD: CPT | Mod: CPTII,95,, | Performed by: STUDENT IN AN ORGANIZED HEALTH CARE EDUCATION/TRAINING PROGRAM

## 2024-08-15 PROCEDURE — 90834 PSYTX W PT 45 MINUTES: CPT | Mod: 95,,, | Performed by: STUDENT IN AN ORGANIZED HEALTH CARE EDUCATION/TRAINING PROGRAM

## 2024-08-15 PROCEDURE — A9698 NON-RAD CONTRAST MATERIALNOC: HCPCS

## 2024-08-15 PROCEDURE — 3044F HG A1C LEVEL LT 7.0%: CPT | Mod: CPTII,95,, | Performed by: STUDENT IN AN ORGANIZED HEALTH CARE EDUCATION/TRAINING PROGRAM

## 2024-08-15 PROCEDURE — 25500020 PHARM REV CODE 255

## 2024-08-15 PROCEDURE — 74170 CT ABD WO CNTRST FLWD CNTRST: CPT | Mod: TC

## 2024-08-15 PROCEDURE — 74170 CT ABD WO CNTRST FLWD CNTRST: CPT | Mod: 26,,, | Performed by: RADIOLOGY

## 2024-08-15 RX ADMIN — IOHEXOL 1000 ML: 12 SOLUTION ORAL at 08:08

## 2024-08-15 RX ADMIN — IOHEXOL 100 ML: 350 INJECTION, SOLUTION INTRAVENOUS at 09:08

## 2024-08-16 ENCOUNTER — LAB VISIT (OUTPATIENT)
Dept: LAB | Facility: HOSPITAL | Age: 34
End: 2024-08-16
Attending: STUDENT IN AN ORGANIZED HEALTH CARE EDUCATION/TRAINING PROGRAM
Payer: COMMERCIAL

## 2024-08-16 ENCOUNTER — PATIENT MESSAGE (OUTPATIENT)
Dept: INTERNAL MEDICINE | Facility: CLINIC | Age: 34
End: 2024-08-16
Payer: COMMERCIAL

## 2024-08-16 DIAGNOSIS — K76.0 FATTY LIVER: Primary | ICD-10-CM

## 2024-08-16 DIAGNOSIS — R73.09 ELEVATED RANDOM BLOOD GLUCOSE LEVEL: ICD-10-CM

## 2024-08-16 DIAGNOSIS — K76.0 FATTY LIVER: ICD-10-CM

## 2024-08-16 LAB
ALBUMIN SERPL BCP-MCNC: 3.5 G/DL (ref 3.5–5.2)
ALP SERPL-CCNC: 115 U/L (ref 55–135)
ALT SERPL W/O P-5'-P-CCNC: 16 U/L (ref 10–44)
ANION GAP SERPL CALC-SCNC: 10 MMOL/L (ref 8–16)
AST SERPL-CCNC: 17 U/L (ref 10–40)
BILIRUB SERPL-MCNC: 0.2 MG/DL (ref 0.1–1)
BUN SERPL-MCNC: 12 MG/DL (ref 6–20)
CALCIUM SERPL-MCNC: 9.5 MG/DL (ref 8.7–10.5)
CHLORIDE SERPL-SCNC: 106 MMOL/L (ref 95–110)
CO2 SERPL-SCNC: 22 MMOL/L (ref 23–29)
CREAT SERPL-MCNC: 0.8 MG/DL (ref 0.5–1.4)
EST. GFR  (NO RACE VARIABLE): >60 ML/MIN/1.73 M^2
ESTIMATED AVG GLUCOSE: 108 MG/DL (ref 68–131)
GLUCOSE SERPL-MCNC: 90 MG/DL (ref 70–110)
HBA1C MFR BLD: 5.4 % (ref 4–5.6)
POTASSIUM SERPL-SCNC: 4.1 MMOL/L (ref 3.5–5.1)
PROT SERPL-MCNC: 7.8 G/DL (ref 6–8.4)
SODIUM SERPL-SCNC: 138 MMOL/L (ref 136–145)

## 2024-08-16 PROCEDURE — 36415 COLL VENOUS BLD VENIPUNCTURE: CPT

## 2024-08-16 PROCEDURE — 80053 COMPREHEN METABOLIC PANEL: CPT

## 2024-08-16 PROCEDURE — 83036 HEMOGLOBIN GLYCOSYLATED A1C: CPT

## 2024-08-20 ENCOUNTER — TELEPHONE (OUTPATIENT)
Dept: OBSTETRICS AND GYNECOLOGY | Facility: CLINIC | Age: 34
End: 2024-08-20
Payer: COMMERCIAL

## 2024-08-20 NOTE — TELEPHONE ENCOUNTER
Attempted to contact pt regarding appt on tomorrow 8/21 for problem visit, pt did not answer appt note did not disclose what pt was coming in for,lvm sent pt message.

## 2024-08-21 ENCOUNTER — OFFICE VISIT (OUTPATIENT)
Dept: FAMILY MEDICINE | Facility: CLINIC | Age: 34
End: 2024-08-21
Payer: COMMERCIAL

## 2024-08-21 VITALS
SYSTOLIC BLOOD PRESSURE: 132 MMHG | DIASTOLIC BLOOD PRESSURE: 80 MMHG | HEART RATE: 98 BPM | OXYGEN SATURATION: 97 % | TEMPERATURE: 97 F | BODY MASS INDEX: 43.9 KG/M2 | WEIGHT: 273.13 LBS | HEIGHT: 66 IN

## 2024-08-21 DIAGNOSIS — A48.3: Primary | ICD-10-CM

## 2024-08-21 PROCEDURE — 1160F RVW MEDS BY RX/DR IN RCRD: CPT | Mod: CPTII,S$GLB,, | Performed by: NURSE PRACTITIONER

## 2024-08-21 PROCEDURE — 99214 OFFICE O/P EST MOD 30 MIN: CPT | Mod: S$GLB,,, | Performed by: NURSE PRACTITIONER

## 2024-08-21 PROCEDURE — 3008F BODY MASS INDEX DOCD: CPT | Mod: CPTII,S$GLB,, | Performed by: NURSE PRACTITIONER

## 2024-08-21 PROCEDURE — 3044F HG A1C LEVEL LT 7.0%: CPT | Mod: CPTII,S$GLB,, | Performed by: NURSE PRACTITIONER

## 2024-08-21 PROCEDURE — 3079F DIAST BP 80-89 MM HG: CPT | Mod: CPTII,S$GLB,, | Performed by: NURSE PRACTITIONER

## 2024-08-21 PROCEDURE — 3075F SYST BP GE 130 - 139MM HG: CPT | Mod: CPTII,S$GLB,, | Performed by: NURSE PRACTITIONER

## 2024-08-21 PROCEDURE — 1159F MED LIST DOCD IN RCRD: CPT | Mod: CPTII,S$GLB,, | Performed by: NURSE PRACTITIONER

## 2024-08-21 PROCEDURE — 99999 PR PBB SHADOW E&M-EST. PATIENT-LVL IV: CPT | Mod: PBBFAC,,, | Performed by: NURSE PRACTITIONER

## 2024-08-21 RX ORDER — CLINDAMYCIN HYDROCHLORIDE 300 MG/1
300 CAPSULE ORAL EVERY 8 HOURS
Qty: 21 CAPSULE | Refills: 0 | Status: SHIPPED | OUTPATIENT
Start: 2024-08-21 | End: 2024-08-28

## 2024-08-21 RX ORDER — FLUCONAZOLE 150 MG/1
TABLET ORAL
Qty: 2 TABLET | Refills: 0 | Status: SHIPPED | OUTPATIENT
Start: 2024-08-21

## 2024-08-21 NOTE — PROGRESS NOTES
Anthony Sexton  08/21/2024  [id number]    Tyesha Leggett MD  Patient Care Team:  Tyesha Leggett MD as PCP - General (Family Medicine)  Emily Lott LPN as Care Coordinator (Internal Medicine)  Rebecca Butler as Digital Medicine Health   Joselito Franco MD (Obstetrics and Gynecology)          Visit Type:an urgent visit for a new problem    Chief Complaint:  Chief Complaint   Patient presents with    Abdominal Pain     Pt states she seen someone last week for her right upper abdomen pain. Pt states since then her lower abdomen has starting cramping and she now has diarrhea as of last night.       History of Present Illness:    33 yo female reports she was evaluated for upper right sided abd pain and fouls smelling vaginal odor that resolved. CT abd and UA  negative.   Reports the pain moved to her left lower side with diarrhea last night.  Her LMP  8/8/24  Reports when she sat down for a BM an old tampon fell in the toilet.  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     Pre-eclampsia in postpartum period 04/23/2020    Vitamin D deficiency      Past Surgical History:   Procedure Laterality Date    DILATION AND CURETTAGE OF UTERUS USING SUCTION N/A 3/28/2024    Procedure: DILATION AND CURETTAGE, UTERUS, USING SUCTION;  Surgeon: Kirsten Siu MD;  Location: [facility] OR;  Service: OB/GYN;  Laterality: N/A;    ESOPHAGOGASTRODUODENOSCOPY N/A 05/26/2021    Procedure: EGD (ESOPHAGOGASTRODUODENOSCOPY);  Surgeon: Alka Olmstead MD;   Location: [facility] ENDO;  Service: Endoscopy;  Laterality: N/A;    ESOPHAGOGASTRODUODENOSCOPY N/A 10/04/2021    Procedure: EGD (ESOPHAGOGASTRODUODENOSCOPY);  Surgeon: Alka Olmstead MD;  Location: [facility];  Service: Endoscopy;  Laterality: N/A;    ESOPHAGOGASTRODUODENOSCOPY N/A 05/29/2023    Procedure: EGD (ESOPHAGOGASTRODUODENOSCOPY);  Surgeon: Alka Olmstead MD;  Location: [facility];  Service: Endoscopy;  Laterality: N/A;    PH MONITORING, ESOPHAGUS, WIRELESS, (OFF REFLUX MEDS) N/A 05/29/2023    Procedure: PH MONITORING, ESOPHAGUS, WIRELESS, (OFF REFLUX MEDS);  Surgeon: Alka Olmstead MD;  Location: [facility];  Service: Endoscopy;  Laterality: N/A;    REDUCTION OF BOTH BREASTS Bilateral 05/27/2021    Procedure: MAMMOPLASTY, REDUCTION, BILATERAL;  Surgeon: Daniel Ba MD;  Location: [address];  Service: Plastics;  Laterality: Bilateral;  Left breast tissue: 1584  Right breast tissue: 1438    SEPTOPLASTY, NOSE, WITH NASAL TURBINATE REDUCTION Bilateral 01/17/2024    Procedure: SEPTOPLASTY, NOSE, WITH NASAL TURBINATE REDUCTION;  Surgeon: Corey Lucio MD;  Location: [facility];  Service: ENT;  Laterality: Bilateral;     Family History   Problem Relation Name Age of Onset    Hypertension Maternal Grandmother      Diabetes Maternal Grandmother      No Known Problems Maternal Grandfather      Hypertension Father      Hyperlipidemia Father      Heart disease Father  55        MI/CAD    Diabetes Mother      Asthma Mother      Hyperlipidemia Mother      Hypertension Mother      Sjogren's syndrome Mother      Fibromyalgia Mother      No Known Problems Brother      No Known Problems Sister      Insulin resistance Sister      No Known Problems Daughter      Diabetes Maternal Aunt      Breast cancer Maternal Aunt      Stroke Neg Hx      Brain cancer Neg Hx      Migraines Neg Hx      Colon cancer Neg Hx      Ovarian cancer Neg Hx      Anesthesia problems Neg Hx       Thrombosis Neg Hx       Social History     Socioeconomic History    Marital status:     Number of children: 0   Occupational History    Occupation: RN     Comment: [employer]   Tobacco Use    Smoking status: Never     Passive exposure: Never    Smokeless tobacco: Never   Substance and Sexual Activity    Alcohol use: Not Currently     Alcohol/week: 0.0 standard drinks of alcohol    Drug use: No    Sexual activity: Yes     Partners: Male     Comment: Not interested in having further kids   Social History Narrative    She wears seatbelt.      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    2 kids.      Social Determinants of Health     Financial Resource Strain: Low Risk  (11/13/2023)    Overall Financial Resource Strain (CARDIA)     Difficulty of Paying Living Expenses: Not hard at all   Food Insecurity: No Food Insecurity (11/13/2023)    Hunger Vital Sign     Worried About Running Out of Food in the Last Year: Never true     Ran Out of Food in the Last Year: Never true   Transportation Needs: No Transportation Needs (11/13/2023)    PRAPARE - Transportation     Lack of Transportation (Medical): No     Lack of Transportation (Non-Medical): No   Physical Activity: Inactive (2/16/2024)    Exercise Vital Sign     Days of Exercise per Week: 0 days     Minutes of Exercise per Session: 0 min   Stress: Stress Concern Present (11/13/2023)    [employer] - Occupational Stress Questionnaire     Feeling of Stress : To some extent   Housing Stability: Low Risk  (11/13/2023)    Housing Stability Vital Sign     Unable to Pay for Housing in the Last Year: No     Number of Places Lived in the Last Year: 1     Unstable Housing in the Last Year: No     Patient Active Problem List   Diagnosis    Morbid obesity with BMI of 45.0-49.9, adult    Vitamin D deficiency    SOB (shortness of breath)    GERD (gastroesophageal reflux disease)     Essential hypertension    History of postpartum depression, currently pregnant    Generalized anxiety disorder    Iron deficiency anemia due to chronic blood loss    ESDRAS positive    Family history of Sjogren's disease    Fibromyalgia    SAVANNA (obstructive sleep apnea)    High risk HPV infection    Nausea    8 weeks gestation of pregnancy    Antepartum multigravida of advanced maternal age    Normal pregnancy in first trimester    Incomplete      Review of patient's allergies indicates:   Allergen Reactions    Procardia [nifedipine]        The following were reviewed at this visit: active problem list, medication list, allergies, family history, social history, and health maintenance.    Medications:  Current Outpatient Medications on File Prior to Visit   Medication Sig Dispense Refill    cholecalciferol, vitamin D3, (VITAMIN D3) 50 mcg (2,000 unit) Tab Take 2,000 Units by mouth once daily.      drospirenone-estetrol (NEXTSTELLIS) 3 mg- 14.2 mg (28) Tab Take 1 tablet by mouth daily. 28 tablet 3    ondansetron (ZOFRAN-ODT) 4 MG TbDL Take 1 tablet (4 mg total) by mouth every 6 (six) hours as needed (vomiting). 20 tablet 0     No current facility-administered medications on file prior to visit.       Medications have been reviewed and reconciled with patient at this visit.  Barriers to medications reviewed with patient.    Adverse reactions to current medications reviewed with patient..    Over the counter medications reviewed and reconciled with patient.    Exam:  Wt Readings from Last 3 Encounters:   24 123.9 kg (273 lb 2.4 oz)   24 122.7 kg (270 lb 9.6 oz)   24 122.9 kg (271 lb)     Temp Readings from Last 3 Encounters:   24 96.8 °F (36 °C) (Temporal)   24 97.8 °F (36.6 °C) (Tympanic)   24 98.1 °F (36.7 °C) (Tympanic)     BP Readings from Last 3 Encounters:   24 132/80   24 (!) 140/92   24 128/76     Pulse Readings from Last 3 Encounters:    08/21/24 98   05/23/24 74   04/09/24 84     Body mass index is 44.09 kg/m².      Review of Systems   Constitutional:  Negative for fever.   Respiratory:  Negative for cough, shortness of breath and wheezing.    Cardiovascular:  Negative for chest pain and palpitations.   Gastrointestinal:  Positive for abdominal pain and diarrhea. Negative for nausea.   Neurological:  Negative for speech change, weakness and headaches.   All other systems reviewed and are negative.    Physical Exam  Vitals and nursing note reviewed.   Constitutional:       Appearance: Normal appearance. She is obese.   HENT:      Head: Normocephalic and atraumatic.      Right Ear: Tympanic membrane, ear canal and external ear normal.      Left Ear: Tympanic membrane, ear canal and external ear normal.      Nose: Nose normal.      Mouth/Throat:      Mouth: Mucous membranes are moist.      Pharynx: Oropharynx is clear.   Eyes:      Extraocular Movements: Extraocular movements intact.      Conjunctiva/sclera: Conjunctivae normal.      Pupils: Pupils are equal, round, and reactive to light.   Cardiovascular:      Rate and Rhythm: Normal rate and regular rhythm.      Pulses: Normal pulses.      Heart sounds: Normal heart sounds.   Pulmonary:      Effort: Pulmonary effort is normal.      Breath sounds: Normal breath sounds.   Abdominal:      General: Bowel sounds are normal.      Palpations: Abdomen is soft.       Musculoskeletal:         General: Normal range of motion.      Cervical back: Normal range of motion and neck supple.   Skin:     General: Skin is warm and dry.      Capillary Refill: Capillary refill takes less than 2 seconds.   Neurological:      General: No focal deficit present.      Mental Status: She is alert and oriented to person, place, and time.   Psychiatric:         Mood and Affect: Mood normal.         Behavior: Behavior normal.         Thought Content: Thought content normal.         Judgment: Judgment normal.       Laboratory  Reviewed ({Yes)  Lab Results   Component Value Date    WBC 16.45 (H) 03/28/2024    HGB 10.6 (L) 03/28/2024    HCT 34.0 (L) 03/28/2024     03/28/2024    CHOL 175 02/16/2024    TRIG 66 02/16/2024    HDL 37 (L) 02/16/2024    ALT 16 08/16/2024    AST 17 08/16/2024     08/16/2024    K 4.1 08/16/2024     08/16/2024    CREATININE 0.8 08/16/2024    BUN 12 08/16/2024    CO2 22 (L) 08/16/2024    TSH 0.837 02/16/2024    HGBA1C 5.4 08/16/2024       Anthony was seen today for abdominal pain.    Diagnoses and all orders for this visit:    Toxic shock syndrome (TSS)  -     clindamycin (CLEOCIN) 300 MG capsule; Take 1 capsule (300 mg total) by mouth every 8 (eight) hours. for 7 days  -     fluconazole (DIFLUCAN) 150 MG Tab; Take one tablet by mouth now and one tablet by mouth in 48 hours          Plan   Start abx         Care Plan/Goals: Reviewed    Goals         Blood Pressure < 140/90 (pt-stated)         Anthony was seen today for abdominal pain.    Diagnoses and all orders for this visit:    Toxic shock syndrome (TSS)  -     clindamycin (CLEOCIN) 300 MG capsule; Take 1 capsule (300 mg total) by mouth every 8 (eight) hours. for 7 days  -     fluconazole (DIFLUCAN) 150 MG Tab; Take one tablet by mouth now and one tablet by mouth in 48 hours       Follow up: Follow up for with  for 6 month follow up.    After visit summary was printed and given to patient upon discharge today.  Patient goals and care plan are included in After Visit Summary.

## 2024-08-22 ENCOUNTER — PATIENT MESSAGE (OUTPATIENT)
Dept: OBSTETRICS AND GYNECOLOGY | Facility: CLINIC | Age: 34
End: 2024-08-22
Payer: COMMERCIAL

## 2024-08-27 ENCOUNTER — TELEPHONE (OUTPATIENT)
Dept: PSYCHIATRY | Facility: CLINIC | Age: 34
End: 2024-08-27
Payer: COMMERCIAL

## 2024-08-27 NOTE — PROGRESS NOTES
[facility]  Psychology  Progress Note  Individual Psychotherapy (PhD/LCSW)    Patient Name: Anthony Sexton  MRN: [id number]    Patient Class: OP- [facility]  Primary Care Provider: Tyesha Leggett MD    Psychiatry Initial Visit (PhD/LCSW)  Individual Psychotherapy - CPT 57431  EAP #5 for 2024    Date: 8/15/2024    Site: [place]    The patient location is: Patient's home/ Patient reported that his/her location at the time of this visit was in the [facility]     Visit type: Virtual visit with synchronous audio and video     Each patient to whom he or she provides medical services by telemedicine is: (1) informed of the relationship between the physician and patient and the respective role of any other health care provider with respect to management of the patient; and (2) notified that he or she may decline to receive medical services by telemedicine and may withdraw from such care at any time.     Referral source: Lenora Walters MD    Clinical status of patient: Outpatient    Anthony Sexton, a 34 y.o. female, for initial evaluation visit.  Met with patient.    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     Substance  use:   Alcohol: none   Drugs: none   Tobacco: none   Caffeine: none    Current medications and drug reactions (include OTC, herbal): see medication list     Strengths and liabilities: Strength: Patient accepts guidance/feedback, Strength: Patient is expressive/articulate., Strength: Patient is intelligent., Strength: Patient is motivated for change., Strength: Patient has positive support network., Strength: Patient has reasonable judgment., Strength: Patient is stable., Liability: Patient lacks coping skills.    Current Evaluation:     Mental Status Exam:  General Appearance:  unremarkable, age appropriate   Speech: normal tone, normal rate, normal pitch, normal volume      Level of Cooperation: cooperative      Thought Processes: normal and logical   Mood: anxious, sad      Thought Content: normal, no suicidality, no homicidality, delusions, or paranoia   Affect: congruent and appropriate   Orientation: Oriented x3   Memory: recent >  intact   Attention Span & Concentration: intact   Fund of General Knowledge: intact and appropriate to age and level of education   Abstract Reasoning: not assessed   Judgment & Insight: fair     Language  intact     Summary: The patient's stress has intensified due to concerns at home and work. Patient and I discussed interventions to reduce stressors. Focusing on strengths versus deficits was encouraged.   Diagnostic Impression - Plan:       ICD-10-CM ICD-9-CM   1. Undiagnosed disease or syndrome present  R69 799.9         Plan:individual psychotherapy    Return to Clinic: as scheduled    Length of Service (minutes): 30          Barbra Bartlett, PhD  Psychologist  [facility]

## 2024-08-29 ENCOUNTER — PATIENT MESSAGE (OUTPATIENT)
Dept: OTHER | Facility: OTHER | Age: 34
End: 2024-08-29
Payer: COMMERCIAL

## 2024-08-29 ENCOUNTER — OFFICE VISIT (OUTPATIENT)
Dept: PSYCHIATRY | Facility: CLINIC | Age: 34
End: 2024-08-29
Payer: COMMERCIAL

## 2024-08-29 DIAGNOSIS — R69 UNDIAGNOSED DISEASE OR SYNDROME PRESENT: Primary | ICD-10-CM

## 2024-08-29 PROCEDURE — 3044F HG A1C LEVEL LT 7.0%: CPT | Mod: CPTII,95,, | Performed by: STUDENT IN AN ORGANIZED HEALTH CARE EDUCATION/TRAINING PROGRAM

## 2024-08-29 PROCEDURE — 1159F MED LIST DOCD IN RCRD: CPT | Mod: CPTII,95,, | Performed by: STUDENT IN AN ORGANIZED HEALTH CARE EDUCATION/TRAINING PROGRAM

## 2024-08-29 PROCEDURE — 90834 PSYTX W PT 45 MINUTES: CPT | Mod: 95,,, | Performed by: STUDENT IN AN ORGANIZED HEALTH CARE EDUCATION/TRAINING PROGRAM

## 2024-08-31 ENCOUNTER — HOSPITAL ENCOUNTER (EMERGENCY)
Facility: HOSPITAL | Age: 34
Discharge: HOME | End: 2024-08-31
Attending: EMERGENCY MEDICINE
Payer: COMMERCIAL

## 2024-08-31 VITALS
WEIGHT: 272.69 LBS | SYSTOLIC BLOOD PRESSURE: 132 MMHG | BODY MASS INDEX: 43.82 KG/M2 | TEMPERATURE: 99 F | OXYGEN SATURATION: 97 % | RESPIRATION RATE: 20 BRPM | HEART RATE: 83 BPM | DIASTOLIC BLOOD PRESSURE: 69 MMHG | HEIGHT: 66 IN

## 2024-08-31 DIAGNOSIS — R10.11 RIGHT UPPER QUADRANT ABDOMINAL PAIN: Primary | ICD-10-CM

## 2024-08-31 DIAGNOSIS — R00.2 PALPITATIONS: ICD-10-CM

## 2024-08-31 DIAGNOSIS — R19.7 DIARRHEA, UNSPECIFIED TYPE: ICD-10-CM

## 2024-08-31 LAB
ALBUMIN SERPL BCP-MCNC: 3.8 G/DL (ref 3.5–5.2)
ALP SERPL-CCNC: 105 U/L (ref 55–135)
ALT SERPL W/O P-5'-P-CCNC: 18 U/L (ref 10–44)
ANION GAP SERPL CALC-SCNC: 11 MMOL/L (ref 8–16)
AST SERPL-CCNC: 17 U/L (ref 10–40)
B-HCG UR QL: NEGATIVE
BASOPHILS # BLD AUTO: 0.06 K/UL (ref 0–0.2)
BASOPHILS NFR BLD: 0.5 % (ref 0–1.9)
BILIRUB SERPL-MCNC: 0.3 MG/DL (ref 0.1–1)
BILIRUB UR QL STRIP: NEGATIVE
BUN SERPL-MCNC: 10 MG/DL (ref 6–20)
CALCIUM SERPL-MCNC: 9.9 MG/DL (ref 8.7–10.5)
CHLORIDE SERPL-SCNC: 105 MMOL/L (ref 95–110)
CLARITY UR: CLEAR
CO2 SERPL-SCNC: 22 MMOL/L (ref 23–29)
COLOR UR: YELLOW
CREAT SERPL-MCNC: 0.8 MG/DL (ref 0.5–1.4)
DIFFERENTIAL METHOD BLD: ABNORMAL
EOSINOPHIL # BLD AUTO: 0.1 K/UL (ref 0–0.5)
EOSINOPHIL NFR BLD: 0.4 % (ref 0–8)
ERYTHROCYTE [DISTWIDTH] IN BLOOD BY AUTOMATED COUNT: 14.4 % (ref 11.5–14.5)
EST. GFR  (NO RACE VARIABLE): >60 ML/MIN/1.73 M^2
GLUCOSE SERPL-MCNC: 93 MG/DL (ref 70–110)
GLUCOSE UR QL STRIP: NEGATIVE
HCT VFR BLD AUTO: 42.7 % (ref 37–48.5)
HGB BLD-MCNC: 12.9 G/DL (ref 12–16)
HGB UR QL STRIP: NEGATIVE
IMM GRANULOCYTES # BLD AUTO: 0.03 K/UL (ref 0–0.04)
IMM GRANULOCYTES NFR BLD AUTO: 0.3 % (ref 0–0.5)
KETONES UR QL STRIP: NEGATIVE
LACTATE SERPL-SCNC: 1.6 MMOL/L (ref 0.5–2.2)
LEUKOCYTE ESTERASE UR QL STRIP: NEGATIVE
LIPASE SERPL-CCNC: 68 U/L (ref 4–60)
LYMPHOCYTES # BLD AUTO: 2.2 K/UL (ref 1–4.8)
LYMPHOCYTES NFR BLD: 19.3 % (ref 18–48)
MAGNESIUM SERPL-MCNC: 2 MG/DL (ref 1.6–2.6)
MCH RBC QN AUTO: 23.7 PG (ref 27–31)
MCHC RBC AUTO-ENTMCNC: 30.2 G/DL (ref 32–36)
MCV RBC AUTO: 79 FL (ref 82–98)
MONOCYTES # BLD AUTO: 0.7 K/UL (ref 0.3–1)
MONOCYTES NFR BLD: 5.8 % (ref 4–15)
NEUTROPHILS # BLD AUTO: 8.3 K/UL (ref 1.8–7.7)
NEUTROPHILS NFR BLD: 73.7 % (ref 38–73)
NITRITE UR QL STRIP: NEGATIVE
NRBC BLD-RTO: 0 /100 WBC
PH UR STRIP: 8 [PH] (ref 5–8)
PLATELET # BLD AUTO: 299 K/UL (ref 150–450)
PMV BLD AUTO: 10.1 FL (ref 9.2–12.9)
POTASSIUM SERPL-SCNC: 3.6 MMOL/L (ref 3.5–5.1)
PROCALCITONIN SERPL IA-MCNC: <0.02 NG/ML
PROT SERPL-MCNC: 8.5 G/DL (ref 6–8.4)
PROT UR QL STRIP: NEGATIVE
RBC # BLD AUTO: 5.44 M/UL (ref 4–5.4)
SARS-COV-2 RDRP RESP QL NAA+PROBE: NEGATIVE
SODIUM SERPL-SCNC: 138 MMOL/L (ref 136–145)
SP GR UR STRIP: 1.01 (ref 1–1.03)
URN SPEC COLLECT METH UR: NORMAL
UROBILINOGEN UR STRIP-ACNC: NEGATIVE EU/DL
WBC # BLD AUTO: 11.25 K/UL (ref 3.9–12.7)

## 2024-08-31 PROCEDURE — 81003 URINALYSIS AUTO W/O SCOPE: CPT | Performed by: EMERGENCY MEDICINE

## 2024-08-31 PROCEDURE — 96361 HYDRATE IV INFUSION ADD-ON: CPT

## 2024-08-31 PROCEDURE — 81025 URINE PREGNANCY TEST: CPT | Performed by: EMERGENCY MEDICINE

## 2024-08-31 PROCEDURE — 63600175 PHARM REV CODE 636 W HCPCS: Performed by: EMERGENCY MEDICINE

## 2024-08-31 PROCEDURE — 93005 ELECTROCARDIOGRAM TRACING: CPT

## 2024-08-31 PROCEDURE — U0002 COVID-19 LAB TEST NON-CDC: HCPCS | Performed by: EMERGENCY MEDICINE

## 2024-08-31 PROCEDURE — 83605 ASSAY OF LACTIC ACID: CPT | Performed by: EMERGENCY MEDICINE

## 2024-08-31 PROCEDURE — 83690 ASSAY OF LIPASE: CPT | Performed by: EMERGENCY MEDICINE

## 2024-08-31 PROCEDURE — 85025 COMPLETE CBC W/AUTO DIFF WBC: CPT | Performed by: EMERGENCY MEDICINE

## 2024-08-31 PROCEDURE — 80053 COMPREHEN METABOLIC PANEL: CPT | Performed by: EMERGENCY MEDICINE

## 2024-08-31 PROCEDURE — 84145 PROCALCITONIN (PCT): CPT | Performed by: EMERGENCY MEDICINE

## 2024-08-31 PROCEDURE — 93010 ELECTROCARDIOGRAM REPORT: CPT | Mod: ,,, | Performed by: INTERNAL MEDICINE

## 2024-08-31 PROCEDURE — 25000003 PHARM REV CODE 250: Performed by: EMERGENCY MEDICINE

## 2024-08-31 PROCEDURE — 83735 ASSAY OF MAGNESIUM: CPT | Performed by: EMERGENCY MEDICINE

## 2024-08-31 PROCEDURE — 87040 BLOOD CULTURE FOR BACTERIA: CPT | Performed by: EMERGENCY MEDICINE

## 2024-08-31 PROCEDURE — 96375 TX/PRO/DX INJ NEW DRUG ADDON: CPT

## 2024-08-31 PROCEDURE — 96374 THER/PROPH/DIAG INJ IV PUSH: CPT

## 2024-08-31 PROCEDURE — 99285 EMERGENCY DEPT VISIT HI MDM: CPT | Mod: 25

## 2024-08-31 RX ORDER — ONDANSETRON 4 MG/1
4 TABLET, ORALLY DISINTEGRATING ORAL EVERY 6 HOURS PRN
Qty: 12 TABLET | Refills: 0 | Status: SHIPPED | OUTPATIENT
Start: 2024-08-31

## 2024-08-31 RX ORDER — MORPHINE SULFATE 4 MG/ML
4 INJECTION, SOLUTION INTRAMUSCULAR; INTRAVENOUS
Status: COMPLETED | OUTPATIENT
Start: 2024-08-31 | End: 2024-08-31

## 2024-08-31 RX ORDER — ONDANSETRON HYDROCHLORIDE 2 MG/ML
4 INJECTION, SOLUTION INTRAVENOUS ONCE
Status: COMPLETED | OUTPATIENT
Start: 2024-08-31 | End: 2024-08-31

## 2024-08-31 RX ORDER — DIPHENHYDRAMINE HYDROCHLORIDE 50 MG/ML
25 INJECTION INTRAMUSCULAR; INTRAVENOUS
Status: COMPLETED | OUTPATIENT
Start: 2024-08-31 | End: 2024-08-31

## 2024-08-31 RX ORDER — DICYCLOMINE HYDROCHLORIDE 20 MG/1
20 TABLET ORAL 4 TIMES DAILY PRN
Qty: 16 TABLET | Refills: 0 | Status: SHIPPED | OUTPATIENT
Start: 2024-08-31

## 2024-08-31 RX ADMIN — DIPHENHYDRAMINE HYDROCHLORIDE 25 MG: 50 INJECTION, SOLUTION INTRAMUSCULAR; INTRAVENOUS at 06:08

## 2024-08-31 RX ADMIN — ONDANSETRON 4 MG: 2 INJECTION INTRAMUSCULAR; INTRAVENOUS at 03:08

## 2024-08-31 RX ADMIN — SODIUM CHLORIDE 1000 ML: 9 INJECTION, SOLUTION INTRAVENOUS at 12:08

## 2024-08-31 RX ADMIN — MORPHINE SULFATE 4 MG: 4 INJECTION INTRAVENOUS at 03:08

## 2024-08-31 NOTE — DISCHARGE INSTRUCTIONS
Take frequent sips of Pedialyte, Powerade, Gatorade.  Popsicles.  Hopkins diet, bananas, breads, rice.  Avoid red sauces, fruit juices, and dairy products as can irritate your stomach.  If drinking water, be sure to have something salty such as crackers to help restore electrolytes.  Return to emergency department for: Weakness, passing out, blood in stool, blood in vomit, fever, worsening abdominal pain, or other concerns.    The source of your stomach pain currently is unknown.  Consider outpatient HIDA scan.

## 2024-08-31 NOTE — ED PROVIDER NOTES
Emergency Medicine Provider Note - 8/31/2024       SCRIBE NOTE: Steffen MARROQUIN, am scribing for, and in the presence of Giselle Cadena DO, FACEP     History     Chief Complaint   Patient presents with    Palpitations     Palpitations started last night around 10 pm. Also has right upper quadrant pain. + nausea, no vomiting, no shortness of breath, + diarrhea last night. Recently treated for infection with antibiotics.        Allergies:  Review of patient's allergies indicates:   Allergen Reactions    Procardia [nifedipine]        History of Present Illness   HPI    8/31/2024, 2:26 PM  The history is provided by the patient and old chart    Anthony Sexton is a 34 y.o. female presenting to the ED for RUQ abd pain which radiates to her back.  Onset 4 weeks ago with some loose stool.  The pain initially as minor aches but now more severe.  Patient was seen by primary Care provider on 8/14/2024.  Workup: CT on 8/15: Fatty Liver.  Gallbladder unremarkable. Patient abdominal pain worsened about 2 weeks ago.  Repeat visit with Primary Care on 8/21/2024:   Patient had severe diarrhea at that time.  Shortly after, she noticed an old tampon fall from her vaginal area. Into the toilet. Patient was started on Clecin 300 mg, 1 PO TID x 7days and Diflucan for suspected toxic shock. Last night: Patient had multiple episodes of diarrhea and Right upper abdominal pain .  She experienced palpations, chills, and cramping in the toes/fingers. Pt denies any rash, dysuria, urinary frequency, or exacerbation after eating food. Pt also denies any recent travel, hx of colonoscopy, or hx of cholecystectomy.    Prior imaging-  EXAMINATION:  CT ABDOMEN WITH WITHOUT CONTRAST     CLINICAL HISTORY:  Right upper quadrant pain, <Reason For Exam>     TECHNIQUE:  CT of the abdomen only without and with contrast utilizing   Omnipaque 350. All CT scans at this facility are performed  using dose modulation techniques as appropriate to performed  exam including the following:  automated exposure control; adjustment of mA and/or kV according to the patients size (this includes techniques or standardized protocols for targeted exams where dose is matched to indication/reason for exam: i.e. extremities or head);  iterative reconstruction technique.     COMPARISON:  None.     FINDINGS:  Lung bases are clear.     Noncontrast images are unremarkable.     Fatty infiltration of the liver.  Spleen is unremarkable.  Pancreas is normal.     Gallbladder appears unremarkable.     Visualized bowel is unremarkable.     Impression:     Fatty infiltration of liver.  No acute findings or suspicious masses.      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     Pre-eclampsia in postpartum period 04/23/2020    Vitamin D deficiency        Past Surgical History:  Past Surgical History:   Procedure Laterality Date    DILATION AND CURETTAGE OF UTERUS USING SUCTION N/A 3/28/2024    Procedure: DILATION AND CURETTAGE, UTERUS, USING SUCTION;  Surgeon: Kirsten Siu MD;  Location: [facility] OR;  Service: OB/GYN;  Laterality: N/A;    ESOPHAGOGASTRODUODENOSCOPY N/A 05/26/2021    Procedure: EGD (ESOPHAGOGASTRODUODENOSCOPY);  Surgeon: Alka Olmstead MD;  [facility];  Service: Endoscopy;  Laterality: N/A;    ESOPHAGOGASTRODUODENOSCOPY N/A 10/04/2021    Procedure: EGD (ESOPHAGOGASTRODUODENOSCOPY);  Surgeon: Alka Olmstead MD;  Location: [facility];  Service: Endoscopy;   Laterality: N/A;    ESOPHAGOGASTRODUODENOSCOPY N/A 05/29/2023    Procedure: EGD (ESOPHAGOGASTRODUODENOSCOPY);  Surgeon: Alka Olmstead MD;  Location: [facility];  Service: Endoscopy;  Laterality: N/A;    PH MONITORING, ESOPHAGUS, WIRELESS, (OFF REFLUX MEDS) N/A 05/29/2023    Procedure: PH MONITORING, ESOPHAGUS, WIRELESS, (OFF REFLUX MEDS);  Surgeon: Alka Olmstead MD;  Location: [facility] ENDO;  Service: Endoscopy;  Laterality: N/A;    REDUCTION OF BOTH BREASTS Bilateral 05/27/2021    Procedure: MAMMOPLASTY, REDUCTION, BILATERAL;  Surgeon: Daniel Ba MD;  Location: [address];  Service: Plastics;  Laterality: Bilateral;  Left breast tissue: 1584  Right breast tissue: 1438    SEPTOPLASTY, NOSE, WITH NASAL TURBINATE REDUCTION Bilateral 01/17/2024    Procedure: SEPTOPLASTY, NOSE, WITH NASAL TURBINATE REDUCTION;  Surgeon: Corey Lucio MD;  Location: [facility];  Service: ENT;  Laterality: Bilateral;         Family History:  Family History   Problem Relation Name Age of Onset    Hypertension Maternal Grandmother      Diabetes Maternal Grandmother      No Known Problems Maternal Grandfather      Hypertension Father      Hyperlipidemia Father      Heart disease Father  55        MI/CAD    Diabetes Mother      Asthma Mother      Hyperlipidemia Mother      Hypertension Mother      Sjogren's syndrome Mother      Fibromyalgia Mother      No Known Problems Brother      No Known Problems Sister      Insulin resistance Sister      No Known Problems Daughter      Diabetes Maternal Aunt      Breast cancer Maternal Aunt      Stroke Neg Hx      Brain cancer Neg Hx      Migraines Neg Hx      Colon cancer Neg Hx      Ovarian cancer Neg Hx      Anesthesia problems Neg Hx      Thrombosis Neg Hx         Social History:  Social History     Tobacco Use    Smoking status: Never     Passive exposure: Never    Smokeless tobacco: Never   Substance and Sexual Activity    Alcohol use: Not Currently     Alcohol/week: 0.0  standard drinks of alcohol    Drug use: No    Sexual activity: Yes     Partners: Male     Comment: Not interested in having further kids       I have reviewed the Past Medical History, Past Surgical History, Family History and Social History as documented above.      Review of Systems   Review of Systems   Constitutional:  Positive for chills. Negative for diaphoresis and fever.   HENT:  Negative for congestion.    Respiratory:  Negative for cough and shortness of breath.    Cardiovascular:  Positive for palpitations. Negative for chest pain.   Gastrointestinal:  Positive for abdominal pain (RUQ and radiates to back) and diarrhea. Negative for nausea and vomiting.   Genitourinary:  Negative for dysuria and frequency.   Musculoskeletal:  Positive for back pain and myalgias (cramping in toes/fingers).   Skin:  Negative for rash.   Neurological:  Negative for dizziness, weakness and headaches.   All other systems reviewed and are negative.         Physical Exam     Initial Vitals [08/31/24 1209]   BP Pulse Resp Temp SpO2   (!) 147/82 108 20 99 °F (37.2 °C) 100 %      MAP       --          Physical Exam    Nursing Notes and Vital Signs Reviewed.  Constitutional: Patient is in no acute distress. Elevated BMI Well-developed and well-nourished.  Head: Atraumatic. Normocephalic.  Eyes: PERRL. EOM intact. Conjunctivae are not pale. No scleral icterus.  ENT: Mucous membranes are moist.     Neck: Supple. Full ROM.   Cardiovascular: Regular rate. Regular rhythm. No murmurs, rubs, or gallops. Distal pulses are 2+ and symmetric.  Pulmonary/Chest: No respiratory distress. Clear to auscultation bilaterally. No wheezing or rales.  Abdominal: Soft and non-distended.  There is no tenderness.  No rebound, guarding, or rigidity. (-) Crowder's.  Genitourinary: Vaginal Exam: Chaperone Present.  Verbal Permission Given . Bimanual finger sweep exam. No foreign body noted.  Patient refused speculum exam.   Musculoskeletal: Moves all  "extremities. No obvious deformities. No edema.   Skin: Warm and dry.  Neurological:  Alert, awake, and appropriate.  Normal speech.  No acute focal neurological deficits are appreciated.  Psychiatric: Normal affect. Good eye contact. Appropriate in content.     ED Course   ED Procedures:  Procedures    ED Vital Signs:  Vitals:    08/31/24 1209 08/31/24 1300 08/31/24 1330 08/31/24 1430   BP: (!) 147/82 134/67 137/81 (!) 140/85   Pulse: 108 98 95 89   Resp: 20 18 20 20   Temp: 99 °F (37.2 °C)      TempSrc: Oral      SpO2: 100% 98% 100% 99%   Weight: 123.7 kg (272 lb 11.3 oz)      Height: 5' 6" (1.676 m)       08/31/24 1500 08/31/24 1504 08/31/24 1600 08/31/24 1630   BP: 137/70 137/70 122/70    Pulse: 85 78 78    Resp: 20 16 20 20   Temp:       TempSrc:       SpO2: 98% 99% 96%    Weight:       Height:        08/31/24 1700 08/31/24 1800   BP: 132/69    Pulse: 102 83   Resp: 20    Temp:     TempSrc:     SpO2: 97% 97%   Weight:     Height:         Abnormal Lab Results:  Labs Reviewed   CBC W/ AUTO DIFFERENTIAL - Abnormal       Result Value    WBC 11.25      RBC 5.44 (*)     Hemoglobin 12.9      Hematocrit 42.7      MCV 79 (*)     MCH 23.7 (*)     MCHC 30.2 (*)     RDW 14.4      Platelets 299      MPV 10.1      Immature Granulocytes 0.3      Gran # (ANC) 8.3 (*)     Immature Grans (Abs) 0.03      Lymph # 2.2      Mono # 0.7      Eos # 0.1      Baso # 0.06      nRBC 0      Gran % 73.7 (*)     Lymph % 19.3      Mono % 5.8      Eosinophil % 0.4      Basophil % 0.5      Differential Method Automated     COMPREHENSIVE METABOLIC PANEL - Abnormal    Sodium 138      Potassium 3.6      Chloride 105      CO2 22 (*)     Glucose 93      BUN 10      Creatinine 0.8      Calcium 9.9      Total Protein 8.5 (*)     Albumin 3.8      Total Bilirubin 0.3      Alkaline Phosphatase 105      AST 17      ALT 18      eGFR >60      Anion Gap 11     LIPASE - Abnormal    Lipase 68 (*)    CULTURE, BLOOD    Blood Culture, Routine No Growth to date   "   CULTURE, BLOOD    Blood Culture, Routine No Growth to date     URINALYSIS, REFLEX TO URINE CULTURE    Specimen UA Urine, Clean Catch      Color, UA Yellow      Appearance, UA Clear      pH, UA 8.0      Specific Gravity, UA 1.015      Protein, UA Negative      Glucose, UA Negative      Ketones, UA Negative      Bilirubin (UA) Negative      Occult Blood UA Negative      Nitrite, UA Negative      Urobilinogen, UA Negative      Leukocytes, UA Negative      Narrative:     Specimen Source->Urine   PREGNANCY TEST, URINE RAPID    Preg Test, Ur Negative      Narrative:     Specimen Source->Urine   MAGNESIUM    Magnesium 2.0     SARS-COV-2 RNA AMPLIFICATION, QUAL    SARS-CoV-2 RNA, Amplification, Qual Negative     LACTIC ACID, PLASMA    Lactate (Lactic Acid) 1.6     PROCALCITONIN    Procalcitonin <0.02          All Lab Results:  Results for orders placed or performed during the hospital encounter of 08/31/24   Blood Culture #1 **CANNOT BE ORDERED STAT**    Specimen: Peripheral, Antecubital, Right; Blood   Result Value Ref Range    Blood Culture, Routine No Growth to date    Blood Culture #2 **CANNOT BE ORDERED STAT**    Specimen: Peripheral, Antecubital, Right; Blood   Result Value Ref Range    Blood Culture, Routine No Growth to date    CBC W/ AUTO DIFFERENTIAL   Result Value Ref Range    WBC 11.25 3.90 - 12.70 K/uL    RBC 5.44 (H) 4.00 - 5.40 M/uL    Hemoglobin 12.9 12.0 - 16.0 g/dL    Hematocrit 42.7 37.0 - 48.5 %    MCV 79 (L) 82 - 98 fL    MCH 23.7 (L) 27.0 - 31.0 pg    MCHC 30.2 (L) 32.0 - 36.0 g/dL    RDW 14.4 11.5 - 14.5 %    Platelets 299 150 - 450 K/uL    MPV 10.1 9.2 - 12.9 fL    Immature Granulocytes 0.3 0.0 - 0.5 %    Gran # (ANC) 8.3 (H) 1.8 - 7.7 K/uL    Immature Grans (Abs) 0.03 0.00 - 0.04 K/uL    Lymph # 2.2 1.0 - 4.8 K/uL    Mono # 0.7 0.3 - 1.0 K/uL    Eos # 0.1 0.0 - 0.5 K/uL    Baso # 0.06 0.00 - 0.20 K/uL    nRBC 0 0 /100 WBC    Gran % 73.7 (H) 38.0 - 73.0 %    Lymph % 19.3 18.0 - 48.0 %    Mono % 5.8  4.0 - 15.0 %    Eosinophil % 0.4 0.0 - 8.0 %    Basophil % 0.5 0.0 - 1.9 %    Differential Method Automated    Comp. Metabolic Panel   Result Value Ref Range    Sodium 138 136 - 145 mmol/L    Potassium 3.6 3.5 - 5.1 mmol/L    Chloride 105 95 - 110 mmol/L    CO2 22 (L) 23 - 29 mmol/L    Glucose 93 70 - 110 mg/dL    BUN 10 6 - 20 mg/dL    Creatinine 0.8 0.5 - 1.4 mg/dL    Calcium 9.9 8.7 - 10.5 mg/dL    Total Protein 8.5 (H) 6.0 - 8.4 g/dL    Albumin 3.8 3.5 - 5.2 g/dL    Total Bilirubin 0.3 0.1 - 1.0 mg/dL    Alkaline Phosphatase 105 55 - 135 U/L    AST 17 10 - 40 U/L    ALT 18 10 - 44 U/L    eGFR >60 >60 mL/min/1.73 m^2    Anion Gap 11 8 - 16 mmol/L   Lipase   Result Value Ref Range    Lipase 68 (H) 4 - 60 U/L   Urinalysis, Reflex to Urine Culture Urine, Clean Catch    Specimen: Urine   Result Value Ref Range    Specimen UA Urine, Clean Catch     Color, UA Yellow Yellow, Straw, Talia    Appearance, UA Clear Clear    pH, UA 8.0 5.0 - 8.0    Specific Gravity, UA 1.015 1.005 - 1.030    Protein, UA Negative Negative    Glucose, UA Negative Negative    Ketones, UA Negative Negative    Bilirubin (UA) Negative Negative    Occult Blood UA Negative Negative    Nitrite, UA Negative Negative    Urobilinogen, UA Negative <2.0 EU/dL    Leukocytes, UA Negative Negative   Pregnancy, urine rapid   Result Value Ref Range    Preg Test, Ur Negative    Magnesium   Result Value Ref Range    Magnesium 2.0 1.6 - 2.6 mg/dL   COVID-19 Rapid Screening   Result Value Ref Range    SARS-CoV-2 RNA, Amplification, Qual Negative Negative   Lactic acid, plasma   Result Value Ref Range    Lactate (Lactic Acid) 1.6 0.5 - 2.2 mmol/L   Procalcitonin   Result Value Ref Range    Procalcitonin <0.02 <0.25 ng/mL     *Note: Due to a large number of results and/or encounters for the requested time period, some results have not been displayed. A complete set of results can be found in Results Review.         The EKG was ordered, reviewed, and independently  interpreted by the ED provider:      ECG Results              EKG 12-lead (Preliminary result)  Result time 08/31/24 13:52:01      Wet Read by Giselle Cadena DO (08/31/24 13:52:01, [facility] - Emergency Dept., Emergency Medicine)    Rate of 111.  Sinus tachycardia.  Normal axis.  No ST segment elevation.  No STEMI                                    Imaging Results:  Imaging Results              US Abdomen Limited (Final result)  Result time 08/31/24 14:56:28      Final result by Tee Castillo MD (08/31/24 14:56:28)                   Impression:      1.  Fatty infiltration of the liver.    2.  Otherwise, normal study.      Electronically signed by: Tee Castillo MD  Date:    08/31/2024  Time:    14:56               Narrative:    EXAMINATION:  US ABDOMEN LIMITED, color flow and spectral Doppler interrogation    CLINICAL HISTORY:  RUQ pain;    COMPARISON:  Ultrasound from August 30, 2022, abdomen and pelvis CT scan from August 15, 2024    FINDINGS:  The liver is fatty, and measures 16.5 cm. The gallbladder is normal. Negative for a sonographic Crowder's sign.  The common duct measures 3 mm. The right kidney measures 10.4 cm.  The right kidney is without focal solid or cystic masses, hydronephrosis, perinephric fluid collections or shadowing stones. The visualized portions of the pancreas are normal.    The aorta and IVC are normal in caliber.  Hepatopedal flow is documented in the portal vein.  The flow velocity of the portal vein is 30 centimeters/second.    Hepatorenal index is not record.                                            The Emergency Provider reviewed the vital signs and test results, which are outlined above.     ED Discussion   ED Medication(s):  Medications   sodium chloride 0.9% bolus 1,000 mL 1,000 mL (0 mLs Intravenous Stopped 8/31/24 1351)   morphine injection 4 mg (4 mg Intravenous Given 8/31/24 1504)   ondansetron injection 4 mg (4 mg Intravenous Given 8/31/24 1503)   diphenhydrAMINE  injection 25 mg (25 mg Intravenous Given 8/31/24 1807)       ED Course as of 09/01/24 1022   Sat Aug 31, 2024   1433 Abdominal return precautions given.  At this time, patient will defer CT scan [LB]   1653 Repeat abdominal exam soft no rebound or guarding no masses.  Bimanual vaginal exam performed: No foreign body.  No speculum exam.  Recommend that she follow up with her primary care physician.  Question need for HIDA scan.  If symptoms persist recommend follow up with Gastroenterology.  Return precautions discussed [LB]      ED Course User Index  [LB] Giselle Cadena DO        4:54 PM Reassessment: Dr. Cadena reassessed the pt.  The pt is resting comfortably and is NAD.  Pt states their sx have improved. Discussed test results, shared treatment plan, specific conditions for return, and the need for f/u.  Answered their questions at this time.  Pt understands and agrees to the plan.  The pt has remained hemodynamically stable through ED course and is stable for discharge.    I discussed with patient and/or family/caretaker that evaluation in the ED does not suggest any emergent or life threatening medical conditions requiring immediate intervention beyond what was provided in the ED, and I believe patient is safe for discharge.  Regardless, an unremarkable evaluation in the ED does not preclude the development or presence of a serious of life threatening condition. As such, patient was instructed to return immediately for any worsening or change in current symptoms.    Regarding ABDOMINAL PAIN, I recommended that the patient: Sip water or other clear fluids; avoid solid food for the first few hours after vomiting or diarrhea; if vomiting, wait 6 hours, and then eat small amounts of mild foods such as rice, applesauce, or crackers; avoid dairy products; avoid citrus, high-fat foods, fried or greasy foods, tomato products, caffeine, alcohol, and carbonated beverages;  avoid aspirin, ibuprofen or other  anti-inflammatory medications, and narcotic pain medications unless prescribed.  In regards to prevention, I encouraged patient to:  Avoid fatty or greasy foods; drink plenty of water each day; eat small meals more frequently; exercise regularly; limit foods that produce gas; make sure meals are well-balanced and high in fiber and include plenty of fruits and vegetables.       MIPS Measures     Smoker? No     Hypertension: Patient did not have any elevated blood pressures in the Emergency Department.         Medical Decision Making                 Medical Decision Making  Differential Diagnoses: Based on the available information and the initial assessment, the working DIFFERENTIAL DIAGNOSIS considered during this evaluation included, but not limited to:  Biliary colic, acute cholecystitis, irritable bowel, urinary tract infection    ED course: Patient had IV established.  Patient given ondansetron as well as 4 mg morphine.  1000 mL cc fluid bolus.  EKG no STEMI.  No arrhythmia.  Urinalysis negative for infection.  UPT negative.  Lactic 1.6.  Procalcitonin 0.02.  WBC 11.25.  Positive left shift.  H/H normal.  BUN 10.  Creatinine 0.8.  Liver enzymes normal.  Lipase mildly elevated at 68.  Mag 2.0.  COVID negative.  Ultrasound shows fatty liver.  Negative sonographic Crowder's.  Patient did develop itching after morphine.  I believe that this was due to a histamine release.  Patient never developed any tongue swelling, lip swelling, hives/rash, nausea, vomiting, throat swelling, or difficulty breathing.  Patient was given 25 mg of IV Benadryl.  Repeat abdominal exam soft no rebound or guarding no masses.  Abdomen nonsurgical.  Discussed abdominal return precautions as well as allergy precautions.  Shared decision-making regarding repeat CT scan: Patient did have CT scan performed proximally 3 weeks prior with normal results.  At this time patient deferred CT scan.  Pelvic exam performed with finger sweep.  Patient had  "declined speculum exam.  Also considered C difficile colitis.  Patient was not able to provide a stool sample in the ED. RA    Amount and/or Complexity of Data Reviewed  External Data Reviewed: radiology.     Details: See HPI.  NC have  Labs: ordered. Decision-making details documented in ED Course.  Radiology: ordered. Decision-making details documented in ED Course.  ECG/medicine tests: ordered and independent interpretation performed. Decision-making details documented in ED Course.    Risk  Prescription drug management.        Prescription Management: I performed a review of the patient's current Rx medication list as input by nursing staff.    Discharge Medication List as of 8/31/2024  6:11 PM        START taking these medications    Details   dicyclomine (BENTYL) 20 mg tablet Take 1 tablet (20 mg total) by mouth 4 (four) times daily as needed., Starting Sat 8/31/2024, Normal           CONTINUE these medications which have CHANGED    Details   ondansetron (ZOFRAN-ODT) 4 MG TbDL Take 1 tablet (4 mg total) by mouth every 6 (six) hours as needed., Starting Sat 8/31/2024, Normal           CONTINUE these medications which have NOT CHANGED    Details   cholecalciferol, vitamin D3, (VITAMIN D3) 50 mcg (2,000 unit) Tab Take 2,000 Units by mouth once daily., Historical Med      fluconazole (DIFLUCAN) 150 MG Tab Take one tablet by mouth now and one tablet by mouth in 48 hours, Normal              Discussed case verbally with:N/A    Referrals:  Orders Placed This Encounter   Procedures    Ambulatory referral/consult to General Surgery     Standing Status:   Future     Standing Expiration Date:   9/30/2025     Referral Priority:   Routine     Referral Type:   Consultation     Referral Reason:   Specialty Services Required     Requested Specialty:   General Surgery     Number of Visits Requested:   1         Portions of this note may have been created with voice recognition software. Occasional "wrong-word" or " ""sound-a-like" substitutions may have occurred due to the inherent limitations of voice recognition software. Please, read the note carefully and recognize, using context, where substitutions have occurred.          Clinical Impression       ICD-10-CM ICD-9-CM   1. Right upper quadrant abdominal pain  R10.11 789.01   2. Palpitations  R00.2 785.1   3. Diarrhea, unspecified type  R19.7 787.91         ED Disposition     Disposition: Discharge to home  Patient condition: Good    ED Follow-up   Follow-up Information       Kathie Contreras, NP In 2 days.    Specialty: Internal Medicine  Why: Return to emergency department for fever, bloody stools, severe abdominal pain, nausea, vomiting, worsening condition.  Contact information:  [address] [facility] [address] [phone]                               Scribe Attestation:   Scribe #1: Steffen MARROQUIN,  performed the above scribed service and the documentation accurately describes the services I performed. I attest to the accuracy of the note.     Attending:   Physician Attestation Statement for Scribe #1: Giselle MARROQUIN DO, FACEP, personally performed the services described in this documentation, as scribed by Steffen Buckner , in my presence, and it is both accurate and complete.                   Giselle Cadena DO  09/01/24 1026    "

## 2024-08-31 NOTE — ED NOTES
Report has been received from the previous shift nurse.  Care has been resumed seamlessly.     Upon assessment, no acute findings or signs and symptoms of distress were observed.  Anthony remains stable and alert.    Ongoing monitoring will be implemented, and I will continue to adhere to the established treatment orders and plan of care to ensure optimal patient outcomes from ER visit.

## 2024-09-01 LAB
OHS QRS DURATION: 68 MS
OHS QTC CALCULATION: 446 MS

## 2024-09-03 NOTE — PROGRESS NOTES
[facility]  Psychology  Progress Note  Individual Psychotherapy (PhD/LCSW)    Patient Name: Anthony Sexton  MRN: [id number]    Patient Class: OP- [facility]  Primary Care Provider: Maryana, Primary Doctor    Psychiatry Initial Visit (PhD/LCSW)  Individual Psychotherapy - CPT 32111  EAP #5 for 2024    Date: 8/29/2024    Site: [place]    The patient location is: Patient's home/ Patient reported that his/her location at the time of this visit was in the [facility]     Visit type: Virtual visit with synchronous audio and video     Each patient to whom he or she provides medical services by telemedicine is: (1) informed of the relationship between the physician and patient and the respective role of any other health care provider with respect to management of the patient; and (2) notified that he or she may decline to receive medical services by telemedicine and may withdraw from such care at any time.     Referral source: Lenora Walters MD    Clinical status of patient: Outpatient    Anthony Sexton, a 34 y.o. female, for initial evaluation visit.  Met with patient.    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     Substance use:    Alcohol: none   Drugs: none   Tobacco: none   Caffeine: none    Current medications and drug reactions (include OTC, herbal): see medication list     Strengths and liabilities: Strength: Patient accepts guidance/feedback, Strength: Patient is expressive/articulate., Strength: Patient is intelligent., Strength: Patient is motivated for change., Strength: Patient has positive support network., Strength: Patient has reasonable judgment., Strength: Patient is stable., Liability: Patient lacks coping skills.    Current Evaluation:     Mental Status Exam:  General Appearance:  unremarkable, age appropriate   Speech: normal tone, normal rate, normal pitch, normal volume      Level of Cooperation: cooperative      Thought Processes: normal and logical   Mood: anxious, sad      Thought Content: normal, no suicidality, no homicidality, delusions, or paranoia   Affect: congruent and appropriate   Orientation: Oriented x3   Memory: recent >  intact   Attention Span & Concentration: intact   Fund of General Knowledge: intact and appropriate to age and level of education   Abstract Reasoning: not assessed   Judgment & Insight: fair     Language  intact     Summary: The patient received surprising news that has caused frustration and stress. Patient has noticed difficulty accessing emotions after receiving the news. The reaction to the news was explored and normalized. Patient using techniques to reduce stress.   Diagnostic Impression - Plan:       ICD-10-CM ICD-9-CM   1. Undiagnosed disease or syndrome present  R69 799.9           Plan:individual psychotherapy    Return to Clinic: as scheduled    Length of Service (minutes): 30          Barbra Bartlett, PhD  Psychologist  [facility]

## 2024-09-04 ENCOUNTER — PATIENT MESSAGE (OUTPATIENT)
Dept: INTERNAL MEDICINE | Facility: CLINIC | Age: 34
End: 2024-09-04
Payer: COMMERCIAL

## 2024-09-04 DIAGNOSIS — R10.11 RIGHT UPPER QUADRANT PAIN: Primary | ICD-10-CM

## 2024-09-05 LAB
BACTERIA BLD CULT: NORMAL
BACTERIA BLD CULT: NORMAL

## 2024-09-16 ENCOUNTER — OFFICE VISIT (OUTPATIENT)
Dept: PSYCHIATRY | Facility: CLINIC | Age: 34
End: 2024-09-16
Payer: COMMERCIAL

## 2024-09-16 DIAGNOSIS — R69 UNDIAGNOSED DISEASE OR SYNDROME PRESENT: Primary | ICD-10-CM

## 2024-09-16 PROCEDURE — 90834 PSYTX W PT 45 MINUTES: CPT | Mod: 95,,, | Performed by: STUDENT IN AN ORGANIZED HEALTH CARE EDUCATION/TRAINING PROGRAM

## 2024-09-16 PROCEDURE — 3044F HG A1C LEVEL LT 7.0%: CPT | Mod: CPTII,95,, | Performed by: STUDENT IN AN ORGANIZED HEALTH CARE EDUCATION/TRAINING PROGRAM

## 2024-09-16 PROCEDURE — 1159F MED LIST DOCD IN RCRD: CPT | Mod: CPTII,95,, | Performed by: STUDENT IN AN ORGANIZED HEALTH CARE EDUCATION/TRAINING PROGRAM

## 2024-09-16 NOTE — PROGRESS NOTES
[facility]  Psychology  Progress Note  Individual Psychotherapy (PhD/LCSW)    Patient Name: Anthony Sexton  MRN: [id number]    Patient Class: OP- [facility]  Primary Care Provider: Maryana, Primary Doctor    Psychiatry Initial Visit (PhD/LCSW)  Individual Psychotherapy - CPT 03045  EAP #5 for 2024    Date: 9/16/2024    Site: [place]    The patient location is: Patient's home/ Patient reported that his/her location at the time of this visit was in the [facility]     Visit type: Virtual visit with synchronous audio and video     Each patient to whom he or she provides medical services by telemedicine is: (1) informed of the relationship between the physician and patient and the respective role of any other health care provider with respect to management of the patient; and (2) notified that he or she may decline to receive medical services by telemedicine and may withdraw from such care at any time.     Referral source: Lenora Walters MD    Clinical status of patient: Outpatient    Anthony Sexton, a 34 y.o. female, for initial evaluation visit.  Met with patient.    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     Substance use:    Alcohol: none   Drugs: none   Tobacco: none   Caffeine: none    Current medications and drug reactions (include OTC, herbal): see medication list     Strengths and liabilities: Strength: Patient accepts guidance/feedback, Strength: Patient is expressive/articulate., Strength: Patient is intelligent., Strength: Patient is motivated for change., Strength: Patient has positive support network., Strength: Patient has reasonable judgment., Strength: Patient is stable., Liability: Patient lacks coping skills.    Current Evaluation:     Mental Status Exam:  General Appearance:  unremarkable, age appropriate   Speech: normal tone, normal rate, normal pitch, normal volume      Level of Cooperation: cooperative      Thought Processes: normal and logical   Mood: anxious, sad      Thought Content: normal, no suicidality, no homicidality, delusions, or paranoia   Affect: congruent and appropriate   Orientation: Oriented x3   Memory: recent >  intact   Attention Span & Concentration: intact   Fund of General Knowledge: intact and appropriate to age and level of education   Abstract Reasoning: not assessed   Judgment & Insight: fair     Language  intact     Summary: The patient is feeling overwhelmed and defeated by stressors. Stress is manifesting as physical and emotional pain and rigidity. Discussed sources of stress and ways to cope with those stressors (asking for help before reaching her limit, self-care practices, reduced isolation). Patient will attempt coping strategies and speak with loved ones before next session.   Diagnostic Impression - Plan:       ICD-10-CM ICD-9-CM   1. Undiagnosed disease or syndrome present  R69 799.9             Plan:individual psychotherapy    Return to Clinic: as scheduled    Length of Service (minutes): 30          Barbra Bartlett, PhD  Psychologist  [facility]

## 2024-09-18 ENCOUNTER — HOSPITAL ENCOUNTER (OUTPATIENT)
Dept: RADIOLOGY | Facility: HOSPITAL | Age: 34
Discharge: HOME | End: 2024-09-18
Payer: COMMERCIAL

## 2024-09-18 ENCOUNTER — OFFICE VISIT (OUTPATIENT)
Dept: NEUROLOGY | Facility: CLINIC | Age: 34
End: 2024-09-18
Payer: COMMERCIAL

## 2024-09-18 VITALS
SYSTOLIC BLOOD PRESSURE: 142 MMHG | WEIGHT: 272.06 LBS | HEIGHT: 66 IN | DIASTOLIC BLOOD PRESSURE: 96 MMHG | HEART RATE: 84 BPM | OXYGEN SATURATION: 99 % | BODY MASS INDEX: 43.72 KG/M2 | RESPIRATION RATE: 16 BRPM

## 2024-09-18 DIAGNOSIS — E55.9 VITAMIN D DEFICIENCY: ICD-10-CM

## 2024-09-18 DIAGNOSIS — G44.229 CHRONIC TENSION-TYPE HEADACHE, NOT INTRACTABLE: ICD-10-CM

## 2024-09-18 DIAGNOSIS — R11.0 NAUSEA: ICD-10-CM

## 2024-09-18 DIAGNOSIS — G43.E19 INTRACTABLE CHRONIC MIGRAINE WITH AURA AND WITHOUT STATUS MIGRAINOSUS: Primary | ICD-10-CM

## 2024-09-18 DIAGNOSIS — M54.2 CERVICALGIA: ICD-10-CM

## 2024-09-18 DIAGNOSIS — R10.11 RIGHT UPPER QUADRANT PAIN: ICD-10-CM

## 2024-09-18 DIAGNOSIS — R29.898 NECK TIGHTNESS: ICD-10-CM

## 2024-09-18 PROCEDURE — 99215 OFFICE O/P EST HI 40 MIN: CPT | Mod: S$GLB,,,

## 2024-09-18 PROCEDURE — 63600175 PHARM REV CODE 636 W HCPCS

## 2024-09-18 PROCEDURE — 1159F MED LIST DOCD IN RCRD: CPT | Mod: CPTII,S$GLB,,

## 2024-09-18 PROCEDURE — 1160F RVW MEDS BY RX/DR IN RCRD: CPT | Mod: CPTII,S$GLB,,

## 2024-09-18 PROCEDURE — 3008F BODY MASS INDEX DOCD: CPT | Mod: CPTII,S$GLB,,

## 2024-09-18 PROCEDURE — 78227 HEPATOBIL SYST IMAGE W/DRUG: CPT | Mod: TC

## 2024-09-18 PROCEDURE — A9537 TC99M MEBROFENIN: HCPCS

## 2024-09-18 PROCEDURE — 3044F HG A1C LEVEL LT 7.0%: CPT | Mod: CPTII,S$GLB,,

## 2024-09-18 PROCEDURE — 99999 PR PBB SHADOW E&M-EST. PATIENT-LVL V: CPT | Mod: PBBFAC,,,

## 2024-09-18 PROCEDURE — 3080F DIAST BP >= 90 MM HG: CPT | Mod: CPTII,S$GLB,,

## 2024-09-18 PROCEDURE — 99417 PROLNG OP E/M EACH 15 MIN: CPT | Mod: S$GLB,,,

## 2024-09-18 PROCEDURE — 3077F SYST BP >= 140 MM HG: CPT | Mod: CPTII,S$GLB,,

## 2024-09-18 RX ORDER — TIZANIDINE 2 MG/1
2 TABLET ORAL NIGHTLY PRN
Qty: 15 TABLET | Refills: 0 | Status: SHIPPED | OUTPATIENT
Start: 2024-09-18 | End: 2024-10-18

## 2024-09-18 RX ORDER — SUMATRIPTAN 50 MG/1
50 TABLET, FILM COATED ORAL DAILY PRN
Qty: 9 TABLET | Refills: 0 | Status: SHIPPED | OUTPATIENT
Start: 2024-09-18 | End: 2024-10-18

## 2024-09-18 RX ORDER — OMEPRAZOLE 40 MG/1
40 CAPSULE, DELAYED RELEASE ORAL DAILY
COMMUNITY

## 2024-09-18 RX ORDER — SINCALIDE 5 UG/5ML
2.5 INJECTION, POWDER, LYOPHILIZED, FOR SOLUTION INTRAVENOUS ONCE
Status: COMPLETED | OUTPATIENT
Start: 2024-09-18 | End: 2024-09-18

## 2024-09-18 RX ORDER — KIT FOR THE PREPARATION OF TECHNETIUM TC 99M MEBROFENIN 45 MG/10ML
5 INJECTION, POWDER, LYOPHILIZED, FOR SOLUTION INTRAVENOUS
Status: COMPLETED | OUTPATIENT
Start: 2024-09-18 | End: 2024-09-18

## 2024-09-18 RX ORDER — AMITRIPTYLINE HYDROCHLORIDE 10 MG/1
10 TABLET, FILM COATED ORAL NIGHTLY
Qty: 30 TABLET | Refills: 0 | Status: SHIPPED | OUTPATIENT
Start: 2024-09-18 | End: 2024-10-18

## 2024-09-18 RX ORDER — ONDANSETRON 4 MG/1
4 TABLET, ORALLY DISINTEGRATING ORAL EVERY 8 HOURS PRN
Qty: 30 TABLET | Refills: 0 | Status: SHIPPED | OUTPATIENT
Start: 2024-09-18 | End: 2024-10-18

## 2024-09-18 RX ADMIN — SINCALIDE 2.5 MCG: 5 INJECTION, POWDER, LYOPHILIZED, FOR SOLUTION INTRAVENOUS at 09:09

## 2024-09-18 RX ADMIN — MEBROFENIN 5 MILLICURIE: 45 INJECTION, POWDER, LYOPHILIZED, FOR SOLUTION INTRAVENOUS at 08:09

## 2024-09-18 NOTE — PROGRESS NOTES
"Subjective:       Patient ID: Anthony Sexton is a 34 y.o. female.      Chief Complaint: "Headaches"        HPI    HPI 34 Years old Female with PMHx of SENTHIL / HTN / FLAQUITO / Vitamin D Deficiency / SAVANNA /  and other medical conditions came for the evaluation and recommendation of "Headaches".       Referral: The patient was referred by PCP.    Headache History:    Onset: Started age 18, worsening over the last 2 months.    Description: Headaches are pressure like, building up slowly towards the night and early morning. Patient reports headaches do not wake them up from sleep. They are progressively worsening and interfering with daily activities.    Timing: Duration of 3 hours for regular headaches / Duration of 12 hours for migraine like    Frequency: Intermittent, with daily headaches reported 4-5 times per week, and 3 per month are migraine-like.    Pain Severity: Increasing in severity, rated 3-8/10, causing significant morbidity.    Location: Unilateral / Starts behind right ear and radiates to temporal, parietal, occipital areas.       Family History: No history of migraines or early dementia.    Medications: Advil / Tylenol     Worsening Factors: Neck Extension / Eating a Meal     Alleviating Factors: Dark and quiet room, Sleep, cold towels to the forehead.    Associated Symptoms: Bilateral eye pressure, light and sound sensitivity, numbness to right arm and right leg with migraines, nausea, neck pain with radiation to right arm, blurry vision, ringing to right ear, dizziness, light-headed, tightness to right jaw     Pertinent Negative Symptoms: No associated neurological deficits, no scalp sensitivity, vomiting, balance issues, acute thunderclap onset, double vision, focal or bilateral limb weakness.    Positional/Behavioral Factors: Headaches are positional and postural, worsened by movement and bending the head downward. Denies worsening with Valsalva maneuver.     Triggers: Eating a Meal     Prodromal Symptoms: " "Right eye blurry vision and "pulling sensation of eye being pulled upwards"    Exclusions: No TMJ issues, head trauma, seizures, smoking history, caffeine overuse, vertigo, blackouts, fever, chills, or significant memory loss. No history of strokes or falls.    Ophthalmological History: Last eye clinic appointment 2 months, with normal pressures and no abnormalities reported. No papilledema noted. PMH of stigmatism bilaterally and routine ophthalmology visits every 12 months.    Sleep History: No symptoms of sleep apnea (e.g., snoring, gasping, frequent nighttime awakening, daytime fatigue).           Abortive therapies (tried and failed):     Sumatriptan     Fioricet    Nurtec      Preventative therapies (tried and failed):      Topamax- not effective    Metoprolol - side effects of bradycardia               Review of Systems   Constitutional:  Negative for activity change, appetite change, chills, diaphoresis, fatigue, fever and unexpected weight change.   HENT:  Positive for tinnitus. Negative for congestion, dental problem, drooling, ear discharge, ear pain, facial swelling, hearing loss, mouth sores, nosebleeds, postnasal drip, rhinorrhea, sinus pressure, sinus pain, sneezing, sore throat, trouble swallowing and voice change.    Eyes:  Positive for photophobia and visual disturbance. Negative for pain, discharge, redness and itching.        Bilateral eye pressure with headaches   Respiratory:  Negative for cough, chest tightness, shortness of breath and wheezing.    Cardiovascular:  Negative for chest pain, palpitations and leg swelling.   Gastrointestinal:  Positive for nausea. Negative for abdominal distention, abdominal pain, blood in stool, constipation, diarrhea and vomiting.   Endocrine: Negative for cold intolerance, heat intolerance, polydipsia, polyphagia and polyuria.   Genitourinary:  Negative for decreased urine volume, difficulty urinating, dysuria, flank pain, frequency, hematuria, pelvic pain, " urgency and vaginal discharge.   Musculoskeletal:  Positive for arthralgias and neck pain. Negative for back pain, gait problem, joint swelling, myalgias and neck stiffness.        Tightness to right jaw with headaches   Skin:  Negative for color change and rash.   Allergic/Immunologic: Negative for immunocompromised state.   Neurological:  Positive for dizziness, light-headedness, numbness and headaches. Negative for tremors, seizures, syncope, facial asymmetry, speech difficulty and weakness.   Hematological:  Negative for adenopathy. Does not bruise/bleed easily.   Psychiatric/Behavioral:  Negative for agitation, behavioral problems, confusion, decreased concentration, dysphoric mood, hallucinations, self-injury, sleep disturbance and suicidal ideas. The patient is not nervous/anxious and is not hyperactive.    All other systems reviewed and are negative.                Current Outpatient Medications:     cholecalciferol, vitamin D3, (VITAMIN D3) 50 mcg (2,000 unit) Tab, Take 2,000 Units by mouth once daily., Disp: , Rfl:     dicyclomine (BENTYL) 20 mg tablet, Take 1 tablet (20 mg total) by mouth 4 (four) times daily as needed., Disp: 16 tablet, Rfl: 0    drospirenone-estetrol (NEXTSTELLIS) 3 mg- 14.2 mg (28) Tab, Take 1 tablet by mouth daily., Disp: 28 tablet, Rfl: 3    fluconazole (DIFLUCAN) 150 MG Tab, Take one tablet by mouth now and one tablet by mouth in 48 hours, Disp: 2 tablet, Rfl: 0    ondansetron (ZOFRAN-ODT) 4 MG TbDL, Take 1 tablet (4 mg total) by mouth every 6 (six) hours as needed., Disp: 12 tablet, Rfl: 0    Past Medical History:   Diagnosis Date    Abnormal Pap smear of cervix     Anemia     Anxiety     Chronic low back pain     Depression     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     Pre-eclampsia in postpartum period 04/23/2020    Vitamin D deficiency        Past Surgical History:   Procedure Laterality Date    DILATION AND CURETTAGE OF UTERUS USING SUCTION N/A 3/28/2024    Procedure: DILATION AND CURETTAGE, UTERUS, USING SUCTION;  Surgeon: Kirsten Siu MD;  Location: [facility];  Service: OB/GYN;  Laterality: N/A;    ESOPHAGOGASTRODUODENOSCOPY N/A 05/26/2021    Procedure: EGD (ESOPHAGOGASTRODUODENOSCOPY);  Surgeon: Alka Olmstead MD;  Location: [facility];  Service: Endoscopy;  Laterality: N/A;    ESOPHAGOGASTRODUODENOSCOPY N/A 10/04/2021    Procedure: EGD (ESOPHAGOGASTRODUODENOSCOPY);  Surgeon: Alka Olmstead MD;  Location: [facility];  Service: Endoscopy;  Laterality: N/A;    ESOPHAGOGASTRODUODENOSCOPY N/A 05/29/2023    Procedure: EGD (ESOPHAGOGASTRODUODENOSCOPY);  Surgeon: Alka Olmstead MD;  Location: [facility];  Service: Endoscopy;  Laterality: N/A;    PH MONITORING, ESOPHAGUS, WIRELESS, (OFF REFLUX MEDS) N/A 05/29/2023    Procedure: PH MONITORING, ESOPHAGUS, WIRELESS, (OFF REFLUX MEDS);  Surgeon: Alka Olmstead MD;  Location: [facility];  Service: Endoscopy;  Laterality: N/A;    REDUCTION OF BOTH BREASTS Bilateral 05/27/2021    Procedure: MAMMOPLASTY, REDUCTION, BILATERAL;  Surgeon: Daniel Ba MD;  Location: [address];  Service: Plastics;  Laterality: Bilateral;  Left breast tissue: 1584  Right breast tissue: 1438    SEPTOPLASTY, NOSE, WITH NASAL TURBINATE REDUCTION Bilateral 01/17/2024    Procedure: SEPTOPLASTY, NOSE, WITH NASAL TURBINATE REDUCTION;  Surgeon: Corey Lucio MD;  Location: [facility];  Service: ENT;  Laterality: Bilateral;       Social History     Socioeconomic History    Marital status:     Number of children: 0   Occupational History    Occupation: RN     Comment: [employer]   Tobacco Use    Smoking status: Never     Passive exposure: Never     Smokeless tobacco: Never   Substance and Sexual Activity    Alcohol use: Not Currently     Alcohol/week: 0.0 standard drinks of alcohol    Drug use: No    Sexual activity: Yes     Partners: Male     Comment: Not interested in having further kids   Social History Narrative    She wears seatbelt.      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    2 kids.      Social Determinants of Health     Financial Resource Strain: Low Risk  (11/13/2023)    Overall Financial Resource Strain (CARDIA)     Difficulty of Paying Living Expenses: Not hard at all   Food Insecurity: No Food Insecurity (11/13/2023)    Hunger Vital Sign     Worried About Running Out of Food in the Last Year: Never true     Ran Out of Food in the Last Year: Never true   Transportation Needs: No Transportation Needs (11/13/2023)    PRAPARE - Transportation     Lack of Transportation (Medical): No     Lack of Transportation (Non-Medical): No   Physical Activity: Inactive (2/16/2024)    Exercise Vital Sign     Days of Exercise per Week: 0 days     Minutes of Exercise per Session: 0 min   Stress: Stress Concern Present (11/13/2023)    [employer] - Occupational Stress Questionnaire     Feeling of Stress : To some extent   Housing Stability: Low Risk  (11/13/2023)    Housing Stability Vital Sign     Unable to Pay for Housing in the Last Year: No     Number of Places Lived in the Last Year: 1     Unstable Housing in the Last Year: No         Past/Current Medical/Surgical History, Past/Current Social History, Past/Current Family History and Past/Current Medications were reviewed in detail.    Objective:           VITAL SIGNS WERE REVIEWED      GENERAL APPEARANCE:     The patient looks comfortable.    BMI    No signs of respiratory distress.    Normal breathing pattern.    No dysmorphic features    Normal eye contact.       GENERAL MEDICAL EXAM:    HEENT:  Head is atraumatic normocephalic.      FUNDUSCOPIC (OPHTHALMOSCOPIC) EXAMINATION showed no disc edema (papilledema).      NECK: No JVD. No visible lesions or goiters.     CHEST-CARDIOPULMONARY: No cyanosis. No tachypnea. Normal respiratory effort.    VKAXTDO-CCIHAALWETSHSOEQ-[id number]: No jaundice. No stomas or lesions. No visible hernias. No catheters.     SKIN, HAIR, NAILS: No pathognomonic skin rash.No neurofibromatosis. No visible lesions.No stigmata of autoimmune disease. No clubbing.    LIMBS: No varicose veins. No visible swelling.    MUSCULOSKELETAL: No visible deformities.No visible lesions.             Neurologic Exam     Mental Status   Oriented to person, place, and time.   Oriented to person.   Oriented to place. Oriented to country, city and area.   Oriented to time. Oriented to year, month, date, day and season.   Registration: recalls 3 of 3 objects. Recall at 5 minutes: recalls 3 of 3 objects. Follows 3 step commands.   Attention: normal. Concentration: normal.   Speech: speech is normal   Level of consciousness: alert  Knowledge: good. Able to perform simple calculations.   Able to name object. Able to read. Able to repeat. Able to write. Normal comprehension.     Cranial Nerves     CN II   Visual fields full to confrontation.   Visual acuity: normal  Right visual field deficit: none  Left visual field deficit: none     CN III, IV, VI   Pupils are equal, round, and reactive to light.  Extraocular motions are normal.   Right pupil: Size: 2 mm. Shape: regular. Reactivity: brisk. Consensual response: intact. Accommodation: intact.   Left pupil: Size: 2 mm. Shape: regular. Reactivity: brisk. Consensual response: intact. Accommodation: intact.   CN III: no CN III palsy  CN VI: no CN VI palsy  Nystagmus: none   Diplopia: none  Ophthalmoparesis: none  Upgaze: normal  Downgaze: normal  Conjugate gaze: present  Vestibulo-ocular reflex: present    CN V   Facial sensation intact.   Right facial sensation deficit: none  Left facial sensation  deficit: none    CN VII   Facial expression full, symmetric.   Right facial weakness: none  Left facial weakness: none    CN VIII   CN VIII normal.   Hearing: intact    CN IX, X   CN IX normal.   CN X normal.   Palate: symmetric    CN XI   CN XI normal.   Right sternocleidomastoid strength: normal  Left sternocleidomastoid strength: normal  Right trapezius strength: normal  Left trapezius strength: normal    CN XII   CN XII normal.   Tongue: not atrophic  Fasciculations: absent  Tongue deviation: none    Motor Exam   Muscle bulk: normal  Overall muscle tone: normal  Right arm pronator drift: absent  Left arm pronator drift: absent    Strength   Strength 5/5 throughout.   Right neck flexion: 5/5  Left neck flexion: 5/5  Right neck extension: 5/5  Left neck extension: 5/5  Right deltoid: 5/5  Left deltoid: 5/5  Right biceps: 5/5  Left biceps: 5/5  Right triceps: 5/5  Left triceps: 5/5  Right wrist flexion: 5/5  Left wrist flexion: 5/5  Right wrist extension: 5/5  Left wrist extension: 5/5  Right interossei: 5/5  Left interossei: 5/5  Right abdominals: 5/5  Left abdominals: 5/5  Right iliopsoas: 5/5  Left iliopsoas: 5/5  Right quadriceps: 5/5  Left quadriceps: 5/5  Right hamstrin/5  Left hamstrin/5  Right glutei: 5/5  Left glutei: 5/5  Right anterior tibial: 5/5  Left anterior tibial: 5/5  Right posterior tibial: 5/5  Left posterior tibial: 5/5  Right peroneal: 5/5  Left peroneal: 5/5  Right gastroc: 5/5  Left gastroc: 5/5    Sensory Exam   Light touch normal.   Vibration normal.   Proprioception normal.   Pinprick normal.   Graphesthesia: normal  Stereognosis: normal      Tenderness noted to right posterior neck and occipital areas to palpation.      Gait, Coordination, and Reflexes     Gait  Gait: normal    Coordination   Romberg: negative  Finger to nose coordination: normal  Heel to shin coordination: normal  Tandem walking coordination: normal    Tremor   Resting tremor: absent  Intention tremor:  absent  Action tremor: absent    Reflexes   Reflexes 2+ except as noted.   Right brachioradialis: 0  Left brachioradialis: 0  Right biceps: 0  Left biceps: 0  Right triceps: 0  Left triceps: 0  Right patellar: 0  Left patellar: 0  Right achilles: 0  Left achilles: 0  Right : 0  Left : 0  Right plantar: normal  Left plantar: normal  Right Ivey: absent  Left Ivey: absent  Right ankle clonus: absent  Left ankle clonus: absent  Right pendular knee jerk: absent  Left pendular knee jerk: absent        Lab Results   Component Value Date    WBC 11.25 08/31/2024    HGB 12.9 08/31/2024    HCT 42.7 08/31/2024    MCV 79 (L) 08/31/2024     08/31/2024       Sodium   Date Value Ref Range Status   08/31/2024 138 136 - 145 mmol/L Final     Potassium   Date Value Ref Range Status   08/31/2024 3.6 3.5 - 5.1 mmol/L Final     Chloride   Date Value Ref Range Status   08/31/2024 105 95 - 110 mmol/L Final     CO2   Date Value Ref Range Status   08/31/2024 22 (L) 23 - 29 mmol/L Final     Glucose   Date Value Ref Range Status   08/31/2024 93 70 - 110 mg/dL Final     BUN   Date Value Ref Range Status   08/31/2024 10 6 - 20 mg/dL Final     Creatinine   Date Value Ref Range Status   08/31/2024 0.8 0.5 - 1.4 mg/dL Final     Calcium   Date Value Ref Range Status   08/31/2024 9.9 8.7 - 10.5 mg/dL Final     Total Protein   Date Value Ref Range Status   08/31/2024 8.5 (H) 6.0 - 8.4 g/dL Final     Albumin   Date Value Ref Range Status   08/31/2024 3.8 3.5 - 5.2 g/dL Final     Total Bilirubin   Date Value Ref Range Status   08/31/2024 0.3 0.1 - 1.0 mg/dL Final     Comment:     For infants and newborns, interpretation of results should be based  on gestational age, weight and in agreement with clinical  observations.    Premature Infant recommended reference ranges:  Up to 24 hours.............<8.0 mg/dL  Up to 48 hours............<12.0 mg/dL  3-5 days..................<15.0 mg/dL  6-29 days.................<15.0 mg/dL    "    Alkaline Phosphatase   Date Value Ref Range Status   08/31/2024 105 55 - 135 U/L Final     AST   Date Value Ref Range Status   08/31/2024 17 10 - 40 U/L Final     ALT   Date Value Ref Range Status   08/31/2024 18 10 - 44 U/L Final     Anion Gap   Date Value Ref Range Status   08/31/2024 11 8 - 16 mmol/L Final     eGFR if    Date Value Ref Range Status   05/24/2022 >60.0 >60 mL/min/1.73 m^2 Final     eGFR if non    Date Value Ref Range Status   05/24/2022 >60.0 >60 mL/min/1.73 m^2 Final     Comment:     Calculation used to obtain the estimated glomerular filtration  rate (eGFR) is the CKD-EPI equation.          Lab Results   Component Value Date    [id number] 414 12/14/2021       Lab Results   Component Value Date    TSH 0.837 02/16/2024       No results found in the last 24 hours.    No results found in the last 24 hours.    Reviewed the neuroimaging independently       Assessment:   34 Years old Female with PMH as above came for an evaluation of "Headaches"    Intractable chronic migraine with aura and without status migrainosus     Chronic tension-type headache, not intractable     Cervicalgia     Neck tightness     Vitamin D deficiency     Nausea     Plan:   Patient Neurological Assessment is remarkable for tenderness noted to right posterior neck and occipital areas to palpation. Patient's history and physical point to rule out Chronic Migraines and Tension Type Headaches versus Cervical Radiculopathy / TMJ / BPPV.      -Order PT for Migraine Therapy / Dry Needling / vestibular Therapy / TMJ Therapy    -Start Elavil 10 mg PO QHS for Migraine Prevention Therapy. Side effects discussed. Patient verbalized understanding.     -Start Imitrex 50 mg PO Daily PRN for Migraine Abortive Therapy. Side effects discussed. Patient verbalized understanding.     -Continue Zofran-ODT 4 mg PO Q8H PRN Nausea. Side effects discussed. Patient verbalized understanding.     -Start Zanaflex 2 mg PO " QHS PRN for Neck Muscle Tightness. Side effects discussed. Patient verbalized understanding.      -Order Vitamin D / Folate / B-12 for neurological evaluation    -Order Brain MRI WO Contrast to rule out structural abnormalities contributing to Migraines    -Order Cervical Spine MRI WO Contrast to rule out structural abnormalities contributing to Cervical Radiculopathy.              LABORATORY EVALUATION    Labs: (2024) Procalcitonin / Lactic Acid / Magnesium / Lipase / CMP / CBC / A1C / Hepatitis Panel Acute / Sickle Cell Screen / RPR / HIV / HSV 1 & 2 / Lipid Panel / TSH /   -personally reviewed -non-significant abnormalities except     -Vitamin D (16) - decreased - Normal (30-96) Continue supplement /  Re-order levels today        RADIOLOGY EVALUATION     Personally Reviewed Head CT WO Contrast - 2022 - no acute abnormalities    Personally Reviewed Brain MRI Demyelinating W W/O Contrast - 2021 - Minimal nonspecific punctate foci T2 and FLAIR hyperintensity which are favored to be secondary to migraine headaches. No convincing findings to suggest a demyelinating process.     Personally Reviewed Cervical Spine MRI Demyelinating W W/O Contrast - 2021 - Normal cord signal. No abnormal enhancement. No central or neural foraminal canal narrowing.                 MEDICAL/SURGICAL COMORBIDITIES     All relevant medical comorbidities noted and managed by primary care physician and medical care team.          HEALTHY LIFESTYLE AND PREVENTATIVE CARE    The patient to adhere to the age-appropriate health maintenance guidelines including screening tests and vaccinations. The patient to adhere to  healthy lifestyle, optimal weight, exercise, healthy diet, good sleep hygiene and avoiding drugs including smoking, alcohol and recreational drugs.    I spent a total of 80 minutes on the day of the visit.This includes face to face time and non-face to face time preparing to see the patient (eg, review of tests), obtaining and/or  reviewing separately obtained history, documenting clinical information in the electronic or other health record, independently interpreting results and communicating results to the patient/family/caregiver, or care coordinator.     Please do not hesitate to contact me with any updates, questions or concerns.    No follow-ups on file.    Phuong Valle, MSN, FNP-C    General Neurology

## 2024-09-30 ENCOUNTER — PATIENT MESSAGE (OUTPATIENT)
Dept: LAB | Facility: HOSPITAL | Age: 34
End: 2024-09-30
Payer: COMMERCIAL

## 2024-09-30 ENCOUNTER — TELEPHONE (OUTPATIENT)
Dept: NEUROLOGY | Facility: CLINIC | Age: 34
End: 2024-09-30
Payer: COMMERCIAL

## 2024-09-30 NOTE — TELEPHONE ENCOUNTER
----- Message from Emily sent at 9/30/2024  8:27 AM CDT -----  Good Morning/Afternoon,    The physical therapy department received your order to get the attached patient scheduled for an evaluation. We have reached out to the patient and scheduled him/her for an evaluation; however, they have cancelled their appointment.We have made several attempts to get the patient rescheduled but have been unsuccessful.     We wanted you to be aware that your patient has not started therapy. If you speak with them again about starting therapy please have them reach out to us at [phone] to get scheduled?.      Sincerely,  Emily Milner

## 2024-10-07 ENCOUNTER — OFFICE VISIT (OUTPATIENT)
Dept: FAMILY MEDICINE | Facility: CLINIC | Age: 34
End: 2024-10-07
Attending: FAMILY MEDICINE
Payer: COMMERCIAL

## 2024-10-07 DIAGNOSIS — M25.551 PAIN OF RIGHT HIP: Primary | ICD-10-CM

## 2024-10-07 DIAGNOSIS — R10.31 GROIN PAIN, RIGHT: ICD-10-CM

## 2024-10-07 PROCEDURE — 1159F MED LIST DOCD IN RCRD: CPT | Mod: CPTII,95,, | Performed by: FAMILY MEDICINE

## 2024-10-07 PROCEDURE — 1160F RVW MEDS BY RX/DR IN RCRD: CPT | Mod: CPTII,95,, | Performed by: FAMILY MEDICINE

## 2024-10-07 PROCEDURE — 99213 OFFICE O/P EST LOW 20 MIN: CPT | Mod: 95,,, | Performed by: FAMILY MEDICINE

## 2024-10-07 PROCEDURE — 3044F HG A1C LEVEL LT 7.0%: CPT | Mod: CPTII,95,, | Performed by: FAMILY MEDICINE

## 2024-10-07 NOTE — PROGRESS NOTES
Anthony Sexton    Chief Complaint   Patient presents with    Right leg pain       History of Present Illness:   The patient location is: Home (In [place])  The chief complaint leading to consultation is: Right leg pain    Visit type: audiovisual    Face to Face time with patient: 25 minutes with 25 minutes of total time spent on the encounter, which includes face to face time and non-face to face time preparing to see the patient (eg, review of tests), Obtaining and/or reviewing separately obtained history, Documenting clinical information in the electronic or other health record, Independently interpreting results (not separately reported) and communicating results to the patient/family/caregiver, or Care coordination (not separately reported).         Each patient to whom he or she provides medical services by telemedicine is:  (1) informed of the relationship between the physician and patient and the respective role of any other health care provider with respect to management of the patient; and (2) notified that he or she may decline to receive medical services by telemedicine and may withdraw from such care at any time.    Notes:      Ms. Sexton visits with me today with complaint of having new symptoms of pain at her right groin, lower abdomen, and hip area of constant nature for 3 weeks. She states she occasionally has numbness at the area.  She states she has not taken medication for pain relief.  She states pain is 6/10 on pain scale today but worse with sitting or with laying down.  She denies having associated trauma; rashes; fever, chills, fatigue, appetite changes; shortness of breath, cough, wheezing; chest pain, palpitations, leg swelling; abdominal pain, nausea, vomiting, diarrhea, constipation; unusual urinary symptoms; polydipsia, polyphagia, polyuria, hot or cold intolerance; back pain; headache; anxiety, depression, homicidal or suicidal thoughts. She reports history of GERD without history of peptic  ulcer disease, kidney disease or liver disease.             Current Outpatient Medications   Medication Sig    amitriptyline (ELAVIL) 10 MG tablet Take 1 tablet (10 mg total) by mouth every evening.    cholecalciferol, vitamin D3, (VITAMIN D3) 50 mcg (2,000 unit) Tab Take 2,000 Units by mouth once daily.    dicyclomine (BENTYL) 20 mg tablet Take 1 tablet (20 mg total) by mouth 4 (four) times daily as needed.    drospirenone-estetrol (NEXTSTELLIS) 3 mg- 14.2 mg (28) Tab Take 1 tablet by mouth daily.    omeprazole (PRILOSEC) 40 MG capsule Take 40 mg by mouth once daily.    ondansetron (ZOFRAN-ODT) 4 MG TbDL Dissolve 1 tablet (4 mg total) by mouth every 8 (eight) hours as needed (Nausea).    sumatriptan (IMITREX) 50 MG tablet Take 1 tablet (50 mg total) by mouth daily as needed for Migraine. Take at onset of migraine. Can repeat in 2 hours, if needed. No more than 2 tablets per day OR 3 days per week.    tiZANidine (ZANAFLEX) 2 MG tablet Take 1 tablet (2 mg total) by mouth nightly as needed (Muscle Spasms / Tightness).         Review of Systems   Constitutional:  Negative for activity change, appetite change, chills, fatigue, fever and unexpected weight change.   Respiratory:  Negative for cough, chest tightness, shortness of breath and wheezing.    Cardiovascular:  Negative for chest pain, palpitations and leg swelling.   Gastrointestinal:  Positive for abdominal pain. Negative for blood in stool, constipation, diarrhea, nausea and vomiting.   Endocrine: Negative for cold intolerance, heat intolerance, polydipsia, polyphagia and polyuria.   Genitourinary:  Negative for difficulty urinating, dysuria, hematuria and menstrual problem.   Musculoskeletal:  Positive for arthralgias and myalgias. Negative for back pain, joint swelling and neck pain.   Skin:  Negative for rash.   Neurological:  Positive for numbness. Negative for weakness and headaches.   Psychiatric/Behavioral:  Negative for confusion, dysphoric mood and  suicidal ideas. The patient is not nervous/anxious.         Negative for homicidal ideas.       Objective:  Physical Exam  Vitals reviewed.   Constitutional:       General: She is not in acute distress.     Appearance: Normal appearance. She is obese. She is not ill-appearing, toxic-appearing or diaphoretic.      Comments: Pleasant.   Pulmonary:      Effort: Pulmonary effort is normal. No respiratory distress.   Musculoskeletal:         General: Normal range of motion.      Comments: She moves around her space without problems during virtual visit today.   Neurological:      General: No focal deficit present.      Mental Status: She is alert and oriented to person, place, and time.   Psychiatric:         Mood and Affect: Mood normal.         Behavior: Behavior normal.         Thought Content: Thought content normal.         Judgment: Judgment normal.         ASSESSMENT:  1. Pain of right hip    2. Groin pain, right        PLAN:  Anthony was seen today for right leg pain.    Diagnoses and all orders for this visit:    Pain of right hip  -     X-Ray Hip 1 View Right (with Pelvis when performed); Future    Groin pain, right  -     Urinalysis; Future  -     Urine culture; Future      Patient advised to call for results.  Continue current medications, follow low sodium, low cholesterol, low carb diet, daily walks.  Take pain medication (already has at home) for pain relief as directed.  Keep follow up with specialists.  Flu shot this fall.  Follow up if symptoms worsen or fail to improve.

## 2024-10-10 ENCOUNTER — PATIENT MESSAGE (OUTPATIENT)
Dept: PSYCHIATRY | Facility: CLINIC | Age: 34
End: 2024-10-10
Payer: COMMERCIAL

## 2024-10-15 ENCOUNTER — TELEPHONE (OUTPATIENT)
Dept: NEUROLOGY | Facility: CLINIC | Age: 34
End: 2024-10-15
Payer: COMMERCIAL

## 2024-10-15 NOTE — TELEPHONE ENCOUNTER
Attempted to reach patient to confirm appt. Patient's line never rang after several attempts. Could not reach patient.

## 2024-10-22 ENCOUNTER — TELEPHONE (OUTPATIENT)
Dept: NEUROLOGY | Facility: CLINIC | Age: 34
End: 2024-10-22
Payer: COMMERCIAL

## 2024-10-23 ENCOUNTER — TELEPHONE (OUTPATIENT)
Dept: PSYCHIATRY | Facility: CLINIC | Age: 34
End: 2024-10-23
Payer: COMMERCIAL

## 2024-10-25 ENCOUNTER — OFFICE VISIT (OUTPATIENT)
Dept: PSYCHIATRY | Facility: CLINIC | Age: 34
End: 2024-10-25
Payer: COMMERCIAL

## 2024-10-25 ENCOUNTER — LAB VISIT (OUTPATIENT)
Dept: LAB | Facility: HOSPITAL | Age: 34
End: 2024-10-25
Attending: NURSE PRACTITIONER
Payer: COMMERCIAL

## 2024-10-25 ENCOUNTER — OFFICE VISIT (OUTPATIENT)
Dept: OBSTETRICS AND GYNECOLOGY | Facility: CLINIC | Age: 34
End: 2024-10-25
Payer: COMMERCIAL

## 2024-10-25 VITALS
HEIGHT: 66 IN | WEIGHT: 277.88 LBS | SYSTOLIC BLOOD PRESSURE: 132 MMHG | BODY MASS INDEX: 44.66 KG/M2 | DIASTOLIC BLOOD PRESSURE: 80 MMHG

## 2024-10-25 DIAGNOSIS — R10.2 PELVIC PAIN IN FEMALE: Primary | ICD-10-CM

## 2024-10-25 DIAGNOSIS — R10.2 PELVIC PAIN IN FEMALE: ICD-10-CM

## 2024-10-25 DIAGNOSIS — N92.6 IRREGULAR MENSES: ICD-10-CM

## 2024-10-25 DIAGNOSIS — R69 UNDIAGNOSED DISEASE OR SYNDROME PRESENT: Primary | ICD-10-CM

## 2024-10-25 LAB
B-HCG UR QL: NEGATIVE
BASOPHILS # BLD AUTO: 0.04 K/UL (ref 0–0.2)
BASOPHILS NFR BLD: 0.4 % (ref 0–1.9)
BILIRUB UR QL STRIP: NORMAL
CTP QC/QA: YES
DIFFERENTIAL METHOD BLD: ABNORMAL
EOSINOPHIL # BLD AUTO: 0.1 K/UL (ref 0–0.5)
EOSINOPHIL NFR BLD: 0.6 % (ref 0–8)
ERYTHROCYTE [DISTWIDTH] IN BLOOD BY AUTOMATED COUNT: 13.6 % (ref 11.5–14.5)
GLUCOSE UR QL STRIP: NORMAL
HCG INTACT+B SERPL-ACNC: <1.2 MIU/ML
HCT VFR BLD AUTO: 40.7 % (ref 37–48.5)
HGB BLD-MCNC: 11.9 G/DL (ref 12–16)
IMM GRANULOCYTES # BLD AUTO: 0.02 K/UL (ref 0–0.04)
IMM GRANULOCYTES NFR BLD AUTO: 0.2 % (ref 0–0.5)
KETONES UR QL STRIP: NORMAL
LEUKOCYTE ESTERASE UR QL STRIP: NORMAL
LYMPHOCYTES # BLD AUTO: 2.9 K/UL (ref 1–4.8)
LYMPHOCYTES NFR BLD: 30.8 % (ref 18–48)
MCH RBC QN AUTO: 23.8 PG (ref 27–31)
MCHC RBC AUTO-ENTMCNC: 29.2 G/DL (ref 32–36)
MCV RBC AUTO: 81 FL (ref 82–98)
MONOCYTES # BLD AUTO: 0.5 K/UL (ref 0.3–1)
MONOCYTES NFR BLD: 5.5 % (ref 4–15)
NEUTROPHILS # BLD AUTO: 5.8 K/UL (ref 1.8–7.7)
NEUTROPHILS NFR BLD: 62.5 % (ref 38–73)
NRBC BLD-RTO: 0 /100 WBC
PH, POC UA: NORMAL
PLATELET # BLD AUTO: 308 K/UL (ref 150–450)
PMV BLD AUTO: 11.2 FL (ref 9.2–12.9)
POC BLOOD, URINE: NORMAL
POC NITRATES, URINE: NORMAL
PROT UR QL STRIP: NORMAL
RBC # BLD AUTO: 5 M/UL (ref 4–5.4)
SP GR UR STRIP: NORMAL (ref 1–1.03)
TSH SERPL DL<=0.005 MIU/L-ACNC: 0.74 UIU/ML (ref 0.4–4)
UROBILINOGEN UR STRIP-ACNC: NORMAL (ref 0.3–2.2)
WBC # BLD AUTO: 9.3 K/UL (ref 3.9–12.7)

## 2024-10-25 PROCEDURE — 36415 COLL VENOUS BLD VENIPUNCTURE: CPT | Mod: PN | Performed by: NURSE PRACTITIONER

## 2024-10-25 PROCEDURE — 84443 ASSAY THYROID STIM HORMONE: CPT | Performed by: NURSE PRACTITIONER

## 2024-10-25 PROCEDURE — 87491 CHLMYD TRACH DNA AMP PROBE: CPT | Performed by: NURSE PRACTITIONER

## 2024-10-25 PROCEDURE — 99999 PR PBB SHADOW E&M-EST. PATIENT-LVL III: CPT | Mod: PBBFAC,,, | Performed by: NURSE PRACTITIONER

## 2024-10-25 PROCEDURE — 85025 COMPLETE CBC W/AUTO DIFF WBC: CPT | Performed by: NURSE PRACTITIONER

## 2024-10-25 PROCEDURE — 87591 N.GONORRHOEAE DNA AMP PROB: CPT | Performed by: NURSE PRACTITIONER

## 2024-10-25 PROCEDURE — 84702 CHORIONIC GONADOTROPIN TEST: CPT | Performed by: NURSE PRACTITIONER

## 2024-10-25 NOTE — PROGRESS NOTES
CC:  Pelvic pain    Anthony Sexton is a 34 y.o. female  presents for persistant pain for the last 2 month(s) pain is right sided.  Saw pcp and ordered xray of right hip that pt has not done yet bc she feels pain is more pelvic  Made urgent visit today b/c had cycle on 10/6/24 and then started bleeding again today  Saw gyn in may and started ocp - took x 1 month and did not like how affected mood  Pt has hx of PCOS   Miscarriage in March     Patient's last menstrual period was 10/06/2024 (exact date).    Past Medical History:   Diagnosis Date    Abnormal Pap smear of cervix     Anemia     Anxiety     Chronic low back pain     Depression     Enthesitis related arthritis 2023    Fibromyalgia     GERD (gastroesophageal reflux disease)     History of abnormal cervical Pap smear     History of recurrent UTIs     History of ulcer disease     Irregular menses     Obesity     SAVANNA (obstructive sleep apnea) 2023    Postpartum depression     with last pregnancy.     Pre-eclampsia in postpartum period 2020    Vitamin D deficiency      Past Surgical History:   Procedure Laterality Date    DILATION AND CURETTAGE OF UTERUS USING SUCTION N/A 3/28/2024    Procedure: DILATION AND CURETTAGE, UTERUS, USING SUCTION;  Surgeon: Kirsten Siu MD;  Location: [facility];  Service: OB/GYN;  Laterality: N/A;    ESOPHAGOGASTRODUODENOSCOPY N/A 2021    Procedure: EGD (ESOPHAGOGASTRODUODENOSCOPY);  Surgeon: Alka Olmstead MD;  Location: [facility];  Service: Endoscopy;  Laterality: N/A;    ESOPHAGOGASTRODUODENOSCOPY N/A 10/04/2021    Procedure: EGD (ESOPHAGOGASTRODUODENOSCOPY);  Surgeon: Alka Olmstead MD;  Location: [facility];  Service: Endoscopy;  Laterality: N/A;    ESOPHAGOGASTRODUODENOSCOPY N/A 2023    Procedure: EGD (ESOPHAGOGASTRODUODENOSCOPY);  Surgeon: Alka Olmstead MD;  Location: [facility];  Service: Endoscopy;  Laterality: N/A;    PH MONITORING, ESOPHAGUS, WIRELESS, (OFF REFLUX MEDS)  N/A 05/29/2023    Procedure: PH MONITORING, ESOPHAGUS, WIRELESS, (OFF REFLUX MEDS);  Surgeon: Alka Olmstead MD;  Location: [facility] ENDO;  Service: Endoscopy;  Laterality: N/A;    REDUCTION OF BOTH BREASTS Bilateral 05/27/2021    Procedure: MAMMOPLASTY, REDUCTION, BILATERAL;  Surgeon: Dainel Ba MD;  Location: [facility] OR [facility];  Service: Plastics;  Laterality: Bilateral;  Left breast tissue: 1584  Right breast tissue: 1438    SEPTOPLASTY, NOSE, WITH NASAL TURBINATE REDUCTION Bilateral 01/17/2024    Procedure: SEPTOPLASTY, NOSE, WITH NASAL TURBINATE REDUCTION;  Surgeon: Corey Lucio MD;  Location: [facility] OR;  Service: ENT;  Laterality: Bilateral;     Family History   Problem Relation Name Age of Onset    Hypertension Maternal Grandmother      Diabetes Maternal Grandmother      No Known Problems Maternal Grandfather      Hypertension Father      Hyperlipidemia Father      Heart disease Father  55        MI/CAD    Diabetes Mother      Asthma Mother      Hyperlipidemia Mother      Hypertension Mother      Sjogren's syndrome Mother      Fibromyalgia Mother      No Known Problems Brother      No Known Problems Sister      Insulin resistance Sister      No Known Problems Daughter      Diabetes Maternal Aunt      Breast cancer Maternal Aunt      Stroke Neg Hx      Brain cancer Neg Hx      Migraines Neg Hx      Colon cancer Neg Hx      Ovarian cancer Neg Hx      Anesthesia problems Neg Hx      Thrombosis Neg Hx       Social History     Socioeconomic History    Marital status:     Number of children: 0   Occupational History    Occupation: RN     Comment: [employer]   Tobacco Use    Smoking status: Never     Passive exposure: Never    Smokeless tobacco: Never   Substance and Sexual Activity    Alcohol use: Not Currently     Alcohol/week: 0.0 standard drinks of alcohol    Drug use: No    Sexual activity: Yes     Partners: Male     Comment: Not interested in having further kids   Social History  "Narrative    She wears seatbelt.      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    2 kids.      Social Drivers of Health     Financial Resource Strain: Low Risk  (2023)    Overall Financial Resource Strain (CARDIA)     Difficulty of Paying Living Expenses: Not hard at all   Food Insecurity: No Food Insecurity (2023)    Hunger Vital Sign     Worried About Running Out of Food in the Last Year: Never true     Ran Out of Food in the Last Year: Never true   Transportation Needs: No Transportation Needs (2023)    PRAPARE - Transportation     Lack of Transportation (Medical): No     Lack of Transportation (Non-Medical): No   Physical Activity: Inactive (2024)    Exercise Vital Sign     Days of Exercise per Week: 0 days     Minutes of Exercise per Session: 0 min   Stress: Stress Concern Present (2023)    Citizen of the [place] [employer] - Occupational Stress Questionnaire     Feeling of Stress : To some extent   Housing Stability: Low Risk  (2023)    Housing Stability Vital Sign     Unable to Pay for Housing in the Last Year: No     Number of Places Lived in the Last Year: 1     Unstable Housing in the Last Year: No     OB History    Para Term  AB Living   3 2 2 0 1 2   SAB IAB Ectopic Multiple Live Births   1 0 0 0 2      # Outcome Date GA Lbr Trever/2nd Weight Sex Type Anes PTL Lv   3 Term 20 38w2d 07:44 / 00:06 3.14 kg (6 lb 14.8 oz) F Vag-Spont EPI N DANIELLE   2 Term 16 37w0d  3.289 kg (7 lb 4 oz) F Vag-Spont EPI  DANIELLE      Complications: Severe hyperemesis gravidarum   1 SAB                /80 (Patient Position: Sitting)   Ht 5' 6" (1.676 m)   Wt 126.1 kg (277 lb 14.2 oz)   LMP 10/06/2024 (Exact Date) Comment: monthly  BMI 44.85 kg/m²         ROS:  Per hpi    PHYSICAL EXAM:  APPEARANCE: Well nourished, well developed, in no acute distress.  AFFECT: WNL, alert and oriented x 3  SKIN: No acne or hirsutism  PELVIC: " Normal external genitalia without lesions.  Normal hair distribution.  Adequate perineal body, normal urethral meatus.  Vagina moist and well rugated without lesions or discharge - menstrual bleeding .  Cervix pink, without lesions, discharge or tenderness.  No significant cystocele or rectocele.  Bimanual exam difficult due to body habitus but shows uterus to be normal week size, regular, mobile and tender.  Adnexa without masses but is tender to right side  EXTREMITIES: No edema.    AND PLAN:    Anthony was seen today for pelvic pain.    Diagnoses and all orders for this visit:    Pelvic pain in female  -     C. trachomatis/N. gonorrhoeae by AMP DNA  -     POCT Urine Pregnancy  -     POCT Urinalysis, Dipstick, Automated, W/O Scope  -     CBC Auto Differential; Future  -     TSH; Future  -     HCG, Quantitative; Future  -     US Pelvis Comp with Transvag NON-OB (xpd; Future    Irregular menses  -     C. trachomatis/N. gonorrhoeae by AMP DNA  -     POCT Urine Pregnancy  -     POCT Urinalysis, Dipstick, Automated, W/O Scope  -     CBC Auto Differential; Future  -     TSH; Future  -     HCG, Quantitative; Future  -     US Pelvis Comp with Transvag NON-OB (xpd; Future      Upt negative in office today  UA dip only with blood - pt bleeding today      u/s for pain and f/u pending   If pain were to Worsen to ER for workup

## 2024-10-29 ENCOUNTER — TELEPHONE (OUTPATIENT)
Dept: NEUROLOGY | Facility: CLINIC | Age: 34
End: 2024-10-29
Payer: COMMERCIAL

## 2024-10-29 ENCOUNTER — PATIENT MESSAGE (OUTPATIENT)
Dept: NEUROLOGY | Facility: CLINIC | Age: 34
End: 2024-10-29
Payer: COMMERCIAL

## 2024-11-11 ENCOUNTER — OFFICE VISIT (OUTPATIENT)
Dept: PSYCHIATRY | Facility: CLINIC | Age: 34
End: 2024-11-11
Payer: COMMERCIAL

## 2024-11-11 DIAGNOSIS — R69 UNDIAGNOSED DISEASE OR SYNDROME PRESENT: Primary | ICD-10-CM

## 2024-11-11 PROCEDURE — 1159F MED LIST DOCD IN RCRD: CPT | Mod: CPTII,95,, | Performed by: STUDENT IN AN ORGANIZED HEALTH CARE EDUCATION/TRAINING PROGRAM

## 2024-11-11 PROCEDURE — 90834 PSYTX W PT 45 MINUTES: CPT | Mod: 95,,, | Performed by: STUDENT IN AN ORGANIZED HEALTH CARE EDUCATION/TRAINING PROGRAM

## 2024-11-11 PROCEDURE — 3044F HG A1C LEVEL LT 7.0%: CPT | Mod: CPTII,95,, | Performed by: STUDENT IN AN ORGANIZED HEALTH CARE EDUCATION/TRAINING PROGRAM

## 2024-11-18 NOTE — PROGRESS NOTES
[facility]  Psychology  Progress Note  Individual Psychotherapy (PhD/LCSW)    Patient Name: Anthony Sexton  MRN: [id number]    Patient Class: OP- [facility]  Primary Care Provider: Maryana, Primary Doctor    Psychiatry Initial Visit (PhD/LCSW)  Individual Psychotherapy - CPT 86845  EAP #5 for 2024    Date: 11/11/2024    Site: [place]    The patient location is: Patient's home/ Patient reported that his/her location at the time of this visit was in the [facility]     Visit type: Virtual visit with synchronous audio and video     Each patient to whom he or she provides medical services by telemedicine is: (1) informed of the relationship between the physician and patient and the respective role of any other health care provider with respect to management of the patient; and (2) notified that he or she may decline to receive medical services by telemedicine and may withdraw from such care at any time.     Referral source: Lenora Walters MD    Clinical status of patient: Outpatient    Anthony Sexton, a 34 y.o. female, for initial evaluation visit.  Met with patient.    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     Substance use:    Alcohol: none   Drugs: none   Tobacco: none   Caffeine: none    Current medications and drug reactions (include OTC, herbal): see medication list     Strengths and liabilities: Strength: Patient accepts guidance/feedback, Strength: Patient is expressive/articulate., Strength: Patient is intelligent., Strength: Patient is motivated for change., Strength: Patient has positive support network., Strength: Patient has reasonable judgment., Strength: Patient is stable., Liability: Patient lacks coping skills.    Current Evaluation:     Mental Status Exam:  General Appearance:  unremarkable, age appropriate   Speech: normal tone, normal rate, normal pitch, normal volume      Level of Cooperation: cooperative      Thought Processes: normal and logical   Mood: anxious, sad      Thought Content: normal, no suicidality, no homicidality, delusions, or paranoia   Affect: congruent and appropriate   Orientation: Oriented x3   Memory: recent >  intact   Attention Span & Concentration: intact   Fund of General Knowledge: intact and appropriate to age and level of education   Abstract Reasoning: not assessed   Judgment & Insight: fair     Language  intact     Summary: The patient had a recent and very unexpected loss that has amplified feelings of fear and uncertainty. Supportive therapy was utilized and mindfulness techniques were discussed.   Diagnostic Impression - Plan:       ICD-10-CM ICD-9-CM   1. Undiagnosed disease or syndrome present  R69 799.9                 Plan:individual psychotherapy    Return to Clinic: as scheduled    Length of Service (minutes): 30          Barbra Bartlett, PhD  Psychologist  [facility]

## 2024-11-27 ENCOUNTER — TELEPHONE (OUTPATIENT)
Dept: NEUROLOGY | Facility: CLINIC | Age: 34
End: 2024-11-27
Payer: COMMERCIAL

## 2024-12-04 ENCOUNTER — PATIENT MESSAGE (OUTPATIENT)
Dept: NEUROLOGY | Facility: CLINIC | Age: 34
End: 2024-12-04
Payer: COMMERCIAL

## 2024-12-04 ENCOUNTER — PATIENT MESSAGE (OUTPATIENT)
Dept: PSYCHIATRY | Facility: CLINIC | Age: 34
End: 2024-12-04
Payer: COMMERCIAL

## 2024-12-10 ENCOUNTER — OFFICE VISIT (OUTPATIENT)
Dept: PSYCHIATRY | Facility: CLINIC | Age: 34
End: 2024-12-10
Payer: COMMERCIAL

## 2024-12-10 VITALS
WEIGHT: 277.13 LBS | HEART RATE: 80 BPM | SYSTOLIC BLOOD PRESSURE: 133 MMHG | BODY MASS INDEX: 44.73 KG/M2 | DIASTOLIC BLOOD PRESSURE: 89 MMHG

## 2024-12-10 DIAGNOSIS — F41.1 GENERALIZED ANXIETY DISORDER: ICD-10-CM

## 2024-12-10 PROCEDURE — 99999 PR PBB SHADOW E&M-EST. PATIENT-LVL II: CPT | Mod: PBBFAC,,, | Performed by: PSYCHOLOGIST

## 2024-12-10 PROCEDURE — 90833 PSYTX W PT W E/M 30 MIN: CPT | Mod: S$GLB,,, | Performed by: PSYCHOLOGIST

## 2024-12-10 PROCEDURE — 99213 OFFICE O/P EST LOW 20 MIN: CPT | Mod: S$GLB,,, | Performed by: PSYCHOLOGIST

## 2024-12-10 PROCEDURE — 3044F HG A1C LEVEL LT 7.0%: CPT | Mod: CPTII,S$GLB,, | Performed by: PSYCHOLOGIST

## 2024-12-10 PROCEDURE — 3079F DIAST BP 80-89 MM HG: CPT | Mod: CPTII,S$GLB,, | Performed by: PSYCHOLOGIST

## 2024-12-10 PROCEDURE — 3075F SYST BP GE 130 - 139MM HG: CPT | Mod: CPTII,S$GLB,, | Performed by: PSYCHOLOGIST

## 2024-12-10 PROCEDURE — 1159F MED LIST DOCD IN RCRD: CPT | Mod: CPTII,S$GLB,, | Performed by: PSYCHOLOGIST

## 2024-12-10 PROCEDURE — 3008F BODY MASS INDEX DOCD: CPT | Mod: CPTII,S$GLB,, | Performed by: PSYCHOLOGIST

## 2024-12-10 RX ORDER — SERTRALINE HYDROCHLORIDE 50 MG/1
50 TABLET, FILM COATED ORAL DAILY
Qty: 42 TABLET | Refills: 0 | Status: SHIPPED | OUTPATIENT
Start: 2025-01-07 | End: 2025-04-07

## 2024-12-10 RX ORDER — ALPRAZOLAM 0.5 MG/1
0.5 TABLET ORAL DAILY PRN
Qty: 30 TABLET | Refills: 1 | Status: SHIPPED | OUTPATIENT
Start: 2024-12-10 | End: 2025-02-08

## 2024-12-10 RX ORDER — SERTRALINE HYDROCHLORIDE 25 MG/1
TABLET, FILM COATED ORAL
Qty: 42 TABLET | Refills: 0 | Status: SHIPPED | OUTPATIENT
Start: 2024-12-10 | End: 2025-01-08

## 2024-12-10 NOTE — PROGRESS NOTES
"Outpatient Psychiatry Follow-Up Visit    12/10/2024      Chief Complaint:  Anthony Sexton is a 34 y.o. female who presents today for follow-up of anxiety and inattention/distractibility .       LAST VISIT: Anthony attended her visit. She was last seen by me in 2023. She is about 8 weeks pregnant--not planned but a good thing. She has not been on her medicine for about 4 months. She does have a history of postpartum but has good support around her. She has been tired the last two months--we talked briefly about breathing through increased anxiety--and doing something physical to "re-set her system." She sees Dr. Bartlett regularly. See plan below.    Plan--no medicines at this time; continue therapy    CURRENT PRESENTATION: Anthony attended her visit. She was last seen by me in 2024. She miscarried in March; not long after her grandfather , then her cousin; then her daughter was dx with Autism. She has been "fighting" with the school for academic accommodations. She has hired an advocate to help her. She reported having intrusive thoughts of things happening to her or her kids--"that I know are not real or rational" but she struggles to use her tools to deal with them when under stress. She was dx with Elavil for her migraines--had been having daily ones. She wants to restart her medicine--we reviewed her prior medicines; what worked best was Wellbutrin. She has been having anxiety, however, and Wellbutrin can exacerbate physically anxiety.  We agreed to specifically target her anxiety as she denies subjective feelings of depression currently.  We reviewed Zoloft and Prozac, both of which she has tried in the past successfully and agreed on Zoloft due to fewer drug drug interactions.  We also spend a little time discussing intrusive thoughts and rather than trying to push them out of her mind to allow acknowledgement of the thought and move on.  See plan below.      Plan--start Zoloft 25 mg for 2 " Post-Anesthesia Evaluation and Assessment    Patient: Lacey Robb MRN: [id number]  SSN: [id number]    YOB: 1944  Age: 66 y.o. Sex: female      I have evaluated the patient and they are stable and ready for discharge from the PACU. Cardiovascular Function/Vital Signs  Visit Vitals  /50   Pulse (!) 104   Temp 36.6 °C (97.8 °F)   Resp 18   Ht 5' 4\" (1.626 m)   Wt 90.7 kg (200 lb)   SpO2 91%   BMI 34.33 kg/m²       Patient is status post General anesthesia for Procedure(s):  ENDOSCOPIC RETROGRADE CHOLANGIOPANCREATOGRAPHY (ERCP)  ENDOSCOPIC SPHINCTEROTOMY  BILIARY STENT PLACEMENT. Nausea/Vomiting: None    Postoperative hydration reviewed and adequate. Pain:  Pain Scale 1: Visual (11/18/19 1729)  Pain Intensity 1: 0 (11/18/19 1729)   Managed    Neurological Status: At baseline    Mental Status, Level of Consciousness: Alert and  oriented to person, place, and time    Pulmonary Status:   O2 Device: Nasal cannula (11/18/19 1724)   Adequate oxygenation and airway patent    Complications related to anesthesia: None    Post-anesthesia assessment completed. No concerns    Signed By: Mani Dye MD     November 18, 2019              Procedure(s):  ENDOSCOPIC RETROGRADE CHOLANGIOPANCREATOGRAPHY (ERCP)  ENDOSCOPIC SPHINCTEROTOMY  BILIARY STENT PLACEMENT. general    <BSHSIANPOST>    Vitals Value Taken Time   /50 11/18/2019  5:44 PM   Temp 36.6 °C (97.8 °F) 11/18/2019  5:29 PM   Pulse 102 11/18/2019  5:47 PM   Resp 14 11/18/2019  5:47 PM   SpO2 90 % 11/18/2019  5:47 PM   Vitals shown include unvalidated device data. 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        Prior medicines: buspirone, Prozac, Klonopin, Lexapro, Celexa, Zoloft, hydroxyzine, Adderall XR; Effexor (she thinks); Ativan, Savella, Cymbalta, Xanax, Wellbutrin XL    Therapist: Barbra Bartlett, PhD  __________________________________________________________________________    PSYCHOTHERAPY      Site: [facility]  Time: 20 minutes  Participants: Met with patient    Therapeutic Intervention Type: behavior modifying psychotherapy, supportive psychotherapy  Why chosen therapy is appropriate versus another modality: patient responds to this modality, evidence based practice    Target symptoms: anxiety , intrusive thoughts  Primary focus: see above    Outcome monitoring methods: self-report, observation    Patient's response to intervention:  The patient's response to intervention is accepting.    Progress toward goals:  The patient's progress toward goals is good.        12/10/2024     1:00 PM 8/4/2023     7:26 AM 5/5/2023     8:35 AM   GAD7   1. Feeling nervous, anxious, or on edge? 1  1  1    2. Not being able to stop or control worrying? 1  1  1    3. Worrying too much about different things? 1  1  1    4. Trouble relaxing? 2  0  1    5. Being so restless that it is hard to sit still? 0  0  1    6. Becoming easily annoyed or irritable? 1  0  1    7. Feeling afraid as if something awful might happen? 2  1  1    8. If you checked off any problems, how difficult have these problems made it for you to do your work, take care of things at home, or get along with other people? 1      SENTHIL-7 Score 8  4 7       Patient-reported      0-4 = Minimal anxiety  5-9 = Mild anxiety  10-14 = Moderate anxiety  15-21 = Severe anxiety       Review of Systems   PSYCHIATRIC: Pertinant items are noted in the narrative.    Past  "Medical, Family and Social History: The patient's past medical, family and social history have been reviewed and updated as appropriate within the electronic medical record - see encounter notes.      Current Outpatient Medications:     ALPRAZolam (XANAX) 0.5 MG tablet, Take 1 tablet (0.5 mg total) by mouth daily as needed for Anxiety., Disp: 30 tablet, Rfl: 1    amitriptyline (ELAVIL) 10 MG tablet, Take 1 tablet (10 mg total) by mouth every evening., Disp: 30 tablet, Rfl: 0    cholecalciferol, vitamin D3, (VITAMIN D3) 50 mcg (2,000 unit) Tab, Take 2,000 Units by mouth once daily., Disp: , Rfl:     omeprazole (PRILOSEC) 40 MG capsule, Take 40 mg by mouth once daily., Disp: , Rfl:     sertraline (ZOLOFT) 25 MG tablet, Take 1 tablet (25 mg total) by mouth once daily for 14 days, THEN 2 tablets (50 mg total) once daily for 14 days., Disp: 42 tablet, Rfl: 0    [START ON 1/7/2025] sertraline (ZOLOFT) 50 MG tablet, Take 1 tablet (50 mg total) by mouth once daily., Disp: 42 tablet, Rfl: 0    sumatriptan (IMITREX) 50 MG tablet, Take 1 tablet (50 mg total) by mouth daily as needed for Migraine. Take at onset of migraine. Can repeat in 2 hours, if needed. No more than 2 tablets per day OR 3 days per week., Disp: 9 tablet, Rfl: 0    Compliance: yes    Side effects: see above    Risk Parameters:  Patient reports no suicidal ideation  Patient reports no homicidal ideation  Patient reports no self-injurious behavior  Patient reports no violent behavior    Exam (detailed: at least 9 elements; comprehensive: all 15 elements)   Constitutional  Vitals:  Most recent vital signs were reviewed.   Last 3 sets of Vitals        8/31/2024    12:09 PM 9/18/2024     1:04 PM 10/25/2024    12:46 PM   Vitals - 1 value per visit   SYSTOLIC 147 142 132   DIASTOLIC 82 96 80   Pulse 108 84    Temp 99 °F (37.2 °C)     Resp 20 16    SPO2 100 % 99 %    Weight (lb) 272.71 272.05 277.89   Weight (kg) 123.7 123.4 126.05   Height 5' 6" (1.676 m) 5' 6" " "(1.676 m) 5' 6" (1.676 m)   BMI (Calculated) 44 43.9 44.9   Pain Score  Zero Six          General:  age appropriate, neatly groomed, wearing glasses, scrubs     Musculoskeletal  Muscle Strength/Tone:  no tremor, no tic   Gait & Station:  Non-ataxic     Psychiatric  Speech:  no latency; no press   Behavior: wnl   Mood & Affect:  anxious  congruent and appropriate   Thought Process:  normal and logical   Associations:  intact   Thought Content:  normal, no suicidality, no homicidality, delusions, or paranoia   Insight:  has awareness of illness   Judgement: behavior is adequate to circumstances   Orientation:  grossly intact   Memory: intact for content of interview   Language: grossly intact   Attention Span & Concentration:  Grossly intact   Fund of Knowledge:  intact and appropriate to age and level of education     Assessment and Diagnosis   Status/Progress: Based on the examination today, the patient's problem(s) is/are adequately but not ideally controlled.  New problems have not been presented today.   Co-morbidities and psychosocial stressors  are complicating management of the primary condition.  There are no active rule-out diagnoses for this patient at this time.     General Impression:     Encounter Diagnosis   Name Primary?    Generalized anxiety disorder        Intervention/Counseling/Treatment Plan   Medication Management: Discussed risks, benefits, and alternatives to treatment plan documented above with patient. I answered all patient questions related to this plan, and patient expressed understanding and agreement.   start Zoloft 25 mg for 2 weeks, then 50 mg  Continue therapy   acknowledge intrusive thoughts without trying to push the thought away      Medication List with Changes/Refills   New Medications    SERTRALINE (ZOLOFT) 25 MG TABLET    Take 1 tablet (25 mg total) by mouth once daily for 14 days, THEN 2 tablets (50 mg total) once daily for 14 days.    SERTRALINE (ZOLOFT) 50 MG TABLET    Take " 1 tablet (50 mg total) by mouth once daily.   Current Medications    AMITRIPTYLINE (ELAVIL) 10 MG TABLET    Take 1 tablet (10 mg total) by mouth every evening.    CHOLECALCIFEROL, VITAMIN D3, (VITAMIN D3) 50 MCG (2,000 UNIT) TAB    Take 2,000 Units by mouth once daily.    OMEPRAZOLE (PRILOSEC) 40 MG CAPSULE    Take 40 mg by mouth once daily.    SUMATRIPTAN (IMITREX) 50 MG TABLET    Take 1 tablet (50 mg total) by mouth daily as needed for Migraine. Take at onset of migraine. Can repeat in 2 hours, if needed. No more than 2 tablets per day OR 3 days per week.   Changed and/or Refilled Medications    Modified Medication Previous Medication    ALPRAZOLAM (XANAX) 0.5 MG TABLET ALPRAZolam (XANAX) 0.5 MG tablet       Take 1 tablet (0.5 mg total) by mouth daily as needed for Anxiety.    Take 1 tab by mouth daily as needed not to exceed 30 tabs per 90 days   Discontinued Medications    DICYCLOMINE (BENTYL) 20 MG TABLET    Take 1 tablet (20 mg total) by mouth 4 (four) times daily as needed.        Return to Clinic: 2 months    I spent an additional 24 minutes performing E/M services with >50% spent on counseling, guidance, coordinating care (not Psychotherapy related) in addition to the 20 minutes performing Psychotherapy.    I spent a total of 44 minutes on the day of the visit. This includes face to face time and non-face to face time preparing to see the patient (eg, review of tests), obtaining and/or reviewing separately obtained history, documenting clinical information in the electronic or other health record, independently interpreting results and communicating results to the patient/family/caregiver, or care coordinator.        Kenya Sanchez, PhD,   Advanced Practice Medical Psychologist  [facility]  [id number] [place] [address].  MICHAEL Polanco [id number]  [phone]   [phone] fax

## 2024-12-10 NOTE — PATIENT INSTRUCTIONS

## 2024-12-13 ENCOUNTER — HOSPITAL ENCOUNTER (OUTPATIENT)
Dept: RADIOLOGY | Facility: HOSPITAL | Age: 34
Discharge: HOME | End: 2024-12-13
Payer: COMMERCIAL

## 2024-12-13 DIAGNOSIS — G43.E19 INTRACTABLE CHRONIC MIGRAINE WITH AURA AND WITHOUT STATUS MIGRAINOSUS: ICD-10-CM

## 2024-12-13 DIAGNOSIS — M54.2 CERVICALGIA: ICD-10-CM

## 2024-12-13 PROCEDURE — 70551 MRI BRAIN STEM W/O DYE: CPT | Mod: 26,,, | Performed by: RADIOLOGY

## 2024-12-13 PROCEDURE — 72141 MRI NECK SPINE W/O DYE: CPT | Mod: TC,PN

## 2024-12-13 PROCEDURE — 70551 MRI BRAIN STEM W/O DYE: CPT | Mod: TC,PN

## 2024-12-13 PROCEDURE — 72141 MRI NECK SPINE W/O DYE: CPT | Mod: 26,,, | Performed by: RADIOLOGY

## 2024-12-20 ENCOUNTER — TELEPHONE (OUTPATIENT)
Dept: NEUROLOGY | Facility: CLINIC | Age: 34
End: 2024-12-20
Payer: COMMERCIAL

## 2024-12-24 ENCOUNTER — E-VISIT (OUTPATIENT)
Dept: INTERNAL MEDICINE | Facility: CLINIC | Age: 34
End: 2024-12-24
Payer: COMMERCIAL

## 2024-12-24 DIAGNOSIS — N30.90 CYSTITIS: Primary | ICD-10-CM

## 2024-12-24 RX ORDER — CIPROFLOXACIN 500 MG/1
500 TABLET ORAL EVERY 12 HOURS
Qty: 10 TABLET | Refills: 0 | Status: SHIPPED | OUTPATIENT
Start: 2024-12-24

## 2024-12-24 RX ORDER — PHENAZOPYRIDINE HYDROCHLORIDE 200 MG/1
200 TABLET, FILM COATED ORAL 3 TIMES DAILY PRN
Qty: 30 TABLET | Refills: 0 | Status: SHIPPED | OUTPATIENT
Start: 2024-12-24 | End: 2025-01-03

## 2024-12-24 NOTE — PROGRESS NOTES
Patient ID: Anthony Sexton is a 34 y.o. female.    Chief Complaint: General Illness (Entered automatically based on patient selection in [employer].)    The patient initiated a request through [employer] on 12/24/2024 for evaluation and management with a chief complaint of General Illness (Entered automatically based on patient selection in [employer].)     I evaluated the questionnaire submission on 12/24/2024.    [facility]    12/24/2024  7:57 AM CST - Filed by Patient   What do you need help with? Urinary Symptoms   Do you agree to participate in an E-Visit? Yes   If you have any of the following symptoms, please present to your local emergency room or call 911:  I acknowledge   Do you have any of the following pregnancy-related conditions? None   What is the main issue you would like addressed today? Urinary tract infection   What symptoms do you currently have? Pain while passing urine;  Change in urine appearance or smell   When did your symptoms first appear? 12/23/2024   List what you have done or taken to help your symptoms. None   Please indicate whether you have had the following symptoms during the past 24 hours     Urgent urination (a sudden and uncontrollable urge to urinate) Moderate   Frequent urination of small amounts of urine (going to the toilet very often) Moderate   Burning pain when urinating Moderate   Incomplete bladder emptying (still feel like you need to urinate again after urination) Moderate   Pain not associated with urination in the lower abdomen below the belly button) None   What does your urine look like? Cloudy   Blood seen in the urine None   Flank pain (pain in one or both sides of the lower back) None   Abnormal Vaginal Discharge (abnormal amount, color and/or odor) None   Discharge from the urethra (urinary opening) without urination None   High body temperature/fever? None-<99.5   Please rate how much discomfort you have experience because of the  symptoms in the past 24 hours: Moderate   Please indicate how the symptoms have interfered with your every day activities/work in the past 24 hours: Moderate   Please indicate how these symptoms have interfered with your social activities (visiting people, meeting with friends, etc.) in the past 24 hours? Moderate   Are you a diabetic? No   Please indicate whether you have the following at the time of completion of this questionnaire: Menstruation (menses)   Provide any additional information you feel is important.    Please attach any relevant images or files (if you have performed a home test for UTI, please submit a photo of results)    Are you able to take your vital signs? No         Encounter Diagnosis   Name Primary?    Cystitis Yes        No orders of the defined types were placed in this encounter.     Medications Ordered This Encounter   Medications    ciprofloxacin HCl (CIPRO) 500 MG tablet     Sig: Take 1 tablet (500 mg total) by mouth every 12 (twelve) hours.     Dispense:  10 tablet     Refill:  0    phenazopyridine (PYRIDIUM) 200 MG tablet     Sig: Take 1 tablet (200 mg total) by mouth 3 (three) times daily as needed for Pain.     Dispense:  30 tablet     Refill:  0        No follow-ups on file.      E-Visit Time Tracking:    Day 1 Time (in minutes): 6    Total Time (in minutes): 6         Anthony was seen today for general illness.    Diagnoses and all orders for this visit:    Cystitis  -     ciprofloxacin HCl (CIPRO) 500 MG tablet; Take 1 tablet (500 mg total) by mouth every 12 (twelve) hours.  -     phenazopyridine (PYRIDIUM) 200 MG tablet; Take 1 tablet (200 mg total) by mouth 3 (three) times daily as needed for Pain.

## 2025-01-13 ENCOUNTER — LAB VISIT (OUTPATIENT)
Dept: LAB | Facility: HOSPITAL | Age: 35
End: 2025-01-13
Attending: NURSE PRACTITIONER
Payer: COMMERCIAL

## 2025-01-13 ENCOUNTER — OFFICE VISIT (OUTPATIENT)
Dept: INTERNAL MEDICINE | Facility: CLINIC | Age: 35
End: 2025-01-13
Payer: COMMERCIAL

## 2025-01-13 ENCOUNTER — OFFICE VISIT (OUTPATIENT)
Dept: PSYCHIATRY | Facility: CLINIC | Age: 35
End: 2025-01-13
Payer: COMMERCIAL

## 2025-01-13 VITALS
WEIGHT: 278.69 LBS | DIASTOLIC BLOOD PRESSURE: 78 MMHG | HEIGHT: 66 IN | SYSTOLIC BLOOD PRESSURE: 128 MMHG | TEMPERATURE: 98 F | BODY MASS INDEX: 44.79 KG/M2 | HEART RATE: 86 BPM | OXYGEN SATURATION: 99 %

## 2025-01-13 DIAGNOSIS — G89.29 CHRONIC BILATERAL LOW BACK PAIN WITHOUT SCIATICA: ICD-10-CM

## 2025-01-13 DIAGNOSIS — M79.674 GREAT TOE PAIN, RIGHT: ICD-10-CM

## 2025-01-13 DIAGNOSIS — M79.671 HEEL PAIN, BILATERAL: Primary | ICD-10-CM

## 2025-01-13 DIAGNOSIS — M79.672 HEEL PAIN, BILATERAL: Primary | ICD-10-CM

## 2025-01-13 DIAGNOSIS — R69 UNDIAGNOSED DISEASE OR SYNDROME PRESENT: Primary | ICD-10-CM

## 2025-01-13 DIAGNOSIS — M54.50 CHRONIC BILATERAL LOW BACK PAIN WITHOUT SCIATICA: ICD-10-CM

## 2025-01-13 LAB — URATE SERPL-MCNC: 5.6 MG/DL (ref 2.4–5.7)

## 2025-01-13 PROCEDURE — G2211 COMPLEX E/M VISIT ADD ON: HCPCS | Mod: S$GLB,,, | Performed by: NURSE PRACTITIONER

## 2025-01-13 PROCEDURE — 3074F SYST BP LT 130 MM HG: CPT | Mod: CPTII,S$GLB,, | Performed by: NURSE PRACTITIONER

## 2025-01-13 PROCEDURE — 1159F MED LIST DOCD IN RCRD: CPT | Mod: CPTII,95,, | Performed by: STUDENT IN AN ORGANIZED HEALTH CARE EDUCATION/TRAINING PROGRAM

## 2025-01-13 PROCEDURE — 3008F BODY MASS INDEX DOCD: CPT | Mod: CPTII,S$GLB,, | Performed by: NURSE PRACTITIONER

## 2025-01-13 PROCEDURE — 99999 PR PBB SHADOW E&M-EST. PATIENT-LVL IV: CPT | Mod: PBBFAC,,, | Performed by: NURSE PRACTITIONER

## 2025-01-13 PROCEDURE — 84550 ASSAY OF BLOOD/URIC ACID: CPT | Performed by: NURSE PRACTITIONER

## 2025-01-13 PROCEDURE — 99213 OFFICE O/P EST LOW 20 MIN: CPT | Mod: S$GLB,,, | Performed by: NURSE PRACTITIONER

## 2025-01-13 PROCEDURE — 3078F DIAST BP <80 MM HG: CPT | Mod: CPTII,S$GLB,, | Performed by: NURSE PRACTITIONER

## 2025-01-13 PROCEDURE — 90834 PSYTX W PT 45 MINUTES: CPT | Mod: 95,,, | Performed by: STUDENT IN AN ORGANIZED HEALTH CARE EDUCATION/TRAINING PROGRAM

## 2025-01-13 PROCEDURE — 1159F MED LIST DOCD IN RCRD: CPT | Mod: CPTII,S$GLB,, | Performed by: NURSE PRACTITIONER

## 2025-01-13 PROCEDURE — 36415 COLL VENOUS BLD VENIPUNCTURE: CPT | Performed by: NURSE PRACTITIONER

## 2025-01-13 NOTE — PROGRESS NOTES
Subjective:      Patient ID: Anthony Sexton is a 34 y.o. female.    Chief Complaint: Foot Pain    History of Present Illness    CHIEF COMPLAINT:  Anthony presents today with foot pain.    FOOT PAIN:  She reports bilateral foot pain that started one month ago, more severe in the right foot. She experiences heel tenderness that radiates towards the toes with associated toe cramping. Pain is exacerbated by walking and after lying down, reaching 10/10 intensity at night. She had a plastic toy fragment embedded in her foot two weeks ago but denies any other foot injury.    CHRONIC PAIN:  She reports chronic lower back pain that is asymmetric.    MEDICAL HISTORY:  She has a history of postpartum preeclampsia with frequent emergency room visits.    HYPERTENSION:  She is enrolled in digital medicine for hypertension management.    DIET:  She follows a high protein, low carbohydrate diet with increased vegetable intake since the New Year.      ROS:  General: -fever, -chills, -fatigue, -weight gain, -weight loss  Eyes: -vision changes, -redness, -discharge  ENT: -ear pain, -nasal congestion, -sore throat  Cardiovascular: -chest pain, -palpitations, -lower extremity edema  Respiratory: -cough, -shortness of breath  Gastrointestinal: -abdominal pain, -nausea, -vomiting, -diarrhea, -constipation, -blood in stool  Genitourinary: -dysuria, -hematuria, -frequency  Musculoskeletal: +joint pain, -muscle pain, +muscle cramps, +back pain  Skin: -rash, -lesion  Neurological: -headache, -dizziness, -numbness, -tingling  Psychiatric: -anxiety, -depression, -sleep difficulty          Patient Active Problem List   Diagnosis    Morbid obesity with BMI of 45.0-49.9, adult    Vitamin D deficiency    SOB (shortness of breath)    GERD (gastroesophageal reflux disease)    Essential hypertension    History of postpartum depression, currently pregnant    Generalized anxiety disorder    Iron deficiency anemia due to chronic blood loss    ESDRAS  "positive    Family history of Sjogren's disease    Fibromyalgia    SAVANNA (obstructive sleep apnea)    High risk HPV infection    Nausea    8 weeks gestation of pregnancy    Antepartum multigravida of advanced maternal age    Normal pregnancy in first trimester    Incomplete          Current Outpatient Medications:     ALPRAZolam (XANAX) 0.5 MG tablet, Take 1 tablet (0.5 mg total) by mouth daily as needed for Anxiety., Disp: 30 tablet, Rfl: 1    cholecalciferol, vitamin D3, (VITAMIN D3) 50 mcg (2,000 unit) Tab, Take 2,000 Units by mouth once daily., Disp: , Rfl:     omeprazole (PRILOSEC) 40 MG capsule, Take 40 mg by mouth once daily., Disp: , Rfl:     sertraline (ZOLOFT) 25 MG tablet, Take 1 tablet (25 mg total) by mouth once daily for 14 days, THEN 2 tablets (50 mg total) once daily for 14 days., Disp: 42 tablet, Rfl: 0    sertraline (ZOLOFT) 50 MG tablet, Take 1 tablet (50 mg total) by mouth once daily., Disp: 42 tablet, Rfl: 0    sumatriptan (IMITREX) 50 MG tablet, Take 1 tablet (50 mg total) by mouth daily as needed for Migraine. Take at onset of migraine. Can repeat in 2 hours, if needed. No more than 2 tablets per day OR 3 days per week., Disp: 9 tablet, Rfl: 0    amitriptyline (ELAVIL) 10 MG tablet, Take 1 tablet (10 mg total) by mouth every evening. (Patient not taking: Reported on 2025), Disp: 30 tablet, Rfl: 0    ciprofloxacin HCl (CIPRO) 500 MG tablet, Take 1 tablet (500 mg total) by mouth every 12 (twelve) hours. (Patient not taking: Reported on 2025), Disp: 10 tablet, Rfl: 0      Objective:   /78 (BP Location: Right arm, Patient Position: Sitting)   Pulse 86   Temp 97.6 °F (36.4 °C) (Tympanic)   Ht 5' 6" (1.676 m)   Wt 126.4 kg (278 lb 10.6 oz)   LMP 2025 (Exact Date)   SpO2 99%   BMI 44.98 kg/m²     Physical Exam             Physical Exam  Vitals and nursing note reviewed.   Constitutional:       General: She is awake. She is not in acute distress.     Appearance: " Normal appearance. She is well-developed and well-groomed. She is not ill-appearing, toxic-appearing or diaphoretic.   HENT:      Head: Normocephalic and atraumatic.      Right Ear: Ear canal and external ear normal.      Left Ear: External ear normal.   Eyes:      Pupils: Pupils are equal, round, and reactive to light.   Cardiovascular:      Rate and Rhythm: Normal rate and regular rhythm.      Pulses:           Dorsalis pedis pulses are 2+ on the right side.        Posterior tibial pulses are 2+ on the right side.      Heart sounds: Normal heart sounds. No murmur heard.  Pulmonary:      Effort: Pulmonary effort is normal. No tachypnea, bradypnea, accessory muscle usage, prolonged expiration, respiratory distress or retractions.      Breath sounds: Normal breath sounds. No stridor, decreased air movement or transmitted upper airway sounds. No decreased breath sounds, wheezing, rhonchi or rales.   Musculoskeletal:         General: No swelling, tenderness, deformity or signs of injury.      Cervical back: Normal range of motion and neck supple.      Right lower leg: No edema.      Left lower leg: No edema.      Right foot: Normal range of motion. No deformity, bunion, Charcot foot, foot drop or prominent metatarsal heads.   Feet:      Right foot:      Skin integrity: Skin integrity normal.      Toenail Condition: Right toenails are long.   Skin:     General: Skin is warm and dry.      Capillary Refill: Capillary refill takes less than 2 seconds.   Neurological:      General: No focal deficit present.      Mental Status: She is alert and oriented to person, place, and time.      Gait: Gait abnormal.   Psychiatric:         Attention and Perception: Attention and perception normal.         Mood and Affect: Mood and affect normal.         Speech: Speech normal.         Behavior: Behavior normal. Behavior is cooperative.         Cognition and Memory: Cognition normal.          Assessment/Plan :   1. Heel pain,  bilateral  -     Ambulatory referral/consult to Podiatry; Future; Expected date: 01/20/2025    2. Great toe pain, right  -     X-Ray Lumbar Spine 5 View; Future; Expected date: 01/13/2025  -     X-Ray Toe 2 or More Views Right; Future; Expected date: 01/13/2025  -     URIC ACID; Future; Expected date: 01/13/2025    3. Chronic bilateral low back pain without sciatica  -     X-Ray Lumbar Spine 5 View; Future; Expected date: 01/13/2025         Assessment & Plan    Suspected plantar fasciitis based on patient's reported symptoms and pain pattern  Considered gout as a differential diagnosis  Evaluated for possible connection between back pain and toe symptoms  Assessed risk factors for ED visits (67% risk noted)    MORBID (SEVERE) OBESITY DUE TO EXCESS CALORIES:  - Acknowledged patient's weight as a contributing factor to self-reported lower back pain.  - Determined patient's eligibility for digital medicine and outpatient care management, with a 67% risk for ED visit.  - Enrolled patient in digital medicine program for hypertension management.  - Noted recent dietary changes to high protein, low carb, and increased vegetable intake.  - Encouraged continuation of these healthy dietary habits.    PLANTAR FASCIITIS:  - Evaluated patient's foot pain, which started about a month ago and has been progressively worsening, more severe in the right foot.  - Noted heel pain moving towards the toes, with occasional cramping in the toes.  - Pain severity assessed as 10/10 when it occurs, worsening after periods of rest and with walking.  - Suspected plantar fasciitis based on symptoms.    TREATMENT PLAN:  - 1.  - Instruct patient to elevate feet above heart level. 2.  - Advise changing shoe inserts. 3.  - Recommend wearing appropriate, supportive shoes, including at home. 4.  - Explain the importance of supportive footwear and potential benefits of using good quality slippers for foot support.    FOOT PAIN:  - Noted less severe  foot pain in the left foot compared to the right.  - Referred patient to podiatry for further evaluation and management of foot pain.  - Ordered x-ray of the toe to rule out fracture and uric acid level to check for gout.  - Offered naproxen, but noted it may not be effective for the patient's condition.    CHRONIC LOWER BACK PAIN:  - Evaluated patient's chronic lower back pain, noted to be more on one side.  - Assessed for any associated bowel or bladder issues.  - Ordered x-ray of lower back for further evaluation.    POSTPARTUM CARE:  - Noted patient's recent childbirth with postpartum complications.          No follow-ups on file.    This note was generated with the assistance of ambient listening technology. Verbal consent was obtained by the patient and accompanying visitor(s) for the recording of patient appointment to facilitate this note. I attest to having reviewed and edited the generated note for accuracy, though some syntax or spelling errors may persist. Please contact the author of this note for any clarification.

## 2025-01-14 ENCOUNTER — HOSPITAL ENCOUNTER (OUTPATIENT)
Dept: RADIOLOGY | Facility: HOSPITAL | Age: 35
Discharge: HOME | End: 2025-01-14
Attending: NURSE PRACTITIONER
Payer: COMMERCIAL

## 2025-01-14 DIAGNOSIS — M79.674 GREAT TOE PAIN, RIGHT: ICD-10-CM

## 2025-01-14 DIAGNOSIS — G89.29 CHRONIC BILATERAL LOW BACK PAIN WITHOUT SCIATICA: ICD-10-CM

## 2025-01-14 DIAGNOSIS — M54.50 CHRONIC BILATERAL LOW BACK PAIN WITHOUT SCIATICA: ICD-10-CM

## 2025-01-14 PROCEDURE — 73660 X-RAY EXAM OF TOE(S): CPT | Mod: 26,RT,, | Performed by: RADIOLOGY

## 2025-01-14 PROCEDURE — 72110 X-RAY EXAM L-2 SPINE 4/>VWS: CPT | Mod: TC

## 2025-01-14 PROCEDURE — 72110 X-RAY EXAM L-2 SPINE 4/>VWS: CPT | Mod: 26,,, | Performed by: RADIOLOGY

## 2025-01-14 PROCEDURE — 73660 X-RAY EXAM OF TOE(S): CPT | Mod: TC,RT

## 2025-01-15 NOTE — PROGRESS NOTES
Cancer [place] - [facility]  Psychology  Progress Note  Individual Psychotherapy (PhD/LCSW)    Patient Name: Anthony Sexton  MRN: [id number]    Patient Class: OP- Hospital Outpatient [facility]  Primary Care Provider: Tyesha Leggett MD    Psychiatry Initial Visit (PhD/LCSW)  Individual Psychotherapy - CPT 35053  EAP #5 for 2024    Date: 1/13/2025    Site: [place]    The patient location is: Patient's home/ Patient reported that his/her location at the time of this visit was in the [facility]     Visit type: Virtual visit with synchronous audio and video     Each patient to whom he or she provides medical services by telemedicine is: (1) informed of the relationship between the physician and patient and the respective role of any other health care provider with respect to management of the patient; and (2) notified that he or she may decline to receive medical services by telemedicine and may withdraw from such care at any time.     Referral source: Lenora Walters MD    Clinical status of patient: Outpatient    Anthony Sexton, a 34 y.o. female, for initial evaluation visit.  Met with patient.    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     Substance  use:   Alcohol: none   Drugs: none   Tobacco: none   Caffeine: none    Current medications and drug reactions (include OTC, herbal): see medication list     Strengths and liabilities: Strength: Patient accepts guidance/feedback, Strength: Patient is expressive/articulate., Strength: Patient is intelligent., Strength: Patient is motivated for change., Strength: Patient has positive support network., Strength: Patient has reasonable judgment., Strength: Patient is stable., Liability: Patient lacks coping skills.    Current Evaluation:     Mental Status Exam:  General Appearance:  unremarkable, age appropriate   Speech: normal tone, normal rate, normal pitch, normal volume      Level of Cooperation: cooperative      Thought Processes: normal and logical   Mood: anxious, sad      Thought Content: normal, no suicidality, no homicidality, delusions, or paranoia   Affect: congruent and appropriate   Orientation: Oriented x3   Memory: recent >  intact   Attention Span & Concentration: intact   Fund of General Knowledge: intact and appropriate to age and level of education   Abstract Reasoning: not assessed   Judgment & Insight: fair     Language  intact     Summary: The patient is doing well. She and I explored some concerns she had about how to advocate for her daughter and how to cope with the stressors that come with securing supports for a child.    Diagnostic Impression - Plan:       ICD-10-CM ICD-9-CM   1. Undiagnosed disease or syndrome present  R69 799.9                   Plan:individual psychotherapy    Return to Clinic: as scheduled    Length of Service (minutes): 30          Barbra Bartlett, PhD  Psychologist  [facility]

## 2025-01-21 ENCOUNTER — PATIENT MESSAGE (OUTPATIENT)
Dept: PODIATRY | Facility: CLINIC | Age: 35
End: 2025-01-21
Payer: COMMERCIAL

## 2025-01-22 DIAGNOSIS — M54.50 CHRONIC BILATERAL LOW BACK PAIN WITHOUT SCIATICA: Primary | ICD-10-CM

## 2025-01-22 DIAGNOSIS — G89.29 CHRONIC BILATERAL LOW BACK PAIN WITHOUT SCIATICA: Primary | ICD-10-CM

## 2025-01-22 NOTE — PROGRESS NOTES
Anthony Sexton  MRN:  [id number]  34 y.o. female      PCP:  Tyesha Leggett MD    Patient Care Team:  Tyesha Leggett MD as PCP - General (Family Medicine)  Joselito Franco MD (Obstetrics and Gynecology)  Alex Sandoval MD as Consulting Physician (Rheumatology)      SUBJECTIVE:     CHIEF COMPLAINT:  - Ms. Sexton presents with chronic lower back pain radiating to the toe, exacerbated by prolonged sitting or lying down.    HPI:  Ms. Sexton reports chronic lower back pain radiating to the toe, recently intensified. She visited a nurse practitioner on , where an x-ray of the back was performed and physical therapy was ordered. She was advised to follow-up with PCP for medication management.    Pain is constant and severe, interfering with ambulation. It is exacerbated by lying down and sitting, particularly problematic due to desk job. Standing provides some relief, but patient can only maintain this position for limited periods.    Ms. Sexton has been using various self-management techniques with limited success. Ibuprofen (800 mg, 3 times daily) provides temporary relief. Icy Hot patches help but do not completely alleviate pain. Heating pad is used extensively, especially during work breaks.    Ms. Sexton reports lower back pain shooting down posterior leg all the way to toes. Denies numbness or tingling. Leg not unstable.    Requests updated referral to Bariatric Medicine. One on file completed by PCP has .    ````````````````````    XRAY LUMBAR SPINE 2025:  FINDINGS:  The vertebral bodies demonstrate a normal height and alignment.  The disc space heights appear to be relatively well maintained.  Mild-to-moderate disc space narrowing noted at the L2-3 through the L5-S1 levels.  No pars defects.      Review of Systems   Constitutional: Negative.    Respiratory: Negative.     Cardiovascular: Negative.    Musculoskeletal:  Positive for back pain. Negative for falls and neck pain.  "  Neurological:  Negative for tingling, tremors and weakness.       Review of patient's allergies indicates:   Allergen Reactions    Procardia [nifedipine]          Patient Active Problem List   Diagnosis    Morbid obesity with BMI of 45.0-49.9, adult    Vitamin D deficiency    GERD (gastroesophageal reflux disease)    Essential hypertension    History of postpartum depression, currently pregnant    Generalized anxiety disorder    Iron deficiency anemia due to chronic blood loss    ESDRAS positive    Family history of Sjogren's disease    Fibromyalgia    SAVANNA (obstructive sleep apnea)    High risk HPV infection       Current Outpatient Medications   Medication Instructions    ALPRAZolam (XANAX) 0.5 mg, Oral, Daily PRN    cholecalciferol (vitamin D3) (VITAMIN D3) 2,000 Units, Daily    methocarbamoL (ROBAXIN) 750 mg, Oral, 3 times daily PRN    omeprazole (PRILOSEC) 40 mg, Daily    sertraline (ZOLOFT) 50 mg, Oral, Daily    sumatriptan (IMITREX) 50 mg, Oral, Daily PRN, Take at onset of migraine. Can repeat in 2 hours, if needed. No more than 2 tablets per day OR 3 days per week.         Past medical, surgical, family and social histories have been reviewed today.      OBJECTIVE:     Vitals:    01/24/25 1113   BP: 112/78   Pulse: 103   Resp: 18   Temp: 97.7 °F (36.5 °C)   TempSrc: Tympanic   SpO2: 98%   Weight: 125 kg (275 lb 9.2 oz)   Height: 5' 6" (1.676 m)     Vitals:    01/24/25 1113   PainSc:   7   PainLoc: Leg       Physical Exam  Vitals reviewed.   Constitutional:       General: She is not in acute distress.  Musculoskeletal:      Cervical back: Normal.      Thoracic back: Normal.      Lumbar back: Spasms and tenderness present. No swelling, edema or deformity. Normal range of motion.        Back:    Skin:     Capillary Refill: Capillary refill takes less than 2 seconds.   Neurological:      Mental Status: She is alert.      Sensory: No sensory deficit.      Motor: No weakness.      Coordination: Coordination normal. "      Gait: Gait abnormal (slightly antalgic due to back and leg pain).   Psychiatric:         Mood and Affect: Mood normal.         Behavior: Behavior normal.         Thought Content: Thought content normal.         Judgment: Judgment normal.         ASSESSMENT:     1. Right-sided low back pain with right-sided sciatica, unspecified chronicity ------ chronic intermittent issue, worsening over time.  To avoid steroids ----> palpitations.  Back care and pain relief measures discussed.  -     methocarbamoL (ROBAXIN) 750 MG Tab; Take 1 tablet (750 mg total) by mouth 3 (three) times daily as needed (muscle pain////SEDATING).  Dispense: 30 tablet; Refill: 0  -     Ambulatory referral/consult to Back & Spine Clinic; Future; Expected date: 01/31/2025    2. Morbid obesity with body mass index (BMI) of 40.0 to 49.9  -     Ambulatory referral/consult to Bariatric/Obesity Medicine; Future; Expected date: 01/31/2025  -     Ambulatory referral/consult to Back & Spine Clinic; Future; Expected date: 01/31/2025      PLAN:     Referrals placed.  RTC as directed and/or prn.        GERALDINE Hagen  [facility]       25 minutes of total time spent on the encounter, which includes face to face time and non-face to face time preparing to see the patient.  This includes obtaining and/or reviewing separately obtained history, performing a medically appropriate examination and/or evaluation, and counseling and educating the patient/family/caregiver.  Includes documenting clinical information in the electronic or other health record, independently interpreting results (not separately reported) and communicating results to the patient/family/caregiver, with care coordination (not separately reported).  Medications, tests and/or procedures ordered as necessary along with referring and communicating with other health professionals (when not separately reported).

## 2025-01-23 PROBLEM — O03.4 INCOMPLETE ABORTION: Status: RESOLVED | Noted: 2024-03-28 | Resolved: 2025-01-23

## 2025-01-23 PROBLEM — Z3A.08 8 WEEKS GESTATION OF PREGNANCY: Status: RESOLVED | Noted: 2024-03-01 | Resolved: 2025-01-23

## 2025-01-23 PROBLEM — R11.0 NAUSEA: Status: RESOLVED | Noted: 2023-05-29 | Resolved: 2025-01-23

## 2025-01-23 PROBLEM — O09.529 ANTEPARTUM MULTIGRAVIDA OF ADVANCED MATERNAL AGE: Status: RESOLVED | Noted: 2024-03-06 | Resolved: 2025-01-23

## 2025-01-23 PROBLEM — Z34.91 NORMAL PREGNANCY IN FIRST TRIMESTER: Status: RESOLVED | Noted: 2024-03-13 | Resolved: 2025-01-23

## 2025-01-23 PROBLEM — R06.02 SOB (SHORTNESS OF BREATH): Status: RESOLVED | Noted: 2020-02-07 | Resolved: 2025-01-23

## 2025-01-24 ENCOUNTER — OFFICE VISIT (OUTPATIENT)
Dept: FAMILY MEDICINE | Facility: CLINIC | Age: 35
End: 2025-01-24
Payer: COMMERCIAL

## 2025-01-24 ENCOUNTER — HOSPITAL ENCOUNTER (OUTPATIENT)
Dept: RADIOLOGY | Facility: HOSPITAL | Age: 35
Discharge: HOME | End: 2025-01-24
Attending: FAMILY MEDICINE
Payer: COMMERCIAL

## 2025-01-24 VITALS
BODY MASS INDEX: 44.29 KG/M2 | SYSTOLIC BLOOD PRESSURE: 112 MMHG | TEMPERATURE: 98 F | WEIGHT: 275.56 LBS | HEART RATE: 103 BPM | RESPIRATION RATE: 18 BRPM | DIASTOLIC BLOOD PRESSURE: 78 MMHG | HEIGHT: 66 IN | OXYGEN SATURATION: 98 %

## 2025-01-24 DIAGNOSIS — M25.551 PAIN OF RIGHT HIP: ICD-10-CM

## 2025-01-24 DIAGNOSIS — E66.01 MORBID OBESITY WITH BODY MASS INDEX (BMI) OF 40.0 TO 49.9: ICD-10-CM

## 2025-01-24 DIAGNOSIS — M54.41 RIGHT-SIDED LOW BACK PAIN WITH RIGHT-SIDED SCIATICA, UNSPECIFIED CHRONICITY: Primary | ICD-10-CM

## 2025-01-24 PROCEDURE — 73502 X-RAY EXAM HIP UNI 2-3 VIEWS: CPT | Mod: 26,RT,, | Performed by: RADIOLOGY

## 2025-01-24 PROCEDURE — 99999 PR PBB SHADOW E&M-EST. PATIENT-LVL IV: CPT | Mod: PBBFAC,,, | Performed by: REGISTERED NURSE

## 2025-01-24 PROCEDURE — 1159F MED LIST DOCD IN RCRD: CPT | Mod: CPTII,S$GLB,, | Performed by: REGISTERED NURSE

## 2025-01-24 PROCEDURE — 3078F DIAST BP <80 MM HG: CPT | Mod: CPTII,S$GLB,, | Performed by: REGISTERED NURSE

## 2025-01-24 PROCEDURE — 73502 X-RAY EXAM HIP UNI 2-3 VIEWS: CPT | Mod: TC,RT

## 2025-01-24 PROCEDURE — 99213 OFFICE O/P EST LOW 20 MIN: CPT | Mod: S$GLB,,, | Performed by: REGISTERED NURSE

## 2025-01-24 PROCEDURE — 3008F BODY MASS INDEX DOCD: CPT | Mod: CPTII,S$GLB,, | Performed by: REGISTERED NURSE

## 2025-01-24 PROCEDURE — G2211 COMPLEX E/M VISIT ADD ON: HCPCS | Mod: S$GLB,,, | Performed by: REGISTERED NURSE

## 2025-01-24 PROCEDURE — 3074F SYST BP LT 130 MM HG: CPT | Mod: CPTII,S$GLB,, | Performed by: REGISTERED NURSE

## 2025-01-24 RX ORDER — METHOCARBAMOL 750 MG/1
750 TABLET, FILM COATED ORAL 3 TIMES DAILY PRN
Qty: 30 TABLET | Refills: 0 | Status: SHIPPED | OUTPATIENT
Start: 2025-01-24

## 2025-01-27 ENCOUNTER — TELEPHONE (OUTPATIENT)
Dept: PAIN MEDICINE | Facility: CLINIC | Age: 35
End: 2025-01-27
Payer: COMMERCIAL

## 2025-01-28 ENCOUNTER — PATIENT MESSAGE (OUTPATIENT)
Dept: PHYSICAL MEDICINE AND REHAB | Facility: CLINIC | Age: 35
End: 2025-01-28
Payer: COMMERCIAL

## 2025-01-28 ENCOUNTER — TELEPHONE (OUTPATIENT)
Dept: PAIN MEDICINE | Facility: CLINIC | Age: 35
End: 2025-01-28
Payer: COMMERCIAL

## 2025-01-28 NOTE — TELEPHONE ENCOUNTER
Attempting reaching patient again for scheduling of Back and Spine referral. No answer, lvm. Also, left message in patient's [facility] account.  Pari Abrams RN

## 2025-01-29 ENCOUNTER — TELEPHONE (OUTPATIENT)
Dept: PAIN MEDICINE | Facility: CLINIC | Age: 35
End: 2025-01-29
Payer: COMMERCIAL

## 2025-01-29 NOTE — TELEPHONE ENCOUNTER
Patient responded to my "[employer]" message asking for her Back and Spine referral to be scheduled. Got her scheduled and replied to her message informing her.  EDIN

## 2025-02-02 NOTE — PATIENT INSTRUCTIONS

## 2025-02-06 ENCOUNTER — TELEPHONE (OUTPATIENT)
Dept: BARIATRICS | Facility: CLINIC | Age: 35
End: 2025-02-06
Payer: COMMERCIAL

## 2025-02-17 ENCOUNTER — OFFICE VISIT (OUTPATIENT)
Dept: PSYCHIATRY | Facility: CLINIC | Age: 35
End: 2025-02-17
Payer: COMMERCIAL

## 2025-02-17 DIAGNOSIS — R69 UNDIAGNOSED DISEASE OR SYNDROME PRESENT: Primary | ICD-10-CM

## 2025-02-18 ENCOUNTER — OFFICE VISIT (OUTPATIENT)
Dept: FAMILY MEDICINE | Facility: CLINIC | Age: 35
End: 2025-02-18
Attending: FAMILY MEDICINE
Payer: COMMERCIAL

## 2025-02-18 VITALS
OXYGEN SATURATION: 99 % | WEIGHT: 276.69 LBS | HEART RATE: 73 BPM | HEIGHT: 66 IN | SYSTOLIC BLOOD PRESSURE: 120 MMHG | TEMPERATURE: 97 F | BODY MASS INDEX: 44.47 KG/M2 | DIASTOLIC BLOOD PRESSURE: 74 MMHG

## 2025-02-18 DIAGNOSIS — E66.01 MORBID OBESITY WITH BODY MASS INDEX (BMI) OF 40.0 TO 49.9: ICD-10-CM

## 2025-02-18 DIAGNOSIS — Z00.00 ANNUAL PHYSICAL EXAM: Primary | ICD-10-CM

## 2025-02-18 PROCEDURE — 3078F DIAST BP <80 MM HG: CPT | Mod: CPTII,S$GLB,, | Performed by: FAMILY MEDICINE

## 2025-02-18 PROCEDURE — 99999 PR PBB SHADOW E&M-EST. PATIENT-LVL IV: CPT | Mod: PBBFAC,,, | Performed by: FAMILY MEDICINE

## 2025-02-18 PROCEDURE — 3044F HG A1C LEVEL LT 7.0%: CPT | Mod: CPTII,S$GLB,, | Performed by: FAMILY MEDICINE

## 2025-02-18 PROCEDURE — 99395 PREV VISIT EST AGE 18-39: CPT | Mod: S$GLB,,, | Performed by: FAMILY MEDICINE

## 2025-02-18 PROCEDURE — 3074F SYST BP LT 130 MM HG: CPT | Mod: CPTII,S$GLB,, | Performed by: FAMILY MEDICINE

## 2025-02-18 PROCEDURE — 3008F BODY MASS INDEX DOCD: CPT | Mod: CPTII,S$GLB,, | Performed by: FAMILY MEDICINE

## 2025-02-18 NOTE — PROGRESS NOTES
HISTORY OF PRESENT ILLNESS: Ms. Barrientos comes in today not fasting (drank tea) and without taking medication for annual wellness examination.     END OF LIFE DECISION: She does not have a living will and does desire life support and states family is aware of her decision.    Current Outpatient Medications   Medication Sig    ALPRAZolam (XANAX) 0.5 MG tablet Take 1 tablet (0.5 mg total) by mouth daily as needed for Anxiety.    cholecalciferol, vitamin D3, (VITAMIN D3) 50 mcg (2,000 unit) Tab Take 2,000 Units by mouth once daily.    methocarbamoL (ROBAXIN) 750 MG Tab Take 1 tablet (750 mg total) by mouth 3 (three) times daily as needed (muscle pain////SEDATING).    sertraline (ZOLOFT) 50 MG tablet Take 1 tablet (50 mg total) by mouth once daily.    sumatriptan (IMITREX) 50 MG tablet Take 1 tablet (50 mg total) by mouth daily as needed for Migraine. Take at onset of migraine. Can repeat in 2 hours, if needed. No more than 2 tablets per day OR 3 days per week.    omeprazole (PRILOSEC) 40 MG capsule Take 40 mg by mouth once daily. (Patient not taking: Reported on 2/18/2025)     SCREENINGS:       Cholesterol: February 16, 2024.     FFS/Colonoscopy: Never.     Mammogram:  Never.     GYN Exam: GYN Dr. Joselito Franco pap smear, HPV on January 29, 2025.     Dexa Scan:  Never.     Eye Exam: June 5, 2024 with Dr. Delgado. She wears contacts/glasses.  Scheduled for February 27, 2025.     PPD: Negative in the past.     Immunizations:  Td/Tdap - 2016 per patient.                              Gardasil - Never.                              Zostavax - N./A.                              Pneumovax - In the past.                              Prevnar - Never. She declines.                              Seasonal Flu - September 23, 2024.                              Covid-19 (Pfizer) vaccine - January 20, 2021, February 10, 2021, December 2, 2021.                           ROS:  GENERAL: Denies fever, chills, fatigue or unusual weight  change. Appetite normal. Does not exercises or monitors diet. Wt 124.8 kg (275 lb 2.2 oz) at February 16, 2024 visit.         SKIN: Denies itching, rash, changes in mole, color or texture of skin or easy bruising.   HEAD:  Denies headaches or recent head trauma. Saw HILARY Valle with neurology on September 18, 2024 for migraine.  EYES: Denies change in vision, pain, diplopia, redness or discharge. Wears seatbelt.  EARS: Denies ear pain, discharge, vertigo or decreased hearing.  NOSE: Denies loss of smell, epistaxis or rhinitis.  MOUTH & THROAT: Denies hoarseness or change in voice. Denies excessive gum bleeding or mouth sores.  Denies sore throat.  NODES: Denies swollen glands.  CHEST: Denies TELLEZ, wheezing, cough, or sputum production. Reports slight snoring. Saw ENT Dr. Lucio on January 22, 2024 s/p septoplasty and inferior turbinate reduction.          CARDIOVASCULAR: Denies chest pain, PND, orthopnea or reduced exercise tolerance.  Denies palpitations.  Saw Dr. Delong, cardiologist,                      March 1, 2024 for essential hypertension, morbid obesity with BMI of 45.0-49.9, adult, SAVANNA (obstructive sleep apnea), SOB (shortness of          breath) at which time she was 8 weeks gestation of pregnancy.        ABDOMEN: Denies diarrhea, constipation, nausea, vomiting, abdominal pain, or blood in stool.          URINARY: Denies flank pain, dysuria or hematuria.   GENITOURINARY: Denies flank pain, dysuria, frequency or hematuria.  Reports continues with menstrual irregularity. Does not perform monthly breast self examinations.    ENDOCRINE: Denies thyroid, diabetes or cholesterol problems.  HEME/LYMPH: Denies bleeding problems.  PERIPHERAL VASCULAR:Denies claudication or cyanosis.  MUSCULOSKELETAL: Denies joint stiffness, pain or swelling. Denies edema. Reports right hip and back pain daily. Reports will see physical therapy and/or spine department soon for evaluation. Reports occasionally takes Robaxin with  "help. Saw Dr. Sandoval, rheumatologist, on January 11, 2023 for enthesitis related arthritis, ESDRAS positive, family history of Sjogren's disease, fibromyalgia, vitamin D deficiency.  NEUROLOGIC: Denies history of seizures, tremors, paralysis, alteration of gait or coordination.  PSYCHIATRIC: Denies mood swings, homicidal or suicidal thoughts. Denies acute steep problems. Reports follows with psychiatry Dr. Sanchez for anxiety, depression with last visit on December 10, 2024. Saw Dr. Bartlett, psychologist, on February 17, 2025 for monthly therapy.    PE:   VS: /74   Pulse 73   Temp 96.8 °F (36 °C) (Tympanic)   Ht 5' 6" (1.676 m)   Wt 125.5 kg (276 lb 10.8 oz)   LMP 02/13/2025   SpO2 99%   BMI 44.66 kg/m²   APPEARANCE:  Well nourished, well developed female, pleasant and obese, alert and oriented in no acute distress.    HEAD: Nontender. Full range of motion.  EYES: PERRL, conjunctiva pink, lids no edema.   EARS: External canal patent, no swelling or redness. TM's shiny and clear.  NOSE: Mucosa and turbinates pink, not swollen. No discharge. Nontender sinuses.  THROAT: No pharyngeal erythema or exudate. No stridor.   NECK: Supple, no mass, thyroid not enlarged.  NODES: No cervical lymph node enlargement.  CHEST: Normal respiratory effort. Lungs clear to auscultation.  CARDIOVASCULAR: Normal S1, S2. No rubs, murmurs or gallops. PMI not displaced. No carotid bruit. Pedal pulses palpable bilaterally. No edema.  ABDOMEN: Bowel sounds present. Not distended. Soft. No tenderness, masses or organomegaly.  BREAST EXAM: Not examined.  PELVIC EXAM: Not examined.  RECTAL EXAM: Not examined.  MUSCULOSKELETAL: No joint deformities or stiffness. She is ambulatory without problems.  SKIN: No rashes or suspicious lesions, normal color and turgor.  NEUROLOGIC:   Cranial Nerves: II-XII grossly intact.   DTR's: Knees, Ankles 2+ and equal bilaterally. Gait & Posture: Normal gait and fine motion.  PSYCHIATRIC:Patient alert, " oriented x 3. Mood/Affect normal without acute anxiety or depression noted. Judgment/insight good as she makes appropriate decisions during today's exam.        ASSESSMENT:    ICD-10-CM ICD-9-CM    1. Annual physical exam  Z00.00 V70.0 Vitamin D      Lipid Panel      Comprehensive Metabolic Panel      CBC Auto Differential      TSH      Hemoglobin A1C      2. Morbid obesity with body mass index (BMI) of 40.0 to 49.9 - not currently managed with diet or exercise E66.01 278.01         PLAN:  1. Age-appropriate counseling-appropriate low-sodium, low-cholesterol, low carbohydrate diet and exercise daily, monthly breast self exam, annual wellness examination.   2. Patient advised to call for results.  3. Continue current medications.  4. Keep follow up with specialists.  5. Follow up in about 1 year (around 2/18/2026) for physical.

## 2025-02-19 NOTE — PROGRESS NOTES
Cancer [place] - [facility] Psych  Psychology  Progress Note  Individual Psychotherapy (PhD/LCSW)    Patient Name: Anthony Sexton  MRN: [id number]    Patient Class: OP- Hospital Outpatient Clinic  Primary Care Provider: Tyesha Leggett MD    Psychiatry Initial Visit (PhD/LCSW)  Individual Psychotherapy - CPT 45616  EAP #5 for 2024    Date: 2/17/2025    Site: [place]    The patient location is: Patient's home/ Patient reported that his/her location at the time of this visit was in the [facility]     Visit type: Virtual visit with synchronous audio and video     Each patient to whom he or she provides medical services by telemedicine is: (1) informed of the relationship between the physician and patient and the respective role of any other health care provider with respect to management of the patient; and (2) notified that he or she may decline to receive medical services by telemedicine and may withdraw from such care at any time.     Referral source: Lenora Walters MD    Clinical status of patient: Outpatient    Anthony Sexton, a 34 y.o. female, for initial evaluation visit.  Met with patient.    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     Substance  use:   Alcohol: none   Drugs: none   Tobacco: none   Caffeine: none    Current medications and drug reactions (include OTC, herbal): see medication list     Strengths and liabilities: Strength: Patient accepts guidance/feedback, Strength: Patient is expressive/articulate., Strength: Patient is intelligent., Strength: Patient is motivated for change., Strength: Patient has positive support network., Strength: Patient has reasonable judgment., Strength: Patient is stable., Liability: Patient lacks coping skills.    Current Evaluation:     Mental Status Exam:  General Appearance:  unremarkable, age appropriate   Speech: normal tone, normal rate, normal pitch, normal volume      Level of Cooperation: cooperative      Thought Processes: normal and logical   Mood: anxious, sad      Thought Content: normal, no suicidality, no homicidality, delusions, or paranoia   Affect: congruent and appropriate   Orientation: Oriented x3   Memory: recent >  intact   Attention Span & Concentration: intact   Fund of General Knowledge: intact and appropriate to age and level of education   Abstract Reasoning: not assessed   Judgment & Insight: fair     Language  intact     Summary: The patient feels exhausted by the demands of securing accommodations for her daughter at a school that seems to be resistant to honoring the child's exceptionalities. Supportive therapy was utilized and the patient's feelings were validated. Coping strategies to help the patient manage the stress that she is experiencing was discussed.   Diagnostic Impression - Plan:       ICD-10-CM ICD-9-CM   1. Undiagnosed disease or syndrome present  R69 799.9                     Plan:individual psychotherapy    Return to Clinic: as scheduled    Length of Service (minutes): 30          Barbra Bartlett, PhD  Psychologist  [facility]

## 2025-02-21 ENCOUNTER — CLINICAL SUPPORT (OUTPATIENT)
Dept: BARIATRICS | Facility: CLINIC | Age: 35
End: 2025-02-21
Payer: COMMERCIAL

## 2025-02-21 VITALS — WEIGHT: 276 LBS | BODY MASS INDEX: 44.55 KG/M2

## 2025-02-21 DIAGNOSIS — M79.7 FIBROMYALGIA: ICD-10-CM

## 2025-02-21 DIAGNOSIS — K21.9 GASTROESOPHAGEAL REFLUX DISEASE WITHOUT ESOPHAGITIS: ICD-10-CM

## 2025-02-21 DIAGNOSIS — E66.01 MORBID OBESITY WITH BMI OF 40.0-44.9, ADULT: ICD-10-CM

## 2025-02-21 DIAGNOSIS — G47.33 OSA (OBSTRUCTIVE SLEEP APNEA): ICD-10-CM

## 2025-02-21 DIAGNOSIS — I10 ESSENTIAL HYPERTENSION: ICD-10-CM

## 2025-02-21 DIAGNOSIS — Z71.3 DIETARY COUNSELING: Primary | ICD-10-CM

## 2025-02-21 DIAGNOSIS — K76.0 FATTY LIVER: Primary | ICD-10-CM

## 2025-02-21 DIAGNOSIS — E66.01 MORBID OBESITY WITH BMI OF 45.0-49.9, ADULT: ICD-10-CM

## 2025-02-21 PROCEDURE — 97802 MEDICAL NUTRITION INDIV IN: CPT | Mod: 95,,, | Performed by: DIETITIAN, REGISTERED

## 2025-02-21 NOTE — PROGRESS NOTES
The patient location is: home ([place])  The chief complaint leading to consultation is: morbid obesity    Visit type: audiovisual    Face to Face time with patient: 60 min  60 minutes of total time spent on the encounter, which includes face to face time and non-face to face time preparing to see the patient (eg, review of tests), Obtaining and/or reviewing separately obtained history, Documenting clinical information in the electronic or other health record, Independently interpreting results (not separately reported) and communicating results to the patient/family/caregiver, or Care coordination (not separately reported).         Each patient to whom he or she provides medical services by telemedicine is:  (1) informed of the relationship between the physician and patient and the respective role of any other health care provider with respect to management of the patient; and (2) notified that he or she may decline to receive medical services by telemedicine and may withdraw from such care at any time.      NUTRITIONAL CONSULT    Referring Physician: Ward Meraz M.D.   Reason for MNT Referral: Initial assessment for bariatric surgery work-up    PAST MEDICAL HISTORY:   34 y.o. female    Weight history includes She has struggled with excess weight since childhood.  Her highest adult weight was 276 lbs at age 34, and her lowest adult weight was 211 lbs at age 16.  The patient has tried calorie counting, Keto, ideal protein, phentermine (Adipex-P), and GLP 1.  The patient was most successful with Keto with a weight loss of 20 lbs, but did not feel it was realistic food choices cutting out the starchy CHO.     Past Medical History:   Diagnosis Date    Abnormal Pap smear of cervix     Anemia     Antepartum multigravida of advanced maternal age 03/06/2024    Anxiety     Chronic low back pain     Depression     Enthesitis related arthritis 01/11/2023    Fatty liver 2/21/2025    Fibromyalgia     GERD (gastroesophageal  reflux disease)     History of abnormal cervical Pap smear     History of recurrent UTIs     History of ulcer disease     Incomplete  2024    Irregular menses     Obesity     SAVANNA (obstructive sleep apnea) 2023    Postpartum depression     with last pregnancy.     Pre-eclampsia in postpartum period 2020    Vitamin D deficiency      CLINICAL DATA:  34 y.o.-year-old Black or  female.  Height: 5 ft. 6 in.  Weight: 276 lbs  IBW: 144 lbs  BMI: 44.5  The patient's goal weight (50% EBW): 210 lbs    Goal for Bariatric Surgery: to improve health, to improve quality of life, to lose weight, and to prevent future medical conditions    DAILY NUTRITIONAL NEEDS: pre-op nutritional bariatric guidelines to promote weight loss  [id number] Calories    Grams Protein    NUTRITION & HEALTH HISTORY:  Greatest challenge: sweets, starchy CHO, portion control, snacking at night, irregular meal patterns, sugar-sweetened beverages, ultra-processed foods, and mindless eating/grazing    Current diet recall:     While at work: snacks on granola bar, crackers, chips, donuts, rg cake  Homemade or PJ's: decaf caramel almond milk latte  Few sips of water  D 5:30pm: family  Snacking: sweets mostly (aida cakes, cookies, ice cream)  Cup of hot tea with honey and stevia    Current Diet:  Meal pattern: 1 meal + snacking  Protein supplements: none  Snacking: junk foods and sweets  Vegetables: Likes a variety. Eats 2-3 times per week.  Fruits: Likes a variety. Eats 2-3 times per week.  Beverages: soda and coffee with sugar, hot tea with honey and Stevia  Dining out/delivery: Weekly. Mostly fast food, restaurants, and take-out.  Cooking at home: Weekly cooking on Sun/Mon. Mostly red beans and rice, spaghetti and meat sauce, [place] coconut chicken over rice, taco bowls with rice    Exercise:  Past exercise: off and on    Current exercise:   30 min walk on 2 days/week at work    Restrictions to Exercise: None.  "Time management with work and family.    Vitamins / Minerals / Herbs:   Gummies: MV, Probiotic, Prebiotics, Vit D3    Labs:   None available at time of visit    Food Allergies:   None known  Lactose intolerance    Social:  Works regular daytime shifts. 2 days/week in office.  Lives with  and 2 daughters (8 and 4).  Grocery shopping and food prep done by the pt.  Patient believes the household will be supportive after surgery.  Alcohol: None.  Smoking: None.    ASSESSMENT:  Patient reports attempts at weight loss, only to regain lost weight.  Patient demonstrated knowledge of healthy eating behaviors and exercise patterns; admits to not eating healthy and not exercising at this point.  Patient states willingness to change lifestyle and make behavior modifications.    Barriers to Education: none    Stage of change: determination    NUTRITION DIAGNOSIS:    Morbid Obesity related to Excessive carbohydrate intake, Excessive calorie intake, and Physical inactivity as evidence by BMI.    BARIATRIC DIET DISCUSSION/PLAN:  Discussed diet after surgery and related to patient's food record.  Reviewed nutrition guidelines for before and after surgery.  Answered all questions.  Continue to review Bariatric Nutrition Guidebook at home and call with any questions.  Work on Bariatric Nutrition Checklist.  Work on expanding variety of vegetables.  Work on gradually cutting back on starchy CHO in the diet.  Begin trying various protein supplements to determine preference  1200-calorie diet.  1500-calorie diet.  5-6 meals per day  Work on cutting out sugar-sweetened beverages  Work on cutting out "junk foods"    RECOMMENDATIONS:  Pt is a potential candidate for bariatric surgery and needs additional medically supervised diet counseling and surveillance  Follow up in one month.     Patient verbalized understanding.    Communicated nutrition plan with bariatric team.    SESSION TIME:  60 minutes      "

## 2025-02-21 NOTE — PATIENT INSTRUCTIONS
Prior to surgery you will need to complete:  - Dietitian consult and follow up appointments as needed  - Labs  - Chest X-ray  - EKG  - Psychological evaluation, Please call psychiatry [phone] to schedule  - Fl UGI     In preparation for bariatric surgery, please complete the following:   Discuss your current medications with your primary care provider, remember your medications will need to be crushed, chewable, or in liquid form for the first 2-4 weeks after a gastric bypass or sleeve.  For a gastric band, your medications will need to be crushed indefinitely.    If you take a blood thinner such as: Coumadin (warfarin), Pradaxa (dabigatran), or Plavix (clopidogrel), you will need to speak with your prescribing provider on how or if this medication can be stopped before surgery.   If you take a medication for depression or anxiety, remember to discuss this with the psychologist or psychiatrist that you see.   If you take medication for arthritis on a daily basis that is considered a non-steroidal anti-inflammatory (NSAID), please discuss with your prescribing physician an alternative medication.  After having gastric bypass or gastric sleeve, this group of medications is not appropriate to take due to increased risk of bleeding stomach ulcers.      DEFINITIONS  Appointments: Pre-scheduled meetings or consultations with any physician, advanced practice provider, dietitian, or psychologist, and labs, imaging studies, sleep studies, etc.   Late cancellation: Cancelling an appointment 24-48 hours prior to scheduled time.  No-Show appointment:  is when   You do NOT arrive to your appointment at the time its scheduled.  You call to cancel or cancel via [facility] less than 24 hours in advance of your scheduled appointment  You show up 15 minutes AFTER your scheduled appointment time without any notification of being late.     POLICY  You are allowed up to 3 cancellations for appointments.   After 3 cancellations  your case will be placed on hold for 2 months and after that time you can resume the program.   You are allowed only 1 no-show for an appointment.   You will be re-scheduled one time and if there is a 2nd no-show at any point, your case will be placed on hold for 3 months.  After 3 months you can resume the program.     Upon resuming the program after being placed on hold for either above mentioned reasons, if you have a late cancel or no show for any appointment, the bariatric team will review if youre an appropriate candidate for surgery at the monthly meeting.       Surgeons:   Dr. ALICIA Underwood

## 2025-02-21 NOTE — PROGRESS NOTES
The patient location is: [place]  The chief complaint leading to consultation is: New Consult    Visit type: audiovisual    Face to Face time with patient: 45  60 minutes of total time spent on the encounter, which includes face to face time and non-face to face time preparing to see the patient (eg, review of tests), Obtaining and/or reviewing separately obtained history, Documenting clinical information in the electronic or other health record, Independently interpreting results (not separately reported) and communicating results to the patient/family/caregiver, or Care coordination (not separately reported).     Each patient to whom he or she provides medical services by telemedicine is:  (1) informed of the relationship between the physician and patient and the respective role of any other health care provider with respect to management of the patient; and (2) notified that he or she may decline to receive medical services by telemedicine and may withdraw from such care at any time.    Notes:     BARIATRIC NEW PATIENT EVALUATION    CHIEF COMPLAINT:   Morbid obesity, body mass index is 44.55 kg/m². and inability to lose weight.    HPI:  Anthony Sexton is a 34 y.o. morbidly obese female. Her current body mass index is 44.55 kg/m². She has multiple associated comorbidities including essential hypertension, SAVANNA on CPAP, GERD, and fatty liver disease.  She has struggled with excess weight since childhood.  Her highest adult weight was 276 lbs at age 34 , and her lowest adult weight was 211 lbs at age 16.  The patient has tried calorie counting, Keto, ideal protein, phentermine (Adipex-P), and GLP 1.  The patient was most successful with Keto  with a weight loss of 20 lbs.  Her current exercise includes walking 2 times a week. She denies any history of eating disorder such as anorexia, bulimia, or taking laxatives for weight loss, and denies any addiction including illicit substances, alcohol, or gambling.  Patient states  she has a good  support system.  She lives with family.  She is currently employed   .  She  endorses a 10 year history of GERD. But has resolved maybe linked pregnancy. The patient's goal is   lbs.    ESS: Score of 10, reviewed 2025.  Does not need Sleep Study.    Pre op weight-276  IBW-144    U/S Abd Impression: 24  1.  Fatty infiltration of the liver.  2.  Otherwise, normal study.    HIDA 24   Impression:The cystic and common bile ducts are patent.  The gallbladder ejection fraction is 40% at 30 minutes minutes.  This is within normal limits.    PAST MEDICAL HISTORY:  Past Medical History:   Diagnosis Date    Abnormal Pap smear of cervix     Anemia     Antepartum multigravida of advanced maternal age 2024    Anxiety     Chronic low back pain     Depression     Enthesitis related arthritis 2023    Fatty liver 2025    Fibromyalgia     GERD (gastroesophageal reflux disease)     History of abnormal cervical Pap smear     History of recurrent UTIs     History of ulcer disease     Incomplete  2024    Irregular menses     Obesity     SAVANNA (obstructive sleep apnea) 2023    Postpartum depression     with last pregnancy.     Pre-eclampsia in postpartum period 2020    Vitamin D deficiency        PAST SURGICAL HISTORY:  Past Surgical History:   Procedure Laterality Date    DILATION AND CURETTAGE OF UTERUS USING SUCTION N/A 3/28/2024    Procedure: DILATION AND CURETTAGE, UTERUS, USING SUCTION;  Surgeon: Kirsten Siu MD;  Location: [facility] OR;  Service: OB/GYN;  Laterality: N/A;    ESOPHAGOGASTRODUODENOSCOPY N/A 2021    Procedure: EGD (ESOPHAGOGASTRODUODENOSCOPY);  Surgeon: Alka Olmstead MD;  Location: [facility] ENDO;  Service: Endoscopy;  Laterality: N/A;    ESOPHAGOGASTRODUODENOSCOPY N/A 10/04/2021    Procedure: EGD (ESOPHAGOGASTRODUODENOSCOPY);  Surgeon: Alka Olmstead MD;  [facility]: [facility];  Service: Endoscopy;  Laterality: N/A;     ESOPHAGOGASTRODUODENOSCOPY N/A 05/29/2023    Procedure: EGD (ESOPHAGOGASTRODUODENOSCOPY);  Surgeon: Alka Olmstead MD;  Location: [facility];  Service: Endoscopy;  Laterality: N/A;    PH MONITORING, ESOPHAGUS, WIRELESS, (OFF REFLUX MEDS) N/A 05/29/2023    Procedure: PH MONITORING, ESOPHAGUS, WIRELESS, (OFF REFLUX MEDS);  Surgeon: Alka Olmstead MD;  Location: [facility] ENDO;  Service: Endoscopy;  Laterality: N/A;    REDUCTION OF BOTH BREASTS Bilateral 05/27/2021    Procedure: MAMMOPLASTY, REDUCTION, BILATERAL;  Surgeon: Daniel Ba MD;  Location: [facility] OR [address] [place] [address];  Service: Plastics;  Laterality: Bilateral;  Left breast tissue: 1584  Right breast tissue: 1438    SEPTOPLASTY, NOSE, WITH NASAL TURBINATE REDUCTION Bilateral 01/17/2024    Procedure: SEPTOPLASTY, NOSE, WITH NASAL TURBINATE REDUCTION;  Surgeon: Corey Lucio MD;  Location: [facility] OR;  Service: ENT;  Laterality: Bilateral;       FAMILY HISTORY:  Family History   Problem Relation Name Age of Onset    Hypertension Maternal Grandmother      Diabetes Maternal Grandmother      No Known Problems Maternal Grandfather      Hypertension Father      Hyperlipidemia Father      Heart disease Father  55        MI/CAD    Diabetes Mother      Asthma Mother      Hyperlipidemia Mother      Hypertension Mother      Sjogren's syndrome Mother      Fibromyalgia Mother      No Known Problems Brother      No Known Problems Sister      Insulin resistance Sister      No Known Problems Daughter      Diabetes Maternal Aunt      Breast cancer Maternal Aunt      Stroke Neg Hx      Brain cancer Neg Hx      Migraines Neg Hx      Colon cancer Neg Hx      Ovarian cancer Neg Hx      Anesthesia problems Neg Hx      Thrombosis Neg Hx          SOCIAL HISTORY:  Social History     Socioeconomic History    Marital status:     Number of children: 0   Occupational History    Occupation: RN     Comment: [employer]   Tobacco Use    Smoking status: Never     Passive  exposure: Never    Smokeless tobacco: Never   Substance and Sexual Activity    Alcohol use: Not Currently     Alcohol/week: 0.0 standard drinks of alcohol    Drug use: No    Sexual activity: Yes     Partners: Male     Comment: Not interested in having further kids   Social History Narrative    She wears seatbelt.      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    2 kids.      Social Drivers of Health     Financial Resource Strain: Low Risk  (2/17/2025)    Overall Financial Resource Strain (CARDIA)     Difficulty of Paying Living Expenses: Not hard at all   Food Insecurity: No Food Insecurity (2/17/2025)    Hunger Vital Sign     Worried About Running Out of Food in the Last Year: Never true     Ran Out of Food in the Last Year: Never true   Transportation Needs: No Transportation Needs (2/17/2025)    PRAPARE - Transportation     Lack of Transportation (Medical): No     Lack of Transportation (Non-Medical): No   Physical Activity: Insufficiently Active (2/17/2025)    Exercise Vital Sign     Days of Exercise per Week: 2 days     Minutes of Exercise per Session: 10 min   Stress: Stress Concern Present (2/17/2025)    [employer] [employer] - Occupational Stress Questionnaire     Feeling of Stress : To some extent   Housing Stability: Low Risk  (2/17/2025)    Housing Stability Vital Sign     Unable to Pay for Housing in the Last Year: No     Number of Times Moved in the Last Year: 0     Homeless in the Last Year: No       MEDICATIONS:  Current Medications[1]    ALLERGIES:  Review of patient's allergies indicates:   Allergen Reactions    Procardia [nifedipine]        Review of Systems   Constitutional:  Negative for chills and fever.   HENT:  Negative for ear pain, nosebleeds and sore throat.    Eyes:  Negative for blurred vision and double vision.   Respiratory:  Negative for cough and shortness of breath.    Cardiovascular:  Negative for chest pain, palpitations,  orthopnea, claudication and leg swelling.   Gastrointestinal:  Negative for abdominal pain, constipation, diarrhea, heartburn, nausea and vomiting.   Genitourinary:  Negative for dysuria and urgency.   Musculoskeletal:  Positive for back pain and joint pain.   Skin:  Negative for rash.   Neurological:  Negative for dizziness, tingling, focal weakness and headaches.   Endo/Heme/Allergies:  Does not bruise/bleed easily.   Psychiatric/Behavioral:  Negative for depression and suicidal ideas.        Vitals:    02/21/25 1052   Weight: 125.2 kg (276 lb)   PainSc: 0-No pain         DIAGNOSIS:  1. Morbid obesity, body mass index is 44.55 kg/m². and inability to lose weight.  2. Co-morbidities: essential hypertension, SAVANNA on CPAP, GERD, and fatty liver disease    PLAN:  The patient is a good candidate for Bariatric Surgery. The patient is interested in undecided with undecided. The surgery and post-op care was discussed in detail with the patient. All questions were answered.    The patient understands that bariatric surgery is a tool to aid in weight loss and that they need to be committed to the diet and exercise post-operatively for successful weight loss. Discussed with patient that bariatric surgery is not the easy way out and that it will take plenty of dedication on the patient's part to be successful. Also discussed the possibility of weight regain if the patient strays from the diet guidelines or exercise requirements. Patient verbalized understanding and wishes to proceed with the work-up.    Estimated Goal weight is 210 lbs.    Discussed hyper fertility and no NSAIDS post op     ORDERS:  1. Chest X-Ray and EKG, [place] UGI   2. Psychological Consult and Bariatric Dietician Consult  3. Bariatric Labs: Per orders.      PCP: Tyesha Leggett MD  RTC: As scheduled.       [1]   Current Outpatient Medications:     ALPRAZolam (XANAX) 0.5 MG tablet, Take 1 tablet (0.5 mg total) by mouth daily as needed for Anxiety., Disp: 30  tablet, Rfl: 1    cholecalciferol, vitamin D3, (VITAMIN D3) 50 mcg (2,000 unit) Tab, Take 2,000 Units by mouth once daily., Disp: , Rfl:     methocarbamoL (ROBAXIN) 750 MG Tab, Take 1 tablet (750 mg total) by mouth 3 (three) times daily as needed (muscle pain////SEDATING)., Disp: 30 tablet, Rfl: 0    omeprazole (PRILOSEC) 40 MG capsule, Take 40 mg by mouth once daily. (Patient not taking: Reported on 2/18/2025), Disp: , Rfl:     sertraline (ZOLOFT) 50 MG tablet, Take 1 tablet (50 mg total) by mouth once daily., Disp: 42 tablet, Rfl: 0    sumatriptan (IMITREX) 50 MG tablet, Take 1 tablet (50 mg total) by mouth daily as needed for Migraine. Take at onset of migraine. Can repeat in 2 hours, if needed. No more than 2 tablets per day OR 3 days per week., Disp: 9 tablet, Rfl: 0    Current Facility-Administered Medications:     hpv vaccine,9-kathryn (GARDASIL 9) vaccine 0.5 mL, 0.5 mL, Intramuscular, 1 time in Clinic/HOD, Joselito Franco MD

## 2025-02-24 ENCOUNTER — PATIENT MESSAGE (OUTPATIENT)
Dept: INTERNAL MEDICINE | Facility: CLINIC | Age: 35
End: 2025-02-24

## 2025-02-24 ENCOUNTER — TELEPHONE (OUTPATIENT)
Dept: INTERNAL MEDICINE | Facility: CLINIC | Age: 35
End: 2025-02-24
Payer: COMMERCIAL

## 2025-02-24 DIAGNOSIS — E66.01 OBESITY, CLASS III, BMI 40-49.9 (MORBID OBESITY): Primary | ICD-10-CM

## 2025-02-24 PROCEDURE — 99499 UNLISTED E&M SERVICE: CPT | Mod: S$GLB,,,

## 2025-02-24 RX ORDER — SEMAGLUTIDE 1 MG/.5ML
1 INJECTION, SOLUTION SUBCUTANEOUS
Qty: 2 ML | Refills: 0 | Status: SHIPPED | OUTPATIENT
Start: 2025-02-24

## 2025-02-24 RX ORDER — SEMAGLUTIDE 0.5 MG/.5ML
0.5 INJECTION, SOLUTION SUBCUTANEOUS
Qty: 2 ML | Refills: 0 | Status: SHIPPED | OUTPATIENT
Start: 2025-02-24

## 2025-02-24 RX ORDER — SEMAGLUTIDE 0.25 MG/.5ML
0.25 INJECTION, SOLUTION SUBCUTANEOUS
Qty: 2 ML | Refills: 0 | Status: SHIPPED | OUTPATIENT
Start: 2025-02-24

## 2025-02-24 NOTE — TELEPHONE ENCOUNTER
Patient ID: Anthony Sexton is a 34 y.o. Black or  female    Subjective  Chief Complaint: patient presents for medical weight loss management.    Contraindications to GLP-1 receptor agonist therapy:   Denies personal or family history of MTC and personal history of MEN2     Pregnancy Status:   - Pt denies current pregnancy, breastfeeding, or plans to become pregnant.  - Pt denies current use of oral hormonal contraception.     Co-morbidities: HTN, NAFLD, SAVANNA    History of weight loss therapy: Pt previously tired Ozempic last year for about 2 weeks but discontinued due to pregnancy.     Weight loss history:  Starting weight:    2/4/2025   Recent Readings    Weight (lbs) 277 lb    BMI 46.09 BMI      Objective  Lab Results   Component Value Date     08/31/2024     08/16/2024     (L) 03/28/2024     Lab Results   Component Value Date    K 3.6 08/31/2024    K 4.1 08/16/2024    K 4.1 03/28/2024     Lab Results   Component Value Date     08/31/2024     08/16/2024     03/28/2024     Lab Results   Component Value Date    CO2 22 (L) 08/31/2024    CO2 22 (L) 08/16/2024    CO2 18 (L) 03/28/2024     Lab Results   Component Value Date    BUN 10 08/31/2024    BUN 12 08/16/2024    BUN 8 03/28/2024     Lab Results   Component Value Date    GLU 93 08/31/2024    GLU 90 08/16/2024    GLU 83 03/28/2024     Lab Results   Component Value Date    CALCIUM 9.9 08/31/2024    CALCIUM 9.5 08/16/2024    CALCIUM 9.4 03/28/2024     Lab Results   Component Value Date    PROT 8.5 (H) 08/31/2024    PROT 7.8 08/16/2024    PROT 8.0 03/28/2024     Lab Results   Component Value Date    ALBUMIN 3.8 08/31/2024    ALBUMIN 3.5 08/16/2024    ALBUMIN 3.6 03/28/2024     Lab Results   Component Value Date    BILITOT 0.3 08/31/2024    BILITOT 0.2 08/16/2024    BILITOT 0.5 03/28/2024     Lab Results   Component Value Date    AST 17 08/31/2024    AST 17 08/16/2024    AST 24 03/28/2024     Lab Results   Component  Value Date    ALT 18 08/31/2024    ALT 16 08/16/2024    ALT 35 03/28/2024     Lab Results   Component Value Date    ANIONGAP 11 08/31/2024    ANIONGAP 10 08/16/2024    ANIONGAP 11 03/28/2024     Lab Results   Component Value Date    CREATININE 0.8 08/31/2024    CREATININE 0.8 08/16/2024    CREATININE 0.8 03/28/2024     Lab Results   Component Value Date    EGFRNORACEVR >60 08/31/2024    EGFRNORACEVR >60.0 08/16/2024    EGFRNORACEVR >60 03/28/2024     Assessment/Plan  -Pt qualifies for GLP-1 RA therapy based on BMI greater than or equal to 30 kg/m2  - Initiate Wegovy 0.25 mg once weekly for 1 month  - Then increase to Wegovy 0.5 mg once weekly for 1 month  - Then increase to Wegovy 1 mg once weekly  - RTC in 3 months for follow-up evaluation       Patient consented to pharmacist management via collaborative practice.

## 2025-02-25 ENCOUNTER — LAB VISIT (OUTPATIENT)
Dept: LAB | Facility: HOSPITAL | Age: 35
End: 2025-02-25
Attending: FAMILY MEDICINE
Payer: COMMERCIAL

## 2025-02-25 DIAGNOSIS — Z00.00 ANNUAL PHYSICAL EXAM: ICD-10-CM

## 2025-02-25 LAB
25(OH)D3+25(OH)D2 SERPL-MCNC: 13 NG/ML (ref 30–96)
ALBUMIN SERPL BCP-MCNC: 3.4 G/DL (ref 3.5–5.2)
ALP SERPL-CCNC: 103 U/L (ref 40–150)
ALT SERPL W/O P-5'-P-CCNC: 18 U/L (ref 10–44)
ANION GAP SERPL CALC-SCNC: 7 MMOL/L (ref 8–16)
AST SERPL-CCNC: 52 U/L (ref 10–40)
BASOPHILS # BLD AUTO: 0.06 K/UL (ref 0–0.2)
BASOPHILS NFR BLD: 0.8 % (ref 0–1.9)
BILIRUB SERPL-MCNC: 0.4 MG/DL (ref 0.1–1)
BUN SERPL-MCNC: 9 MG/DL (ref 6–20)
CALCIUM SERPL-MCNC: 8.9 MG/DL (ref 8.7–10.5)
CHLORIDE SERPL-SCNC: 106 MMOL/L (ref 95–110)
CHOLEST SERPL-MCNC: 186 MG/DL (ref 120–199)
CHOLEST/HDLC SERPL: 5 {RATIO} (ref 2–5)
CO2 SERPL-SCNC: 25 MMOL/L (ref 23–29)
CREAT SERPL-MCNC: 0.8 MG/DL (ref 0.5–1.4)
DIFFERENTIAL METHOD BLD: ABNORMAL
EOSINOPHIL # BLD AUTO: 0.1 K/UL (ref 0–0.5)
EOSINOPHIL NFR BLD: 0.9 % (ref 0–8)
ERYTHROCYTE [DISTWIDTH] IN BLOOD BY AUTOMATED COUNT: 14.2 % (ref 11.5–14.5)
EST. GFR  (NO RACE VARIABLE): >60 ML/MIN/1.73 M^2
ESTIMATED AVG GLUCOSE: 114 MG/DL (ref 68–131)
GLUCOSE SERPL-MCNC: 85 MG/DL (ref 70–110)
HBA1C MFR BLD: 5.6 % (ref 4–5.6)
HCT VFR BLD AUTO: 41.4 % (ref 37–48.5)
HDLC SERPL-MCNC: 37 MG/DL (ref 40–75)
HDLC SERPL: 19.9 % (ref 20–50)
HGB BLD-MCNC: 11.8 G/DL (ref 12–16)
IMM GRANULOCYTES # BLD AUTO: 0.03 K/UL (ref 0–0.04)
IMM GRANULOCYTES NFR BLD AUTO: 0.4 % (ref 0–0.5)
LDLC SERPL CALC-MCNC: 135.4 MG/DL (ref 63–159)
LYMPHOCYTES # BLD AUTO: 2.2 K/UL (ref 1–4.8)
LYMPHOCYTES NFR BLD: 28.9 % (ref 18–48)
MCH RBC QN AUTO: 23.2 PG (ref 27–31)
MCHC RBC AUTO-ENTMCNC: 28.5 G/DL (ref 32–36)
MCV RBC AUTO: 81 FL (ref 82–98)
MONOCYTES # BLD AUTO: 0.5 K/UL (ref 0.3–1)
MONOCYTES NFR BLD: 6.4 % (ref 4–15)
NEUTROPHILS # BLD AUTO: 4.7 K/UL (ref 1.8–7.7)
NEUTROPHILS NFR BLD: 62.6 % (ref 38–73)
NONHDLC SERPL-MCNC: 149 MG/DL
NRBC BLD-RTO: 0 /100 WBC
PLATELET # BLD AUTO: 313 K/UL (ref 150–450)
PMV BLD AUTO: 11 FL (ref 9.2–12.9)
POTASSIUM SERPL-SCNC: 4.2 MMOL/L (ref 3.5–5.1)
PROT SERPL-MCNC: 7.8 G/DL (ref 6–8.4)
RBC # BLD AUTO: 5.09 M/UL (ref 4–5.4)
SODIUM SERPL-SCNC: 138 MMOL/L (ref 136–145)
TRIGL SERPL-MCNC: 68 MG/DL (ref 30–150)
TSH SERPL DL<=0.005 MIU/L-ACNC: 0.75 UIU/ML (ref 0.4–4)
WBC # BLD AUTO: 7.55 K/UL (ref 3.9–12.7)

## 2025-02-25 PROCEDURE — 80061 LIPID PANEL: CPT | Performed by: FAMILY MEDICINE

## 2025-02-25 PROCEDURE — 80053 COMPREHEN METABOLIC PANEL: CPT | Performed by: FAMILY MEDICINE

## 2025-02-25 PROCEDURE — 85025 COMPLETE CBC W/AUTO DIFF WBC: CPT | Performed by: FAMILY MEDICINE

## 2025-02-25 PROCEDURE — 82306 VITAMIN D 25 HYDROXY: CPT | Performed by: FAMILY MEDICINE

## 2025-02-25 PROCEDURE — 83036 HEMOGLOBIN GLYCOSYLATED A1C: CPT | Performed by: FAMILY MEDICINE

## 2025-02-25 PROCEDURE — 84443 ASSAY THYROID STIM HORMONE: CPT | Performed by: FAMILY MEDICINE

## 2025-02-26 ENCOUNTER — RESULTS FOLLOW-UP (OUTPATIENT)
Dept: FAMILY MEDICINE | Facility: CLINIC | Age: 35
End: 2025-02-26

## 2025-02-26 ENCOUNTER — PATIENT MESSAGE (OUTPATIENT)
Dept: PSYCHIATRY | Facility: CLINIC | Age: 35
End: 2025-02-26
Payer: COMMERCIAL

## 2025-02-26 DIAGNOSIS — R74.01 ELEVATED AST (SGOT): Primary | ICD-10-CM

## 2025-02-27 ENCOUNTER — OFFICE VISIT (OUTPATIENT)
Dept: OPHTHALMOLOGY | Facility: CLINIC | Age: 35
End: 2025-02-27
Payer: COMMERCIAL

## 2025-02-27 DIAGNOSIS — H52.13 MYOPIA OF BOTH EYES WITH ASTIGMATISM: Primary | ICD-10-CM

## 2025-02-27 DIAGNOSIS — H52.203 MYOPIA OF BOTH EYES WITH ASTIGMATISM: Primary | ICD-10-CM

## 2025-02-27 PROCEDURE — 92014 COMPRE OPH EXAM EST PT 1/>: CPT | Mod: ,,, | Performed by: OPTOMETRIST

## 2025-02-27 PROCEDURE — 92015 DETERMINE REFRACTIVE STATE: CPT | Mod: ,,, | Performed by: OPTOMETRIST

## 2025-02-27 PROCEDURE — 99999 PR PBB SHADOW E&M-EST. PATIENT-LVL III: CPT | Mod: PBBFAC,,, | Performed by: OPTOMETRIST

## 2025-02-27 NOTE — PROGRESS NOTES
HPI     Annual Exam            Comments: RTC 1 yr for dilated eye exam and gOCT  Vision changes since last eye exam?: OS has changed    Any eye pain today: no    Other ocular symptoms: floaters    Interested in contact lens fitting today? yes                     Last edited by Nelly Hancock on 2/27/2025  9:39 AM.            Assessment /Plan     For exam results, see Encounter Report.    Myopia of both eyes with astigmatism      Eyeglass Final Rx       Eyeglass Final Rx         Sphere Cylinder Axis    Right -5.00 +0.75 075    Left -5.75 +0.75 110      Expiration Date: 2/27/2026                  Contact Lens Prescription (2/27/2025)          Brand Base Curve Diameter Sphere Cylinder Axis    Right Acuvue Oasys for Astigmatism 8.6 14.5 -4.00 -0.75 160    Left Acuvue Oasys for Astigmatism 8.6 14.5 -5.00 -0.75 020      Expiration Date: 2/27/2026    Replacement: Every 2 weeks    Wearing Schedule: Daily Wear          Dispensed trial contact lenses today. Patient is to wear lenses for 1 week.   Ok to order supply if no problems. RTC PRN if any problems arise.    Normal gOCT today with nice, symmetric GCL OU      RTC 1 yr for undilated eye exam or PRN if any problems.   Discussed above and answered questions.

## 2025-03-04 ENCOUNTER — TELEPHONE (OUTPATIENT)
Dept: FAMILY MEDICINE | Facility: CLINIC | Age: 35
End: 2025-03-04
Payer: COMMERCIAL

## 2025-03-04 ENCOUNTER — PATIENT MESSAGE (OUTPATIENT)
Dept: FAMILY MEDICINE | Facility: CLINIC | Age: 35
End: 2025-03-04
Payer: COMMERCIAL

## 2025-03-04 DIAGNOSIS — K21.9 GASTROESOPHAGEAL REFLUX DISEASE WITHOUT ESOPHAGITIS: Primary | ICD-10-CM

## 2025-03-04 DIAGNOSIS — E55.9 VITAMIN D DEFICIENCY: ICD-10-CM

## 2025-03-04 RX ORDER — OMEPRAZOLE 40 MG/1
40 CAPSULE, DELAYED RELEASE ORAL DAILY
Qty: 90 CAPSULE | Refills: 3 | Status: SHIPPED | OUTPATIENT
Start: 2025-03-04

## 2025-03-04 RX ORDER — CHOLECALCIFEROL (VITAMIN D3) 50 MCG
2000 TABLET ORAL DAILY
Status: CANCELLED | OUTPATIENT
Start: 2025-03-04

## 2025-03-04 RX ORDER — ASPIRIN 325 MG
TABLET, DELAYED RELEASE (ENTERIC COATED) ORAL
Qty: 12 CAPSULE | Refills: 3 | Status: SHIPPED | OUTPATIENT
Start: 2025-03-04

## 2025-03-04 NOTE — TELEPHONE ENCOUNTER
"Portal msg below. Pended orders for Omeprazole and vitamin D3. Please advise.     "Hi Dr Leggett!  The pharmacy hadnt received the prescription for Vitamin D. Also, could you send in refills for Omeprazole 40mg? Please send to [facility].     Thanks,  Anthony"  "

## 2025-03-04 NOTE — TELEPHONE ENCOUNTER
I have put the following orders and/or medications to this note.  Please advise pt.    No orders of the defined types were placed in this encounter.      Medications Ordered This Encounter   Medications    cholecalciferol, vitamin D3, 1,250 mcg (50,000 unit) Tab     Sig: Take 1,250 mcg by mouth every 7 days.     Dispense:  12 tablet     Refill:  3    omeprazole (PRILOSEC) 40 MG capsule     Sig: Take 1 capsule (40 mg total) by mouth once daily.     Dispense:  90 capsule     Refill:  3

## 2025-03-10 ENCOUNTER — PATIENT MESSAGE (OUTPATIENT)
Dept: BARIATRICS | Facility: CLINIC | Age: 35
End: 2025-03-10
Payer: COMMERCIAL

## 2025-03-16 ENCOUNTER — PATIENT MESSAGE (OUTPATIENT)
Dept: OPTOMETRY | Facility: CLINIC | Age: 35
End: 2025-03-16
Payer: COMMERCIAL

## 2025-03-31 ENCOUNTER — TELEPHONE (OUTPATIENT)
Dept: PAIN MEDICINE | Facility: CLINIC | Age: 35
End: 2025-03-31
Payer: COMMERCIAL

## 2025-03-31 ENCOUNTER — OFFICE VISIT (OUTPATIENT)
Dept: PSYCHIATRY | Facility: CLINIC | Age: 35
End: 2025-03-31
Payer: COMMERCIAL

## 2025-03-31 DIAGNOSIS — R69 UNDIAGNOSED DISEASE OR SYNDROME PRESENT: Primary | ICD-10-CM

## 2025-03-31 NOTE — PROGRESS NOTES
[facility]  Psychology  Progress Note  Individual Psychotherapy (PhD/LCSW)    Patient Name: Anthony Sexton  MRN: [id number]    Patient Class: OP- [facility]  Primary Care Provider: Tyesha Leggett MD    Psychiatry Initial Visit (PhD/LCSW)  Individual Psychotherapy - CPT 33500  EAP #5 for 2024    Date: 3/31/2025    Site: [place]    The patient location is: Patient's home/ Patient reported that his/her location at the time of this visit was in the [facility]     Visit type: Virtual visit with synchronous audio and video     Each patient to whom he or she provides medical services by telemedicine is: (1) informed of the relationship between the physician and patient and the respective role of any other health care provider with respect to management of the patient; and (2) notified that he or she may decline to receive medical services by telemedicine and may withdraw from such care at any time.     Referral source: Lenora Walters MD    Clinical status of patient: Outpatient    Anthony Sexton, a 34 y.o. female, for initial evaluation visit.  Met with patient.    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     Substance  use:   Alcohol: none   Drugs: none   Tobacco: none   Caffeine: none    Current medications and drug reactions (include OTC, herbal): see medication list     Strengths and liabilities: Strength: Patient accepts guidance/feedback, Strength: Patient is expressive/articulate., Strength: Patient is intelligent., Strength: Patient is motivated for change., Strength: Patient has positive support network., Strength: Patient has reasonable judgment., Strength: Patient is stable., Liability: Patient lacks coping skills.    Current Evaluation:     Mental Status Exam:  General Appearance:  unremarkable, age appropriate   Speech: normal tone, normal rate, normal pitch, normal volume      Level of Cooperation: cooperative      Thought Processes: normal and logical   Mood: anxious, sad      Thought Content: normal, no suicidality, no homicidality, delusions, or paranoia   Affect: congruent and appropriate   Orientation: Oriented x3   Memory: recent >  intact   Attention Span & Concentration: intact   Fund of General Knowledge: intact and appropriate to age and level of education   Abstract Reasoning: not assessed   Judgment & Insight: fair     Language  intact     Summary: The patient was able to secure accommodations for her child, but was depleted by the process. Patient has become critical of 's contribution, but has now recognized that it is a projection and not reality. Supportive therapy was utilized and the patient's feelings were validated. Coping strategies to help the patient manage the stress that she is experiencing was discussed.   Diagnostic Impression - Plan:       ICD-10-CM ICD-9-CM   1. Undiagnosed disease or syndrome present  R69 799.9                       Plan:individual psychotherapy    Return to Clinic: as scheduled    Length of Service (minutes): 30          Barbra Bartlett, PhD  Psychologist  [facility]

## 2025-03-31 NOTE — TELEPHONE ENCOUNTER
Called patient to confirm appointment. Unable to confirm appt. Left VM for patient to call back.

## 2025-04-01 ENCOUNTER — OFFICE VISIT (OUTPATIENT)
Dept: FAMILY MEDICINE | Facility: CLINIC | Age: 35
End: 2025-04-01
Payer: COMMERCIAL

## 2025-04-01 ENCOUNTER — LAB VISIT (OUTPATIENT)
Dept: LAB | Facility: HOSPITAL | Age: 35
End: 2025-04-01
Payer: COMMERCIAL

## 2025-04-01 VITALS
TEMPERATURE: 99 F | DIASTOLIC BLOOD PRESSURE: 86 MMHG | SYSTOLIC BLOOD PRESSURE: 124 MMHG | OXYGEN SATURATION: 99 % | WEIGHT: 276.88 LBS | HEIGHT: 66 IN | HEART RATE: 97 BPM | BODY MASS INDEX: 44.5 KG/M2

## 2025-04-01 DIAGNOSIS — K29.50 CHRONIC GASTRITIS WITHOUT BLEEDING, UNSPECIFIED GASTRITIS TYPE: ICD-10-CM

## 2025-04-01 DIAGNOSIS — K21.9 GASTROESOPHAGEAL REFLUX DISEASE WITHOUT ESOPHAGITIS: ICD-10-CM

## 2025-04-01 DIAGNOSIS — R10.13 EPIGASTRIC PAIN: Primary | ICD-10-CM

## 2025-04-01 DIAGNOSIS — I10 ESSENTIAL HYPERTENSION: ICD-10-CM

## 2025-04-01 DIAGNOSIS — E66.01 MORBID OBESITY WITH BODY MASS INDEX (BMI) OF 40.0 TO 49.9: ICD-10-CM

## 2025-04-01 LAB
ABSOLUTE EOSINOPHIL (OHS): 0.09 K/UL
ABSOLUTE MONOCYTE (OHS): 0.48 K/UL (ref 0.3–1)
ABSOLUTE NEUTROPHIL COUNT (OHS): 5.13 K/UL (ref 1.8–7.7)
BASOPHILS # BLD AUTO: 0.05 K/UL
BASOPHILS NFR BLD AUTO: 0.6 %
ERYTHROCYTE [DISTWIDTH] IN BLOOD BY AUTOMATED COUNT: 14 % (ref 11.5–14.5)
HCT VFR BLD AUTO: 39.8 % (ref 37–48.5)
HGB BLD-MCNC: 11.7 GM/DL (ref 12–16)
IMM GRANULOCYTES # BLD AUTO: 0.02 K/UL (ref 0–0.04)
IMM GRANULOCYTES NFR BLD AUTO: 0.3 % (ref 0–0.5)
LYMPHOCYTES # BLD AUTO: 2.12 K/UL (ref 1–4.8)
MCH RBC QN AUTO: 23.5 PG (ref 27–31)
MCHC RBC AUTO-ENTMCNC: 29.4 G/DL (ref 32–36)
MCV RBC AUTO: 80 FL (ref 82–98)
NUCLEATED RBC (/100WBC) (OHS): 0 /100 WBC
PLATELET # BLD AUTO: 246 K/UL (ref 150–450)
PMV BLD AUTO: 12.3 FL (ref 9.2–12.9)
RBC # BLD AUTO: 4.97 M/UL (ref 4–5.4)
RELATIVE EOSINOPHIL (OHS): 1.1 %
RELATIVE LYMPHOCYTE (OHS): 26.9 % (ref 18–48)
RELATIVE MONOCYTE (OHS): 6.1 % (ref 4–15)
RELATIVE NEUTROPHIL (OHS): 65 % (ref 38–73)
WBC # BLD AUTO: 7.89 K/UL (ref 3.9–12.7)

## 2025-04-01 PROCEDURE — 3074F SYST BP LT 130 MM HG: CPT | Mod: CPTII,S$GLB,,

## 2025-04-01 PROCEDURE — 80048 BASIC METABOLIC PNL TOTAL CA: CPT

## 2025-04-01 PROCEDURE — 1160F RVW MEDS BY RX/DR IN RCRD: CPT | Mod: CPTII,S$GLB,,

## 2025-04-01 PROCEDURE — 99999 PR PBB SHADOW E&M-EST. PATIENT-LVL IV: CPT | Mod: PBBFAC,,,

## 2025-04-01 PROCEDURE — 36415 COLL VENOUS BLD VENIPUNCTURE: CPT | Mod: PO

## 2025-04-01 PROCEDURE — 3008F BODY MASS INDEX DOCD: CPT | Mod: CPTII,S$GLB,,

## 2025-04-01 PROCEDURE — 3044F HG A1C LEVEL LT 7.0%: CPT | Mod: CPTII,S$GLB,,

## 2025-04-01 PROCEDURE — 99215 OFFICE O/P EST HI 40 MIN: CPT | Mod: S$GLB,,,

## 2025-04-01 PROCEDURE — 85025 COMPLETE CBC W/AUTO DIFF WBC: CPT

## 2025-04-01 PROCEDURE — 86677 HELICOBACTER PYLORI ANTIBODY: CPT

## 2025-04-01 PROCEDURE — 1159F MED LIST DOCD IN RCRD: CPT | Mod: CPTII,S$GLB,,

## 2025-04-01 PROCEDURE — 3079F DIAST BP 80-89 MM HG: CPT | Mod: CPTII,S$GLB,,

## 2025-04-01 RX ORDER — SUCRALFATE 1 G/1
1 TABLET ORAL 4 TIMES DAILY
Qty: 120 TABLET | Refills: 1 | Status: SHIPPED | OUTPATIENT
Start: 2025-04-01 | End: 2025-05-31

## 2025-04-01 RX ORDER — SUCRALFATE 1 G/1
1 TABLET ORAL 4 TIMES DAILY
Qty: 90 TABLET | Refills: 0 | Status: CANCELLED | OUTPATIENT
Start: 2025-04-01

## 2025-04-01 NOTE — PROGRESS NOTES
Anthony Sexton  04/01/2025  [id number]    Tyesha Leggett MD  Patient Care Team:  Tyesha Leggett MD as PCP - General (Family Medicine)  Joselito Franco MD (Obstetrics and Gynecology)  Alex Sandoval MD as Consulting Physician (Rheumatology)  Barbra Contreras, PharmD as Pharmacist     Subjective      Chief Complaint:  Chief Complaint   Patient presents with    Shortness of Breath    Abdominal Pain       History of Present Illness:    Anthony presents today for epigastric pain. She has fatty liver and GERD with history of cholecystectomy due to RUQ issues. She previously underwent pH testing of her abdomen to assess acid levels and has had a recent EGD with Dr. Olmstead on 5/29/23. Her extensive GI concerns are being managed by gastroenterologist, Dr. Olmstead. She had appointment scheduled with GI but admits that pain was too severe and she needed to be seen sooner. She reports epigastric pain that started last week. Pain is worsened by eating. States she can feel her food moving around in her stomach after she eats. She has lactose sensitivity and typically uses almond milk as an alternative. She has been taking omeprazole without symptom relief. She experiences nausea without vomiting. She has daily bowel movements with varying consistency, ranging from formed stools to fragmented pieces. Denies blood in stools or urine. Also denies fever, CP or dysuria. States she has been having shortness of breath with activity that is believed to be related to epigastric pain. States it feels as if something is pressing against her epigastric region, preventing her from taking a deep breath.       Review of Systems   Constitutional:  Negative for fever.   Respiratory:  Positive for shortness of breath.    Cardiovascular:  Negative for chest pain.   Gastrointestinal:  Positive for abdominal distention, abdominal pain, nausea and reflux. Negative for blood in stool, constipation, diarrhea and vomiting.   Genitourinary:   Negative for dysuria and hematuria.   Allergic/Immunologic: Positive for food allergies.        History:  Past Medical History:   Diagnosis Date    Abnormal Pap smear of cervix     Allergy     Procardia- Rebound tachycardia    Anemia     Antepartum multigravida of advanced maternal age 2024    Anxiety     Chronic low back pain     Depression     Enthesitis related arthritis 2023    Fatty liver 2025    Fibromyalgia     GERD (gastroesophageal reflux disease)     History of abnormal cervical Pap smear     History of recurrent UTIs     History of ulcer disease     Incomplete  2024    Irregular menses     Migraine headache     Obesity     SAVANNA (obstructive sleep apnea) 2023    Postpartum depression     with last pregnancy.     Pre-eclampsia in postpartum period 2020    Vitamin D deficiency      Past Surgical History:   Procedure Laterality Date    BREAST SURGERY  21    Reduction Mammoplasty    COSMETIC SURGERY      DILATION AND CURETTAGE OF UTERUS  2024    DILATION AND CURETTAGE OF UTERUS USING SUCTION N/A 2024    Procedure: DILATION AND CURETTAGE, UTERUS, USING SUCTION;  Surgeon: Kirsten Siu MD;  Location: [facility] OR;  Service: OB/GYN;  Laterality: N/A;    ESOPHAGOGASTRODUODENOSCOPY N/A 2021    Procedure: EGD (ESOPHAGOGASTRODUODENOSCOPY);  Surgeon: Alka Olmstead MD;  Location: [facility];  Service: Endoscopy;  Laterality: N/A;    ESOPHAGOGASTRODUODENOSCOPY N/A 10/04/2021    Procedure: EGD (ESOPHAGOGASTRODUODENOSCOPY);  Surgeon: Alka Olmstead MD;  Location: [facility];  Service: Endoscopy;  Laterality: N/A;    ESOPHAGOGASTRODUODENOSCOPY N/A 2023    Procedure: EGD (ESOPHAGOGASTRODUODENOSCOPY);  Surgeon: Alka Olmstead MD;  Location: [facility];  Service: Endoscopy;  Laterality: N/A;    EXCISION TURBINATE, SUBMUCOUS      NASAL SEPTUM SURGERY      PH MONITORING, ESOPHAGUS, WIRELESS, (OFF REFLUX MEDS) N/A 2023    Procedure: PH  MONITORING, ESOPHAGUS, WIRELESS, (OFF REFLUX MEDS);  Surgeon: Alka Olmstead MD;  Location: [facility] ENDO;  Service: Endoscopy;  Laterality: N/A;    REDUCTION OF BOTH BREASTS Bilateral 05/27/2021    Procedure: MAMMOPLASTY, REDUCTION, BILATERAL;  Surgeon: Daniel Ba MD;  [facility];  Service: Plastics;  Laterality: Bilateral;  Left breast tissue: 1584  Right breast tissue: 1438    SEPTOPLASTY, NOSE, WITH NASAL TURBINATE REDUCTION Bilateral 01/17/2024    Procedure: SEPTOPLASTY, NOSE, WITH NASAL TURBINATE REDUCTION;  Surgeon: Corey Lucio MD;  Location: [facility] OR;  Service: ENT;  Laterality: Bilateral;     Family History   Problem Relation Name Age of Onset    Hypertension Maternal Grandmother Marie     Diabetes Maternal Grandmother Marie     No Known Problems Maternal Grandfather      Hypertension Father Jordan     Hyperlipidemia Father Jordan     Heart disease Father Jordan 55        MI/CAD    Obesity Father Jordan     Diabetes Mother Michaelle     Asthma Mother Michaelle     Hyperlipidemia Mother Michaelle     Hypertension Mother Michaelle     Sjogren's syndrome Mother Mihcaelle     Fibromyalgia Mother Michaelle     Obesity Mother Michaelle     Miscarriages / Stillbirths Mother Michaelle     No Known Problems Brother      Hypertension Sister Angellica     Obesity Sister Angellica     Insulin resistance Sister      No Known Problems Daughter      Diabetes Maternal Aunt Debbie     Breast cancer Maternal Aunt      Breast cancer Maternal Aunt Barbara     Cancer Maternal Aunt Barbara     Stroke Neg Hx      Brain cancer Neg Hx      Migraines Neg Hx      Colon cancer Neg Hx      Ovarian cancer Neg Hx      Anesthesia problems Neg Hx      Thrombosis Neg Hx       Social History[1]     Review of patient's allergies indicates:   Allergen Reactions    Procardia [nifedipine]        **The following were reviewed at this visit: active problem list, medication list, allergies, family history, social history, and health  maintenance.    Medications:  Medications Ordered Prior to Encounter[2]    **Medications have been reviewed and reconciled with patient at this visit.            Objective      Vitals:    04/01/25 1457   BP: 124/86   Pulse: 97   Temp: 98.5 °F (36.9 °C)      Body mass index is 44.69 kg/m².    Wt Readings from Last 3 Encounters:   04/01/25 125.6 kg (276 lb 14.4 oz)   03/19/25 125.2 kg (276 lb)   02/21/25 125.2 kg (276 lb)     Temp Readings from Last 3 Encounters:   04/01/25 98.5 °F (36.9 °C) (Tympanic)   02/18/25 96.8 °F (36 °C) (Tympanic)   01/24/25 97.7 °F (36.5 °C) (Tympanic)     BP Readings from Last 3 Encounters:   04/01/25 124/86   03/19/25 132/84   02/18/25 120/74     Pulse Readings from Last 3 Encounters:   04/01/25 97   02/18/25 73   01/24/25 103         Laboratory Reviewed ({Yes)  Lab Results   Component Value Date    WBC 7.55 02/25/2025    HGB 11.8 (L) 02/25/2025    HCT 41.4 02/25/2025     02/25/2025    CHOL 186 02/25/2025    TRIG 68 02/25/2025    HDL 37 (L) 02/25/2025    ALT 18 02/25/2025    AST 52 (H) 02/25/2025     02/25/2025    K 4.2 02/25/2025     02/25/2025    CREATININE 0.8 02/25/2025    BUN 9 02/25/2025    CO2 25 02/25/2025    TSH 0.748 02/25/2025    HGBA1C 5.6 02/25/2025       Physical Exam  Vitals reviewed.   Constitutional:       General: She is not in acute distress.     Appearance: Normal appearance. She is obese. She is not ill-appearing.   HENT:      Head: Normocephalic and atraumatic.   Eyes:      Extraocular Movements: Extraocular movements intact.      Conjunctiva/sclera: Conjunctivae normal.   Cardiovascular:      Rate and Rhythm: Normal rate and regular rhythm.      Pulses: Normal pulses.      Heart sounds: Normal heart sounds.   Pulmonary:      Effort: Pulmonary effort is normal.      Breath sounds: Normal breath sounds.   Abdominal:      General: Bowel sounds are normal. There is no distension.      Palpations: Abdomen is soft. There is no mass.      Tenderness:  There is no abdominal tenderness.   Musculoskeletal:         General: Normal range of motion.   Neurological:      General: No focal deficit present.      Mental Status: She is alert and oriented to person, place, and time.   Psychiatric:         Mood and Affect: Mood normal.         Behavior: Behavior normal.         Thought Content: Thought content normal.         Judgment: Judgment normal.               Assessment      Anthony was seen today for shortness of breath and abdominal pain.    Diagnoses and all orders for this visit:    Epigastric pain  -     sucralfate (CARAFATE) 1 gram tablet; Take 1 tablet (1 g total) by mouth 4 (four) times daily.    Chronic gastritis without bleeding, unspecified gastritis type  -     H. pylori Antibody, IgG; Future  -     Fecal Immunochemical Test (iFOBT); Future  -     CBC Auto Differential; Future  -     Basic Metabolic Panel; Future  -     Fecal Immunochemical Test (iFOBT)  -     sucralfate (CARAFATE) 1 gram tablet; Take 1 tablet (1 g total) by mouth 4 (four) times daily.    Essential hypertension   - stable without medications. Continue low sodium diet    Morbid obesity with body mass index (BMI) of 40.0 to 49.9   - healthy lifestyle and diet modifications advised. Continue Wegovy    Gastroesophageal reflux disease without esophagitis  -     sucralfate (CARAFATE) 1 gram tablet; Take 1 tablet (1 g total) by mouth 4 (four) times daily.   - continue omeprazole and dietary modifications    Plan    1) H.pylori, CBC, BMP today  2) iFOBT testing sent home. Patient instructed on directions for use, verbalizes understanding  3) Carafate QID  4) avoid alcohol, smoking, spicy foods or foods high in acidity   5) follow up with GI for further evaluation      Follow up: Follow up if symptoms worsen or fail to improve.    **After visit summary was printed and given to patient upon discharge today.  Patient goals and care plan are included in After Visit Summary.    I spent a total of 40  minutes on the day of the visit.This includes face to face time and non-face to face time preparing to see the patient (eg, review of tests), obtaining and/or reviewing separately obtained history, documenting clinical information in the electronic or other health record, independently interpreting results and communicating results to the patient/family/caregiver, or care coordinator.          Rizwana Henry, MSN, APRN, FNP-C         [1]   Social History  Socioeconomic History    Marital status:     Number of children: 0   Occupational History    Occupation: RN     Comment: [employer]   Tobacco Use    Smoking status: Never     Passive exposure: Never    Smokeless tobacco: Never   Substance and Sexual Activity    Alcohol use: Not Currently     Alcohol/week: 0.0 standard drinks of alcohol    Drug use: No    Sexual activity: Yes     Partners: Male     Comment: Not interested in having further kids   Social History Narrative    She wears seatbelt.      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    2 kids.      Social Drivers of Health     Financial Resource Strain: Low Risk  (2/17/2025)    Overall Financial Resource Strain (CARDIA)     Difficulty of Paying Living Expenses: Not hard at all   Food Insecurity: No Food Insecurity (2/17/2025)    Hunger Vital Sign     Worried About Running Out of Food in the Last Year: Never true     Ran Out of Food in the Last Year: Never true   Transportation Needs: No Transportation Needs (2/17/2025)    PRAPARE - Transportation     Lack of Transportation (Medical): No     Lack of Transportation (Non-Medical): No   Physical Activity: Insufficiently Active (2/17/2025)    Exercise Vital Sign     Days of Exercise per Week: 2 days     Minutes of Exercise per Session: 10 min   Stress: Stress Concern Present (2/17/2025)    [employer] - Occupational Stress Questionnaire     Feeling of Stress : To some extent   Housing  Stability: Low Risk  (2/17/2025)    Housing Stability Vital Sign     Unable to Pay for Housing in the Last Year: No     Number of Times Moved in the Last Year: 0     Homeless in the Last Year: No   [2]   Current Outpatient Medications on File Prior to Visit   Medication Sig Dispense Refill    ALPRAZolam (XANAX) 0.5 MG tablet Take 1 tablet (0.5 mg total) by mouth daily as needed for Anxiety. 30 tablet 1    cholecalciferol, vitamin D3, (VITAMIN D3) 50 mcg (2,000 unit) Tab Take 2,000 Units by mouth once daily.      cholecalciferol, vitamin D3, 1,250 mcg (50,000 unit) capsule Take 1 capsule by mouth every 7 days. 12 capsule 3    omeprazole (PRILOSEC) 40 MG capsule Take 1 capsule (40 mg total) by mouth once daily. 90 capsule 3    semaglutide, weight loss, (WEGOVY) 0.25 mg/0.5 mL PnIj Inject 0.25 mg into the skin every 7 days. 2 mL 0    semaglutide, weight loss, (WEGOVY) 0.5 mg/0.5 mL PnIj Inject 0.5 mg into the skin every 7 days. 2 mL 0    semaglutide, weight loss, (WEGOVY) 1 mg/0.5 mL PnIj Inject 1 mg into the skin every 7 days. 2 mL 0    sumatriptan (IMITREX) 50 MG tablet Take 1 tablet (50 mg total) by mouth daily as needed for Migraine. Take at onset of migraine. Can repeat in 2 hours, if needed. No more than 2 tablets per day OR 3 days per week. 9 tablet 0    methocarbamoL (ROBAXIN) 750 MG Tab Take 1 tablet (750 mg total) by mouth 3 (three) times daily as needed (muscle pain////SEDATING). (Patient not taking: Reported on 4/1/2025) 30 tablet 0    sertraline (ZOLOFT) 50 MG tablet Take 1 tablet (50 mg total) by mouth once daily. (Patient not taking: Reported on 4/1/2025) 42 tablet 0     Current Facility-Administered Medications on File Prior to Visit   Medication Dose Route Frequency Provider Last Rate Last Admin    hpv vaccine,9-kathryn (GARDASIL 9) vaccine 0.5 mL  0.5 mL Intramuscular 1 time in Clinic/HOD Joselito Franco MD

## 2025-04-02 ENCOUNTER — OFFICE VISIT (OUTPATIENT)
Dept: GASTROENTEROLOGY | Facility: CLINIC | Age: 35
End: 2025-04-02
Payer: COMMERCIAL

## 2025-04-02 ENCOUNTER — HOSPITAL ENCOUNTER (OUTPATIENT)
Dept: RADIOLOGY | Facility: HOSPITAL | Age: 35
Discharge: HOME | End: 2025-04-02
Attending: NURSE PRACTITIONER
Payer: COMMERCIAL

## 2025-04-02 ENCOUNTER — RESULTS FOLLOW-UP (OUTPATIENT)
Dept: GASTROENTEROLOGY | Facility: CLINIC | Age: 35
End: 2025-04-02

## 2025-04-02 VITALS
HEIGHT: 66 IN | HEART RATE: 84 BPM | BODY MASS INDEX: 44.47 KG/M2 | WEIGHT: 276.69 LBS | SYSTOLIC BLOOD PRESSURE: 121 MMHG | DIASTOLIC BLOOD PRESSURE: 84 MMHG

## 2025-04-02 DIAGNOSIS — R10.13 EPIGASTRIC PAIN: ICD-10-CM

## 2025-04-02 DIAGNOSIS — K21.9 GASTROESOPHAGEAL REFLUX DISEASE WITHOUT ESOPHAGITIS: Primary | ICD-10-CM

## 2025-04-02 DIAGNOSIS — R19.5 ABNORMAL STOOLS: ICD-10-CM

## 2025-04-02 DIAGNOSIS — K76.0 FATTY LIVER: ICD-10-CM

## 2025-04-02 LAB
ANION GAP (OHS): 10 MMOL/L (ref 8–16)
BUN SERPL-MCNC: 11 MG/DL (ref 6–20)
CALCIUM SERPL-MCNC: 8.6 MG/DL (ref 8.7–10.5)
CHLORIDE SERPL-SCNC: 103 MMOL/L (ref 95–110)
CO2 SERPL-SCNC: 21 MMOL/L (ref 23–29)
CREAT SERPL-MCNC: 0.7 MG/DL (ref 0.5–1.4)
GFR SERPLBLD CREATININE-BSD FMLA CKD-EPI: >60 ML/MIN/1.73/M2
GLUCOSE SERPL-MCNC: 73 MG/DL (ref 70–110)
H PYLORI IGG SERPL QL IA: NEGATIVE
POTASSIUM SERPL-SCNC: 3.6 MMOL/L (ref 3.5–5.1)
SODIUM SERPL-SCNC: 134 MMOL/L (ref 136–145)

## 2025-04-02 PROCEDURE — 1160F RVW MEDS BY RX/DR IN RCRD: CPT | Mod: CPTII,S$GLB,, | Performed by: NURSE PRACTITIONER

## 2025-04-02 PROCEDURE — 3008F BODY MASS INDEX DOCD: CPT | Mod: CPTII,S$GLB,, | Performed by: NURSE PRACTITIONER

## 2025-04-02 PROCEDURE — 3044F HG A1C LEVEL LT 7.0%: CPT | Mod: CPTII,S$GLB,, | Performed by: NURSE PRACTITIONER

## 2025-04-02 PROCEDURE — 99214 OFFICE O/P EST MOD 30 MIN: CPT | Mod: S$GLB,,, | Performed by: NURSE PRACTITIONER

## 2025-04-02 PROCEDURE — 1159F MED LIST DOCD IN RCRD: CPT | Mod: CPTII,S$GLB,, | Performed by: NURSE PRACTITIONER

## 2025-04-02 PROCEDURE — 3074F SYST BP LT 130 MM HG: CPT | Mod: CPTII,S$GLB,, | Performed by: NURSE PRACTITIONER

## 2025-04-02 PROCEDURE — 99999 PR PBB SHADOW E&M-EST. PATIENT-LVL IV: CPT | Mod: PBBFAC,,, | Performed by: NURSE PRACTITIONER

## 2025-04-02 PROCEDURE — 3079F DIAST BP 80-89 MM HG: CPT | Mod: CPTII,S$GLB,, | Performed by: NURSE PRACTITIONER

## 2025-04-02 PROCEDURE — 74019 RADEX ABDOMEN 2 VIEWS: CPT | Mod: 26,,, | Performed by: RADIOLOGY

## 2025-04-02 PROCEDURE — 74019 RADEX ABDOMEN 2 VIEWS: CPT | Mod: TC

## 2025-04-02 RX ORDER — ESOMEPRAZOLE MAGNESIUM 40 MG/1
40 CAPSULE, DELAYED RELEASE ORAL 2 TIMES DAILY
Qty: 180 CAPSULE | Refills: 0 | Status: SHIPPED | OUTPATIENT
Start: 2025-04-02 | End: 2025-07-03

## 2025-04-02 NOTE — PROGRESS NOTES
Clinic Consult:  [facility] Consultation Note    Reason for Consult:  The primary encounter diagnosis was Gastroesophageal reflux disease without esophagitis. Diagnoses of Epigastric pain and Abnormal stools were also pertinent to this visit.    PCP: Tyesha Leggett       HPI:  This is a 34 y.o. female here for evaluation of     ROS    Medical History:  has a past medical history of Abnormal Pap smear of cervix, Allergy, Anemia, Antepartum multigravida of advanced maternal age (2024), Anxiety, Chronic low back pain, Depression, Enthesitis related arthritis (2023), Fatty liver (2025), Fibromyalgia, GERD (gastroesophageal reflux disease), History of abnormal cervical Pap smear, History of recurrent UTIs, History of ulcer disease, Incomplete  (2024), Irregular menses, Migraine headache, Obesity, SAVANNA (obstructive sleep apnea) (2023), Postpartum depression, Pre-eclampsia in postpartum period (2020), and Vitamin D deficiency.    Surgical History:  has a past surgical history that includes Esophagogastroduodenoscopy (N/A, 2021); Reduction of both breasts (Bilateral, 2021); Esophagogastroduodenoscopy (N/A, 10/04/2021); Esophagogastroduodenoscopy (N/A, 2023); ph monitoring, esophagus, wireless, (off reflux meds) (N/A, 2023); septoplasty, nose, with nasal turbinate reduction (Bilateral, 2024); Dilation and curettage of uterus using suction (N/A, 2024); Excision turbinate, submucous; Nasal septum surgery; Dilation and curettage of uterus (2024); Cosmetic surgery; and Breast surgery (21).    Family History: family history includes Asthma in her mother; Breast cancer in her maternal aunt and maternal aunt; Cancer in her maternal aunt; Diabetes in her maternal aunt, maternal grandmother, and mother; Fibromyalgia in her mother; Heart disease (age of onset: 55) in her father; Hyperlipidemia in her father and mother;  "Hypertension in her father, maternal grandmother, mother, and sister; Insulin resistance in her sister; Miscarriages / Stillbirths in her mother; No Known Problems in her brother, daughter, and maternal grandfather; Obesity in her father, mother, and sister; Sjogren's syndrome in her mother..     Social History:  reports that she has never smoked. She has never been exposed to tobacco smoke. She has never used smokeless tobacco. She reports that she does not currently use alcohol. She reports that she does not use drugs.    Allergies: Reviewed    Home Medications:   Medications Ordered Prior to Encounter[1]    Physical Exam:  /84 (BP Location: Right arm, Patient Position: Sitting)   Pulse 84   Ht 5' 6" (1.676 m)   Wt 125.5 kg (276 lb 10.8 oz)   LMP 03/22/2025   BMI 44.66 kg/m²   Body mass index is 44.66 kg/m².  Physical Exam    Labs: Pertinent labs reviewed.  CRC Screening:     Assessment:  1. Gastroesophageal reflux disease without esophagitis    2. Epigastric pain    3. Abnormal stools        Recommendations:   ***    Gastroesophageal reflux disease without esophagitis  -     esomeprazole (NEXIUM) 40 MG capsule; Take 1 capsule (40 mg total) by mouth 2 (two) times daily.  Dispense: 180 capsule; Refill: 0    Epigastric pain    Abnormal stools  -     X-Ray Abdomen Flat And Erect; Future; Expected date: 04/02/2025        No follow-ups on file.    Thank you so much for allowing me to participate in the care of SANDOVAL Parisi       [1]   Current Outpatient Medications on File Prior to Visit   Medication Sig Dispense Refill    cholecalciferol, vitamin D3, 1,250 mcg (50,000 unit) capsule Take 1 capsule by mouth every 7 days. 12 capsule 3    sertraline (ZOLOFT) 50 MG tablet Take 1 tablet (50 mg total) by mouth once daily. 42 tablet 0    [DISCONTINUED] omeprazole (PRILOSEC) 40 MG capsule Take 1 capsule (40 mg total) by mouth once daily. 90 capsule 3    ALPRAZolam (XANAX) 0.5 MG tablet Take " 1 tablet (0.5 mg total) by mouth daily as needed for Anxiety. 30 tablet 1    cholecalciferol, vitamin D3, (VITAMIN D3) 50 mcg (2,000 unit) Tab Take 2,000 Units by mouth once daily. (Patient not taking: Reported on 4/2/2025)      methocarbamoL (ROBAXIN) 750 MG Tab Take 1 tablet (750 mg total) by mouth 3 (three) times daily as needed (muscle pain////SEDATING). (Patient not taking: Reported on 4/2/2025) 30 tablet 0    semaglutide, weight loss, (WEGOVY) 0.25 mg/0.5 mL PnIj Inject 0.25 mg into the skin every 7 days. (Patient not taking: Reported on 4/2/2025) 2 mL 0    semaglutide, weight loss, (WEGOVY) 0.5 mg/0.5 mL PnIj Inject 0.5 mg into the skin every 7 days. (Patient not taking: Reported on 4/2/2025) 2 mL 0    semaglutide, weight loss, (WEGOVY) 1 mg/0.5 mL PnIj Inject 1 mg into the skin every 7 days. (Patient not taking: Reported on 4/2/2025) 2 mL 0    sucralfate (CARAFATE) 1 gram tablet Take 1 tablet (1 g total) by mouth 4 (four) times daily. (Patient not taking: Reported on 4/2/2025) 120 tablet 1    sumatriptan (IMITREX) 50 MG tablet Take 1 tablet (50 mg total) by mouth daily as needed for Migraine. Take at onset of migraine. Can repeat in 2 hours, if needed. No more than 2 tablets per day OR 3 days per week. 9 tablet 0     Current Facility-Administered Medications on File Prior to Visit   Medication Dose Route Frequency Provider Last Rate Last Admin    hpv vaccine,9-kathryn (GARDASIL 9) vaccine 0.5 mL  0.5 mL Intramuscular 1 time in Clinic/HOD Joselito Franco MD

## 2025-04-02 NOTE — PROGRESS NOTES
Clinic Follow Up:  [facility] Follow Up Note    Reason for Follow Up:  The primary encounter diagnosis was Gastroesophageal reflux disease without esophagitis. Diagnoses of Epigastric pain, Abnormal stools, and Fatty liver were also pertinent to this visit.    PCP: Tyesha Leggett       HPI:  This is a 34 y.o. female here for follow up of   She is established with GI but new to me.   She has GERD.  Was on pantoprazole b.i.d. but stopped working.  She was then switched to omeprazole and that is what she is currently taking, however, it has caused her to have increase in belching.  She started with epigastric pain 2 weeks ago.  Pain will be severe at times.  It sometimes radiates to RUQ.  Had gallbladder evaluation that was negative.  She also reports abdominal bloating and feeling her food digesting down.    She has been diagnosed with fatty liver.  She was prescribed semaglutide but has not started taking it yet 2/2 current GI symptoms.  Her PCP sent in carafate for her to start. Her IgG serology was negative.    EGD with BRAVO. Positive for acid reflux.   US- normal gallbladder, fatty liver   HIDA- normal     Review of Systems   Constitutional:  Negative for activity change and appetite change.        As per interval history above   Respiratory:  Negative for cough and shortness of breath.    Cardiovascular:  Negative for chest pain.   Gastrointestinal:  Positive for abdominal pain.   Skin:  Negative for color change and rash.       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     Pre-eclampsia in postpartum period 2020    Vitamin D deficiency        Surgical History:   Past Surgical History:   Procedure Laterality Date    BREAST SURGERY  21    Reduction Mammoplasty    COSMETIC SURGERY      DILATION AND CURETTAGE OF UTERUS  2024    DILATION AND CURETTAGE OF UTERUS USING SUCTION N/A 2024    Procedure: DILATION AND CURETTAGE, UTERUS, USING SUCTION;  Surgeon: Kirsten Siu MD;  Location: [facility];  Service: OB/GYN;  Laterality: N/A;    ESOPHAGOGASTRODUODENOSCOPY N/A 2021    Procedure: EGD (ESOPHAGOGASTRODUODENOSCOPY);  Surgeon: Alka Olmstead MD;  Location: [facility];  Service: Endoscopy;  Laterality: N/A;    ESOPHAGOGASTRODUODENOSCOPY N/A 10/04/2021    Procedure: EGD (ESOPHAGOGASTRODUODENOSCOPY);  Surgeon: Alka Olmstead MD;  Location: [facility];  Service: Endoscopy;  Laterality: N/A;    ESOPHAGOGASTRODUODENOSCOPY N/A 2023    Procedure: EGD (ESOPHAGOGASTRODUODENOSCOPY);  Surgeon: Alka Olmstead MD;  Location: [facility];  Service: Endoscopy;  Laterality: N/A;    EXCISION TURBINATE, SUBMUCOUS      NASAL SEPTUM SURGERY      PH MONITORING, ESOPHAGUS, WIRELESS, (OFF REFLUX MEDS) N/A 2023    Procedure: PH MONITORING, ESOPHAGUS, WIRELESS, (OFF REFLUX MEDS);  Surgeon: Alka Olmstead MD;  Location: [facility];  Service: Endoscopy;  Laterality: N/A;    REDUCTION OF BOTH BREASTS Bilateral 2021    Procedure: MAMMOPLASTY, REDUCTION, BILATERAL;  Surgeon: Daniel Ba MD;  Location: [address];  Service: Plastics;  Laterality: Bilateral;  Left breast tissue: 1584  Right breast tissue: 1438    SEPTOPLASTY, NOSE, WITH NASAL TURBINATE REDUCTION Bilateral 2024    Procedure: SEPTOPLASTY, NOSE, WITH NASAL TURBINATE  "REDUCTION;  Surgeon: Corey Lucio MD;  Location: [facility];  Service: ENT;  Laterality: Bilateral;       Family History:   Family History   Problem Relation Name Age of Onset    Hypertension Maternal Grandmother Marie     Diabetes Maternal Grandmother Marie     No Known Problems Maternal Grandfather      Hypertension Father Jordan     Hyperlipidemia Father Jordan     Heart disease Father Jordan 55        MI/CAD    Obesity Father Jordan     Diabetes Mother Michaelle     Asthma Mother Michaelle     Hyperlipidemia Mother Michaelle     Hypertension Mother Michaelle     Sjogren's syndrome Mother Michaelle     Fibromyalgia Mother Michaelle     Obesity Mother Michaelle     Miscarriages / Stillbirths Mother Michaelle     No Known Problems Brother      Hypertension Sister Angellica     Obesity Sister Angellica     Insulin resistance Sister      No Known Problems Daughter      Diabetes Maternal Aunt Debbie     Breast cancer Maternal Aunt      Breast cancer Maternal Aunt Barbara     Cancer Maternal Aunt Barbara     Stroke Neg Hx      Brain cancer Neg Hx      Migraines Neg Hx      Colon cancer Neg Hx      Ovarian cancer Neg Hx      Anesthesia problems Neg Hx      Thrombosis Neg Hx         Social History:   Social History[1]    Allergies:   Review of patient's allergies indicates:   Allergen Reactions    Procardia [nifedipine]        Home Medications:  Medications Ordered Prior to Encounter[2]    /84 (BP Location: Right arm, Patient Position: Sitting)   Pulse 84   Ht 5' 6" (1.676 m)   Wt 125.5 kg (276 lb 10.8 oz)   LMP 03/22/2025   BMI 44.66 kg/m²   Body mass index is 44.66 kg/m².  Physical Exam  Constitutional:       General: She is not in acute distress.  HENT:      Head: Normocephalic.   Neurological:      General: No focal deficit present.      Mental Status: She is alert.   Psychiatric:         Mood and Affect: Mood normal.         Judgment: Judgment normal.         Labs: Pertinent labs reviewed.    Assessment:   1. Gastroesophageal " reflux disease without esophagitis    2. Epigastric pain    3. Abnormal stools    4. Fatty liver        Recommendations:   - agree with Carafate  - will send in Nexium BID x 90 days then decrease to daily dose.   - xray today  - FIT kit was ordered but she is not due for colon cancer screening until age 43.   - would not start semaglutide until GI issues have improved     Gastroesophageal reflux disease without esophagitis  -     esomeprazole (NEXIUM) 40 MG capsule; Take 1 capsule (40 mg total) by mouth 2 (two) times daily.  Dispense: 180 capsule; Refill: 0    Epigastric pain    Abnormal stools  -     X-Ray Abdomen Flat And Erect; Future; Expected date: 04/02/2025    Fatty liver    Return to Clinic:  Follow up if symptoms worsen or fail to improve.    Thank you for the opportunity to participate in the care of this patient.  SANDOVAL Saldivar           [1]   Social History  Tobacco Use    Smoking status: Never     Passive exposure: Never    Smokeless tobacco: Never   Substance Use Topics    Alcohol use: Not Currently     Alcohol/week: 0.0 standard drinks of alcohol    Drug use: No   [2]   Current Outpatient Medications on File Prior to Visit   Medication Sig Dispense Refill    cholecalciferol, vitamin D3, 1,250 mcg (50,000 unit) capsule Take 1 capsule by mouth every 7 days. 12 capsule 3    sertraline (ZOLOFT) 50 MG tablet Take 1 tablet (50 mg total) by mouth once daily. 42 tablet 0    [DISCONTINUED] omeprazole (PRILOSEC) 40 MG capsule Take 1 capsule (40 mg total) by mouth once daily. 90 capsule 3    ALPRAZolam (XANAX) 0.5 MG tablet Take 1 tablet (0.5 mg total) by mouth daily as needed for Anxiety. 30 tablet 1    cholecalciferol, vitamin D3, (VITAMIN D3) 50 mcg (2,000 unit) Tab Take 2,000 Units by mouth once daily. (Patient not taking: Reported on 4/2/2025)      methocarbamoL (ROBAXIN) 750 MG Tab Take 1 tablet (750 mg total) by mouth 3 (three) times daily as needed (muscle pain////SEDATING). (Patient not taking:  Reported on 4/2/2025) 30 tablet 0    semaglutide, weight loss, (WEGOVY) 0.25 mg/0.5 mL PnIj Inject 0.25 mg into the skin every 7 days. (Patient not taking: Reported on 4/2/2025) 2 mL 0    semaglutide, weight loss, (WEGOVY) 0.5 mg/0.5 mL PnIj Inject 0.5 mg into the skin every 7 days. (Patient not taking: Reported on 4/2/2025) 2 mL 0    semaglutide, weight loss, (WEGOVY) 1 mg/0.5 mL PnIj Inject 1 mg into the skin every 7 days. (Patient not taking: Reported on 4/2/2025) 2 mL 0    sucralfate (CARAFATE) 1 gram tablet Take 1 tablet (1 g total) by mouth 4 (four) times daily. (Patient not taking: Reported on 4/2/2025) 120 tablet 1    sumatriptan (IMITREX) 50 MG tablet Take 1 tablet (50 mg total) by mouth daily as needed for Migraine. Take at onset of migraine. Can repeat in 2 hours, if needed. No more than 2 tablets per day OR 3 days per week. 9 tablet 0     Current Facility-Administered Medications on File Prior to Visit   Medication Dose Route Frequency Provider Last Rate Last Admin    hpv vaccine,9-kathryn (GARDASIL 9) vaccine 0.5 mL  0.5 mL Intramuscular 1 time in Clinic/HOD Joselito Franco MD

## 2025-04-03 ENCOUNTER — RESULTS FOLLOW-UP (OUTPATIENT)
Dept: FAMILY MEDICINE | Facility: CLINIC | Age: 35
End: 2025-04-03

## 2025-04-08 ENCOUNTER — PATIENT MESSAGE (OUTPATIENT)
Dept: BARIATRICS | Facility: CLINIC | Age: 35
End: 2025-04-08
Payer: COMMERCIAL

## 2025-05-13 ENCOUNTER — PATIENT MESSAGE (OUTPATIENT)
Dept: BARIATRICS | Facility: CLINIC | Age: 35
End: 2025-05-13
Payer: COMMERCIAL

## 2025-05-19 ENCOUNTER — OFFICE VISIT (OUTPATIENT)
Dept: OPHTHALMOLOGY | Facility: CLINIC | Age: 35
End: 2025-05-19
Payer: COMMERCIAL

## 2025-05-19 ENCOUNTER — OFFICE VISIT (OUTPATIENT)
Dept: PSYCHIATRY | Facility: CLINIC | Age: 35
End: 2025-05-19
Payer: COMMERCIAL

## 2025-05-19 DIAGNOSIS — R69 UNDIAGNOSED DISEASE OR SYNDROME PRESENT: Primary | ICD-10-CM

## 2025-05-19 DIAGNOSIS — H16.002 CORNEAL ULCER, LEFT: Primary | ICD-10-CM

## 2025-05-19 PROCEDURE — 99999 PR PBB SHADOW E&M-EST. PATIENT-LVL III: CPT | Mod: PBBFAC,,, | Performed by: OPTOMETRIST

## 2025-05-19 PROCEDURE — 1160F RVW MEDS BY RX/DR IN RCRD: CPT | Mod: CPTII,S$GLB,, | Performed by: OPTOMETRIST

## 2025-05-19 PROCEDURE — 3044F HG A1C LEVEL LT 7.0%: CPT | Mod: CPTII,S$GLB,, | Performed by: OPTOMETRIST

## 2025-05-19 PROCEDURE — 3044F HG A1C LEVEL LT 7.0%: CPT | Mod: CPTII,95,, | Performed by: STUDENT IN AN ORGANIZED HEALTH CARE EDUCATION/TRAINING PROGRAM

## 2025-05-19 PROCEDURE — 1159F MED LIST DOCD IN RCRD: CPT | Mod: CPTII,S$GLB,, | Performed by: OPTOMETRIST

## 2025-05-19 PROCEDURE — 87070 CULTURE OTHR SPECIMN AEROBIC: CPT | Performed by: OPTOMETRIST

## 2025-05-19 PROCEDURE — 1159F MED LIST DOCD IN RCRD: CPT | Mod: CPTII,95,, | Performed by: STUDENT IN AN ORGANIZED HEALTH CARE EDUCATION/TRAINING PROGRAM

## 2025-05-19 PROCEDURE — 99214 OFFICE O/P EST MOD 30 MIN: CPT | Mod: S$GLB,,, | Performed by: OPTOMETRIST

## 2025-05-19 PROCEDURE — 90834 PSYTX W PT 45 MINUTES: CPT | Mod: 95,,, | Performed by: STUDENT IN AN ORGANIZED HEALTH CARE EDUCATION/TRAINING PROGRAM

## 2025-05-19 RX ORDER — MOXIFLOXACIN 5 MG/ML
SOLUTION/ DROPS OPHTHALMIC
Qty: 3 ML | Refills: 0 | Status: SHIPPED | OUTPATIENT
Start: 2025-05-19 | End: 2025-05-20 | Stop reason: SDUPTHER

## 2025-05-19 NOTE — PROGRESS NOTES
HPI     Follow-up            Comments: Pt reports for urgent care due fb.          Comments    Pt states her symptoms started last Friday. Pt states she wears CTL full   time and did have problems with dry eye in the past. Pt states her eyes   been very light sensitive, watering, itchy, and sensitive to touch. Pt   states that she has accidentally slept in her CTL a few times proior to   symptoms occurring and also she reinserted her CTL in her left eye without   washing it   while driving per pt.   Pt states her eye pain is 5/10-10/10 on Sunday.   Pt states this the first time she has experienced these symptoms.   Pt is using OTC artifical tears, Tobramycin OS BID.           Last edited by Wendy Holguin on 5/19/2025 12:50 PM.            Assessment /Plan     For exam results, see Encounter Report.    Corneal ulcer, left  -     moxifloxacin (VIGAMOX) 0.5 % ophthalmic solution; Instill 1 drop in left eye every 2 hours while awake for 1 day then 4 times daily for 6 days.  Dispense: 3 mL; Refill: 0  -     Aerobic culture; Future; Expected date: 05/19/2025    Stop CTL wear,   Paracentral K ulcer with infiltrate <1mm noted today  Culture obtained  Start moxi q2h x 1 day then qid x 6 days    RTC 1 day for K f/u, sooner if any changes to vision or worsening symptoms.

## 2025-05-20 ENCOUNTER — OFFICE VISIT (OUTPATIENT)
Dept: OPHTHALMOLOGY | Facility: CLINIC | Age: 35
End: 2025-05-20
Payer: COMMERCIAL

## 2025-05-20 DIAGNOSIS — H52.13 MYOPIA OF BOTH EYES WITH ASTIGMATISM: ICD-10-CM

## 2025-05-20 DIAGNOSIS — H16.002 CORNEAL ULCER, LEFT: Primary | ICD-10-CM

## 2025-05-20 DIAGNOSIS — H52.203 MYOPIA OF BOTH EYES WITH ASTIGMATISM: ICD-10-CM

## 2025-05-20 PROCEDURE — 3044F HG A1C LEVEL LT 7.0%: CPT | Mod: CPTII,S$GLB,, | Performed by: OPTOMETRIST

## 2025-05-20 PROCEDURE — 1159F MED LIST DOCD IN RCRD: CPT | Mod: CPTII,S$GLB,, | Performed by: OPTOMETRIST

## 2025-05-20 PROCEDURE — 99999 PR PBB SHADOW E&M-EST. PATIENT-LVL II: CPT | Mod: PBBFAC,,, | Performed by: OPTOMETRIST

## 2025-05-20 PROCEDURE — 99213 OFFICE O/P EST LOW 20 MIN: CPT | Mod: S$GLB,,, | Performed by: OPTOMETRIST

## 2025-05-20 RX ORDER — MOXIFLOXACIN 5 MG/ML
1 SOLUTION/ DROPS OPHTHALMIC 4 TIMES DAILY
Qty: 3 ML | Refills: 0 | Status: SHIPPED | OUTPATIENT
Start: 2025-05-20

## 2025-05-20 NOTE — PROGRESS NOTES
HPI     Follow-up            Comments: 1 day follow up  Patient States OS feels much better  Pain Level is between 3/4  Use gtts as directed           Last edited by Monika Bradley on 5/20/2025  9:57 AM.            Assessment /Plan     For exam results, see Encounter Report.    Corneal ulcer, left    Myopia of both eyes with astigmatism    Stable. Pending culture results  Continue to stay out of CTL   Continue Moxi QID   F/u 2-3 days for recheck sooner if any changes to vision or worsening symptoms.

## 2025-05-21 NOTE — PROGRESS NOTES
[facility] [place] [facility]  Psychology  Progress Note  Individual Psychotherapy (PhD/LCSW)    Patient Name: Anthony Sexton  MRN: [id number]    Patient Class: OP- [facility]  Primary Care Provider: Tyesha Leggett MD    Psychiatry Initial Visit (PhD/LCSW)  Individual Psychotherapy - CPT 05370  EAP #5 for 2024    Date: 5/19/2025    Site: [place]    The patient location is: Patient's home/ Patient reported that his/her location at the time of this visit was in the [facility]     Visit type: Virtual visit with synchronous audio and video     Each patient to whom he or she provides medical services by telemedicine is: (1) informed of the relationship between the physician and patient and the respective role of any other health care provider with respect to management of the patient; and (2) notified that he or she may decline to receive medical services by telemedicine and may withdraw from such care at any time.     Referral source: Lenora Walters MD    Clinical status of patient: Outpatient    Anthony Sexton, a 34 y.o. female, for initial evaluation visit.  Met with patient.    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     Substance  use:   Alcohol: none   Drugs: none   Tobacco: none   Caffeine: none    Current medications and drug reactions (include OTC, herbal): see medication list     Strengths and liabilities: Strength: Patient accepts guidance/feedback, Strength: Patient is expressive/articulate., Strength: Patient is intelligent., Strength: Patient is motivated for change., Strength: Patient has positive support network., Strength: Patient has reasonable judgment., Strength: Patient is stable., Liability: Patient lacks coping skills.    Current Evaluation:     Mental Status Exam:  General Appearance:  unremarkable, age appropriate   Speech: normal tone, normal rate, normal pitch, normal volume      Level of Cooperation: cooperative      Thought Processes: normal and logical   Mood: anxious, sad      Thought Content: normal, no suicidality, no homicidality, delusions, or paranoia   Affect: congruent and appropriate   Orientation: Oriented x3   Memory: recent >  intact   Attention Span & Concentration: intact   Fund of General Knowledge: intact and appropriate to age and level of education   Abstract Reasoning: not assessed   Judgment & Insight: fair     Language  intact     Summary: The patient would like to move her family to her parent's house to save money for a house. Her plan is to move in June but have a house in August. During that time, her  is starting school and her youngest daughter is going to a new . We discussed the major changes and how they would impact her children, particularly her daughter who ASD level 1. Patient's idea came from fear of what will happen with the housing market under the new presidential administration. We explored benefits vs risks to the move. Patient will discuss more with  and they will make the decision that is best for their family.   Diagnostic Impression - Plan:       ICD-10-CM ICD-9-CM   1. Undiagnosed disease or syndrome present  R69 799.9                          Plan:individual psychotherapy    Return to Clinic: as scheduled    Length of Service (minutes): 30          Barbra Bartlett, PhD  Psychologist  [facility]

## 2025-05-23 ENCOUNTER — OFFICE VISIT (OUTPATIENT)
Dept: OPHTHALMOLOGY | Facility: CLINIC | Age: 35
End: 2025-05-23
Payer: COMMERCIAL

## 2025-05-23 DIAGNOSIS — H16.002 CORNEAL ULCER, LEFT: Primary | ICD-10-CM

## 2025-05-23 PROCEDURE — 99999 PR PBB SHADOW E&M-EST. PATIENT-LVL III: CPT | Mod: PBBFAC,,, | Performed by: OPTOMETRIST

## 2025-05-23 NOTE — PROGRESS NOTES
HPI     Follow-up            Comments: Patient reports for K f/u. Patient denies pain and discomfort.   Patient has been 100% compliant to Moxi. No further complaints.          Last edited by Kelly Graham MA on 5/23/2025  8:20 AM.            Assessment /Plan     For exam results, see Encounter Report.    1. Corneal ulcer, left  Resolving, continue Moxi QID x 5 days then stop.       RTC as scheduled for annual eye exam, sooner if any changes to vision worsening symptoms.

## 2025-05-24 LAB — BACTERIA SPEC AEROBE CULT: NO GROWTH

## 2025-06-03 ENCOUNTER — PATIENT MESSAGE (OUTPATIENT)
Dept: BARIATRICS | Facility: CLINIC | Age: 35
End: 2025-06-03
Payer: COMMERCIAL

## 2025-06-09 ENCOUNTER — OFFICE VISIT (OUTPATIENT)
Dept: PSYCHIATRY | Facility: CLINIC | Age: 35
End: 2025-06-09
Payer: COMMERCIAL

## 2025-06-09 ENCOUNTER — TELEPHONE (OUTPATIENT)
Dept: INTERNAL MEDICINE | Facility: CLINIC | Age: 35
End: 2025-06-09
Payer: COMMERCIAL

## 2025-06-09 DIAGNOSIS — R69 UNDIAGNOSED DISEASE OR SYNDROME PRESENT: Primary | ICD-10-CM

## 2025-06-09 PROCEDURE — 1159F MED LIST DOCD IN RCRD: CPT | Mod: CPTII,95,, | Performed by: STUDENT IN AN ORGANIZED HEALTH CARE EDUCATION/TRAINING PROGRAM

## 2025-06-09 PROCEDURE — 90832 PSYTX W PT 30 MINUTES: CPT | Mod: 95,,, | Performed by: STUDENT IN AN ORGANIZED HEALTH CARE EDUCATION/TRAINING PROGRAM

## 2025-06-09 PROCEDURE — 3044F HG A1C LEVEL LT 7.0%: CPT | Mod: CPTII,95,, | Performed by: STUDENT IN AN ORGANIZED HEALTH CARE EDUCATION/TRAINING PROGRAM

## 2025-06-09 NOTE — PROGRESS NOTES
[facility] [place] - [facility]  Psychology  Progress Note  Individual Psychotherapy (PhD/LCSW)    Patient Name: Anthony Sexton  MRN: [id number]    Patient Class: OP- Hospital Outpatient Clinic  Primary Care Provider: Tyesha Leggett MD    Psychiatry Initial Visit (PhD/LCSW)  Individual Psychotherapy - CPT 62373  EAP #5 for 2024    Date: 6/9/2025    Site: [place]    The patient location is: Patient's home/ Patient reported that his/her location at the time of this visit was in the [facility]     Visit type: Virtual visit with synchronous audio and video     Each patient to whom he or she provides medical services by telemedicine is: (1) informed of the relationship between the physician and patient and the respective role of any other health care provider with respect to management of the patient; and (2) notified that he or she may decline to receive medical services by telemedicine and may withdraw from such care at any time.     Referral source: Lenora Walters MD    Clinical status of patient: Outpatient    Anthony Sexton, a 34 y.o. female, for initial evaluation visit.  Met with patient.    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     Substance  use:   Alcohol: none   Drugs: none   Tobacco: none   Caffeine: none    Current medications and drug reactions (include OTC, herbal): see medication list     Strengths and liabilities: Strength: Patient accepts guidance/feedback, Strength: Patient is expressive/articulate., Strength: Patient is intelligent., Strength: Patient is motivated for change., Strength: Patient has positive support network., Strength: Patient has reasonable judgment., Strength: Patient is stable., Liability: Patient lacks coping skills.    Current Evaluation:     Mental Status Exam:  General Appearance:  unremarkable, age appropriate   Speech: normal tone, normal rate, normal pitch, normal volume      Level of Cooperation: cooperative      Thought Processes: normal and logical   Mood: anxious, sad      Thought Content: normal, no suicidality, no homicidality, delusions, or paranoia   Affect: congruent and appropriate   Orientation: Oriented x3   Memory: recent >  intact   Attention Span & Concentration: intact   Fund of General Knowledge: intact and appropriate to age and level of education   Abstract Reasoning: not assessed   Judgment & Insight: fair     Language  intact     Summary: The patient and  decided to hold off moving for a while. Patient is concerned about her youngest daughter starting school and fears that she will endure the same challenges as her sister. The patient and I discussed the genuine fears and the cognitive distortions associated with her fear. Patient encouraged to use her eldest daughter's experience as a lens and not as a means of comparison. Patient will reach out if any additional challenges surface.   Diagnostic Impression - Plan:       ICD-10-CM ICD-9-CM   1. Undiagnosed disease or syndrome present  R69 799.9                           Plan:individual psychotherapy    Return to Clinic: as scheduled    Length of Service (minutes): 30          Barbra Bartlett, PhD  Psychologist  [facility]  Psych

## 2025-06-09 NOTE — TELEPHONE ENCOUNTER
----- Message from Carlos Little sent at 6/9/2025  7:58 AM CDT -----  Regarding: Wegovy  Hello,Pt has not been in contact with [facility] for 2 months for medication refills. Per protocol, prescriptions will be discontinued and enrollment with [facility] will be closed out. Last dispense date was on 3/6/2025 for Wegovy 0.25mg.

## 2025-06-09 NOTE — TELEPHONE ENCOUNTER
"Anesthesia Transfer of Care Note    Patient: Annie Anderson    Procedure(s) Performed: Procedure(s) (LRB):   SECTION, WITH TUBAL LIGATION (N/A)    Patient location: Labor and Delivery    Anesthesia Type: spinal    Transport from OR: Transported from OR on room air with adequate spontaneous ventilation    Post pain: adequate analgesia    Post assessment: no apparent anesthetic complications and tolerated procedure well    Post vital signs: stable    Level of consciousness: awake and alert    Nausea/Vomiting: no nausea/vomiting    Complications: none    Transfer of care protocol was followed      Last vitals: Visit Vitals  /83 (BP Location: Right arm, Patient Position: Lying)   Temp 36.9 °C (98.4 °F) (Oral)   Resp 16   Ht 5' 6" (1.676 m)   Wt 115.7 kg (255 lb)   LMP 10/19/2023 (Exact Date)   SpO2 100%   Breastfeeding No   BMI 41.16 kg/m²     " Patient has been closed out of [facility] MTM program due to [facility] being unable to reach patient to fill GLP-1 medication orders. Programs updated, medications discontinued, and future visits canceled. Pt is welcome to re-enroll in  MTM program at later date if they would like.

## 2025-06-10 ENCOUNTER — OFFICE VISIT (OUTPATIENT)
Dept: OTOLARYNGOLOGY | Facility: CLINIC | Age: 35
End: 2025-06-10
Payer: COMMERCIAL

## 2025-06-10 VITALS — WEIGHT: 277.13 LBS | HEIGHT: 66 IN | BODY MASS INDEX: 44.54 KG/M2

## 2025-06-10 DIAGNOSIS — G47.33 OSA (OBSTRUCTIVE SLEEP APNEA): Primary | ICD-10-CM

## 2025-06-10 DIAGNOSIS — J30.9 ALLERGIC RHINITIS, UNSPECIFIED SEASONALITY, UNSPECIFIED TRIGGER: ICD-10-CM

## 2025-06-10 DIAGNOSIS — Z98.890 HISTORY OF NASAL SEPTOPLASTY: ICD-10-CM

## 2025-06-10 DIAGNOSIS — J34.3 HYPERTROPHY OF BOTH INFERIOR NASAL TURBINATES: ICD-10-CM

## 2025-06-10 PROCEDURE — 3044F HG A1C LEVEL LT 7.0%: CPT | Mod: CPTII,S$GLB,, | Performed by: STUDENT IN AN ORGANIZED HEALTH CARE EDUCATION/TRAINING PROGRAM

## 2025-06-10 PROCEDURE — 99214 OFFICE O/P EST MOD 30 MIN: CPT | Mod: 25,S$GLB,, | Performed by: STUDENT IN AN ORGANIZED HEALTH CARE EDUCATION/TRAINING PROGRAM

## 2025-06-10 PROCEDURE — 31231 NASAL ENDOSCOPY DX: CPT | Mod: S$GLB,,, | Performed by: STUDENT IN AN ORGANIZED HEALTH CARE EDUCATION/TRAINING PROGRAM

## 2025-06-10 PROCEDURE — 1159F MED LIST DOCD IN RCRD: CPT | Mod: CPTII,S$GLB,, | Performed by: STUDENT IN AN ORGANIZED HEALTH CARE EDUCATION/TRAINING PROGRAM

## 2025-06-10 PROCEDURE — 3008F BODY MASS INDEX DOCD: CPT | Mod: CPTII,S$GLB,, | Performed by: STUDENT IN AN ORGANIZED HEALTH CARE EDUCATION/TRAINING PROGRAM

## 2025-06-10 PROCEDURE — 99999 PR PBB SHADOW E&M-EST. PATIENT-LVL III: CPT | Mod: PBBFAC,,, | Performed by: STUDENT IN AN ORGANIZED HEALTH CARE EDUCATION/TRAINING PROGRAM

## 2025-06-10 RX ORDER — IPRATROPIUM BROMIDE 21 UG/1
2 SPRAY, METERED NASAL 2 TIMES DAILY
Qty: 30 ML | Refills: 1 | Status: SHIPPED | OUTPATIENT
Start: 2025-06-10

## 2025-06-10 NOTE — PROGRESS NOTES
Referring Provider:    No referring provider defined for this encounter.  Subjective:   Patient: Anthony Sexton [id number], :1990   Visit date:6/10/2025 1:25 PM    Chief Complaint:  nasal blockage (Pt is coming in today for nasal blockage and headaches that started 4 months ago. Pt c/o L ear pain.)    HPI:    Prior notes reviewed by myself.  Clinical documentation obtained by nursing staff reviewed.     Symptoms worsened abouut 1 month ago. Started after a viral infection. Treated with anitbiotics, flonase, cough medication, decadron (stopped after one dose - heart fluttering), and astelin.    Since then she has continued to have ear fullness, nasal congestion, facial pressure, post nasal drip, hyposmia. She was doing better with sleep, but now having more trouble with breathing at night since this started. No purulent rhinorrhea.     Sinonasal symptoms now present for over a year, and do not resolve between episodes.     She has been on flonase daily.   Has not had allergy testing.     Return clinic visit, 23  Since last visit she continues to have significant issues with left nasal obstruction. Overall for last year or so, progressively worsening. All the time. Worse at night. Only temporary improvement flonase and BID astelin despite consistent use for ~6 months.  She denies facial pressure/pain, purulent rhinorrhea, or anosmia/hyposmia.    Return clinic visit, 24  Doing well  Nasal breathing has much improved  Still some mild congestion when allergies flare, but overall much better  No significant nose bleeding  Some tenderness at times when she blows her nose  No ear issues  Snoring also much improved and morning headaches have resolved    Return clinic visit, 6/10/25      Alternating nasal obstruction for the last 4 months.   Associated sensation of ahrd mucosa or rock in the nose, that also alternates, left ear fullness, facial pressure/fullness all day (forehead, cheeks, nose). Improves with a  "nap  Using saline spray BID, tried flonase and astelin    Hx migraines usually in the back   Takes imitrex      Objective:     Physical Exam:  Vitals:  Ht 5' 6" (1.676 m)   Wt 125.7 kg (277 lb 1.9 oz)   BMI 44.73 kg/m²   General appearance:  Well developed, well nourished    Ears:  Otoscopy of external auditory canals and tympanic membranes was normal, clinical speech reception thresholds grossly intact, no mass/lesion of auricle.    Nose:  No masses/lesions of external nose; septum midline, patent nasal cavities    Mouth:  No mass/lesion of lips, teeth, gums, hard/soft palate, tongue, tonsils, or oropharynx.    Neck & Lymphatics:  No cervical lymphadenopathy, no neck mass/crepitus/ asymmetry, trachea is midline, no thyroid enlargement/tenderness/mass.        [x]  Data Reviewed:    Images    I have independently reviewed the following imaging:    CT sinus 11/9/23    Sinuses clear  Left septal deviation  Inferior turbinate hypertrophy     EXAMINATION:  CT MEDTRONIC SINUSES WITHOUT     CLINICAL HISTORY:  Chronic sinusitis, unspecified     TECHNIQUE:  2.5 mm axial images of the paranasal sinuses without contrast.  Coronal and sagittal reformatted imaging from the axial acquisition     COMPARISON:  CT head 09/27/22     FINDINGS:  Frontal sinuses: Frontal sinuses are clear.     Minimal patchy right posterior ethmoid air cell opacification.     Sphenoid sinus:Clear.     Maxillary antra: Unremarkable.     Ostiomeatal complexes: The ostiomeatal complexes are patent bilaterally.     There is mild leftward deviation of the nasal septum.  No evidence of carotid dehiscence.     Roof of the ethmoids is symmetric.  The lamina papyracea grossly intact bilaterally.     Impression:     No significant abnormality.    PSG  PHYSICIAN INTERPRETATION AND COMMENTS: Findings are consistent with mild, positional obstructive sleep apnea(SAVANNA).  Indications of cardiac dysrhythmia are recorded. Correlated with cardiology exam. Therapy with " CPAP indicated, Please  refer to sleep disorders clinic  CLINICAL HISTORY: 32 year old female presented with: 15 inch neck, BMI of 43.9, an [place] sleepiness score of 10, history  of hypertension, depression and symptoms of nocturnal waking up choking. Based on the clinical history, the patient has  a high pre-test probability of having Mild SAVANNA.  SLEEP STUDY FINDINGS: Patient underwent a 1 night Home Sleep Test and by behavioral criteria, slept for approximately  5.33 hours , with a sleep efficiency of 95%. Mild sleep disordered breathing (AHI=7) is noted based on a 4% hypopnea  desaturation criteria, predominantly in the supine position (10 events/hour). The patient slept supine 59.7% of the night  based on valid recording time of 4.89 hours and is 3.3 times as likely to have apneas/hypopneas when supine. When  considering more subtle measures of sleep disordered breathing, the overall respiratory disturbance index is mild(RDI=15)  based on a 1% hypopnea desaturation criteria with confirmation by surrogate arousal indicators. The apneas/hypopneas  are accompanied by minimal oxygen desaturation (percent time below 90% SpO2: 1%, Min SpO2: 78.1%). The average  desaturation across all sleep disordered breathing events is 2.9%. Snoring occurs for 3.8% (30 dB) of the study. The mean  pulse rate is 86.3 BPM, with very frequent pulse rate variability (82 events with >= 6 BPM increase/decrease per hour).  TREATMENT CONSIDERATIONS: For symptomatic mild obstructive sleep apnea, patient preference and compliance  impacts efficacious outcomes. Consider treatment with nasal continuous positive airway pressure (CPAP/AutoPAP),  mandibular advancement splint (MAS), referral to an ENT surgeon for modification to the airway, and/or weight loss or  behavioral therapy. Based on the SAVANNA Supine data in the study, a Mandibular Advancement Splint (MAS) will likely provide  treatment benefit independent of SAVANNA severity. The patient should  avoid sleeping supine given non-supine AHI is in the  normal range.    Lab Results   Component Value Date    WBC 7.89 04/01/2025    HGB 11.7 (L) 04/01/2025    HCT 39.8 04/01/2025    MCV 80 (L) 04/01/2025    EOSINOPHIL 0.9 02/25/2025       NASAL ENDOSCOPY       Indication: Anthony Sexton is a 34 y.o. female  with sinonasal symptoms that were not able to be explained by anterior rhinoscopy alone, thus necessitating nasal endoscopy.     Procedure: Risks, benefits, and alternatives of the procedure were discussed with the patient, and the patient consented to the nasal endoscopy.  The nasal cavity was sprayed with a topical decongestant and anesthetic (if needed). The endoscope was passed into each nostril and each nasal cavity was visualized.  On each side the nasal cavity, sinuses (if open), turbinates, middle and superior meatus, sphenoethmoidal recess and septum were examined with the findings described below. At the end of the examination, the scope was removed. The patient tolerated the procedure well with no complications.       Endoscopic Sinonasal Exam Findings:  -     The right side has normal mucosa  -     The left side has normal mucosa  -     Nasal secretions: No discolored secretions noted bilaterally  -     Nasal septum: no significant deviation or perforation appreciated   -     Inferior turbinate: Normal mucosa without significant hypertrophy bilaterally  -     Middle turbinate: Normal mucosa without significant hypertrophy bilaterally  -     Other findings: none      Assessment & Plan:   SAVANNA (obstructive sleep apnea)    History of nasal septoplasty    Allergic rhinitis, unspecified seasonality, unspecified trigger    Hypertrophy of both inferior nasal turbinates    Other orders  -     ipratropium (ATROVENT) 21 mcg (0.03 %) nasal spray; 2 sprays by Each Nostril route 2 (two) times daily.  Dispense: 30 mL; Refill: 1        No endoscopic evidence of Chronic Rhinosinusitis, well healed septum  Hx of  migraine, suspect that is etiology of her headaches/facial pressure - trial of her imitrex during episode    Atrovent for VR symptoms; hold saline/astelin

## 2025-06-27 ENCOUNTER — OFFICE VISIT (OUTPATIENT)
Dept: URGENT CARE | Facility: CLINIC | Age: 35
End: 2025-06-27
Payer: COMMERCIAL

## 2025-06-27 VITALS
HEIGHT: 65 IN | HEART RATE: 77 BPM | WEIGHT: 275.88 LBS | BODY MASS INDEX: 45.97 KG/M2 | RESPIRATION RATE: 20 BRPM | SYSTOLIC BLOOD PRESSURE: 139 MMHG | OXYGEN SATURATION: 100 % | TEMPERATURE: 99 F | DIASTOLIC BLOOD PRESSURE: 77 MMHG

## 2025-06-27 DIAGNOSIS — K21.9 GASTROESOPHAGEAL REFLUX DISEASE, UNSPECIFIED WHETHER ESOPHAGITIS PRESENT: Primary | ICD-10-CM

## 2025-06-27 RX ORDER — SUCRALFATE 1 G/1
1 TABLET ORAL
Qty: 120 TABLET | Refills: 0 | Status: SHIPPED | OUTPATIENT
Start: 2025-06-27

## 2025-06-27 RX ORDER — ALUMINUM HYDROXIDE, MAGNESIUM HYDROXIDE, AND SIMETHICONE 1200; 120; 1200 MG/30ML; MG/30ML; MG/30ML
30 SUSPENSION ORAL
Status: COMPLETED | OUTPATIENT
Start: 2025-06-27 | End: 2025-06-27

## 2025-06-27 RX ORDER — LIDOCAINE HYDROCHLORIDE 20 MG/ML
5 SOLUTION OROPHARYNGEAL
Status: COMPLETED | OUTPATIENT
Start: 2025-06-27 | End: 2025-06-27

## 2025-06-27 RX ADMIN — ALUMINUM HYDROXIDE, MAGNESIUM HYDROXIDE, AND SIMETHICONE 30 ML: 1200; 120; 1200 SUSPENSION ORAL at 04:06

## 2025-06-27 RX ADMIN — LIDOCAINE HYDROCHLORIDE 5 ML: 20 SOLUTION OROPHARYNGEAL at 04:06

## 2025-06-27 NOTE — PATIENT INSTRUCTIONS
Maintain hydration/increase fluids  GERD diet  Continue medications as directed  Follow up as needed/with worsening

## 2025-06-27 NOTE — PROGRESS NOTES
"Subjective:      Patient ID: Anthony Sexton is a 34 y.o. female.    Vitals:  height is 5' 5" (1.651 m) and weight is 125.1 kg (275 lb 14.5 oz). Her tympanic temperature is 98.7 °F (37.1 °C). Her blood pressure is 139/77 and her pulse is 77. Her respiration is 20 and oxygen saturation is 100%.     Chief Complaint: Heartburn    34 year old female presents for evaluation of acid reflux (burning pain in chest/esophageal) x 2 weeks.  Reports a history of ulcers and acid reflux.  Tums, Pepto, Nexium 40 mg BID, Omeprazole 40 BID, and Carafate in the past 2 week with very mild relief.  LMP 6/2, (-) Pregnancy test this morning. Followed by GI, last consult 4/2025, (-)H pylori. Discontinued Omeprazole and switched over to Nexium. Concerned about cardiac etiology. Requesting refill on Carafate pill    Heartburn  She complains of heartburn and nausea. She reports no abdominal pain, no belching, no chest pain, no choking, no coughing, no dysphagia, no early satiety, no globus sensation, no hoarse voice, no sore throat, no stridor, no tooth decay, no water brash or no wheezing. This is a recurrent problem. The current episode started 1 to 4 weeks ago. The problem occurs constantly. The problem has been gradually worsening. The heartburn duration is more than one hour. The heartburn is located in the RUQ and substernum. The heartburn is of moderate intensity. The heartburn wakes her from sleep. The heartburn does not limit her activity. The heartburn doesn't change with position. Nothing aggravates the symptoms. Pertinent negatives include no anemia, fatigue, melena, muscle weakness, orthopnea or weight loss. Risk factors: history of gerd and uclers. The treatment provided no relief. Past procedures include an EGD, H. pylori antibody titer and a UGI. Past procedures do not include an abdominal ultrasound, esophageal manometry or esophageal pH monitoring. Past invasive treatments do not include gastroplasty, gastroplication or " reflux surgery.       Constitution: Negative. Negative for fatigue.   HENT: Negative.  Negative for sore throat.    Neck: neck negative.   Cardiovascular: Negative.  Negative for chest pain.   Eyes: Negative.    Respiratory:  Negative for cough and wheezing.    Gastrointestinal:  Positive for nausea and heartburn. Negative for abdominal pain.   Endocrine: negative.   Genitourinary: Negative.    Musculoskeletal: Negative.    Skin: Negative.    Allergic/Immunologic: Negative.    Neurological: Negative.    Hematologic/Lymphatic: Negative.    Psychiatric/Behavioral: Negative.        Objective:     Physical Exam   Constitutional: She is oriented to person, place, and time. She is cooperative.  Non-toxic appearance. She does not appear ill. No distress. She is not intubated. awake  HENT:   Head: Normocephalic and atraumatic.   Ears:   Right Ear: Hearing normal.   Left Ear: Hearing normal.   Eyes: Conjunctivae are normal. Pupils are equal, round, and reactive to light. Right eye exhibits no discharge. Left eye exhibits no discharge. No scleral icterus. Extraocular movement intact   Neck: Neck supple.   Cardiovascular: Normal rate and regular rhythm.   Pulmonary/Chest: Effort normal and breath sounds normal. No accessory muscle usage or stridor. No apnea, no tachypnea and no bradypnea. She is not intubated. No respiratory distress. She has no decreased breath sounds. She has no wheezes. She has no rhonchi. She has no rales. She exhibits tenderness (upper chest).   Abdominal: Normal appearance.   Musculoskeletal: Normal range of motion.         General: Normal range of motion.   Neurological: no focal deficit. She is alert and oriented to person, place, and time.   Skin: Skin is warm and not diaphoretic.   Psychiatric: Her behavior is normal. Mood, judgment and thought content normal.   Nursing note and vitals reviewed.    EKG: normal EKG, normal sinus rhythm, normal sinus rhythm.    Assessment:     1. Gastroesophageal  reflux disease, unspecified whether esophagitis present        Plan:       Gastroesophageal reflux disease, unspecified whether esophagitis present  -     EKG 12-lead; Future  -     sucralfate (CARAFATE) 1 gram tablet; Take 1 tablet (1 g total) by mouth 4 (four) times daily before meals and nightly.  Dispense: 120 tablet; Refill: 0  -     aluminum-magnesium hydroxide-simethicone 200-200-20 mg/5 mL suspension 30 mL  -     LIDOcaine viscous HCl 2% oral solution 5 mL      Patient presents with symptoms and examination that are consistent with acid reflux exacerbation. Decision to administer Maalox/Lidocaine oral solution for relief. Patient reports noted relief/improvement prior to discharge. EKG to rule out cardiac etiology: negative acute findings discussed. Plan is to manage symptoms, prevent worsening, and follow up as needed/with worsening. Discussed with patient who verbalizes understanding. Patient is stable for discharge.        Patient Instructions   Maintain hydration/increase fluids  GERD diet  Continue medications as directed  Follow up as needed/with worsening

## 2025-06-29 ENCOUNTER — TELEPHONE (OUTPATIENT)
Dept: URGENT CARE | Facility: CLINIC | Age: 35
End: 2025-06-29
Payer: COMMERCIAL

## 2025-07-07 ENCOUNTER — PATIENT MESSAGE (OUTPATIENT)
Dept: BARIATRICS | Facility: CLINIC | Age: 35
End: 2025-07-07
Payer: COMMERCIAL

## 2025-07-08 ENCOUNTER — PATIENT MESSAGE (OUTPATIENT)
Dept: BARIATRICS | Facility: CLINIC | Age: 35
End: 2025-07-08
Payer: COMMERCIAL

## 2025-08-12 ENCOUNTER — PATIENT MESSAGE (OUTPATIENT)
Dept: BARIATRICS | Facility: CLINIC | Age: 35
End: 2025-08-12
Payer: COMMERCIAL

## 2025-09-01 ENCOUNTER — DOCUMENTATION ONLY (OUTPATIENT)
Dept: BARIATRICS | Facility: CLINIC | Age: 35
End: 2025-09-01
Payer: COMMERCIAL

## 2025-09-05 ENCOUNTER — HOSPITAL ENCOUNTER (OUTPATIENT)
Dept: CARDIOLOGY | Facility: HOSPITAL | Age: 35
Discharge: HOME | End: 2025-09-05
Attending: INTERNAL MEDICINE
Payer: COMMERCIAL

## 2025-09-05 ENCOUNTER — OFFICE VISIT (OUTPATIENT)
Dept: CARDIOLOGY | Facility: CLINIC | Age: 35
End: 2025-09-05
Payer: COMMERCIAL

## 2025-09-05 VITALS
DIASTOLIC BLOOD PRESSURE: 87 MMHG | HEIGHT: 65 IN | HEART RATE: 91 BPM | OXYGEN SATURATION: 97 % | SYSTOLIC BLOOD PRESSURE: 142 MMHG | BODY MASS INDEX: 46.17 KG/M2 | RESPIRATION RATE: 16 BRPM | WEIGHT: 277.13 LBS

## 2025-09-05 DIAGNOSIS — R07.9 CHEST PAIN, UNSPECIFIED TYPE: Primary | ICD-10-CM

## 2025-09-05 DIAGNOSIS — R07.9 CHEST PAIN, UNSPECIFIED TYPE: ICD-10-CM

## 2025-09-05 DIAGNOSIS — R06.02 SOB (SHORTNESS OF BREATH): ICD-10-CM

## 2025-09-05 DIAGNOSIS — R00.2 PALPITATIONS: ICD-10-CM

## 2025-09-05 DIAGNOSIS — I10 ESSENTIAL HYPERTENSION: Primary | ICD-10-CM

## 2025-09-05 DIAGNOSIS — R07.2 PRECORDIAL PAIN: ICD-10-CM

## 2025-09-05 DIAGNOSIS — G47.33 OSA (OBSTRUCTIVE SLEEP APNEA): ICD-10-CM

## 2025-09-05 LAB
OHS QRS DURATION: 70 MS
OHS QTC CALCULATION: 430 MS

## 2025-09-05 PROCEDURE — 99999 PR PBB SHADOW E&M-EST. PATIENT-LVL IV: CPT | Mod: PBBFAC,,, | Performed by: INTERNAL MEDICINE

## 2025-09-05 PROCEDURE — 93005 ELECTROCARDIOGRAM TRACING: CPT

## (undated) DEVICE — SUT 5/0 18IN PLAIN FAST AB

## (undated) DEVICE — GLOVE SURG BIOGEL LTX PF SZ 8

## (undated) DEVICE — SUT MCRYL PLUS 4-0 PS2 27IN

## (undated) DEVICE — SUT PROLENE 3-0 SH DA 36 BL

## (undated) DEVICE — DRAPE ORTH SPLIT 77X108IN

## (undated) DEVICE — TOWEL OR DISP STRL BLUE 4/PK

## (undated) DEVICE — DRAIN CHANNEL ROUND 15FR

## (undated) DEVICE — BRA STYLE 7 SIZE 44

## (undated) DEVICE — NDL SPINAL SPINOCAN 22GX3.5

## (undated) DEVICE — ELECTRODE REM PLYHSV RETURN 9

## (undated) DEVICE — SYR 30CC LUER LOCK

## (undated) DEVICE — DRESSING TELFA N ADH 3X8

## (undated) DEVICE — SPONGE DERMACEA GAUZE 4X4

## (undated) DEVICE — PACK UNIVERSAL SPLIT II

## (undated) DEVICE — SEE MEDLINE ITEM 152622

## (undated) DEVICE — APPLIER CLIP LIAGCLIP 9.375IN

## (undated) DEVICE — TUBING SUCTION STRAIGHT .25X20

## (undated) DEVICE — STAPLER SKIN ROTATING HEAD

## (undated) DEVICE — SEE MEDLINE ITEM 146417

## (undated) DEVICE — MANIFOLD 4 PORT

## (undated) DEVICE — COVER LIGHT HANDLE 80/CA

## (undated) DEVICE — GOWN SURGICAL X-LARGE

## (undated) DEVICE — TRAY SKIN SCRUB WET 4 COMPART

## (undated) DEVICE — PACK DRAPE PERI/GYN TIBURON

## (undated) DEVICE — GOWN AERO CHROME W/ TOWEL XL

## (undated) DEVICE — GOWN SMARTGOWN LVL4 X-LONG XL

## (undated) DEVICE — TRAY FOLEY 16FR INFECTION CONT

## (undated) DEVICE — SEE MEDLINE ITEM 152512

## (undated) DEVICE — BLADE SURG #15 CARBON STEEL

## (undated) DEVICE — CLOSURE SKIN STERI STRIP 1/2X4

## (undated) DEVICE — NDL HYPO 27G X 1 1/2

## (undated) DEVICE — DRESSING XEROFORM FOIL PK 1X8

## (undated) DEVICE — SKINMARKER & RULER REGULAR X-F

## (undated) DEVICE — EVACUATOR WOUND BULB 100CC

## (undated) DEVICE — BLADE SURG CARBON STEEL #10

## (undated) DEVICE — SPONGE PATTY SURGICAL .5X3IN

## (undated) DEVICE — SYR B-D DISP CONTROL 10CC100/C

## (undated) DEVICE — BANDAGE KERLIX P/P 2.25IN STER

## (undated) DEVICE — SEE MEDLINE ITEM 157128

## (undated) DEVICE — DRAPE UINDERBUT GRAD PCH

## (undated) DEVICE — SUT 4-0 CHROMIC GUT / SH

## (undated) DEVICE — PACK BASIC SETUP SC BR

## (undated) DEVICE — SUT MONOCRYL 3-0 PS-2 UND

## (undated) DEVICE — SYS PRINEO SKIN CLOSURE

## (undated) DEVICE — BLADE INFERIOR TURBINATE 2MM

## (undated) DEVICE — ELECTRODE NDL EDGE 2 5/6IN

## (undated) DEVICE — BOVIE SUCTION

## (undated) DEVICE — CONTAINER SPECIMEN OR STER 4OZ

## (undated) DEVICE — TRAY MINOR GEN SURG

## (undated) DEVICE — SUT SILK 2-0 PS 18IN BLACK

## (undated) DEVICE — GLOVE SENSICARE PI ALOE 6

## (undated) DEVICE — SPONGE LAP 18X18 PREWASHED

## (undated) DEVICE — PAD ABD 8X10 STERILE